# Patient Record
Sex: FEMALE | Race: WHITE | NOT HISPANIC OR LATINO | Employment: OTHER | URBAN - NONMETROPOLITAN AREA
[De-identification: names, ages, dates, MRNs, and addresses within clinical notes are randomized per-mention and may not be internally consistent; named-entity substitution may affect disease eponyms.]

---

## 2017-01-04 ENCOUNTER — OFFICE VISIT (OUTPATIENT)
Dept: CARDIAC SURGERY | Facility: CLINIC | Age: 72
End: 2017-01-04

## 2017-01-04 VITALS
BODY MASS INDEX: 39.4 KG/M2 | SYSTOLIC BLOOD PRESSURE: 130 MMHG | HEIGHT: 68 IN | HEART RATE: 40 BPM | DIASTOLIC BLOOD PRESSURE: 80 MMHG | WEIGHT: 260 LBS | OXYGEN SATURATION: 98 %

## 2017-01-04 DIAGNOSIS — I35.0 AORTIC VALVE STENOSIS, UNSPECIFIED ETIOLOGY: Primary | ICD-10-CM

## 2017-01-04 PROCEDURE — 99213 OFFICE O/P EST LOW 20 MIN: CPT | Performed by: THORACIC SURGERY (CARDIOTHORACIC VASCULAR SURGERY)

## 2017-01-04 NOTE — MR AVS SNAPSHOT
Jarvis Morgan   1/4/2017 1:45 PM   Office Visit    Dept Phone:  [phone]   Encounter #:  [id number]    Provider:  Tristan Mendez MD   Department:  [facility] CARDIOTHORACIC SURGERY                Your Full Care Plan              Your Updated Medication List          This list is accurate as of: 1/4/17  3:39 PM.  Always use your most recent med list.                allopurinol 300 MG tablet   Commonly known as:  ZYLOPRIM       calcitriol 0.25 MCG capsule   Commonly known as:  ROCALTROL       cholecalciferol 1000 UNITS tablet   Commonly known as:  VITAMIN D3       donepezil 5 MG tablet   Commonly known as:  ARICEPT       ergocalciferol 75358 UNITS capsule   Commonly known as:  ERGOCALCIFEROL       fish oil 1000 MG capsule capsule       furosemide 40 MG tablet   Commonly known as:  LASIX       HYDROcodone-acetaminophen 5-325 MG per tablet   Commonly known as:  NORCO       methIMAzole 10 MG tablet   Commonly known as:  TAPAZOLE       metoprolol tartrate 100 MG tablet   Commonly known as:  LOPRESSOR       O2   Commonly known as:  OXYGEN       PROTONIX 40 MG EC tablet   Generic drug:  pantoprazole       * CAROLANN-FIT NATURA UROSTOMY POUCH POUCH misc       * CAROLANN-FIT NATURA WAFER/FLANGE wafer       warfarin 7.5 MG tablet   Commonly known as:  COUMADIN       * Notice:  This list has 2 medication(s) that are the same as other medications prescribed for you. Read the directions carefully, and ask your doctor or other care provider to review them with you.            We Performed the Following     Ambulatory Referral to Cardiology       You Were Diagnosed With        Codes Comments    Aortic valve stenosis, unspecified etiology    -  Primary ICD-10-CM: I35.0  ICD-9-CM: 424.1       Instructions     None    Patient Instructions History      Upcoming Appointments     Visit Type Date Time Department    FOLLOW UP 1/4/2017  1:45 PM [facility] SR PAD    FOLLOW UP 2/7/2017  1:00 PM [facility]  "[facility] HRT[facility] SR PAD    FOLLOW UP 4/21/2017  9:15 AM [facility] HEART [facility] PAD      MyChart Signup     Our records indicate that you have declined [facility] sign[facility]. If you would like to sign up for MyChart, please email [email or web address] or call [phone] to obtain an activation code.             Other Info from Your Visit           Your Appointments     Feb 07, 2017  1:00 PM CST   Follow Up with Tristan Mendez MD   [facility] CARDIOTHORACIC SURGERY (--)    [address] [place] [address] [address]  [place] [address] [phone]           Arrive 15 minutes prior to appointment.            Apr 21, 2017  9:15 AM CDT   Follow Up with Elia Flores MD   [facility] HEART GROUP (--)    [address] [place] [address] [address]  [place] [address] [phone]           Arrive 15 minutes prior to appointment.              Allergies     Ciprofloxacin      Codeine      Other Allergy Itching    Cloth bandaids    Tetanus Toxoids      Tizanidine Hcl        Reason for Visit     Follow-up pt states here for f/u after lung doctor    Shortness of Breath pt has had several spells on exertion and they are getting worse       Vital Signs     Blood Pressure Pulse Height Weight Oxygen Saturation Body Mass Index    130/80 (BP Location: Right arm, Patient Position: Sitting, Cuff Size: Adult) 40 68\" (172.7 cm) 260 lb (118 kg) 98% 39.53 kg/m2    Smoking Status                   Never Smoker           Problems and Diagnoses Noted     Aortic heart valve narrowing    -  Primary        "

## 2017-01-06 ENCOUNTER — TELEPHONE (OUTPATIENT)
Dept: CARDIAC SURGERY | Facility: CLINIC | Age: 72
End: 2017-01-06

## 2017-01-06 NOTE — TELEPHONE ENCOUNTER
Have already attempted to call pts daughter her v/m is full i dont have order for any test but i do have an appt for cardiology that i have been trying to get ahold of her for if she calls and im not around apt is with mitali 1/12 @ 1015 i will att  empt to call her back again

## 2017-01-12 ENCOUNTER — OFFICE VISIT (OUTPATIENT)
Dept: CARDIOLOGY | Facility: CLINIC | Age: 72
End: 2017-01-12

## 2017-01-12 VITALS
SYSTOLIC BLOOD PRESSURE: 118 MMHG | HEIGHT: 68 IN | DIASTOLIC BLOOD PRESSURE: 80 MMHG | HEART RATE: 81 BPM | BODY MASS INDEX: 38.49 KG/M2 | WEIGHT: 254 LBS

## 2017-01-12 DIAGNOSIS — I10 ESSENTIAL HYPERTENSION: ICD-10-CM

## 2017-01-12 DIAGNOSIS — I35.0 AORTIC STENOSIS, SEVERE: Primary | ICD-10-CM

## 2017-01-12 DIAGNOSIS — R53.81 PHYSICAL DECONDITIONING: ICD-10-CM

## 2017-01-12 DIAGNOSIS — I48.20 CHRONIC ATRIAL FIBRILLATION (HCC): ICD-10-CM

## 2017-01-12 PROBLEM — R06.02 SHORTNESS OF BREATH: Status: ACTIVE | Noted: 2017-01-12

## 2017-01-12 PROCEDURE — 99213 OFFICE O/P EST LOW 20 MIN: CPT | Performed by: INTERNAL MEDICINE

## 2017-01-12 PROCEDURE — 93000 ELECTROCARDIOGRAM COMPLETE: CPT | Performed by: INTERNAL MEDICINE

## 2017-01-12 NOTE — MR AVS SNAPSHOT
Jarvis Morgan   1/12/2017 10:15 AM   Office Visit    Dept Phone:  [phone]   Encounter #:  [id number]    Provider:  Lb Murry MD   Department:  [facility] HEART GROUP                Your Full Care Plan              Your Updated Medication List          This list is accurate as of: 1/12/17 11:23 AM.  Always use your most recent med list.                allopurinol 300 MG tablet   Commonly known as:  ZYLOPRIM       calcitriol 0.25 MCG capsule   Commonly known as:  ROCALTROL       cholecalciferol 1000 UNITS tablet   Commonly known as:  VITAMIN D3       donepezil 5 MG tablet   Commonly known as:  ARICEPT       ergocalciferol 50769 UNITS capsule   Commonly known as:  ERGOCALCIFEROL       fish oil 1000 MG capsule capsule       furosemide 40 MG tablet   Commonly known as:  LASIX       HYDROcodone-acetaminophen 5-325 MG per tablet   Commonly known as:  NORCO       methIMAzole 10 MG tablet   Commonly known as:  TAPAZOLE       metoprolol tartrate 100 MG tablet   Commonly known as:  LOPRESSOR       O2   Commonly known as:  OXYGEN       PROTONIX 40 MG EC tablet   Generic drug:  pantoprazole       * CAROLANN-FIT NATURA UROSTOMY POUCH POUCH misc       * CAROLANN-FIT NATURA WAFER/FLANGE wafer       warfarin 7.5 MG tablet   Commonly known as:  COUMADIN       * Notice:  This list has 2 medication(s) that are the same as other medications prescribed for you. Read the directions carefully, and ask your doctor or other care provider to review them with you.            Instructions     None    Patient Instructions History      Upcoming Appointments     Visit Type Date Time Department    FOLLOW UP 1/12/2017 10:15 AM [facility] HEART GROUP PAD    FOLLOW UP 2/7/2017  1:00 PM [facility] HRTCHST SR PAD    FOLLOW UP 4/21/2017  9:15 AM [facility] HEART [facility] PAD      MyChart Signup     Our records indicate that you have declined [facility] signup. If you would like to sign up for MyChart, please  "email [email or web address] or call [phone] to obtain an activation code.             Other Info from Your Visit           Your Appointments     Feb 07, 2017  1:00 PM CST   Follow Up with Tristan Mendez MD   [facility] CARDIOTHORACIC SURGERY (--)    [address] [place] [address] [address]  [facility] [id number] [phone]           Arrive 15 minutes prior to appointment.            Apr 21, 2017  9:15 AM CDT   Follow Up with Elia Flores MD   [facility] HEART GROUP (--)    [address] [place] [address] [address]  [facility] [address]   [phone]           Arrive 15 minutes prior to appointment.              Allergies     Ciprofloxacin      Codeine      Other Allergy Itching    Cloth bandaids    Tetanus Toxoids      Tizanidine Hcl        Reason for Visit     Aortic Stenosis Dr Mendez referred to us for baloon procedure.    Edema feet and ankles swelling.    Shortness of Breath When she wakes up in the morning and on exertion.      Vital Signs     Blood Pressure Pulse Height Weight Body Mass Index Smoking Status    118/80 (BP Location: Right arm, Patient Position: Sitting) 81 68\" (172.7 cm) 254 lb (115 kg) 38.62 kg/m2 Never Smoker        "

## 2017-01-12 NOTE — PROGRESS NOTES
Subjective:     Encounter Date:01/12/2017      Patient ID: Viaserjio Morgan is a 71 y.o. female.  With a history of chronic atrial fibrillation, followed by Dr. Flores, severe native aortic valve stenosis, previously seen by Dr. Mendez for consideration of surgical aortic valve replacement who presents today for routine follow-up, and a second opinion regarding the patient's care surrounding her aortic valve disease.    Chief Complaint: Aortic valve stenosis    Shortness of Breath   This is a chronic problem. The problem occurs daily. The problem has been gradually improving. Associated symptoms include leg swelling. Pertinent negatives include no abdominal pain, chest pain, claudication, fever, headaches, hemoptysis, orthopnea, PND, sore throat, syncope, vomiting or wheezing. The symptoms are aggravated by exercise. She has tried rest for the symptoms. The treatment provided moderate relief. There is no history of CAD or a heart failure.   Atrial Fibrillation   Presents for follow-up visit. Symptoms include shortness of breath. Symptoms are negative for bradycardia, chest pain, dizziness, hemodynamic instability, hypertension, hypotension, palpitations, syncope, tachycardia and weakness. The symptoms have been stable. Past medical history includes atrial fibrillation. There is no history of CAD. There are no medication compliance problems.   Hypertension   This is a chronic problem. The problem is unchanged. The problem is controlled. Associated symptoms include peripheral edema and shortness of breath. Pertinent negatives include no anxiety, blurred vision, chest pain, headaches, orthopnea, palpitations or PND. There are no associated agents to hypertension. Risk factors for coronary artery disease include obesity. Past treatments include beta blockers. The current treatment provides significant improvement. Compliance problems include exercise and diet.  There is no history of CAD/MI or heart failure. Identifiable  causes of hypertension include chronic renal disease.     The patient is a history of chronic atrial fibrillation and is followed by Dr. Flores in our office and is anticoagulated and tolerating this well with controlled heart rates.  She also has a history of severe aortic valve stenosis.  She is followed with Dr. Mendez for this.  She has been considered for surgical aortic valve replacement, however, in the interim of scheduling this procedure and testing, the patient has had some decrease in her overall health status to the poor she was thought, at last office visit by Dr. Mendez, did not be an ideal candidate, at the current time, given her physical deconditioning, for a surgical aortic valve replacement.  I was then asked to see the patient to consider her for a possible balloon aortic valvuloplasty procedure.  Since last seen Dr. Mendez, the patient has lost proximally 5-10 pounds.  She notes that she has increased her physical activity level and she has changed her diet and improve her portion control on attempt to get in better health prior to possible surgical aortic valve replacement.  The patient is feeling markedly better than she did when she saw Dr. Mendez.  She denies any chest pain.  She continues to have shortness of breath and dyspnea on exertion as well as chronic lower extremity edema.  She denies any orthopnea, PND, lightheadedness, dizziness, syncope.  She has had no trouble with her medications.    Current Outpatient Prescriptions:   •  allopurinol (ZYLOPRIM) 300 MG tablet, Take 300 mg by mouth Daily., Disp: , Rfl:   •  calcitriol (ROCALTROL) 0.25 MCG capsule, Take 0.25 mcg by mouth Daily., Disp: , Rfl:   •  cholecalciferol (VITAMIN D3) 1000 UNITS tablet, Take 1,000 Units by mouth Daily., Disp: , Rfl:   •  donepezil (ARICEPT) 5 MG tablet, Take 5 mg by mouth Every Night., Disp: , Rfl:   •  ergocalciferol (ERGOCALCIFEROL) 30839 UNITS capsule, Take 50,000 Units by mouth 1 (One) Time Per Week., Disp: ,  Rfl:   •  furosemide (LASIX) 40 MG tablet, Take 40 mg by mouth Daily., Disp: , Rfl:   •  HYDROcodone-acetaminophen (NORCO) 5-325 MG per tablet, Take 1 tablet by mouth Every 6 (Six) Hours As Needed., Disp: , Rfl:   •  methimazole (TAPAZOLE) 10 MG tablet, Take 5 mg by mouth Daily., Disp: , Rfl:   •  metoprolol tartrate (LOPRESSOR) 100 MG tablet, Take 100 mg by mouth 2 (Two) Times a Day., Disp: , Rfl:   •  O2 (OXYGEN), Inhale 1 (One) Time., Disp: , Rfl:   •  Omega-3 Fatty Acids (FISH OIL) 1000 MG capsule capsule, Take  by mouth Daily With Breakfast., Disp: , Rfl:   •  Ostomy Supplies (CAROLANN-FIT NATURA UROSTOMY POUCH) POUCH misc, , Disp: , Rfl: 11  •  Ostomy Supplies (CAROLANN-FIT NATURA WAFER/FLANGE) wafer, , Disp: , Rfl:   •  pantoprazole (PROTONIX) 40 MG EC tablet, Take 40 mg by mouth Daily., Disp: , Rfl:   •  warfarin (COUMADIN) 7.5 MG tablet, Take 7.5 mg by mouth Daily. As directed by Dr. Alvarez, Disp: , Rfl:     Review of Systems   Constitution: Positive for weight loss. Negative for chills, fever, weakness and night sweats.   HENT: Negative for congestion, headaches and sore throat.    Eyes: Negative for blurred vision.   Cardiovascular: Positive for dyspnea on exertion and leg swelling. Negative for chest pain, claudication, irregular heartbeat, orthopnea, palpitations, paroxysmal nocturnal dyspnea and syncope.   Respiratory: Positive for shortness of breath. Negative for cough, hemoptysis and wheezing.    Endocrine: Negative for cold intolerance, heat intolerance, polydipsia and polyuria.   Hematologic/Lymphatic: Negative for bleeding problem. Does not bruise/bleed easily.   Gastrointestinal: Negative for abdominal pain, hematemesis, hematochezia, melena, nausea and vomiting.   Genitourinary: Negative for bladder incontinence, dysuria and hematuria.   Neurological: Negative for dizziness, loss of balance, numbness, paresthesias and seizures.       ECG 12 Lead  Date/Time: 1/12/2017 4:09 PM  Performed by: KRISTEN  "GOLDY WALKER  Authorized by: GOLDY PETERSON DENISE   Comparison: compared with previous ECG   Similar to previous ECG  Rhythm: atrial fibrillation  Rate: normal  Clinical impression: abnormal ECG  Comments: Nonspecific ST and T-wave changes             Objective:     Physical Exam   Constitutional: She is oriented to person, place, and time. She appears well-developed and well-nourished. No distress.   HENT:   Head: Normocephalic and atraumatic.   Mouth/Throat: Oropharynx is clear and moist.   Eyes: EOM are normal. Pupils are equal, round, and reactive to light.   Neck: Normal range of motion. Neck supple. No JVD present. No thyromegaly present.   Cardiovascular: Normal rate, regular rhythm, S1 normal, S2 normal and intact distal pulses.  Exam reveals no gallop and no friction rub.    Murmur heard.   Harsh midsystolic murmur is present with a grade of 2/6  at the upper right sternal border  Pulses:       Carotid pulses are 2+ on the right side, and 2+ on the left side.       Dorsalis pedis pulses are 1+ on the right side, and 1+ on the left side.        Posterior tibial pulses are 1+ on the right side, and 1+ on the left side.   Pulmonary/Chest: Effort normal and breath sounds normal.   Abdominal: Soft. Bowel sounds are normal. She exhibits no distension and no mass. There is no tenderness. There is no rebound and no guarding.   Obese   Musculoskeletal: Normal range of motion. She exhibits edema.   Neurological: She is alert and oriented to person, place, and time. No cranial nerve deficit.   Skin: Skin is warm and dry. No rash noted. No cyanosis or erythema. Nails show no clubbing.   Psychiatric: She has a normal mood and affect.   Nursing note and vitals reviewed.      Visit Vitals   • /80 (BP Location: Right arm, Patient Position: Sitting)   • Pulse 81   • Ht 68\" (172.7 cm)   • Wt 254 lb (115 kg)   • BMI 38.62 kg/m2           Assessment:          Diagnosis Plan   1. Aortic stenosis, severe     2. Chronic " atrial fibrillation     3. Essential hypertension     4. Physical deconditioning            Plan:       1.  Severe native aortic valve stenosis: The patient likely needs an aortic valve replacement as I feel like she is symptomatic from her aortic valve disease.  She was seen by Dr. Mendez and thought to be somewhat deconditioned and likely not the best candidate for an immediate surgical aortic valve replacement.  She was referred here to me, for potential evaluation of a balloon aortic valvuloplasty.  Since last seeing Dr. Mendez, she reports a 10 pound weight loss, more strict adherence to diet and exercise regimens.  She notes that she actually has improved.  Her physical strength and stamina have increased somewhat.  Her daughter supports her in this and and is keeping a close watch over her mother and helping her along in this process.  Therefore, I think, as the patient has follow-up with Dr. Mendez at February 1, I should allow for a few more weeks to see if she continues to improve.  If, by the time she visits Dr. Mendez again, she is still thought to be deconditioned to the point of not being a good candidate for surgical aortic valve at the present time, we could offer her a bridge to a surgical aortic valve procedure by means of a balloon aortic valvuloplasty.  She, although otherwise might not be high risk necessarily for surgical aortic valve procedure, might also be considered for a transcatheter aortic valve replacement if she does not improve physically.    2.  Chronic atrial fibrillation: Her heart rates are well controlled and she is chronically anticoagulated with warfarin.  Continue current medication.    3.  Essential hypertension: The patient's blood pressure is under good control.  Continue current medications.    4.  Physical deconditioning.  I have encouraged continued efforts at diet, weight loss, exercise, and for her to perform as much physical activity as possible in order to better herself  for a potential surgical aortic valve replacement.    Follow-up with me as needed.  The patient has scheduled follow-up with Dr. Mendez and Dr. Flores.  Keep these appointments.    EMR Dragon/Transcription disclaimer: Much of this encounter note is an electronic transcription/translation of spoken language to printed text. The electronic translation of spoken language may permit erroneous, or at times, nonsensical words or phrases to be inadvertently transcribed; although I have reviewed the note for such errors, some may still exist.

## 2017-01-24 NOTE — PROGRESS NOTES
"Chief Complaint   Patient presents with   • Follow-up     pt states here for f/u after lung doctor   • Shortness of Breath     pt has had several spells on exertion and they are getting worse        Since her last visit, she was seen by pulmonary.  She has been cleared with the understanding for her to have increased operative risk from a pulmonary point of view for pulmonary complications.    She reports increasing shortness of breath with exertion.  The intensity is increased and the time taken to recover is increased.  As a result, she has reduced her ambulation markedly.  She reports no significant lower extremity swelling.      Visit Vitals   • /80 (BP Location: Right arm, Patient Position: Sitting, Cuff Size: Adult)   • Pulse (!) 40   • Ht 68\" (172.7 cm)   • Wt 260 lb (118 kg)   • SpO2 98%   • BMI 39.53 kg/m2       Physical Exam:    General: NAD, depressed facies  Cardiovascular: Irregular, irregular HR on exam is 64, No rubs, or gallops.  Murmur    Pulmonary: CTAB, No wheezing, rubs, or rales.  Abdomen: soft, Non-distended, and non-tender  Extremities: warm, KABA, edematous, chronic left greater than right  Neurologic:  No focal deficits, CN II-XII intact grossly.      Impression:  Aortic valve stenosis, severe, symptomatic   Pulmonary dysfunction, restrictive- contributing but improved as her hypoxemia is better today compared to last exam  Hypercoagulable state, previous DVT, left  Atrial fibrillation   Possible physical debilitation  Apparent poort operative candidate    Medical decision making/recommendations/plan:  We discussed that she is a difficult case.  Given her host of medical problems, she will have increased risk of complications whether TAVR or SAVR is performed.  Given her debility which appears to have been progressive over the last 3 week at least, she probably is better served by not proceeding forward with SAVR.  Probably the best course of action is to consider BAV.  I discussed the " procedure and its asociated risks in broad description.  If her dyspnea improves and she can increase her strength, then SAVR will probably be her best answer.  If her dyspnea does not, then given her baseline comorbidities and physical debilitation, the best course is TAVR.    We discussed the above.  A referral will be made to Dr. Murry for BAV.  I have spoken to him direction, and try to see her in an asap effort.    We discussed the importance of durable lifestyle changes to accomplish an acceptable weight since her weight is greater than acceptable for her age and height.   I have recommended she speak with her PCP.    She is a non-smoker.    Total time: 22 minutes.  With greater than 50% time counseling the patient.

## 2017-02-07 ENCOUNTER — OFFICE VISIT (OUTPATIENT)
Dept: CARDIAC SURGERY | Facility: CLINIC | Age: 72
End: 2017-02-07

## 2017-02-07 VITALS
BODY MASS INDEX: 37.44 KG/M2 | HEIGHT: 68 IN | WEIGHT: 247 LBS | OXYGEN SATURATION: 98 % | DIASTOLIC BLOOD PRESSURE: 80 MMHG | SYSTOLIC BLOOD PRESSURE: 124 MMHG | HEART RATE: 83 BPM

## 2017-02-07 DIAGNOSIS — I87.2 VENOUS INSUFFICIENCY (CHRONIC) (PERIPHERAL): Primary | ICD-10-CM

## 2017-02-07 PROCEDURE — 99214 OFFICE O/P EST MOD 30 MIN: CPT | Performed by: THORACIC SURGERY (CARDIOTHORACIC VASCULAR SURGERY)

## 2017-02-13 ENCOUNTER — TELEPHONE (OUTPATIENT)
Dept: CARDIAC SURGERY | Facility: CLINIC | Age: 72
End: 2017-02-13

## 2017-03-07 ENCOUNTER — OFFICE VISIT (OUTPATIENT)
Dept: VASCULAR SURGERY | Facility: CLINIC | Age: 72
End: 2017-03-07

## 2017-03-07 VITALS
DIASTOLIC BLOOD PRESSURE: 72 MMHG | WEIGHT: 239 LBS | SYSTOLIC BLOOD PRESSURE: 134 MMHG | HEIGHT: 68 IN | HEART RATE: 59 BPM | BODY MASS INDEX: 36.22 KG/M2

## 2017-03-07 DIAGNOSIS — I87.8 VENOUS STASIS: ICD-10-CM

## 2017-03-07 DIAGNOSIS — Z86.718 HISTORY OF DVT OF LOWER EXTREMITY: ICD-10-CM

## 2017-03-07 DIAGNOSIS — I87.2 VENOUS (PERIPHERAL) INSUFFICIENCY: Primary | ICD-10-CM

## 2017-03-07 PROCEDURE — 99204 OFFICE O/P NEW MOD 45 MIN: CPT | Performed by: SURGERY

## 2017-03-07 NOTE — PROGRESS NOTES
03/07/2017      Tristan Mendez MD  [address]  [facility] [address]  [place] [address]    Jarvis Morgan  1945    Chief Complaint   Patient presents with   • Lower Extremity Issue     wound LLL       Dear Tristan Mendez MD:      HPI  I had the pleasure of seeing your patient Jarvis Morgan in the office today.  Thank you kindly for this consultation.  As you recall, Jarvis Morgan is a 71 y.o.  female who you are currently following for aortic valve disease.  She also has a history of a hypercoagulable state with multiple DVTs in the left lower extremity.  She has evidence of significant swelling and stasis dermatitis of that leg.  She has had a wound to her shin on her left lower extremity, which has now healed.  She has been wearing compression stockings on a daily basis.    Past Medical History   Diagnosis Date   • A-fib    • Aortic stenosis    • Arthritis    • Bradycardia    • Cancer      bladder and skin   • Cardiomegaly    • GERD (gastroesophageal reflux disease)    • Hypercalcemia    • Hyperlipidemia    • Hypertension    • Hypothyroidism    • Long term current use of anticoagulant therapy    • Palpitation    • Shortness of breath    • Sick sinus syndrome    • Sleep apnea        Past Surgical History   Procedure Laterality Date   • Hip bipolar replacement Left    • Bladder surgery       removed   • Nephrectomy radical Right      from cancer   • Hemicolectomy w/ ostomy     • Hysterectomy     • Cardiac catheterization     • Kidney surgery       right kidney removed   • Cardiac catheterization N/A 12/9/2016     Procedure: Left Heart Cath;  Surgeon: Elia Flores MD;  Location: [facility] CATH INVASIVE LOCATION;  Service:        Family History   Problem Relation Age of Onset   • Heart failure Mother    • Heart disease Father    • Stroke Father    • Hypertension Sister    • Heart failure Sister    • No Known Problems Brother    • No Known Problems Maternal Grandmother    • No Known Problems Maternal Grandfather    • No  Known Problems Paternal Grandmother    • No Known Problems Paternal Grandfather    • Hypertension Sister    • Heart failure Sister    • Hypertension Sister    • Heart failure Sister    • Hypertension Sister    • Heart failure Sister    • Hypertension Sister    • Heart failure Sister    • Hypertension Sister    • Heart failure Sister    • Gallbladder disease Brother        Social History     Social History   • Marital status:      Spouse name: N/A   • Number of children: N/A   • Years of education: N/A     Occupational History   • Not on file.     Social History Main Topics   • Smoking status: Never Smoker   • Smokeless tobacco: Never Used   • Alcohol use No   • Drug use: No   • Sexual activity: Defer     Other Topics Concern   • Not on file     Social History Narrative       Allergies   Allergen Reactions   • Ciprofloxacin    • Codeine    • Other Itching     Cloth bandaids   • Tetanus Toxoids    • Tizanidine Hcl        Prior to Admission medications    Medication Sig Start Date End Date Taking? Authorizing Provider   allopurinol (ZYLOPRIM) 300 MG tablet Take 300 mg by mouth Daily.   Yes Historical Provider, MD   calcitriol (ROCALTROL) 0.25 MCG capsule Take 0.25 mcg by mouth Daily.   Yes Historical Provider, MD   donepezil (ARICEPT) 5 MG tablet Take 5 mg by mouth Every Night.   Yes Historical Provider, MD   methimazole (TAPAZOLE) 10 MG tablet Take 5 mg by mouth Daily.   Yes Historical Provider, MD   metoprolol tartrate (LOPRESSOR) 100 MG tablet Take 100 mg by mouth 2 (Two) Times a Day.   Yes Historical Provider, MD   O2 (OXYGEN) Inhale 1 (One) Time.   Yes Historical Provider, MD   Ostomy Supplies (CAROLANN-FIT NATURA UROSTOMY POUCH) [facility]  10/25/16  Yes Historical Provider, MD   Ostomy Supplies (CAROLANN-FIT NATURA WAFER/FLANGE) wafer    Yes Historical Provider, MD   pantoprazole (PROTONIX) 40 MG EC tablet Take 40 mg by mouth Daily.   Yes Historical Provider, MD   warfarin (COUMADIN) 7.5 MG tablet Take 7.5 mg by  "mouth Daily. As directed by Dr. Alvarez   Yes Historical Provider, MD   cholecalciferol (VITAMIN D3) 1000 UNITS tablet Take 1,000 Units by mouth Daily.  3/7/17  Historical Provider, MD   ergocalciferol (ERGOCALCIFEROL) 73852 UNITS capsule Take 50,000 Units by mouth 1 (One) Time Per Week.  3/7/17  Historical Provider, MD   furosemide (LASIX) 40 MG tablet Take 40 mg by mouth Daily.  3/7/17  Historical Provider, MD   HYDROcodone-acetaminophen (NORCO) 5-325 MG per tablet Take 1 tablet by mouth Every 6 (Six) Hours As Needed.  3/7/17  Historical Provider, MD   Omega-3 Fatty Acids (FISH OIL) 1000 MG capsule capsule Take  by mouth Daily With Breakfast.  3/7/17  Historical Provider, MD       Review of Systems   Constitutional: Negative.    HENT: Negative.    Eyes: Negative.    Respiratory: Negative.    Cardiovascular: Positive for leg swelling.        Leg cramping   Gastrointestinal: Negative.    Endocrine: Negative.    Genitourinary: Negative.    Musculoskeletal: Negative.    Skin: Negative.    Allergic/Immunologic: Negative.    Neurological: Negative.    Hematological: Negative.    Psychiatric/Behavioral: Negative.        Visit Vitals   • /72   • Pulse 59   • Ht 68\" (172.7 cm)   • Wt 239 lb (108 kg)   • BMI 36.34 kg/m2     Physical Exam   Constitutional: She is oriented to person, place, and time. She appears well-developed and well-nourished.   HENT:   Head: Normocephalic and atraumatic.   Neck: Neck supple. No JVD present. Carotid bruit is not present. No thyromegaly present.   Cardiovascular: Normal rate, regular rhythm and normal heart sounds.    Pulses:       Carotid pulses are 2+ on the right side, and 2+ on the left side.       Femoral pulses are 2+ on the right side, and 2+ on the left side.       Popliteal pulses are 2+ on the right side, and 2+ on the left side.        Dorsalis pedis pulses are 2+ on the right side, and 2+ on the left side.        Posterior tibial pulses are 2+ on the right side, and 2+ on " the left side.   Chronic venous stasis changes to left lower extremity   Pulmonary/Chest: Effort normal and breath sounds normal.   Abdominal: Soft. Bowel sounds are normal. She exhibits no distension, no abdominal bruit and no mass. There is no hepatosplenomegaly. There is no tenderness.   Musculoskeletal: Normal range of motion. She exhibits no edema.   Neurological: She is alert and oriented to person, place, and time. She has normal strength. No cranial nerve deficit or sensory deficit.   Skin: Skin is warm and intact.   Nursing note and vitals reviewed.          Patient Active Problem List   Diagnosis   • Aortic stenosis, severe   • Chronic atrial fibrillation   • Mixed hyperlipidemia   • Essential hypertension   • Deep vein thrombosis (DVT) of left lower extremity   • Shortness of breath   • Physical deconditioning         ICD-10-CM ICD-9-CM   1. Venous (peripheral) insufficiency I87.2 459.81   2. Venous stasis I87.8 459.81   3. History of DVT of lower extremity Z86.718 V12.51       Lab Frequency Next Occurrence       Plan: After thoroughly evaluating Jarvis Morgan, I believe the best course of action is to proceed with some further testing which will include a 2 leg VVI.  She does have evidence of significant swelling and venous stasis.  She recently had an ulcer which was slow to heal but did finally heal.  If she has significant insufficiency on the testing then I would recommend radiofrequency ablation of the greater saphenous vein.  I will see her back in 1 week's time with her testing to see if any further treatment is necessary.  I urged her to continue to wear her stockings on a daily basis and to keep her legs elevated when she is not on them.    Thank you for allowing me to participate in the care of your patient.  Please do not hesitate with any questions or concerns.  I will keep you aware of any further encounters with Jarvis Morgan.        Sincerely yours,         Blake Martinez, DO [place]  Catrachita Izquierdo, APRN

## 2017-03-17 ENCOUNTER — OFFICE VISIT (OUTPATIENT)
Dept: VASCULAR SURGERY | Facility: CLINIC | Age: 72
End: 2017-03-17

## 2017-03-17 ENCOUNTER — HOSPITAL ENCOUNTER (OUTPATIENT)
Dept: WOMENS IMAGING | Age: 72
Discharge: HOME | End: 2017-03-17
Payer: MEDICARE

## 2017-03-17 ENCOUNTER — HOSPITAL ENCOUNTER (OUTPATIENT)
Dept: ULTRASOUND IMAGING | Facility: HOSPITAL | Age: 72
Discharge: HOME | End: 2017-03-17
Admitting: NURSE PRACTITIONER

## 2017-03-17 VITALS
SYSTOLIC BLOOD PRESSURE: 142 MMHG | HEIGHT: 68 IN | BODY MASS INDEX: 36.22 KG/M2 | WEIGHT: 239 LBS | DIASTOLIC BLOOD PRESSURE: 82 MMHG

## 2017-03-17 DIAGNOSIS — Z79.01 LONG TERM CURRENT USE OF ANTICOAGULANT THERAPY: ICD-10-CM

## 2017-03-17 DIAGNOSIS — I87.2 VENOUS (PERIPHERAL) INSUFFICIENCY: Primary | ICD-10-CM

## 2017-03-17 DIAGNOSIS — I87.2 VENOUS (PERIPHERAL) INSUFFICIENCY: ICD-10-CM

## 2017-03-17 DIAGNOSIS — Z01.818 PREOP TESTING: ICD-10-CM

## 2017-03-17 DIAGNOSIS — Z12.31 VISIT FOR SCREENING MAMMOGRAM: ICD-10-CM

## 2017-03-17 PROCEDURE — 93970 EXTREMITY STUDY: CPT | Performed by: SURGERY

## 2017-03-17 PROCEDURE — 77063 BREAST TOMOSYNTHESIS BI: CPT

## 2017-03-17 PROCEDURE — 99213 OFFICE O/P EST LOW 20 MIN: CPT | Performed by: NURSE PRACTITIONER

## 2017-03-17 PROCEDURE — 93970 EXTREMITY STUDY: CPT

## 2017-03-17 RX ORDER — ERGOCALCIFEROL 1.25 MG/1
50000 CAPSULE ORAL WEEKLY
COMMUNITY
End: 2017-11-29 | Stop reason: HOSPADM

## 2017-03-17 NOTE — H&P
03/07/2017      IRINEO Baltazar  [address] [place] [address] [address]  [place] [address]    Jarvis Morgan  1945    Chief Complaint   Patient presents with   • Follow-up     1 wk f/u with 2 leg VVI/zander knee inj since last visit-feeling better       Dear IRINEO Baltazar:      HPI  I had the pleasure of seeing your patient Jarvis Morgan in the office today.   As you recall, Jarvis Morgan is a 71 y.o.  female who you are currently following for aortic valve disease.  She also has a history of a hypercoagulable state with multiple DVTs in the left lower extremity.  She has evidence of significant swelling and stasis dermatitis of that leg.  She has had a wound to her shin on her left that healed, but recently reopened.  She has been wearing compression stockings on a daily basis, however I not sure these are enough compression.    Past Medical History   Diagnosis Date   • A-fib    • Aortic stenosis    • Arthritis    • Bradycardia    • Cancer      bladder and skin   • Cardiomegaly    • GERD (gastroesophageal reflux disease)    • Hypercalcemia    • Hyperlipidemia    • Hypertension    • Hypothyroidism    • Long term current use of anticoagulant therapy    • Palpitation    • Shortness of breath    • Sick sinus syndrome    • Sleep apnea        Past Surgical History   Procedure Laterality Date   • Hip bipolar replacement Left    • Bladder surgery       removed   • Nephrectomy radical Right      from cancer   • Hemicolectomy w/ ostomy     • Hysterectomy     • Cardiac catheterization     • Kidney surgery       right kidney removed   • Cardiac catheterization N/A 12/9/2016     Procedure: Left Heart Cath;  Surgeon: Elia Flores MD;  Location:  [facility] CATH INVASIVE LOCATION;  Service:        Family History   Problem Relation Age of Onset   • Heart failure Mother    • Heart disease Father    • Stroke Father    • Hypertension Sister    • Heart failure Sister    • No Known Problems Brother    • No Known Problems Maternal  Grandmother    • No Known Problems Maternal Grandfather    • No Known Problems Paternal Grandmother    • No Known Problems Paternal Grandfather    • Hypertension Sister    • Heart failure Sister    • Hypertension Sister    • Heart failure Sister    • Hypertension Sister    • Heart failure Sister    • Hypertension Sister    • Heart failure Sister    • Hypertension Sister    • Heart failure Sister    • Gallbladder disease Brother        Social History     Social History   • Marital status:      Spouse name: N/A   • Number of children: N/A   • Years of education: N/A     Occupational History   • Not on file.     Social History Main Topics   • Smoking status: Never Smoker   • Smokeless tobacco: Never Used   • Alcohol use No   • Drug use: No   • Sexual activity: Defer     Other Topics Concern   • Not on file     Social History Narrative       Allergies   Allergen Reactions   • Ciprofloxacin    • Codeine    • Other Itching     Cloth bandaids   • Tetanus Toxoids    • Tizanidine Hcl        Prior to Admission medications    Medication Sig Start Date End Date Taking? Authorizing Provider   allopurinol (ZYLOPRIM) 300 MG tablet Take 300 mg by mouth Daily.   Yes Historical Provider, MD   calcitriol (ROCALTROL) 0.25 MCG capsule Take 0.25 mcg by mouth Daily.   Yes Historical Provider, MD   donepezil (ARICEPT) 5 MG tablet Take 5 mg by mouth Every Night.   Yes Historical Provider, MD   methimazole (TAPAZOLE) 10 MG tablet Take 5 mg by mouth Daily.   Yes Historical Provider, MD   metoprolol tartrate (LOPRESSOR) 100 MG tablet Take 100 mg by mouth 2 (Two) Times a Day.   Yes Historical Provider, MD   O2 (OXYGEN) Inhale 1 (One) Time.   Yes Historical Provider, MD   Ostomy Supplies (CAROLANN-FIT NATURA UROSTOMY POUCH) POUCH misc  10/25/16  Yes Historical Provider, MD   Ostomy Supplies (CAROLANN-FIT NATURA WAFER/FLANGE) wafer    Yes Historical Provider, MD   pantoprazole (PROTONIX) 40 MG EC tablet Take 40 mg by mouth Daily.   Yes Historical  "Provider, MD   warfarin (COUMADIN) 7.5 MG tablet Take 7.5 mg by mouth Daily. As directed by Dr. Alvarez   Yes Historical Provider, MD   cholecalciferol (VITAMIN D3) 1000 UNITS tablet Take 1,000 Units by mouth Daily.  3/7/17  Historical Provider, MD   ergocalciferol (ERGOCALCIFEROL) 87311 UNITS capsule Take 50,000 Units by mouth 1 (One) Time Per Week.  3/7/17  Historical Provider, MD   furosemide (LASIX) 40 MG tablet Take 40 mg by mouth Daily.  3/7/17  Historical Provider, MD   HYDROcodone-acetaminophen (NORCO) 5-325 MG per tablet Take 1 tablet by mouth Every 6 (Six) Hours As Needed.  3/7/17  Historical Provider, MD   Omega-3 Fatty Acids (FISH OIL) 1000 MG capsule capsule Take  by mouth Daily With Breakfast.  3/7/17  Historical Provider, MD       Review of Systems   Constitutional: Negative.    HENT: Negative.    Eyes: Negative.    Respiratory: Negative.    Cardiovascular: Positive for leg swelling.        Leg cramping   Gastrointestinal: Negative.    Endocrine: Negative.    Genitourinary: Negative.    Musculoskeletal: Negative.    Skin: Negative.    Allergic/Immunologic: Negative.    Neurological: Negative.    Hematological: Negative.    Psychiatric/Behavioral: Negative.        Visit Vitals   • /82   • Ht 68\" (172.7 cm)   • Wt 239 lb (108 kg)   • BMI 36.34 kg/m2     Physical Exam   Constitutional: She is oriented to person, place, and time. She appears well-developed and well-nourished.   HENT:   Head: Normocephalic and atraumatic.   Neck: Neck supple. No JVD present. Carotid bruit is not present. No thyromegaly present.   Cardiovascular: Normal rate, regular rhythm and normal heart sounds.    Pulses:       Carotid pulses are 2+ on the right side, and 2+ on the left side.       Femoral pulses are 2+ on the right side, and 2+ on the left side.       Popliteal pulses are 2+ on the right side, and 2+ on the left side.        Dorsalis pedis pulses are 2+ on the right side, and 2+ on the left side.        Posterior " tibial pulses are 2+ on the right side, and 2+ on the left side.   Chronic venous stasis changes to left lower extremity   Pulmonary/Chest: Effort normal and breath sounds normal.   Abdominal: Soft. Bowel sounds are normal. She exhibits no distension, no abdominal bruit and no mass. There is no hepatosplenomegaly. There is no tenderness.   Musculoskeletal: Normal range of motion. She exhibits no edema.        Legs:  Neurological: She is alert and oriented to person, place, and time. She has normal strength. No cranial nerve deficit or sensory deficit.   Skin: Skin is warm and intact.   Nursing note and vitals reviewed.    Diagnostic Data:  Noninvasive testing including a 2 leg venous valvular insufficiency study that shows greater than 0.5 seconds of reflux to bilateral lower extremities with bilateral lesser saphenous vein reflux is well.      Patient Active Problem List   Diagnosis   • Aortic stenosis, severe   • Chronic atrial fibrillation   • Mixed hyperlipidemia   • Essential hypertension   • Deep vein thrombosis (DVT) of left lower extremity   • Shortness of breath   • Physical deconditioning         ICD-10-CM ICD-9-CM   1. Venous (peripheral) insufficiency I87.2 459.81   2. Preop testing Z01.818 V72.84   3. Long term current use of anticoagulant therapy Z79.01 V58.61       Lab Frequency Next Occurrence       Plan: After thoroughly evaluating Jarvis Morgan, I believe the best course of action is to proceed with a radiofrequency ablation of the left lower extremity She does have evidence of significant swelling and venous stasis.  She recently had an ulcer which was slow to heal but did finally heal, but has reopened.  I will try to get her juxta lite compression.  Risks of radiofrequency ablation include, but are not limited to, bleeding, infection, vessel damage, nerve damage, DVT, phlebitis, and pulmonary embolus.  The patient understands these risks and wishes to proceed with procedure.  I urged her to  continue to wear her stockings on a daily basis and to keep her legs elevated when she is not on them. We will schedule this over the next 1-2 weeks.     Thank you for allowing me to participate in the care of your patient.  Please do not hesitate with any questions or concerns.  I will keep you aware of any further encounters with Jarvis Morgan.        Sincerely yours,         IRINEO Simmons APRN

## 2017-03-17 NOTE — PROGRESS NOTES
"03/07/2017      IRINEO Baltazar  [address] [place] [address] [address]  [place] [address]    Jarvis Morgan  1945    Chief Complaint   Patient presents with   • Follow-up     1 wk f/u with 2 leg VVI/zander knee inj since last visit-feeling better       Dear IRINEO Baltazar:      HPI  I had the pleasure of seeing your patient Jarvis Morgan in the office today.   As you recall, Jarvis Morgan is a 71 y.o.  female who you are currently following for aortic valve disease.  She also has a history of a hypercoagulable state with multiple DVTs in the left lower extremity.  She has evidence of significant swelling and stasis dermatitis of that leg.  She has had a wound to her shin on her left that healed, but recently reopened.  She has been wearing compression stockings on a daily basis, however I not sure these are enough compression.        Review of Systems   Constitutional: Negative.    HENT: Negative.    Eyes: Negative.    Respiratory: Negative.    Cardiovascular: Positive for leg swelling.        Leg cramping   Gastrointestinal: Negative.    Endocrine: Negative.    Genitourinary: Negative.    Musculoskeletal: Negative.    Skin: Positive for wound.   Allergic/Immunologic: Negative.    Neurological: Negative.    Hematological: Negative.    Psychiatric/Behavioral: Negative.        Visit Vitals   • /82   • Ht 68\" (172.7 cm)   • Wt 239 lb (108 kg)   • BMI 36.34 kg/m2     Physical Exam   Constitutional: She is oriented to person, place, and time. She appears well-developed and well-nourished.   HENT:   Head: Normocephalic and atraumatic.   Neck: Neck supple. No JVD present. Carotid bruit is not present. No thyromegaly present.   Cardiovascular: Normal rate, regular rhythm and normal heart sounds.    Pulses:       Carotid pulses are 2+ on the right side, and 2+ on the left side.       Femoral pulses are 2+ on the right side, and 2+ on the left side.       Popliteal pulses are 2+ on the right side, and 2+ on " the left side.        Dorsalis pedis pulses are 2+ on the right side, and 2+ on the left side.        Posterior tibial pulses are 2+ on the right side, and 2+ on the left side.   Chronic venous stasis changes to left lower extremity   Pulmonary/Chest: Effort normal and breath sounds normal.   Abdominal: Soft. Bowel sounds are normal. She exhibits no distension, no abdominal bruit and no mass. There is no hepatosplenomegaly. There is no tenderness.   Musculoskeletal: Normal range of motion. She exhibits no edema.        Legs:  Neurological: She is alert and oriented to person, place, and time. She has normal strength. No cranial nerve deficit or sensory deficit.   Skin: Skin is warm and intact.   Nursing note and vitals reviewed.    Diagnostic Data:  Noninvasive testing including a 2 leg venous valvular insufficiency study that shows greater than 0.5 seconds of reflux to bilateral lower extremities with bilateral lesser saphenous vein reflux is well.      Patient Active Problem List   Diagnosis   • Aortic stenosis, severe   • Chronic atrial fibrillation   • Mixed hyperlipidemia   • Essential hypertension   • Deep vein thrombosis (DVT) of left lower extremity   • Shortness of breath   • Physical deconditioning         ICD-10-CM ICD-9-CM   1. Venous (peripheral) insufficiency I87.2 459.81   2. Preop testing Z01.818 V72.84   3. Long term current use of anticoagulant therapy Z79.01 V58.61       Lab Frequency Next Occurrence       Plan: After thoroughly evaluating Jarvis Morgan, I believe the best course of action is to proceed with a radiofrequency ablation of the left lower extremity She does have evidence of significant swelling and venous stasis.  She recently had an ulcer which was slow to heal but did finally heal, but has reopened.  I will try to get her juxta lite compression.  Risks of radiofrequency ablation include, but are not limited to, bleeding, infection, vessel damage, nerve damage, DVT, phlebitis, and  pulmonary embolus.  The patient understands these risks and wishes to proceed with procedure.  She  I urged her to continue to wear her stockings on a daily basis and to keep her legs elevated when she is not on them. We will schedule this over the next 1-2 weeks.     Thank you for allowing me to participate in the care of your patient.  Please do not hesitate with any questions or concerns.  I will keep you aware of any further encounters with Jarvis Morgan.        Sincerely yours,         IRINEO Simmons APRN

## 2017-04-03 ENCOUNTER — APPOINTMENT (OUTPATIENT)
Dept: PREADMISSION TESTING | Facility: HOSPITAL | Age: 72
End: 2017-04-03

## 2017-04-03 ENCOUNTER — HOSPITAL ENCOUNTER (OUTPATIENT)
Dept: GENERAL RADIOLOGY | Facility: HOSPITAL | Age: 72
Discharge: HOME | End: 2017-04-03
Admitting: NURSE PRACTITIONER

## 2017-04-03 VITALS
HEART RATE: 54 BPM | BODY MASS INDEX: 33.47 KG/M2 | RESPIRATION RATE: 18 BRPM | HEIGHT: 69 IN | SYSTOLIC BLOOD PRESSURE: 120 MMHG | WEIGHT: 226 LBS | DIASTOLIC BLOOD PRESSURE: 83 MMHG | OXYGEN SATURATION: 100 %

## 2017-04-03 DIAGNOSIS — I87.2 VENOUS (PERIPHERAL) INSUFFICIENCY: ICD-10-CM

## 2017-04-03 DIAGNOSIS — Z79.01 LONG TERM CURRENT USE OF ANTICOAGULANT THERAPY: ICD-10-CM

## 2017-04-03 DIAGNOSIS — Z01.818 PREOP TESTING: ICD-10-CM

## 2017-04-03 LAB
ANION GAP SERPL CALCULATED.3IONS-SCNC: 10 MMOL/L (ref 4–13)
APTT PPP: 45.8 SECONDS (ref 24.1–34.8)
BASOPHILS # BLD AUTO: 0.01 10*3/MM3 (ref 0–0.2)
BASOPHILS NFR BLD AUTO: 0.2 % (ref 0–2)
BUN BLD-MCNC: 38 MG/DL (ref 5–21)
BUN/CREAT SERPL: 23.5 (ref 7–25)
CALCIUM SPEC-SCNC: 9.7 MG/DL (ref 8.4–10.4)
CHLORIDE SERPL-SCNC: 101 MMOL/L (ref 98–110)
CO2 SERPL-SCNC: 31 MMOL/L (ref 24–31)
CREAT BLD-MCNC: 1.62 MG/DL (ref 0.5–1.4)
DEPRECATED RDW RBC AUTO: 46.6 FL (ref 40–54)
EOSINOPHIL # BLD AUTO: 0.2 10*3/MM3 (ref 0–0.7)
EOSINOPHIL NFR BLD AUTO: 3 % (ref 0–4)
ERYTHROCYTE [DISTWIDTH] IN BLOOD BY AUTOMATED COUNT: 13.3 % (ref 12–15)
GFR SERPL CREATININE-BSD FRML MDRD: 31 ML/MIN/1.73
GLUCOSE BLD-MCNC: 102 MG/DL (ref 70–100)
HCT VFR BLD AUTO: 39.6 % (ref 37–47)
HGB BLD-MCNC: 13.2 G/DL (ref 12–16)
IMM GRANULOCYTES # BLD: 0.01 10*3/MM3 (ref 0–0.03)
IMM GRANULOCYTES NFR BLD: 0.2 % (ref 0–5)
LYMPHOCYTES # BLD AUTO: 1.44 10*3/MM3 (ref 0.72–4.86)
LYMPHOCYTES NFR BLD AUTO: 22 % (ref 15–45)
MCH RBC QN AUTO: 32.3 PG (ref 28–32)
MCHC RBC AUTO-ENTMCNC: 33.3 G/DL (ref 33–36)
MCV RBC AUTO: 96.8 FL (ref 82–98)
MONOCYTES # BLD AUTO: 0.6 10*3/MM3 (ref 0.19–1.3)
MONOCYTES NFR BLD AUTO: 9.1 % (ref 4–12)
NEUTROPHILS # BLD AUTO: 4.3 10*3/MM3 (ref 1.87–8.4)
NEUTROPHILS NFR BLD AUTO: 65.5 % (ref 39–78)
PLATELET # BLD AUTO: 190 10*3/MM3 (ref 130–400)
PMV BLD AUTO: 9.7 FL (ref 6–12)
POTASSIUM BLD-SCNC: 4.9 MMOL/L (ref 3.5–5.3)
RBC # BLD AUTO: 4.09 10*6/MM3 (ref 4.2–5.4)
SODIUM BLD-SCNC: 142 MMOL/L (ref 135–145)
WBC NRBC COR # BLD: 6.56 10*3/MM3 (ref 4.8–10.8)

## 2017-04-03 PROCEDURE — 36415 COLL VENOUS BLD VENIPUNCTURE: CPT

## 2017-04-03 PROCEDURE — 80048 BASIC METABOLIC PNL TOTAL CA: CPT | Performed by: NURSE PRACTITIONER

## 2017-04-03 PROCEDURE — 85730 THROMBOPLASTIN TIME PARTIAL: CPT | Performed by: NURSE PRACTITIONER

## 2017-04-03 PROCEDURE — 93005 ELECTROCARDIOGRAM TRACING: CPT

## 2017-04-03 PROCEDURE — 85025 COMPLETE CBC W/AUTO DIFF WBC: CPT | Performed by: NURSE PRACTITIONER

## 2017-04-03 PROCEDURE — 71020 HC CHEST PA AND LATERAL: CPT

## 2017-04-03 PROCEDURE — 93010 ELECTROCARDIOGRAM REPORT: CPT | Performed by: INTERNAL MEDICINE

## 2017-04-03 RX ORDER — TRIAMTERENE AND HYDROCHLOROTHIAZIDE 37.5; 25 MG/1; MG/1
1 CAPSULE ORAL EVERY MORNING
COMMUNITY
End: 2017-08-03

## 2017-04-03 RX ORDER — CHLORAL HYDRATE 500 MG
1000 CAPSULE ORAL
Status: ON HOLD | COMMUNITY
End: 2017-04-10

## 2017-04-03 RX ORDER — FUROSEMIDE 40 MG/1
40 TABLET ORAL DAILY
COMMUNITY
End: 2018-01-04 | Stop reason: HOSPADM

## 2017-04-03 NOTE — DISCHARGE INSTRUCTIONS
DAY OF SURGERY INSTRUCTIONS        YOUR SURGEON: dr noble      PROCEDURE: left saphenous vein radio frequency ablation    DATE OF SURGERY: 4/10/2017    ARRIVAL TIME: AS DIRECTED BY OFFICE    DAY OF SURGERY TAKE ONLY THESE MEDICATIONS: metoprolol        BEFORE YOU COME TO THE HOSPITAL  (Pre-op instructions)  • Do not eat, drink, smoke or chew gum after midnight the night before surgery.  This also includes no mints.  • Morning of surgery take only the medicines you have been instructed with a sip of water unless otherwise instructed  by your physician.  • Do not shave, wear makeup or dark nail polish.  • Remove all jewelry including rings.  • Leave anything you consider valuable at home.  • Leave your suitcase in the car until after your surgery.  • Bring the following with you if applicable:  o Picture ID and insurance, Medicare or Medicaid cards  o Co-pay/deductible required by insurance (cash, check, credit card)  o Medications (no narcotics) in original bottles (not a list) including all over-the-counter medications.  o Copy of advance directive, living will or power-of- documents if not brought to PAT  o CPAP or BIPAP mask and tubing  o Skin prep instruction sheet  o Relaxation aids (MP3 player, book, magazine)  • Confirm your arrival time with you surgeon the day before your surgery (surgery times are subject to change)  • On the day of surgery check in at registration located at the main entrance of the hospital.       Outpatient Surgery Guidelines, Adult  Outpatient procedures are those for which the person having the procedure is allowed to go home the same day as the procedure. Various procedures are done on an outpatient basis. You should follow some general guidelines if you will be having an outpatient procedure.  LET YOUR HEALTH CARE PROVIDER KNOW ABOUT:  · Any allergies you have.  · All medicines you are taking, including vitamins, herbs, eye drops, creams, and over-the-counter  medicines.  · Previous problems you or members of your family have had with the use of anesthetics.  · Any blood disorders you have.  · Previous surgeries you have had.  · Medical conditions you have.  RISKS AND COMPLICATIONS  Your health care provider will discuss possible risks and complications with you before surgery. Common risks and complications include:    · Problems due to the use of anesthetics.  · Blood loss and replacement (does not apply to minor surgical procedures).  · Temporary increase in pain due to surgery.  · Uncorrected pain or problems that the surgery was meant to correct.  · Infection.  · New damage.  BEFORE THE PROCEDURE  · Ask your health care provider about changing or stopping your regular medicines. You may need to stop taking certain medicines in the days or weeks before the procedure.  · Stop smoking at least 2 weeks before surgery. This lowers your risk for complications during and after surgery. Ask your health care provider for help with this if needed.  · Eat your usual meals and a light supper the day before surgery. Continue fluid intake. Do not drink alcohol.  · Do not eat or drink after midnight the night before your surgery.   · Arrange for someone to take you home and to stay with you for 24 hours after the procedure. Medicine given for your procedure may affect your ability to drive or to care for yourself.  · Call your health care provider's office if you develop an illness or problem that may prevent you from safely having your procedure.  AFTER THE PROCEDURE  After surgery, you will be taken to a recovery area, where your progress will be monitored. If there are no complications, you will be allowed to go home when you are awake, stable, and taking fluids well. You may have numbness around the surgical site. Healing will take some time. You will have tenderness at the surgical site and may have some swelling and bruising. You may also have some nausea.  HOME CARE  INSTRUCTIONS  · Do not drive for 24 hours, or as directed by your health care provider. Do not drive while taking prescription pain medicines.  · Do not drink alcohol for 24 hours.  · Do not make important decisions or sign legal documents for 24 hours.  · You may resume a normal diet and activities as directed.  · Do not lift anything heavier than 10 pounds (4.5 kg) or play contact sports until your health care provider says it is okay.  · Change your bandages (dressings) as directed.  · Only take over-the-counter or prescription medicines as directed by your health care provider.  · Follow up with your health care provider as directed.  SEEK MEDICAL CARE IF:  · You have increased bleeding (more than a small spot) from the surgical site.  · You have redness, swelling, or increasing pain in the wound.  · You see pus coming from the wound.  · You have a fever.  · You notice a bad smell coming from the wound or dressing.  · You feel lightheaded or faint.  · You develop a rash.  · You have trouble breathing.  · You develop allergies.  MAKE SURE YOU:  · Understand these instructions.  · Will watch your condition.  · Will get help right away if you are not doing well or get worse.     This information is not intended to replace advice given to you by your health care provider. Make sure you discuss any questions you have with your health care provider.     Document Released: 09/12/2002 Document Revised: 05/03/2016 Document Reviewed: 05/22/2014  [employer] Interactive Patient Education ©2016 [employer]       Fall Prevention in Hospitals, Adult  As a hospital patient, your condition and the treatments you receive can increase your risk for falls. Some additional risk factors for falls in a hospital include:  · Being in an unfamiliar environment.  · Being on bed rest.  · Your surgery.  · Taking certain medicines.  · Your tubing requirements, such as intravenous (IV) therapy or catheters.  It is important that you learn how  to decrease fall risks while at the hospital. Below are important tips that can help prevent falls.  SAFETY TIPS FOR PREVENTING FALLS  Talk about your risk of falling.  · Ask your health care provider why you are at risk for falling. Is it your medicine, illness, tubing placement, or something else?  · Make a plan with your health care provider to keep you safe from falls.  · Ask your health care provider or pharmacist about side effects of your medicines. Some medicines can make you dizzy or affect your coordination.  Ask for help.  · Ask for help before getting out of bed. You may need to press your call button.  · Ask for assistance in getting safely to the toilet.  · Ask for a walker or cane to be put at your bedside. Ask that most of the side rails on your bed be placed up before your health care provider leaves the room.  · Ask family or friends to sit with you.  · Ask for things that are out of your reach, such as your glasses, hearing aids, telephone, bedside table, or call button.  Follow these tips to avoid falling:  · Stay lying or seated, rather than standing, while waiting for help.  · Wear rubber-soled slippers or shoes whenever you walk in the hospital.  · Avoid quick, sudden movements.  ¨ Change positions slowly.  ¨ Sit on the side of your bed before standing.  ¨ Stand up slowly and wait before you start to walk.  · Let your health care provider know if there is a spill on the floor.  · Pay careful attention to the medical equipment, electrical cords, and tubes around you.  · When you need help, use your call button by your bed or in the bathroom. Wait for one of your health care providers to help you.  · If you feel dizzy or unsure of your footing, return to bed and wait for assistance.  · Avoid being distracted by the TV, telephone, or another person in your room.  · Do not lean or support yourself on rolling objects, such as IV poles or bedside tables.     This information is not intended to  replace advice given to you by your health care provider. Make sure you discuss any questions you have with your health care provider.     Document Released: 12/15/2001 Document Revised: 01/08/2016 Document Reviewed: 08/25/2013  [employer] Interactive Patient Education ©2016 [employer]       Surgical Site Infections FAQs  What is a Surgical Site Infection (SSI)?  A surgical site infection is an infection that occurs after surgery in the part of the body where the surgery took place. Most patients who have surgery do not develop an infection. However, infections develop in about 1 to 3 out of every 100 patients who have surgery.  Some of the common symptoms of a surgical site infection are:  · Redness and pain around the area where you had surgery  · Drainage of cloudy fluid from your surgical wound  · Fever  Can SSIs be treated?  Yes. Most surgical site infections can be treated with antibiotics. The antibiotic given to you depends on the bacteria (germs) causing the infection. Sometimes patients with SSIs also need another surgery to treat the infection.  What are some of the things that hospitals are doing to prevent SSIs?  To prevent SSIs, doctors, nurses, and other healthcare providers:  · Clean their hands and arms up to their elbows with an antiseptic agent just before the surgery.  · Clean their hands with soap and water or an alcohol-based hand rub before and after caring for each patient.  · May remove some of your hair immediately before your surgery using electric clippers if the hair is in the same area where the procedure will occur. They should not shave you with a razor.  · Wear special hair covers, masks, gowns, and gloves during surgery to keep the surgery area clean.  · Give you antibiotics before your surgery starts. In most cases, you should get antibiotics within 60 minutes before the surgery starts and the antibiotics should be stopped within 24 hours after surgery.  · Clean the skin at the  site of your surgery with a special soap that kills germs.  What can I do to help prevent SSIs?  Before your surgery:  · Tell your doctor about other medical problems you may have. Health problems such as allergies, diabetes, and obesity could affect your surgery and your treatment.  · Quit smoking. Patients who smoke get more infections. Talk to your doctor about how you can quit before your surgery.  · Do not shave near where you will have surgery. Shaving with a razor can irritate your skin and make it easier to develop an infection.  At the time of your surgery:  · Speak up if someone tries to shave you with a razor before surgery. Ask why you need to be shaved and talk with your surgeon if you have any concerns.  · Ask if you will get antibiotics before surgery.  After your surgery:  · Make sure that your healthcare providers clean their hands before examining you, either with soap and water or an alcohol-based hand rub.  · If you do not see your providers clean their hands, please ask them to do so.  · Family and friends who visit you should not touch the surgical wound or dressings.  · Family and friends should clean their hands with soap and water or an alcohol-based hand rub before and after visiting you. If you do not see them clean their hands, ask them to clean their hands.  What do I need to do when I go home from the hospital?  · Before you go home, your doctor or nurse should explain everything you need to know about taking care of your wound. Make sure you understand how to care for your wound before you leave the hospital.  · Always clean your hands before and after caring for your wound.  · Before you go home, make sure you know who to contact if you have questions or problems after you get home.  · If you have any symptoms of an infection, such as redness and pain at the surgery site, drainage, or fever, call your doctor immediately.  If you have additional questions, please ask your doctor or  nurse.  Developed and co-sponsored by The Society for Healthcare Epidemiology of [place] (SHEA); Infectious Diseases Society of [place] (IDSA); American Hospital Association; Association for Professionals in Infection Control and Epidemiology (APIC); Centers for Disease Control and Prevention (CDC); and The Joint Commission.     This information is not intended to replace advice given to you by your health care provider. Make sure you discuss any questions you have with your health care provider.     Document Released: 12/23/2014 Document Revised: 01/08/2016 Document Reviewed: 03/02/2016  [employer] Interactive Patient Education ©2016 [employer]       [facility]  CHG 4% Patient Instruction Sheet    Preparing the Skin Before Surgery  Preparing or “prepping” skin before surgery can reduce the risk of infection at the surgical site. To make the process easier,[facility] has chosen 4% Chlorhexidine Gluconate (CHG) antiseptic solution.   The steps below outline the prepping process and should be carefully followed.                                                                                                                                                      Prep the skin at the following time(s):                                                      We recommend you shower the night before surgery, and again the morning of surgery with the 4% CHG antiseptic solution using half of the bottle and a cloth each time.  Dress in clean clothes/sleepwear after showering.  See instructions below for application.          Do not apply any lotions or moisturizers.       Do not shave the area to be prepped for at least 2 days prior to surgery.    Clipping the hair may be done immediately prior to your surgery at the hospital    if needed.    Directions:  Thoroughly rinse your body with water.  Apply 4% CHG to a cloth and wash skin gently, paying special attention to the operative site.  Rinse again thoroughly.  Once you  have begun using this product do not apply anything else to your skin. If itching or redness persists, rinse affected areas and discontinue use.    When using this product:  • Keep out of eyes, ears, and mouth.  • If solution should contact these areas, rinse out promptly and thoroughly with water.  • For external use only.  • Do not use in genital area, on your face or head.      PATIENT/FAMILY/RESPONSIBLE PARTY VERBALIZES UNDERSTANDING OF ABOVE EDUCATION

## 2017-04-10 ENCOUNTER — ANESTHESIA EVENT (OUTPATIENT)
Dept: PERIOP | Facility: HOSPITAL | Age: 72
End: 2017-04-10

## 2017-04-10 ENCOUNTER — HOSPITAL ENCOUNTER (OUTPATIENT)
Facility: HOSPITAL | Age: 72
Discharge: HOME | End: 2017-04-10
Attending: SURGERY | Admitting: SURGERY

## 2017-04-10 ENCOUNTER — APPOINTMENT (OUTPATIENT)
Dept: ULTRASOUND IMAGING | Facility: HOSPITAL | Age: 72
End: 2017-04-10

## 2017-04-10 ENCOUNTER — ANESTHESIA (OUTPATIENT)
Dept: PERIOP | Facility: HOSPITAL | Age: 72
End: 2017-04-10

## 2017-04-10 ENCOUNTER — APPOINTMENT (OUTPATIENT)
Dept: INTERVENTIONAL RADIOLOGY/VASCULAR | Facility: HOSPITAL | Age: 72
End: 2017-04-10

## 2017-04-10 VITALS
SYSTOLIC BLOOD PRESSURE: 129 MMHG | DIASTOLIC BLOOD PRESSURE: 82 MMHG | HEART RATE: 76 BPM | TEMPERATURE: 97.7 F | OXYGEN SATURATION: 93 % | RESPIRATION RATE: 20 BRPM

## 2017-04-10 DIAGNOSIS — I87.2 VENOUS INSUFFICIENCY: ICD-10-CM

## 2017-04-10 DIAGNOSIS — Z01.818 PREOP TESTING: ICD-10-CM

## 2017-04-10 DIAGNOSIS — I87.2 VENOUS (PERIPHERAL) INSUFFICIENCY: ICD-10-CM

## 2017-04-10 LAB
ABO GROUP BLD: NORMAL
BLD GP AB SCN SERPL QL: NEGATIVE
INR PPP: 1.56 (ref 0.91–1.09)
PROTHROMBIN TIME: 19.2 SECONDS (ref 11.9–14.6)
RH BLD: POSITIVE

## 2017-04-10 PROCEDURE — C1769 GUIDE WIRE: HCPCS | Performed by: SURGERY

## 2017-04-10 PROCEDURE — C1894 INTRO/SHEATH, NON-LASER: HCPCS | Performed by: SURGERY

## 2017-04-10 PROCEDURE — 25010000002 PHENYLEPHRINE PER 1 ML: Performed by: NURSE ANESTHETIST, CERTIFIED REGISTERED

## 2017-04-10 PROCEDURE — 25010000002 PROPOFOL 10 MG/ML EMULSION: Performed by: NURSE ANESTHETIST, CERTIFIED REGISTERED

## 2017-04-10 PROCEDURE — 85610 PROTHROMBIN TIME: CPT | Performed by: NURSE PRACTITIONER

## 2017-04-10 PROCEDURE — 25010000002 PROPOFOL 1000 MG/ML EMULSION: Performed by: NURSE ANESTHETIST, CERTIFIED REGISTERED

## 2017-04-10 PROCEDURE — 76937 US GUIDE VASCULAR ACCESS: CPT

## 2017-04-10 PROCEDURE — C1888 ENDOVAS NON-CARDIAC ABL CATH: HCPCS | Performed by: SURGERY

## 2017-04-10 PROCEDURE — 36476 ENDOVENOUS RF VEIN ADD-ON: CPT | Performed by: SURGERY

## 2017-04-10 PROCEDURE — 0 IOVERSOL 68 % SOLUTION: Performed by: SURGERY

## 2017-04-10 PROCEDURE — 36475 ENDOVENOUS RF 1ST VEIN: CPT | Performed by: SURGERY

## 2017-04-10 PROCEDURE — 86900 BLOOD TYPING SEROLOGIC ABO: CPT | Performed by: NURSE PRACTITIONER

## 2017-04-10 PROCEDURE — 86901 BLOOD TYPING SEROLOGIC RH(D): CPT | Performed by: NURSE PRACTITIONER

## 2017-04-10 PROCEDURE — 25010000003 CEFAZOLIN PER 500 MG: Performed by: NURSE PRACTITIONER

## 2017-04-10 PROCEDURE — 75820 VEIN X-RAY ARM/LEG: CPT

## 2017-04-10 PROCEDURE — 25010000002 MIDAZOLAM PER 1 MG: Performed by: NURSE ANESTHETIST, CERTIFIED REGISTERED

## 2017-04-10 PROCEDURE — 25010000002 FENTANYL CITRATE (PF) 100 MCG/2ML SOLUTION: Performed by: NURSE ANESTHETIST, CERTIFIED REGISTERED

## 2017-04-10 PROCEDURE — 86850 RBC ANTIBODY SCREEN: CPT | Performed by: NURSE PRACTITIONER

## 2017-04-10 RX ORDER — HYDRALAZINE HYDROCHLORIDE 20 MG/ML
5 INJECTION INTRAMUSCULAR; INTRAVENOUS
Status: CANCELLED | OUTPATIENT
Start: 2017-04-10

## 2017-04-10 RX ORDER — MIDAZOLAM HYDROCHLORIDE 1 MG/ML
1 INJECTION INTRAMUSCULAR; INTRAVENOUS
Status: DISCONTINUED | OUTPATIENT
Start: 2017-04-10 | End: 2017-04-10 | Stop reason: HOSPADM

## 2017-04-10 RX ORDER — ONDANSETRON 2 MG/ML
4 INJECTION INTRAMUSCULAR; INTRAVENOUS ONCE AS NEEDED
Status: CANCELLED | OUTPATIENT
Start: 2017-04-10

## 2017-04-10 RX ORDER — PROPOFOL 10 MG/ML
VIAL (ML) INTRAVENOUS AS NEEDED
Status: DISCONTINUED | OUTPATIENT
Start: 2017-04-10 | End: 2017-04-10 | Stop reason: SURG

## 2017-04-10 RX ORDER — FENTANYL CITRATE 50 UG/ML
25 INJECTION, SOLUTION INTRAMUSCULAR; INTRAVENOUS
Status: DISCONTINUED | OUTPATIENT
Start: 2017-04-10 | End: 2017-04-10 | Stop reason: HOSPADM

## 2017-04-10 RX ORDER — MEPERIDINE HYDROCHLORIDE 25 MG/ML
12.5 INJECTION INTRAMUSCULAR; INTRAVENOUS; SUBCUTANEOUS
Status: CANCELLED | OUTPATIENT
Start: 2017-04-10 | End: 2017-04-11

## 2017-04-10 RX ORDER — LABETALOL HYDROCHLORIDE 5 MG/ML
5 INJECTION, SOLUTION INTRAVENOUS
Status: CANCELLED | OUTPATIENT
Start: 2017-04-10

## 2017-04-10 RX ORDER — SODIUM CHLORIDE, SODIUM LACTATE, POTASSIUM CHLORIDE, CALCIUM CHLORIDE 600; 310; 30; 20 MG/100ML; MG/100ML; MG/100ML; MG/100ML
9 INJECTION, SOLUTION INTRAVENOUS CONTINUOUS
Status: DISCONTINUED | OUTPATIENT
Start: 2017-04-10 | End: 2017-04-10 | Stop reason: HOSPADM

## 2017-04-10 RX ORDER — MIDAZOLAM HYDROCHLORIDE 1 MG/ML
2 INJECTION INTRAMUSCULAR; INTRAVENOUS
Status: DISCONTINUED | OUTPATIENT
Start: 2017-04-10 | End: 2017-04-10 | Stop reason: HOSPADM

## 2017-04-10 RX ORDER — DEXTROSE MONOHYDRATE 25 G/50ML
12.5 INJECTION, SOLUTION INTRAVENOUS AS NEEDED
Status: DISCONTINUED | OUTPATIENT
Start: 2017-04-10 | End: 2017-04-10 | Stop reason: HOSPADM

## 2017-04-10 RX ORDER — SODIUM CHLORIDE 9 MG/ML
INJECTION, SOLUTION INTRAVENOUS AS NEEDED
Status: DISCONTINUED | OUTPATIENT
Start: 2017-04-10 | End: 2017-04-10 | Stop reason: HOSPADM

## 2017-04-10 RX ORDER — DIPHENHYDRAMINE HYDROCHLORIDE 50 MG/ML
12.5 INJECTION INTRAMUSCULAR; INTRAVENOUS
Status: CANCELLED | OUTPATIENT
Start: 2017-04-10

## 2017-04-10 RX ORDER — MIDAZOLAM HYDROCHLORIDE 1 MG/ML
INJECTION INTRAMUSCULAR; INTRAVENOUS AS NEEDED
Status: DISCONTINUED | OUTPATIENT
Start: 2017-04-10 | End: 2017-04-10 | Stop reason: SURG

## 2017-04-10 RX ORDER — MORPHINE SULFATE 2 MG/ML
2 INJECTION, SOLUTION INTRAMUSCULAR; INTRAVENOUS
Status: CANCELLED | OUTPATIENT
Start: 2017-04-10 | End: 2017-04-10

## 2017-04-10 RX ORDER — IPRATROPIUM BROMIDE AND ALBUTEROL SULFATE 2.5; .5 MG/3ML; MG/3ML
3 SOLUTION RESPIRATORY (INHALATION) ONCE AS NEEDED
Status: CANCELLED | OUTPATIENT
Start: 2017-04-10

## 2017-04-10 RX ORDER — KETAMINE HYDROCHLORIDE 50 MG/ML
INJECTION, SOLUTION, CONCENTRATE INTRAMUSCULAR; INTRAVENOUS AS NEEDED
Status: DISCONTINUED | OUTPATIENT
Start: 2017-04-10 | End: 2017-04-10 | Stop reason: SURG

## 2017-04-10 RX ORDER — NALOXONE HCL 0.4 MG/ML
0.4 VIAL (ML) INJECTION AS NEEDED
Status: CANCELLED | OUTPATIENT
Start: 2017-04-10

## 2017-04-10 RX ORDER — HYDROCODONE BITARTRATE AND ACETAMINOPHEN 5; 325 MG/1; MG/1
1-2 TABLET ORAL EVERY 6 HOURS PRN
Qty: 20 TABLET | Refills: 0 | Status: SHIPPED | OUTPATIENT
Start: 2017-04-10 | End: 2017-04-17

## 2017-04-10 RX ORDER — LIDOCAINE HYDROCHLORIDE 20 MG/ML
INJECTION, SOLUTION INFILTRATION; PERINEURAL AS NEEDED
Status: DISCONTINUED | OUTPATIENT
Start: 2017-04-10 | End: 2017-04-10 | Stop reason: SURG

## 2017-04-10 RX ORDER — ONDANSETRON 2 MG/ML
4 INJECTION INTRAMUSCULAR; INTRAVENOUS ONCE AS NEEDED
Status: DISCONTINUED | OUTPATIENT
Start: 2017-04-10 | End: 2017-04-10 | Stop reason: HOSPADM

## 2017-04-10 RX ORDER — FENTANYL CITRATE 50 UG/ML
INJECTION, SOLUTION INTRAMUSCULAR; INTRAVENOUS AS NEEDED
Status: DISCONTINUED | OUTPATIENT
Start: 2017-04-10 | End: 2017-04-10 | Stop reason: SURG

## 2017-04-10 RX ORDER — PROMETHAZINE HYDROCHLORIDE 25 MG/ML
12.5 INJECTION, SOLUTION INTRAMUSCULAR; INTRAVENOUS ONCE AS NEEDED
Status: DISCONTINUED | OUTPATIENT
Start: 2017-04-10 | End: 2017-04-10 | Stop reason: HOSPADM

## 2017-04-10 RX ORDER — SODIUM CHLORIDE 0.9 % (FLUSH) 0.9 %
1-10 SYRINGE (ML) INJECTION AS NEEDED
Status: DISCONTINUED | OUTPATIENT
Start: 2017-04-10 | End: 2017-04-10 | Stop reason: HOSPADM

## 2017-04-10 RX ORDER — HYDROCODONE BITARTRATE AND ACETAMINOPHEN 5; 325 MG/1; MG/1
1 TABLET ORAL ONCE AS NEEDED
Status: DISCONTINUED | OUTPATIENT
Start: 2017-04-10 | End: 2017-04-10 | Stop reason: HOSPADM

## 2017-04-10 RX ADMIN — CEFAZOLIN SODIUM 2 G: 2 SOLUTION INTRAVENOUS at 08:46

## 2017-04-10 RX ADMIN — KETAMINE HYDROCHLORIDE 20 MG: 50 INJECTION, SOLUTION INTRAMUSCULAR; INTRAVENOUS at 08:42

## 2017-04-10 RX ADMIN — SODIUM CHLORIDE, POTASSIUM CHLORIDE, SODIUM LACTATE AND CALCIUM CHLORIDE: 600; 310; 30; 20 INJECTION, SOLUTION INTRAVENOUS at 09:59

## 2017-04-10 RX ADMIN — PROPOFOL 20 MG: 10 INJECTION, EMULSION INTRAVENOUS at 08:45

## 2017-04-10 RX ADMIN — PHENYLEPHRINE HYDROCHLORIDE 80 MCG: 10 INJECTION INTRAVENOUS at 09:33

## 2017-04-10 RX ADMIN — SODIUM CHLORIDE, POTASSIUM CHLORIDE, SODIUM LACTATE AND CALCIUM CHLORIDE 9 ML/HR: 600; 310; 30; 20 INJECTION, SOLUTION INTRAVENOUS at 07:37

## 2017-04-10 RX ADMIN — PHENYLEPHRINE HYDROCHLORIDE 80 MCG: 10 INJECTION INTRAVENOUS at 09:00

## 2017-04-10 RX ADMIN — KETAMINE HYDROCHLORIDE 10 MG: 50 INJECTION, SOLUTION INTRAMUSCULAR; INTRAVENOUS at 08:58

## 2017-04-10 RX ADMIN — PROPOFOL 30 MG: 10 INJECTION, EMULSION INTRAVENOUS at 09:00

## 2017-04-10 RX ADMIN — FENTANYL CITRATE 50 MCG: 50 INJECTION, SOLUTION INTRAMUSCULAR; INTRAVENOUS at 08:42

## 2017-04-10 RX ADMIN — PHENYLEPHRINE HYDROCHLORIDE 80 MCG: 10 INJECTION INTRAVENOUS at 09:08

## 2017-04-10 RX ADMIN — LIDOCAINE HYDROCHLORIDE 80 MG: 20 INJECTION, SOLUTION INFILTRATION; PERINEURAL at 08:46

## 2017-04-10 RX ADMIN — MIDAZOLAM HYDROCHLORIDE 1 MG: 1 INJECTION, SOLUTION INTRAMUSCULAR; INTRAVENOUS at 09:09

## 2017-04-10 RX ADMIN — PHENYLEPHRINE HYDROCHLORIDE 80 MCG: 10 INJECTION INTRAVENOUS at 09:14

## 2017-04-10 RX ADMIN — LIDOCAINE HYDROCHLORIDE 0.5 ML: 10 INJECTION, SOLUTION EPIDURAL; INFILTRATION; INTRACAUDAL; PERINEURAL at 07:36

## 2017-04-10 RX ADMIN — PROPOFOL 25 MCG/KG/MIN: 10 INJECTION, EMULSION INTRAVENOUS at 08:42

## 2017-04-10 NOTE — PLAN OF CARE
Problem: Patient Care Overview (Adult)  Goal: Plan of Care Review  Outcome: Outcome(s) achieved Date Met:  04/10/17    04/10/17 1100   Coping/Psychosocial Response Interventions   Plan Of Care Reviewed With patient;family   Patient Care Overview   Progress improving   Outcome Evaluation   Outcome Summary/Follow up Plan ready for dc          Problem: Perioperative Period (Adult)  Goal: Signs and Symptoms of Listed Potential Problems Will be Absent or Manageable (Perioperative Period)  Outcome: Outcome(s) achieved Date Met:  04/10/17    04/10/17 1100   Perioperative Period   Problems Assessed (Perioperative Period) all   Problems Present (Perioperative Period) none

## 2017-04-10 NOTE — H&P (VIEW-ONLY)
03/07/2017      IRINEO Baltazar  [address] [place] [address] [address]  [place] [address]    Jarvis Morgan  1945    Chief Complaint   Patient presents with   • Follow-up     1 wk f/u with 2 leg VVI/zander knee inj since last visit-feeling better       Dear IRINEO Baltazar:      HPI  I had the pleasure of seeing your patient Jarvis Morgan in the office today.   As you recall, Jarvis Morgan is a 71 y.o.  female who you are currently following for aortic valve disease.  She also has a history of a hypercoagulable state with multiple DVTs in the left lower extremity.  She has evidence of significant swelling and stasis dermatitis of that leg.  She has had a wound to her shin on her left that healed, but recently reopened.  She has been wearing compression stockings on a daily basis, however I not sure these are enough compression.    Past Medical History   Diagnosis Date   • A-fib    • Aortic stenosis    • Arthritis    • Bradycardia    • Cancer      bladder and skin   • Cardiomegaly    • GERD (gastroesophageal reflux disease)    • Hypercalcemia    • Hyperlipidemia    • Hypertension    • Hypothyroidism    • Long term current use of anticoagulant therapy    • Palpitation    • Shortness of breath    • Sick sinus syndrome    • Sleep apnea        Past Surgical History   Procedure Laterality Date   • Hip bipolar replacement Left    • Bladder surgery       removed   • Nephrectomy radical Right      from cancer   • Hemicolectomy w/ ostomy     • Hysterectomy     • Cardiac catheterization     • Kidney surgery       right kidney removed   • Cardiac catheterization N/A 12/9/2016     Procedure: Left Heart Cath;  Surgeon: Elia Flores MD;  Location:  [facility];  Service:        Family History   Problem Relation Age of Onset   • Heart failure Mother    • Heart disease Father    • Stroke Father    • Hypertension Sister    • Heart failure Sister    • No Known Problems Brother    • No Known Problems Maternal  Grandmother    • No Known Problems Maternal Grandfather    • No Known Problems Paternal Grandmother    • No Known Problems Paternal Grandfather    • Hypertension Sister    • Heart failure Sister    • Hypertension Sister    • Heart failure Sister    • Hypertension Sister    • Heart failure Sister    • Hypertension Sister    • Heart failure Sister    • Hypertension Sister    • Heart failure Sister    • Gallbladder disease Brother        Social History     Social History   • Marital status:      Spouse name: N/A   • Number of children: N/A   • Years of education: N/A     Occupational History   • Not on file.     Social History Main Topics   • Smoking status: Never Smoker   • Smokeless tobacco: Never Used   • Alcohol use No   • Drug use: No   • Sexual activity: Defer     Other Topics Concern   • Not on file     Social History Narrative       Allergies   Allergen Reactions   • Ciprofloxacin    • Codeine    • Other Itching     Cloth bandaids   • Tetanus Toxoids    • Tizanidine Hcl        Prior to Admission medications    Medication Sig Start Date End Date Taking? Authorizing Provider   allopurinol (ZYLOPRIM) 300 MG tablet Take 300 mg by mouth Daily.   Yes Historical Provider, MD   calcitriol (ROCALTROL) 0.25 MCG capsule Take 0.25 mcg by mouth Daily.   Yes Historical Provider, MD   donepezil (ARICEPT) 5 MG tablet Take 5 mg by mouth Every Night.   Yes Historical Provider, MD   methimazole (TAPAZOLE) 10 MG tablet Take 5 mg by mouth Daily.   Yes Historical Provider, MD   metoprolol tartrate (LOPRESSOR) 100 MG tablet Take 100 mg by mouth 2 (Two) Times a Day.   Yes Historical Provider, MD   O2 (OXYGEN) Inhale 1 (One) Time.   Yes Historical Provider, MD   Ostomy Supplies (CAROLANN-FIT NATURA UROSTOMY POUCH) POUCH misc  10/25/16  Yes Historical Provider, MD   Ostomy Supplies (CAROLANN-FIT NATURA WAFER/FLANGE) wafer    Yes Historical Provider, MD   pantoprazole (PROTONIX) 40 MG EC tablet Take 40 mg by mouth Daily.   Yes Historical  "Provider, MD   warfarin (COUMADIN) 7.5 MG tablet Take 7.5 mg by mouth Daily. As directed by Dr. Alvarez   Yes Historical Provider, MD   cholecalciferol (VITAMIN D3) 1000 UNITS tablet Take 1,000 Units by mouth Daily.  3/7/17  Historical Provider, MD   ergocalciferol (ERGOCALCIFEROL) 21678 UNITS capsule Take 50,000 Units by mouth 1 (One) Time Per Week.  3/7/17  Historical Provider, MD   furosemide (LASIX) 40 MG tablet Take 40 mg by mouth Daily.  3/7/17  Historical Provider, MD   HYDROcodone-acetaminophen (NORCO) 5-325 MG per tablet Take 1 tablet by mouth Every 6 (Six) Hours As Needed.  3/7/17  Historical Provider, MD   Omega-3 Fatty Acids (FISH OIL) 1000 MG capsule capsule Take  by mouth Daily With Breakfast.  3/7/17  Historical Provider, MD       Review of Systems   Constitutional: Negative.    HENT: Negative.    Eyes: Negative.    Respiratory: Negative.    Cardiovascular: Positive for leg swelling.        Leg cramping   Gastrointestinal: Negative.    Endocrine: Negative.    Genitourinary: Negative.    Musculoskeletal: Negative.    Skin: Negative.    Allergic/Immunologic: Negative.    Neurological: Negative.    Hematological: Negative.    Psychiatric/Behavioral: Negative.        Visit Vitals   • /82   • Ht 68\" (172.7 cm)   • Wt 239 lb (108 kg)   • BMI 36.34 kg/m2     Physical Exam   Constitutional: She is oriented to person, place, and time. She appears well-developed and well-nourished.   HENT:   Head: Normocephalic and atraumatic.   Neck: Neck supple. No JVD present. Carotid bruit is not present. No thyromegaly present.   Cardiovascular: Normal rate, regular rhythm and normal heart sounds.    Pulses:       Carotid pulses are 2+ on the right side, and 2+ on the left side.       Femoral pulses are 2+ on the right side, and 2+ on the left side.       Popliteal pulses are 2+ on the right side, and 2+ on the left side.        Dorsalis pedis pulses are 2+ on the right side, and 2+ on the left side.        Posterior " tibial pulses are 2+ on the right side, and 2+ on the left side.   Chronic venous stasis changes to left lower extremity   Pulmonary/Chest: Effort normal and breath sounds normal.   Abdominal: Soft. Bowel sounds are normal. She exhibits no distension, no abdominal bruit and no mass. There is no hepatosplenomegaly. There is no tenderness.   Musculoskeletal: Normal range of motion. She exhibits no edema.        Legs:  Neurological: She is alert and oriented to person, place, and time. She has normal strength. No cranial nerve deficit or sensory deficit.   Skin: Skin is warm and intact.   Nursing note and vitals reviewed.    Diagnostic Data:  Noninvasive testing including a 2 leg venous valvular insufficiency study that shows greater than 0.5 seconds of reflux to bilateral lower extremities with bilateral lesser saphenous vein reflux is well.      Patient Active Problem List   Diagnosis   • Aortic stenosis, severe   • Chronic atrial fibrillation   • Mixed hyperlipidemia   • Essential hypertension   • Deep vein thrombosis (DVT) of left lower extremity   • Shortness of breath   • Physical deconditioning         ICD-10-CM ICD-9-CM   1. Venous (peripheral) insufficiency I87.2 459.81   2. Preop testing Z01.818 V72.84   3. Long term current use of anticoagulant therapy Z79.01 V58.61       Lab Frequency Next Occurrence       Plan: After thoroughly evaluating Jarvis Morgan, I believe the best course of action is to proceed with a radiofrequency ablation of the left lower extremity She does have evidence of significant swelling and venous stasis.  She recently had an ulcer which was slow to heal but did finally heal, but has reopened.  I will try to get her juxta lite compression.  Risks of radiofrequency ablation include, but are not limited to, bleeding, infection, vessel damage, nerve damage, DVT, phlebitis, and pulmonary embolus.  The patient understands these risks and wishes to proceed with procedure.  I urged her to  continue to wear her stockings on a daily basis and to keep her legs elevated when she is not on them. We will schedule this over the next 1-2 weeks.     Thank you for allowing me to participate in the care of your patient.  Please do not hesitate with any questions or concerns.  I will keep you aware of any further encounters with Jarvis Morgan.        Sincerely yours,         IRINEO Simmons APRN

## 2017-04-10 NOTE — ANESTHESIA PREPROCEDURE EVALUATION
Anesthesia Evaluation     Patient summary reviewed   no history of anesthetic complications:  NPO Status: > 8 hours   Airway   Mallampati: II  TM distance: >3 FB  Neck ROM: full  Dental      Pulmonary    (+) sleep apnea (with O2) on CPAP,   (-) not a smoker  Cardiovascular   Exercise tolerance: good (4-7 METS)    ECG reviewed  Patient on routine beta blocker and Beta blocker given within 24 hours of surgery    (+) hypertension, valvular problems/murmurs (severe AS) AS, dysrhythmias Atrial Fib, PVD,   (-) pacemaker, CAD, CHF, cardiac stents    ROS comment: Currently in the works for SAVR/TAVR but non healing stasis ulcers are prohibitive.    Neuro/Psych  (-) seizures, CVA  GI/Hepatic/Renal/Endo    (+) obesity,  GERD, hypothyroidism,   (-) liver disease, renal disease, diabetes    Musculoskeletal     Abdominal    Substance History      OB/GYN          Other                                    Anesthesia Plan    ASA 4     MAC     intravenous induction   Anesthetic plan and risks discussed with patient.

## 2017-04-10 NOTE — OP NOTE
DATE OF PROCEDURE: 04/10/2017    PREOPERATIVE DIAGNOSIS:  Venous insufficiency with varicose veins.    POSTOPERATIVE DIAGNOSIS: Venous insufficience with varicose veins.     PROCEDURES PERFORMED:  1.  Ultrasound-guided cannulation of the left lower extremity greater saphenous vein.   2.  Ultrasound-guided cannulation of the left lower extremity anterior accessory saphenous vein.   3.  Radiofrequency ablation of the left lower extremity greater saphenous vein.   4.  Radiofrequency ablation of the left lower extremity anterior accessory saphenous vein.   5.  Left lower extremity superficial venogram.   6.  Radiographic supervision and interpretation.     SURGEON: Blake Martinez DO    ASSISTANT: None.     ANESTHESIA: MAC with local.     ESTIMATED BLOOD LOSS: 20 mL     COMPLICATIONS:  None.     COUNTS: Correct.     SPECIMENS: None.    INDICATIONS FOR PROCEDURE:  The patient is a 71-year-old  female who has a history of hypercoagulable state with multiple DVTs in the left lower extremity. She also has evidence of significant swelling and stasis dermatitis in that leg. She also has a wound to her shin  that is healed but it has recently reopened.  She has also been wearing compression stockings on a daily basis but still has evidence of swelling and ulceration. Therefore, the decision was made to proceed with a radiofrequency ablation of the left lower extremity greater saphenous vein and anterior accessory saphenous vein. Risks and benefits were explained at great length to the patient which include but are not limited to infection, vessel damage, nerve damage, DVT, and PE. The patient understands the risks and wished for me to proceed.     DESCRIPTION OF PROCEDURE: After the patient was correctly identified and the site was marked by attending surgeon, the patient was brought into the operating room and placed in supine position. Satisfactory anesthesia was then induced. The left lower extremity was  prepped and draped in standard sterile fashion. Under ultrasound guidance and using a micropuncture technique, the left lower extremity greater saphenous vein was cannulated just below the knee. The microwire was placed.  This was confirmed on ultrasound. A separate stab incision was made with a #11 blade and a 7-Bangladeshi sheath was placed. The catheter was attempted to be placed but was unsuccessful. At this point, a left lower extremity venogram was performed. There was a large greater saphenous vein but severe stenosis just above the knee and a very large anterior accessory branch coming all the way down the medial thigh traveling down below the knee, which appeared to be running tandem or parallel to the greater saphenous vein. At this point, under roadmap guidance, the 0.014 command wire was used to traverse the stenosis and get up to the saphenofemoral junction. The catheter was then placed over the wire successfully. The patient was then placed in Trendelenburg position. The septal saphenofemoral junction was identified and the catheter was pulled back 3 cm and marked. Next, tumescent fluid was instilled along the entire length of the vein under ultrasound guidance. Once sufficient tumescent fluid was placed, the radiofrequency ablation had commenced. There was a total of 8 RF cycles for a total treatment length of 46 cm for a total treatment of time of 2 minutes and 40 seconds at an average of 13 colón at 128°C. Once the radiofrequency ablation was complete, the sheath and catheter were removed. Direct pressure was held for 5-10 minutes to help ensure hemostasis. Under ultrasound guidance and using micropuncture technique the anterior accessory saphenous vein was also cannulated just below the knee. This was confirmed under ultrasound with the microwire. A small stab incision was made with the #11 blade and a 7-Bangladeshi sheath was placed. The junction was identified.  The catheter was placed over the 0.014  command wire up to the saphenofemoral junction and pulled back 3 cm and marked. Next, sufficient tumescent fluid was placed along the entire length of the vein successfully. This is a very large anterior accessory saphenous vein. Once sufficient tumescent fluid was placed, the radiofrequency ablation had commenced. There was still 8 RF cycles for a total treatment length of 46 cm at 13 colón and 120? Celsius at 2 minutes and 40 seconds. Upon completion, the sheath and catheter were removed. Direct pressure was held for about10 minutes. Sterile dressings were applied. An Ace wrap was placed from the toes to the groin. The patient tolerated the procedure well and was transferred back to the outpatient center in stable condition. All counts were correct at the end of the case. I was present for the entire procedure.     cc:  IRINEO De Leon D.O.  [id number]  D:  04/10/2017 11:10:00(Eastern Time)  T:  04/10/2017 12:32:53(Eastern Time)  Voice ID:  [id number]/Document ID:  [id number]

## 2017-04-10 NOTE — DISCHARGE INSTRUCTIONS
Moderate Conscious Sedation, Adult, Care After  Refer to this sheet in the next few weeks. These instructions provide you with information on caring for yourself after your procedure. Your health care provider may also give you more specific instructions. Your treatment has been planned according to current medical practices, but problems sometimes occur. Call your health care provider if you have any problems or questions after your procedure.  WHAT TO EXPECT AFTER THE PROCEDURE    After your procedure:  · You may feel sleepy, clumsy, and have poor balance for several hours.  · Vomiting may occur if you eat too soon after the procedure.  HOME CARE INSTRUCTIONS  · Do not participate in any activities where you could become injured for at least 24 hours. Do not:  ¨ Drive.  ¨ Swim.  ¨ Ride a bicycle.  ¨ Operate heavy machinery.  ¨ Cook.  ¨ Use power tools.  ¨ Climb ladders.  ¨ Work from a high place.  · Do not make important decisions or sign legal documents until you are improved.  · If you vomit, drink water, juice, or soup when you can drink without vomiting. Make sure you have little or no nausea before eating solid foods.  · Only take over-the-counter or prescription medicines for pain, discomfort, or fever as directed by your health care provider.  · Make sure you and your family fully understand everything about the medicines given to you, including what side effects may occur.  · You should not drink alcohol, take sleeping pills, or take medicines that cause drowsiness for at least 24 hours.  · If you smoke, do not smoke without supervision.  · If you are feeling better, you may resume normal activities 24 hours after you were sedated.  · Keep all appointments with your health care provider.  SEEK MEDICAL CARE IF:  · Your skin is pale or bluish in color.  · You continue to feel nauseous or vomit.  · Your pain is getting worse and is not helped by medicine.  · You have bleeding or swelling.  · You are still  sleepy or feeling clumsy after 24 hours.  SEEK IMMEDIATE MEDICAL CARE IF:  · You develop a rash.  · You have difficulty breathing.  · You develop any type of allergic problem.  · You have a fever.  MAKE SURE YOU:  · Understand these instructions.  · Will watch your condition.  · Will get help right away if you are not doing well or get worse.     This information is not intended to replace advice given to you by your health care provider. Make sure you discuss any questions you have with your health care provider.     Document Released: 10/08/2014 Document Revised: 01/08/2016 Document Reviewed: 10/08/2014  [employer] Interactive Patient Education ©2016 [employer]         CALL YOUR PHYSICIAN IF YOU EXPERIENCE  INCREASED PAIN NOT HELPED BY YOUR PAIN MEDICATION.    IF YOU HAVE QUESTIONS OR CONCERNS AFTER YOU ARE DISCHARGED CALL THE NURSE AT THE [facility] LINE [phone].              Fall Prevention in the Home      Falls can cause injuries. They can happen to people of all ages. There are many things you can do to make your home safe and to help prevent falls.    WHAT CAN I DO ON THE OUTSIDE OF MY HOME?  · Regularly fix the edges of walkways and driveways and fix any cracks.  · Remove anything that might make you trip as you walk through a door, such as a raised step or threshold.  · Trim any bushes or trees on the path to your home.  · Use bright outdoor lighting.  · Clear any walking paths of anything that might make someone trip, such as rocks or tools.  · Regularly check to see if handrails are loose or broken. Make sure that both sides of any steps have handrails.  · Any raised decks and porches should have guardrails on the edges.  · Have any leaves, snow, or ice cleared regularly.  · Use sand or salt on walking paths during winter.  · Clean up any spills in your garage right away. This includes oil or grease spills.  WHAT CAN I DO IN THE BATHROOM?    · Use night lights.  · Install grab bars by the  toilet and in the tub and shower. Do not use towel bars as grab bars.  · Use non-skid mats or decals in the tub or shower.  · If you need to sit down in the shower, use a plastic, non-slip stool.  · Keep the floor dry. Clean up any water that spills on the floor as soon as it happens.  · Remove soap buildup in the tub or shower regularly.  · Attach bath mats securely with double-sided non-slip rug tape.  · Do not have throw rugs and other things on the floor that can make you trip.  WHAT CAN I DO IN THE BEDROOM?  · Use night lights.  · Make sure that you have a light by your bed that is easy to reach.  · Do not use any sheets or blankets that are too big for your bed. They should not hang down onto the floor.  · Have a firm chair that has side arms. You can use this for support while you get dressed.  · Do not have throw rugs and other things on the floor that can make you trip.  WHAT CAN I DO IN THE KITCHEN?  · Clean up any spills right away.  · Avoid walking on wet floors.  · Keep items that you use a lot in easy-to-reach places.  · If you need to reach something above you, use a strong step stool that has a grab bar.  · Keep electrical cords out of the way.  · Do not use floor polish or wax that makes floors slippery. If you must use wax, use non-skid floor wax.  · Do not have throw rugs and other things on the floor that can make you trip.  WHAT CAN I DO WITH MY STAIRS?  · Do not leave any items on the stairs.  · Make sure that there are handrails on both sides of the stairs and use them. Fix handrails that are broken or loose. Make sure that handrails are as long as the stairways.  · Check any carpeting to make sure that it is firmly attached to the stairs. Fix any carpet that is loose or worn.  · Avoid having throw rugs at the top or bottom of the stairs. If you do have throw rugs, attach them to the floor with carpet tape.  · Make sure that you have a light switch at the top of the stairs and the bottom of  the stairs. If you do not have them, ask someone to add them for you.  WHAT ELSE CAN I DO TO HELP PREVENT FALLS?  · Wear shoes that:  ¨ Do not have high heels.  ¨ Have rubber bottoms.  ¨ Are comfortable and fit you well.  ¨ Are closed at the toe. Do not wear sandals.  · If you use a stepladder:  ¨ Make sure that it is fully opened. Do not climb a closed stepladder.  ¨ Make sure that both sides of the stepladder are locked into place.  ¨ Ask someone to hold it for you, if possible.  · Clearly yoselyn and make sure that you can see:  ¨ Any grab bars or handrails.  ¨ First and last steps.  ¨ Where the edge of each step is.  · Use tools that help you move around (mobility aids) if they are needed. These include:  ¨ Canes.  ¨ Walkers.  ¨ Scooters.  ¨ Crutches.  · Turn on the lights when you go into a dark area. Replace any light bulbs as soon as they burn out.  · Set up your furniture so you have a clear path. Avoid moving your furniture around.  · If any of your floors are uneven, fix them.  · If there are any pets around you, be aware of where they are.  · Review your medicines with your doctor. Some medicines can make you feel dizzy. This can increase your chance of falling.  Ask your doctor what other things that you can do to help prevent falls.     This information is not intended to replace advice given to you by your health care provider. Make sure you discuss any questions you have with your health care provider.     Document Released: 10/14/2010 Document Revised: 05/03/2016 Document Reviewed: 01/22/2016  [employer] Interactive Patient Education ©2016 [employer]     PATIENT/FAMILY/RESPONSIBLE PARTY VERBALIZES UNDERSTANDING OF ABOVE EDUCATION

## 2017-04-10 NOTE — BRIEF OP NOTE
SAPHENOUS VEIN RADIO FREQUENCY ABLATION  Procedure Note    Jarvis Morgan  4/10/2017    Pre-op Diagnosis:   Venous (peripheral) insufficiency [I87.2]  Preop testing [Z01.818]    Post-op Diagnosis:     Post-Op Diagnosis Codes:     * Venous (peripheral) insufficiency [I87.2]     * Preop testing [Z01.818]    Procedure/CPT® Codes:      Procedure(s):  LEFT LOWER EXTREMITY VENOGRAM. LEFT SAPHENOUS VEIN RADIO FREQUENCY ABLATION with anterior accessory    Surgeon(s):  Blake Martinez DO    Anesthesia: General    Staff:   Circulator: Karolyn Danielson RN  Scrub Person: Deacon Maria  Assistant: Hoa Grubbs  Vascular Ultrasound Technician: Elise Shabazz    Estimated Blood Loss: 20ml  Urine Voided: * No values recorded between 4/10/2017  8:29 AM and 4/10/2017 10:01 AM *    Specimens:                * No specimens in log *      Drains:   Urostomy (Active)             Complications: none      Blake Martinez DO     Date: 4/10/2017  Time: 10:01 AM

## 2017-04-17 ENCOUNTER — OFFICE VISIT (OUTPATIENT)
Dept: VASCULAR SURGERY | Facility: CLINIC | Age: 72
End: 2017-04-17

## 2017-04-17 ENCOUNTER — HOSPITAL ENCOUNTER (OUTPATIENT)
Dept: ULTRASOUND IMAGING | Facility: HOSPITAL | Age: 72
Discharge: HOME | End: 2017-04-17
Attending: SURGERY | Admitting: SURGERY

## 2017-04-17 VITALS
HEART RATE: 82 BPM | DIASTOLIC BLOOD PRESSURE: 84 MMHG | BODY MASS INDEX: 36.98 KG/M2 | SYSTOLIC BLOOD PRESSURE: 128 MMHG | WEIGHT: 244 LBS | HEIGHT: 68 IN

## 2017-04-17 DIAGNOSIS — I87.2 VENOUS INSUFFICIENCY: ICD-10-CM

## 2017-04-17 DIAGNOSIS — E78.2 MIXED HYPERLIPIDEMIA: ICD-10-CM

## 2017-04-17 DIAGNOSIS — I10 ESSENTIAL HYPERTENSION: ICD-10-CM

## 2017-04-17 DIAGNOSIS — I87.2 VENOUS INSUFFICIENCY: Primary | ICD-10-CM

## 2017-04-17 PROCEDURE — 93971 EXTREMITY STUDY: CPT

## 2017-04-17 PROCEDURE — 99213 OFFICE O/P EST LOW 20 MIN: CPT | Performed by: NURSE PRACTITIONER

## 2017-04-17 PROCEDURE — 93971 EXTREMITY STUDY: CPT | Performed by: SURGERY

## 2017-04-17 RX ORDER — WARFARIN SODIUM 10 MG/1
10 TABLET ORAL 2 TIMES WEEKLY
COMMUNITY
End: 2017-11-29 | Stop reason: HOSPADM

## 2017-04-21 NOTE — PROGRESS NOTES
"04/17/2017      IRINEO Baltazar  [address] [place] [address] [address]  [place] [address]        Jarvis Morgan  1945    Chief Complaint   Patient presents with   • Follow-up     Post left leg RFA.States it is getting better and color is improving.       Dear IRINEO Baltazar:    HPI     I had the pleasure of seeing you patient in the office today for follow up.  As you recall, the patient is a 72 y.o. female who we are currently following for venous insufficiency.  She also has a history of a hypercoagulable state with multiple DVTs in the left lower extremity. She has evidence of significant swelling and stasis dermatitis of that leg. She has had a wound to her shin on her left that healed, but recently reopened.   He did recently have radio frequency ablation of the left greater saphenous vein.  She is doing well and the area that had previously opened has essentially healed.  She did have noninvasive testing performed today, which I did review in office.      /84  Pulse 82  Ht 68\" (172.7 cm)  Wt 244 lb (111 kg)  BMI 37.1 kg/m2  Physical Exam  Constitutional: She is oriented to person, place, and time. She appears well-developed and well-nourished.   HENT:   Head: Normocephalic and atraumatic.   Neck: Neck supple. No JVD present. Carotid bruit is not present. No thyromegaly present.   Cardiovascular: Normal rate, regular rhythm and normal heart sounds.   Pulses:  Carotid pulses are 2+ on the right side, and 2+ on the left side.  Femoral pulses are 2+ on the right side, and 2+ on the left side.  Popliteal pulses are 2+ on the right side, and 2+ on the left side.   Dorsalis pedis pulses are 2+ on the right side, and 2+ on the left side.   Posterior tibial pulses are 2+ on the right side, and 2+ on the left side.   Chronic venous stasis changes to left lower extremity   Pulmonary/Chest: Effort normal and breath sounds normal.   Abdominal: Soft. Bowel sounds are normal. She exhibits no " distension, no abdominal bruit and no mass. There is no hepatosplenomegaly. There is no tenderness.   Musculoskeletal: Normal range of motion. She exhibits no edema.   Legs:  Neurological: She is alert and oriented to person, place, and time. She has normal strength. No cranial nerve deficit or sensory deficit.   Skin: Skin is warm and intact.   Nursing note and vitals reviewed.    DIAGNOSTIC DATA:History: Status post ablation      IMPRESSION:  Impression:   1. There is no evidence of deep venous thrombosis of the left lower  extremity.  2. The greater saphenous vein and the accessory saphenous vein are  closed from zones 2 through 6.      Comments: Left lower extremity venous duplex exam was performed using  color Doppler flow, Doppler wave form analysis, and grayscale imaging,  with and without compression. There is no evidence of deep venous  thrombosis of the common femoral, superficial femoral, popliteal,  posterior tibial, and peroneal veins. There is no thrombus identified in  the saphenofemoral junction. There is no evidence of clot in the right  common femoral vein.      This report was finalized on 04/17/2017 15:26 by Dr. Blake Martinez MD.    Patient Active Problem List   Diagnosis   • Aortic stenosis, severe   • Chronic atrial fibrillation   • Mixed hyperlipidemia   • Essential hypertension   • Deep vein thrombosis (DVT) of left lower extremity   • Shortness of breath   • Physical deconditioning   • Venous insufficiency         ICD-10-CM ICD-9-CM   1. Venous insufficiency I87.2 459.81   2. Mixed hyperlipidemia E78.2 272.2   3. Essential hypertension I10 401.9       Lab Frequency Next Occurrence       PLAN: After thoroughly evaluating Jarvis Morgan, I believe the best course of action is to remain conservative from a vascular standpoint.  We will see Jarvis Morgan back in 6 months.  He is to continue wearing compression stockings as previously discussed.  The patient is to continue taking their  medications as previously discussed.   This was all discussed in full with complete understanding.  Thank you for allowing me to participate in the care of your patient.  Please do not hesitate to call with any questions or concerns.  We will keep you aware of any further encounters with Jarvis Morgan.      Sincerely Yours,        IRINEO Simmons

## 2017-04-26 RX ORDER — METOPROLOL TARTRATE 100 MG/1
100 TABLET ORAL 2 TIMES DAILY
Qty: 60 TABLET | Refills: 11 | Status: SHIPPED | OUTPATIENT
Start: 2017-04-26 | End: 2017-11-20

## 2017-05-11 ENCOUNTER — HOSPITAL ENCOUNTER (EMERGENCY)
Facility: HOSPITAL | Age: 72
Discharge: HOME | End: 2017-05-12
Attending: EMERGENCY MEDICINE | Admitting: EMERGENCY MEDICINE

## 2017-05-11 DIAGNOSIS — R79.9 ELEVATED BUN: ICD-10-CM

## 2017-05-11 DIAGNOSIS — M79.605 LEFT LEG PAIN: Primary | ICD-10-CM

## 2017-05-11 PROCEDURE — 99283 EMERGENCY DEPT VISIT LOW MDM: CPT

## 2017-05-11 PROCEDURE — 96375 TX/PRO/DX INJ NEW DRUG ADDON: CPT

## 2017-05-11 PROCEDURE — 96374 THER/PROPH/DIAG INJ IV PUSH: CPT

## 2017-05-12 ENCOUNTER — APPOINTMENT (OUTPATIENT)
Dept: ULTRASOUND IMAGING | Facility: HOSPITAL | Age: 72
End: 2017-05-12

## 2017-05-12 ENCOUNTER — OFFICE VISIT (OUTPATIENT)
Dept: VASCULAR SURGERY | Facility: CLINIC | Age: 72
End: 2017-05-12

## 2017-05-12 VITALS
OXYGEN SATURATION: 96 % | HEIGHT: 67 IN | SYSTOLIC BLOOD PRESSURE: 131 MMHG | BODY MASS INDEX: 36.1 KG/M2 | TEMPERATURE: 97.8 F | HEART RATE: 88 BPM | DIASTOLIC BLOOD PRESSURE: 97 MMHG | RESPIRATION RATE: 17 BRPM | WEIGHT: 230 LBS

## 2017-05-12 VITALS
DIASTOLIC BLOOD PRESSURE: 90 MMHG | WEIGHT: 236 LBS | HEART RATE: 66 BPM | BODY MASS INDEX: 35.77 KG/M2 | HEIGHT: 68 IN | SYSTOLIC BLOOD PRESSURE: 122 MMHG

## 2017-05-12 DIAGNOSIS — I10 ESSENTIAL HYPERTENSION: ICD-10-CM

## 2017-05-12 DIAGNOSIS — I80.02 THROMBOPHLEBITIS OF SUPERFICIAL VEINS OF LEFT LOWER EXTREMITY: Primary | ICD-10-CM

## 2017-05-12 DIAGNOSIS — E78.2 MIXED HYPERLIPIDEMIA: ICD-10-CM

## 2017-05-12 DIAGNOSIS — I87.2 VENOUS INSUFFICIENCY: ICD-10-CM

## 2017-05-12 LAB
ALBUMIN SERPL-MCNC: 3.6 G/DL (ref 3.5–5)
ALBUMIN/GLOB SERPL: 1 G/DL (ref 1.1–2.5)
ALP SERPL-CCNC: 95 U/L (ref 24–120)
ALT SERPL W P-5'-P-CCNC: 17 U/L (ref 0–54)
ANION GAP SERPL CALCULATED.3IONS-SCNC: 12 MMOL/L (ref 4–13)
AST SERPL-CCNC: 22 U/L (ref 7–45)
BASOPHILS # BLD AUTO: 0.02 10*3/MM3 (ref 0–0.2)
BASOPHILS NFR BLD AUTO: 0.3 % (ref 0–2)
BILIRUB SERPL-MCNC: 0.5 MG/DL (ref 0.1–1)
BUN BLD-MCNC: 50 MG/DL (ref 5–21)
BUN/CREAT SERPL: 32.9 (ref 7–25)
CALCIUM SPEC-SCNC: 9.8 MG/DL (ref 8.4–10.4)
CHLORIDE SERPL-SCNC: 107 MMOL/L (ref 98–110)
CO2 SERPL-SCNC: 27 MMOL/L (ref 24–31)
CREAT BLD-MCNC: 1.52 MG/DL (ref 0.5–1.4)
DEPRECATED RDW RBC AUTO: 50.9 FL (ref 40–54)
EOSINOPHIL # BLD AUTO: 0.22 10*3/MM3 (ref 0–0.7)
EOSINOPHIL NFR BLD AUTO: 3.6 % (ref 0–4)
ERYTHROCYTE [DISTWIDTH] IN BLOOD BY AUTOMATED COUNT: 14.3 % (ref 12–15)
GFR SERPL CREATININE-BSD FRML MDRD: 34 ML/MIN/1.73
GLOBULIN UR ELPH-MCNC: 3.7 GM/DL
GLUCOSE BLD-MCNC: 92 MG/DL (ref 70–100)
HCT VFR BLD AUTO: 38.3 % (ref 37–47)
HGB BLD-MCNC: 12.8 G/DL (ref 12–16)
HOLD SPECIMEN: NORMAL
HOLD SPECIMEN: NORMAL
IMM GRANULOCYTES # BLD: 0.01 10*3/MM3 (ref 0–0.03)
IMM GRANULOCYTES NFR BLD: 0.2 % (ref 0–5)
INR PPP: 3.57 (ref 0.91–1.09)
LYMPHOCYTES # BLD AUTO: 1.39 10*3/MM3 (ref 0.72–4.86)
LYMPHOCYTES NFR BLD AUTO: 22.6 % (ref 15–45)
MCH RBC QN AUTO: 32.8 PG (ref 28–32)
MCHC RBC AUTO-ENTMCNC: 33.4 G/DL (ref 33–36)
MCV RBC AUTO: 98.2 FL (ref 82–98)
MONOCYTES # BLD AUTO: 0.61 10*3/MM3 (ref 0.19–1.3)
MONOCYTES NFR BLD AUTO: 9.9 % (ref 4–12)
NEUTROPHILS # BLD AUTO: 3.89 10*3/MM3 (ref 1.87–8.4)
NEUTROPHILS NFR BLD AUTO: 63.4 % (ref 39–78)
PLATELET # BLD AUTO: 215 10*3/MM3 (ref 130–400)
PMV BLD AUTO: 9.5 FL (ref 6–12)
POTASSIUM BLD-SCNC: 3.8 MMOL/L (ref 3.5–5.3)
PROT SERPL-MCNC: 7.3 G/DL (ref 6.3–8.7)
PROTHROMBIN TIME: 37.1 SECONDS (ref 11.9–14.6)
RBC # BLD AUTO: 3.9 10*6/MM3 (ref 4.2–5.4)
SODIUM BLD-SCNC: 146 MMOL/L (ref 135–145)
WBC NRBC COR # BLD: 6.14 10*3/MM3 (ref 4.8–10.8)
WHOLE BLOOD HOLD SPECIMEN: NORMAL
WHOLE BLOOD HOLD SPECIMEN: NORMAL

## 2017-05-12 PROCEDURE — 85025 COMPLETE CBC W/AUTO DIFF WBC: CPT | Performed by: EMERGENCY MEDICINE

## 2017-05-12 PROCEDURE — 99213 OFFICE O/P EST LOW 20 MIN: CPT | Performed by: NURSE PRACTITIONER

## 2017-05-12 PROCEDURE — 80053 COMPREHEN METABOLIC PANEL: CPT | Performed by: EMERGENCY MEDICINE

## 2017-05-12 PROCEDURE — 85610 PROTHROMBIN TIME: CPT | Performed by: EMERGENCY MEDICINE

## 2017-05-12 PROCEDURE — 25010000002 ONDANSETRON PER 1 MG: Performed by: EMERGENCY MEDICINE

## 2017-05-12 PROCEDURE — 93971 EXTREMITY STUDY: CPT | Performed by: SURGERY

## 2017-05-12 PROCEDURE — 93926 LOWER EXTREMITY STUDY: CPT | Performed by: SURGERY

## 2017-05-12 PROCEDURE — 93926 LOWER EXTREMITY STUDY: CPT

## 2017-05-12 PROCEDURE — 93971 EXTREMITY STUDY: CPT

## 2017-05-12 RX ORDER — ONDANSETRON 2 MG/ML
4 INJECTION INTRAMUSCULAR; INTRAVENOUS ONCE
Status: COMPLETED | OUTPATIENT
Start: 2017-05-12 | End: 2017-05-12

## 2017-05-12 RX ORDER — SODIUM CHLORIDE 0.9 % (FLUSH) 0.9 %
10 SYRINGE (ML) INJECTION AS NEEDED
Status: DISCONTINUED | OUTPATIENT
Start: 2017-05-12 | End: 2017-05-12 | Stop reason: HOSPADM

## 2017-05-12 RX ADMIN — ONDANSETRON 4 MG: 2 INJECTION INTRAMUSCULAR; INTRAVENOUS at 04:44

## 2017-05-12 RX ADMIN — ONDANSETRON 4 MG: 2 INJECTION INTRAMUSCULAR; INTRAVENOUS at 01:49

## 2017-07-11 ENCOUNTER — OFFICE VISIT (OUTPATIENT)
Dept: CARDIAC SURGERY | Facility: CLINIC | Age: 72
End: 2017-07-11

## 2017-07-11 VITALS
WEIGHT: 239 LBS | HEART RATE: 82 BPM | OXYGEN SATURATION: 97 % | SYSTOLIC BLOOD PRESSURE: 120 MMHG | HEIGHT: 68 IN | BODY MASS INDEX: 36.22 KG/M2 | DIASTOLIC BLOOD PRESSURE: 80 MMHG

## 2017-07-11 DIAGNOSIS — I87.2 VENOUS INSUFFICIENCY: ICD-10-CM

## 2017-07-11 DIAGNOSIS — R53.81 PHYSICAL DECONDITIONING: ICD-10-CM

## 2017-07-11 DIAGNOSIS — I35.0 AORTIC STENOSIS, SEVERE: Primary | ICD-10-CM

## 2017-07-11 DIAGNOSIS — I48.20 CHRONIC ATRIAL FIBRILLATION (HCC): ICD-10-CM

## 2017-07-11 PROCEDURE — 99214 OFFICE O/P EST MOD 30 MIN: CPT | Performed by: THORACIC SURGERY (CARDIOTHORACIC VASCULAR SURGERY)

## 2017-07-11 NOTE — PROGRESS NOTES
"Subjective   Patient ID: Jarvis Morgan is a 72 y.o. female who is here for follow-up to consider her candidacy for TAVR verses SAVR         History of Present Illness       She presents today with her feelings slightly better.  Her shortness of breath is less.  She denies paroxysmal nocturnal dyspnea, worsening lower extremity swelling, admissions for heart failure, or changes in diuretics.  She does report increasing knee troubles.        The following portions of the patient's history were reviewed and updated as appropriate: allergies, current medications, past family history, past medical history, past social history, past surgical history and problem list.        Objective   Visit Vitals   • /80 (BP Location: Left arm, Patient Position: Sitting, Cuff Size: Adult)   • Pulse 82   • Ht 68\" (172.7 cm)   • Wt 239 lb (108 kg)   • SpO2 97%   • BMI 36.34 kg/m2       Physical Exam   Constitutional: She is oriented to person, place, and time. She appears well-developed.   HENT:   Head: Normocephalic and atraumatic.   Mouth/Throat: Oropharynx is clear and moist.   Eyes: EOM are normal. Pupils are equal, round, and reactive to light.   Neck: Normal range of motion. Neck supple. No JVD present. No tracheal deviation present. No thyromegaly present.   Cardiovascular: Normal rate, regular rhythm and intact distal pulses.  Exam reveals no gallop and no friction rub.    Murmur heard.  Pulmonary/Chest: Effort normal and breath sounds normal. No respiratory distress. She has no wheezes. She has no rales. She exhibits no tenderness.   Abdominal: Soft. She exhibits no distension. There is no tenderness.   Musculoskeletal: Normal range of motion. She exhibits edema (chronic left).   Her previously described wound is closed.     Lymphadenopathy:     She has no cervical adenopathy.   Neurological: She is alert and oriented to person, place, and time. No cranial nerve deficit.   Skin: Skin is warm and dry.   Psychiatric: She has a " normal mood and affect.           Assessment/Plan       Viann was seen today for follow-up.    Diagnoses and all orders for this visit:    Aortic stenosis, severe  -     Adult Transthoracic Echo Complete With Contrast; Future    Chronic atrial fibrillation    Venous insufficiency    Physical deconditioning      Based on current STS data she can expect a risk of morbidity and mortality of       Risk of Mortality: 5.294%   Morbidity or Mortality: 24.188%   Long Length of Stay: 12.744%   Short Length of Stay: 17.5853%   Permanent Stroke: 1.502%   Prolonged Ventilation: 17.83%   DSW Infection: 0.814%   Renal Failure: 8.682%   Reoperation: 8.185%   She does contimue with fraility requiring assistance with standing and CGA with walking.  She perhaps may be more limited by her knee troubles at this time.  Given the aforementioned risk stratification, I do believe she would be greater risk than the STS risk calculator would suggest.  I recommend TAVR as she contineus to need although is doing better with rehabilitation.  We discussed the prox/cons of each approach.  Factors that were weighed in my decision making were discussed.  I've answered all questions from both her and the daughter.  She would like to stay local if TAVR is recommended.  She does not want to go out of town.  Will see back in 6 months with a ECHO.  If symptoms worsen she is to contact me earlier to re-evaluate.     We discussed the importance of durable lifestyle changes to accomplish an acceptable weight since her weight is greater than acceptable for her age and height.   I have recommended she speak with her PCP.  She is a Non smoker.    RTC 6 months.

## 2017-07-14 RX ORDER — ALLOPURINOL 300 MG/1
300 TABLET ORAL DAILY
Qty: 30 TABLET | Refills: 1 | Status: SHIPPED | OUTPATIENT
Start: 2017-07-14 | End: 2017-09-08 | Stop reason: SDUPTHER

## 2017-07-14 RX ORDER — ALLOPURINOL 300 MG/1
300 TABLET ORAL
COMMUNITY
End: 2017-07-14 | Stop reason: SDUPTHER

## 2017-08-03 ENCOUNTER — APPOINTMENT (OUTPATIENT)
Dept: CT IMAGING | Facility: HOSPITAL | Age: 72
End: 2017-08-03

## 2017-08-03 ENCOUNTER — HOSPITAL ENCOUNTER (EMERGENCY)
Facility: HOSPITAL | Age: 72
Discharge: HOME | End: 2017-08-03
Attending: EMERGENCY MEDICINE | Admitting: EMERGENCY MEDICINE

## 2017-08-03 VITALS
HEIGHT: 68 IN | OXYGEN SATURATION: 100 % | DIASTOLIC BLOOD PRESSURE: 67 MMHG | RESPIRATION RATE: 16 BRPM | SYSTOLIC BLOOD PRESSURE: 135 MMHG | BODY MASS INDEX: 37.13 KG/M2 | TEMPERATURE: 97.8 F | HEART RATE: 78 BPM | WEIGHT: 245 LBS

## 2017-08-03 DIAGNOSIS — I35.0 AORTIC VALVE STENOSIS, UNSPECIFIED ETIOLOGY: ICD-10-CM

## 2017-08-03 DIAGNOSIS — R07.9 CHEST PAIN, UNSPECIFIED TYPE: Primary | ICD-10-CM

## 2017-08-03 DIAGNOSIS — I48.20 CHRONIC A-FIB (HCC): ICD-10-CM

## 2017-08-03 LAB
ALBUMIN SERPL-MCNC: 3.8 G/DL (ref 3.5–5)
ALBUMIN/GLOB SERPL: 1.2 G/DL (ref 1.1–2.5)
ALP SERPL-CCNC: 85 U/L (ref 24–120)
ALT SERPL W P-5'-P-CCNC: 36 U/L (ref 0–54)
ANION GAP SERPL CALCULATED.3IONS-SCNC: 9 MMOL/L (ref 4–13)
APTT PPP: 44.8 SECONDS (ref 24.1–34.8)
ARTERIAL PATENCY WRIST A: ABNORMAL
AST SERPL-CCNC: 22 U/L (ref 7–45)
ATMOSPHERIC PRESS: ABNORMAL MMHG
BASE EXCESS BLDA CALC-SCNC: 1 MMOL/L (ref -2–2)
BASOPHILS # BLD AUTO: 0.01 10*3/MM3 (ref 0–0.2)
BASOPHILS NFR BLD AUTO: 0.2 % (ref 0–2)
BDY SITE: ABNORMAL
BILIRUB SERPL-MCNC: 0.7 MG/DL (ref 0.1–1)
BUN BLD-MCNC: 50 MG/DL (ref 5–21)
BUN/CREAT SERPL: 38.8 (ref 7–25)
CALCIUM SPEC-SCNC: 10 MG/DL (ref 8.4–10.4)
CHLORIDE SERPL-SCNC: 108 MMOL/L (ref 98–110)
CO2 SERPL-SCNC: 26 MMOL/L (ref 24–31)
CREAT BLD-MCNC: 1.29 MG/DL (ref 0.5–1.4)
D DIMER PPP FEU-MCNC: 0.31 MG/L (FEU) (ref 0–0.5)
DEPRECATED RDW RBC AUTO: 51.6 FL (ref 40–54)
EOSINOPHIL # BLD AUTO: 0.38 10*3/MM3 (ref 0–0.7)
EOSINOPHIL NFR BLD AUTO: 6.1 % (ref 0–4)
ERYTHROCYTE [DISTWIDTH] IN BLOOD BY AUTOMATED COUNT: 13.8 % (ref 12–15)
GFR SERPL CREATININE-BSD FRML MDRD: 41 ML/MIN/1.73
GLOBULIN UR ELPH-MCNC: 3.3 GM/DL
GLUCOSE BLD-MCNC: 94 MG/DL (ref 70–100)
HCO3 BLDA-SCNC: 27.1 MMOL/L (ref 22–26)
HCT VFR BLD AUTO: 37.6 % (ref 37–47)
HGB BLD-MCNC: 12.2 G/DL (ref 12–16)
HOLD SPECIMEN: NORMAL
HOLD SPECIMEN: NORMAL
IMM GRANULOCYTES # BLD: 0 10*3/MM3 (ref 0–0.03)
IMM GRANULOCYTES NFR BLD: 0 % (ref 0–5)
INR PPP: 3.64 (ref 0.91–1.09)
LYMPHOCYTES # BLD AUTO: 1.34 10*3/MM3 (ref 0.72–4.86)
LYMPHOCYTES NFR BLD AUTO: 21.4 % (ref 15–45)
MCH RBC QN AUTO: 33.2 PG (ref 28–32)
MCHC RBC AUTO-ENTMCNC: 32.4 G/DL (ref 33–36)
MCV RBC AUTO: 102.5 FL (ref 82–98)
MODALITY: ABNORMAL
MONOCYTES # BLD AUTO: 0.56 10*3/MM3 (ref 0.19–1.3)
MONOCYTES NFR BLD AUTO: 8.9 % (ref 4–12)
MYOGLOBIN SERPL-MCNC: 86.4 NG/ML (ref 0–110)
NEUTROPHILS # BLD AUTO: 3.97 10*3/MM3 (ref 1.87–8.4)
NEUTROPHILS NFR BLD AUTO: 63.4 % (ref 39–78)
NT-PROBNP SERPL-MCNC: 2260 PG/ML (ref 0–900)
PCO2 BLDA: 48.6 MM HG (ref 35–45)
PH BLDA: 7.36 PH UNITS (ref 7.35–7.45)
PLATELET # BLD AUTO: 177 10*3/MM3 (ref 130–400)
PMV BLD AUTO: 9.8 FL (ref 6–12)
PO2 BLDA: 64.6 MM HG (ref 80–100)
POTASSIUM BLD-SCNC: 4.2 MMOL/L (ref 3.5–5.3)
PROT SERPL-MCNC: 7.1 G/DL (ref 6.3–8.7)
PROTHROMBIN TIME: 37.6 SECONDS (ref 11.9–14.6)
RBC # BLD AUTO: 3.67 10*6/MM3 (ref 4.2–5.4)
SAO2 % BLDCOA: 91.8 % (ref 94–100)
SAO2 % BLDCOA: 91.8 % (ref 94–100)
SODIUM BLD-SCNC: 143 MMOL/L (ref 135–145)
TROPONIN I SERPL-MCNC: <0.012 NG/ML (ref 0–0.03)
TROPONIN I SERPL-MCNC: <0.012 NG/ML (ref 0–0.03)
WBC NRBC COR # BLD: 6.26 10*3/MM3 (ref 4.8–10.8)
WHOLE BLOOD HOLD SPECIMEN: NORMAL
WHOLE BLOOD HOLD SPECIMEN: NORMAL

## 2017-08-03 PROCEDURE — 36600 WITHDRAWAL OF ARTERIAL BLOOD: CPT

## 2017-08-03 PROCEDURE — 83874 ASSAY OF MYOGLOBIN: CPT | Performed by: EMERGENCY MEDICINE

## 2017-08-03 PROCEDURE — 85610 PROTHROMBIN TIME: CPT | Performed by: EMERGENCY MEDICINE

## 2017-08-03 PROCEDURE — 71275 CT ANGIOGRAPHY CHEST: CPT

## 2017-08-03 PROCEDURE — 93005 ELECTROCARDIOGRAM TRACING: CPT

## 2017-08-03 PROCEDURE — 0 IOPAMIDOL PER 1 ML: Performed by: EMERGENCY MEDICINE

## 2017-08-03 PROCEDURE — 93010 ELECTROCARDIOGRAM REPORT: CPT | Performed by: INTERNAL MEDICINE

## 2017-08-03 PROCEDURE — 84484 ASSAY OF TROPONIN QUANT: CPT | Performed by: EMERGENCY MEDICINE

## 2017-08-03 PROCEDURE — 85025 COMPLETE CBC W/AUTO DIFF WBC: CPT | Performed by: EMERGENCY MEDICINE

## 2017-08-03 PROCEDURE — 80053 COMPREHEN METABOLIC PANEL: CPT | Performed by: EMERGENCY MEDICINE

## 2017-08-03 PROCEDURE — 85379 FIBRIN DEGRADATION QUANT: CPT | Performed by: EMERGENCY MEDICINE

## 2017-08-03 PROCEDURE — 85730 THROMBOPLASTIN TIME PARTIAL: CPT | Performed by: EMERGENCY MEDICINE

## 2017-08-03 PROCEDURE — 93005 ELECTROCARDIOGRAM TRACING: CPT | Performed by: EMERGENCY MEDICINE

## 2017-08-03 PROCEDURE — 83880 ASSAY OF NATRIURETIC PEPTIDE: CPT | Performed by: EMERGENCY MEDICINE

## 2017-08-03 PROCEDURE — 82803 BLOOD GASES ANY COMBINATION: CPT

## 2017-08-03 PROCEDURE — 99283 EMERGENCY DEPT VISIT LOW MDM: CPT

## 2017-08-03 RX ADMIN — IOPAMIDOL 73 ML: 755 INJECTION, SOLUTION INTRAVENOUS at 14:19

## 2017-08-03 NOTE — ED PROVIDER NOTES
Subjective   Patient is a 72 y.o. female presenting with chest pain.   Chest Pain   Pain location:  Substernal area and L chest  Pain quality: aching    Pain quality: not hot, no pressure, not sharp and not shooting    Pain radiates to:  Does not radiate  Pain severity:  Mild  Onset quality:  Gradual  Timing:  Constant  Chronicity:  New  Context: not breathing, not drug use, not eating, not lifting, not movement, not at rest, not stress and not trauma    Relieved by:  Nothing  Worsened by:  Nothing  Ineffective treatments:  None tried  Associated symptoms: lower extremity edema    Associated symptoms: no abdominal pain, no AICD problem, no altered mental status, no anorexia, no back pain, no claudication, no diaphoresis, no dizziness, no dysphagia, no fatigue, no fever, no headache, no heartburn, no nausea, no near-syncope, no numbness, no orthopnea, no palpitations, no PND, no vomiting and no weakness    Risk factors: coronary artery disease, high cholesterol, hypertension, obesity and prior DVT/PE    Risk factors: no aortic disease and no diabetes mellitus        Review of Systems   Constitutional: Negative.  Negative for activity change, appetite change, chills, diaphoresis, fatigue and fever.   HENT: Negative for congestion, drooling, ear pain, facial swelling, hearing loss, sinus pressure, sore throat and trouble swallowing.    Eyes: Negative.  Negative for discharge.   Cardiovascular: Positive for chest pain. Negative for palpitations, orthopnea, claudication, PND and near-syncope.   Gastrointestinal: Negative for abdominal distention, abdominal pain, anorexia, blood in stool, diarrhea, heartburn, nausea and vomiting.   Endocrine: Negative.  Negative for cold intolerance, heat intolerance, polydipsia, polyphagia and polyuria.   Genitourinary: Negative.  Negative for dysuria, flank pain and urgency.   Musculoskeletal: Negative.  Negative for arthralgias, back pain, myalgias and neck stiffness.   Skin: Negative.   Negative for color change, pallor and rash.   Allergic/Immunologic: Negative.    Neurological: Negative.  Negative for dizziness, seizures, speech difficulty, weakness, numbness and headaches.   Hematological: Negative.  Negative for adenopathy.   All other systems reviewed and are negative.      Past Medical History:   Diagnosis Date   • A-fib    • Aortic stenosis    • Arthritis    • Bradycardia    • Cancer     bladder and skin   • Cardiomegaly    • GERD (gastroesophageal reflux disease)    • Hard of hearing    • History of short term memory loss    • Hypercalcemia    • Hyperlipidemia    • Hypertension    • Hypothyroidism    • Kidney stone    • Long term current use of anticoagulant therapy    • Open wound     left lower extremity,   • Palpitation    • PONV (postoperative nausea and vomiting)    • Shortness of breath    • Sick sinus syndrome    • Sleep apnea        Allergies   Allergen Reactions   • Other Anaphylaxis and Itching     Cloth bandaids , causes redness of skin   • Ciprofloxacin Itching   • Codeine Itching   • Tetanus Toxoids Itching     Itching around site   • Tizanidine Hcl Itching       Past Surgical History:   Procedure Laterality Date   • BLADDER SURGERY      removed   • CARDIAC CATHETERIZATION     • CARDIAC CATHETERIZATION N/A 12/9/2016    Procedure: Left Heart Cath;  Surgeon: Elia Flores MD;  Location:  [facility];  Service:    • HIP BIPOLAR REPLACEMENT Left    • HYSTERECTOMY     • ILEOSTOMY     • JOINT REPLACEMENT     • KIDNEY SURGERY      right kidney removed   • NEPHRECTOMY RADICAL Right     from cancer   • VARICOSE VEIN SURGERY Left 4/10/2017    Procedure: LEFT LOWER EXTREMITY VENOGRAM. LEFT SAPHENOUS VEIN RADIO FREQUENCY ABLATION;  Surgeon: Blake Martinez DO;  Location: [facility] OR;  Service:        Family History   Problem Relation Age of Onset   • Heart failure Mother    • Heart disease Father    • Stroke Father    • Hypertension Sister    • Heart failure Sister    •  No Known Problems Brother    • No Known Problems Maternal Grandmother    • No Known Problems Maternal Grandfather    • No Known Problems Paternal Grandmother    • No Known Problems Paternal Grandfather    • Hypertension Sister    • Heart failure Sister    • Hypertension Sister    • Heart failure Sister    • Hypertension Sister    • Heart failure Sister    • Hypertension Sister    • Heart failure Sister    • Hypertension Sister    • Heart failure Sister    • Gallbladder disease Brother        Social History     Social History   • Marital status:      Spouse name: N/A   • Number of children: N/A   • Years of education: N/A     Social History Main Topics   • Smoking status: Never Smoker   • Smokeless tobacco: Never Used   • Alcohol use No   • Drug use: No   • Sexual activity: Defer     Other Topics Concern   • None     Social History Narrative           Objective   Physical Exam   Constitutional: She is oriented to person, place, and time. She appears well-developed and well-nourished.   HENT:   Head: Normocephalic.   Right Ear: External ear normal.   Eyes: Conjunctivae are normal. Pupils are equal, round, and reactive to light.   Neck: Normal range of motion. Neck supple.   Cardiovascular: Normal rate, regular rhythm, normal heart sounds and intact distal pulses.  PMI is not displaced.  Exam reveals no decreased pulses.    No murmur heard.  Pulmonary/Chest: Effort normal and breath sounds normal. No accessory muscle usage. No tachypnea. No respiratory distress. She has no decreased breath sounds. She has no wheezes. She has no rales. She exhibits no tenderness.   Abdominal: Soft. Bowel sounds are normal. There is no tenderness.   Musculoskeletal: Normal range of motion. She exhibits no edema or tenderness.   Le edema left greater than right       Vascular Status -  Her exam exhibits right foot vasculature normal. Her exam exhibits no right foot edema. Her exam exhibits left foot vasculature normal. Her exam  exhibits no left foot edema.  Neurological: She is alert and oriented to person, place, and time. She has normal reflexes. No cranial nerve deficit. Coordination normal.   Skin: Skin is warm. No rash noted. No erythema.   Nursing note and vitals reviewed.      Procedures         ED Course  ED Course   Comment By Time   vj Castellanos MD 08/03 1240   Selective coronary angiography:                         #1.  The left main coronary artery is a long vessel with no significant disease.                         #2.   The left anterior descending coronary artery is a type 3 vessel with one diagonal branch. There is no significant disease.                         #3.  Left circumflex coronary artery is an average size vessel with one large obtuse marginal branch.  There is no significant disease                         #4  Right coronary artery is a large dominant vessel with one posterior descending one posterolateral branch.  There is no significant disease     Conclusion:  Normal coronary arteries 12/2016 London Castellanos MD 08/03 5383   Patient given a chest pain no evidence of CHF BNP slightly elevated Coumadin is therapeutic he is got a history of aortic stenosis very cath 6 months ago was negative case discussed with the hospitalist service and cardiology service this is a with some ocular negative the patient will be discharged home to follow up with the primary M.D. and to return here for any worsening symptoms she has follow-up with thoracic surgery also London Castellanos MD 08/03 7705   The patient's symptoms are now better.  Patient is not having pain.  No chest pain, difficulty breathing, nausea, vomiting or palpitations.  No anxiety or dizziness.  Vital signs have been reviewed and appear to be correct.  Physical exam findings are improved.  Alert.  Oriented X3 .  No acute distress.  Breath sounds normal.  No respiratory distress, decreased breath sounds or wheezes.  Normal heart rate and rhythm.  Heart sounds  normal.  .  Abdomen soft and nontender.  No abdominal tenderness or guarding or rebound tenderness.  Skin warm and dry.  No cyanosis or diaphoresis.  Oriented X 3.  Not anxious.  No alteration in mental status or weakness. London Castellanos MD 08/03 2039                  [facility]    Final diagnoses:   Chest pain, unspecified type   Aortic valve stenosis, unspecified etiology   Chronic a-fib            London Castellanos MD  08/04/17 2239

## 2017-08-08 PROBLEM — Z98.890: Status: ACTIVE | Noted: 2017-08-08

## 2017-08-08 PROBLEM — Z85.51 HISTORY OF BLADDER CANCER: Status: ACTIVE | Noted: 2017-08-08

## 2017-08-08 RX ORDER — FUROSEMIDE 40 MG/1
TABLET ORAL
Qty: 60 TABLET | Refills: 3 | Status: SHIPPED | OUTPATIENT
Start: 2017-08-08 | End: 2018-07-18 | Stop reason: SDUPTHER

## 2017-08-08 RX ORDER — OSTOMY SUPPLY 2 1/4"
EACH MISCELLANEOUS
Qty: 20 EACH | Refills: 11 | Status: SHIPPED | OUTPATIENT
Start: 2017-08-08 | End: 2017-08-08 | Stop reason: SDUPTHER

## 2017-08-08 RX ORDER — FECAL COLL W-CHARCOAL/CATH/SYR
MISCELLANEOUS RECTAL
Qty: 20 WAFER | Refills: 11 | Status: SHIPPED | OUTPATIENT
Start: 2017-08-08 | End: 2017-08-22 | Stop reason: SDUPTHER

## 2017-08-08 RX ORDER — OSTOMY SUPPLY 2 1/4"
EACH MISCELLANEOUS
Qty: 20 EACH | Refills: 11 | Status: SHIPPED | OUTPATIENT
Start: 2017-08-08 | End: 2017-08-22 | Stop reason: SDUPTHER

## 2017-08-22 DIAGNOSIS — Z85.51 HISTORY OF BLADDER CANCER: Primary | ICD-10-CM

## 2017-08-22 DIAGNOSIS — Z98.890 H/O URETEROSTOMY: ICD-10-CM

## 2017-08-22 RX ORDER — OSTOMY SUPPLY 2 1/4"
EACH MISCELLANEOUS
Qty: 20 EACH | Refills: 11 | Status: SHIPPED | OUTPATIENT
Start: 2017-08-22 | End: 2018-10-05 | Stop reason: SDUPTHER

## 2017-08-22 RX ORDER — FECAL COLL W-CHARCOAL/CATH/SYR
MISCELLANEOUS RECTAL
Qty: 20 WAFER | Refills: 11 | Status: SHIPPED | OUTPATIENT
Start: 2017-08-22

## 2017-08-28 RX ORDER — WARFARIN SODIUM 10 MG/1
TABLET ORAL
Qty: 30 TABLET | Refills: 2 | Status: SHIPPED | OUTPATIENT
Start: 2017-08-28 | End: 2017-10-24 | Stop reason: ALTCHOICE

## 2017-09-08 ENCOUNTER — TELEPHONE (OUTPATIENT)
Dept: INTERNAL MEDICINE | Age: 72
End: 2017-09-08

## 2017-09-08 DIAGNOSIS — E05.90 HYPERTHYROIDISM: ICD-10-CM

## 2017-09-08 DIAGNOSIS — D64.9 ANEMIA, UNSPECIFIED: ICD-10-CM

## 2017-09-08 DIAGNOSIS — E55.9 VITAMIN D DEFICIENCY: ICD-10-CM

## 2017-09-08 DIAGNOSIS — I48.20 CHRONIC ATRIAL FIBRILLATION (HCC): ICD-10-CM

## 2017-09-08 DIAGNOSIS — E78.5 HYPERLIPIDEMIA, UNSPECIFIED HYPERLIPIDEMIA TYPE: ICD-10-CM

## 2017-09-08 DIAGNOSIS — E05.90 HYPERTHYROIDISM: Primary | ICD-10-CM

## 2017-09-08 DIAGNOSIS — I10 ESSENTIAL HYPERTENSION, BENIGN: ICD-10-CM

## 2017-09-08 LAB
ALBUMIN SERPL-MCNC: 3.7 G/DL (ref 3.5–5.2)
ALP BLD-CCNC: 97 U/L (ref 35–104)
ALT SERPL-CCNC: 14 U/L (ref 5–33)
ANION GAP SERPL CALCULATED.3IONS-SCNC: 16 MMOL/L (ref 7–19)
AST SERPL-CCNC: 18 U/L (ref 5–32)
BACTERIA: ABNORMAL /HPF
BILIRUB SERPL-MCNC: 0.6 MG/DL (ref 0.2–1.2)
BILIRUBIN URINE: NEGATIVE
BLOOD, URINE: ABNORMAL
BUN BLDV-MCNC: 48 MG/DL (ref 8–23)
CALCIUM SERPL-MCNC: 9.9 MG/DL (ref 8.8–10.2)
CHLORIDE BLD-SCNC: 103 MMOL/L (ref 98–111)
CHOLESTEROL, TOTAL: 114 MG/DL (ref 160–199)
CLARITY: CLEAR
CO2: 26 MMOL/L (ref 22–29)
COLOR: YELLOW
CREAT SERPL-MCNC: 1.4 MG/DL (ref 0.5–0.9)
EPITHELIAL CELLS, UA: ABNORMAL /HPF
GFR NON-AFRICAN AMERICAN: 37
GLUCOSE BLD-MCNC: 101 MG/DL (ref 74–109)
GLUCOSE URINE: NEGATIVE MG/DL
HCT VFR BLD CALC: 35.7 % (ref 37–47)
HDLC SERPL-MCNC: 29 MG/DL (ref 65–121)
HEMOGLOBIN: 11.9 G/DL (ref 12–16)
INR BLD: 3.64 (ref 0.88–1.18)
KETONES, URINE: NEGATIVE MG/DL
LDL CHOLESTEROL CALCULATED: 51 MG/DL
LEUKOCYTE ESTERASE, URINE: ABNORMAL
MCH RBC QN AUTO: 34.9 PG (ref 27–31)
MCHC RBC AUTO-ENTMCNC: 33.3 G/DL (ref 33–37)
MCV RBC AUTO: 104.7 FL (ref 81–99)
NITRITE, URINE: NEGATIVE
PDW BLD-RTO: 13.9 % (ref 11.5–14.5)
PH UA: 6.5
PLATELET # BLD: 197 K/UL (ref 130–400)
PMV BLD AUTO: 9.3 FL (ref 9.4–12.3)
POTASSIUM SERPL-SCNC: 4.2 MMOL/L (ref 3.5–5)
PROTEIN UA: NEGATIVE MG/DL
PROTHROMBIN TIME: 36.8 SEC (ref 12–14.6)
RBC # BLD: 3.41 M/UL (ref 4.2–5.4)
SODIUM BLD-SCNC: 145 MMOL/L (ref 136–145)
SPECIFIC GRAVITY UA: 1.01
T4 FREE: 1.3 NG/ML (ref 0.9–1.7)
TOTAL PROTEIN: 7.7 G/DL (ref 6.6–8.7)
TRIGL SERPL-MCNC: 169 MG/DL (ref 150–199)
TSH SERPL DL<=0.05 MIU/L-ACNC: 0.13 UIU/ML (ref 0.27–4.2)
UROBILINOGEN, URINE: 0.2 E.U./DL
VITAMIN D 25-HYDROXY: 16.4 NG/ML
WBC # BLD: 6.3 K/UL (ref 4.8–10.8)
WBC UA: ABNORMAL /HPF (ref 0–5)

## 2017-09-10 LAB
ORGANISM: ABNORMAL
URINE CULTURE, ROUTINE: ABNORMAL
URINE CULTURE, ROUTINE: ABNORMAL

## 2017-09-11 RX ORDER — DONEPEZIL HYDROCHLORIDE 5 MG/1
TABLET, FILM COATED ORAL
Qty: 90 TABLET | Refills: 3 | Status: SHIPPED | OUTPATIENT
Start: 2017-09-11 | End: 2018-09-14 | Stop reason: SDUPTHER

## 2017-09-11 RX ORDER — PANTOPRAZOLE SODIUM 40 MG/1
TABLET, DELAYED RELEASE ORAL
Qty: 90 TABLET | Refills: 3 | Status: SHIPPED | OUTPATIENT
Start: 2017-09-11 | End: 2018-09-18 | Stop reason: SDUPTHER

## 2017-09-11 RX ORDER — ALLOPURINOL 300 MG/1
TABLET ORAL
Qty: 90 TABLET | Refills: 1 | Status: SHIPPED | OUTPATIENT
Start: 2017-09-11 | End: 2018-01-08 | Stop reason: ALTCHOICE

## 2017-09-11 RX ORDER — NITROFURANTOIN MACROCRYSTALS 100 MG/1
100 CAPSULE ORAL 2 TIMES DAILY
Qty: 14 CAPSULE | Refills: 0 | Status: SHIPPED | OUTPATIENT
Start: 2017-09-11 | End: 2017-09-18

## 2017-09-15 ENCOUNTER — OFFICE VISIT (OUTPATIENT)
Dept: INTERNAL MEDICINE | Age: 72
End: 2017-09-15
Payer: MEDICARE

## 2017-09-15 ENCOUNTER — HOSPITAL ENCOUNTER (OUTPATIENT)
Dept: CT IMAGING | Age: 72
Discharge: HOME | End: 2017-09-15
Payer: MEDICARE

## 2017-09-15 ENCOUNTER — HOSPITAL ENCOUNTER (OUTPATIENT)
Dept: GENERAL RADIOLOGY | Age: 72
Discharge: HOME | End: 2017-09-15
Payer: MEDICARE

## 2017-09-15 VITALS
HEIGHT: 69 IN | OXYGEN SATURATION: 96 % | DIASTOLIC BLOOD PRESSURE: 80 MMHG | SYSTOLIC BLOOD PRESSURE: 132 MMHG | WEIGHT: 241 LBS | BODY MASS INDEX: 35.7 KG/M2 | TEMPERATURE: 97.7 F | RESPIRATION RATE: 18 BRPM | HEART RATE: 84 BPM

## 2017-09-15 DIAGNOSIS — Z92.29 HISTORY OF COUMADIN THERAPY: ICD-10-CM
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
## 2017-09-15 DIAGNOSIS — I48.20 CHRONIC ATRIAL FIBRILLATION (HCC): ICD-10-CM

## 2017-09-15 DIAGNOSIS — Z00.00 HEALTH CARE MAINTENANCE: ICD-10-CM

## 2017-09-15 DIAGNOSIS — S09.90XA HEAD INJURY, INITIAL ENCOUNTER: ICD-10-CM

## 2017-09-15 DIAGNOSIS — N30.00 ACUTE CYSTITIS WITHOUT HEMATURIA: ICD-10-CM

## 2017-09-15 DIAGNOSIS — B86 SCABIES INFESTATION: ICD-10-CM

## 2017-09-15 DIAGNOSIS — E78.5 HYPERLIPIDEMIA, UNSPECIFIED HYPERLIPIDEMIA TYPE: ICD-10-CM

## 2017-09-15 DIAGNOSIS — M54.50 ACUTE LEFT-SIDED LOW BACK PAIN WITHOUT SCIATICA: ICD-10-CM

## 2017-09-15 DIAGNOSIS — M25.552 LEFT HIP PAIN: ICD-10-CM
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
## 2017-09-15 PROBLEM — I87.2 CHRONIC VENOUS INSUFFICIENCY: Status: ACTIVE | Noted: 2017-04-10

## 2017-09-15 LAB
INR BLD: 1.93 (ref 0.88–1.18)
PROTHROMBIN TIME: 22.2 SEC (ref 12–14.6)

## 2017-09-15 PROCEDURE — G8419 CALC BMI OUT NRM PARAM NOF/U: HCPCS | Performed by: NURSE PRACTITIONER

## 2017-09-15 PROCEDURE — 1036F TOBACCO NON-USER: CPT | Performed by: NURSE PRACTITIONER

## 2017-09-15 PROCEDURE — 99214 OFFICE O/P EST MOD 30 MIN: CPT | Performed by: NURSE PRACTITIONER

## 2017-09-15 PROCEDURE — 73521 X-RAY EXAM HIPS BI 2 VIEWS: CPT

## 2017-09-15 PROCEDURE — 1090F PRES/ABSN URINE INCON ASSESS: CPT | Performed by: NURSE PRACTITIONER

## 2017-09-15 PROCEDURE — 4040F PNEUMOC VAC/ADMIN/RCVD: CPT | Performed by: NURSE PRACTITIONER

## 2017-09-15 PROCEDURE — 72100 X-RAY EXAM L-S SPINE 2/3 VWS: CPT

## 2017-09-15 PROCEDURE — 3017F COLORECTAL CA SCREEN DOC REV: CPT | Performed by: NURSE PRACTITIONER

## 2017-09-15 PROCEDURE — G8400 PT W/DXA NO RESULTS DOC: HCPCS | Performed by: NURSE PRACTITIONER

## 2017-09-15 PROCEDURE — 70450 CT HEAD/BRAIN W/O DYE: CPT

## 2017-09-15 PROCEDURE — G8427 DOCREV CUR MEDS BY ELIG CLIN: HCPCS | Performed by: NURSE PRACTITIONER

## 2017-09-15 PROCEDURE — 3014F SCREEN MAMMO DOC REV: CPT | Performed by: NURSE PRACTITIONER

## 2017-09-15 PROCEDURE — 1123F ACP DISCUSS/DSCN MKR DOCD: CPT | Performed by: NURSE PRACTITIONER

## 2017-09-15 RX ORDER — SULFAMETHOXAZOLE AND TRIMETHOPRIM 800; 160 MG/1; MG/1
1 TABLET ORAL 2 TIMES DAILY
Qty: 14 TABLET | Refills: 0 | Status: SHIPPED | OUTPATIENT
Start: 2017-09-15 | End: 2017-09-22

## 2017-09-15 RX ORDER — METOPROLOL TARTRATE 100 MG/1
TABLET ORAL
Refills: 11 | COMMUNITY
Start: 2017-07-28 | End: 2019-03-01

## 2017-09-15 RX ORDER — CALCITRIOL 0.25 UG/1
CAPSULE, LIQUID FILLED ORAL
Refills: 4 | COMMUNITY
Start: 2017-07-10 | End: 2018-08-20 | Stop reason: SDUPTHER

## 2017-09-15 ASSESSMENT — ENCOUNTER SYMPTOMS
EYE DISCHARGE: 0
WHEEZING: 0
TROUBLE SWALLOWING: 0
ABDOMINAL DISTENTION: 0
SORE THROAT: 0
NAUSEA: 0
STRIDOR: 0
CONSTIPATION: 0
CHOKING: 0
VOMITING: 0
SHORTNESS OF BREATH: 0
COUGH: 0
EYE ITCHING: 0
DIARRHEA: 0
BLOOD IN STOOL: 0
ABDOMINAL PAIN: 0
COLOR CHANGE: 0

## 2017-09-15 ASSESSMENT — PATIENT HEALTH QUESTIONNAIRE - PHQ9
2. FEELING DOWN, DEPRESSED OR HOPELESS: 0
SUM OF ALL RESPONSES TO PHQ9 QUESTIONS 1 & 2: 0
1. LITTLE INTEREST OR PLEASURE IN DOING THINGS: 0
SUM OF ALL RESPONSES TO PHQ QUESTIONS 1-9: 0

## 2017-09-19 ENCOUNTER — TELEPHONE (OUTPATIENT)
Dept: INTERNAL MEDICINE | Age: 72
End: 2017-09-19

## 2017-09-19 DIAGNOSIS — I48.20 CHRONIC ATRIAL FIBRILLATION (HCC): Primary | ICD-10-CM

## 2017-09-19 DIAGNOSIS — I48.20 CHRONIC ATRIAL FIBRILLATION (HCC): ICD-10-CM

## 2017-09-19 LAB
INR BLD: 2.58 (ref 0.88–1.18)
PROTHROMBIN TIME: 28 SEC (ref 12–14.6)

## 2017-10-08 ENCOUNTER — APPOINTMENT (OUTPATIENT)
Dept: GENERAL RADIOLOGY | Facility: HOSPITAL | Age: 72
End: 2017-10-08

## 2017-10-08 ENCOUNTER — HOSPITAL ENCOUNTER (INPATIENT)
Facility: HOSPITAL | Age: 72
LOS: 2 days | Discharge: ANOTHER HOSPITAL | End: 2017-10-11
Attending: EMERGENCY MEDICINE | Admitting: INTERNAL MEDICINE

## 2017-10-08 DIAGNOSIS — I50.9 ACUTE ON CHRONIC CONGESTIVE HEART FAILURE, UNSPECIFIED CONGESTIVE HEART FAILURE TYPE: Primary | ICD-10-CM

## 2017-10-08 DIAGNOSIS — R06.09 DYSPNEA ON EXERTION: ICD-10-CM

## 2017-10-08 LAB
ALBUMIN SERPL-MCNC: 4 G/DL (ref 3.5–5)
ALBUMIN/GLOB SERPL: 1.1 G/DL (ref 1.1–2.5)
ALP SERPL-CCNC: 100 U/L (ref 24–120)
ALT SERPL W P-5'-P-CCNC: 45 U/L (ref 0–54)
ANION GAP SERPL CALCULATED.3IONS-SCNC: 11 MMOL/L (ref 4–13)
ANISOCYTOSIS BLD QL: NORMAL
APTT PPP: 45.6 SECONDS (ref 24.1–34.8)
ARTERIAL PATENCY WRIST A: POSITIVE
AST SERPL-CCNC: 30 U/L (ref 7–45)
ATMOSPHERIC PRESS: 744 MMHG
BASE EXCESS BLDA CALC-SCNC: 0.1 MMOL/L (ref 0–2)
BASOPHILS # BLD AUTO: 0.02 10*3/MM3 (ref 0–0.2)
BASOPHILS NFR BLD AUTO: 0.3 % (ref 0–2)
BDY SITE: ABNORMAL
BILIRUB SERPL-MCNC: 0.6 MG/DL (ref 0.1–1)
BODY TEMPERATURE: 37 C
BUN BLD-MCNC: 35 MG/DL (ref 5–21)
BUN/CREAT SERPL: 28.5 (ref 7–25)
CALCIUM SPEC-SCNC: 9.8 MG/DL (ref 8.4–10.4)
CHLORIDE SERPL-SCNC: 110 MMOL/L (ref 98–110)
CO2 SERPL-SCNC: 26 MMOL/L (ref 24–31)
CREAT BLD-MCNC: 1.23 MG/DL (ref 0.5–1.4)
D DIMER PPP FEU-MCNC: 0.53 MG/L (FEU) (ref 0–0.5)
DEPRECATED RDW RBC AUTO: 55.3 FL (ref 40–54)
EOSINOPHIL # BLD AUTO: 0.64 10*3/MM3 (ref 0–0.7)
EOSINOPHIL NFR BLD AUTO: 9.5 % (ref 0–4)
ERYTHROCYTE [DISTWIDTH] IN BLOOD BY AUTOMATED COUNT: 14.4 % (ref 12–15)
GAS FLOW AIRWAY: 2 LPM
GFR SERPL CREATININE-BSD FRML MDRD: 43 ML/MIN/1.73
GLOBULIN UR ELPH-MCNC: 3.6 GM/DL
GLUCOSE BLD-MCNC: 136 MG/DL (ref 70–100)
HCO3 BLDA-SCNC: 25.9 MMOL/L (ref 20–26)
HCT VFR BLD AUTO: 37 % (ref 37–47)
HGB BLD-MCNC: 11.8 G/DL (ref 12–16)
HOLD SPECIMEN: NORMAL
HOLD SPECIMEN: NORMAL
IMM GRANULOCYTES # BLD: 0.01 10*3/MM3 (ref 0–0.03)
IMM GRANULOCYTES NFR BLD: 0.1 % (ref 0–5)
INR PPP: 2.92 (ref 0.91–1.09)
LYMPHOCYTES # BLD AUTO: 1.17 10*3/MM3 (ref 0.72–4.86)
LYMPHOCYTES NFR BLD AUTO: 17.4 % (ref 15–45)
Lab: ABNORMAL
MACROCYTES BLD QL SMEAR: NORMAL
MCH RBC QN AUTO: 33.7 PG (ref 28–32)
MCHC RBC AUTO-ENTMCNC: 31.9 G/DL (ref 33–36)
MCV RBC AUTO: 105.7 FL (ref 82–98)
MODALITY: ABNORMAL
MONOCYTES # BLD AUTO: 0.88 10*3/MM3 (ref 0.19–1.3)
MONOCYTES NFR BLD AUTO: 13.1 % (ref 4–12)
NEUTROPHILS # BLD AUTO: 4.01 10*3/MM3 (ref 1.87–8.4)
NEUTROPHILS NFR BLD AUTO: 59.6 % (ref 39–78)
PCO2 BLDA: 46.2 MM HG (ref 35–45)
PCO2 TEMP ADJ BLD: 46.2 MM HG (ref 35–45)
PH BLDA: 7.36 PH UNITS (ref 7.35–7.45)
PH, TEMP CORRECTED: 7.36 PH UNITS (ref 7.35–7.45)
PLAT MORPH BLD: NORMAL
PLATELET # BLD AUTO: 216 10*3/MM3 (ref 130–400)
PMV BLD AUTO: 9.9 FL (ref 6–12)
PO2 BLDA: 79 MM HG (ref 83–108)
PO2 TEMP ADJ BLD: 79 MM HG (ref 83–108)
POTASSIUM BLD-SCNC: 4.4 MMOL/L (ref 3.5–5.3)
PROT SERPL-MCNC: 7.6 G/DL (ref 6.3–8.7)
PROTHROMBIN TIME: 31.6 SECONDS (ref 11.9–14.6)
RBC # BLD AUTO: 3.5 10*6/MM3 (ref 4.2–5.4)
SAO2 % BLDCOA: 95.7 % (ref 94–99)
SODIUM BLD-SCNC: 147 MMOL/L (ref 135–145)
TROPONIN I SERPL-MCNC: <0.012 NG/ML (ref 0–0.03)
VENTILATOR MODE: ABNORMAL
WBC MORPH BLD: NORMAL
WBC NRBC COR # BLD: 6.73 10*3/MM3 (ref 4.8–10.8)
WHOLE BLOOD HOLD SPECIMEN: NORMAL
WHOLE BLOOD HOLD SPECIMEN: NORMAL

## 2017-10-08 PROCEDURE — 93005 ELECTROCARDIOGRAM TRACING: CPT

## 2017-10-08 PROCEDURE — 82803 BLOOD GASES ANY COMBINATION: CPT

## 2017-10-08 PROCEDURE — 99285 EMERGENCY DEPT VISIT HI MDM: CPT

## 2017-10-08 PROCEDURE — 94640 AIRWAY INHALATION TREATMENT: CPT

## 2017-10-08 PROCEDURE — 94799 UNLISTED PULMONARY SVC/PX: CPT

## 2017-10-08 PROCEDURE — 71010 HC CHEST PA OR AP: CPT

## 2017-10-08 PROCEDURE — 85025 COMPLETE CBC W/AUTO DIFF WBC: CPT | Performed by: EMERGENCY MEDICINE

## 2017-10-08 PROCEDURE — 93010 ELECTROCARDIOGRAM REPORT: CPT | Performed by: INTERNAL MEDICINE

## 2017-10-08 PROCEDURE — 25010000002 METHYLPREDNISOLONE PER 125 MG: Performed by: EMERGENCY MEDICINE

## 2017-10-08 PROCEDURE — 84484 ASSAY OF TROPONIN QUANT: CPT | Performed by: EMERGENCY MEDICINE

## 2017-10-08 PROCEDURE — 85379 FIBRIN DEGRADATION QUANT: CPT | Performed by: EMERGENCY MEDICINE

## 2017-10-08 PROCEDURE — 85007 BL SMEAR W/DIFF WBC COUNT: CPT | Performed by: EMERGENCY MEDICINE

## 2017-10-08 PROCEDURE — 80053 COMPREHEN METABOLIC PANEL: CPT | Performed by: EMERGENCY MEDICINE

## 2017-10-08 PROCEDURE — 83880 ASSAY OF NATRIURETIC PEPTIDE: CPT | Performed by: EMERGENCY MEDICINE

## 2017-10-08 PROCEDURE — 85730 THROMBOPLASTIN TIME PARTIAL: CPT | Performed by: EMERGENCY MEDICINE

## 2017-10-08 PROCEDURE — 36600 WITHDRAWAL OF ARTERIAL BLOOD: CPT

## 2017-10-08 PROCEDURE — 85610 PROTHROMBIN TIME: CPT | Performed by: EMERGENCY MEDICINE

## 2017-10-08 RX ORDER — METHYLPREDNISOLONE SODIUM SUCCINATE 125 MG/2ML
125 INJECTION, POWDER, LYOPHILIZED, FOR SOLUTION INTRAMUSCULAR; INTRAVENOUS ONCE
Status: COMPLETED | OUTPATIENT
Start: 2017-10-08 | End: 2017-10-08

## 2017-10-08 RX ORDER — IPRATROPIUM BROMIDE AND ALBUTEROL SULFATE 2.5; .5 MG/3ML; MG/3ML
6 SOLUTION RESPIRATORY (INHALATION) ONCE
Status: COMPLETED | OUTPATIENT
Start: 2017-10-08 | End: 2017-10-08

## 2017-10-08 RX ADMIN — METHYLPREDNISOLONE SODIUM SUCCINATE 125 MG: 125 INJECTION, POWDER, FOR SOLUTION INTRAMUSCULAR; INTRAVENOUS at 23:00

## 2017-10-08 RX ADMIN — IPRATROPIUM BROMIDE AND ALBUTEROL SULFATE 3 ML: .5; 3 SOLUTION RESPIRATORY (INHALATION) at 23:33

## 2017-10-09 ENCOUNTER — APPOINTMENT (OUTPATIENT)
Dept: CARDIOLOGY | Facility: HOSPITAL | Age: 72
End: 2017-10-09
Attending: INTERNAL MEDICINE

## 2017-10-09 PROBLEM — I50.9 ACUTE ON CHRONIC CONGESTIVE HEART FAILURE (HCC): Status: ACTIVE | Noted: 2017-10-09

## 2017-10-09 LAB
25(OH)D3 SERPL-MCNC: 27.5 NG/ML (ref 30–100)
BACTERIA UR QL AUTO: ABNORMAL /HPF
BH CV ECHO MEAS - AO MAX PG (FULL): 81.8 MMHG
BH CV ECHO MEAS - AO MAX PG: 86.9 MMHG
BH CV ECHO MEAS - AO MEAN PG (FULL): 52.3 MMHG
BH CV ECHO MEAS - AO MEAN PG: 56.3 MMHG
BH CV ECHO MEAS - AO ROOT AREA (BSA CORRECTED): 1.6
BH CV ECHO MEAS - AO ROOT AREA: 9.1 CM^2
BH CV ECHO MEAS - AO ROOT DIAM: 3.4 CM
BH CV ECHO MEAS - AO V2 MAX: 466 CM/SEC
BH CV ECHO MEAS - AO V2 MEAN: 358.3 CM/SEC
BH CV ECHO MEAS - AO V2 VTI: 110.1 CM
BH CV ECHO MEAS - AVA(I,A): 1.3 CM^2
BH CV ECHO MEAS - AVA(I,D): 1.3 CM^2
BH CV ECHO MEAS - AVA(V,A): 1.1 CM^2
BH CV ECHO MEAS - AVA(V,D): 1.1 CM^2
BH CV ECHO MEAS - BSA(HAYCOCK): 2.3 M^2
BH CV ECHO MEAS - BSA: 2.2 M^2
BH CV ECHO MEAS - BZI_BMI: 37.7 KILOGRAMS/M^2
BH CV ECHO MEAS - BZI_METRIC_HEIGHT: 170.2 CM
BH CV ECHO MEAS - BZI_METRIC_WEIGHT: 109.3 KG
BH CV ECHO MEAS - CONTRAST EF 4CH: 62.7 ML/M^2
BH CV ECHO MEAS - EDV(CUBED): 35.6 ML
BH CV ECHO MEAS - EDV(MOD-SP4): 110 ML
BH CV ECHO MEAS - EDV(TEICH): 43.8 ML
BH CV ECHO MEAS - EF(CUBED): 69.3 %
BH CV ECHO MEAS - EF(MOD-SP4): 62.7 %
BH CV ECHO MEAS - EF(TEICH): 62.2 %
BH CV ECHO MEAS - ESV(CUBED): 10.9 ML
BH CV ECHO MEAS - ESV(MOD-SP4): 41 ML
BH CV ECHO MEAS - ESV(TEICH): 16.6 ML
BH CV ECHO MEAS - FS: 32.5 %
BH CV ECHO MEAS - IVS/LVPW: 0.93
BH CV ECHO MEAS - IVSD: 1.8 CM
BH CV ECHO MEAS - LA DIMENSION: 4.2 CM
BH CV ECHO MEAS - LA/AO: 1.2
BH CV ECHO MEAS - LV DIASTOLIC VOL/BSA (35-75): 50.3 ML/M^2
BH CV ECHO MEAS - LV MASS(C)D: 256.3 GRAMS
BH CV ECHO MEAS - LV MASS(C)DI: 117.1 GRAMS/M^2
BH CV ECHO MEAS - LV MAX PG: 5.1 MMHG
BH CV ECHO MEAS - LV MEAN PG: 4 MMHG
BH CV ECHO MEAS - LV SYSTOLIC VOL/BSA (12-30): 18.7 ML/M^2
BH CV ECHO MEAS - LV V1 MAX: 113 CM/SEC
BH CV ECHO MEAS - LV V1 MEAN: 91.1 CM/SEC
BH CV ECHO MEAS - LV V1 VTI: 31.9 CM
BH CV ECHO MEAS - LVIDD: 3.3 CM
BH CV ECHO MEAS - LVIDS: 2.2 CM
BH CV ECHO MEAS - LVLD AP4: 7.4 CM
BH CV ECHO MEAS - LVLS AP4: 6.5 CM
BH CV ECHO MEAS - LVOT AREA (M): 4.5 CM^2
BH CV ECHO MEAS - LVOT AREA: 4.5 CM^2
BH CV ECHO MEAS - LVOT DIAM: 2.4 CM
BH CV ECHO MEAS - LVPWD: 1.9 CM
BH CV ECHO MEAS - MR ALIAS VEL: 30.8 CM/SEC
BH CV ECHO MEAS - MR ERO: 0.06 CM^2
BH CV ECHO MEAS - MR FLOW RATE: 31 CM^3/SEC
BH CV ECHO MEAS - MR MAX PG: 91.4 MMHG
BH CV ECHO MEAS - MR MAX VEL: 478 CM/SEC
BH CV ECHO MEAS - MR MEAN PG: 72 MMHG
BH CV ECHO MEAS - MR MEAN VEL: 413 CM/SEC
BH CV ECHO MEAS - MR PISA RADIUS: 0.4 CM
BH CV ECHO MEAS - MR PISA: 1 CM^2
BH CV ECHO MEAS - MR VOLUME: 11.6 ML
BH CV ECHO MEAS - MR VTI: 179 CM
BH CV ECHO MEAS - MV DEC TIME: 0.3 SEC
BH CV ECHO MEAS - MV E MAX VEL: 128 CM/SEC
BH CV ECHO MEAS - RAP SYSTOLE: 10 MMHG
BH CV ECHO MEAS - RVSP: 57.6 MMHG
BH CV ECHO MEAS - SI(AO): 456.8 ML/M^2
BH CV ECHO MEAS - SI(CUBED): 11.3 ML/M^2
BH CV ECHO MEAS - SI(LVOT): 65.9 ML/M^2
BH CV ECHO MEAS - SI(MOD-SP4): 31.5 ML/M^2
BH CV ECHO MEAS - SI(TEICH): 12.4 ML/M^2
BH CV ECHO MEAS - SV(AO): 999.9 ML
BH CV ECHO MEAS - SV(CUBED): 24.7 ML
BH CV ECHO MEAS - SV(LVOT): 144.3 ML
BH CV ECHO MEAS - SV(MOD-SP4): 69 ML
BH CV ECHO MEAS - SV(TEICH): 27.2 ML
BH CV ECHO MEAS - TR MAX VEL: 345 CM/SEC
BILIRUB UR QL STRIP: NEGATIVE
CLARITY UR: CLEAR
COLOR UR: YELLOW
E/E' RATIO: 16.1
FOLATE SERPL-MCNC: 12.3 NG/ML
GLUCOSE UR STRIP-MCNC: NEGATIVE MG/DL
HGB UR QL STRIP.AUTO: NEGATIVE
HYALINE CASTS UR QL AUTO: ABNORMAL /LPF
INR PPP: 2.72 (ref 0.91–1.09)
KETONES UR QL STRIP: NEGATIVE
LEFT ATRIUM VOLUME INDEX: 55.7 ML/M2
LEFT ATRIUM VOLUME: 122 CM3
LEUKOCYTE ESTERASE UR QL STRIP.AUTO: NEGATIVE
LV EF 2D ECHO EST: 65 %
MAGNESIUM SERPL-MCNC: 1.8 MG/DL (ref 1.4–2.2)
NITRITE UR QL STRIP: POSITIVE
NT-PROBNP SERPL-MCNC: 3510 PG/ML (ref 0–900)
PH UR STRIP.AUTO: 6.5 [PH] (ref 5–8)
PROT UR QL STRIP: NEGATIVE
PROTHROMBIN TIME: 29.9 SECONDS (ref 11.9–14.6)
RBC # UR: ABNORMAL /HPF
REF LAB TEST METHOD: ABNORMAL
SP GR UR STRIP: 1.01 (ref 1–1.03)
SQUAMOUS #/AREA URNS HPF: ABNORMAL /HPF
T4 FREE SERPL-MCNC: 1.44 NG/DL (ref 0.78–2.19)
TROPONIN I SERPL-MCNC: 0.01 NG/ML (ref 0–0.03)
TROPONIN I SERPL-MCNC: 0.01 NG/ML (ref 0–0.03)
TROPONIN I SERPL-MCNC: <0.012 NG/ML (ref 0–0.03)
TSH SERPL DL<=0.05 MIU/L-ACNC: <0.02 MIU/ML (ref 0.47–4.68)
UROBILINOGEN UR QL STRIP: ABNORMAL
VIT B12 BLD-MCNC: 259 PG/ML (ref 239–931)
WBC UR QL AUTO: ABNORMAL /HPF

## 2017-10-09 PROCEDURE — 84443 ASSAY THYROID STIM HORMONE: CPT | Performed by: INTERNAL MEDICINE

## 2017-10-09 PROCEDURE — 82746 ASSAY OF FOLIC ACID SERUM: CPT | Performed by: INTERNAL MEDICINE

## 2017-10-09 PROCEDURE — 84484 ASSAY OF TROPONIN QUANT: CPT | Performed by: INTERNAL MEDICINE

## 2017-10-09 PROCEDURE — 25010000002 FUROSEMIDE PER 20 MG: Performed by: EMERGENCY MEDICINE

## 2017-10-09 PROCEDURE — 84439 ASSAY OF FREE THYROXINE: CPT | Performed by: INTERNAL MEDICINE

## 2017-10-09 PROCEDURE — 85610 PROTHROMBIN TIME: CPT | Performed by: INTERNAL MEDICINE

## 2017-10-09 PROCEDURE — 83735 ASSAY OF MAGNESIUM: CPT | Performed by: INTERNAL MEDICINE

## 2017-10-09 PROCEDURE — 87186 SC STD MICRODIL/AGAR DIL: CPT | Performed by: INTERNAL MEDICINE

## 2017-10-09 PROCEDURE — 99223 1ST HOSP IP/OBS HIGH 75: CPT | Performed by: NURSE PRACTITIONER

## 2017-10-09 PROCEDURE — 25010000002 PERFLUTREN 6.52 MG/ML SUSPENSION: Performed by: INTERNAL MEDICINE

## 2017-10-09 PROCEDURE — 87086 URINE CULTURE/COLONY COUNT: CPT | Performed by: INTERNAL MEDICINE

## 2017-10-09 PROCEDURE — 82607 VITAMIN B-12: CPT | Performed by: INTERNAL MEDICINE

## 2017-10-09 PROCEDURE — 93306 TTE W/DOPPLER COMPLETE: CPT | Performed by: INTERNAL MEDICINE

## 2017-10-09 PROCEDURE — 99223 1ST HOSP IP/OBS HIGH 75: CPT | Performed by: INTERNAL MEDICINE

## 2017-10-09 PROCEDURE — 36415 COLL VENOUS BLD VENIPUNCTURE: CPT | Performed by: INTERNAL MEDICINE

## 2017-10-09 PROCEDURE — 25010000002 FUROSEMIDE PER 20 MG: Performed by: INTERNAL MEDICINE

## 2017-10-09 PROCEDURE — 93306 TTE W/DOPPLER COMPLETE: CPT

## 2017-10-09 PROCEDURE — 87088 URINE BACTERIA CULTURE: CPT | Performed by: INTERNAL MEDICINE

## 2017-10-09 PROCEDURE — 82306 VITAMIN D 25 HYDROXY: CPT | Performed by: INTERNAL MEDICINE

## 2017-10-09 PROCEDURE — 94799 UNLISTED PULMONARY SVC/PX: CPT

## 2017-10-09 PROCEDURE — 81001 URINALYSIS AUTO W/SCOPE: CPT | Performed by: INTERNAL MEDICINE

## 2017-10-09 RX ORDER — ONDANSETRON 2 MG/ML
4 INJECTION INTRAMUSCULAR; INTRAVENOUS EVERY 6 HOURS PRN
Status: DISCONTINUED | OUTPATIENT
Start: 2017-10-09 | End: 2017-10-11 | Stop reason: HOSPADM

## 2017-10-09 RX ORDER — ACETAMINOPHEN 325 MG/1
650 TABLET ORAL EVERY 6 HOURS PRN
Status: DISCONTINUED | OUTPATIENT
Start: 2017-10-09 | End: 2017-10-11 | Stop reason: HOSPADM

## 2017-10-09 RX ORDER — FUROSEMIDE 10 MG/ML
40 INJECTION INTRAMUSCULAR; INTRAVENOUS EVERY 12 HOURS
Status: DISCONTINUED | OUTPATIENT
Start: 2017-10-09 | End: 2017-10-11 | Stop reason: HOSPADM

## 2017-10-09 RX ORDER — SODIUM CHLORIDE 0.9 % (FLUSH) 0.9 %
1-10 SYRINGE (ML) INJECTION AS NEEDED
Status: DISCONTINUED | OUTPATIENT
Start: 2017-10-09 | End: 2017-10-11 | Stop reason: HOSPADM

## 2017-10-09 RX ORDER — METOPROLOL TARTRATE 100 MG/1
100 TABLET ORAL 2 TIMES DAILY
Status: DISCONTINUED | OUTPATIENT
Start: 2017-10-09 | End: 2017-10-11 | Stop reason: HOSPADM

## 2017-10-09 RX ORDER — METHIMAZOLE 10 MG/1
5 TABLET ORAL DAILY
Status: DISCONTINUED | OUTPATIENT
Start: 2017-10-09 | End: 2017-10-11 | Stop reason: HOSPADM

## 2017-10-09 RX ORDER — PANTOPRAZOLE SODIUM 40 MG/1
40 TABLET, DELAYED RELEASE ORAL DAILY
Status: DISCONTINUED | OUTPATIENT
Start: 2017-10-09 | End: 2017-10-09 | Stop reason: SDUPTHER

## 2017-10-09 RX ORDER — POLYETHYLENE GLYCOL 3350 17 G/17G
17 POWDER, FOR SOLUTION ORAL DAILY PRN
Status: DISCONTINUED | OUTPATIENT
Start: 2017-10-09 | End: 2017-10-11 | Stop reason: HOSPADM

## 2017-10-09 RX ORDER — CALCITRIOL 0.25 UG/1
0.25 CAPSULE, LIQUID FILLED ORAL DAILY
Status: DISCONTINUED | OUTPATIENT
Start: 2017-10-09 | End: 2017-10-11 | Stop reason: HOSPADM

## 2017-10-09 RX ORDER — ALUMINA, MAGNESIA, AND SIMETHICONE 2400; 2400; 240 MG/30ML; MG/30ML; MG/30ML
30 SUSPENSION ORAL EVERY 6 HOURS PRN
Status: DISCONTINUED | OUTPATIENT
Start: 2017-10-09 | End: 2017-10-11 | Stop reason: HOSPADM

## 2017-10-09 RX ORDER — NITROGLYCERIN 0.4 MG/1
0.4 TABLET SUBLINGUAL
Status: DISCONTINUED | OUTPATIENT
Start: 2017-10-09 | End: 2017-10-11 | Stop reason: HOSPADM

## 2017-10-09 RX ORDER — NALOXONE HYDROCHLORIDE 1 MG/ML
0.4 INJECTION INTRAMUSCULAR; INTRAVENOUS; SUBCUTANEOUS
Status: DISCONTINUED | OUTPATIENT
Start: 2017-10-09 | End: 2017-10-11 | Stop reason: HOSPADM

## 2017-10-09 RX ORDER — WARFARIN SODIUM 10 MG/1
10 TABLET ORAL
Status: DISCONTINUED | OUTPATIENT
Start: 2017-10-09 | End: 2017-10-11 | Stop reason: HOSPADM

## 2017-10-09 RX ORDER — PANTOPRAZOLE SODIUM 40 MG/1
40 TABLET, DELAYED RELEASE ORAL
Status: DISCONTINUED | OUTPATIENT
Start: 2017-10-09 | End: 2017-10-11 | Stop reason: HOSPADM

## 2017-10-09 RX ORDER — DILTIAZEM HYDROCHLORIDE 180 MG/1
180 CAPSULE, COATED, EXTENDED RELEASE ORAL
Status: DISCONTINUED | OUTPATIENT
Start: 2017-10-09 | End: 2017-10-11 | Stop reason: HOSPADM

## 2017-10-09 RX ORDER — FUROSEMIDE 10 MG/ML
40 INJECTION INTRAMUSCULAR; INTRAVENOUS ONCE
Status: COMPLETED | OUTPATIENT
Start: 2017-10-09 | End: 2017-10-09

## 2017-10-09 RX ORDER — DONEPEZIL HYDROCHLORIDE 5 MG/1
5 TABLET, FILM COATED ORAL NIGHTLY
Status: DISCONTINUED | OUTPATIENT
Start: 2017-10-09 | End: 2017-10-11 | Stop reason: HOSPADM

## 2017-10-09 RX ORDER — IPRATROPIUM BROMIDE AND ALBUTEROL SULFATE 2.5; .5 MG/3ML; MG/3ML
3 SOLUTION RESPIRATORY (INHALATION) EVERY 4 HOURS PRN
Status: DISCONTINUED | OUTPATIENT
Start: 2017-10-09 | End: 2017-10-11 | Stop reason: HOSPADM

## 2017-10-09 RX ADMIN — FUROSEMIDE 40 MG: 10 INJECTION, SOLUTION INTRAMUSCULAR; INTRAVENOUS at 04:09

## 2017-10-09 RX ADMIN — PANTOPRAZOLE SODIUM 40 MG: 40 TABLET, DELAYED RELEASE ORAL at 05:40

## 2017-10-09 RX ADMIN — DILTIAZEM HYDROCHLORIDE 180 MG: 180 CAPSULE, COATED, EXTENDED RELEASE ORAL at 11:07

## 2017-10-09 RX ADMIN — WARFARIN 10 MG: 10 TABLET ORAL at 18:08

## 2017-10-09 RX ADMIN — CALCITRIOL 0.25 MCG: 0.25 CAPSULE ORAL at 11:06

## 2017-10-09 RX ADMIN — METOPROLOL TARTRATE 100 MG: 100 TABLET ORAL at 18:09

## 2017-10-09 RX ADMIN — METOPROLOL TARTRATE 100 MG: 100 TABLET ORAL at 11:05

## 2017-10-09 RX ADMIN — FUROSEMIDE 40 MG: 10 INJECTION, SOLUTION INTRAMUSCULAR; INTRAVENOUS at 19:55

## 2017-10-09 RX ADMIN — PERFLUTREN 8.48 MG: 6.52 INJECTION, SUSPENSION INTRAVENOUS at 12:29

## 2017-10-09 RX ADMIN — METHIMAZOLE 5 MG: 10 TABLET ORAL at 11:06

## 2017-10-09 RX ADMIN — DONEPEZIL HYDROCHLORIDE 5 MG: 5 TABLET, FILM COATED ORAL at 21:09

## 2017-10-09 NOTE — PROGRESS NOTES
Discharge Planning Assessment   [place]     Patient Name: Jarvis Morgan  MRN: [id number]  Today's Date: 10/9/2017    Admit Date: 10/8/2017          Discharge Needs Assessment       10/09/17 5271    Living Environment    Lives With child(jamin), adult    Living Arrangements house    Type of Financial/Environmental Concern none    Transportation Available family or friend will provide;car    Living Environment    Provides Primary Care For no one    Quality Of Family Relationships supportive    Able to Return to Prior Living Arrangements yes    Discharge Needs Assessment    Concerns To Be Addressed denies needs/concerns at this time    Readmission Within The Last 30 Days no previous admission in last 30 days    Equipment Currently Used at Home none;bipap/ cpap;oxygen   Has Cane/walker and says she does not use    Equipment Needed After Discharge none            Discharge Plan       10/09/17 2004    Case Management/Social Work Plan    Plan Home    Additional Comments Spoke with pt to assess for home needs. Pt lives with dtr and plans same. She says she has needed DME, does not wish for HH care. Will follow.         Discharge Placement     No information found                Demographic Summary     None            Functional Status     None            Psychosocial     None            Abuse/Neglect     None            Legal     None            Substance Abuse     None            Patient Forms     None          LUIS Rodríguez

## 2017-10-09 NOTE — CONSULTS
Referring Provider: Dr. Francisco  Reason for Consultation: Sever aortic valve stenosis    Patient Care Team:  IRINEO Baltazar as PCP - General  IRINEO Baltazar as PCP - Family Medicine  Elia Flores MD as Cardiologist (Cardiology)    Chief complaint Shortness of breath    Subjective .     History of present illness:  Ms. Morgan is a 72 year old female well known to our service and followed by Dr. Mendez for her history of severe aortic valve stenosis. She was last seen in the office in July 2017 with discussions of SAVR vs. TAVR. At that point, she was having less shortness of breath but continued with physical deconditioning and TAVR was recommended. However, the patient wanted to stay local for TAVR so a 6 month follow up with repeat 2d echo was planned.    Ms. Morgan states she has had frequent falls at home. She states she is falling due to loss of balance and denies syncopal episodes. She reports worsening shortness of breath that prompted a visit to the ER last night. Workup showed an elevated BNP and negative Troponins. IV diuresis was initiated and patient was admitted to the Hospitalist service with acute on chronic CHF. CT surgery has been consulted for known sever aortic valve stenosis. 2d Echo is currently pending. She denies chest pain or orthopnea. Her daughter is not present.      History  Past Medical History:   Diagnosis Date   • A-fib    • Aortic stenosis    • Arthritis    • Bradycardia    • Cancer     bladder and skin   • Cardiomegaly    • CHF (congestive heart failure)    • GERD (gastroesophageal reflux disease)    • Hard of hearing    • History of short term memory loss    • Hypercalcemia    • Hyperlipidemia    • Hypertension    • Hypothyroidism    • Kidney stone    • Long term current use of anticoagulant therapy    • Open wound     left lower extremity,   • Palpitation    • PONV (postoperative nausea and vomiting)    • Shortness of breath    • Sick sinus syndrome    • Sleep  apnea    , Past Surgical History:   Procedure Laterality Date   • BLADDER SURGERY      removed   • CARDIAC CATHETERIZATION     • CARDIAC CATHETERIZATION N/A 12/9/2016    Procedure: Left Heart Cath;  Surgeon: Elia Flores MD;  Location:  [facility];  Service:    • HIP BIPOLAR REPLACEMENT Left    • HYSTERECTOMY     • ILEOSTOMY     • JOINT REPLACEMENT     • KIDNEY SURGERY      right kidney removed   • NEPHRECTOMY RADICAL Right     from cancer   • VARICOSE VEIN SURGERY Left 4/10/2017    Procedure: LEFT LOWER EXTREMITY VENOGRAM. LEFT SAPHENOUS VEIN RADIO FREQUENCY ABLATION;  Surgeon: Blake Martinez DO;  Location:  [facility] OR;  Service:    , Family History   Problem Relation Age of Onset   • Heart failure Mother    • Heart disease Father    • Stroke Father    • Hypertension Sister    • Heart failure Sister    • No Known Problems Brother    • No Known Problems Maternal Grandmother    • No Known Problems Maternal Grandfather    • No Known Problems Paternal Grandmother    • No Known Problems Paternal Grandfather    • Hypertension Sister    • Heart failure Sister    • Hypertension Sister    • Heart failure Sister    • Hypertension Sister    • Heart failure Sister    • Hypertension Sister    • Heart failure Sister    • Hypertension Sister    • Heart failure Sister    • Gallbladder disease Brother    • COPD Daughter    • Asthma Daughter    • Diabetes Son    • No Known Problems Son    • Autism Son    , Social History   Substance Use Topics   • Smoking status: Never Smoker   • Smokeless tobacco: Never Used   • Alcohol use No   , Prescriptions Prior to Admission   Medication Sig Dispense Refill Last Dose   • allopurinol (ZYLOPRIM) 300 MG tablet Take 300 mg by mouth Daily.   Past Week at Unknown time   • calcitriol (ROCALTROL) 0.25 MCG capsule Take 0.25 mcg by mouth Daily.   Past Week at Unknown time   • donepezil (ARICEPT) 5 MG tablet Take 5 mg by mouth Every Night.   Past Week at Unknown time   • furosemide  "(LASIX) 40 MG tablet Take 40 mg by mouth Daily.   Past Week at Unknown time   • methimazole (TAPAZOLE) 10 MG tablet Take 5 mg by mouth Daily.   Taking   • metoprolol tartrate (LOPRESSOR) 100 MG tablet Take 1 tablet by mouth 2 (Two) Times a Day. 60 tablet 11 Past Week at Unknown time   • pantoprazole (PROTONIX) 40 MG EC tablet Take 40 mg by mouth Daily.   Past Week at Unknown time   • vitamin D (ERGOCALCIFEROL) 91719 UNITS capsule capsule Take 50,000 Units by mouth 1 (One) Time Per Week.   Past Week at Unknown time   • warfarin (COUMADIN) 10 MG tablet Take 10 mg by mouth Daily.   Past Week at Unknown time   , Allergies: Other; Ciprofloxacin; Codeine; Tetanus toxoids; and Tizanidine hcl    Review of Systems  Review of Systems   Constitutional: Positive for fatigue. Negative for activity change, chills and fever.   Respiratory: Positive for shortness of breath. Negative for wheezing.    Cardiovascular: Positive for palpitations and leg swelling. Negative for chest pain.   Gastrointestinal: Negative for abdominal pain, constipation, nausea and vomiting.   Genitourinary: Negative for decreased urine volume and flank pain.   Musculoskeletal: Positive for gait problem.   Neurological: Negative for dizziness and syncope.   Hematological: Bruises/bleeds easily.        Objective     Vital Signs   Visit Vitals   • /65 (BP Location: Right arm, Patient Position: Sitting)   • Pulse 88   • Temp 98.6 °F (37 °C) (Oral)   • Resp 20   • Ht 67\" (170.2 cm)   • Wt 241 lb 2 oz (109 kg)   • LMP  (LMP Unknown)   • SpO2 95%   • Breastfeeding No   • BMI 37.77 kg/m2       Physical Exam   Constitutional: She appears well-developed. No distress.   HENT:   Head: Normocephalic.   Eyes: Pupils are equal, round, and reactive to light.   Neck: Normal range of motion.   Cardiovascular: An irregularly irregular rhythm present.   Murmur heard.   Systolic murmur is present with a grade of 3/6   Pulmonary/Chest: Breath sounds normal. No stridor. No " respiratory distress. She has no wheezes. She has no rales.   Slightly labored, on 2 liters nasal cannula   Abdominal: Soft.   Obese. Ostomy bag.    Musculoskeletal: She exhibits edema.   LE edema. Chronic venous stasis changes to left lower extremity.    Neurological: She is alert.   Skin: Skin is warm and dry. She is not diaphoretic.   Vitals reviewed.        LAB:   CBC:  Results from last 7 days  Lab Units 10/08/17  2244   WBC 10*3/mm3 6.73   HEMATOCRIT % 37.0   PLATELETS 10*3/mm3 216          BMP:)  Results from last 7 days  Lab Units 10/08/17  2244   SODIUM mmol/L 147*   POTASSIUM mmol/L 4.4   CHLORIDE mmol/L 110   CO2 mmol/L 26.0   GLUCOSE mg/dL 136*   BUN mg/dL 35*   CREATININE mg/dL 1.23           COAG:  Results from last 7 days  Lab Units 10/09/17  0507 10/08/17  2244   INR  2.72* 2.92*   APTT seconds  --  45.6*           IMAGES:       Imaging Results (last 24 hours)     Procedure Component Value Units Date/Time    XR Chest 1 View [[id number]] Collected:  10/09/17 0652     Updated:  10/09/17 0655    Narrative:       EXAMINATION:   XR CHEST 1 VW-  10/9/2017 6:52 AM CDT     HISTORY: Single view the chest obtained. Chronic changes noted in the  pulmonary parenchyma. Cardiac silhouettes mildly enlarged. Pulmonary  vascular margins are slightly indistinct. Small bilateral pleural  effusions are present.     IMPRESSION mild changes of pulmonary vascular congestion superimposed on  COPD  This report was finalized on 10/09/2017 06:52 by Dr. Phong Mckeon MD.                   Assessment/Plan      Active Problems:    Acute on chronic congestive heart failure  Aortic valve stenosis, severe  Chronic atrial fibrillation on Coumadin  History of deep vein thrombosis of left lower extremity  Physical deconditioning  Peripheral vascular disease  Essential hypertension  Obesity    Patient continues with poor performance measures, a now with admission for heart failure. Based on previous discussion with patient and daughter,  probably best to proceed forward with TAVR. Will discuss with Cardiology and patient's daughter tomorrow.       Noemi Root, IRINEO  10/09/17  3:51 PM

## 2017-10-09 NOTE — PLAN OF CARE
Problem: Patient Care Overview (Adult)  Goal: Plan of Care Review  Outcome: Ongoing (interventions implemented as appropriate)    10/09/17 0544   Coping/Psychosocial Response Interventions   Plan Of Care Reviewed With patient   Patient Care Overview   Progress no change   Outcome Evaluation   Outcome Summary/Follow up Plan Pt admitted from ER with CHF. STach per Tele, O2 at 2L, VSS. Pt stable; continue to monitor.         Problem: Cardiac: Heart Failure (Adult)  Goal: Signs and Symptoms of Listed Potential Problems Will be Absent or Manageable (Cardiac: Heart Failure)  Outcome: Ongoing (interventions implemented as appropriate)    10/09/17 0544   Cardiac: Heart Failure   Problems Assessed (Heart Failure) all   Problems Present (Heart Failure) dysrhythmia/arrhythmia;functional decline/self-care deficit;respiratory compromise         Problem: Fall Risk (Adult)  Goal: Identify Related Risk Factors and Signs and Symptoms  Outcome: Ongoing (interventions implemented as appropriate)    10/09/17 0544   Fall Risk   Fall Risk: Related Risk Factors age-related changes;gait/mobility problems;history of falls;environment unfamiliar   Fall Risk: Signs and Symptoms presence of risk factors       Goal: Absence of Falls  Outcome: Ongoing (interventions implemented as appropriate)    10/09/17 0544   Fall Risk (Adult)   Absence of Falls making progress toward outcome

## 2017-10-09 NOTE — PLAN OF CARE
Problem: Patient Care Overview (Adult)  Goal: Plan of Care Review  Outcome: Ongoing (interventions implemented as appropriate)    10/09/17 1121   Patient Care Overview   Progress no change   Outcome Evaluation   Outcome Summary/Follow up Plan Initial RDN assessment. Pt is on Cardiac diet with PO intake 100%/1meal so far. She was sleeping at time of RD visit and did not wake to door knock or verbal stimuli--will continue to FU and offer Low Na/Cardiac diet edu when Pt more alert.

## 2017-10-09 NOTE — ED NOTES
RESPIRATORY THERAPIST AT PATIENT BEDSIDE FOR ABG BLOOD DRAW AND ADMINISTRATION OF BREATHING TREATMENT     Amaya Rizzo, GERARDO  10/08/17 7157

## 2017-10-09 NOTE — CONSULTS
Patient Care Team:  IRINEO Baltazar as PCP - General  IRINEO Baltazar as PCP - Family Medicine  Elia Flores MD as Cardiologist (Cardiology)  Torres Dunbar MD  REASON FOR REFERRAL: severe aortic stenosis   Chief complaint: dyspnea     Subjective     Patient is a 72 y.o. female presents with worsening dyspnea. The patient is a poor historian. She states her dyspnea became severe yesterday and she presented to ER for further evaluation and care. There are reports of chest pain in the chart, however the patient is denying chest pain. She admits chronic lower extremity edema and occasional palpitations. EKG reveals atrial fibrillation, no acute STT changes. Troponins are negative. Heart rates are low 100s to 120s. BNP is elevated. She has been started on IV lasix per the primary team. She is anticoagulated with warfarin for her chronic afib. INR is therapeutic. Dr. Murry and Dr. Mendez follow her for severe aortic stenosis. Echo 10/2016 revealed severe AS, normal LVEF, LV DD, moderate LAE, and mild TR. Per his 7/2017 office note, TAVR was recommended by Dr. Mendez. The patient wanted TAVR to be done locally. Six month follow up with repeat echo was the plan at that time. Dr. Murry saw the patient prior to that on 1/2017 for consideration of balloon aortic valvuloplasty. She was felt to be significantly deconditioned at that time. Workup was ongoing. Please refer to his note. She had normal coronaries per cath 12/2016.    Review of Systems   Review of Systems   Constitutional: Positive for fatigue. Negative for diaphoresis, fever and unexpected weight change.   HENT: Negative for nosebleeds.    Respiratory: Positive for shortness of breath. Negative for apnea, cough, chest tightness and wheezing.    Cardiovascular: Positive for palpitations (occasional ) and leg swelling. Negative for chest pain.   Gastrointestinal: Negative for abdominal distention, nausea and vomiting.   Genitourinary: Negative  for hematuria.   Musculoskeletal: Negative for gait problem.   Skin: Negative for color change.   Neurological: Negative for dizziness, syncope, weakness and light-headedness.       History  Past Medical History:   Diagnosis Date   • A-fib    • Aortic stenosis    • Arthritis    • Bradycardia    • Cancer     bladder and skin   • Cardiomegaly    • CHF (congestive heart failure)    • GERD (gastroesophageal reflux disease)    • Hard of hearing    • History of short term memory loss    • Hypercalcemia    • Hyperlipidemia    • Hypertension    • Hypothyroidism    • Kidney stone    • Long term current use of anticoagulant therapy    • Open wound     left lower extremity,   • Palpitation    • PONV (postoperative nausea and vomiting)    • Shortness of breath    • Sick sinus syndrome    • Sleep apnea      Past Surgical History:   Procedure Laterality Date   • BLADDER SURGERY      removed   • CARDIAC CATHETERIZATION     • CARDIAC CATHETERIZATION N/A 12/9/2016    Procedure: Left Heart Cath;  Surgeon: Elia Flores MD;  Location:  [facility];  Service:    • HIP BIPOLAR REPLACEMENT Left    • HYSTERECTOMY     • ILEOSTOMY     • JOINT REPLACEMENT     • KIDNEY SURGERY      right kidney removed   • NEPHRECTOMY RADICAL Right     from cancer   • VARICOSE VEIN SURGERY Left 4/10/2017    Procedure: LEFT LOWER EXTREMITY VENOGRAM. LEFT SAPHENOUS VEIN RADIO FREQUENCY ABLATION;  Surgeon: Blake Martinez DO;  Location:  [facility] OR;  Service:      Family History   Problem Relation Age of Onset   • Heart failure Mother    • Heart disease Father    • Stroke Father    • Hypertension Sister    • Heart failure Sister    • No Known Problems Brother    • No Known Problems Maternal Grandmother    • No Known Problems Maternal Grandfather    • No Known Problems Paternal Grandmother    • No Known Problems Paternal Grandfather    • Hypertension Sister    • Heart failure Sister    • Hypertension Sister    • Heart failure Sister    •  Hypertension Sister    • Heart failure Sister    • Hypertension Sister    • Heart failure Sister    • Hypertension Sister    • Heart failure Sister    • Gallbladder disease Brother    • COPD Daughter    • Asthma Daughter    • Diabetes Son    • No Known Problems Son    • Autism Son      Social History   Substance Use Topics   • Smoking status: Never Smoker   • Smokeless tobacco: Never Used   • Alcohol use No     Prescriptions Prior to Admission   Medication Sig Dispense Refill Last Dose   • allopurinol (ZYLOPRIM) 300 MG tablet Take 300 mg by mouth Daily.   Past Week at Unknown time   • calcitriol (ROCALTROL) 0.25 MCG capsule Take 0.25 mcg by mouth Daily.   Past Week at Unknown time   • donepezil (ARICEPT) 5 MG tablet Take 5 mg by mouth Every Night.   Past Week at Unknown time   • furosemide (LASIX) 40 MG tablet Take 40 mg by mouth Daily.   Past Week at Unknown time   • methimazole (TAPAZOLE) 10 MG tablet Take 5 mg by mouth Daily.   Taking   • metoprolol tartrate (LOPRESSOR) 100 MG tablet Take 1 tablet by mouth 2 (Two) Times a Day. 60 tablet 11 Past Week at Unknown time   • pantoprazole (PROTONIX) 40 MG EC tablet Take 40 mg by mouth Daily.   Past Week at Unknown time   • vitamin D (ERGOCALCIFEROL) 38209 UNITS capsule capsule Take 50,000 Units by mouth 1 (One) Time Per Week.   Past Week at Unknown time   • warfarin (COUMADIN) 10 MG tablet Take 10 mg by mouth Daily.   Past Week at Unknown time       Current Facility-Administered Medications:   •  acetaminophen (TYLENOL) tablet 650 mg, 650 mg, Oral, Q6H PRN, Torres Dunbar MD  •  aluminum-magnesium hydroxide-simethicone (MAALOX MAX) 400-400-40 MG/5ML suspension 30 mL, 30 mL, Oral, Q6H PRN, Torres Dunbar MD  •  calcitriol (ROCALTROL) capsule 0.25 mcg, 0.25 mcg, Oral, Daily, Torres Dunbar MD, 0.25 mcg at 10/09/17 1106  •  diltiaZEM CD (CARDIZEM CD) 24 hr capsule 180 mg, 180 mg, Oral, Q24H, Mode Francisco MD, 180 mg at 10/09/17 1107  •  donepezil (ARICEPT) tablet 5 mg, 5  mg, Oral, Nightly, Torres Dunbar MD  •  furosemide (LASIX) injection 40 mg, 40 mg, Intravenous, Q12H, Torres Dunbar MD  •  Influenza Vac Subunit Quad (FLUCELVAX) injection 0.5 mL, 0.5 mL, Intramuscular, During Hospitalization, Torres Dunbar MD  •  ipratropium-albuterol (DUO-NEB) nebulizer solution 3 mL, 3 mL, Nebulization, Q4H PRN, Torres Dunbar MD  •  methIMAzole (TAPAZOLE) tablet 5 mg, 5 mg, Oral, Daily, Torres Dunbar MD, 5 mg at 10/09/17 1106  •  metoprolol tartrate (LOPRESSOR) tablet 100 mg, 100 mg, Oral, BID, Torres Dunbar MD, 100 mg at 10/09/17 1105  •  Naloxone HCl (NARCAN) injection 0.4 mg, 0.4 mg, Intravenous, Q1H PRN, Torres Dunbar MD  •  nitroglycerin (NITROSTAT) SL tablet 0.4 mg, 0.4 mg, Sublingual, Q5 Min PRN, Torres Dunbar MD  •  ondansetron (ZOFRAN) injection 4 mg, 4 mg, Intravenous, Q6H PRN, Torres Dunbar MD  •  pantoprazole (PROTONIX) EC tablet 40 mg, 40 mg, Oral, Q AM, Torres Dunbar MD, 40 mg at 10/09/17 0540  •  pneumococcal polysaccharide 23-valent (PNEUMOVAX-23) vaccine 0.5 mL, 0.5 mL, Intramuscular, During Hospitalization, Torres Dunbar MD  •  polyethylene glycol (MIRALAX) packet 17 g, 17 g, Oral, Daily PRN, Torres Dunbar MD  •  sodium chloride 0.9 % flush 1-10 mL, 1-10 mL, Intravenous, PRN, Torres Dunbar MD  •  warfarin (COUMADIN) tablet 10 mg, 10 mg, Oral, Daily, Torres Dunbar MD  Allergies:  Other; Ciprofloxacin; Codeine; Tetanus toxoids; and Tizanidine hcl    Objective     Vital Signs  Temp:  [97.4 °F (36.3 °C)-98.4 °F (36.9 °C)] 97.4 °F (36.3 °C)  Heart Rate:  [] 101  Resp:  [18-20] 18  BP: (108-160)/() 143/87    Physical Exam:   Physical Exam   Constitutional: She is oriented to person, place, and time. She appears well-developed and well-nourished. No distress.   HENT:   Head: Normocephalic and atraumatic.   Eyes: Pupils are equal, round, and reactive to light.   Neck: Normal range of motion. Neck supple. No JVD present. No thyromegaly present.   Cardiovascular: Normal rate,  regular rhythm and intact distal pulses.  Exam reveals no gallop and no friction rub.    Murmur (3/6 systolic murmur ) heard.  Pulses:       Dorsalis pedis pulses are 2+ on the right side, and 2+ on the left side.   Pulmonary/Chest: Effort normal. No respiratory distress. She has decreased breath sounds. She has no wheezes. She has no rales. She exhibits no tenderness.   Abdominal: Soft. Bowel sounds are normal. She exhibits no distension. There is no tenderness.   Musculoskeletal: Normal range of motion. She exhibits edema (3-4+ BLE edema ).   Neurological: She is alert and oriented to person, place, and time. No cranial nerve deficit.   Skin: Skin is warm and dry. She is not diaphoretic.   Psychiatric: She has a normal mood and affect. Her behavior is normal.     Results Review:     Lab Results (last 72 hours)     Procedure Component Value Units Date/Time    Comprehensive Metabolic Panel [[id number]]  (Abnormal) Collected:  10/08/17 2244    Specimen:  Blood from Arm, Right Updated:  10/08/17 2312     Glucose 136 (H) mg/dL      BUN 35 (H) mg/dL      Creatinine 1.23 mg/dL      Sodium 147 (H) mmol/L      Potassium 4.4 mmol/L      Chloride 110 mmol/L      CO2 26.0 mmol/L      Calcium 9.8 mg/dL      Total Protein 7.6 g/dL      Albumin 4.00 g/dL      ALT (SGPT) 45 U/L      AST (SGOT) 30 U/L      Alkaline Phosphatase 100 U/L      Total Bilirubin 0.6 mg/dL      eGFR Non African Amer 43 (L) mL/min/1.73      Globulin 3.6 gm/dL      A/G Ratio 1.1 g/dL      BUN/Creatinine Ratio 28.5 (H)     Anion Gap 11.0 mmol/L     Narrative:       The MDRD GFR formula is only valid for adults with stable renal function between ages 18 and 70.    aPTT [[id number]]  (Abnormal) Collected:  10/08/17 2244    Specimen:  Blood from Arm, Right Updated:  10/08/17 2317     PTT 45.6 (H) seconds     Protime-INR [[id number]]  (Abnormal) Collected:  10/08/17 2244    Specimen:  Blood from Arm, Right Updated:  10/08/17 2317     Protime 31.6 (H) Seconds       INR 2.92 (H)    D-dimer, Quantitative [[id number]]  (Abnormal) Collected:  10/08/17 2244    Specimen:  Blood from Arm, Right Updated:  10/08/17 2317     D-Dimer, Quantitative 0.53 (H) mg/L (FEU)     Narrative:       Reference Range is 0-0.50 mg/L FEU. However, results <0.50 mg/L FEU tends to rule out DVT or PE. Results >0.50 mg/L FEU are not useful in predicting absence or presence of DVT or PE.    Troponin [[id number]]  (Normal) Collected:  10/08/17 2244    Specimen:  Blood from Arm, Right Updated:  10/08/17 2323     Troponin I <0.012 ng/mL     Blood Gas, Arterial [[id number]]  (Abnormal) Collected:  10/08/17 2322    Specimen:  Arterial Blood Updated:  10/08/17 2330     Site Right Radial     Rubén's Test Positive     pH, Arterial 7.358 pH units      pCO2, Arterial 46.2 (H) mm Hg      pO2, Arterial 79.0 (L) mm Hg      HCO3, Arterial 25.9 mmol/L      Base Excess, Arterial 0.1 mmol/L      O2 Saturation, Arterial 95.7 %      Temperature 37.0 C      pH, Temp Corrected 7.358 pH Units      pCO2, Temperature Corrected 46.2 (H) mm Hg      pO2, Temperature Corrected 79.0 (L) mm Hg      Barometric Pressure for Blood Gas 744 mmHg      Modality Nasal Cannula     Flow Rate 2.0 lpm      Ventilator Mode NA     Collected by [id number]    [place] Draw [[id number]] Collected:  10/08/17 2244    Specimen:  Blood Updated:  10/08/17 2346    Narrative:       The following orders were created for panel order [place] Draw.  Procedure                               Abnormality         Status                     ---------                               -----------         ------                     Light Blue Top[[id number]]                                   Final result               Green Top (Gel)[[id number]]                                  Final result               Lavender Top[[id number]]                                     Final result               Red Top[[id number]]                                          Final result                 Please  view results for these tests on the individual orders.    Light Blue Top [[id number]] Collected:  10/08/17 2244    Specimen:  Blood from Arm, Right Updated:  10/08/17 2346     Extra Tube hold for add-on      Auto resulted       Green Top (Gel) [[id number]] Collected:  10/08/17 2244    Specimen:  Blood from Arm, Right Updated:  10/08/17 2346     Extra Tube Hold for add-ons.      Auto resulted.       Lavender Top [[id number]] Collected:  10/08/17 2244    Specimen:  Blood from Arm, Right Updated:  10/08/17 2346     Extra Tube hold for add-on      Auto resulted       Red Top [[id number]] Collected:  10/08/17 2244    Specimen:  Blood from Arm, Right Updated:  10/08/17 2346     Extra Tube Hold for add-ons.      Auto resulted.       CBC & Differential [[id number]] Collected:  10/08/17 2244    Specimen:  Blood Updated:  10/08/17 2357    Narrative:       The following orders were created for panel order CBC & Differential.  Procedure                               Abnormality         Status                     ---------                               -----------         ------                     Scan Slide[[id number]]                                       Final result               CBC Auto Differential[[id number]]        Abnormal            Final result                 Please view results for these tests on the individual orders.    CBC Auto Differential [[id number]]  (Abnormal) Collected:  10/08/17 2244    Specimen:  Blood from Arm, Right Updated:  10/08/17 2357     WBC 6.73 10*3/mm3      RBC 3.50 (L) 10*6/mm3      Hemoglobin 11.8 (L) g/dL      Hematocrit 37.0 %      .7 (H) fL      MCH 33.7 (H) pg      MCHC 31.9 (L) g/dL      RDW 14.4 %      RDW-SD 55.3 (H) fl      MPV 9.9 fL      Platelets 216 10*3/mm3      Neutrophil % 59.6 %      Lymphocyte % 17.4 %      Monocyte % 13.1 (H) %      Eosinophil % 9.5 (H) %      Basophil % 0.3 %      Immature Grans % 0.1 %      Neutrophils, Absolute 4.01 10*3/mm3      Lymphocytes, Absolute  1.17 10*3/mm3      Monocytes, Absolute 0.88 10*3/mm3      Eosinophils, Absolute 0.64 10*3/mm3      Basophils, Absolute 0.02 10*3/mm3      Immature Grans, Absolute 0.01 10*3/mm3     Narrative:       Appended report.  These results have been appended to a previously verified report.    Scan Slide [[id number]] Collected:  10/08/17 2244    Specimen:  Blood from Arm, Right Updated:  10/08/17 2357     Anisocytosis Slight/1+     Macrocytes Slight/1+     WBC Morphology Normal     Platelet Morphology Normal    BNP [[id number]]  (Abnormal) Collected:  10/08/17 2244    Specimen:  Blood from Arm, Right Updated:  10/09/17 0224     proBNP 3510.0 (H) pg/mL     Protime-INR [[id number]]  (Abnormal) Collected:  10/09/17 0507    Specimen:  Blood Updated:  10/09/17 0545     Protime 29.9 (H) Seconds      INR 2.72 (H)    Urine Culture - Urine, Urine, Clean Catch [[id number]] Collected:  10/09/17 0526    Specimen:  Urine from Urine, Clean Catch Updated:  10/09/17 0547    Troponin [[id number]]  (Normal) Collected:  10/09/17 0507    Specimen:  Blood Updated:  10/09/17 0557     Troponin I 0.013 ng/mL     T4, Free [[id number]]  (Normal) Collected:  10/09/17 0507    Specimen:  Blood Updated:  10/09/17 0605     Free T4 1.44 ng/dL     Urinalysis With / Culture If Indicated - Urine, Clean Catch [[id number]]  (Abnormal) Collected:  10/09/17 0526    Specimen:  Urine from Urine, Clean Catch Updated:  10/09/17 0624     Color, UA Yellow     Appearance, UA Clear     pH, UA 6.5     Specific Gravity, UA 1.007     Glucose, UA Negative     Ketones, UA Negative     Bilirubin, UA Negative     Blood, UA Negative     Protein, UA Negative     Leuk Esterase, UA Negative     Nitrite, UA Positive (A)     Urobilinogen, UA 0.2 E.U./dL    Urinalysis, Microscopic Only - Urine, Clean Catch [[id number]]  (Abnormal) Collected:  10/09/17 0526    Specimen:  Urine from Urine, Clean Catch Updated:  10/09/17 0624     RBC, UA None Seen /HPF      WBC, UA 0-2 (A) /HPF      Bacteria, UA  2+ (A) /HPF      Squamous Epithelial Cells, UA 0-2 /HPF      Hyaline Casts, UA None Seen /LPF      Methodology Manual Light Microscopy    Troponin [[id number]]  (Normal) Collected:  10/09/17 0939    Specimen:  Blood Updated:  10/09/17 1006     Troponin I <0.012 ng/mL     Magnesium [[id number]]  (Normal) Collected:  10/09/17 0507    Specimen:  Blood Updated:  10/09/17 1019     Magnesium 1.8 mg/dL     Vitamin D 25 Hydroxy [[id number]]  (Abnormal) Collected:  10/09/17 0507    Specimen:  Blood Updated:  10/09/17 1038     25 Hydroxy, Vitamin D 27.5 (L) ng/ml     TSH [[id number]]  (Abnormal) Collected:  10/09/17 0507    Specimen:  Blood Updated:  10/09/17 1051     TSH <0.020 (L) mIU/mL     Vitamin B12 [[id number]]  (Normal) Collected:  10/09/17 0507    Specimen:  Blood Updated:  10/09/17 1125     Vitamin B-12 259 pg/mL     Folate [[id number]] Collected:  10/09/17 0507    Specimen:  Blood Updated:  10/09/17 1125     Folate 12.30 ng/mL           Assessment/Plan     Severe aortic stenosis  Chronic atrial fibrillation - rates 100s-120s  Acute congestive heart failure- ?diastolic, repeat echo pending  Essential hypertension  Deconditioning   Peripheral vascular disease   Anemia  Hyperthyroidism  Obstructive sleep apnea     Plan-  Defer the need for SAVR vs TAVR vs balloon aortic valvuloplasty to Dr. Flores (seeing for Dr. Murry today) and Dr. Mendez  Repeat echo pending   Cautious diuresis. On IV lasix per primary team- monitor intake, output, renal function, electrolytes, daily weights   Continue anticoagulation with warfarin - INR therapeutic  Continue beta blocker; cardizem added per primary team   Minimize beta agonists   Further recommendations per Dr. Flores     I discussed the patients findings and my recommendations with patient.     Hina Verma, APRN  10/09/17  11:26 AM

## 2017-10-09 NOTE — ED NOTES
PT REMOVED FROM O2 PER DR VASQUES TO ASSESS O2 SATURATION ON ROOM AIR     Amaya Rizzo RN  10/09/17 0125

## 2017-10-09 NOTE — NURSING NOTE
"Patient has agreed that we can give information to her daughter when she calls.  The Password is \"[email or web address]\".  "

## 2017-10-09 NOTE — NURSING NOTE
Spoke to Latanya in admitting at [facility] and Patient's facesheet faxed to [phone] per request. Samantha Pearl RN.

## 2017-10-09 NOTE — ED PROVIDER NOTES
"Subjective   HPI Comments: Patient complaining of dyspnea.  She states that it began approximately 13 hours ago.  She does not typically wear oxygen at home but when she began to have difficulty with the dyspnea she began wearing her oxygen.  Patient is also complaining of a pressure-like sensation to her chest.  It is 5 out of 10 without radiation.  She is not having any nausea or vomiting or diaphoresis.  Patient's daughter checked her pulse and noted that it was \"high\" which is why she came to the ED tonight.      History provided by:  Patient      Review of Systems   Constitutional: Negative for activity change, fatigue and fever.   HENT: Negative for congestion, ear pain, facial swelling, postnasal drip, rhinorrhea, sinus pressure, sore throat and trouble swallowing.    Eyes: Negative for photophobia and redness.   Respiratory: Positive for shortness of breath. Negative for cough, chest tightness and wheezing.    Cardiovascular: Positive for chest pain. Negative for palpitations.   Gastrointestinal: Negative for abdominal distention, abdominal pain, diarrhea, nausea and vomiting.   Genitourinary: Negative for difficulty urinating, dysuria and flank pain.   Musculoskeletal: Negative for back pain and neck pain.   Skin: Negative for color change and wound.   Neurological: Negative for dizziness, speech difficulty, weakness, light-headedness and headaches.   Psychiatric/Behavioral: Negative for agitation, confusion, self-injury and suicidal ideas.       Past Medical History:   Diagnosis Date   • A-fib    • Aortic stenosis    • Arthritis    • Bradycardia    • Cancer     bladder and skin   • Cardiomegaly    • GERD (gastroesophageal reflux disease)    • Hard of hearing    • History of short term memory loss    • Hypercalcemia    • Hyperlipidemia    • Hypertension    • Hypothyroidism    • Kidney stone    • Long term current use of anticoagulant therapy    • Open wound     left lower extremity,   • Palpitation    • " PONV (postoperative nausea and vomiting)    • Shortness of breath    • Sick sinus syndrome    • Sleep apnea        Allergies   Allergen Reactions   • Other Anaphylaxis and Itching     Cloth bandaids , causes redness of skin   • Ciprofloxacin Itching   • Codeine Itching   • Tetanus Toxoids Itching     Itching around site   • Tizanidine Hcl Itching       Past Surgical History:   Procedure Laterality Date   • BLADDER SURGERY      removed   • CARDIAC CATHETERIZATION     • CARDIAC CATHETERIZATION N/A 12/9/2016    Procedure: Left Heart Cath;  Surgeon: Elia Flores MD;  Location:  [facility];  Service:    • HIP BIPOLAR REPLACEMENT Left    • HYSTERECTOMY     • ILEOSTOMY     • JOINT REPLACEMENT     • KIDNEY SURGERY      right kidney removed   • NEPHRECTOMY RADICAL Right     from cancer   • VARICOSE VEIN SURGERY Left 4/10/2017    Procedure: LEFT LOWER EXTREMITY VENOGRAM. LEFT SAPHENOUS VEIN RADIO FREQUENCY ABLATION;  Surgeon: Blake Martinez DO;  Location:  [facility] OR;  Service:        Family History   Problem Relation Age of Onset   • Heart failure Mother    • Heart disease Father    • Stroke Father    • Hypertension Sister    • Heart failure Sister    • No Known Problems Brother    • No Known Problems Maternal Grandmother    • No Known Problems Maternal Grandfather    • No Known Problems Paternal Grandmother    • No Known Problems Paternal Grandfather    • Hypertension Sister    • Heart failure Sister    • Hypertension Sister    • Heart failure Sister    • Hypertension Sister    • Heart failure Sister    • Hypertension Sister    • Heart failure Sister    • Hypertension Sister    • Heart failure Sister    • Gallbladder disease Brother        Social History     Social History   • Marital status:      Spouse name: N/A   • Number of children: N/A   • Years of education: N/A     Social History Main Topics   • Smoking status: Never Smoker   • Smokeless tobacco: Never Used   • Alcohol use No   • Drug  use: No   • Sexual activity: Defer     Other Topics Concern   • None     Social History Narrative           Objective   Physical Exam   Constitutional: She is oriented to person, place, and time. She appears well-developed and well-nourished.   HENT:   Head: Normocephalic and atraumatic.   Nose: Nose normal.   Mouth/Throat: Oropharynx is clear and moist.   Eyes: Conjunctivae and EOM are normal. Pupils are equal, round, and reactive to light.   Neck: Normal range of motion. Neck supple.   Cardiovascular: S1 normal, S2 normal and intact distal pulses.  An irregularly irregular rhythm present. Tachycardia present.  Exam reveals no gallop.    Murmur heard.   Systolic murmur is present   Pulmonary/Chest: Effort normal. Tachypnea noted. No respiratory distress. She has decreased breath sounds in the right upper field, the right middle field, the right lower field, the left upper field, the left middle field and the left lower field. She has no wheezes. She has no rhonchi. She has no rales.   Abdominal: Soft. Bowel sounds are normal.   Musculoskeletal: Normal range of motion. She exhibits edema.        Left lower leg: She exhibits edema. She exhibits no tenderness, no bony tenderness and no swelling.   Neurological: She is alert and oriented to person, place, and time.   Skin: Skin is warm and dry.   Psychiatric: She has a normal mood and affect. Her behavior is normal.   Nursing note and vitals reviewed.      Procedures         ED Course  ED Course   Value Comment By Time   ECG 12 Lead Atrial fib with a rate of 104, normal axis, no acute ST elevations or depressions. Mode Hernandez MD 10/08 2254   INR: (!) 2.92 INR is therapeutic on Coumadin. Mode Hernandez MD 10/09 0117      Lab Results (last 24 hours)     Procedure Component Value Units Date/Time    aPTT [[id number]]  (Abnormal) Collected:  10/08/17 2244    Specimen:  Blood from Arm, Right Updated:  10/08/17 2317     PTT 45.6 (H) seconds     Protime-INR [[id number]]   (Abnormal) Collected:  10/08/17 2244    Specimen:  Blood from Arm, Right Updated:  10/08/17 2317     Protime 31.6 (H) Seconds      INR 2.92 (H)    Troponin [[id number]]  (Normal) Collected:  10/08/17 2244    Specimen:  Blood from Arm, Right Updated:  10/08/17 2323     Troponin I <0.012 ng/mL     D-dimer, Quantitative [[id number]]  (Abnormal) Collected:  10/08/17 2244    Specimen:  Blood from Arm, Right Updated:  10/08/17 2317     D-Dimer, Quantitative 0.53 (H) mg/L (FEU)     Narrative:       Reference Range is 0-0.50 mg/L FEU. However, results <0.50 mg/L FEU tends to rule out DVT or PE. Results >0.50 mg/L FEU are not useful in predicting absence or presence of DVT or PE.    Comprehensive Metabolic Panel [[id number]]  (Abnormal) Collected:  10/08/17 2244    Specimen:  Blood from Arm, Right Updated:  10/08/17 2312     Glucose 136 (H) mg/dL      BUN 35 (H) mg/dL      Creatinine 1.23 mg/dL      Sodium 147 (H) mmol/L      Potassium 4.4 mmol/L      Chloride 110 mmol/L      CO2 26.0 mmol/L      Calcium 9.8 mg/dL      Total Protein 7.6 g/dL      Albumin 4.00 g/dL      ALT (SGPT) 45 U/L      AST (SGOT) 30 U/L      Alkaline Phosphatase 100 U/L      Total Bilirubin 0.6 mg/dL      eGFR Non African Amer 43 (L) mL/min/1.73      Globulin 3.6 gm/dL      A/G Ratio 1.1 g/dL      BUN/Creatinine Ratio 28.5 (H)     Anion Gap 11.0 mmol/L     Narrative:       The MDRD GFR formula is only valid for adults with stable renal function between ages 18 and 70.    CBC & Differential [[id number]] Collected:  10/08/17 2244    Specimen:  Blood Updated:  10/08/17 2357    Narrative:       The following orders were created for panel order CBC & Differential.  Procedure                               Abnormality         Status                     ---------                               -----------         ------                     Scan Slide[[id number]]                                       Final result               CBC Auto Differential[[id number]]         Abnormal            Final result                 Please view results for these tests on the individual orders.    CBC Auto Differential [[id number]]  (Abnormal) Collected:  10/08/17 2244    Specimen:  Blood from Arm, Right Updated:  10/08/17 2357     WBC 6.73 10*3/mm3      RBC 3.50 (L) 10*6/mm3      Hemoglobin 11.8 (L) g/dL      Hematocrit 37.0 %      .7 (H) fL      MCH 33.7 (H) pg      MCHC 31.9 (L) g/dL      RDW 14.4 %      RDW-SD 55.3 (H) fl      MPV 9.9 fL      Platelets 216 10*3/mm3      Neutrophil % 59.6 %      Lymphocyte % 17.4 %      Monocyte % 13.1 (H) %      Eosinophil % 9.5 (H) %      Basophil % 0.3 %      Immature Grans % 0.1 %      Neutrophils, Absolute 4.01 10*3/mm3      Lymphocytes, Absolute 1.17 10*3/mm3      Monocytes, Absolute 0.88 10*3/mm3      Eosinophils, Absolute 0.64 10*3/mm3      Basophils, Absolute 0.02 10*3/mm3      Immature Grans, Absolute 0.01 10*3/mm3     Narrative:       Appended report.  These results have been appended to a previously verified report.    Scan Slide [[id number]] Collected:  10/08/17 2244    Specimen:  Blood from Arm, Right Updated:  10/08/17 2357     Anisocytosis Slight/1+     Macrocytes Slight/1+     WBC Morphology Normal     Platelet Morphology Normal    BNP [[id number]]  (Abnormal) Collected:  10/08/17 2244    Specimen:  Blood from Arm, Right Updated:  10/09/17 0224     proBNP 3510.0 (H) pg/mL     Blood Gas, Arterial [[id number]]  (Abnormal) Collected:  10/08/17 2322    Specimen:  Arterial Blood Updated:  10/08/17 2330     Site Right Radial     Rubén's Test Positive     pH, Arterial 7.358 pH units      pCO2, Arterial 46.2 (H) mm Hg      pO2, Arterial 79.0 (L) mm Hg      HCO3, Arterial 25.9 mmol/L      Base Excess, Arterial 0.1 mmol/L      O2 Saturation, Arterial 95.7 %      Temperature 37.0 C      pH, Temp Corrected 7.358 pH Units      pCO2, Temperature Corrected 46.2 (H) mm Hg      pO2, Temperature Corrected 79.0 (L) mm Hg      Barometric Pressure for Blood Gas  744 mmHg      Modality Nasal Cannula     Flow Rate 2.0 lpm      Ventilator Mode NA     Collected by [id number]                    [facility]  Number of Diagnoses or Management Options  Acute on chronic congestive heart failure, unspecified congestive heart failure type: new and requires workup  Dyspnea on exertion: new and requires workup  Diagnosis management comments: Patient found to have CHF.  Will admit to the hospitalist for further evaluation.       Amount and/or Complexity of Data Reviewed  Clinical lab tests: ordered and reviewed  Tests in the radiology section of CPT®: ordered and reviewed  Tests in the medicine section of CPT®: ordered and reviewed  Decide to obtain previous medical records or to obtain history from someone other than the patient: yes  Discuss the patient with other providers: yes  Independent visualization of images, tracings, or specimens: yes    Risk of Complications, Morbidity, and/or Mortality  Presenting problems: moderate  Diagnostic procedures: moderate  Management options: moderate    Patient Progress  Patient progress: stable      Final diagnoses:   Acute on chronic congestive heart failure, unspecified congestive heart failure type   Dyspnea on exertion            Mode Hernandez MD  10/09/17 0652

## 2017-10-09 NOTE — H&P
"    [facility] Medicine Services  HISTORY AND PHYSICAL    Date of Admission: 10/8/2017  Primary Care Physician: IRINEO Baltazar    Subjective     Chief Complaint: dyspnea    Atrial Fibrillation   Symptoms include chest pain and shortness of breath. Past medical history includes atrial fibrillation.   Chest Pain    Associated symptoms include shortness of breath. Pertinent negatives include no fever, headaches, nausea or vomiting.   Shortness of Breath   Associated symptoms include chest pain. Pertinent negatives include no fever, headaches, vomiting or wheezing.     Patient is a 71 yo  female complaining of severe dyspnea starting at 9 A.M. on 10/8/17. She was sitting on the side of the bed during interview in good spirits. Daughter was not present. Patient states shortness of air on walking to her bathroom. She typically does not wear O2, but does wear it during her dyspneic \"flairs\" which she states helps. She wears a CPAP at night, but states it does not help. Patient denied any chest pain or pressure today, but complained of it last night in the emergency room. Patient denies any coughing, wheezing or fevers. Also has history of aortic stenosis and has seen CT surgery with plans for future valve replacement.        Review of Systems   Constitutional: Negative for activity change, chills, fatigue and fever.   Respiratory: Positive for shortness of breath. Negative for chest tightness and wheezing.    Cardiovascular: Positive for chest pain.   Gastrointestinal: Positive for constipation. Negative for diarrhea, nausea and vomiting.   Genitourinary: Negative for difficulty urinating, dysuria and frequency.   Neurological: Negative for speech difficulty, light-headedness and headaches.        Otherwise complete ROS reviewed and negative except as mentioned in the HPI.      Past Medical History:   Past Medical History:   Diagnosis Date   • A-fib    • Aortic stenosis  "   • Arthritis    • Bradycardia    • Cancer     bladder and skin   • Cardiomegaly    • CHF (congestive heart failure)    • GERD (gastroesophageal reflux disease)    • Hard of hearing    • History of short term memory loss    • Hypercalcemia    • Hyperlipidemia    • Hypertension    • Hypothyroidism    • Kidney stone    • Long term current use of anticoagulant therapy    • Open wound     left lower extremity,   • Palpitation    • PONV (postoperative nausea and vomiting)    • Shortness of breath    • Sick sinus syndrome    • Sleep apnea        Past Surgical History:  Past Surgical History:   Procedure Laterality Date   • BLADDER SURGERY      removed   • CARDIAC CATHETERIZATION     • CARDIAC CATHETERIZATION N/A 12/9/2016    Procedure: Left Heart Cath;  Surgeon: Elia Flores MD;  Location:  [facility] CATH INVASIVE LOCATION;  Service:    • HIP BIPOLAR REPLACEMENT Left    • HYSTERECTOMY     • ILEOSTOMY     • JOINT REPLACEMENT     • KIDNEY SURGERY      right kidney removed   • NEPHRECTOMY RADICAL Right     from cancer   • VARICOSE VEIN SURGERY Left 4/10/2017    Procedure: LEFT LOWER EXTREMITY VENOGRAM. LEFT SAPHENOUS VEIN RADIO FREQUENCY ABLATION;  Surgeon: Blake Martinez DO;  Location:  [facility] OR;  Service:        Social History:  reports that she has never smoked. She has never used smokeless tobacco. She reports that she does not drink alcohol or use illicit drugs.    Family History: family history includes Asthma in her daughter; Autism in her son; COPD in her daughter; Diabetes in her son; Gallbladder disease in her brother; Heart disease in her father; Heart failure in her mother, sister, sister, sister, sister, sister, and sister; Hypertension in her sister, sister, sister, sister, sister, and sister; No Known Problems in her brother, maternal grandfather, maternal grandmother, paternal grandfather, paternal grandmother, and son; Stroke in her father.       Allergies:  Allergies   Allergen Reactions   • Other  "Anaphylaxis and Itching     Cloth bandaids , causes redness of skin   • Ciprofloxacin Itching   • Codeine Itching   • Tetanus Toxoids Itching     Itching around site   • Tizanidine Hcl Itching       Medications:  Prior to Admission medications    Medication Sig Start Date End Date Taking? Authorizing Provider   allopurinol (ZYLOPRIM) 300 MG tablet Take 300 mg by mouth Daily.    Historical Provider, MD   calcitriol (ROCALTROL) 0.25 MCG capsule Take 0.25 mcg by mouth Daily.    Historical Provider, MD   donepezil (ARICEPT) 5 MG tablet Take 5 mg by mouth Every Night.    Historical Provider, MD   furosemide (LASIX) 40 MG tablet Take 40 mg by mouth Daily.    Historical Provider, MD   methimazole (TAPAZOLE) 10 MG tablet Take 5 mg by mouth Daily.    Historical Provider, MD   metoprolol tartrate (LOPRESSOR) 100 MG tablet Take 1 tablet by mouth 2 (Two) Times a Day. 4/26/17   IRINEO Pichardo   pantoprazole (PROTONIX) 40 MG EC tablet Take 40 mg by mouth Daily.    Historical Provider, MD   vitamin D (ERGOCALCIFEROL) 83913 UNITS capsule capsule Take 50,000 Units by mouth 1 (One) Time Per Week.    Historical Provider, MD   warfarin (COUMADIN) 10 MG tablet Take 10 mg by mouth Daily.    Historical Provider, MD       Objective     Vital Signs: /87 (BP Location: Right arm, Patient Position: Sitting)  Pulse 101  Temp 97.4 °F (36.3 °C) (Tympanic)   Resp 18  Ht 67\" (170.2 cm)  Wt 241 lb 2 oz (109 kg)  LMP  (LMP Unknown)  SpO2 96%  Breastfeeding? No  BMI 37.77 kg/m2  Physical Exam   Constitutional: She appears well-nourished. No distress.   HENT:   Head: Normocephalic and atraumatic.   Eyes: Conjunctivae are normal. Pupils are equal, round, and reactive to light.   Neck: JVD present.   Right JVD   Cardiovascular: Exam reveals no gallop and no friction rub.    Murmur heard.  Increased rate. 4/6 holosystolic murmur at left lower sternal border.   Pulmonary/Chest: Breath sounds normal. She has no wheezes. She has no " rales. She exhibits no tenderness.   Abdominal: Soft. Bowel sounds are normal. There is no tenderness.   Musculoskeletal: She exhibits edema. She exhibits no tenderness.   Lower extremity edema, L>R   Neurological: She is alert.   Elderly female oriented to person and place and month, but not exact date.  Gait normal   Skin: Skin is warm and dry. No rash noted. No erythema. No pallor.   Psychiatric: She has a normal mood and affect.           Results Reviewed:  Lab Results (last 24 hours)     Procedure Component Value Units Date/Time    Comprehensive Metabolic Panel [[id number]]  (Abnormal) Collected:  10/08/17 2244    Specimen:  Blood from Arm, Right Updated:  10/08/17 2312     Glucose 136 (H) mg/dL      BUN 35 (H) mg/dL      Creatinine 1.23 mg/dL      Sodium 147 (H) mmol/L      Potassium 4.4 mmol/L      Chloride 110 mmol/L      CO2 26.0 mmol/L      Calcium 9.8 mg/dL      Total Protein 7.6 g/dL      Albumin 4.00 g/dL      ALT (SGPT) 45 U/L      AST (SGOT) 30 U/L      Alkaline Phosphatase 100 U/L      Total Bilirubin 0.6 mg/dL      eGFR Non African Amer 43 (L) mL/min/1.73      Globulin 3.6 gm/dL      A/G Ratio 1.1 g/dL      BUN/Creatinine Ratio 28.5 (H)     Anion Gap 11.0 mmol/L     Narrative:       The MDRD GFR formula is only valid for adults with stable renal function between ages 18 and 70.    aPTT [[id number]]  (Abnormal) Collected:  10/08/17 2244    Specimen:  Blood from Arm, Right Updated:  10/08/17 2317     PTT 45.6 (H) seconds     Protime-INR [[id number]]  (Abnormal) Collected:  10/08/17 2244    Specimen:  Blood from Arm, Right Updated:  10/08/17 2317     Protime 31.6 (H) Seconds      INR 2.92 (H)    D-dimer, Quantitative [[id number]]  (Abnormal) Collected:  10/08/17 2244    Specimen:  Blood from Arm, Right Updated:  10/08/17 2317     D-Dimer, Quantitative 0.53 (H) mg/L (FEU)     Narrative:       Reference Range is 0-0.50 mg/L FEU. However, results <0.50 mg/L FEU tends to rule out DVT or PE. Results >0.50 mg/L  FEU are not useful in predicting absence or presence of DVT or PE.    Troponin [[id number]]  (Normal) Collected:  10/08/17 2244    Specimen:  Blood from Arm, Right Updated:  10/08/17 2323     Troponin I <0.012 ng/mL     Blood Gas, Arterial [[id number]]  (Abnormal) Collected:  10/08/17 2322    Specimen:  Arterial Blood Updated:  10/08/17 2330     Site Right Radial     Rubén's Test Positive     pH, Arterial 7.358 pH units      pCO2, Arterial 46.2 (H) mm Hg      pO2, Arterial 79.0 (L) mm Hg      HCO3, Arterial 25.9 mmol/L      Base Excess, Arterial 0.1 mmol/L      O2 Saturation, Arterial 95.7 %      Temperature 37.0 C      pH, Temp Corrected 7.358 pH Units      pCO2, Temperature Corrected 46.2 (H) mm Hg      pO2, Temperature Corrected 79.0 (L) mm Hg      Barometric Pressure for Blood Gas 744 mmHg      Modality Nasal Cannula     Flow Rate 2.0 lpm      Ventilator Mode NA     Collected by [id number]    Reading Draw [[id number]] Collected:  10/08/17 2244    Specimen:  Blood Updated:  10/08/17 2346    Narrative:       The following orders were created for panel order Reading Draw.  Procedure                               Abnormality         Status                     ---------                               -----------         ------                     Light Blue Top[[id number]]                                   Final result               Green Top (Gel)[[id number]]                                  Final result               Lavender Top[[id number]]                                     Final result               Red Top[[id number]]                                          Final result                 Please view results for these tests on the individual orders.    Light Blue Top [[id number]] Collected:  10/08/17 2244    Specimen:  Blood from Arm, Right Updated:  10/08/17 2346     Extra Tube hold for add-on      Auto resulted       Green Top (Gel) [[id number]] Collected:  10/08/17 2244    Specimen:  Blood from Arm, Right Updated:   10/08/17 2346     Extra Tube Hold for add-ons.      Auto resulted.       Lavender Top [[id number]] Collected:  10/08/17 2244    Specimen:  Blood from Arm, Right Updated:  10/08/17 2346     Extra Tube hold for add-on      Auto resulted       Red Top [[id number]] Collected:  10/08/17 2244    Specimen:  Blood from Arm, Right Updated:  10/08/17 2346     Extra Tube Hold for add-ons.      Auto resulted.       CBC & Differential [[id number]] Collected:  10/08/17 2244    Specimen:  Blood Updated:  10/08/17 2357    Narrative:       The following orders were created for panel order CBC & Differential.  Procedure                               Abnormality         Status                     ---------                               -----------         ------                     Scan Slide[[id number]]                                       Final result               CBC Auto Differential[[id number]]        Abnormal            Final result                 Please view results for these tests on the individual orders.    CBC Auto Differential [[id number]]  (Abnormal) Collected:  10/08/17 2244    Specimen:  Blood from Arm, Right Updated:  10/08/17 2357     WBC 6.73 10*3/mm3      RBC 3.50 (L) 10*6/mm3      Hemoglobin 11.8 (L) g/dL      Hematocrit 37.0 %      .7 (H) fL      MCH 33.7 (H) pg      MCHC 31.9 (L) g/dL      RDW 14.4 %      RDW-SD 55.3 (H) fl      MPV 9.9 fL      Platelets 216 10*3/mm3      Neutrophil % 59.6 %      Lymphocyte % 17.4 %      Monocyte % 13.1 (H) %      Eosinophil % 9.5 (H) %      Basophil % 0.3 %      Immature Grans % 0.1 %      Neutrophils, Absolute 4.01 10*3/mm3      Lymphocytes, Absolute 1.17 10*3/mm3      Monocytes, Absolute 0.88 10*3/mm3      Eosinophils, Absolute 0.64 10*3/mm3      Basophils, Absolute 0.02 10*3/mm3      Immature Grans, Absolute 0.01 10*3/mm3     Narrative:       Appended report.  These results have been appended to a previously verified report.    Scan Slide [[id number]] Collected:  10/08/17  2244    Specimen:  Blood from Arm, Right Updated:  10/08/17 2357     Anisocytosis Slight/1+     Macrocytes Slight/1+     WBC Morphology Normal     Platelet Morphology Normal    BNP [[id number]]  (Abnormal) Collected:  10/08/17 2244    Specimen:  Blood from Arm, Right Updated:  10/09/17 0224     proBNP 3510.0 (H) pg/mL     Protime-INR [[id number]]  (Abnormal) Collected:  10/09/17 0507    Specimen:  Blood Updated:  10/09/17 0545     Protime 29.9 (H) Seconds      INR 2.72 (H)    Urine Culture - Urine, Urine, Clean Catch [[id number]] Collected:  10/09/17 0526    Specimen:  Urine from Urine, Clean Catch Updated:  10/09/17 0547    Troponin [[id number]]  (Normal) Collected:  10/09/17 0507    Specimen:  Blood Updated:  10/09/17 0557     Troponin I 0.013 ng/mL     T4, Free [[id number]]  (Normal) Collected:  10/09/17 0507    Specimen:  Blood Updated:  10/09/17 0605     Free T4 1.44 ng/dL     Urinalysis With / Culture If Indicated - Urine, Clean Catch [[id number]]  (Abnormal) Collected:  10/09/17 0526    Specimen:  Urine from Urine, Clean Catch Updated:  10/09/17 0624     Color, UA Yellow     Appearance, UA Clear     pH, UA 6.5     Specific Gravity, UA 1.007     Glucose, UA Negative     Ketones, UA Negative     Bilirubin, UA Negative     Blood, UA Negative     Protein, UA Negative     Leuk Esterase, UA Negative     Nitrite, UA Positive (A)     Urobilinogen, UA 0.2 E.U./dL    Urinalysis, Microscopic Only - Urine, Clean Catch [[id number]]  (Abnormal) Collected:  10/09/17 0526    Specimen:  Urine from Urine, Clean Catch Updated:  10/09/17 0624     RBC, UA None Seen /HPF      WBC, UA 0-2 (A) /HPF      Bacteria, UA 2+ (A) /HPF      Squamous Epithelial Cells, UA 0-2 /HPF      Hyaline Casts, UA None Seen /LPF      Methodology Manual Light Microscopy        Imaging Results (last 24 hours)     Procedure Component Value Units Date/Time    XR Chest 1 View [[id number]] Collected:  10/09/17 0652     Updated:  10/09/17 0655    Narrative:        EXAMINATION:   XR CHEST 1 VW-  10/9/2017 6:52 AM CDT     HISTORY: Single view the chest obtained. Chronic changes noted in the  pulmonary parenchyma. Cardiac silhouettes mildly enlarged. Pulmonary  vascular margins are slightly indistinct. Small bilateral pleural  effusions are present.     IMPRESSION mild changes of pulmonary vascular congestion superimposed on  COPD  This report was finalized on 10/09/2017 06:52 by Dr. Phong Mckeon MD.          I have personally reviewed and interpreted the radiology studies and ECG obtained at time of admission.     Assessment / Plan     Assessment & Plan  Hospital Problem List     Acute on chronic congestive heart failure        1. Valvular disease  2. Tachycardia  2. CHF acute on chronic   3. Hx of Afib, venous stasis, elevated d-dimer  4. Hyperglycemia, hypernatremia  5. Macrocytic anemia  6. Hx of hyperthyroidism  7. Hypoxia    1. Consult cardiology, consult CT surgery.    2. Start Cardizem po for rate control.   3. Continue Lasix for diuresis  4. Patient is therapeutic on coumadin. Continue home dose.   4. Follow labs  5. Order B12 and folate levels.  6. TSH and T4   7. Continue on oxygen    Code Status: Full      I discussed the patients findings and my recommendations with the patient she designates her daughter as her healthcare power surrogate.    Estimated length of stay 4-5 days    Mode Francisco MD   10/09/17   8:37 AM

## 2017-10-09 NOTE — ED NOTES
O2 REAPPLIED PER NASAL CANNULA AT 2L DUE TO O2 SAT OF 86% ON ROOM AIR     Amaya Rizzo RN  10/09/17 0132

## 2017-10-10 LAB
ALBUMIN SERPL-MCNC: 3.8 G/DL (ref 3.5–5)
ALBUMIN/GLOB SERPL: 1 G/DL (ref 1.1–2.5)
ALP SERPL-CCNC: 100 U/L (ref 24–120)
ALT SERPL W P-5'-P-CCNC: 36 U/L (ref 0–54)
ANION GAP SERPL CALCULATED.3IONS-SCNC: 10 MMOL/L (ref 4–13)
AST SERPL-CCNC: 38 U/L (ref 7–45)
BASOPHILS # BLD AUTO: 0.01 10*3/MM3 (ref 0–0.2)
BASOPHILS NFR BLD AUTO: 0.1 % (ref 0–2)
BILIRUB SERPL-MCNC: 1 MG/DL (ref 0.1–1)
BUN BLD-MCNC: 36 MG/DL (ref 5–21)
BUN/CREAT SERPL: 28.6 (ref 7–25)
CALCIUM SPEC-SCNC: 10 MG/DL (ref 8.4–10.4)
CHLORIDE SERPL-SCNC: 107 MMOL/L (ref 98–110)
CO2 SERPL-SCNC: 31 MMOL/L (ref 24–31)
CREAT BLD-MCNC: 1.26 MG/DL (ref 0.5–1.4)
DEPRECATED RDW RBC AUTO: 55.6 FL (ref 40–54)
EOSINOPHIL # BLD AUTO: 0.38 10*3/MM3 (ref 0–0.7)
EOSINOPHIL NFR BLD AUTO: 4.7 % (ref 0–4)
ERYTHROCYTE [DISTWIDTH] IN BLOOD BY AUTOMATED COUNT: 14.7 % (ref 12–15)
GFR SERPL CREATININE-BSD FRML MDRD: 42 ML/MIN/1.73
GLOBULIN UR ELPH-MCNC: 3.7 GM/DL
GLUCOSE BLD-MCNC: 114 MG/DL (ref 70–100)
HCT VFR BLD AUTO: 37.3 % (ref 37–47)
HGB BLD-MCNC: 11.8 G/DL (ref 12–16)
IMM GRANULOCYTES # BLD: 0.02 10*3/MM3 (ref 0–0.03)
IMM GRANULOCYTES NFR BLD: 0.2 % (ref 0–5)
INR PPP: 2.29 (ref 0.91–1.09)
LYMPHOCYTES # BLD AUTO: 1.34 10*3/MM3 (ref 0.72–4.86)
LYMPHOCYTES NFR BLD AUTO: 16.6 % (ref 15–45)
MAGNESIUM SERPL-MCNC: 1.6 MG/DL (ref 1.4–2.2)
MCH RBC QN AUTO: 33.8 PG (ref 28–32)
MCHC RBC AUTO-ENTMCNC: 31.6 G/DL (ref 33–36)
MCV RBC AUTO: 106.9 FL (ref 82–98)
MONOCYTES # BLD AUTO: 1.3 10*3/MM3 (ref 0.19–1.3)
MONOCYTES NFR BLD AUTO: 16.1 % (ref 4–12)
NEUTROPHILS # BLD AUTO: 5.02 10*3/MM3 (ref 1.87–8.4)
NEUTROPHILS NFR BLD AUTO: 62.3 % (ref 39–78)
NT-PROBNP SERPL-MCNC: 4700 PG/ML (ref 0–900)
PLATELET # BLD AUTO: 228 10*3/MM3 (ref 130–400)
PMV BLD AUTO: 10.1 FL (ref 6–12)
POTASSIUM BLD-SCNC: 4.1 MMOL/L (ref 3.5–5.3)
PROT SERPL-MCNC: 7.5 G/DL (ref 6.3–8.7)
PROTHROMBIN TIME: 26.1 SECONDS (ref 11.9–14.6)
RBC # BLD AUTO: 3.49 10*6/MM3 (ref 4.2–5.4)
SODIUM BLD-SCNC: 148 MMOL/L (ref 135–145)
WBC NRBC COR # BLD: 8.07 10*3/MM3 (ref 4.8–10.8)

## 2017-10-10 PROCEDURE — 83735 ASSAY OF MAGNESIUM: CPT | Performed by: INTERNAL MEDICINE

## 2017-10-10 PROCEDURE — 83880 ASSAY OF NATRIURETIC PEPTIDE: CPT | Performed by: INTERNAL MEDICINE

## 2017-10-10 PROCEDURE — 25010000002 PNEUMOCOCCAL VAC POLYVALENT PER 0.5 ML: Performed by: INTERNAL MEDICINE

## 2017-10-10 PROCEDURE — 90732 PPSV23 VACC 2 YRS+ SUBQ/IM: CPT | Performed by: INTERNAL MEDICINE

## 2017-10-10 PROCEDURE — 85610 PROTHROMBIN TIME: CPT | Performed by: INTERNAL MEDICINE

## 2017-10-10 PROCEDURE — 25010000002 CEFTRIAXONE: Performed by: INTERNAL MEDICINE

## 2017-10-10 PROCEDURE — 99232 SBSQ HOSP IP/OBS MODERATE 35: CPT | Performed by: INTERNAL MEDICINE

## 2017-10-10 PROCEDURE — 85025 COMPLETE CBC W/AUTO DIFF WBC: CPT | Performed by: INTERNAL MEDICINE

## 2017-10-10 PROCEDURE — 80053 COMPREHEN METABOLIC PANEL: CPT | Performed by: INTERNAL MEDICINE

## 2017-10-10 PROCEDURE — 25010000002 FUROSEMIDE PER 20 MG: Performed by: INTERNAL MEDICINE

## 2017-10-10 PROCEDURE — G0008 ADMIN INFLUENZA VIRUS VAC: HCPCS | Performed by: INTERNAL MEDICINE

## 2017-10-10 PROCEDURE — 90661 CCIIV3 VAC ABX FR 0.5 ML IM: CPT | Performed by: INTERNAL MEDICINE

## 2017-10-10 PROCEDURE — G0009 ADMIN PNEUMOCOCCAL VACCINE: HCPCS | Performed by: INTERNAL MEDICINE

## 2017-10-10 PROCEDURE — 25010000002 INFLUENZA VAC SUBUNIT QUAD 0.5 ML SUSPENSION PREFILLED SYRINGE: Performed by: INTERNAL MEDICINE

## 2017-10-10 RX ORDER — DIAPER,BRIEF,INFANT-TODD,DISP
EACH MISCELLANEOUS EVERY 12 HOURS SCHEDULED
Status: DISCONTINUED | OUTPATIENT
Start: 2017-10-10 | End: 2017-10-11 | Stop reason: HOSPADM

## 2017-10-10 RX ADMIN — METOPROLOL TARTRATE 100 MG: 100 TABLET ORAL at 09:55

## 2017-10-10 RX ADMIN — FUROSEMIDE 40 MG: 10 INJECTION, SOLUTION INTRAMUSCULAR; INTRAVENOUS at 18:45

## 2017-10-10 RX ADMIN — METOPROLOL TARTRATE 100 MG: 100 TABLET ORAL at 18:46

## 2017-10-10 RX ADMIN — PANTOPRAZOLE SODIUM 40 MG: 40 TABLET, DELAYED RELEASE ORAL at 05:38

## 2017-10-10 RX ADMIN — PNEUMOCOCCAL VACCINE POLYVALENT 0.5 ML
25; 25; 25; 25; 25; 25; 25; 25; 25; 25; 25; 25; 25; 25; 25; 25; 25; 25; 25; 25; 25; 25; 25 INJECTION, SOLUTION INTRAMUSCULAR; SUBCUTANEOUS at 16:48

## 2017-10-10 RX ADMIN — CALCITRIOL 0.25 MCG: 0.25 CAPSULE ORAL at 09:55

## 2017-10-10 RX ADMIN — DONEPEZIL HYDROCHLORIDE 5 MG: 5 TABLET, FILM COATED ORAL at 20:34

## 2017-10-10 RX ADMIN — FUROSEMIDE 40 MG: 10 INJECTION, SOLUTION INTRAMUSCULAR; INTRAVENOUS at 05:38

## 2017-10-10 RX ADMIN — METHIMAZOLE 5 MG: 10 TABLET ORAL at 09:54

## 2017-10-10 RX ADMIN — WARFARIN 10 MG: 10 TABLET ORAL at 18:46

## 2017-10-10 RX ADMIN — DILTIAZEM HYDROCHLORIDE 180 MG: 180 CAPSULE, COATED, EXTENDED RELEASE ORAL at 09:55

## 2017-10-10 RX ADMIN — A/SINGAPORE/GP1908/2015 IVR-180 (H1N1) (AN A/MICHIGAN/45/2015-LIKE VIRUS), A/SINGAPORE/GP2050/2015 (H3N2) (AN A/HONG KONG/4801/2014 - LIKE VIRUS), B/UTAH/9/2014 (A B/PHUKET/3073/2013-LIKE VIRUS), B/HONG KONG/259/2010 (A B/BRISBANE/60/08-LIKE VIRUS) 0.5 ML: 15; 15; 15; 15 INJECTION, SUSPENSION INTRAMUSCULAR at 16:49

## 2017-10-10 RX ADMIN — HYDROCORTISONE: 1 CREAM TOPICAL at 18:46

## 2017-10-10 RX ADMIN — CEFTRIAXONE 1 G: 1 INJECTION, POWDER, FOR SOLUTION INTRAMUSCULAR; INTRAVENOUS at 16:46

## 2017-10-10 NOTE — PROGRESS NOTES
[facility] Medicine Services  INPATIENT PROGRESS NOTE    Length of Stay: 1  Date of Admission: 10/8/2017  Primary Care Physician: IRINEO Baltazar    Subjective   Chief Complaint:  SOA     HPI   71 YO CF admitted on 10/8/17. Patient sitting up in the side of the bed sleeping. No acute complaints. Spoke with Dr. Flores who would like to transfer the patient to [facility] for TAVR secondary to patient's physical status and endurance. Patient is agreeable and we are awaiting a bed in [place].         Review of Systems   SOA  All pertinent negatives and positives are as above. All other systems have been reviewed and are negative unless otherwise stated.     Objective    Temp:  [97.8 °F (36.6 °C)-98.9 °F (37.2 °C)] 98 °F (36.7 °C)  Heart Rate:  [73-95] 85  Resp:  [18-20] 18  BP: (115-139)/(65-84) 122/72  Physical Exam   HENT:   Head: Normocephalic and atraumatic.   Nose: Nose normal.   Mouth/Throat: Oropharynx is clear and moist.   Eyes: Conjunctivae and EOM are normal.   Neck: Normal range of motion. Neck supple.   Cardiovascular: Normal rate, regular rhythm and normal heart sounds.    Pulmonary/Chest: Effort normal and breath sounds normal.   Abdominal: Soft. Bowel sounds are normal.   Obese     Musculoskeletal: She exhibits no edema or tenderness.   Hyperkyphotic.    Neurological: She is alert. No cranial nerve deficit.   Skin: Skin is warm and dry.   Compression hose left leg.    Psychiatric: She has a normal mood and affect.   Vitals reviewed.          Results Review:  I have reviewed the labs, radiology results, and diagnostic studies.    Laboratory Data:     Results from last 7 days  Lab Units 10/10/17  0453 10/08/17  2244   WBC 10*3/mm3 8.07 6.73   HEMOGLOBIN g/dL 11.8* 11.8*   HEMATOCRIT % 37.3 37.0   PLATELETS 10*3/mm3 228 216          Results from last 7 days  Lab Units 10/10/17  0453 10/08/17  2244   SODIUM mmol/L 148* 147*   POTASSIUM mmol/L 4.1 4.4   CHLORIDE mmol/L  107 110   CO2 mmol/L 31.0 26.0   BUN mg/dL 36* 35*   CREATININE mg/dL 1.26 1.23   CALCIUM mg/dL 10.0 9.8   BILIRUBIN mg/dL 1.0 0.6   ALK PHOS U/L 100 100   ALT (SGPT) U/L 36 45   AST (SGOT) U/L 38 30   GLUCOSE mg/dL 114* 136*       Culture Data:   Urine Culture   Date Value Ref Range Status   10/09/2017 >100,000 CFU/mL Escherichia coli (A)  Preliminary       Radiology Data:   Imaging Results (last 24 hours)     ** No results found for the last 24 hours. **          I have reviewed the patient current medications.     Assessment/Plan     Hospital Problem List     Acute on chronic congestive heart failure        Impression/PLan:  1. A/C CHF  2. Severe aortic valve stenosis  3. E Coli UTI  4. Generalized debility  5. Hyperkalemia  6. Afib    1. Continue Lasix 40mg IV daily.  2. Pending transfer to [facility] for less invasive valve replacement procedure.  3. Start Rocephin  4. Potassium normal range  5. Chronic coumadin therapy, she is theraputic            Discharge Planning: I expect the patient to be discharged to [facility] in 1 days.    Amaris Miner,    10/10/17   1:03 PM

## 2017-10-10 NOTE — PROGRESS NOTES
Continued Stay Note  KRYSTAL Mccracken     Patient Name: Jarvis Morgan  MRN: [id number]  Today's Date: 10/10/2017    Admit Date: 10/8/2017          Discharge Plan       10/10/17 1103    Case Management/Social Work Plan    Plan Waiting on bed at [place]    Additional Comments Called [facility] and spoke with Claudia [phone], informed of need for transfer to [facility] when bed becomes available, provided needed information. Pt placed on Will Call.               Discharge Codes     None            LUIS Rodríguez

## 2017-10-10 NOTE — NURSING NOTE
"April  V., unit assistant called [facility] for an update on patient bed.  The Transfer Center stated'\" We are at capacity and do not have a bed available a this time.\"  "

## 2017-10-10 NOTE — DISCHARGE PLACEMENT REQUEST
"  Jarvis Morgan (72 y.o. Female)     Date of Birth Social Security Number Address Home Phone MRN    1945  [address]   [facility] [address] [phone] [id number]    Sabianism Marital Status          Mandaen        Admission Date Admission Type Admitting Provider Attending Provider Department, Room/Bed    10/8/17 Emergency Amaris Miner DO [place], Ali Janell, DO [address], [address]    Discharge Date Discharge Disposition Discharge Destination                      Attending Provider: Amaris Miner DO     Allergies:  Other, Ciprofloxacin, Codeine, Tetanus Toxoids, Tizanidine Hcl    Isolation:  None   Infection:  None   Code Status:  FULL    Ht:  67\" (170.2 cm)   Wt:  241 lb 2 oz (109 kg)    Admission Cmt:  None   Principal Problem:  None                Active Insurance as of 10/8/2017     Primary Coverage     Payor Plan Insurance Group Employer/Plan Group    MEDICARE MEDICARE A & B      Payor Plan Address Payor Plan Phone Number Effective From Effective To    [address] [phone] 12/1/1999     [place] [address]       Subscriber Name Subscriber Birth Date Member ID       JARVIS MORGAN 1945 [id number]                 Emergency Contacts      (Rel.) Home Phone Work Phone Mobile Phone    Swetha Morgan (Daughter) -- -- [phone]              "

## 2017-10-10 NOTE — NURSING NOTE
Face Sheet sent to [facility] () spoke with Nayana who said Dr Segal was to be attending awaiting bed assignment

## 2017-10-10 NOTE — PLAN OF CARE
Problem: Patient Care Overview (Adult)  Goal: Plan of Care Review  Outcome: Ongoing (interventions implemented as appropriate)    10/09/17 0544 10/09/17 1121 10/09/17 1756   Coping/Psychosocial Response Interventions   Plan Of Care Reviewed With patient --  --    Patient Care Overview   Progress --  no change --    Outcome Evaluation   Outcome Summary/Follow up Plan --  --  spent time talking with family in the room about patients medication change and her dianosis. she might go to [place] tomorrow. dr deleon has spoken with a Dr. jasmine. face sheet was faxed.         Problem: Cardiac: Heart Failure (Adult)  Goal: Signs and Symptoms of Listed Potential Problems Will be Absent or Manageable (Cardiac: Heart Failure)  Outcome: Ongoing (interventions implemented as appropriate)    Problem: Fall Risk (Adult)  Goal: Identify Related Risk Factors and Signs and Symptoms  Outcome: Ongoing (interventions implemented as appropriate)  Goal: Absence of Falls  Outcome: Ongoing (interventions implemented as appropriate)

## 2017-10-10 NOTE — PROGRESS NOTES
Referring Provider: Torres Dunbar MD    Length of Stay: 1    Chief Complaint:   Chief Complaint   Patient presents with   • Atrial Fibrillation   • Chest Pain   • Shortness of Breath       Subjective: My arms are itching    Medications  Current Facility-Administered Medications   Medication Dose Route Frequency Provider Last Rate Last Dose   • acetaminophen (TYLENOL) tablet 650 mg  650 mg Oral Q6H PRN Torres Dunbar MD       • aluminum-magnesium hydroxide-simethicone (MAALOX MAX) 400-400-40 MG/5ML suspension 30 mL  30 mL Oral Q6H PRN Torres Dunbar MD       • calcitriol (ROCALTROL) capsule 0.25 mcg  0.25 mcg Oral Daily Torres Dunbar MD   0.25 mcg at 10/10/17 0955   • diltiaZEM CD (CARDIZEM CD) 24 hr capsule 180 mg  180 mg Oral Q24H Mode Francisco MD   180 mg at 10/10/17 0955   • donepezil (ARICEPT) tablet 5 mg  5 mg Oral Nightly Torres Dunbar MD   5 mg at 10/09/17 2109   • furosemide (LASIX) injection 40 mg  40 mg Intravenous Q12H Torres Dunbar MD   40 mg at 10/10/17 0538   • Influenza Vac Subunit Quad (FLUCELVAX) injection 0.5 mL  0.5 mL Intramuscular During Hospitalization Torres Dunbar MD       • ipratropium-albuterol (DUO-NEB) nebulizer solution 3 mL  3 mL Nebulization Q4H PRN Torres Dunbar MD       • methIMAzole (TAPAZOLE) tablet 5 mg  5 mg Oral Daily Torres Dunbar MD   5 mg at 10/10/17 0954   • metoprolol tartrate (LOPRESSOR) tablet 100 mg  100 mg Oral BID Torres Dunbar MD   100 mg at 10/10/17 0955   • Naloxone HCl (NARCAN) injection 0.4 mg  0.4 mg Intravenous Q1H PRN Torres Dunbar MD       • nitroglycerin (NITROSTAT) SL tablet 0.4 mg  0.4 mg Sublingual Q5 Min PRN Torres Dunbar MD       • ondansetron (ZOFRAN) injection 4 mg  4 mg Intravenous Q6H PRN Torres Dunbar MD       • pantoprazole (PROTONIX) EC tablet 40 mg  40 mg Oral Q AM Torres Dunbar MD   40 mg at 10/10/17 0538   • pneumococcal polysaccharide 23-valent (PNEUMOVAX-23) vaccine 0.5 mL  0.5 mL Intramuscular During Hospitalization Torres Dunbar MD        • polyethylene glycol (MIRALAX) packet 17 g  17 g Oral Daily PRN Torres Dunbar MD       • sodium chloride 0.9 % flush 1-10 mL  1-10 mL Intravenous PRN Torres Dunbar MD       • warfarin (COUMADIN) tablet 10 mg  10 mg Oral Daily Torres Dunbar MD   10 mg at 10/09/17 1808       Past Medical History:   Diagnosis Date   • A-fib    • Aortic stenosis    • Arthritis    • Bradycardia    • Cancer     bladder and skin   • Cardiomegaly    • CHF (congestive heart failure)    • GERD (gastroesophageal reflux disease)    • Hard of hearing    • History of short term memory loss    • Hypercalcemia    • Hyperlipidemia    • Hypertension    • Hypothyroidism    • Kidney stone    • Long term current use of anticoagulant therapy    • Open wound     left lower extremity,   • Palpitation    • PONV (postoperative nausea and vomiting)    • Shortness of breath    • Sick sinus syndrome    • Sleep apnea    ,   Past Surgical History:   Procedure Laterality Date   • BLADDER SURGERY      removed   • CARDIAC CATHETERIZATION     • CARDIAC CATHETERIZATION N/A 12/9/2016    Procedure: Left Heart Cath;  Surgeon: Elia Flores MD;  Location:  [facility] CATH [facility]ASIVE [facility];  Service:    • HIP BIPOLAR REPLACEMENT Left    • HYSTERECTOMY     • ILEOSTOMY     • JOINT REPLACEMENT     • KIDNEY SURGERY      right kidney removed   • NEPHRECTOMY RADICAL Right     from cancer   • VARICOSE VEIN SURGERY Left 4/10/2017    Procedure: LEFT LOWER EXTREMITY VENOGRAM. LEFT SAPHENOUS VEIN RADIO FREQUENCY ABLATION;  Surgeon: Blake Martinez DO;  Location:  [facility] OR;  Service:    ,   Family History   Problem Relation Age of Onset   • Heart failure Mother    • Heart disease Father    • Stroke Father    • Hypertension Sister    • Heart failure Sister    • No Known Problems Brother    • No Known Problems Maternal Grandmother    • No Known Problems Maternal Grandfather    • No Known Problems Paternal Grandmother    • No Known Problems Paternal Grandfather    • Hypertension Sister     • Heart failure Sister    • Hypertension Sister    • Heart failure Sister    • Hypertension Sister    • Heart failure Sister    • Hypertension Sister    • Heart failure Sister    • Hypertension Sister    • Heart failure Sister    • Gallbladder disease Brother    • COPD Daughter    • Asthma Daughter    • Diabetes Son    • No Known Problems Son    • Autism Son    ,   Social History   Substance Use Topics   • Smoking status: Never Smoker   • Smokeless tobacco: Never Used   • Alcohol use No   ,    Review of Systems  Review of Systems   HENT: Negative for nosebleeds.    Cardiovascular: Positive for dyspnea on exertion. Negative for chest pain, claudication, irregular heartbeat, leg swelling, near-syncope, orthopnea, palpitations, paroxysmal nocturnal dyspnea and syncope.   Respiratory: Positive for shortness of breath. Negative for cough and hemoptysis.    Skin: Positive for itching and rash.   Gastrointestinal: Negative for dysphagia, hematemesis and melena.   Genitourinary: Negative for hematuria.   All other systems reviewed and are negative.      Objective     Physical Exam:  Patient Vitals for the past 24 hrs:   BP Temp Temp src Pulse Resp SpO2   10/10/17 0805 122/72 98 °F (36.7 °C) Temporal Art 85 18 94 %   10/10/17 0510 121/84 98.9 °F (37.2 °C) Temporal Art 91 18 96 %   10/09/17 2131 125/69 98.2 °F (36.8 °C) Oral 95 18 97 %   10/09/17 1623 139/75 97.8 °F (36.6 °C) Tympanic 73 20 95 %   10/09/17 1350 115/65 98.6 °F (37 °C) Oral 88 20 95 %     Physical Exam   Constitutional: She is oriented to person, place, and time.   HENT:   Head: Normocephalic and atraumatic.   Cardiovascular: Normal rate, regular rhythm, S1 normal and S2 normal.  Exam reveals no gallop and no friction rub.    Murmur heard.   Harsh midsystolic murmur is present with a grade of 3/6  at the upper right sternal border  Pulmonary/Chest: Effort normal and breath sounds normal. No respiratory distress. She has no wheezes. She has no rales. She  exhibits no tenderness.   Abdominal: Soft. Bowel sounds are normal. She exhibits no distension. There is no tenderness. There is no rebound and no guarding.   Musculoskeletal: Normal range of motion. She exhibits no edema.   Neurological: She is alert and oriented to person, place, and time. No cranial nerve deficit.   Skin: Skin is warm and dry. Rash noted. No erythema.        Psychiatric: She has a normal mood and affect. Her behavior is normal.       Results Review:   I reviewed the patient's new clinical results.  Lab Results (last 24 hours)     Procedure Component Value Units Date/Time    Vitamin B12 [[id number]]  (Normal) Collected:  10/09/17 0507    Specimen:  Blood Updated:  10/09/17 1125     Vitamin B-12 259 pg/mL     Folate [[id number]] Collected:  10/09/17 0507    Specimen:  Blood Updated:  10/09/17 1125     Folate 12.30 ng/mL     Troponin [[id number]]  (Normal) Collected:  10/09/17 1532    Specimen:  Blood Updated:  10/09/17 1715     Troponin I 0.015 ng/mL     CBC Auto Differential [[id number]]  (Abnormal) Collected:  10/10/17 0453    Specimen:  Blood Updated:  10/10/17 0606     WBC 8.07 10*3/mm3      RBC 3.49 (L) 10*6/mm3      Hemoglobin 11.8 (L) g/dL      Hematocrit 37.3 %      .9 (H) fL      MCH 33.8 (H) pg      MCHC 31.6 (L) g/dL      RDW 14.7 %      RDW-SD 55.6 (H) fl      MPV 10.1 fL      Platelets 228 10*3/mm3      Neutrophil % 62.3 %      Lymphocyte % 16.6 %      Monocyte % 16.1 (H) %      Eosinophil % 4.7 (H) %      Basophil % 0.1 %      Immature Grans % 0.2 %      Neutrophils, Absolute 5.02 10*3/mm3      Lymphocytes, Absolute 1.34 10*3/mm3      Monocytes, Absolute 1.30 10*3/mm3      Eosinophils, Absolute 0.38 10*3/mm3      Basophils, Absolute 0.01 10*3/mm3      Immature Grans, Absolute 0.02 10*3/mm3     CBC & Differential [[id number]] Collected:  10/10/17 0453    Specimen:  Blood Updated:  10/10/17 0606    Narrative:       The following orders were created for panel order CBC &  Differential.  Procedure                               Abnormality         Status                     ---------                               -----------         ------                     Scan Slide[[id number]]                                                                  CBC Auto Differential[[id number]]        Abnormal            Final result                 Please view results for these tests on the individual orders.    Protime-INR [[id number]]  (Abnormal) Collected:  10/10/17 0453    Specimen:  Blood Updated:  10/10/17 0606     Protime 26.1 (H) Seconds      INR 2.29 (H)    Magnesium [[id number]]  (Normal) Collected:  10/10/17 0453    Specimen:  Blood Updated:  10/10/17 0617     Magnesium 1.6 mg/dL     Comprehensive Metabolic Panel [[id number]]  (Abnormal) Collected:  10/10/17 0453    Specimen:  Blood Updated:  10/10/17 0617     Glucose 114 (H) mg/dL      BUN 36 (H) mg/dL      Creatinine 1.26 mg/dL      Sodium 148 (H) mmol/L      Potassium 4.1 mmol/L      Chloride 107 mmol/L      CO2 31.0 mmol/L      Calcium 10.0 mg/dL      Total Protein 7.5 g/dL      Albumin 3.80 g/dL      ALT (SGPT) 36 U/L      AST (SGOT) 38 U/L      Alkaline Phosphatase 100 U/L      Total Bilirubin 1.0 mg/dL      eGFR Non African Amer 42 (L) mL/min/1.73      Globulin 3.7 gm/dL      A/G Ratio 1.0 (L) g/dL      BUN/Creatinine Ratio 28.6 (H)     Anion Gap 10.0 mmol/L     Narrative:       The MDRD GFR formula is only valid for adults with stable renal function between ages 18 and 70.    BNP [[id number]]  (Abnormal) Collected:  10/10/17 0453    Specimen:  Blood Updated:  10/10/17 0619     proBNP 4700.0 (H) pg/mL     Urine Culture - Urine, Urine, Clean Catch [[id number]]  (Abnormal) Collected:  10/09/17 0526    Specimen:  Urine from Urine, Clean Catch Updated:  10/10/17 0721     Urine Culture --      >100,000 CFU/mL Escherichia coli (A)        Lab Results   Component Value Date    ECHOEFEST 65 10/09/2017     Imaging Results (last 24 hours)     ** No  results found for the last 24 hours. **          I have reviewed telemetry which reveals Afib    Assessment/Plan   Active Problems:    Acute on chronic congestive heart failure      New Problems that need treatment:  1. Severe AS, Dr. Peña at [facility] has agreed to accept the pt in transfer for TAVR but they currently have no beds and she is on the waiting list    Old problems that are stable:   Chronic afib  HTN  Anemia      Medical Decision Making:  Moderate Complexity

## 2017-10-11 VITALS
HEART RATE: 62 BPM | RESPIRATION RATE: 14 BRPM | TEMPERATURE: 97.9 F | WEIGHT: 237 LBS | HEIGHT: 67 IN | SYSTOLIC BLOOD PRESSURE: 116 MMHG | OXYGEN SATURATION: 92 % | DIASTOLIC BLOOD PRESSURE: 54 MMHG | BODY MASS INDEX: 37.2 KG/M2

## 2017-10-11 LAB
ALBUMIN SERPL-MCNC: 3.6 G/DL (ref 3.5–5)
ALBUMIN/GLOB SERPL: 1 G/DL (ref 1.1–2.5)
ALP SERPL-CCNC: 80 U/L (ref 24–120)
ALT SERPL W P-5'-P-CCNC: 38 U/L (ref 0–54)
ANION GAP SERPL CALCULATED.3IONS-SCNC: 12 MMOL/L (ref 4–13)
AST SERPL-CCNC: 24 U/L (ref 7–45)
BACTERIA SPEC AEROBE CULT: ABNORMAL
BASOPHILS # BLD AUTO: 0.01 10*3/MM3 (ref 0–0.2)
BASOPHILS NFR BLD AUTO: 0.1 % (ref 0–2)
BILIRUB SERPL-MCNC: 0.9 MG/DL (ref 0.1–1)
BUN BLD-MCNC: 48 MG/DL (ref 5–21)
BUN/CREAT SERPL: 32 (ref 7–25)
CALCIUM SPEC-SCNC: 9.5 MG/DL (ref 8.4–10.4)
CHLORIDE SERPL-SCNC: 105 MMOL/L (ref 98–110)
CO2 SERPL-SCNC: 29 MMOL/L (ref 24–31)
CREAT BLD-MCNC: 1.5 MG/DL (ref 0.5–1.4)
DEPRECATED RDW RBC AUTO: 55.1 FL (ref 40–54)
EOSINOPHIL # BLD AUTO: 0.68 10*3/MM3 (ref 0–0.7)
EOSINOPHIL NFR BLD AUTO: 9 % (ref 0–4)
ERYTHROCYTE [DISTWIDTH] IN BLOOD BY AUTOMATED COUNT: 14.5 % (ref 12–15)
GFR SERPL CREATININE-BSD FRML MDRD: 34 ML/MIN/1.73
GLOBULIN UR ELPH-MCNC: 3.6 GM/DL
GLUCOSE BLD-MCNC: 119 MG/DL (ref 70–100)
HCT VFR BLD AUTO: 35.9 % (ref 37–47)
HGB BLD-MCNC: 11.4 G/DL (ref 12–16)
IMM GRANULOCYTES # BLD: 0.02 10*3/MM3 (ref 0–0.03)
IMM GRANULOCYTES NFR BLD: 0.3 % (ref 0–5)
INR PPP: 2.51 (ref 0.91–1.09)
LYMPHOCYTES # BLD AUTO: 1.26 10*3/MM3 (ref 0.72–4.86)
LYMPHOCYTES NFR BLD AUTO: 16.6 % (ref 15–45)
MCH RBC QN AUTO: 33.6 PG (ref 28–32)
MCHC RBC AUTO-ENTMCNC: 31.8 G/DL (ref 33–36)
MCV RBC AUTO: 105.9 FL (ref 82–98)
MONOCYTES # BLD AUTO: 1.05 10*3/MM3 (ref 0.19–1.3)
MONOCYTES NFR BLD AUTO: 13.8 % (ref 4–12)
NEUTROPHILS # BLD AUTO: 4.57 10*3/MM3 (ref 1.87–8.4)
NEUTROPHILS NFR BLD AUTO: 60.2 % (ref 39–78)
PLATELET # BLD AUTO: 214 10*3/MM3 (ref 130–400)
PMV BLD AUTO: 10 FL (ref 6–12)
POTASSIUM BLD-SCNC: 4 MMOL/L (ref 3.5–5.3)
PROT SERPL-MCNC: 7.2 G/DL (ref 6.3–8.7)
PROTHROMBIN TIME: 28 SECONDS (ref 11.9–14.6)
RBC # BLD AUTO: 3.39 10*6/MM3 (ref 4.2–5.4)
SODIUM BLD-SCNC: 146 MMOL/L (ref 135–145)
WBC NRBC COR # BLD: 7.59 10*3/MM3 (ref 4.8–10.8)

## 2017-10-11 PROCEDURE — 85025 COMPLETE CBC W/AUTO DIFF WBC: CPT | Performed by: INTERNAL MEDICINE

## 2017-10-11 PROCEDURE — 85610 PROTHROMBIN TIME: CPT | Performed by: INTERNAL MEDICINE

## 2017-10-11 PROCEDURE — 80053 COMPREHEN METABOLIC PANEL: CPT | Performed by: INTERNAL MEDICINE

## 2017-10-11 PROCEDURE — 25010000002 FUROSEMIDE PER 20 MG: Performed by: INTERNAL MEDICINE

## 2017-10-11 RX ORDER — DIAPER,BRIEF,INFANT-TODD,DISP
EACH MISCELLANEOUS EVERY 12 HOURS SCHEDULED
Qty: 1 EACH | Refills: 1
Start: 2017-10-11 | End: 2017-11-20

## 2017-10-11 RX ORDER — DILTIAZEM HYDROCHLORIDE 180 MG/1
180 CAPSULE, COATED, EXTENDED RELEASE ORAL
Qty: 1 CAPSULE | Refills: 0 | Status: SHIPPED | OUTPATIENT
Start: 2017-10-11 | End: 2018-01-04 | Stop reason: HOSPADM

## 2017-10-11 RX ADMIN — FUROSEMIDE 40 MG: 10 INJECTION, SOLUTION INTRAMUSCULAR; INTRAVENOUS at 05:03

## 2017-10-11 RX ADMIN — PANTOPRAZOLE SODIUM 40 MG: 40 TABLET, DELAYED RELEASE ORAL at 05:04

## 2017-10-11 NOTE — DISCHARGE SUMMARY
[facility] Medicine Services  DISCHARGE SUMMARY       Date of Admission: 10/8/2017  Date of Discharge:  10/11/2017  Primary Care Physician: IRINEO Baltazar    Discharge Diagnoses:  Hospital Problem List     Acute on chronic congestive heart failure          Procedures Performed:   None    Pertinent Test Results:   Lab Results (last 24 hours)     Procedure Component Value Units Date/Time    Protime-INR [[id number]]  (Abnormal) Collected:  10/11/17 0513    Specimen:  Blood Updated:  10/11/17 0541     Protime 28.0 (H) Seconds      INR 2.51 (H)    CBC Auto Differential [[id number]]  (Abnormal) Collected:  10/11/17 0513    Specimen:  Blood Updated:  10/11/17 0546     WBC 7.59 10*3/mm3      RBC 3.39 (L) 10*6/mm3      Hemoglobin 11.4 (L) g/dL      Hematocrit 35.9 (L) %      .9 (H) fL      MCH 33.6 (H) pg      MCHC 31.8 (L) g/dL      RDW 14.5 %      RDW-SD 55.1 (H) fl      MPV 10.0 fL      Platelets 214 10*3/mm3      Neutrophil % 60.2 %      Lymphocyte % 16.6 %      Monocyte % 13.8 (H) %      Eosinophil % 9.0 (H) %      Basophil % 0.1 %      Immature Grans % 0.3 %      Neutrophils, Absolute 4.57 10*3/mm3      Lymphocytes, Absolute 1.26 10*3/mm3      Monocytes, Absolute 1.05 10*3/mm3      Eosinophils, Absolute 0.68 10*3/mm3      Basophils, Absolute 0.01 10*3/mm3      Immature Grans, Absolute 0.02 10*3/mm3     CBC & Differential [[id number]] Collected:  10/11/17 0513    Specimen:  Blood Updated:  10/11/17 0546    Narrative:       The following orders were created for panel order CBC & Differential.  Procedure                               Abnormality         Status                     ---------                               -----------         ------                     Scan Slide[[id number]]                                                                  CBC Auto Differential[[id number]]        Abnormal            Final result                 Please view results for these  tests on the individual orders.    Comprehensive Metabolic Panel [[id number]]  (Abnormal) Collected:  10/11/17 0513    Specimen:  Blood Updated:  10/11/17 0551     Glucose 119 (H) mg/dL      BUN 48 (H) mg/dL      Creatinine 1.50 (H) mg/dL      Sodium 146 (H) mmol/L      Potassium 4.0 mmol/L      Chloride 105 mmol/L      CO2 29.0 mmol/L      Calcium 9.5 mg/dL      Total Protein 7.2 g/dL      Albumin 3.60 g/dL      ALT (SGPT) 38 U/L      AST (SGOT) 24 U/L      Alkaline Phosphatase 80 U/L      Total Bilirubin 0.9 mg/dL      eGFR Non African Amer 34 (L) mL/min/1.73      Globulin 3.6 gm/dL      A/G Ratio 1.0 (L) g/dL      BUN/Creatinine Ratio 32.0 (H)     Anion Gap 12.0 mmol/L     Narrative:       The MDRD GFR formula is only valid for adults with stable renal function between ages 18 and 70.    Urine Culture - Urine, Urine, Clean Catch [[id number]]  (Abnormal)  (Susceptibility) Collected:  10/09/17 0526    Specimen:  Urine from Urine, Clean Catch Updated:  10/11/17 0719     Urine Culture --      >100,000 CFU/mL Escherichia coli (A)      40,000-50,000 CFU/mL Mixed Gram Positive Monika (A)      Probable Contaminant         Susceptibility      Escherichia coli     INDIO     Ampicillin 8 ug/ml Susceptible     Ampicillin + Sulbactam 4 ug/ml Susceptible     Cefazolin <=4 ug/ml Susceptible  [1]      Cefepime <=1 ug/ml Susceptible     Ceftriaxone <=1 ug/ml Susceptible     Ertapenem <=0.5 ug/ml Susceptible     ESBL Confirmation Test NEG  Negative     Gentamicin <=1 ug/ml Susceptible     Levofloxacin <=0.12 ug/ml Susceptible     Meropenem <=0.25 ug/ml Susceptible     Nitrofurantoin 32 ug/ml Susceptible     Piperacillin + Tazobactam <=4 ug/ml Susceptible     Trimethoprim + Sulfamethoxazole <=20 ug/ml Susceptible            [1]   Cefazolin results may be used to predict the potential effectiveness of oral cephalosporins for treating uncomplicated urinary tract infections.                         Consults:  Cardiology  CT  "surgery    Chief Complaint on Day of Discharge: SOA    Hospital Course  Patient is a 72 y.o. female presented with SOA. CT surgery and cardiology were consulted and the patient was placed on IV diuretic. Patient has severe valve disease and poor physical conditioning. It was thought to be in the patient's best interest to transfer her to [facility] for less invasive procedure for valve replacement. TAVR.      Condition on Discharge:  Stable    Physical Exam on Discharge:  /54 (BP Location: Right arm, Patient Position: Lying)  Pulse 62  Temp 97.9 °F (36.6 °C) (Oral)   Resp 14  Ht 67\" (170.2 cm)  Wt 237 lb (108 kg)  LMP  (LMP Unknown)  SpO2 92%  Breastfeeding? No  BMI 37.12 kg/m2  Physical Exam   HENT:   Head: Normocephalic and atraumatic.   Nose: Nose normal.   Mouth/Throat: Oropharynx is clear and moist.   Eyes: Conjunctivae and EOM are normal.   Neck: Normal range of motion. Neck supple.   Cardiovascular: Normal rate and regular rhythm.    Murmur heard.  Pulmonary/Chest: Effort normal and breath sounds normal.   Abdominal: Soft. Bowel sounds are normal.   Musculoskeletal: She exhibits no edema or tenderness.   Neurological: She is alert. No cranial nerve deficit.   Skin: Skin is warm and dry.   Psychiatric: She has a normal mood and affect.   Vitals reviewed.      Discharge Disposition:  Short Term Hospital (DC - External)    Discharge Medications:   Jarvis Morgan   Home Medication Instructions KAMERON:[id number]    Printed on:10/11/17 6240   Medication Information                      allopurinol (ZYLOPRIM) 300 MG tablet  Take 300 mg by mouth Daily.             calcitriol (ROCALTROL) 0.25 MCG capsule  Take 0.25 mcg by mouth Daily.             cefTRIAXone (ROCEPHIN) 1 g/100 mL 0.9% NS (MBP)  Infuse 100 mL into a venous catheter Daily. Indications: Urinary Tract Infection             diltiaZEM CD (CARDIZEM CD) 180 MG 24 hr capsule  Take 1 capsule by mouth Daily.             donepezil (ARICEPT) 5 MG " tablet  Take 5 mg by mouth Every Night.             furosemide (LASIX) 40 MG tablet  Take 40 mg by mouth Daily.             hydrocortisone 1 % cream  Apply  topically Every 12 (Twelve) Hours.             methimazole (TAPAZOLE) 10 MG tablet  Take 5 mg by mouth Daily.             metoprolol tartrate (LOPRESSOR) 100 MG tablet  Take 1 tablet by mouth 2 (Two) Times a Day.             pantoprazole (PROTONIX) 40 MG EC tablet  Take 40 mg by mouth Daily.             vitamin D (ERGOCALCIFEROL) 22457 UNITS capsule capsule  Take 50,000 Units by mouth 1 (One) Time Per Week.             warfarin (COUMADIN) 10 MG tablet  Take 10 mg by mouth Daily.                 Discharge Diet:    Cardiac    Discharge Care Plan / Instructions: Transfer to [facility] ambulance.     Activity at Discharge:    As tolerated  02 keep sat >90%      Test Results Pending at Discharge: None      Amaris Miner DO  10/11/17  8:22 AM

## 2017-10-11 NOTE — NURSING NOTE
Spoke with Nayana from [facility] [place] - she says without DC documentation (summary, medical record packet) will have bed available 10/11 but may not be the same one assigned previously, therefore calling report may be to different phone number than given previously. Staff to call transfer center  when pt READY to DC via [employer] [facility] service for correct room # assignment & phone number (fax copies of medical record to  & send hard copies with patient/EMS).

## 2017-10-11 NOTE — PLAN OF CARE
Problem: Patient Care Overview (Adult)  Goal: Plan of Care Review  Outcome: Ongoing (interventions implemented as appropriate)    10/09/17 1121 10/09/17 1756 10/10/17 1928   Coping/Psychosocial Response Interventions   Plan Of Care Reviewed With --  --  patient   Patient Care Overview   Progress no change --  --    Outcome Evaluation   Outcome Summary/Follow up Plan --  Bed assignment at [facility] obtained at 1915.  Dr. Flores not on call to do D/c summary. midnight Hospitalist  Notified of d/c.  [facility] on Standby   --          Problem: Cardiac: Heart Failure (Adult)  Goal: Signs and Symptoms of Listed Potential Problems Will be Absent or Manageable (Cardiac: Heart Failure)  Outcome: Ongoing (interventions implemented as appropriate)    Problem: Fall Risk (Adult)  Goal: Identify Related Risk Factors and Signs and Symptoms  Outcome: Ongoing (interventions implemented as appropriate)  Goal: Absence of Falls  Outcome: Ongoing (interventions implemented as appropriate)

## 2017-10-24 RX ORDER — METHIMAZOLE 10 MG/1
TABLET ORAL
Qty: 90 TABLET | Refills: 3 | Status: SHIPPED | OUTPATIENT
Start: 2017-10-24 | End: 2018-10-29 | Stop reason: SDUPTHER

## 2017-10-24 RX ORDER — METHIMAZOLE 10 MG/1
10 TABLET ORAL DAILY
Qty: 30 TABLET | Refills: 1 | Status: SHIPPED | OUTPATIENT
Start: 2017-10-24 | End: 2017-11-24 | Stop reason: SDUPTHER

## 2017-10-26 ENCOUNTER — TELEPHONE (OUTPATIENT)
Dept: VASCULAR SURGERY | Facility: CLINIC | Age: 72
End: 2017-10-26

## 2017-10-26 NOTE — TELEPHONE ENCOUNTER
Spoke with Ms. Morgan and reminded her of her appointment at 10 am and she stated she would be here.

## 2017-11-09 ENCOUNTER — TELEPHONE (OUTPATIENT)
Dept: INTERNAL MEDICINE | Age: 72
End: 2017-11-09

## 2017-11-09 ENCOUNTER — TELEPHONE (OUTPATIENT)
Dept: VASCULAR SURGERY | Facility: CLINIC | Age: 72
End: 2017-11-09

## 2017-11-09 NOTE — TELEPHONE ENCOUNTER
Rang a few times and then stated the person you are trying to call does not have a voicemail box set up.

## 2017-11-10 RX ORDER — METHYLPREDNISOLONE 4 MG/1
TABLET ORAL
Qty: 1 KIT | Refills: 0 | Status: SHIPPED | OUTPATIENT
Start: 2017-11-10 | End: 2017-11-16

## 2017-11-20 ENCOUNTER — HOSPITAL ENCOUNTER (INPATIENT)
Facility: HOSPITAL | Age: 72
LOS: 9 days | Discharge: NURSING, REHAB OR LONG-TERM CARE | End: 2017-11-29
Attending: EMERGENCY MEDICINE | Admitting: FAMILY MEDICINE

## 2017-11-20 ENCOUNTER — APPOINTMENT (OUTPATIENT)
Dept: CT IMAGING | Facility: HOSPITAL | Age: 72
End: 2017-11-20

## 2017-11-20 ENCOUNTER — APPOINTMENT (OUTPATIENT)
Dept: GENERAL RADIOLOGY | Facility: HOSPITAL | Age: 72
End: 2017-11-20

## 2017-11-20 DIAGNOSIS — Z74.09 IMPAIRED MOBILITY: ICD-10-CM

## 2017-11-20 DIAGNOSIS — N39.0 ACUTE UTI (URINARY TRACT INFECTION): ICD-10-CM

## 2017-11-20 DIAGNOSIS — R41.82 ALTERED MENTAL STATUS, UNSPECIFIED ALTERED MENTAL STATUS TYPE: Primary | ICD-10-CM

## 2017-11-20 DIAGNOSIS — R65.10 SIRS (SYSTEMIC INFLAMMATORY RESPONSE SYNDROME) (HCC): ICD-10-CM

## 2017-11-20 DIAGNOSIS — I82.402 DEEP VEIN THROMBOSIS (DVT) OF LEFT LOWER EXTREMITY, UNSPECIFIED CHRONICITY, UNSPECIFIED VEIN (HCC): ICD-10-CM

## 2017-11-20 PROBLEM — E05.90 HYPERTHYROIDISM: Status: ACTIVE | Noted: 2017-11-20

## 2017-11-20 PROBLEM — E55.9 VITAMIN D DEFICIENCY: Status: ACTIVE | Noted: 2017-11-20

## 2017-11-20 PROBLEM — N12 PYELONEPHRITIS: Status: ACTIVE | Noted: 2017-11-20

## 2017-11-20 PROBLEM — R09.02 HYPOXIA: Status: RESOLVED | Noted: 2017-11-20 | Resolved: 2017-11-20

## 2017-11-20 PROBLEM — G47.33 OSA (OBSTRUCTIVE SLEEP APNEA): Status: ACTIVE | Noted: 2017-11-20

## 2017-11-20 PROBLEM — F03.90 DEMENTIA (HCC): Status: ACTIVE | Noted: 2017-11-20

## 2017-11-20 PROBLEM — Z85.51 HISTORY OF BLADDER CANCER: Status: ACTIVE | Noted: 2017-11-20

## 2017-11-20 PROBLEM — E66.09 OBESITY DUE TO EXCESS CALORIES WITH SERIOUS COMORBIDITY: Status: ACTIVE | Noted: 2017-11-20

## 2017-11-20 PROBLEM — J96.01 ACUTE RESPIRATORY FAILURE WITH HYPOXIA (HCC): Status: ACTIVE | Noted: 2017-11-20

## 2017-11-20 PROBLEM — K21.9 GERD (GASTROESOPHAGEAL REFLUX DISEASE): Status: ACTIVE | Noted: 2017-11-20

## 2017-11-20 PROBLEM — R09.02 HYPOXIA: Status: ACTIVE | Noted: 2017-11-20

## 2017-11-20 PROBLEM — E03.9 HYPOTHYROIDISM: Status: RESOLVED | Noted: 2017-11-20 | Resolved: 2017-11-20

## 2017-11-20 PROBLEM — E03.9 HYPOTHYROIDISM: Status: ACTIVE | Noted: 2017-11-20

## 2017-11-20 PROBLEM — Z79.01 CURRENT USE OF LONG TERM ANTICOAGULATION: Status: ACTIVE | Noted: 2017-11-20

## 2017-11-20 PROBLEM — Z86.718 HISTORY OF DVT (DEEP VEIN THROMBOSIS): Status: ACTIVE | Noted: 2017-11-20

## 2017-11-20 PROBLEM — G93.41 METABOLIC ENCEPHALOPATHY: Status: ACTIVE | Noted: 2017-11-20

## 2017-11-20 PROBLEM — I50.32 CHRONIC DIASTOLIC HEART FAILURE (HCC): Status: ACTIVE | Noted: 2017-11-20

## 2017-11-20 PROBLEM — I49.5 SICK SINUS SYNDROME (HCC): Status: ACTIVE | Noted: 2017-11-20

## 2017-11-20 PROBLEM — A41.9 SEPSIS (HCC): Status: ACTIVE | Noted: 2017-11-20

## 2017-11-20 PROBLEM — R50.9 FEVER: Status: ACTIVE | Noted: 2017-11-20

## 2017-11-20 LAB
ALBUMIN SERPL-MCNC: 4.2 G/DL (ref 3.5–5)
ALBUMIN/GLOB SERPL: 1 G/DL (ref 1.1–2.5)
ALP SERPL-CCNC: 105 U/L (ref 24–120)
ALT SERPL W P-5'-P-CCNC: 42 U/L (ref 0–54)
AMPHET+METHAMPHET UR QL: NEGATIVE
ANION GAP SERPL CALCULATED.3IONS-SCNC: 11 MMOL/L (ref 4–13)
APTT PPP: 42.7 SECONDS (ref 24.1–34.8)
ARTERIAL PATENCY WRIST A: POSITIVE
AST SERPL-CCNC: 40 U/L (ref 7–45)
ATMOSPHERIC PRESS: 757 MMHG
BACTERIA BLD CULT: ABNORMAL
BACTERIA UR QL AUTO: ABNORMAL /HPF
BARBITURATES UR QL SCN: NEGATIVE
BASE EXCESS BLDA CALC-SCNC: 2.5 MMOL/L (ref 0–2)
BASOPHILS # BLD AUTO: 0.01 10*3/MM3 (ref 0–0.2)
BASOPHILS NFR BLD AUTO: 0.1 % (ref 0–2)
BDY SITE: ABNORMAL
BENZODIAZ UR QL SCN: NEGATIVE
BILIRUB SERPL-MCNC: 1 MG/DL (ref 0.1–1)
BILIRUB UR QL STRIP: NEGATIVE
BODY TEMPERATURE: 37 C
BUN BLD-MCNC: 53 MG/DL (ref 5–21)
BUN/CREAT SERPL: 31.7 (ref 7–25)
CA-I BLD-MCNC: 4.87 MG/DL (ref 4.6–5.4)
CALCIUM SPEC-SCNC: 9.7 MG/DL (ref 8.4–10.4)
CANNABINOIDS SERPL QL: NEGATIVE
CHLORIDE SERPL-SCNC: 103 MMOL/L (ref 98–110)
CLARITY UR: CLEAR
CO2 SERPL-SCNC: 30 MMOL/L (ref 24–31)
COCAINE UR QL: NEGATIVE
COLOR UR: YELLOW
CREAT BLD-MCNC: 1.67 MG/DL (ref 0.5–1.4)
CRP SERPL-MCNC: 3.49 MG/DL (ref 0–0.99)
D-LACTATE SERPL-SCNC: 1.6 MMOL/L (ref 0.5–2)
D-LACTATE SERPL-SCNC: 2.4 MMOL/L (ref 0.5–2)
D-LACTATE SERPL-SCNC: 2.8 MMOL/L (ref 0.5–2)
DEPRECATED RDW RBC AUTO: 49.8 FL (ref 40–54)
EOSINOPHIL # BLD AUTO: 0 10*3/MM3 (ref 0–0.7)
EOSINOPHIL NFR BLD AUTO: 0 % (ref 0–4)
ERYTHROCYTE [DISTWIDTH] IN BLOOD BY AUTOMATED COUNT: 13.2 % (ref 12–15)
FLUAV AG NPH QL: NEGATIVE
FLUBV AG NPH QL IA: NEGATIVE
GFR SERPL CREATININE-BSD FRML MDRD: 30 ML/MIN/1.73
GLOBULIN UR ELPH-MCNC: 4.1 GM/DL
GLUCOSE BLD-MCNC: 169 MG/DL (ref 70–100)
GLUCOSE UR STRIP-MCNC: NEGATIVE MG/DL
HBA1C MFR BLD: 5.7 %
HCO3 BLDA-SCNC: 26.7 MMOL/L (ref 20–26)
HCT VFR BLD AUTO: 40.2 % (ref 37–47)
HGB BLD-MCNC: 12.8 G/DL (ref 12–16)
HGB UR QL STRIP.AUTO: ABNORMAL
HOLD SPECIMEN: NORMAL
IMM GRANULOCYTES # BLD: 0.03 10*3/MM3 (ref 0–0.03)
IMM GRANULOCYTES NFR BLD: 0.3 % (ref 0–5)
INR PPP: 1.92 (ref 0.91–1.09)
INR PPP: 1.95 (ref 0.91–1.09)
KETONES UR QL STRIP: NEGATIVE
LEUKOCYTE ESTERASE UR QL STRIP.AUTO: NEGATIVE
LYMPHOCYTES # BLD AUTO: 0.32 10*3/MM3 (ref 0.72–4.86)
LYMPHOCYTES NFR BLD AUTO: 2.9 % (ref 15–45)
Lab: ABNORMAL
Lab: ABNORMAL
Lab: NORMAL
MAGNESIUM SERPL-MCNC: 1.8 MG/DL (ref 1.4–2.2)
MCH RBC QN AUTO: 32.8 PG (ref 28–32)
MCHC RBC AUTO-ENTMCNC: 31.8 G/DL (ref 33–36)
MCV RBC AUTO: 103.1 FL (ref 82–98)
METHADONE UR QL SCN: NEGATIVE
MODALITY: ABNORMAL
MONOCYTES # BLD AUTO: 0.48 10*3/MM3 (ref 0.19–1.3)
MONOCYTES NFR BLD AUTO: 4.4 % (ref 4–12)
NEUTROPHILS # BLD AUTO: 10.11 10*3/MM3 (ref 1.87–8.4)
NEUTROPHILS NFR BLD AUTO: 92.3 % (ref 39–78)
NITRITE UR QL STRIP: NEGATIVE
NOTIFIED BY: ABNORMAL
NOTIFIED WHO: ABNORMAL
OPIATES UR QL: NEGATIVE
PCO2 BLDA: 39.1 MM HG (ref 35–45)
PCP UR QL SCN: NEGATIVE
PH BLDA: 7.44 PH UNITS (ref 7.35–7.45)
PH UR STRIP.AUTO: 6.5 [PH] (ref 5–8)
PHOSPHATE SERPL-MCNC: 3.6 MG/DL (ref 2.5–4.5)
PLATELET # BLD AUTO: 214 10*3/MM3 (ref 130–400)
PMV BLD AUTO: 9.7 FL (ref 6–12)
PO2 BLDA: 52.2 MM HG (ref 83–108)
POTASSIUM BLD-SCNC: 4.9 MMOL/L (ref 3.5–5.3)
PROT SERPL-MCNC: 8.3 G/DL (ref 6.3–8.7)
PROT UR QL STRIP: NEGATIVE
PROTHROMBIN TIME: 22.7 SECONDS (ref 11.9–14.6)
PROTHROMBIN TIME: 22.9 SECONDS (ref 11.9–14.6)
RBC # BLD AUTO: 3.9 10*6/MM3 (ref 4.2–5.4)
RBC # UR: ABNORMAL /HPF
REF LAB TEST METHOD: ABNORMAL
SAO2 % BLDCOA: 89.1 % (ref 94–99)
SODIUM BLD-SCNC: 144 MMOL/L (ref 135–145)
SP GR UR STRIP: 1.01 (ref 1–1.03)
SQUAMOUS #/AREA URNS HPF: ABNORMAL /HPF
TSH SERPL DL<=0.05 MIU/L-ACNC: 0.02 MIU/ML (ref 0.47–4.68)
UROBILINOGEN UR QL STRIP: ABNORMAL
VENTILATOR MODE: ABNORMAL
WBC NRBC COR # BLD: 10.95 10*3/MM3 (ref 4.8–10.8)
WBC UR QL AUTO: ABNORMAL /HPF
WHOLE BLOOD HOLD SPECIMEN: NORMAL
WHOLE BLOOD HOLD SPECIMEN: NORMAL

## 2017-11-20 PROCEDURE — 80307 DRUG TEST PRSMV CHEM ANLYZR: CPT | Performed by: EMERGENCY MEDICINE

## 2017-11-20 PROCEDURE — 81001 URINALYSIS AUTO W/SCOPE: CPT | Performed by: EMERGENCY MEDICINE

## 2017-11-20 PROCEDURE — 86140 C-REACTIVE PROTEIN: CPT | Performed by: EMERGENCY MEDICINE

## 2017-11-20 PROCEDURE — 87147 CULTURE TYPE IMMUNOLOGIC: CPT | Performed by: EMERGENCY MEDICINE

## 2017-11-20 PROCEDURE — 71010 HC CHEST PA OR AP: CPT

## 2017-11-20 PROCEDURE — 25010000002 DEXAMETHASONE PER 1 MG: Performed by: EMERGENCY MEDICINE

## 2017-11-20 PROCEDURE — 85025 COMPLETE CBC W/AUTO DIFF WBC: CPT | Performed by: EMERGENCY MEDICINE

## 2017-11-20 PROCEDURE — 87040 BLOOD CULTURE FOR BACTERIA: CPT | Performed by: EMERGENCY MEDICINE

## 2017-11-20 PROCEDURE — 94799 UNLISTED PULMONARY SVC/PX: CPT

## 2017-11-20 PROCEDURE — 87804 INFLUENZA ASSAY W/OPTIC: CPT | Performed by: FAMILY MEDICINE

## 2017-11-20 PROCEDURE — 83036 HEMOGLOBIN GLYCOSYLATED A1C: CPT | Performed by: FAMILY MEDICINE

## 2017-11-20 PROCEDURE — 36600 WITHDRAWAL OF ARTERIAL BLOOD: CPT

## 2017-11-20 PROCEDURE — 87150 DNA/RNA AMPLIFIED PROBE: CPT | Performed by: EMERGENCY MEDICINE

## 2017-11-20 PROCEDURE — 87186 SC STD MICRODIL/AGAR DIL: CPT | Performed by: EMERGENCY MEDICINE

## 2017-11-20 PROCEDURE — 84100 ASSAY OF PHOSPHORUS: CPT | Performed by: EMERGENCY MEDICINE

## 2017-11-20 PROCEDURE — 82330 ASSAY OF CALCIUM: CPT

## 2017-11-20 PROCEDURE — 87185 SC STD ENZYME DETCJ PER NZM: CPT | Performed by: EMERGENCY MEDICINE

## 2017-11-20 PROCEDURE — 83605 ASSAY OF LACTIC ACID: CPT | Performed by: FAMILY MEDICINE

## 2017-11-20 PROCEDURE — 99285 EMERGENCY DEPT VISIT HI MDM: CPT

## 2017-11-20 PROCEDURE — 94660 CPAP INITIATION&MGMT: CPT

## 2017-11-20 PROCEDURE — 80053 COMPREHEN METABOLIC PANEL: CPT | Performed by: EMERGENCY MEDICINE

## 2017-11-20 PROCEDURE — 25010000002 CEFTRIAXONE PER 250 MG: Performed by: EMERGENCY MEDICINE

## 2017-11-20 PROCEDURE — 85610 PROTHROMBIN TIME: CPT | Performed by: EMERGENCY MEDICINE

## 2017-11-20 PROCEDURE — 83735 ASSAY OF MAGNESIUM: CPT | Performed by: EMERGENCY MEDICINE

## 2017-11-20 PROCEDURE — 82803 BLOOD GASES ANY COMBINATION: CPT

## 2017-11-20 PROCEDURE — 85610 PROTHROMBIN TIME: CPT | Performed by: FAMILY MEDICINE

## 2017-11-20 PROCEDURE — 87086 URINE CULTURE/COLONY COUNT: CPT | Performed by: EMERGENCY MEDICINE

## 2017-11-20 PROCEDURE — 25010000002 VANCOMYCIN PER 500 MG: Performed by: EMERGENCY MEDICINE

## 2017-11-20 PROCEDURE — 87147 CULTURE TYPE IMMUNOLOGIC: CPT | Performed by: FAMILY MEDICINE

## 2017-11-20 PROCEDURE — 87181 SC STD AGAR DILUTION PER AGT: CPT | Performed by: EMERGENCY MEDICINE

## 2017-11-20 PROCEDURE — 83605 ASSAY OF LACTIC ACID: CPT | Performed by: EMERGENCY MEDICINE

## 2017-11-20 PROCEDURE — 70450 CT HEAD/BRAIN W/O DYE: CPT

## 2017-11-20 PROCEDURE — 85730 THROMBOPLASTIN TIME PARTIAL: CPT | Performed by: EMERGENCY MEDICINE

## 2017-11-20 PROCEDURE — 93010 ELECTROCARDIOGRAM REPORT: CPT | Performed by: INTERNAL MEDICINE

## 2017-11-20 PROCEDURE — 36415 COLL VENOUS BLD VENIPUNCTURE: CPT | Performed by: EMERGENCY MEDICINE

## 2017-11-20 PROCEDURE — 74176 CT ABD & PELVIS W/O CONTRAST: CPT

## 2017-11-20 PROCEDURE — 93005 ELECTROCARDIOGRAM TRACING: CPT | Performed by: EMERGENCY MEDICINE

## 2017-11-20 PROCEDURE — 84443 ASSAY THYROID STIM HORMONE: CPT | Performed by: FAMILY MEDICINE

## 2017-11-20 PROCEDURE — 87040 BLOOD CULTURE FOR BACTERIA: CPT | Performed by: FAMILY MEDICINE

## 2017-11-20 RX ORDER — ONDANSETRON 2 MG/ML
4 INJECTION INTRAMUSCULAR; INTRAVENOUS EVERY 6 HOURS PRN
Status: DISCONTINUED | OUTPATIENT
Start: 2017-11-20 | End: 2017-11-29 | Stop reason: HOSPADM

## 2017-11-20 RX ORDER — IBUPROFEN 400 MG/1
400 TABLET ORAL ONCE
Status: COMPLETED | OUTPATIENT
Start: 2017-11-20 | End: 2017-11-20

## 2017-11-20 RX ORDER — SODIUM CHLORIDE 0.9 % (FLUSH) 0.9 %
10 SYRINGE (ML) INJECTION AS NEEDED
Status: DISCONTINUED | OUTPATIENT
Start: 2017-11-20 | End: 2017-11-29 | Stop reason: HOSPADM

## 2017-11-20 RX ORDER — ACETAMINOPHEN 160 MG/5ML
650 SOLUTION ORAL ONCE
Status: DISCONTINUED | OUTPATIENT
Start: 2017-11-20 | End: 2017-11-20

## 2017-11-20 RX ORDER — SODIUM CHLORIDE 0.9 % (FLUSH) 0.9 %
1-10 SYRINGE (ML) INJECTION AS NEEDED
Status: DISCONTINUED | OUTPATIENT
Start: 2017-11-20 | End: 2017-11-29 | Stop reason: HOSPADM

## 2017-11-20 RX ORDER — METOPROLOL TARTRATE 100 MG/1
50 TABLET ORAL EVERY 12 HOURS
COMMUNITY
End: 2018-01-04 | Stop reason: HOSPADM

## 2017-11-20 RX ORDER — SODIUM CHLORIDE 9 MG/ML
75 INJECTION, SOLUTION INTRAVENOUS CONTINUOUS
Status: DISCONTINUED | OUTPATIENT
Start: 2017-11-20 | End: 2017-11-22

## 2017-11-20 RX ORDER — ASPIRIN 81 MG/1
81 TABLET ORAL DAILY PRN
Status: ON HOLD | COMMUNITY
End: 2020-11-12

## 2017-11-20 RX ORDER — MELATONIN
5000 DAILY
Status: DISCONTINUED | OUTPATIENT
Start: 2017-11-20 | End: 2017-11-29 | Stop reason: HOSPADM

## 2017-11-20 RX ORDER — DONEPEZIL HYDROCHLORIDE 5 MG/1
5 TABLET, FILM COATED ORAL NIGHTLY
Status: DISCONTINUED | OUTPATIENT
Start: 2017-11-20 | End: 2017-11-29 | Stop reason: HOSPADM

## 2017-11-20 RX ORDER — CALCITRIOL 0.25 UG/1
0.25 CAPSULE, LIQUID FILLED ORAL DAILY
Status: DISCONTINUED | OUTPATIENT
Start: 2017-11-21 | End: 2017-11-29 | Stop reason: HOSPADM

## 2017-11-20 RX ORDER — ASPIRIN 81 MG/1
81 TABLET ORAL DAILY
Status: DISCONTINUED | OUTPATIENT
Start: 2017-11-21 | End: 2017-11-29 | Stop reason: HOSPADM

## 2017-11-20 RX ORDER — CETIRIZINE HYDROCHLORIDE 10 MG/1
10 TABLET ORAL DAILY
COMMUNITY
End: 2017-11-29 | Stop reason: HOSPADM

## 2017-11-20 RX ORDER — DEXAMETHASONE SODIUM PHOSPHATE 10 MG/ML
10 INJECTION INTRAMUSCULAR; INTRAVENOUS ONCE
Status: COMPLETED | OUTPATIENT
Start: 2017-11-20 | End: 2017-11-20

## 2017-11-20 RX ORDER — WARFARIN SODIUM 5 MG/1
5 TABLET ORAL
Status: DISCONTINUED | OUTPATIENT
Start: 2017-11-20 | End: 2017-11-28

## 2017-11-20 RX ORDER — ACETAMINOPHEN 325 MG/1
650 TABLET ORAL EVERY 4 HOURS PRN
Status: DISCONTINUED | OUTPATIENT
Start: 2017-11-20 | End: 2017-11-29 | Stop reason: HOSPADM

## 2017-11-20 RX ORDER — PANTOPRAZOLE SODIUM 40 MG/1
40 TABLET, DELAYED RELEASE ORAL
Status: DISCONTINUED | OUTPATIENT
Start: 2017-11-21 | End: 2017-11-29 | Stop reason: HOSPADM

## 2017-11-20 RX ORDER — WARFARIN SODIUM 10 MG/1
5 TABLET ORAL TAKE AS DIRECTED
COMMUNITY
End: 2017-11-29 | Stop reason: HOSPADM

## 2017-11-20 RX ORDER — WARFARIN SODIUM 10 MG/1
10 TABLET ORAL 2 TIMES WEEKLY
Status: DISCONTINUED | OUTPATIENT
Start: 2017-11-21 | End: 2017-11-29 | Stop reason: HOSPADM

## 2017-11-20 RX ADMIN — CEFTRIAXONE SODIUM 2 G: 2 INJECTION, POWDER, FOR SOLUTION INTRAMUSCULAR; INTRAVENOUS at 10:26

## 2017-11-20 RX ADMIN — DEXAMETHASONE SODIUM PHOSPHATE 10 MG: 10 INJECTION, SOLUTION INTRAMUSCULAR; INTRAVENOUS at 10:25

## 2017-11-20 RX ADMIN — WARFARIN SODIUM 5 MG: 5 TABLET ORAL at 19:00

## 2017-11-20 RX ADMIN — IBUPROFEN 400 MG: 400 TABLET ORAL at 12:40

## 2017-11-20 RX ADMIN — AMPICILLIN SODIUM 2 G: 2 INJECTION, POWDER, FOR SOLUTION INTRAMUSCULAR; INTRAVENOUS at 11:20

## 2017-11-20 RX ADMIN — VANCOMYCIN HYDROCHLORIDE 1000 MG: 1 INJECTION, POWDER, LYOPHILIZED, FOR SOLUTION INTRAVENOUS at 10:27

## 2017-11-20 RX ADMIN — ACETAMINOPHEN 650 MG: 325 SUPPOSITORY RECTAL at 11:10

## 2017-11-20 RX ADMIN — CHOLECALCIFEROL (VITAMIN D3) 25 MCG (1,000 UNIT) TABLET 5000 UNITS: TABLET at 15:19

## 2017-11-20 RX ADMIN — DONEPEZIL HYDROCHLORIDE 5 MG: 5 TABLET, FILM COATED ORAL at 20:03

## 2017-11-20 RX ADMIN — SODIUM CHLORIDE 75 ML/HR: 9 INJECTION, SOLUTION INTRAVENOUS at 20:03

## 2017-11-21 ENCOUNTER — APPOINTMENT (OUTPATIENT)
Dept: CARDIOLOGY | Facility: HOSPITAL | Age: 72
End: 2017-11-21
Attending: FAMILY MEDICINE

## 2017-11-21 PROBLEM — B86 SCABIES: Status: ACTIVE | Noted: 2017-11-21

## 2017-11-21 PROBLEM — R78.81 MRSA BACTEREMIA: Status: ACTIVE | Noted: 2017-11-21

## 2017-11-21 PROBLEM — B95.62 MRSA BACTEREMIA: Status: ACTIVE | Noted: 2017-11-21

## 2017-11-21 LAB
ALBUMIN SERPL-MCNC: 3.7 G/DL (ref 3.5–5)
ALBUMIN/GLOB SERPL: 1 G/DL (ref 1.1–2.5)
ALP SERPL-CCNC: 83 U/L (ref 24–120)
ALT SERPL W P-5'-P-CCNC: 38 U/L (ref 0–54)
ANION GAP SERPL CALCULATED.3IONS-SCNC: 11 MMOL/L (ref 4–13)
AST SERPL-CCNC: 44 U/L (ref 7–45)
BASOPHILS # BLD AUTO: 0 10*3/MM3 (ref 0–0.2)
BASOPHILS NFR BLD AUTO: 0 % (ref 0–2)
BH CV ECHO MEAS - AO MAX PG: 28.5 MMHG
BH CV ECHO MEAS - AO V2 MAX: 267 CM/SEC
BH CV ECHO MEAS - BSA(HAYCOCK): 2.3 M^2
BH CV ECHO MEAS - BSA: 2.3 M^2
BH CV ECHO MEAS - BZI_BMI: 32.6 KILOGRAMS/M^2
BH CV ECHO MEAS - BZI_METRIC_HEIGHT: 180.3 CM
BH CV ECHO MEAS - BZI_METRIC_WEIGHT: 106.1 KG
BH CV ECHO MEAS - CONTRAST EF 4CH: 69.9 ML/M^2
BH CV ECHO MEAS - EDV(MOD-SP4): 115 ML
BH CV ECHO MEAS - EF(MOD-SP4): 69.9 %
BH CV ECHO MEAS - ESV(MOD-SP4): 34.6 ML
BH CV ECHO MEAS - LV DIASTOLIC VOL/BSA (35-75): 51 ML/M^2
BH CV ECHO MEAS - LV SYSTOLIC VOL/BSA (12-30): 15.3 ML/M^2
BH CV ECHO MEAS - LVLD AP4: 7.6 CM
BH CV ECHO MEAS - LVLS AP4: 5.6 CM
BH CV ECHO MEAS - MR MAX PG: 89.1 MMHG
BH CV ECHO MEAS - MR MAX VEL: 472 CM/SEC
BH CV ECHO MEAS - MR MEAN PG: 71 MMHG
BH CV ECHO MEAS - MR MEAN VEL: 403 CM/SEC
BH CV ECHO MEAS - MR VTI: 184 CM
BH CV ECHO MEAS - RAP SYSTOLE: 10 MMHG
BH CV ECHO MEAS - RVSP: 32.7 MMHG
BH CV ECHO MEAS - SI(MOD-SP4): 35.7 ML/M^2
BH CV ECHO MEAS - SV(MOD-SP4): 80.4 ML
BH CV ECHO MEAS - TR MAX VEL: 238 CM/SEC
BILIRUB SERPL-MCNC: 0.4 MG/DL (ref 0.1–1)
BUN BLD-MCNC: 56 MG/DL (ref 5–21)
BUN/CREAT SERPL: 37.1 (ref 7–25)
CALCIUM SPEC-SCNC: 9.5 MG/DL (ref 8.4–10.4)
CHLORIDE SERPL-SCNC: 106 MMOL/L (ref 98–110)
CO2 SERPL-SCNC: 31 MMOL/L (ref 24–31)
CREAT BLD-MCNC: 1.51 MG/DL (ref 0.5–1.4)
D-LACTATE SERPL-SCNC: 1.4 MMOL/L (ref 0.5–2)
DEPRECATED RDW RBC AUTO: 49.6 FL (ref 40–54)
EOSINOPHIL # BLD AUTO: 0 10*3/MM3 (ref 0–0.7)
EOSINOPHIL NFR BLD AUTO: 0 % (ref 0–4)
ERYTHROCYTE [DISTWIDTH] IN BLOOD BY AUTOMATED COUNT: 13.1 % (ref 12–15)
GFR SERPL CREATININE-BSD FRML MDRD: 34 ML/MIN/1.73
GLOBULIN UR ELPH-MCNC: 3.7 GM/DL
GLUCOSE BLD-MCNC: 136 MG/DL (ref 70–100)
HCT VFR BLD AUTO: 37.7 % (ref 37–47)
HGB BLD-MCNC: 11.7 G/DL (ref 12–16)
IMM GRANULOCYTES # BLD: 0.03 10*3/MM3 (ref 0–0.03)
IMM GRANULOCYTES NFR BLD: 0.3 % (ref 0–5)
INR PPP: 2.02 (ref 0.91–1.09)
LV EF 2D ECHO EST: 70 %
LYMPHOCYTES # BLD AUTO: 0.42 10*3/MM3 (ref 0.72–4.86)
LYMPHOCYTES NFR BLD AUTO: 4.7 % (ref 15–45)
MAXIMAL PREDICTED HEART RATE: 147 BPM
MCH RBC QN AUTO: 32.1 PG (ref 28–32)
MCHC RBC AUTO-ENTMCNC: 31 G/DL (ref 33–36)
MCV RBC AUTO: 103.6 FL (ref 82–98)
MONOCYTES # BLD AUTO: 0.63 10*3/MM3 (ref 0.19–1.3)
MONOCYTES NFR BLD AUTO: 7.1 % (ref 4–12)
NEUTROPHILS # BLD AUTO: 7.83 10*3/MM3 (ref 1.87–8.4)
NEUTROPHILS NFR BLD AUTO: 87.9 % (ref 39–78)
PLATELET # BLD AUTO: 164 10*3/MM3 (ref 130–400)
PMV BLD AUTO: 9.7 FL (ref 6–12)
POTASSIUM BLD-SCNC: 3.9 MMOL/L (ref 3.5–5.3)
PROT SERPL-MCNC: 7.4 G/DL (ref 6.3–8.7)
PROTHROMBIN TIME: 23.6 SECONDS (ref 11.9–14.6)
RBC # BLD AUTO: 3.64 10*6/MM3 (ref 4.2–5.4)
SODIUM BLD-SCNC: 148 MMOL/L (ref 135–145)
STRESS TARGET HR: 125 BPM
WBC NRBC COR # BLD: 8.91 10*3/MM3 (ref 4.8–10.8)

## 2017-11-21 PROCEDURE — 87150 DNA/RNA AMPLIFIED PROBE: CPT | Performed by: INTERNAL MEDICINE

## 2017-11-21 PROCEDURE — 85610 PROTHROMBIN TIME: CPT | Performed by: FAMILY MEDICINE

## 2017-11-21 PROCEDURE — 83605 ASSAY OF LACTIC ACID: CPT | Performed by: FAMILY MEDICINE

## 2017-11-21 PROCEDURE — 25010000002 VANCOMYCIN 10 G RECONSTITUTED SOLUTION: Performed by: FAMILY MEDICINE

## 2017-11-21 PROCEDURE — 87205 SMEAR GRAM STAIN: CPT | Performed by: INTERNAL MEDICINE

## 2017-11-21 PROCEDURE — 87186 SC STD MICRODIL/AGAR DIL: CPT | Performed by: INTERNAL MEDICINE

## 2017-11-21 PROCEDURE — 87147 CULTURE TYPE IMMUNOLOGIC: CPT | Performed by: INTERNAL MEDICINE

## 2017-11-21 PROCEDURE — 93325 DOPPLER ECHO COLOR FLOW MAPG: CPT

## 2017-11-21 PROCEDURE — 99223 1ST HOSP IP/OBS HIGH 75: CPT | Performed by: INTERNAL MEDICINE

## 2017-11-21 PROCEDURE — 94660 CPAP INITIATION&MGMT: CPT

## 2017-11-21 PROCEDURE — 87040 BLOOD CULTURE FOR BACTERIA: CPT | Performed by: INTERNAL MEDICINE

## 2017-11-21 PROCEDURE — 25010000002 CEFTRIAXONE PER 250 MG: Performed by: FAMILY MEDICINE

## 2017-11-21 PROCEDURE — 94799 UNLISTED PULMONARY SVC/PX: CPT

## 2017-11-21 PROCEDURE — 93308 TTE F-UP OR LMTD: CPT | Performed by: INTERNAL MEDICINE

## 2017-11-21 PROCEDURE — 93321 DOPPLER ECHO F-UP/LMTD STD: CPT

## 2017-11-21 PROCEDURE — 80053 COMPREHEN METABOLIC PANEL: CPT | Performed by: FAMILY MEDICINE

## 2017-11-21 PROCEDURE — 85025 COMPLETE CBC W/AUTO DIFF WBC: CPT | Performed by: FAMILY MEDICINE

## 2017-11-21 PROCEDURE — 93321 DOPPLER ECHO F-UP/LMTD STD: CPT | Performed by: INTERNAL MEDICINE

## 2017-11-21 PROCEDURE — 25010000002 PERFLUTREN 6.52 MG/ML SUSPENSION: Performed by: FAMILY MEDICINE

## 2017-11-21 PROCEDURE — 87185 SC STD ENZYME DETCJ PER NZM: CPT | Performed by: INTERNAL MEDICINE

## 2017-11-21 PROCEDURE — 93308 TTE F-UP OR LMTD: CPT

## 2017-11-21 PROCEDURE — 93325 DOPPLER ECHO COLOR FLOW MAPG: CPT | Performed by: INTERNAL MEDICINE

## 2017-11-21 RX ORDER — PERMETHRIN 50 MG/G
CREAM TOPICAL ONCE
Status: COMPLETED | OUTPATIENT
Start: 2017-11-21 | End: 2017-11-21

## 2017-11-21 RX ORDER — DIPHENHYDRAMINE HCL 12.5MG/5ML
12.5 LIQUID (ML) ORAL ONCE
Status: COMPLETED | OUTPATIENT
Start: 2017-11-21 | End: 2017-11-21

## 2017-11-21 RX ORDER — DIPHENHYDRAMINE HCL 12.5MG/5ML
12.5 LIQUID (ML) ORAL EVERY 6 HOURS PRN
Status: DISCONTINUED | OUTPATIENT
Start: 2017-11-21 | End: 2017-11-21

## 2017-11-21 RX ADMIN — ASPIRIN 81 MG: 81 TABLET ORAL at 09:56

## 2017-11-21 RX ADMIN — PANTOPRAZOLE SODIUM 40 MG: 40 TABLET, DELAYED RELEASE ORAL at 05:34

## 2017-11-21 RX ADMIN — DONEPEZIL HYDROCHLORIDE 5 MG: 5 TABLET, FILM COATED ORAL at 22:06

## 2017-11-21 RX ADMIN — CHOLECALCIFEROL (VITAMIN D3) 25 MCG (1,000 UNIT) TABLET 5000 UNITS: TABLET at 09:56

## 2017-11-21 RX ADMIN — DIPHENHYDRAMINE HYDROCHLORIDE 12.5 MG: 12.5 SOLUTION ORAL at 00:54

## 2017-11-21 RX ADMIN — SODIUM CHLORIDE 75 ML/HR: 9 INJECTION, SOLUTION INTRAVENOUS at 09:56

## 2017-11-21 RX ADMIN — PERMETHRIN: 50 CREAM TOPICAL at 12:00

## 2017-11-21 RX ADMIN — WARFARIN 10 MG: 10 TABLET ORAL at 18:00

## 2017-11-21 RX ADMIN — CEFTRIAXONE SODIUM 1 G: 1 INJECTION, POWDER, FOR SOLUTION INTRAMUSCULAR; INTRAVENOUS at 09:56

## 2017-11-21 RX ADMIN — PERFLUTREN 8.48 MG: 6.52 INJECTION, SUSPENSION INTRAVENOUS at 14:59

## 2017-11-21 RX ADMIN — CALCITRIOL 0.25 MCG: 0.25 CAPSULE ORAL at 09:56

## 2017-11-21 RX ADMIN — VANCOMYCIN HYDROCHLORIDE 1250 MG: 100 INJECTION, POWDER, LYOPHILIZED, FOR SOLUTION INTRAVENOUS at 06:16

## 2017-11-22 PROBLEM — N12 PYELONEPHRITIS: Status: RESOLVED | Noted: 2017-11-20 | Resolved: 2017-11-22

## 2017-11-22 PROBLEM — R53.1 WEAKNESS: Status: ACTIVE | Noted: 2017-11-22

## 2017-11-22 LAB
ALBUMIN SERPL-MCNC: 3.1 G/DL (ref 3.5–5)
ALBUMIN/GLOB SERPL: 0.9 G/DL (ref 1.1–2.5)
ALP SERPL-CCNC: 76 U/L (ref 24–120)
ALT SERPL W P-5'-P-CCNC: 39 U/L (ref 0–54)
ANION GAP SERPL CALCULATED.3IONS-SCNC: 9 MMOL/L (ref 4–13)
AST SERPL-CCNC: 30 U/L (ref 7–45)
BACTERIA BLD CULT: ABNORMAL
BACTERIA SPEC AEROBE CULT: ABNORMAL
BACTERIA SPEC AEROBE CULT: NORMAL
BASOPHILS # BLD AUTO: 0.01 10*3/MM3 (ref 0–0.2)
BASOPHILS NFR BLD AUTO: 0.2 % (ref 0–2)
BILIRUB SERPL-MCNC: 0.3 MG/DL (ref 0.1–1)
BUN BLD-MCNC: 43 MG/DL (ref 5–21)
BUN/CREAT SERPL: 31.2 (ref 7–25)
CALCIUM SPEC-SCNC: 9.1 MG/DL (ref 8.4–10.4)
CHLORIDE SERPL-SCNC: 110 MMOL/L (ref 98–110)
CO2 SERPL-SCNC: 27 MMOL/L (ref 24–31)
CREAT BLD-MCNC: 1.38 MG/DL (ref 0.5–1.4)
DEPRECATED RDW RBC AUTO: 49.7 FL (ref 40–54)
EOSINOPHIL # BLD AUTO: 0.17 10*3/MM3 (ref 0–0.7)
EOSINOPHIL NFR BLD AUTO: 2.9 % (ref 0–4)
ERYTHROCYTE [DISTWIDTH] IN BLOOD BY AUTOMATED COUNT: 13.4 % (ref 12–15)
GFR SERPL CREATININE-BSD FRML MDRD: 37 ML/MIN/1.73
GLOBULIN UR ELPH-MCNC: 3.4 GM/DL
GLUCOSE BLD-MCNC: 112 MG/DL (ref 70–100)
GRAM STN SPEC: ABNORMAL
GRAM STN SPEC: ABNORMAL
HCT VFR BLD AUTO: 36.3 % (ref 37–47)
HGB BLD-MCNC: 11.1 G/DL (ref 12–16)
IMM GRANULOCYTES # BLD: 0.02 10*3/MM3 (ref 0–0.03)
IMM GRANULOCYTES NFR BLD: 0.3 % (ref 0–5)
INR PPP: 2.08 (ref 0.91–1.09)
ISOLATED FROM: ABNORMAL
ISOLATED FROM: ABNORMAL
LYMPHOCYTES # BLD AUTO: 0.7 10*3/MM3 (ref 0.72–4.86)
LYMPHOCYTES NFR BLD AUTO: 11.8 % (ref 15–45)
MCH RBC QN AUTO: 31.6 PG (ref 28–32)
MCHC RBC AUTO-ENTMCNC: 30.6 G/DL (ref 33–36)
MCV RBC AUTO: 103.4 FL (ref 82–98)
MONOCYTES # BLD AUTO: 0.76 10*3/MM3 (ref 0.19–1.3)
MONOCYTES NFR BLD AUTO: 12.8 % (ref 4–12)
NEUTROPHILS # BLD AUTO: 4.26 10*3/MM3 (ref 1.87–8.4)
NEUTROPHILS NFR BLD AUTO: 72 % (ref 39–78)
PLATELET # BLD AUTO: 144 10*3/MM3 (ref 130–400)
PMV BLD AUTO: 9.4 FL (ref 6–12)
POTASSIUM BLD-SCNC: 4.2 MMOL/L (ref 3.5–5.3)
PROT SERPL-MCNC: 6.5 G/DL (ref 6.3–8.7)
PROTHROMBIN TIME: 24.1 SECONDS (ref 11.9–14.6)
RBC # BLD AUTO: 3.51 10*6/MM3 (ref 4.2–5.4)
REF LAB TEST RESULTS: NORMAL
SODIUM BLD-SCNC: 146 MMOL/L (ref 135–145)
WBC NRBC COR # BLD: 5.92 10*3/MM3 (ref 4.8–10.8)

## 2017-11-22 PROCEDURE — 80053 COMPREHEN METABOLIC PANEL: CPT | Performed by: FAMILY MEDICINE

## 2017-11-22 PROCEDURE — 99232 SBSQ HOSP IP/OBS MODERATE 35: CPT | Performed by: INTERNAL MEDICINE

## 2017-11-22 PROCEDURE — 36415 COLL VENOUS BLD VENIPUNCTURE: CPT | Performed by: INTERNAL MEDICINE

## 2017-11-22 PROCEDURE — 25010000002 ONDANSETRON PER 1 MG: Performed by: FAMILY MEDICINE

## 2017-11-22 PROCEDURE — 25010000002 VANCOMYCIN 10 G RECONSTITUTED SOLUTION: Performed by: FAMILY MEDICINE

## 2017-11-22 PROCEDURE — 85610 PROTHROMBIN TIME: CPT | Performed by: FAMILY MEDICINE

## 2017-11-22 PROCEDURE — 85025 COMPLETE CBC W/AUTO DIFF WBC: CPT | Performed by: FAMILY MEDICINE

## 2017-11-22 RX ORDER — DILTIAZEM HYDROCHLORIDE 180 MG/1
180 CAPSULE, COATED, EXTENDED RELEASE ORAL
Status: DISCONTINUED | OUTPATIENT
Start: 2017-11-22 | End: 2017-11-29 | Stop reason: HOSPADM

## 2017-11-22 RX ORDER — METOPROLOL TARTRATE 50 MG/1
50 TABLET, FILM COATED ORAL EVERY 12 HOURS SCHEDULED
Status: DISCONTINUED | OUTPATIENT
Start: 2017-11-22 | End: 2017-11-29 | Stop reason: HOSPADM

## 2017-11-22 RX ORDER — GUAIFENESIN 600 MG/1
1200 TABLET, EXTENDED RELEASE ORAL EVERY 12 HOURS SCHEDULED
Status: DISCONTINUED | OUTPATIENT
Start: 2017-11-22 | End: 2017-11-25

## 2017-11-22 RX ADMIN — SODIUM CHLORIDE 75 ML/HR: 9 INJECTION, SOLUTION INTRAVENOUS at 01:31

## 2017-11-22 RX ADMIN — ASPIRIN 81 MG: 81 TABLET ORAL at 08:39

## 2017-11-22 RX ADMIN — METOPROLOL TARTRATE 50 MG: 50 TABLET ORAL at 20:31

## 2017-11-22 RX ADMIN — PANTOPRAZOLE SODIUM 40 MG: 40 TABLET, DELAYED RELEASE ORAL at 06:34

## 2017-11-22 RX ADMIN — METOPROLOL TARTRATE 50 MG: 50 TABLET ORAL at 15:29

## 2017-11-22 RX ADMIN — CHOLECALCIFEROL (VITAMIN D3) 25 MCG (1,000 UNIT) TABLET 5000 UNITS: TABLET at 08:39

## 2017-11-22 RX ADMIN — WARFARIN SODIUM 5 MG: 5 TABLET ORAL at 18:03

## 2017-11-22 RX ADMIN — DILTIAZEM HYDROCHLORIDE 180 MG: 180 CAPSULE, COATED, EXTENDED RELEASE ORAL at 15:29

## 2017-11-22 RX ADMIN — DONEPEZIL HYDROCHLORIDE 5 MG: 5 TABLET, FILM COATED ORAL at 20:31

## 2017-11-22 RX ADMIN — GUAIFENESIN 1200 MG: 600 TABLET, EXTENDED RELEASE ORAL at 20:31

## 2017-11-22 RX ADMIN — ONDANSETRON 4 MG: 2 INJECTION, SOLUTION INTRAMUSCULAR; INTRAVENOUS at 22:39

## 2017-11-22 RX ADMIN — CALCITRIOL 0.25 MCG: 0.25 CAPSULE ORAL at 08:39

## 2017-11-22 RX ADMIN — VANCOMYCIN HYDROCHLORIDE 1250 MG: 100 INJECTION, POWDER, LYOPHILIZED, FOR SOLUTION INTRAVENOUS at 06:34

## 2017-11-23 LAB
ALBUMIN SERPL-MCNC: 2.9 G/DL (ref 3.5–5)
ALBUMIN/GLOB SERPL: 0.9 G/DL (ref 1.1–2.5)
ALP SERPL-CCNC: 73 U/L (ref 24–120)
ALT SERPL W P-5'-P-CCNC: 36 U/L (ref 0–54)
ANION GAP SERPL CALCULATED.3IONS-SCNC: 4 MMOL/L (ref 4–13)
AST SERPL-CCNC: 27 U/L (ref 7–45)
B-LACTAMASE USUAL SUSC ISLT: POSITIVE
BACTERIA SPEC AEROBE CULT: ABNORMAL
BACTERIA SPEC AEROBE CULT: ABNORMAL
BASOPHILS # BLD AUTO: 0.01 10*3/MM3 (ref 0–0.2)
BASOPHILS NFR BLD AUTO: 0.2 % (ref 0–2)
BILIRUB SERPL-MCNC: 0.4 MG/DL (ref 0.1–1)
BUN BLD-MCNC: 32 MG/DL (ref 5–21)
BUN/CREAT SERPL: 25.2 (ref 7–25)
CALCIUM SPEC-SCNC: 9.3 MG/DL (ref 8.4–10.4)
CHLORIDE SERPL-SCNC: 112 MMOL/L (ref 98–110)
CO2 SERPL-SCNC: 30 MMOL/L (ref 24–31)
CREAT BLD-MCNC: 1.27 MG/DL (ref 0.5–1.4)
DEPRECATED RDW RBC AUTO: 50 FL (ref 40–54)
EOSINOPHIL # BLD AUTO: 0.34 10*3/MM3 (ref 0–0.7)
EOSINOPHIL NFR BLD AUTO: 7 % (ref 0–4)
ERYTHROCYTE [DISTWIDTH] IN BLOOD BY AUTOMATED COUNT: 13.2 % (ref 12–15)
GFR SERPL CREATININE-BSD FRML MDRD: 41 ML/MIN/1.73
GLOBULIN UR ELPH-MCNC: 3.3 GM/DL
GLUCOSE BLD-MCNC: 94 MG/DL (ref 70–100)
GRAM STN SPEC: ABNORMAL
HCT VFR BLD AUTO: 36.2 % (ref 37–47)
HGB BLD-MCNC: 11.1 G/DL (ref 12–16)
IMM GRANULOCYTES # BLD: 0.01 10*3/MM3 (ref 0–0.03)
IMM GRANULOCYTES NFR BLD: 0.2 % (ref 0–5)
INR PPP: 2.02 (ref 0.91–1.09)
ISOLATED FROM: ABNORMAL
LYMPHOCYTES # BLD AUTO: 0.95 10*3/MM3 (ref 0.72–4.86)
LYMPHOCYTES NFR BLD AUTO: 19.5 % (ref 15–45)
MCH RBC QN AUTO: 31.8 PG (ref 28–32)
MCHC RBC AUTO-ENTMCNC: 30.7 G/DL (ref 33–36)
MCV RBC AUTO: 103.7 FL (ref 82–98)
MONOCYTES # BLD AUTO: 0.81 10*3/MM3 (ref 0.19–1.3)
MONOCYTES NFR BLD AUTO: 16.6 % (ref 4–12)
NEUTROPHILS # BLD AUTO: 2.75 10*3/MM3 (ref 1.87–8.4)
NEUTROPHILS NFR BLD AUTO: 56.5 % (ref 39–78)
PLATELET # BLD AUTO: 147 10*3/MM3 (ref 130–400)
PMV BLD AUTO: 10 FL (ref 6–12)
POTASSIUM BLD-SCNC: 4.5 MMOL/L (ref 3.5–5.3)
PROT SERPL-MCNC: 6.2 G/DL (ref 6.3–8.7)
PROTHROMBIN TIME: 23.6 SECONDS (ref 11.9–14.6)
RBC # BLD AUTO: 3.49 10*6/MM3 (ref 4.2–5.4)
SODIUM BLD-SCNC: 146 MMOL/L (ref 135–145)
VANCOMYCIN TROUGH SERPL-MCNC: 12.59 MCG/ML (ref 10–20)
WBC NRBC COR # BLD: 4.87 10*3/MM3 (ref 4.8–10.8)

## 2017-11-23 PROCEDURE — 80053 COMPREHEN METABOLIC PANEL: CPT | Performed by: FAMILY MEDICINE

## 2017-11-23 PROCEDURE — 97161 PT EVAL LOW COMPLEX 20 MIN: CPT | Performed by: PHYSICAL THERAPIST

## 2017-11-23 PROCEDURE — G8978 MOBILITY CURRENT STATUS: HCPCS | Performed by: PHYSICAL THERAPIST

## 2017-11-23 PROCEDURE — 99232 SBSQ HOSP IP/OBS MODERATE 35: CPT | Performed by: INTERNAL MEDICINE

## 2017-11-23 PROCEDURE — G8979 MOBILITY GOAL STATUS: HCPCS | Performed by: PHYSICAL THERAPIST

## 2017-11-23 PROCEDURE — 85025 COMPLETE CBC W/AUTO DIFF WBC: CPT | Performed by: FAMILY MEDICINE

## 2017-11-23 PROCEDURE — 85610 PROTHROMBIN TIME: CPT | Performed by: FAMILY MEDICINE

## 2017-11-23 PROCEDURE — 80202 ASSAY OF VANCOMYCIN: CPT

## 2017-11-23 PROCEDURE — 25010000002 VANCOMYCIN 10 G RECONSTITUTED SOLUTION: Performed by: FAMILY MEDICINE

## 2017-11-23 PROCEDURE — 87040 BLOOD CULTURE FOR BACTERIA: CPT | Performed by: INTERNAL MEDICINE

## 2017-11-23 RX ADMIN — GUAIFENESIN 1200 MG: 600 TABLET, EXTENDED RELEASE ORAL at 21:04

## 2017-11-23 RX ADMIN — VANCOMYCIN HYDROCHLORIDE 1500 MG: 10 INJECTION, POWDER, LYOPHILIZED, FOR SOLUTION INTRAVENOUS at 06:30

## 2017-11-23 RX ADMIN — METOPROLOL TARTRATE 50 MG: 50 TABLET ORAL at 21:04

## 2017-11-23 RX ADMIN — CHOLECALCIFEROL (VITAMIN D3) 25 MCG (1,000 UNIT) TABLET 5000 UNITS: TABLET at 09:46

## 2017-11-23 RX ADMIN — DILTIAZEM HYDROCHLORIDE 180 MG: 180 CAPSULE, COATED, EXTENDED RELEASE ORAL at 09:46

## 2017-11-23 RX ADMIN — CALCITRIOL 0.25 MCG: 0.25 CAPSULE ORAL at 09:47

## 2017-11-23 RX ADMIN — GUAIFENESIN 1200 MG: 600 TABLET, EXTENDED RELEASE ORAL at 09:46

## 2017-11-23 RX ADMIN — METOPROLOL TARTRATE 50 MG: 50 TABLET ORAL at 09:46

## 2017-11-23 RX ADMIN — WARFARIN 10 MG: 10 TABLET ORAL at 17:30

## 2017-11-23 RX ADMIN — PANTOPRAZOLE SODIUM 40 MG: 40 TABLET, DELAYED RELEASE ORAL at 06:30

## 2017-11-23 RX ADMIN — ASPIRIN 81 MG: 81 TABLET ORAL at 09:47

## 2017-11-23 RX ADMIN — DONEPEZIL HYDROCHLORIDE 5 MG: 5 TABLET, FILM COATED ORAL at 21:04

## 2017-11-24 PROBLEM — G93.41 METABOLIC ENCEPHALOPATHY: Status: RESOLVED | Noted: 2017-11-20 | Resolved: 2017-11-24

## 2017-11-24 PROBLEM — A41.9 SEPSIS (HCC): Status: RESOLVED | Noted: 2017-11-20 | Resolved: 2017-11-24

## 2017-11-24 PROBLEM — R50.9 FEVER: Status: RESOLVED | Noted: 2017-11-20 | Resolved: 2017-11-24

## 2017-11-24 PROBLEM — J96.01 ACUTE RESPIRATORY FAILURE WITH HYPOXIA (HCC): Status: RESOLVED | Noted: 2017-11-20 | Resolved: 2017-11-24

## 2017-11-24 LAB
ALBUMIN SERPL-MCNC: 3.5 G/DL (ref 3.5–5)
ALBUMIN/GLOB SERPL: 1 G/DL (ref 1.1–2.5)
ALP SERPL-CCNC: 93 U/L (ref 24–120)
ALT SERPL W P-5'-P-CCNC: 43 U/L (ref 0–54)
ANION GAP SERPL CALCULATED.3IONS-SCNC: 9 MMOL/L (ref 4–13)
AST SERPL-CCNC: 27 U/L (ref 7–45)
BASOPHILS # BLD AUTO: 0.02 10*3/MM3 (ref 0–0.2)
BASOPHILS NFR BLD AUTO: 0.3 % (ref 0–2)
BILIRUB SERPL-MCNC: 0.4 MG/DL (ref 0.1–1)
BUN BLD-MCNC: 30 MG/DL (ref 5–21)
BUN/CREAT SERPL: 22.1 (ref 7–25)
CALCIUM SPEC-SCNC: 9.6 MG/DL (ref 8.4–10.4)
CHLORIDE SERPL-SCNC: 109 MMOL/L (ref 98–110)
CO2 SERPL-SCNC: 28 MMOL/L (ref 24–31)
CREAT BLD-MCNC: 1.36 MG/DL (ref 0.5–1.4)
DEPRECATED RDW RBC AUTO: 48.6 FL (ref 40–54)
EOSINOPHIL # BLD AUTO: 0.65 10*3/MM3 (ref 0–0.7)
EOSINOPHIL NFR BLD AUTO: 8.7 % (ref 0–4)
ERYTHROCYTE [DISTWIDTH] IN BLOOD BY AUTOMATED COUNT: 13 % (ref 12–15)
GFR SERPL CREATININE-BSD FRML MDRD: 38 ML/MIN/1.73
GLOBULIN UR ELPH-MCNC: 3.5 GM/DL
GLUCOSE BLD-MCNC: 95 MG/DL (ref 70–100)
HCT VFR BLD AUTO: 38 % (ref 37–47)
HGB BLD-MCNC: 11.6 G/DL (ref 12–16)
IMM GRANULOCYTES # BLD: 0.04 10*3/MM3 (ref 0–0.03)
IMM GRANULOCYTES NFR BLD: 0.5 % (ref 0–5)
INR PPP: 1.95 (ref 0.91–1.09)
LYMPHOCYTES # BLD AUTO: 1.06 10*3/MM3 (ref 0.72–4.86)
LYMPHOCYTES NFR BLD AUTO: 14.2 % (ref 15–45)
MCH RBC QN AUTO: 31.5 PG (ref 28–32)
MCHC RBC AUTO-ENTMCNC: 30.5 G/DL (ref 33–36)
MCV RBC AUTO: 103.3 FL (ref 82–98)
MONOCYTES # BLD AUTO: 1.06 10*3/MM3 (ref 0.19–1.3)
MONOCYTES NFR BLD AUTO: 14.2 % (ref 4–12)
NEUTROPHILS # BLD AUTO: 4.62 10*3/MM3 (ref 1.87–8.4)
NEUTROPHILS NFR BLD AUTO: 62.1 % (ref 39–78)
NRBC BLD MANUAL-RTO: 0 /100 WBC (ref 0–0)
PLATELET # BLD AUTO: 157 10*3/MM3 (ref 130–400)
PMV BLD AUTO: 9.9 FL (ref 6–12)
POTASSIUM BLD-SCNC: 4.3 MMOL/L (ref 3.5–5.3)
PROT SERPL-MCNC: 7 G/DL (ref 6.3–8.7)
PROTHROMBIN TIME: 22.9 SECONDS (ref 11.9–14.6)
RBC # BLD AUTO: 3.68 10*6/MM3 (ref 4.2–5.4)
SODIUM BLD-SCNC: 146 MMOL/L (ref 135–145)
WBC NRBC COR # BLD: 7.45 10*3/MM3 (ref 4.8–10.8)

## 2017-11-24 PROCEDURE — 97110 THERAPEUTIC EXERCISES: CPT

## 2017-11-24 PROCEDURE — 25010000002 VANCOMYCIN 10 G RECONSTITUTED SOLUTION: Performed by: FAMILY MEDICINE

## 2017-11-24 PROCEDURE — 85610 PROTHROMBIN TIME: CPT | Performed by: FAMILY MEDICINE

## 2017-11-24 PROCEDURE — 80053 COMPREHEN METABOLIC PANEL: CPT | Performed by: FAMILY MEDICINE

## 2017-11-24 PROCEDURE — 85025 COMPLETE CBC W/AUTO DIFF WBC: CPT | Performed by: FAMILY MEDICINE

## 2017-11-24 PROCEDURE — 99232 SBSQ HOSP IP/OBS MODERATE 35: CPT | Performed by: INTERNAL MEDICINE

## 2017-11-24 PROCEDURE — 94760 N-INVAS EAR/PLS OXIMETRY 1: CPT

## 2017-11-24 PROCEDURE — 87040 BLOOD CULTURE FOR BACTERIA: CPT | Performed by: INTERNAL MEDICINE

## 2017-11-24 PROCEDURE — 94799 UNLISTED PULMONARY SVC/PX: CPT

## 2017-11-24 PROCEDURE — 97116 GAIT TRAINING THERAPY: CPT

## 2017-11-24 RX ADMIN — METOPROLOL TARTRATE 50 MG: 50 TABLET ORAL at 21:07

## 2017-11-24 RX ADMIN — DILTIAZEM HYDROCHLORIDE 180 MG: 180 CAPSULE, COATED, EXTENDED RELEASE ORAL at 08:28

## 2017-11-24 RX ADMIN — VANCOMYCIN HYDROCHLORIDE 1500 MG: 10 INJECTION, POWDER, LYOPHILIZED, FOR SOLUTION INTRAVENOUS at 05:10

## 2017-11-24 RX ADMIN — CHOLECALCIFEROL (VITAMIN D3) 25 MCG (1,000 UNIT) TABLET 5000 UNITS: TABLET at 08:27

## 2017-11-24 RX ADMIN — DONEPEZIL HYDROCHLORIDE 5 MG: 5 TABLET, FILM COATED ORAL at 21:07

## 2017-11-24 RX ADMIN — CALCITRIOL 0.25 MCG: 0.25 CAPSULE ORAL at 08:28

## 2017-11-24 RX ADMIN — ASPIRIN 81 MG: 81 TABLET ORAL at 08:27

## 2017-11-24 RX ADMIN — METOPROLOL TARTRATE 50 MG: 50 TABLET ORAL at 08:27

## 2017-11-24 RX ADMIN — PANTOPRAZOLE SODIUM 40 MG: 40 TABLET, DELAYED RELEASE ORAL at 05:06

## 2017-11-24 RX ADMIN — GUAIFENESIN 1200 MG: 600 TABLET, EXTENDED RELEASE ORAL at 21:07

## 2017-11-24 RX ADMIN — WARFARIN SODIUM 5 MG: 5 TABLET ORAL at 17:08

## 2017-11-25 ENCOUNTER — APPOINTMENT (OUTPATIENT)
Dept: GENERAL RADIOLOGY | Facility: HOSPITAL | Age: 72
End: 2017-11-25

## 2017-11-25 LAB
ALBUMIN SERPL-MCNC: 3 G/DL (ref 3.5–5)
ALBUMIN/GLOB SERPL: 0.9 G/DL (ref 1.1–2.5)
ALP SERPL-CCNC: 82 U/L (ref 24–120)
ALT SERPL W P-5'-P-CCNC: 36 U/L (ref 0–54)
ANION GAP SERPL CALCULATED.3IONS-SCNC: 6 MMOL/L (ref 4–13)
AST SERPL-CCNC: 22 U/L (ref 7–45)
BASOPHILS # BLD AUTO: 0.02 10*3/MM3 (ref 0–0.2)
BASOPHILS NFR BLD AUTO: 0.3 % (ref 0–2)
BILIRUB SERPL-MCNC: 0.5 MG/DL (ref 0.1–1)
BUN BLD-MCNC: 25 MG/DL (ref 5–21)
BUN/CREAT SERPL: 21.6 (ref 7–25)
CALCIUM SPEC-SCNC: 9.3 MG/DL (ref 8.4–10.4)
CHLORIDE SERPL-SCNC: 108 MMOL/L (ref 98–110)
CO2 SERPL-SCNC: 30 MMOL/L (ref 24–31)
CREAT BLD-MCNC: 1.16 MG/DL (ref 0.5–1.4)
DEPRECATED RDW RBC AUTO: 48.2 FL (ref 40–54)
EOSINOPHIL # BLD AUTO: 0.57 10*3/MM3 (ref 0–0.7)
EOSINOPHIL NFR BLD AUTO: 9.3 % (ref 0–4)
ERYTHROCYTE [DISTWIDTH] IN BLOOD BY AUTOMATED COUNT: 13 % (ref 12–15)
GFR SERPL CREATININE-BSD FRML MDRD: 46 ML/MIN/1.73
GLOBULIN UR ELPH-MCNC: 3.4 GM/DL
GLUCOSE BLD-MCNC: 97 MG/DL (ref 70–100)
HCT VFR BLD AUTO: 34.3 % (ref 37–47)
HGB BLD-MCNC: 10.6 G/DL (ref 12–16)
IMM GRANULOCYTES # BLD: 0.03 10*3/MM3 (ref 0–0.03)
IMM GRANULOCYTES NFR BLD: 0.5 % (ref 0–5)
INR PPP: 2 (ref 0.91–1.09)
LYMPHOCYTES # BLD AUTO: 1.15 10*3/MM3 (ref 0.72–4.86)
LYMPHOCYTES NFR BLD AUTO: 18.9 % (ref 15–45)
MCH RBC QN AUTO: 31.6 PG (ref 28–32)
MCHC RBC AUTO-ENTMCNC: 30.9 G/DL (ref 33–36)
MCV RBC AUTO: 102.4 FL (ref 82–98)
MONOCYTES # BLD AUTO: 0.7 10*3/MM3 (ref 0.19–1.3)
MONOCYTES NFR BLD AUTO: 11.5 % (ref 4–12)
NEUTROPHILS # BLD AUTO: 3.63 10*3/MM3 (ref 1.87–8.4)
NEUTROPHILS NFR BLD AUTO: 59.5 % (ref 39–78)
PLATELET # BLD AUTO: 141 10*3/MM3 (ref 130–400)
PMV BLD AUTO: 9.8 FL (ref 6–12)
POTASSIUM BLD-SCNC: 4.2 MMOL/L (ref 3.5–5.3)
PROT SERPL-MCNC: 6.4 G/DL (ref 6.3–8.7)
PROTHROMBIN TIME: 23.4 SECONDS (ref 11.9–14.6)
RBC # BLD AUTO: 3.35 10*6/MM3 (ref 4.2–5.4)
SODIUM BLD-SCNC: 144 MMOL/L (ref 135–145)
WBC NRBC COR # BLD: 6.1 10*3/MM3 (ref 4.8–10.8)

## 2017-11-25 PROCEDURE — 85610 PROTHROMBIN TIME: CPT | Performed by: FAMILY MEDICINE

## 2017-11-25 PROCEDURE — 02HV33Z INSERTION OF INFUSION DEVICE INTO SUPERIOR VENA CAVA, PERCUTANEOUS APPROACH: ICD-10-PCS | Performed by: INTERNAL MEDICINE

## 2017-11-25 PROCEDURE — 97116 GAIT TRAINING THERAPY: CPT

## 2017-11-25 PROCEDURE — 80053 COMPREHEN METABOLIC PANEL: CPT | Performed by: FAMILY MEDICINE

## 2017-11-25 PROCEDURE — 94799 UNLISTED PULMONARY SVC/PX: CPT

## 2017-11-25 PROCEDURE — 71010 HC CHEST PA OR AP: CPT

## 2017-11-25 PROCEDURE — 25010000002 VANCOMYCIN 10 G RECONSTITUTED SOLUTION: Performed by: FAMILY MEDICINE

## 2017-11-25 PROCEDURE — 85025 COMPLETE CBC W/AUTO DIFF WBC: CPT | Performed by: FAMILY MEDICINE

## 2017-11-25 PROCEDURE — C1751 CATH, INF, PER/CENT/MIDLINE: HCPCS

## 2017-11-25 PROCEDURE — 97110 THERAPEUTIC EXERCISES: CPT

## 2017-11-25 PROCEDURE — 94760 N-INVAS EAR/PLS OXIMETRY 1: CPT

## 2017-11-25 RX ORDER — PERMETHRIN 50 MG/G
CREAM TOPICAL ONCE
Status: COMPLETED | OUTPATIENT
Start: 2017-11-25 | End: 2017-11-25

## 2017-11-25 RX ORDER — LIDOCAINE HYDROCHLORIDE 10 MG/ML
1 INJECTION, SOLUTION EPIDURAL; INFILTRATION; INTRACAUDAL; PERINEURAL ONCE
Status: COMPLETED | OUTPATIENT
Start: 2017-11-25 | End: 2017-11-25

## 2017-11-25 RX ADMIN — CALCITRIOL 0.25 MCG: 0.25 CAPSULE ORAL at 08:10

## 2017-11-25 RX ADMIN — CHOLECALCIFEROL (VITAMIN D3) 25 MCG (1,000 UNIT) TABLET 5000 UNITS: TABLET at 08:10

## 2017-11-25 RX ADMIN — PANTOPRAZOLE SODIUM 40 MG: 40 TABLET, DELAYED RELEASE ORAL at 05:56

## 2017-11-25 RX ADMIN — WARFARIN SODIUM 5 MG: 5 TABLET ORAL at 17:53

## 2017-11-25 RX ADMIN — DILTIAZEM HYDROCHLORIDE 180 MG: 180 CAPSULE, COATED, EXTENDED RELEASE ORAL at 08:11

## 2017-11-25 RX ADMIN — LIDOCAINE HYDROCHLORIDE 1 ML: 10 INJECTION, SOLUTION EPIDURAL; INFILTRATION; INTRACAUDAL; PERINEURAL at 12:12

## 2017-11-25 RX ADMIN — DONEPEZIL HYDROCHLORIDE 5 MG: 5 TABLET, FILM COATED ORAL at 20:50

## 2017-11-25 RX ADMIN — ASPIRIN 81 MG: 81 TABLET ORAL at 08:11

## 2017-11-25 RX ADMIN — METOPROLOL TARTRATE 50 MG: 50 TABLET ORAL at 08:10

## 2017-11-25 RX ADMIN — PERMETHRIN: 50 CREAM TOPICAL at 13:09

## 2017-11-25 RX ADMIN — VANCOMYCIN HYDROCHLORIDE 1500 MG: 10 INJECTION, POWDER, LYOPHILIZED, FOR SOLUTION INTRAVENOUS at 05:56

## 2017-11-25 RX ADMIN — GUAIFENESIN 1200 MG: 600 TABLET, EXTENDED RELEASE ORAL at 08:10

## 2017-11-25 RX ADMIN — METOPROLOL TARTRATE 50 MG: 50 TABLET ORAL at 20:50

## 2017-11-26 PROBLEM — I50.33 ACUTE ON CHRONIC DIASTOLIC HEART FAILURE (HCC): Status: ACTIVE | Noted: 2017-11-26

## 2017-11-26 PROBLEM — I50.32 CHRONIC DIASTOLIC HEART FAILURE (HCC): Status: RESOLVED | Noted: 2017-11-20 | Resolved: 2017-11-26

## 2017-11-26 LAB
ALBUMIN SERPL-MCNC: 3.1 G/DL (ref 3.5–5)
ALBUMIN/GLOB SERPL: 0.9 G/DL (ref 1.1–2.5)
ALP SERPL-CCNC: 91 U/L (ref 24–120)
ALT SERPL W P-5'-P-CCNC: 36 U/L (ref 0–54)
ANION GAP SERPL CALCULATED.3IONS-SCNC: 6 MMOL/L (ref 4–13)
AST SERPL-CCNC: 30 U/L (ref 7–45)
BASOPHILS # BLD AUTO: 0.02 10*3/MM3 (ref 0–0.2)
BASOPHILS NFR BLD AUTO: 0.3 % (ref 0–2)
BILIRUB SERPL-MCNC: 0.5 MG/DL (ref 0.1–1)
BUN BLD-MCNC: 22 MG/DL (ref 5–21)
BUN/CREAT SERPL: 18.2 (ref 7–25)
CALCIUM SPEC-SCNC: 9.6 MG/DL (ref 8.4–10.4)
CHLORIDE SERPL-SCNC: 109 MMOL/L (ref 98–110)
CO2 SERPL-SCNC: 30 MMOL/L (ref 24–31)
CREAT BLD-MCNC: 1.21 MG/DL (ref 0.5–1.4)
DEPRECATED RDW RBC AUTO: 48.4 FL (ref 40–54)
EOSINOPHIL # BLD AUTO: 0.55 10*3/MM3 (ref 0–0.7)
EOSINOPHIL NFR BLD AUTO: 8.3 % (ref 0–4)
ERYTHROCYTE [DISTWIDTH] IN BLOOD BY AUTOMATED COUNT: 13 % (ref 12–15)
GFR SERPL CREATININE-BSD FRML MDRD: 44 ML/MIN/1.73
GLOBULIN UR ELPH-MCNC: 3.5 GM/DL
GLUCOSE BLD-MCNC: 95 MG/DL (ref 70–100)
HCT VFR BLD AUTO: 36 % (ref 37–47)
HGB BLD-MCNC: 11 G/DL (ref 12–16)
IMM GRANULOCYTES # BLD: 0.02 10*3/MM3 (ref 0–0.03)
IMM GRANULOCYTES NFR BLD: 0.3 % (ref 0–5)
INR PPP: 1.86 (ref 0.91–1.09)
LYMPHOCYTES # BLD AUTO: 0.97 10*3/MM3 (ref 0.72–4.86)
LYMPHOCYTES NFR BLD AUTO: 14.7 % (ref 15–45)
MCH RBC QN AUTO: 31.3 PG (ref 28–32)
MCHC RBC AUTO-ENTMCNC: 30.6 G/DL (ref 33–36)
MCV RBC AUTO: 102.3 FL (ref 82–98)
MONOCYTES # BLD AUTO: 0.68 10*3/MM3 (ref 0.19–1.3)
MONOCYTES NFR BLD AUTO: 10.3 % (ref 4–12)
NEUTROPHILS # BLD AUTO: 4.35 10*3/MM3 (ref 1.87–8.4)
NEUTROPHILS NFR BLD AUTO: 66.1 % (ref 39–78)
PLATELET # BLD AUTO: 158 10*3/MM3 (ref 130–400)
PMV BLD AUTO: 10.1 FL (ref 6–12)
POTASSIUM BLD-SCNC: 4.3 MMOL/L (ref 3.5–5.3)
PROT SERPL-MCNC: 6.6 G/DL (ref 6.3–8.7)
PROTHROMBIN TIME: 22.1 SECONDS (ref 11.9–14.6)
RBC # BLD AUTO: 3.52 10*6/MM3 (ref 4.2–5.4)
SODIUM BLD-SCNC: 145 MMOL/L (ref 135–145)
WBC NRBC COR # BLD: 6.59 10*3/MM3 (ref 4.8–10.8)

## 2017-11-26 PROCEDURE — 94799 UNLISTED PULMONARY SVC/PX: CPT

## 2017-11-26 PROCEDURE — 97530 THERAPEUTIC ACTIVITIES: CPT

## 2017-11-26 PROCEDURE — 85610 PROTHROMBIN TIME: CPT | Performed by: FAMILY MEDICINE

## 2017-11-26 PROCEDURE — 25010000002 VANCOMYCIN 10 G RECONSTITUTED SOLUTION: Performed by: FAMILY MEDICINE

## 2017-11-26 PROCEDURE — 97110 THERAPEUTIC EXERCISES: CPT

## 2017-11-26 PROCEDURE — 80053 COMPREHEN METABOLIC PANEL: CPT | Performed by: FAMILY MEDICINE

## 2017-11-26 PROCEDURE — 63710000001 DIPHENHYDRAMINE PER 50 MG: Performed by: FAMILY MEDICINE

## 2017-11-26 PROCEDURE — 85025 COMPLETE CBC W/AUTO DIFF WBC: CPT | Performed by: FAMILY MEDICINE

## 2017-11-26 PROCEDURE — 25010000002 FUROSEMIDE PER 20 MG: Performed by: FAMILY MEDICINE

## 2017-11-26 RX ORDER — FUROSEMIDE 10 MG/ML
40 INJECTION INTRAMUSCULAR; INTRAVENOUS ONCE
Status: COMPLETED | OUTPATIENT
Start: 2017-11-26 | End: 2017-11-26

## 2017-11-26 RX ORDER — FUROSEMIDE 40 MG/1
40 TABLET ORAL DAILY
Status: DISCONTINUED | OUTPATIENT
Start: 2017-11-27 | End: 2017-11-29 | Stop reason: HOSPADM

## 2017-11-26 RX ORDER — DIPHENHYDRAMINE HCL 25 MG
25 CAPSULE ORAL EVERY 6 HOURS PRN
Status: DISCONTINUED | OUTPATIENT
Start: 2017-11-26 | End: 2017-11-29 | Stop reason: HOSPADM

## 2017-11-26 RX ADMIN — CHOLECALCIFEROL (VITAMIN D3) 25 MCG (1,000 UNIT) TABLET 5000 UNITS: TABLET at 09:29

## 2017-11-26 RX ADMIN — DILTIAZEM HYDROCHLORIDE 180 MG: 180 CAPSULE, COATED, EXTENDED RELEASE ORAL at 09:29

## 2017-11-26 RX ADMIN — FUROSEMIDE 40 MG: 10 INJECTION, SOLUTION INTRAMUSCULAR; INTRAVENOUS at 09:36

## 2017-11-26 RX ADMIN — PANTOPRAZOLE SODIUM 40 MG: 40 TABLET, DELAYED RELEASE ORAL at 06:05

## 2017-11-26 RX ADMIN — DIPHENHYDRAMINE HYDROCHLORIDE 25 MG: 25 CAPSULE ORAL at 17:32

## 2017-11-26 RX ADMIN — DONEPEZIL HYDROCHLORIDE 5 MG: 5 TABLET, FILM COATED ORAL at 21:09

## 2017-11-26 RX ADMIN — ASPIRIN 81 MG: 81 TABLET ORAL at 09:29

## 2017-11-26 RX ADMIN — METOPROLOL TARTRATE 50 MG: 50 TABLET ORAL at 21:09

## 2017-11-26 RX ADMIN — METOPROLOL TARTRATE 50 MG: 50 TABLET ORAL at 09:29

## 2017-11-26 RX ADMIN — CALCITRIOL 0.25 MCG: 0.25 CAPSULE ORAL at 09:29

## 2017-11-26 RX ADMIN — WARFARIN SODIUM 5 MG: 5 TABLET ORAL at 17:26

## 2017-11-26 RX ADMIN — VANCOMYCIN HYDROCHLORIDE 1500 MG: 10 INJECTION, POWDER, LYOPHILIZED, FOR SOLUTION INTRAVENOUS at 06:05

## 2017-11-27 LAB
ALBUMIN SERPL-MCNC: 3.3 G/DL (ref 3.5–5)
ALBUMIN/GLOB SERPL: 1 G/DL (ref 1.1–2.5)
ALP SERPL-CCNC: 86 U/L (ref 24–120)
ALT SERPL W P-5'-P-CCNC: 41 U/L (ref 0–54)
ANION GAP SERPL CALCULATED.3IONS-SCNC: 6 MMOL/L (ref 4–13)
AST SERPL-CCNC: 20 U/L (ref 7–45)
B-LACTAMASE USUAL SUSC ISLT: POSITIVE
BACTERIA SPEC AEROBE CULT: ABNORMAL
BACTERIA SPEC AEROBE CULT: ABNORMAL
BACTERIA SPEC AEROBE CULT: NORMAL
BASOPHILS # BLD AUTO: 0.01 10*3/MM3 (ref 0–0.2)
BASOPHILS NFR BLD AUTO: 0.1 % (ref 0–2)
BILIRUB SERPL-MCNC: 0.6 MG/DL (ref 0.1–1)
BUN BLD-MCNC: 23 MG/DL (ref 5–21)
BUN/CREAT SERPL: 19 (ref 7–25)
CALCIUM SPEC-SCNC: 9.6 MG/DL (ref 8.4–10.4)
CHLORIDE SERPL-SCNC: 106 MMOL/L (ref 98–110)
CO2 SERPL-SCNC: 33 MMOL/L (ref 24–31)
CREAT BLD-MCNC: 1.21 MG/DL (ref 0.5–1.4)
DEPRECATED RDW RBC AUTO: 48.2 FL (ref 40–54)
EOSINOPHIL # BLD AUTO: 0.55 10*3/MM3 (ref 0–0.7)
EOSINOPHIL NFR BLD AUTO: 8.1 % (ref 0–4)
ERYTHROCYTE [DISTWIDTH] IN BLOOD BY AUTOMATED COUNT: 13.1 % (ref 12–15)
GFR SERPL CREATININE-BSD FRML MDRD: 44 ML/MIN/1.73
GLOBULIN UR ELPH-MCNC: 3.4 GM/DL
GLUCOSE BLD-MCNC: 99 MG/DL (ref 70–100)
GRAM STN SPEC: ABNORMAL
HCT VFR BLD AUTO: 35.3 % (ref 37–47)
HGB BLD-MCNC: 10.9 G/DL (ref 12–16)
IMM GRANULOCYTES # BLD: 0.03 10*3/MM3 (ref 0–0.03)
IMM GRANULOCYTES NFR BLD: 0.4 % (ref 0–5)
INR PPP: 1.74 (ref 0.91–1.09)
ISOLATED FROM: ABNORMAL
LYMPHOCYTES # BLD AUTO: 0.94 10*3/MM3 (ref 0.72–4.86)
LYMPHOCYTES NFR BLD AUTO: 13.9 % (ref 15–45)
MCH RBC QN AUTO: 31.2 PG (ref 28–32)
MCHC RBC AUTO-ENTMCNC: 30.9 G/DL (ref 33–36)
MCV RBC AUTO: 101.1 FL (ref 82–98)
MONOCYTES # BLD AUTO: 0.67 10*3/MM3 (ref 0.19–1.3)
MONOCYTES NFR BLD AUTO: 9.9 % (ref 4–12)
NEUTROPHILS # BLD AUTO: 4.55 10*3/MM3 (ref 1.87–8.4)
NEUTROPHILS NFR BLD AUTO: 67.6 % (ref 39–78)
PLATELET # BLD AUTO: 166 10*3/MM3 (ref 130–400)
PMV BLD AUTO: 9.4 FL (ref 6–12)
POTASSIUM BLD-SCNC: 4 MMOL/L (ref 3.5–5.3)
PROT SERPL-MCNC: 6.7 G/DL (ref 6.3–8.7)
PROTHROMBIN TIME: 21 SECONDS (ref 11.9–14.6)
RBC # BLD AUTO: 3.49 10*6/MM3 (ref 4.2–5.4)
SODIUM BLD-SCNC: 145 MMOL/L (ref 135–145)
WBC NRBC COR # BLD: 6.75 10*3/MM3 (ref 4.8–10.8)

## 2017-11-27 PROCEDURE — 25010000002 VANCOMYCIN 10 G RECONSTITUTED SOLUTION: Performed by: FAMILY MEDICINE

## 2017-11-27 PROCEDURE — 85610 PROTHROMBIN TIME: CPT | Performed by: FAMILY MEDICINE

## 2017-11-27 PROCEDURE — 80053 COMPREHEN METABOLIC PANEL: CPT | Performed by: FAMILY MEDICINE

## 2017-11-27 PROCEDURE — 63710000001 DIPHENHYDRAMINE PER 50 MG: Performed by: FAMILY MEDICINE

## 2017-11-27 PROCEDURE — 97110 THERAPEUTIC EXERCISES: CPT

## 2017-11-27 PROCEDURE — 97116 GAIT TRAINING THERAPY: CPT

## 2017-11-27 PROCEDURE — 85025 COMPLETE CBC W/AUTO DIFF WBC: CPT | Performed by: FAMILY MEDICINE

## 2017-11-27 RX ORDER — METHIMAZOLE 10 MG/1
10 TABLET ORAL DAILY
Qty: 30 TABLET | Refills: 1 | Status: SHIPPED | OUTPATIENT
Start: 2017-11-27 | End: 2017-12-27

## 2017-11-27 RX ORDER — WARFARIN SODIUM 10 MG/1
TABLET ORAL
Qty: 30 TABLET | Refills: 2 | Status: SHIPPED | OUTPATIENT
Start: 2017-11-27 | End: 2018-06-04 | Stop reason: DRUGHIGH

## 2017-11-27 RX ADMIN — ASPIRIN 81 MG: 81 TABLET ORAL at 09:54

## 2017-11-27 RX ADMIN — DIPHENHYDRAMINE HYDROCHLORIDE 25 MG: 25 CAPSULE ORAL at 15:20

## 2017-11-27 RX ADMIN — METOPROLOL TARTRATE 50 MG: 50 TABLET ORAL at 22:18

## 2017-11-27 RX ADMIN — WARFARIN SODIUM 5 MG: 5 TABLET ORAL at 18:43

## 2017-11-27 RX ADMIN — METOPROLOL TARTRATE 50 MG: 50 TABLET ORAL at 09:54

## 2017-11-27 RX ADMIN — CHOLECALCIFEROL (VITAMIN D3) 25 MCG (1,000 UNIT) TABLET 5000 UNITS: TABLET at 09:54

## 2017-11-27 RX ADMIN — FUROSEMIDE 40 MG: 40 TABLET ORAL at 09:54

## 2017-11-27 RX ADMIN — DILTIAZEM HYDROCHLORIDE 180 MG: 180 CAPSULE, COATED, EXTENDED RELEASE ORAL at 09:54

## 2017-11-27 RX ADMIN — PANTOPRAZOLE SODIUM 40 MG: 40 TABLET, DELAYED RELEASE ORAL at 05:08

## 2017-11-27 RX ADMIN — CALCITRIOL 0.25 MCG: 0.25 CAPSULE ORAL at 09:54

## 2017-11-27 RX ADMIN — DONEPEZIL HYDROCHLORIDE 5 MG: 5 TABLET, FILM COATED ORAL at 22:19

## 2017-11-27 RX ADMIN — DIPHENHYDRAMINE HYDROCHLORIDE 25 MG: 25 CAPSULE ORAL at 22:19

## 2017-11-27 RX ADMIN — VANCOMYCIN HYDROCHLORIDE 1500 MG: 10 INJECTION, POWDER, LYOPHILIZED, FOR SOLUTION INTRAVENOUS at 05:08

## 2017-11-28 LAB
ALBUMIN SERPL-MCNC: 3.1 G/DL (ref 3.5–5)
ALBUMIN/GLOB SERPL: 0.9 G/DL (ref 1.1–2.5)
ALP SERPL-CCNC: 79 U/L (ref 24–120)
ALT SERPL W P-5'-P-CCNC: 32 U/L (ref 0–54)
ANION GAP SERPL CALCULATED.3IONS-SCNC: 8 MMOL/L (ref 4–13)
AST SERPL-CCNC: 19 U/L (ref 7–45)
BACTERIA SPEC AEROBE CULT: NORMAL
BASOPHILS # BLD AUTO: 0.02 10*3/MM3 (ref 0–0.2)
BASOPHILS NFR BLD AUTO: 0.3 % (ref 0–2)
BILIRUB SERPL-MCNC: 0.7 MG/DL (ref 0.1–1)
BUN BLD-MCNC: 24 MG/DL (ref 5–21)
BUN/CREAT SERPL: 19.4 (ref 7–25)
CALCIUM SPEC-SCNC: 9.5 MG/DL (ref 8.4–10.4)
CHLORIDE SERPL-SCNC: 102 MMOL/L (ref 98–110)
CO2 SERPL-SCNC: 33 MMOL/L (ref 24–31)
CREAT BLD-MCNC: 1.24 MG/DL (ref 0.5–1.4)
DEPRECATED RDW RBC AUTO: 48.5 FL (ref 40–54)
EOSINOPHIL # BLD AUTO: 0.45 10*3/MM3 (ref 0–0.7)
EOSINOPHIL NFR BLD AUTO: 7.5 % (ref 0–4)
ERYTHROCYTE [DISTWIDTH] IN BLOOD BY AUTOMATED COUNT: 13.3 % (ref 12–15)
GFR SERPL CREATININE-BSD FRML MDRD: 42 ML/MIN/1.73
GLOBULIN UR ELPH-MCNC: 3.6 GM/DL
GLUCOSE BLD-MCNC: 99 MG/DL (ref 70–100)
HCT VFR BLD AUTO: 33.5 % (ref 37–47)
HGB BLD-MCNC: 10.5 G/DL (ref 12–16)
IMM GRANULOCYTES # BLD: 0.02 10*3/MM3 (ref 0–0.03)
IMM GRANULOCYTES NFR BLD: 0.3 % (ref 0–5)
INR PPP: 1.63 (ref 0.91–1.09)
LYMPHOCYTES # BLD AUTO: 1.03 10*3/MM3 (ref 0.72–4.86)
LYMPHOCYTES NFR BLD AUTO: 17.1 % (ref 15–45)
MCH RBC QN AUTO: 31.4 PG (ref 28–32)
MCHC RBC AUTO-ENTMCNC: 31.3 G/DL (ref 33–36)
MCV RBC AUTO: 100.3 FL (ref 82–98)
MONOCYTES # BLD AUTO: 0.75 10*3/MM3 (ref 0.19–1.3)
MONOCYTES NFR BLD AUTO: 12.5 % (ref 4–12)
NEUTROPHILS # BLD AUTO: 3.75 10*3/MM3 (ref 1.87–8.4)
NEUTROPHILS NFR BLD AUTO: 62.3 % (ref 39–78)
NT-PROBNP SERPL-MCNC: 3790 PG/ML (ref 0–900)
PLATELET # BLD AUTO: 171 10*3/MM3 (ref 130–400)
PMV BLD AUTO: 9.9 FL (ref 6–12)
POTASSIUM BLD-SCNC: 3.8 MMOL/L (ref 3.5–5.3)
PROT SERPL-MCNC: 6.7 G/DL (ref 6.3–8.7)
PROTHROMBIN TIME: 19.9 SECONDS (ref 11.9–14.6)
RBC # BLD AUTO: 3.34 10*6/MM3 (ref 4.2–5.4)
SODIUM BLD-SCNC: 143 MMOL/L (ref 135–145)
WBC NRBC COR # BLD: 6.02 10*3/MM3 (ref 4.8–10.8)

## 2017-11-28 PROCEDURE — 63710000001 DIPHENHYDRAMINE PER 50 MG: Performed by: FAMILY MEDICINE

## 2017-11-28 PROCEDURE — 85025 COMPLETE CBC W/AUTO DIFF WBC: CPT | Performed by: FAMILY MEDICINE

## 2017-11-28 PROCEDURE — 97110 THERAPEUTIC EXERCISES: CPT

## 2017-11-28 PROCEDURE — 94799 UNLISTED PULMONARY SVC/PX: CPT

## 2017-11-28 PROCEDURE — 97116 GAIT TRAINING THERAPY: CPT

## 2017-11-28 PROCEDURE — 25010000002 VANCOMYCIN 10 G RECONSTITUTED SOLUTION: Performed by: FAMILY MEDICINE

## 2017-11-28 PROCEDURE — 83880 ASSAY OF NATRIURETIC PEPTIDE: CPT | Performed by: FAMILY MEDICINE

## 2017-11-28 PROCEDURE — 85610 PROTHROMBIN TIME: CPT | Performed by: FAMILY MEDICINE

## 2017-11-28 PROCEDURE — 80053 COMPREHEN METABOLIC PANEL: CPT | Performed by: FAMILY MEDICINE

## 2017-11-28 PROCEDURE — 94660 CPAP INITIATION&MGMT: CPT

## 2017-11-28 RX ORDER — RIFAMPIN 300 MG/1
300 CAPSULE ORAL EVERY 12 HOURS SCHEDULED
Status: DISCONTINUED | OUTPATIENT
Start: 2017-11-28 | End: 2017-11-29 | Stop reason: HOSPADM

## 2017-11-28 RX ORDER — WARFARIN SODIUM 7.5 MG/1
7.5 TABLET ORAL
Status: DISCONTINUED | OUTPATIENT
Start: 2017-11-29 | End: 2017-11-29 | Stop reason: HOSPADM

## 2017-11-28 RX ADMIN — METOPROLOL TARTRATE 50 MG: 50 TABLET ORAL at 21:58

## 2017-11-28 RX ADMIN — DILTIAZEM HYDROCHLORIDE 180 MG: 180 CAPSULE, COATED, EXTENDED RELEASE ORAL at 09:14

## 2017-11-28 RX ADMIN — DIPHENHYDRAMINE HYDROCHLORIDE 25 MG: 25 CAPSULE ORAL at 21:57

## 2017-11-28 RX ADMIN — WARFARIN 10 MG: 10 TABLET ORAL at 17:10

## 2017-11-28 RX ADMIN — PANTOPRAZOLE SODIUM 40 MG: 40 TABLET, DELAYED RELEASE ORAL at 05:45

## 2017-11-28 RX ADMIN — Medication 10 ML: at 09:15

## 2017-11-28 RX ADMIN — ASPIRIN 81 MG: 81 TABLET ORAL at 09:14

## 2017-11-28 RX ADMIN — Medication 10 ML: at 21:59

## 2017-11-28 RX ADMIN — CHOLECALCIFEROL (VITAMIN D3) 25 MCG (1,000 UNIT) TABLET 5000 UNITS: TABLET at 09:14

## 2017-11-28 RX ADMIN — VANCOMYCIN HYDROCHLORIDE 1500 MG: 100 INJECTION, POWDER, LYOPHILIZED, FOR SOLUTION INTRAVENOUS at 05:45

## 2017-11-28 RX ADMIN — RIFAMPIN 300 MG: 300 CAPSULE ORAL at 21:57

## 2017-11-28 RX ADMIN — CALCITRIOL 0.25 MCG: 0.25 CAPSULE ORAL at 09:14

## 2017-11-28 RX ADMIN — METOPROLOL TARTRATE 50 MG: 50 TABLET ORAL at 09:14

## 2017-11-28 RX ADMIN — DONEPEZIL HYDROCHLORIDE 5 MG: 5 TABLET, FILM COATED ORAL at 21:58

## 2017-11-29 ENCOUNTER — APPOINTMENT (OUTPATIENT)
Dept: GENERAL RADIOLOGY | Facility: HOSPITAL | Age: 72
End: 2017-11-29

## 2017-11-29 ENCOUNTER — HOSPITAL ENCOUNTER (OUTPATIENT)
Facility: HOSPITAL | Age: 72
Discharge: HOME HEALTH | End: 2018-01-04
Attending: INTERNAL MEDICINE | Admitting: INTERNAL MEDICINE

## 2017-11-29 VITALS
HEART RATE: 63 BPM | SYSTOLIC BLOOD PRESSURE: 151 MMHG | DIASTOLIC BLOOD PRESSURE: 76 MMHG | BODY MASS INDEX: 32.84 KG/M2 | RESPIRATION RATE: 18 BRPM | OXYGEN SATURATION: 96 % | TEMPERATURE: 100.6 F | HEIGHT: 71 IN | WEIGHT: 234.56 LBS

## 2017-11-29 LAB
ANION GAP SERPL CALCULATED.3IONS-SCNC: 7 MMOL/L (ref 4–13)
BACTERIA SPEC AEROBE CULT: NORMAL
BASOPHILS # BLD AUTO: 0.02 10*3/MM3 (ref 0–0.2)
BASOPHILS NFR BLD AUTO: 0.3 % (ref 0–2)
BUN BLD-MCNC: 25 MG/DL (ref 5–21)
BUN/CREAT SERPL: 18.8 (ref 7–25)
CALCIUM SPEC-SCNC: 9.2 MG/DL (ref 8.4–10.4)
CHLORIDE SERPL-SCNC: 104 MMOL/L (ref 98–110)
CO2 SERPL-SCNC: 30 MMOL/L (ref 24–31)
CREAT BLD-MCNC: 1.33 MG/DL (ref 0.5–1.4)
DEPRECATED RDW RBC AUTO: 45.6 FL (ref 40–54)
EOSINOPHIL # BLD AUTO: 0.37 10*3/MM3 (ref 0–0.7)
EOSINOPHIL NFR BLD AUTO: 6.1 % (ref 0–4)
ERYTHROCYTE [DISTWIDTH] IN BLOOD BY AUTOMATED COUNT: 12.8 % (ref 12–15)
GFR SERPL CREATININE-BSD FRML MDRD: 39 ML/MIN/1.73
GLUCOSE BLD-MCNC: 97 MG/DL (ref 70–100)
HCT VFR BLD AUTO: 30.2 % (ref 37–47)
HGB BLD-MCNC: 9.6 G/DL (ref 12–16)
IMM GRANULOCYTES # BLD: 0.04 10*3/MM3 (ref 0–0.03)
IMM GRANULOCYTES NFR BLD: 0.7 % (ref 0–5)
INR PPP: 1.57 (ref 0.91–1.09)
LYMPHOCYTES # BLD AUTO: 0.89 10*3/MM3 (ref 0.72–4.86)
LYMPHOCYTES NFR BLD AUTO: 14.7 % (ref 15–45)
MCH RBC QN AUTO: 31.2 PG (ref 28–32)
MCHC RBC AUTO-ENTMCNC: 31.8 G/DL (ref 33–36)
MCV RBC AUTO: 98.1 FL (ref 82–98)
MONOCYTES # BLD AUTO: 0.85 10*3/MM3 (ref 0.19–1.3)
MONOCYTES NFR BLD AUTO: 14 % (ref 4–12)
NEUTROPHILS # BLD AUTO: 3.88 10*3/MM3 (ref 1.87–8.4)
NEUTROPHILS NFR BLD AUTO: 64.2 % (ref 39–78)
NRBC BLD MANUAL-RTO: 0 /100 WBC (ref 0–0)
PLATELET # BLD AUTO: 153 10*3/MM3 (ref 130–400)
PMV BLD AUTO: 10 FL (ref 6–12)
POTASSIUM BLD-SCNC: 3.8 MMOL/L (ref 3.5–5.3)
PROTHROMBIN TIME: 19.3 SECONDS (ref 11.9–14.6)
RBC # BLD AUTO: 3.08 10*6/MM3 (ref 4.2–5.4)
SODIUM BLD-SCNC: 141 MMOL/L (ref 135–145)
VANCOMYCIN TROUGH SERPL-MCNC: 15.75 MCG/ML (ref 10–20)
WBC NRBC COR # BLD: 6.05 10*3/MM3 (ref 4.8–10.8)

## 2017-11-29 PROCEDURE — 85025 COMPLETE CBC W/AUTO DIFF WBC: CPT | Performed by: FAMILY MEDICINE

## 2017-11-29 PROCEDURE — 63710000001 DIPHENHYDRAMINE PER 50 MG: Performed by: FAMILY MEDICINE

## 2017-11-29 PROCEDURE — 85610 PROTHROMBIN TIME: CPT | Performed by: FAMILY MEDICINE

## 2017-11-29 PROCEDURE — 63710000001 DIPHENHYDRAMINE PER 50 MG: Performed by: INTERNAL MEDICINE

## 2017-11-29 PROCEDURE — 25010000002 VANCOMYCIN 10 G RECONSTITUTED SOLUTION: Performed by: FAMILY MEDICINE

## 2017-11-29 PROCEDURE — 80048 BASIC METABOLIC PNL TOTAL CA: CPT | Performed by: FAMILY MEDICINE

## 2017-11-29 PROCEDURE — 80202 ASSAY OF VANCOMYCIN: CPT | Performed by: FAMILY MEDICINE

## 2017-11-29 PROCEDURE — 71010 HC CHEST PA OR AP: CPT

## 2017-11-29 RX ORDER — METOPROLOL TARTRATE 50 MG/1
50 TABLET, FILM COATED ORAL EVERY 12 HOURS SCHEDULED
Status: DISCONTINUED | OUTPATIENT
Start: 2017-11-29 | End: 2018-01-04 | Stop reason: HOSPADM

## 2017-11-29 RX ORDER — ONDANSETRON 2 MG/ML
4 INJECTION INTRAMUSCULAR; INTRAVENOUS EVERY 6 HOURS PRN
Status: DISCONTINUED | OUTPATIENT
Start: 2017-11-29 | End: 2018-01-04 | Stop reason: HOSPADM

## 2017-11-29 RX ORDER — ACETAMINOPHEN 325 MG/1
650 TABLET ORAL EVERY 4 HOURS PRN
Status: DISCONTINUED | OUTPATIENT
Start: 2017-11-29 | End: 2018-01-04 | Stop reason: HOSPADM

## 2017-11-29 RX ORDER — FUROSEMIDE 40 MG/1
40 TABLET ORAL DAILY
Status: DISCONTINUED | OUTPATIENT
Start: 2017-11-30 | End: 2018-01-04 | Stop reason: HOSPADM

## 2017-11-29 RX ORDER — WARFARIN SODIUM 10 MG/1
10 TABLET ORAL
Status: DISCONTINUED | OUTPATIENT
Start: 2017-11-29 | End: 2017-12-08

## 2017-11-29 RX ORDER — ONDANSETRON 2 MG/ML
4 INJECTION INTRAMUSCULAR; INTRAVENOUS EVERY 6 HOURS PRN
Start: 2017-11-29 | End: 2018-01-04 | Stop reason: HOSPADM

## 2017-11-29 RX ORDER — CALCITRIOL 0.25 UG/1
0.25 CAPSULE, LIQUID FILLED ORAL DAILY
Status: DISCONTINUED | OUTPATIENT
Start: 2017-11-30 | End: 2018-01-04 | Stop reason: HOSPADM

## 2017-11-29 RX ORDER — ACETAMINOPHEN 325 MG/1
650 TABLET ORAL EVERY 4 HOURS PRN
Status: ON HOLD
Start: 2017-11-29 | End: 2020-11-12

## 2017-11-29 RX ORDER — RIFAMPIN 300 MG/1
300 CAPSULE ORAL EVERY 12 HOURS SCHEDULED
Status: DISPENSED | OUTPATIENT
Start: 2017-11-29 | End: 2017-12-26

## 2017-11-29 RX ORDER — DIPHENHYDRAMINE HCL 25 MG
25 CAPSULE ORAL EVERY 6 HOURS PRN
Status: DISCONTINUED | OUTPATIENT
Start: 2017-11-29 | End: 2018-01-04 | Stop reason: HOSPADM

## 2017-11-29 RX ORDER — WARFARIN SODIUM 10 MG/1
10 TABLET ORAL
Start: 2017-11-29 | End: 2018-01-04 | Stop reason: HOSPADM

## 2017-11-29 RX ORDER — RIFAMPIN 300 MG/1
300 CAPSULE ORAL EVERY 12 HOURS SCHEDULED
Qty: 54 CAPSULE | Refills: 0
Start: 2017-11-29 | End: 2018-01-04 | Stop reason: HOSPADM

## 2017-11-29 RX ORDER — DILTIAZEM HYDROCHLORIDE 180 MG/1
180 CAPSULE, COATED, EXTENDED RELEASE ORAL
Status: DISCONTINUED | OUTPATIENT
Start: 2017-11-30 | End: 2018-01-04 | Stop reason: HOSPADM

## 2017-11-29 RX ORDER — DONEPEZIL HYDROCHLORIDE 5 MG/1
5 TABLET, FILM COATED ORAL NIGHTLY
Status: DISCONTINUED | OUTPATIENT
Start: 2017-11-29 | End: 2018-01-04 | Stop reason: HOSPADM

## 2017-11-29 RX ORDER — PANTOPRAZOLE SODIUM 40 MG/1
40 TABLET, DELAYED RELEASE ORAL
Status: DISCONTINUED | OUTPATIENT
Start: 2017-11-30 | End: 2018-01-04 | Stop reason: HOSPADM

## 2017-11-29 RX ORDER — ASPIRIN 81 MG/1
81 TABLET ORAL DAILY
Status: DISCONTINUED | OUTPATIENT
Start: 2017-11-30 | End: 2018-01-04 | Stop reason: HOSPADM

## 2017-11-29 RX ADMIN — CHOLECALCIFEROL (VITAMIN D3) 25 MCG (1,000 UNIT) TABLET 5000 UNITS: TABLET at 08:47

## 2017-11-29 RX ADMIN — DIPHENHYDRAMINE HYDROCHLORIDE 25 MG: 25 CAPSULE ORAL at 08:47

## 2017-11-29 RX ADMIN — CALCITRIOL 0.25 MCG: 0.25 CAPSULE ORAL at 08:47

## 2017-11-29 RX ADMIN — PANTOPRAZOLE SODIUM 40 MG: 40 TABLET, DELAYED RELEASE ORAL at 06:16

## 2017-11-29 RX ADMIN — ASPIRIN 81 MG: 81 TABLET ORAL at 08:47

## 2017-11-29 RX ADMIN — RIFAMPIN 300 MG: 300 CAPSULE ORAL at 08:47

## 2017-11-29 RX ADMIN — VANCOMYCIN HYDROCHLORIDE 1500 MG: 100 INJECTION, POWDER, LYOPHILIZED, FOR SOLUTION INTRAVENOUS at 06:17

## 2017-11-29 RX ADMIN — METOPROLOL TARTRATE 50 MG: 50 TABLET ORAL at 08:47

## 2017-11-29 RX ADMIN — DILTIAZEM HYDROCHLORIDE 180 MG: 180 CAPSULE, COATED, EXTENDED RELEASE ORAL at 08:48

## 2017-11-30 LAB
ALBUMIN SERPL-MCNC: 3.1 G/DL (ref 3.5–5)
ALBUMIN/GLOB SERPL: 0.9 G/DL (ref 1.1–2.5)
ALP SERPL-CCNC: 86 U/L (ref 24–120)
ALT SERPL W P-5'-P-CCNC: 31 U/L (ref 0–54)
ANION GAP SERPL CALCULATED.3IONS-SCNC: 6 MMOL/L (ref 4–13)
AST SERPL-CCNC: 18 U/L (ref 7–45)
BASOPHILS # BLD AUTO: 0.03 10*3/MM3 (ref 0–0.2)
BASOPHILS NFR BLD AUTO: 0.4 % (ref 0–2)
BILIRUB SERPL-MCNC: 1.1 MG/DL (ref 0.1–1)
BUN BLD-MCNC: 26 MG/DL (ref 5–21)
BUN/CREAT SERPL: 17.7 (ref 7–25)
CALCIUM SPEC-SCNC: 9.2 MG/DL (ref 8.4–10.4)
CHLORIDE SERPL-SCNC: 105 MMOL/L (ref 98–110)
CO2 SERPL-SCNC: 30 MMOL/L (ref 24–31)
CREAT BLD-MCNC: 1.47 MG/DL (ref 0.5–1.4)
DEPRECATED RDW RBC AUTO: 45.5 FL (ref 40–54)
EOSINOPHIL # BLD AUTO: 0.39 10*3/MM3 (ref 0–0.7)
EOSINOPHIL NFR BLD AUTO: 5.7 % (ref 0–4)
ERYTHROCYTE [DISTWIDTH] IN BLOOD BY AUTOMATED COUNT: 12.7 % (ref 12–15)
GFR SERPL CREATININE-BSD FRML MDRD: 35 ML/MIN/1.73
GLOBULIN UR ELPH-MCNC: 3.6 GM/DL
GLUCOSE BLD-MCNC: 99 MG/DL (ref 70–100)
HCT VFR BLD AUTO: 31.3 % (ref 37–47)
HGB BLD-MCNC: 10 G/DL (ref 12–16)
IMM GRANULOCYTES # BLD: 0.04 10*3/MM3 (ref 0–0.03)
IMM GRANULOCYTES NFR BLD: 0.6 % (ref 0–5)
INR PPP: 1.76 (ref 0.91–1.09)
LYMPHOCYTES # BLD AUTO: 1.04 10*3/MM3 (ref 0.72–4.86)
LYMPHOCYTES NFR BLD AUTO: 15.2 % (ref 15–45)
MCH RBC QN AUTO: 31.5 PG (ref 28–32)
MCHC RBC AUTO-ENTMCNC: 31.9 G/DL (ref 33–36)
MCV RBC AUTO: 98.7 FL (ref 82–98)
MONOCYTES # BLD AUTO: 0.93 10*3/MM3 (ref 0.19–1.3)
MONOCYTES NFR BLD AUTO: 13.6 % (ref 4–12)
NEUTROPHILS # BLD AUTO: 4.4 10*3/MM3 (ref 1.87–8.4)
NEUTROPHILS NFR BLD AUTO: 64.5 % (ref 39–78)
NRBC BLD MANUAL-RTO: 0 /100 WBC (ref 0–0)
PLATELET # BLD AUTO: 161 10*3/MM3 (ref 130–400)
PMV BLD AUTO: 9.8 FL (ref 6–12)
POTASSIUM BLD-SCNC: 4.1 MMOL/L (ref 3.5–5.3)
PREALB SERPL-MCNC: 11.6 MG/DL (ref 18–36)
PROT SERPL-MCNC: 6.7 G/DL (ref 6.3–8.7)
PROTHROMBIN TIME: 21.1 SECONDS (ref 11.9–14.6)
RBC # BLD AUTO: 3.17 10*6/MM3 (ref 4.2–5.4)
SODIUM BLD-SCNC: 141 MMOL/L (ref 135–145)
WBC NRBC COR # BLD: 6.83 10*3/MM3 (ref 4.8–10.8)

## 2017-11-30 PROCEDURE — 25010000002 VANCOMYCIN: Performed by: INTERNAL MEDICINE

## 2017-11-30 PROCEDURE — 85610 PROTHROMBIN TIME: CPT | Performed by: INTERNAL MEDICINE

## 2017-11-30 PROCEDURE — 97162 PT EVAL MOD COMPLEX 30 MIN: CPT

## 2017-11-30 PROCEDURE — 97165 OT EVAL LOW COMPLEX 30 MIN: CPT | Performed by: OCCUPATIONAL THERAPIST

## 2017-11-30 PROCEDURE — 80053 COMPREHEN METABOLIC PANEL: CPT | Performed by: INTERNAL MEDICINE

## 2017-11-30 PROCEDURE — 63710000001 DIPHENHYDRAMINE PER 50 MG: Performed by: INTERNAL MEDICINE

## 2017-11-30 PROCEDURE — 85025 COMPLETE CBC W/AUTO DIFF WBC: CPT | Performed by: INTERNAL MEDICINE

## 2017-11-30 PROCEDURE — 84134 ASSAY OF PREALBUMIN: CPT | Performed by: INTERNAL MEDICINE

## 2017-12-01 LAB
ANION GAP SERPL CALCULATED.3IONS-SCNC: 5 MMOL/L (ref 4–13)
BASOPHILS # BLD AUTO: 0.01 10*3/MM3 (ref 0–0.2)
BASOPHILS NFR BLD AUTO: 0.1 % (ref 0–2)
BUN BLD-MCNC: 27 MG/DL (ref 5–21)
BUN/CREAT SERPL: 18.8 (ref 7–25)
CALCIUM SPEC-SCNC: 9 MG/DL (ref 8.4–10.4)
CHLORIDE SERPL-SCNC: 101 MMOL/L (ref 98–110)
CO2 SERPL-SCNC: 33 MMOL/L (ref 24–31)
CREAT BLD-MCNC: 1.44 MG/DL (ref 0.5–1.4)
DEPRECATED RDW RBC AUTO: 45.5 FL (ref 40–54)
EOSINOPHIL # BLD AUTO: 0.37 10*3/MM3 (ref 0–0.7)
EOSINOPHIL NFR BLD AUTO: 4.3 % (ref 0–4)
ERYTHROCYTE [DISTWIDTH] IN BLOOD BY AUTOMATED COUNT: 12.7 % (ref 12–15)
GFR SERPL CREATININE-BSD FRML MDRD: 36 ML/MIN/1.73
GLUCOSE BLD-MCNC: 101 MG/DL (ref 70–100)
HCT VFR BLD AUTO: 31.7 % (ref 37–47)
HGB BLD-MCNC: 10.1 G/DL (ref 12–16)
IMM GRANULOCYTES # BLD: 0.03 10*3/MM3 (ref 0–0.03)
IMM GRANULOCYTES NFR BLD: 0.3 % (ref 0–5)
INR PPP: 1.96 (ref 0.91–1.09)
LYMPHOCYTES # BLD AUTO: 1.15 10*3/MM3 (ref 0.72–4.86)
LYMPHOCYTES NFR BLD AUTO: 13.3 % (ref 15–45)
MCH RBC QN AUTO: 31.4 PG (ref 28–32)
MCHC RBC AUTO-ENTMCNC: 31.9 G/DL (ref 33–36)
MCV RBC AUTO: 98.4 FL (ref 82–98)
MONOCYTES # BLD AUTO: 1.19 10*3/MM3 (ref 0.19–1.3)
MONOCYTES NFR BLD AUTO: 13.8 % (ref 4–12)
NEUTROPHILS # BLD AUTO: 5.89 10*3/MM3 (ref 1.87–8.4)
NEUTROPHILS NFR BLD AUTO: 68.2 % (ref 39–78)
NRBC BLD MANUAL-RTO: 0 /100 WBC (ref 0–0)
PLATELET # BLD AUTO: 178 10*3/MM3 (ref 130–400)
PMV BLD AUTO: 9.4 FL (ref 6–12)
POTASSIUM BLD-SCNC: 4 MMOL/L (ref 3.5–5.3)
PROTHROMBIN TIME: 23 SECONDS (ref 11.9–14.6)
RBC # BLD AUTO: 3.22 10*6/MM3 (ref 4.2–5.4)
SODIUM BLD-SCNC: 139 MMOL/L (ref 135–145)
WBC NRBC COR # BLD: 8.64 10*3/MM3 (ref 4.8–10.8)

## 2017-12-01 PROCEDURE — 63710000001 DIPHENHYDRAMINE PER 50 MG: Performed by: INTERNAL MEDICINE

## 2017-12-01 PROCEDURE — 25010000002 VANCOMYCIN: Performed by: INTERNAL MEDICINE

## 2017-12-01 PROCEDURE — 80048 BASIC METABOLIC PNL TOTAL CA: CPT | Performed by: INTERNAL MEDICINE

## 2017-12-01 PROCEDURE — 85610 PROTHROMBIN TIME: CPT | Performed by: INTERNAL MEDICINE

## 2017-12-01 PROCEDURE — 85025 COMPLETE CBC W/AUTO DIFF WBC: CPT | Performed by: INTERNAL MEDICINE

## 2017-12-01 RX ORDER — GABAPENTIN 100 MG/1
100 CAPSULE ORAL NIGHTLY
Status: DISCONTINUED | OUTPATIENT
Start: 2017-12-01 | End: 2017-12-05

## 2017-12-02 LAB
ANION GAP SERPL CALCULATED.3IONS-SCNC: 5 MMOL/L (ref 4–13)
BASOPHILS # BLD AUTO: 0.04 10*3/MM3 (ref 0–0.2)
BASOPHILS NFR BLD AUTO: 0.4 % (ref 0–2)
BUN BLD-MCNC: 30 MG/DL (ref 5–21)
BUN/CREAT SERPL: 19.9 (ref 7–25)
CALCIUM SPEC-SCNC: 9.1 MG/DL (ref 8.4–10.4)
CHLORIDE SERPL-SCNC: 100 MMOL/L (ref 98–110)
CO2 SERPL-SCNC: 35 MMOL/L (ref 24–31)
CREAT BLD-MCNC: 1.51 MG/DL (ref 0.5–1.4)
DEPRECATED RDW RBC AUTO: 44.7 FL (ref 40–54)
EOSINOPHIL # BLD AUTO: 0.28 10*3/MM3 (ref 0–0.7)
EOSINOPHIL NFR BLD AUTO: 3 % (ref 0–4)
ERYTHROCYTE [DISTWIDTH] IN BLOOD BY AUTOMATED COUNT: 12.6 % (ref 12–15)
GFR SERPL CREATININE-BSD FRML MDRD: 34 ML/MIN/1.73
GLUCOSE BLD-MCNC: 97 MG/DL (ref 70–100)
HCT VFR BLD AUTO: 32.4 % (ref 37–47)
HGB BLD-MCNC: 10.9 G/DL (ref 12–16)
IMM GRANULOCYTES # BLD: 0.03 10*3/MM3 (ref 0–0.03)
IMM GRANULOCYTES NFR BLD: 0.3 % (ref 0–5)
INR PPP: 2.38 (ref 0.91–1.09)
LYMPHOCYTES # BLD AUTO: 1.38 10*3/MM3 (ref 0.72–4.86)
LYMPHOCYTES NFR BLD AUTO: 14.9 % (ref 15–45)
MCH RBC QN AUTO: 32.8 PG (ref 28–32)
MCHC RBC AUTO-ENTMCNC: 33.6 G/DL (ref 33–36)
MCV RBC AUTO: 97.6 FL (ref 82–98)
MONOCYTES # BLD AUTO: 1.32 10*3/MM3 (ref 0.19–1.3)
MONOCYTES NFR BLD AUTO: 14.3 % (ref 4–12)
NEUTROPHILS # BLD AUTO: 6.19 10*3/MM3 (ref 1.87–8.4)
NEUTROPHILS NFR BLD AUTO: 67.1 % (ref 39–78)
NRBC BLD MANUAL-RTO: 0 /100 WBC (ref 0–0)
PLATELET # BLD AUTO: 218 10*3/MM3 (ref 130–400)
PMV BLD AUTO: 9.9 FL (ref 6–12)
POTASSIUM BLD-SCNC: 3.9 MMOL/L (ref 3.5–5.3)
PROTHROMBIN TIME: 26.9 SECONDS (ref 11.9–14.6)
RBC # BLD AUTO: 3.32 10*6/MM3 (ref 4.2–5.4)
SODIUM BLD-SCNC: 140 MMOL/L (ref 135–145)
WBC NRBC COR # BLD: 9.24 10*3/MM3 (ref 4.8–10.8)

## 2017-12-02 PROCEDURE — 85025 COMPLETE CBC W/AUTO DIFF WBC: CPT | Performed by: INTERNAL MEDICINE

## 2017-12-02 PROCEDURE — 85610 PROTHROMBIN TIME: CPT | Performed by: INTERNAL MEDICINE

## 2017-12-02 PROCEDURE — 80048 BASIC METABOLIC PNL TOTAL CA: CPT | Performed by: INTERNAL MEDICINE

## 2017-12-02 PROCEDURE — 25010000002 VANCOMYCIN: Performed by: INTERNAL MEDICINE

## 2017-12-02 PROCEDURE — 63710000001 DIPHENHYDRAMINE PER 50 MG: Performed by: INTERNAL MEDICINE

## 2017-12-03 LAB
ANION GAP SERPL CALCULATED.3IONS-SCNC: 6 MMOL/L (ref 4–13)
BASOPHILS # BLD AUTO: 0.03 10*3/MM3 (ref 0–0.2)
BASOPHILS NFR BLD AUTO: 0.4 % (ref 0–2)
BUN BLD-MCNC: 37 MG/DL (ref 5–21)
BUN/CREAT SERPL: 21.3 (ref 7–25)
CALCIUM SPEC-SCNC: 9 MG/DL (ref 8.4–10.4)
CHLORIDE SERPL-SCNC: 98 MMOL/L (ref 98–110)
CO2 SERPL-SCNC: 35 MMOL/L (ref 24–31)
CREAT BLD-MCNC: 1.74 MG/DL (ref 0.5–1.4)
DEPRECATED RDW RBC AUTO: 45.5 FL (ref 40–54)
EOSINOPHIL # BLD AUTO: 0.2 10*3/MM3 (ref 0–0.7)
EOSINOPHIL NFR BLD AUTO: 2.3 % (ref 0–4)
ERYTHROCYTE [DISTWIDTH] IN BLOOD BY AUTOMATED COUNT: 12.6 % (ref 12–15)
GFR SERPL CREATININE-BSD FRML MDRD: 29 ML/MIN/1.73
GLUCOSE BLD-MCNC: 104 MG/DL (ref 70–100)
HCT VFR BLD AUTO: 33.5 % (ref 37–47)
HGB BLD-MCNC: 10.5 G/DL (ref 12–16)
IMM GRANULOCYTES # BLD: 0.02 10*3/MM3 (ref 0–0.03)
IMM GRANULOCYTES NFR BLD: 0.2 % (ref 0–5)
INR PPP: 2.33 (ref 0.91–1.09)
LYMPHOCYTES # BLD AUTO: 1.15 10*3/MM3 (ref 0.72–4.86)
LYMPHOCYTES NFR BLD AUTO: 13.5 % (ref 15–45)
MCH RBC QN AUTO: 30.9 PG (ref 28–32)
MCHC RBC AUTO-ENTMCNC: 31.3 G/DL (ref 33–36)
MCV RBC AUTO: 98.5 FL (ref 82–98)
MONOCYTES # BLD AUTO: 1.28 10*3/MM3 (ref 0.19–1.3)
MONOCYTES NFR BLD AUTO: 15 % (ref 4–12)
NEUTROPHILS # BLD AUTO: 5.86 10*3/MM3 (ref 1.87–8.4)
NEUTROPHILS NFR BLD AUTO: 68.6 % (ref 39–78)
NRBC BLD MANUAL-RTO: 0 /100 WBC (ref 0–0)
PLATELET # BLD AUTO: 209 10*3/MM3 (ref 130–400)
PMV BLD AUTO: 9.5 FL (ref 6–12)
POTASSIUM BLD-SCNC: 3.5 MMOL/L (ref 3.5–5.3)
PROTHROMBIN TIME: 26.4 SECONDS (ref 11.9–14.6)
RBC # BLD AUTO: 3.4 10*6/MM3 (ref 4.2–5.4)
SODIUM BLD-SCNC: 139 MMOL/L (ref 135–145)
VANCOMYCIN TROUGH SERPL-MCNC: 19.23 MCG/ML (ref 10–20)
WBC NRBC COR # BLD: 8.54 10*3/MM3 (ref 4.8–10.8)

## 2017-12-03 PROCEDURE — 63710000001 DIPHENHYDRAMINE PER 50 MG: Performed by: INTERNAL MEDICINE

## 2017-12-03 PROCEDURE — 80202 ASSAY OF VANCOMYCIN: CPT | Performed by: INTERNAL MEDICINE

## 2017-12-03 PROCEDURE — 85025 COMPLETE CBC W/AUTO DIFF WBC: CPT | Performed by: INTERNAL MEDICINE

## 2017-12-03 PROCEDURE — 80048 BASIC METABOLIC PNL TOTAL CA: CPT | Performed by: INTERNAL MEDICINE

## 2017-12-03 PROCEDURE — 97535 SELF CARE MNGMENT TRAINING: CPT

## 2017-12-03 PROCEDURE — 97530 THERAPEUTIC ACTIVITIES: CPT

## 2017-12-03 PROCEDURE — 85610 PROTHROMBIN TIME: CPT | Performed by: INTERNAL MEDICINE

## 2017-12-03 PROCEDURE — 25010000002 VANCOMYCIN: Performed by: INTERNAL MEDICINE

## 2017-12-03 RX ORDER — DOCUSATE SODIUM 100 MG/1
100 CAPSULE, LIQUID FILLED ORAL 2 TIMES DAILY
Status: DISCONTINUED | OUTPATIENT
Start: 2017-12-03 | End: 2018-01-04 | Stop reason: HOSPADM

## 2017-12-04 LAB
ANION GAP SERPL CALCULATED.3IONS-SCNC: 8 MMOL/L (ref 4–13)
BASOPHILS # BLD AUTO: 0.04 10*3/MM3 (ref 0–0.2)
BASOPHILS NFR BLD AUTO: 0.5 % (ref 0–2)
BUN BLD-MCNC: 41 MG/DL (ref 5–21)
BUN/CREAT SERPL: 25.2 (ref 7–25)
CALCIUM SPEC-SCNC: 9.2 MG/DL (ref 8.4–10.4)
CHLORIDE SERPL-SCNC: 99 MMOL/L (ref 98–110)
CO2 SERPL-SCNC: 32 MMOL/L (ref 24–31)
CREAT BLD-MCNC: 1.63 MG/DL (ref 0.5–1.4)
CRP SERPL-MCNC: 11.8 MG/DL (ref 0–0.99)
DEPRECATED RDW RBC AUTO: 43.6 FL (ref 40–54)
EOSINOPHIL # BLD AUTO: 0.31 10*3/MM3 (ref 0–0.7)
EOSINOPHIL NFR BLD AUTO: 4 % (ref 0–4)
ERYTHROCYTE [DISTWIDTH] IN BLOOD BY AUTOMATED COUNT: 12.5 % (ref 12–15)
ERYTHROCYTE [SEDIMENTATION RATE] IN BLOOD: 65 MM/HR (ref 0–20)
GFR SERPL CREATININE-BSD FRML MDRD: 31 ML/MIN/1.73
GLUCOSE BLD-MCNC: 108 MG/DL (ref 70–100)
HCT VFR BLD AUTO: 31.9 % (ref 37–47)
HGB BLD-MCNC: 10.5 G/DL (ref 12–16)
IMM GRANULOCYTES # BLD: 0.02 10*3/MM3 (ref 0–0.03)
IMM GRANULOCYTES NFR BLD: 0.3 % (ref 0–5)
INR PPP: 2.38 (ref 0.91–1.09)
LYMPHOCYTES # BLD AUTO: 1.02 10*3/MM3 (ref 0.72–4.86)
LYMPHOCYTES NFR BLD AUTO: 13 % (ref 15–45)
MCH RBC QN AUTO: 31.5 PG (ref 28–32)
MCHC RBC AUTO-ENTMCNC: 32.9 G/DL (ref 33–36)
MCV RBC AUTO: 95.8 FL (ref 82–98)
MONOCYTES # BLD AUTO: 1.15 10*3/MM3 (ref 0.19–1.3)
MONOCYTES NFR BLD AUTO: 14.7 % (ref 4–12)
NEUTROPHILS # BLD AUTO: 5.28 10*3/MM3 (ref 1.87–8.4)
NEUTROPHILS NFR BLD AUTO: 67.5 % (ref 39–78)
NRBC BLD MANUAL-RTO: 0 /100 WBC (ref 0–0)
PLATELET # BLD AUTO: 226 10*3/MM3 (ref 130–400)
PMV BLD AUTO: 9.5 FL (ref 6–12)
POTASSIUM BLD-SCNC: 3.8 MMOL/L (ref 3.5–5.3)
PROTHROMBIN TIME: 26.9 SECONDS (ref 11.9–14.6)
RBC # BLD AUTO: 3.33 10*6/MM3 (ref 4.2–5.4)
SODIUM BLD-SCNC: 139 MMOL/L (ref 135–145)
WBC NRBC COR # BLD: 7.82 10*3/MM3 (ref 4.8–10.8)

## 2017-12-04 PROCEDURE — 85610 PROTHROMBIN TIME: CPT | Performed by: INTERNAL MEDICINE

## 2017-12-04 PROCEDURE — 97110 THERAPEUTIC EXERCISES: CPT

## 2017-12-04 PROCEDURE — 25010000002 VANCOMYCIN: Performed by: INTERNAL MEDICINE

## 2017-12-04 PROCEDURE — 85651 RBC SED RATE NONAUTOMATED: CPT | Performed by: NURSE PRACTITIONER

## 2017-12-04 PROCEDURE — 80048 BASIC METABOLIC PNL TOTAL CA: CPT | Performed by: NURSE PRACTITIONER

## 2017-12-04 PROCEDURE — 85025 COMPLETE CBC W/AUTO DIFF WBC: CPT | Performed by: NURSE PRACTITIONER

## 2017-12-04 PROCEDURE — 86140 C-REACTIVE PROTEIN: CPT | Performed by: NURSE PRACTITIONER

## 2017-12-04 PROCEDURE — 63710000001 DIPHENHYDRAMINE PER 50 MG: Performed by: INTERNAL MEDICINE

## 2017-12-05 LAB
ALBUMIN SERPL-MCNC: 3.1 G/DL (ref 3.5–5)
ALBUMIN/GLOB SERPL: 0.8 G/DL (ref 1.1–2.5)
ALP SERPL-CCNC: 79 U/L (ref 24–120)
ALT SERPL W P-5'-P-CCNC: 29 U/L (ref 0–54)
ANION GAP SERPL CALCULATED.3IONS-SCNC: 8 MMOL/L (ref 4–13)
ANION GAP SERPL CALCULATED.3IONS-SCNC: 8 MMOL/L (ref 4–13)
AST SERPL-CCNC: 21 U/L (ref 7–45)
BASOPHILS # BLD AUTO: 0.04 10*3/MM3 (ref 0–0.2)
BASOPHILS NFR BLD AUTO: 0.7 % (ref 0–2)
BILIRUB SERPL-MCNC: 0.3 MG/DL (ref 0.1–1)
BUN BLD-MCNC: 51 MG/DL (ref 5–21)
BUN BLD-MCNC: 51 MG/DL (ref 5–21)
BUN/CREAT SERPL: 30.7 (ref 7–25)
BUN/CREAT SERPL: 30.7 (ref 7–25)
CALCIUM SPEC-SCNC: 9.2 MG/DL (ref 8.4–10.4)
CALCIUM SPEC-SCNC: 9.2 MG/DL (ref 8.4–10.4)
CHLORIDE SERPL-SCNC: 101 MMOL/L (ref 98–110)
CHLORIDE SERPL-SCNC: 101 MMOL/L (ref 98–110)
CO2 SERPL-SCNC: 29 MMOL/L (ref 24–31)
CO2 SERPL-SCNC: 29 MMOL/L (ref 24–31)
CREAT BLD-MCNC: 1.66 MG/DL (ref 0.5–1.4)
CREAT BLD-MCNC: 1.66 MG/DL (ref 0.5–1.4)
CRP SERPL-MCNC: 7.59 MG/DL (ref 0–0.99)
DEPRECATED RDW RBC AUTO: 44.4 FL (ref 40–54)
EOSINOPHIL # BLD AUTO: 0.34 10*3/MM3 (ref 0–0.7)
EOSINOPHIL NFR BLD AUTO: 6.3 % (ref 0–4)
ERYTHROCYTE [DISTWIDTH] IN BLOOD BY AUTOMATED COUNT: 12.5 % (ref 12–15)
GFR SERPL CREATININE-BSD FRML MDRD: 30 ML/MIN/1.73
GFR SERPL CREATININE-BSD FRML MDRD: 30 ML/MIN/1.73
GLOBULIN UR ELPH-MCNC: 3.9 GM/DL
GLUCOSE BLD-MCNC: 112 MG/DL (ref 70–100)
GLUCOSE BLD-MCNC: 112 MG/DL (ref 70–100)
HCT VFR BLD AUTO: 30.9 % (ref 37–47)
HGB BLD-MCNC: 9.9 G/DL (ref 12–16)
IMM GRANULOCYTES # BLD: 0.01 10*3/MM3 (ref 0–0.03)
IMM GRANULOCYTES NFR BLD: 0.2 % (ref 0–5)
INR PPP: 2.34 (ref 0.91–1.09)
LYMPHOCYTES # BLD AUTO: 0.94 10*3/MM3 (ref 0.72–4.86)
LYMPHOCYTES NFR BLD AUTO: 17.3 % (ref 15–45)
MCH RBC QN AUTO: 31 PG (ref 28–32)
MCHC RBC AUTO-ENTMCNC: 32 G/DL (ref 33–36)
MCV RBC AUTO: 96.9 FL (ref 82–98)
MONOCYTES # BLD AUTO: 1.04 10*3/MM3 (ref 0.19–1.3)
MONOCYTES NFR BLD AUTO: 19.2 % (ref 4–12)
NEUTROPHILS # BLD AUTO: 3.05 10*3/MM3 (ref 1.87–8.4)
NEUTROPHILS NFR BLD AUTO: 56.3 % (ref 39–78)
NRBC BLD MANUAL-RTO: 0 /100 WBC (ref 0–0)
PLATELET # BLD AUTO: 245 10*3/MM3 (ref 130–400)
PMV BLD AUTO: 9.6 FL (ref 6–12)
POTASSIUM BLD-SCNC: 3.9 MMOL/L (ref 3.5–5.3)
POTASSIUM BLD-SCNC: 3.9 MMOL/L (ref 3.5–5.3)
PROT SERPL-MCNC: 7 G/DL (ref 6.3–8.7)
PROTHROMBIN TIME: 26.5 SECONDS (ref 11.9–14.6)
RBC # BLD AUTO: 3.19 10*6/MM3 (ref 4.2–5.4)
SODIUM BLD-SCNC: 138 MMOL/L (ref 135–145)
SODIUM BLD-SCNC: 138 MMOL/L (ref 135–145)
WBC NRBC COR # BLD: 5.42 10*3/MM3 (ref 4.8–10.8)

## 2017-12-05 PROCEDURE — 85610 PROTHROMBIN TIME: CPT | Performed by: INTERNAL MEDICINE

## 2017-12-05 PROCEDURE — 63710000001 DIPHENHYDRAMINE PER 50 MG: Performed by: INTERNAL MEDICINE

## 2017-12-05 PROCEDURE — 86140 C-REACTIVE PROTEIN: CPT | Performed by: INTERNAL MEDICINE

## 2017-12-05 PROCEDURE — 80053 COMPREHEN METABOLIC PANEL: CPT | Performed by: INTERNAL MEDICINE

## 2017-12-05 PROCEDURE — 97116 GAIT TRAINING THERAPY: CPT

## 2017-12-05 PROCEDURE — 25010000002 VANCOMYCIN: Performed by: INTERNAL MEDICINE

## 2017-12-05 PROCEDURE — 97110 THERAPEUTIC EXERCISES: CPT

## 2017-12-05 PROCEDURE — 85025 COMPLETE CBC W/AUTO DIFF WBC: CPT | Performed by: INTERNAL MEDICINE

## 2017-12-05 PROCEDURE — 80048 BASIC METABOLIC PNL TOTAL CA: CPT | Performed by: INTERNAL MEDICINE

## 2017-12-05 RX ORDER — GABAPENTIN 300 MG/1
300 CAPSULE ORAL NIGHTLY
Status: DISCONTINUED | OUTPATIENT
Start: 2017-12-05 | End: 2018-01-04 | Stop reason: HOSPADM

## 2017-12-06 LAB
ANION GAP SERPL CALCULATED.3IONS-SCNC: 6 MMOL/L (ref 4–13)
BASOPHILS # BLD AUTO: 0.03 10*3/MM3 (ref 0–0.2)
BASOPHILS NFR BLD AUTO: 0.8 % (ref 0–2)
BUN BLD-MCNC: 48 MG/DL (ref 5–21)
BUN/CREAT SERPL: 31.4 (ref 7–25)
CALCIUM SPEC-SCNC: 9.1 MG/DL (ref 8.4–10.4)
CHLORIDE SERPL-SCNC: 105 MMOL/L (ref 98–110)
CO2 SERPL-SCNC: 28 MMOL/L (ref 24–31)
CREAT BLD-MCNC: 1.53 MG/DL (ref 0.5–1.4)
DEPRECATED RDW RBC AUTO: 43.8 FL (ref 40–54)
EOSINOPHIL # BLD AUTO: 0.31 10*3/MM3 (ref 0–0.7)
EOSINOPHIL NFR BLD AUTO: 7.9 % (ref 0–4)
ERYTHROCYTE [DISTWIDTH] IN BLOOD BY AUTOMATED COUNT: 12.6 % (ref 12–15)
GFR SERPL CREATININE-BSD FRML MDRD: 33 ML/MIN/1.73
GLUCOSE BLD-MCNC: 120 MG/DL (ref 70–100)
HCT VFR BLD AUTO: 28.4 % (ref 37–47)
HGB BLD-MCNC: 9.3 G/DL (ref 12–16)
IMM GRANULOCYTES # BLD: 0.01 10*3/MM3 (ref 0–0.03)
IMM GRANULOCYTES NFR BLD: 0.3 % (ref 0–5)
INR PPP: 2.1 (ref 0.91–1.09)
LYMPHOCYTES # BLD AUTO: 0.78 10*3/MM3 (ref 0.72–4.86)
LYMPHOCYTES NFR BLD AUTO: 20 % (ref 15–45)
MCH RBC QN AUTO: 31.3 PG (ref 28–32)
MCHC RBC AUTO-ENTMCNC: 32.7 G/DL (ref 33–36)
MCV RBC AUTO: 95.6 FL (ref 82–98)
MONOCYTES # BLD AUTO: 0.78 10*3/MM3 (ref 0.19–1.3)
MONOCYTES NFR BLD AUTO: 20 % (ref 4–12)
NEUTROPHILS # BLD AUTO: 1.99 10*3/MM3 (ref 1.87–8.4)
NEUTROPHILS NFR BLD AUTO: 51 % (ref 39–78)
NRBC BLD MANUAL-RTO: 0 /100 WBC (ref 0–0)
PLATELET # BLD AUTO: 232 10*3/MM3 (ref 130–400)
PMV BLD AUTO: 9.3 FL (ref 6–12)
POTASSIUM BLD-SCNC: 4 MMOL/L (ref 3.5–5.3)
PROTHROMBIN TIME: 24.3 SECONDS (ref 11.9–14.6)
RBC # BLD AUTO: 2.97 10*6/MM3 (ref 4.2–5.4)
SODIUM BLD-SCNC: 139 MMOL/L (ref 135–145)
WBC NRBC COR # BLD: 3.9 10*3/MM3 (ref 4.8–10.8)

## 2017-12-06 PROCEDURE — 97535 SELF CARE MNGMENT TRAINING: CPT

## 2017-12-06 PROCEDURE — 85025 COMPLETE CBC W/AUTO DIFF WBC: CPT | Performed by: INTERNAL MEDICINE

## 2017-12-06 PROCEDURE — 63710000001 DIPHENHYDRAMINE PER 50 MG: Performed by: INTERNAL MEDICINE

## 2017-12-06 PROCEDURE — 80048 BASIC METABOLIC PNL TOTAL CA: CPT | Performed by: INTERNAL MEDICINE

## 2017-12-06 PROCEDURE — 25010000002 VANCOMYCIN: Performed by: INTERNAL MEDICINE

## 2017-12-06 PROCEDURE — 85610 PROTHROMBIN TIME: CPT | Performed by: INTERNAL MEDICINE

## 2017-12-07 LAB
ANION GAP SERPL CALCULATED.3IONS-SCNC: 6 MMOL/L (ref 4–13)
BASOPHILS # BLD AUTO: 0.03 10*3/MM3 (ref 0–0.2)
BASOPHILS NFR BLD AUTO: 0.7 % (ref 0–2)
BUN BLD-MCNC: 43 MG/DL (ref 5–21)
BUN/CREAT SERPL: 26.1 (ref 7–25)
CALCIUM SPEC-SCNC: 9 MG/DL (ref 8.4–10.4)
CHLORIDE SERPL-SCNC: 104 MMOL/L (ref 98–110)
CO2 SERPL-SCNC: 30 MMOL/L (ref 24–31)
CREAT BLD-MCNC: 1.65 MG/DL (ref 0.5–1.4)
CRP SERPL-MCNC: 2.96 MG/DL (ref 0–0.99)
DEPRECATED RDW RBC AUTO: 45.1 FL (ref 40–54)
EOSINOPHIL # BLD AUTO: 0.3 10*3/MM3 (ref 0–0.7)
EOSINOPHIL NFR BLD AUTO: 7.4 % (ref 0–4)
ERYTHROCYTE [DISTWIDTH] IN BLOOD BY AUTOMATED COUNT: 12.7 % (ref 12–15)
ERYTHROCYTE [SEDIMENTATION RATE] IN BLOOD: 55 MM/HR (ref 0–20)
GFR SERPL CREATININE-BSD FRML MDRD: 30 ML/MIN/1.73
GLUCOSE BLD-MCNC: 94 MG/DL (ref 70–100)
HCT VFR BLD AUTO: 29.2 % (ref 37–47)
HGB BLD-MCNC: 9.4 G/DL (ref 12–16)
IMM GRANULOCYTES # BLD: 0.01 10*3/MM3 (ref 0–0.03)
IMM GRANULOCYTES NFR BLD: 0.2 % (ref 0–5)
INR PPP: 1.7 (ref 0.91–1.09)
LYMPHOCYTES # BLD AUTO: 1.05 10*3/MM3 (ref 0.72–4.86)
LYMPHOCYTES NFR BLD AUTO: 26.1 % (ref 15–45)
MCH RBC QN AUTO: 31 PG (ref 28–32)
MCHC RBC AUTO-ENTMCNC: 32.2 G/DL (ref 33–36)
MCV RBC AUTO: 96.4 FL (ref 82–98)
MONOCYTES # BLD AUTO: 0.7 10*3/MM3 (ref 0.19–1.3)
MONOCYTES NFR BLD AUTO: 17.4 % (ref 4–12)
NEUTROPHILS # BLD AUTO: 1.94 10*3/MM3 (ref 1.87–8.4)
NEUTROPHILS NFR BLD AUTO: 48.2 % (ref 39–78)
NRBC BLD MANUAL-RTO: 0 /100 WBC (ref 0–0)
PLATELET # BLD AUTO: 239 10*3/MM3 (ref 130–400)
PMV BLD AUTO: 9.1 FL (ref 6–12)
POTASSIUM BLD-SCNC: 4 MMOL/L (ref 3.5–5.3)
PROTHROMBIN TIME: 20.6 SECONDS (ref 11.9–14.6)
RBC # BLD AUTO: 3.03 10*6/MM3 (ref 4.2–5.4)
SODIUM BLD-SCNC: 140 MMOL/L (ref 135–145)
VANCOMYCIN TROUGH SERPL-MCNC: 16.46 MCG/ML (ref 10–20)
WBC NRBC COR # BLD: 4.03 10*3/MM3 (ref 4.8–10.8)

## 2017-12-07 PROCEDURE — 80048 BASIC METABOLIC PNL TOTAL CA: CPT | Performed by: INTERNAL MEDICINE

## 2017-12-07 PROCEDURE — 97110 THERAPEUTIC EXERCISES: CPT

## 2017-12-07 PROCEDURE — 86140 C-REACTIVE PROTEIN: CPT | Performed by: INTERNAL MEDICINE

## 2017-12-07 PROCEDURE — 85651 RBC SED RATE NONAUTOMATED: CPT | Performed by: INTERNAL MEDICINE

## 2017-12-07 PROCEDURE — 85610 PROTHROMBIN TIME: CPT | Performed by: INTERNAL MEDICINE

## 2017-12-07 PROCEDURE — 97535 SELF CARE MNGMENT TRAINING: CPT

## 2017-12-07 PROCEDURE — 97116 GAIT TRAINING THERAPY: CPT

## 2017-12-07 PROCEDURE — 25010000002 VANCOMYCIN: Performed by: INTERNAL MEDICINE

## 2017-12-07 PROCEDURE — 63710000001 DIPHENHYDRAMINE PER 50 MG: Performed by: INTERNAL MEDICINE

## 2017-12-07 PROCEDURE — 80202 ASSAY OF VANCOMYCIN: CPT | Performed by: INTERNAL MEDICINE

## 2017-12-07 PROCEDURE — 85025 COMPLETE CBC W/AUTO DIFF WBC: CPT | Performed by: INTERNAL MEDICINE

## 2017-12-08 LAB
ALBUMIN SERPL-MCNC: 2.9 G/DL (ref 3.5–5)
ALBUMIN/GLOB SERPL: 0.7 G/DL (ref 1.1–2.5)
ALP SERPL-CCNC: 85 U/L (ref 24–120)
ALT SERPL W P-5'-P-CCNC: 33 U/L (ref 0–54)
ANION GAP SERPL CALCULATED.3IONS-SCNC: 5 MMOL/L (ref 4–13)
AST SERPL-CCNC: 18 U/L (ref 7–45)
BASOPHILS # BLD AUTO: 0.03 10*3/MM3 (ref 0–0.2)
BASOPHILS NFR BLD AUTO: 0.7 % (ref 0–2)
BILIRUB SERPL-MCNC: 0.1 MG/DL (ref 0.1–1)
BUN BLD-MCNC: 42 MG/DL (ref 5–21)
BUN/CREAT SERPL: 28.4 (ref 7–25)
CALCIUM SPEC-SCNC: 9.2 MG/DL (ref 8.4–10.4)
CHLORIDE SERPL-SCNC: 105 MMOL/L (ref 98–110)
CO2 SERPL-SCNC: 31 MMOL/L (ref 24–31)
CREAT BLD-MCNC: 1.48 MG/DL (ref 0.5–1.4)
CRP SERPL-MCNC: 2.39 MG/DL (ref 0–0.99)
DEPRECATED RDW RBC AUTO: 46.1 FL (ref 40–54)
EOSINOPHIL # BLD AUTO: 0.31 10*3/MM3 (ref 0–0.7)
EOSINOPHIL NFR BLD AUTO: 7.5 % (ref 0–4)
ERYTHROCYTE [DISTWIDTH] IN BLOOD BY AUTOMATED COUNT: 12.9 % (ref 12–15)
ERYTHROCYTE [SEDIMENTATION RATE] IN BLOOD: 56 MM/HR (ref 0–20)
GFR SERPL CREATININE-BSD FRML MDRD: 35 ML/MIN/1.73
GLOBULIN UR ELPH-MCNC: 3.9 GM/DL
GLUCOSE BLD-MCNC: 102 MG/DL (ref 70–100)
HCT VFR BLD AUTO: 30.5 % (ref 37–47)
HGB BLD-MCNC: 9.6 G/DL (ref 12–16)
IMM GRANULOCYTES # BLD: 0.01 10*3/MM3 (ref 0–0.03)
IMM GRANULOCYTES NFR BLD: 0.2 % (ref 0–5)
INR PPP: 1.57 (ref 0.91–1.09)
LYMPHOCYTES # BLD AUTO: 1.02 10*3/MM3 (ref 0.72–4.86)
LYMPHOCYTES NFR BLD AUTO: 24.8 % (ref 15–45)
MCH RBC QN AUTO: 30.6 PG (ref 28–32)
MCHC RBC AUTO-ENTMCNC: 31.5 G/DL (ref 33–36)
MCV RBC AUTO: 97.1 FL (ref 82–98)
MONOCYTES # BLD AUTO: 0.66 10*3/MM3 (ref 0.19–1.3)
MONOCYTES NFR BLD AUTO: 16.1 % (ref 4–12)
NEUTROPHILS # BLD AUTO: 2.08 10*3/MM3 (ref 1.87–8.4)
NEUTROPHILS NFR BLD AUTO: 50.7 % (ref 39–78)
NRBC BLD MANUAL-RTO: 0 /100 WBC (ref 0–0)
PLATELET # BLD AUTO: 247 10*3/MM3 (ref 130–400)
PMV BLD AUTO: 9.2 FL (ref 6–12)
POTASSIUM BLD-SCNC: 4.2 MMOL/L (ref 3.5–5.3)
PROT SERPL-MCNC: 6.8 G/DL (ref 6.3–8.7)
PROTHROMBIN TIME: 19.3 SECONDS (ref 11.9–14.6)
RBC # BLD AUTO: 3.14 10*6/MM3 (ref 4.2–5.4)
SODIUM BLD-SCNC: 141 MMOL/L (ref 135–145)
WBC NRBC COR # BLD: 4.11 10*3/MM3 (ref 4.8–10.8)

## 2017-12-08 PROCEDURE — 25010000002 VANCOMYCIN: Performed by: INTERNAL MEDICINE

## 2017-12-08 PROCEDURE — 97110 THERAPEUTIC EXERCISES: CPT

## 2017-12-08 PROCEDURE — 97535 SELF CARE MNGMENT TRAINING: CPT

## 2017-12-08 PROCEDURE — 86140 C-REACTIVE PROTEIN: CPT | Performed by: INTERNAL MEDICINE

## 2017-12-08 PROCEDURE — 63710000001 DIPHENHYDRAMINE PER 50 MG: Performed by: INTERNAL MEDICINE

## 2017-12-08 PROCEDURE — 85610 PROTHROMBIN TIME: CPT | Performed by: INTERNAL MEDICINE

## 2017-12-08 PROCEDURE — 80053 COMPREHEN METABOLIC PANEL: CPT | Performed by: INTERNAL MEDICINE

## 2017-12-08 PROCEDURE — 85651 RBC SED RATE NONAUTOMATED: CPT | Performed by: INTERNAL MEDICINE

## 2017-12-08 PROCEDURE — 97116 GAIT TRAINING THERAPY: CPT

## 2017-12-08 PROCEDURE — 85025 COMPLETE CBC W/AUTO DIFF WBC: CPT | Performed by: INTERNAL MEDICINE

## 2017-12-08 RX ORDER — WARFARIN SODIUM 6 MG/1
12 TABLET ORAL
Status: DISCONTINUED | OUTPATIENT
Start: 2017-12-08 | End: 2017-12-11 | Stop reason: DRUGHIGH

## 2017-12-09 LAB
ANION GAP SERPL CALCULATED.3IONS-SCNC: 8 MMOL/L (ref 4–13)
BASOPHILS # BLD AUTO: 0.04 10*3/MM3 (ref 0–0.2)
BASOPHILS NFR BLD AUTO: 0.9 % (ref 0–2)
BUN BLD-MCNC: 42 MG/DL (ref 5–21)
BUN/CREAT SERPL: 30.4 (ref 7–25)
CALCIUM SPEC-SCNC: 9.7 MG/DL (ref 8.4–10.4)
CHLORIDE SERPL-SCNC: 105 MMOL/L (ref 98–110)
CO2 SERPL-SCNC: 30 MMOL/L (ref 24–31)
CREAT BLD-MCNC: 1.38 MG/DL (ref 0.5–1.4)
DEPRECATED RDW RBC AUTO: 43.9 FL (ref 40–54)
EOSINOPHIL # BLD AUTO: 0.31 10*3/MM3 (ref 0–0.7)
EOSINOPHIL NFR BLD AUTO: 7 % (ref 0–4)
ERYTHROCYTE [DISTWIDTH] IN BLOOD BY AUTOMATED COUNT: 12.6 % (ref 12–15)
GFR SERPL CREATININE-BSD FRML MDRD: 37 ML/MIN/1.73
GLUCOSE BLD-MCNC: 98 MG/DL (ref 70–100)
HCT VFR BLD AUTO: 35 % (ref 37–47)
HGB BLD-MCNC: 11.2 G/DL (ref 12–16)
IMM GRANULOCYTES # BLD: 0.01 10*3/MM3 (ref 0–0.03)
IMM GRANULOCYTES NFR BLD: 0.2 % (ref 0–5)
INR PPP: 1.44 (ref 0.91–1.09)
LYMPHOCYTES # BLD AUTO: 0.83 10*3/MM3 (ref 0.72–4.86)
LYMPHOCYTES NFR BLD AUTO: 18.7 % (ref 15–45)
MCH RBC QN AUTO: 30.6 PG (ref 28–32)
MCHC RBC AUTO-ENTMCNC: 32 G/DL (ref 33–36)
MCV RBC AUTO: 95.6 FL (ref 82–98)
MONOCYTES # BLD AUTO: 0.61 10*3/MM3 (ref 0.19–1.3)
MONOCYTES NFR BLD AUTO: 13.7 % (ref 4–12)
NEUTROPHILS # BLD AUTO: 2.65 10*3/MM3 (ref 1.87–8.4)
NEUTROPHILS NFR BLD AUTO: 59.5 % (ref 39–78)
NRBC BLD MANUAL-RTO: 0 /100 WBC (ref 0–0)
PLATELET # BLD AUTO: 301 10*3/MM3 (ref 130–400)
PMV BLD AUTO: 9 FL (ref 6–12)
POTASSIUM BLD-SCNC: 4.4 MMOL/L (ref 3.5–5.3)
PROTHROMBIN TIME: 18 SECONDS (ref 11.9–14.6)
RBC # BLD AUTO: 3.66 10*6/MM3 (ref 4.2–5.4)
SODIUM BLD-SCNC: 143 MMOL/L (ref 135–145)
WBC NRBC COR # BLD: 4.45 10*3/MM3 (ref 4.8–10.8)

## 2017-12-09 PROCEDURE — 85025 COMPLETE CBC W/AUTO DIFF WBC: CPT | Performed by: INTERNAL MEDICINE

## 2017-12-09 PROCEDURE — 80048 BASIC METABOLIC PNL TOTAL CA: CPT | Performed by: INTERNAL MEDICINE

## 2017-12-09 PROCEDURE — 97110 THERAPEUTIC EXERCISES: CPT

## 2017-12-09 PROCEDURE — 97116 GAIT TRAINING THERAPY: CPT

## 2017-12-09 PROCEDURE — 25010000002 VANCOMYCIN: Performed by: INTERNAL MEDICINE

## 2017-12-09 PROCEDURE — 63710000001 DIPHENHYDRAMINE PER 50 MG: Performed by: INTERNAL MEDICINE

## 2017-12-09 PROCEDURE — 85610 PROTHROMBIN TIME: CPT | Performed by: INTERNAL MEDICINE

## 2017-12-10 LAB
ANION GAP SERPL CALCULATED.3IONS-SCNC: 5 MMOL/L (ref 4–13)
BASOPHILS # BLD AUTO: 0.04 10*3/MM3 (ref 0–0.2)
BASOPHILS NFR BLD AUTO: 0.9 % (ref 0–2)
BUN BLD-MCNC: 37 MG/DL (ref 5–21)
BUN/CREAT SERPL: 26.2 (ref 7–25)
CALCIUM SPEC-SCNC: 9.9 MG/DL (ref 8.4–10.4)
CHLORIDE SERPL-SCNC: 105 MMOL/L (ref 98–110)
CO2 SERPL-SCNC: 32 MMOL/L (ref 24–31)
CREAT BLD-MCNC: 1.41 MG/DL (ref 0.5–1.4)
DEPRECATED RDW RBC AUTO: 45.1 FL (ref 40–54)
EOSINOPHIL # BLD AUTO: 0.36 10*3/MM3 (ref 0–0.7)
EOSINOPHIL NFR BLD AUTO: 7.7 % (ref 0–4)
ERYTHROCYTE [DISTWIDTH] IN BLOOD BY AUTOMATED COUNT: 12.5 % (ref 12–15)
GFR SERPL CREATININE-BSD FRML MDRD: 37 ML/MIN/1.73
GLUCOSE BLD-MCNC: 104 MG/DL (ref 70–100)
HCT VFR BLD AUTO: 35.6 % (ref 37–47)
HGB BLD-MCNC: 11 G/DL (ref 12–16)
IMM GRANULOCYTES # BLD: 0.01 10*3/MM3 (ref 0–0.03)
IMM GRANULOCYTES NFR BLD: 0.2 % (ref 0–5)
INR PPP: 1.43 (ref 0.91–1.09)
LYMPHOCYTES # BLD AUTO: 0.95 10*3/MM3 (ref 0.72–4.86)
LYMPHOCYTES NFR BLD AUTO: 20.2 % (ref 15–45)
MCH RBC QN AUTO: 30.3 PG (ref 28–32)
MCHC RBC AUTO-ENTMCNC: 30.9 G/DL (ref 33–36)
MCV RBC AUTO: 98.1 FL (ref 82–98)
MONOCYTES # BLD AUTO: 0.56 10*3/MM3 (ref 0.19–1.3)
MONOCYTES NFR BLD AUTO: 11.9 % (ref 4–12)
NEUTROPHILS # BLD AUTO: 2.78 10*3/MM3 (ref 1.87–8.4)
NEUTROPHILS NFR BLD AUTO: 59.1 % (ref 39–78)
NRBC BLD MANUAL-RTO: 0 /100 WBC (ref 0–0)
PLATELET # BLD AUTO: 305 10*3/MM3 (ref 130–400)
PMV BLD AUTO: 8.9 FL (ref 6–12)
POTASSIUM BLD-SCNC: 4.4 MMOL/L (ref 3.5–5.3)
PROTHROMBIN TIME: 17.9 SECONDS (ref 11.9–14.6)
RBC # BLD AUTO: 3.63 10*6/MM3 (ref 4.2–5.4)
SODIUM BLD-SCNC: 142 MMOL/L (ref 135–145)
WBC NRBC COR # BLD: 4.7 10*3/MM3 (ref 4.8–10.8)

## 2017-12-10 PROCEDURE — 80048 BASIC METABOLIC PNL TOTAL CA: CPT | Performed by: INTERNAL MEDICINE

## 2017-12-10 PROCEDURE — 63710000001 DIPHENHYDRAMINE PER 50 MG: Performed by: INTERNAL MEDICINE

## 2017-12-10 PROCEDURE — 85610 PROTHROMBIN TIME: CPT | Performed by: INTERNAL MEDICINE

## 2017-12-10 PROCEDURE — 25010000002 VANCOMYCIN: Performed by: INTERNAL MEDICINE

## 2017-12-10 PROCEDURE — 85025 COMPLETE CBC W/AUTO DIFF WBC: CPT | Performed by: INTERNAL MEDICINE

## 2017-12-10 RX ORDER — PERMETHRIN 50 MG/G
CREAM TOPICAL ONCE
Status: DISCONTINUED | OUTPATIENT
Start: 2017-12-10 | End: 2018-01-04 | Stop reason: HOSPADM

## 2017-12-10 RX ORDER — PERMETHRIN 50 MG/G
CREAM TOPICAL ONCE
Status: DISCONTINUED | OUTPATIENT
Start: 2017-12-17 | End: 2018-01-04 | Stop reason: HOSPADM

## 2017-12-11 LAB
ANION GAP SERPL CALCULATED.3IONS-SCNC: 8 MMOL/L (ref 4–13)
BASOPHILS # BLD AUTO: 0.03 10*3/MM3 (ref 0–0.2)
BASOPHILS NFR BLD AUTO: 0.7 % (ref 0–2)
BUN BLD-MCNC: 36 MG/DL (ref 5–21)
BUN/CREAT SERPL: 25.5 (ref 7–25)
CALCIUM SPEC-SCNC: 9.7 MG/DL (ref 8.4–10.4)
CHLORIDE SERPL-SCNC: 106 MMOL/L (ref 98–110)
CO2 SERPL-SCNC: 29 MMOL/L (ref 24–31)
CREAT BLD-MCNC: 1.41 MG/DL (ref 0.5–1.4)
CRP SERPL-MCNC: 1.33 MG/DL (ref 0–0.99)
DEPRECATED RDW RBC AUTO: 44.3 FL (ref 40–54)
EOSINOPHIL # BLD AUTO: 0.32 10*3/MM3 (ref 0–0.7)
EOSINOPHIL NFR BLD AUTO: 7.5 % (ref 0–4)
ERYTHROCYTE [DISTWIDTH] IN BLOOD BY AUTOMATED COUNT: 12.5 % (ref 12–15)
ERYTHROCYTE [SEDIMENTATION RATE] IN BLOOD: 65 MM/HR (ref 0–20)
GFR SERPL CREATININE-BSD FRML MDRD: 37 ML/MIN/1.73
GLUCOSE BLD-MCNC: 96 MG/DL (ref 70–100)
HCT VFR BLD AUTO: 34.1 % (ref 37–47)
HGB BLD-MCNC: 10.8 G/DL (ref 12–16)
IMM GRANULOCYTES # BLD: 0.01 10*3/MM3 (ref 0–0.03)
IMM GRANULOCYTES NFR BLD: 0.2 % (ref 0–5)
INR PPP: 1.49 (ref 0.91–1.09)
LYMPHOCYTES # BLD AUTO: 1 10*3/MM3 (ref 0.72–4.86)
LYMPHOCYTES NFR BLD AUTO: 23.6 % (ref 15–45)
MCH RBC QN AUTO: 30.4 PG (ref 28–32)
MCHC RBC AUTO-ENTMCNC: 31.7 G/DL (ref 33–36)
MCV RBC AUTO: 96.1 FL (ref 82–98)
MONOCYTES # BLD AUTO: 0.55 10*3/MM3 (ref 0.19–1.3)
MONOCYTES NFR BLD AUTO: 13 % (ref 4–12)
NEUTROPHILS # BLD AUTO: 2.33 10*3/MM3 (ref 1.87–8.4)
NEUTROPHILS NFR BLD AUTO: 55 % (ref 39–78)
NRBC BLD MANUAL-RTO: 0 /100 WBC (ref 0–0)
PLATELET # BLD AUTO: 280 10*3/MM3 (ref 130–400)
PMV BLD AUTO: 9.1 FL (ref 6–12)
POTASSIUM BLD-SCNC: 4.2 MMOL/L (ref 3.5–5.3)
PROTHROMBIN TIME: 18.5 SECONDS (ref 11.9–14.6)
RBC # BLD AUTO: 3.55 10*6/MM3 (ref 4.2–5.4)
SODIUM BLD-SCNC: 143 MMOL/L (ref 135–145)
WBC NRBC COR # BLD: 4.24 10*3/MM3 (ref 4.8–10.8)

## 2017-12-11 PROCEDURE — 80048 BASIC METABOLIC PNL TOTAL CA: CPT | Performed by: NURSE PRACTITIONER

## 2017-12-11 PROCEDURE — 86140 C-REACTIVE PROTEIN: CPT | Performed by: NURSE PRACTITIONER

## 2017-12-11 PROCEDURE — 97116 GAIT TRAINING THERAPY: CPT

## 2017-12-11 PROCEDURE — 85651 RBC SED RATE NONAUTOMATED: CPT | Performed by: NURSE PRACTITIONER

## 2017-12-11 PROCEDURE — 97535 SELF CARE MNGMENT TRAINING: CPT

## 2017-12-11 PROCEDURE — 25010000002 VANCOMYCIN: Performed by: INTERNAL MEDICINE

## 2017-12-11 PROCEDURE — 85610 PROTHROMBIN TIME: CPT | Performed by: INTERNAL MEDICINE

## 2017-12-11 PROCEDURE — 85025 COMPLETE CBC W/AUTO DIFF WBC: CPT | Performed by: NURSE PRACTITIONER

## 2017-12-11 PROCEDURE — 63710000001 DIPHENHYDRAMINE PER 50 MG: Performed by: INTERNAL MEDICINE

## 2017-12-11 RX ORDER — WARFARIN SODIUM 7.5 MG/1
15 TABLET ORAL
Status: DISCONTINUED | OUTPATIENT
Start: 2017-12-11 | End: 2017-12-14

## 2017-12-12 LAB
ANION GAP SERPL CALCULATED.3IONS-SCNC: 10 MMOL/L (ref 4–13)
BASOPHILS # BLD AUTO: 0.02 10*3/MM3 (ref 0–0.2)
BASOPHILS NFR BLD AUTO: 0.4 % (ref 0–2)
BUN BLD-MCNC: 40 MG/DL (ref 5–21)
BUN/CREAT SERPL: 30.3 (ref 7–25)
CALCIUM SPEC-SCNC: 9.9 MG/DL (ref 8.4–10.4)
CHLORIDE SERPL-SCNC: 105 MMOL/L (ref 98–110)
CO2 SERPL-SCNC: 28 MMOL/L (ref 24–31)
CREAT BLD-MCNC: 1.32 MG/DL (ref 0.5–1.4)
DEPRECATED RDW RBC AUTO: 44.3 FL (ref 40–54)
EOSINOPHIL # BLD AUTO: 0.32 10*3/MM3 (ref 0–0.7)
EOSINOPHIL NFR BLD AUTO: 6.7 % (ref 0–4)
ERYTHROCYTE [DISTWIDTH] IN BLOOD BY AUTOMATED COUNT: 12.8 % (ref 12–15)
GFR SERPL CREATININE-BSD FRML MDRD: 39 ML/MIN/1.73
GLUCOSE BLD-MCNC: 89 MG/DL (ref 70–100)
HCT VFR BLD AUTO: 35.9 % (ref 37–47)
HGB BLD-MCNC: 11.6 G/DL (ref 12–16)
IMM GRANULOCYTES # BLD: 0.01 10*3/MM3 (ref 0–0.03)
IMM GRANULOCYTES NFR BLD: 0.2 % (ref 0–5)
INR PPP: 1.51 (ref 0.91–1.09)
LYMPHOCYTES # BLD AUTO: 1.11 10*3/MM3 (ref 0.72–4.86)
LYMPHOCYTES NFR BLD AUTO: 23.4 % (ref 15–45)
MCH RBC QN AUTO: 30.8 PG (ref 28–32)
MCHC RBC AUTO-ENTMCNC: 32.3 G/DL (ref 33–36)
MCV RBC AUTO: 95.2 FL (ref 82–98)
MONOCYTES # BLD AUTO: 0.63 10*3/MM3 (ref 0.19–1.3)
MONOCYTES NFR BLD AUTO: 13.3 % (ref 4–12)
NEUTROPHILS # BLD AUTO: 2.66 10*3/MM3 (ref 1.87–8.4)
NEUTROPHILS NFR BLD AUTO: 56 % (ref 39–78)
NRBC BLD MANUAL-RTO: 0 /100 WBC (ref 0–0)
PLATELET # BLD AUTO: 300 10*3/MM3 (ref 130–400)
PMV BLD AUTO: 9.2 FL (ref 6–12)
POTASSIUM BLD-SCNC: 4.3 MMOL/L (ref 3.5–5.3)
PROTHROMBIN TIME: 18.7 SECONDS (ref 11.9–14.6)
RBC # BLD AUTO: 3.77 10*6/MM3 (ref 4.2–5.4)
SODIUM BLD-SCNC: 143 MMOL/L (ref 135–145)
VANCOMYCIN TROUGH SERPL-MCNC: 20.01 MCG/ML (ref 10–20)
WBC NRBC COR # BLD: 4.75 10*3/MM3 (ref 4.8–10.8)

## 2017-12-12 PROCEDURE — 85610 PROTHROMBIN TIME: CPT | Performed by: INTERNAL MEDICINE

## 2017-12-12 PROCEDURE — 97116 GAIT TRAINING THERAPY: CPT

## 2017-12-12 PROCEDURE — 25010000002 ENOXAPARIN PER 10 MG: Performed by: INTERNAL MEDICINE

## 2017-12-12 PROCEDURE — 80048 BASIC METABOLIC PNL TOTAL CA: CPT | Performed by: INTERNAL MEDICINE

## 2017-12-12 PROCEDURE — 97110 THERAPEUTIC EXERCISES: CPT

## 2017-12-12 PROCEDURE — 63710000001 DIPHENHYDRAMINE PER 50 MG: Performed by: INTERNAL MEDICINE

## 2017-12-12 PROCEDURE — 80202 ASSAY OF VANCOMYCIN: CPT | Performed by: INTERNAL MEDICINE

## 2017-12-12 PROCEDURE — 85025 COMPLETE CBC W/AUTO DIFF WBC: CPT | Performed by: INTERNAL MEDICINE

## 2017-12-12 PROCEDURE — 25010000002 VANCOMYCIN: Performed by: INTERNAL MEDICINE

## 2017-12-12 RX ORDER — VANCOMYCIN HYDROCHLORIDE 1 G/200ML
1000 INJECTION, SOLUTION INTRAVENOUS EVERY 24 HOURS
Status: DISCONTINUED | OUTPATIENT
Start: 2017-12-13 | End: 2017-12-17 | Stop reason: DRUGHIGH

## 2017-12-13 LAB
ANION GAP SERPL CALCULATED.3IONS-SCNC: 8 MMOL/L (ref 4–13)
BASOPHILS # BLD AUTO: 0.02 10*3/MM3 (ref 0–0.2)
BASOPHILS NFR BLD AUTO: 0.5 % (ref 0–2)
BUN BLD-MCNC: 41 MG/DL (ref 5–21)
BUN/CREAT SERPL: 28.5 (ref 7–25)
CALCIUM SPEC-SCNC: 9.7 MG/DL (ref 8.4–10.4)
CHLORIDE SERPL-SCNC: 105 MMOL/L (ref 98–110)
CO2 SERPL-SCNC: 28 MMOL/L (ref 24–31)
CREAT BLD-MCNC: 1.44 MG/DL (ref 0.5–1.4)
DEPRECATED RDW RBC AUTO: 44.4 FL (ref 40–54)
EOSINOPHIL # BLD AUTO: 0.31 10*3/MM3 (ref 0–0.7)
EOSINOPHIL NFR BLD AUTO: 7.7 % (ref 0–4)
ERYTHROCYTE [DISTWIDTH] IN BLOOD BY AUTOMATED COUNT: 12.6 % (ref 12–15)
GFR SERPL CREATININE-BSD FRML MDRD: 36 ML/MIN/1.73
GLUCOSE BLD-MCNC: 136 MG/DL (ref 70–100)
HCT VFR BLD AUTO: 32.9 % (ref 37–47)
HGB BLD-MCNC: 10.3 G/DL (ref 12–16)
IMM GRANULOCYTES # BLD: 0.01 10*3/MM3 (ref 0–0.03)
IMM GRANULOCYTES NFR BLD: 0.2 % (ref 0–5)
INR PPP: 1.67 (ref 0.91–1.09)
LYMPHOCYTES # BLD AUTO: 0.88 10*3/MM3 (ref 0.72–4.86)
LYMPHOCYTES NFR BLD AUTO: 21.8 % (ref 15–45)
MCH RBC QN AUTO: 30.1 PG (ref 28–32)
MCHC RBC AUTO-ENTMCNC: 31.3 G/DL (ref 33–36)
MCV RBC AUTO: 96.2 FL (ref 82–98)
MONOCYTES # BLD AUTO: 0.48 10*3/MM3 (ref 0.19–1.3)
MONOCYTES NFR BLD AUTO: 11.9 % (ref 4–12)
NEUTROPHILS # BLD AUTO: 2.33 10*3/MM3 (ref 1.87–8.4)
NEUTROPHILS NFR BLD AUTO: 57.9 % (ref 39–78)
NRBC BLD MANUAL-RTO: 0 /100 WBC (ref 0–0)
PLATELET # BLD AUTO: 247 10*3/MM3 (ref 130–400)
PMV BLD AUTO: 9 FL (ref 6–12)
POTASSIUM BLD-SCNC: 4.1 MMOL/L (ref 3.5–5.3)
PROTHROMBIN TIME: 20.3 SECONDS (ref 11.9–14.6)
RBC # BLD AUTO: 3.42 10*6/MM3 (ref 4.2–5.4)
SODIUM BLD-SCNC: 141 MMOL/L (ref 135–145)
WBC NRBC COR # BLD: 4.03 10*3/MM3 (ref 4.8–10.8)

## 2017-12-13 PROCEDURE — 85610 PROTHROMBIN TIME: CPT | Performed by: INTERNAL MEDICINE

## 2017-12-13 PROCEDURE — 85025 COMPLETE CBC W/AUTO DIFF WBC: CPT | Performed by: INTERNAL MEDICINE

## 2017-12-13 PROCEDURE — 25010000002 ENOXAPARIN PER 10 MG: Performed by: INTERNAL MEDICINE

## 2017-12-13 PROCEDURE — 63710000001 DIPHENHYDRAMINE PER 50 MG: Performed by: INTERNAL MEDICINE

## 2017-12-13 PROCEDURE — 97530 THERAPEUTIC ACTIVITIES: CPT

## 2017-12-13 PROCEDURE — 25010000002 VANCOMYCIN PER 500 MG: Performed by: INTERNAL MEDICINE

## 2017-12-13 PROCEDURE — 97116 GAIT TRAINING THERAPY: CPT

## 2017-12-13 PROCEDURE — 80048 BASIC METABOLIC PNL TOTAL CA: CPT | Performed by: INTERNAL MEDICINE

## 2017-12-14 LAB
ANION GAP SERPL CALCULATED.3IONS-SCNC: 10 MMOL/L (ref 4–13)
BASOPHILS # BLD AUTO: 0.01 10*3/MM3 (ref 0–0.2)
BASOPHILS NFR BLD AUTO: 0.2 % (ref 0–2)
BUN BLD-MCNC: 38 MG/DL (ref 5–21)
BUN/CREAT SERPL: 27.5 (ref 7–25)
CALCIUM SPEC-SCNC: 9.8 MG/DL (ref 8.4–10.4)
CHLORIDE SERPL-SCNC: 105 MMOL/L (ref 98–110)
CO2 SERPL-SCNC: 26 MMOL/L (ref 24–31)
CREAT BLD-MCNC: 1.38 MG/DL (ref 0.5–1.4)
CRP SERPL-MCNC: 0.63 MG/DL (ref 0–0.99)
DEPRECATED RDW RBC AUTO: 45 FL (ref 40–54)
EOSINOPHIL # BLD AUTO: 0.35 10*3/MM3 (ref 0–0.7)
EOSINOPHIL NFR BLD AUTO: 7.7 % (ref 0–4)
ERYTHROCYTE [DISTWIDTH] IN BLOOD BY AUTOMATED COUNT: 12.9 % (ref 12–15)
ERYTHROCYTE [SEDIMENTATION RATE] IN BLOOD: 68 MM/HR (ref 0–20)
GFR SERPL CREATININE-BSD FRML MDRD: 37 ML/MIN/1.73
GLUCOSE BLD-MCNC: 86 MG/DL (ref 70–100)
HCT VFR BLD AUTO: 33.6 % (ref 37–47)
HGB BLD-MCNC: 10.7 G/DL (ref 12–16)
IMM GRANULOCYTES # BLD: 0.01 10*3/MM3 (ref 0–0.03)
IMM GRANULOCYTES NFR BLD: 0.2 % (ref 0–5)
INR PPP: 1.59 (ref 0.91–1.09)
LYMPHOCYTES # BLD AUTO: 1.2 10*3/MM3 (ref 0.72–4.86)
LYMPHOCYTES NFR BLD AUTO: 26.3 % (ref 15–45)
MCH RBC QN AUTO: 30.4 PG (ref 28–32)
MCHC RBC AUTO-ENTMCNC: 31.8 G/DL (ref 33–36)
MCV RBC AUTO: 95.5 FL (ref 82–98)
MONOCYTES # BLD AUTO: 0.57 10*3/MM3 (ref 0.19–1.3)
MONOCYTES NFR BLD AUTO: 12.5 % (ref 4–12)
NEUTROPHILS # BLD AUTO: 2.42 10*3/MM3 (ref 1.87–8.4)
NEUTROPHILS NFR BLD AUTO: 53.1 % (ref 39–78)
NRBC BLD MANUAL-RTO: 0 /100 WBC (ref 0–0)
PLATELET # BLD AUTO: 251 10*3/MM3 (ref 130–400)
PMV BLD AUTO: 9.3 FL (ref 6–12)
POTASSIUM BLD-SCNC: 4.2 MMOL/L (ref 3.5–5.3)
PROTHROMBIN TIME: 19.5 SECONDS (ref 11.9–14.6)
RBC # BLD AUTO: 3.52 10*6/MM3 (ref 4.2–5.4)
SODIUM BLD-SCNC: 141 MMOL/L (ref 135–145)
WBC NRBC COR # BLD: 4.56 10*3/MM3 (ref 4.8–10.8)

## 2017-12-14 PROCEDURE — 86140 C-REACTIVE PROTEIN: CPT | Performed by: NURSE PRACTITIONER

## 2017-12-14 PROCEDURE — 97116 GAIT TRAINING THERAPY: CPT

## 2017-12-14 PROCEDURE — 85610 PROTHROMBIN TIME: CPT | Performed by: INTERNAL MEDICINE

## 2017-12-14 PROCEDURE — 25010000002 VANCOMYCIN PER 500 MG: Performed by: INTERNAL MEDICINE

## 2017-12-14 PROCEDURE — 85025 COMPLETE CBC W/AUTO DIFF WBC: CPT | Performed by: NURSE PRACTITIONER

## 2017-12-14 PROCEDURE — 97110 THERAPEUTIC EXERCISES: CPT

## 2017-12-14 PROCEDURE — 85651 RBC SED RATE NONAUTOMATED: CPT | Performed by: NURSE PRACTITIONER

## 2017-12-14 PROCEDURE — 25010000002 ENOXAPARIN PER 10 MG: Performed by: INTERNAL MEDICINE

## 2017-12-14 PROCEDURE — 97164 PT RE-EVAL EST PLAN CARE: CPT

## 2017-12-14 PROCEDURE — 80048 BASIC METABOLIC PNL TOTAL CA: CPT | Performed by: NURSE PRACTITIONER

## 2017-12-14 PROCEDURE — 63710000001 DIPHENHYDRAMINE PER 50 MG: Performed by: INTERNAL MEDICINE

## 2017-12-14 RX ORDER — WARFARIN SODIUM 7.5 MG/1
15 TABLET ORAL
Status: DISCONTINUED | OUTPATIENT
Start: 2017-12-15 | End: 2017-12-16

## 2017-12-14 RX ORDER — GUAIFENESIN 600 MG/1
600 TABLET, EXTENDED RELEASE ORAL EVERY 12 HOURS SCHEDULED
Status: DISCONTINUED | OUTPATIENT
Start: 2017-12-14 | End: 2018-01-04 | Stop reason: HOSPADM

## 2017-12-14 RX ORDER — CETIRIZINE HYDROCHLORIDE 10 MG/1
10 TABLET ORAL NIGHTLY
Status: DISCONTINUED | OUTPATIENT
Start: 2017-12-14 | End: 2018-01-04 | Stop reason: HOSPADM

## 2017-12-15 LAB
ANION GAP SERPL CALCULATED.3IONS-SCNC: 8 MMOL/L (ref 4–13)
BASOPHILS # BLD AUTO: 0.01 10*3/MM3 (ref 0–0.2)
BASOPHILS NFR BLD AUTO: 0.3 % (ref 0–2)
BUN BLD-MCNC: 40 MG/DL (ref 5–21)
BUN/CREAT SERPL: 26 (ref 7–25)
CALCIUM SPEC-SCNC: 9.5 MG/DL (ref 8.4–10.4)
CHLORIDE SERPL-SCNC: 105 MMOL/L (ref 98–110)
CO2 SERPL-SCNC: 28 MMOL/L (ref 24–31)
CREAT BLD-MCNC: 1.54 MG/DL (ref 0.5–1.4)
DEPRECATED RDW RBC AUTO: 45.3 FL (ref 40–54)
EOSINOPHIL # BLD AUTO: 0.25 10*3/MM3 (ref 0–0.7)
EOSINOPHIL NFR BLD AUTO: 6.9 % (ref 0–4)
ERYTHROCYTE [DISTWIDTH] IN BLOOD BY AUTOMATED COUNT: 12.9 % (ref 12–15)
GFR SERPL CREATININE-BSD FRML MDRD: 33 ML/MIN/1.73
GLUCOSE BLD-MCNC: 103 MG/DL (ref 70–100)
HCT VFR BLD AUTO: 31.7 % (ref 37–47)
HGB BLD-MCNC: 10 G/DL (ref 12–16)
IMM GRANULOCYTES # BLD: 0.01 10*3/MM3 (ref 0–0.03)
IMM GRANULOCYTES NFR BLD: 0.3 % (ref 0–5)
INR PPP: 1.74 (ref 0.91–1.09)
LYMPHOCYTES # BLD AUTO: 0.84 10*3/MM3 (ref 0.72–4.86)
LYMPHOCYTES NFR BLD AUTO: 23.3 % (ref 15–45)
MCH RBC QN AUTO: 30.1 PG (ref 28–32)
MCHC RBC AUTO-ENTMCNC: 31.5 G/DL (ref 33–36)
MCV RBC AUTO: 95.5 FL (ref 82–98)
MONOCYTES # BLD AUTO: 0.55 10*3/MM3 (ref 0.19–1.3)
MONOCYTES NFR BLD AUTO: 15.2 % (ref 4–12)
NEUTROPHILS # BLD AUTO: 1.95 10*3/MM3 (ref 1.87–8.4)
NEUTROPHILS NFR BLD AUTO: 54 % (ref 39–78)
NRBC BLD MANUAL-RTO: 0 /100 WBC (ref 0–0)
PLATELET # BLD AUTO: 224 10*3/MM3 (ref 130–400)
PMV BLD AUTO: 9.4 FL (ref 6–12)
POTASSIUM BLD-SCNC: 4 MMOL/L (ref 3.5–5.3)
PROTHROMBIN TIME: 21 SECONDS (ref 11.9–14.6)
RBC # BLD AUTO: 3.32 10*6/MM3 (ref 4.2–5.4)
SODIUM BLD-SCNC: 141 MMOL/L (ref 135–145)
WBC NRBC COR # BLD: 3.61 10*3/MM3 (ref 4.8–10.8)

## 2017-12-15 PROCEDURE — 63710000001 DIPHENHYDRAMINE PER 50 MG: Performed by: INTERNAL MEDICINE

## 2017-12-15 PROCEDURE — 85025 COMPLETE CBC W/AUTO DIFF WBC: CPT | Performed by: INTERNAL MEDICINE

## 2017-12-15 PROCEDURE — 80048 BASIC METABOLIC PNL TOTAL CA: CPT | Performed by: INTERNAL MEDICINE

## 2017-12-15 PROCEDURE — 97110 THERAPEUTIC EXERCISES: CPT

## 2017-12-15 PROCEDURE — 25010000002 VANCOMYCIN PER 500 MG: Performed by: INTERNAL MEDICINE

## 2017-12-15 PROCEDURE — 85610 PROTHROMBIN TIME: CPT | Performed by: INTERNAL MEDICINE

## 2017-12-15 PROCEDURE — 97116 GAIT TRAINING THERAPY: CPT

## 2017-12-15 PROCEDURE — 25010000002 ENOXAPARIN PER 10 MG: Performed by: INTERNAL MEDICINE

## 2017-12-15 RX ORDER — TRIAMCINOLONE ACETONIDE 1 MG/G
CREAM TOPICAL EVERY 12 HOURS SCHEDULED
Status: DISCONTINUED | OUTPATIENT
Start: 2017-12-16 | End: 2018-01-04 | Stop reason: HOSPADM

## 2017-12-16 LAB
INR PPP: 1.65 (ref 0.91–1.09)
PROTHROMBIN TIME: 20.1 SECONDS (ref 11.9–14.6)

## 2017-12-16 PROCEDURE — 25010000002 VANCOMYCIN PER 500 MG: Performed by: INTERNAL MEDICINE

## 2017-12-16 PROCEDURE — 97116 GAIT TRAINING THERAPY: CPT

## 2017-12-16 PROCEDURE — 85610 PROTHROMBIN TIME: CPT | Performed by: NURSE PRACTITIONER

## 2017-12-17 LAB
INR PPP: 1.53 (ref 0.91–1.09)
PROTHROMBIN TIME: 18.9 SECONDS (ref 11.9–14.6)
VANCOMYCIN TROUGH SERPL-MCNC: 18.22 MCG/ML (ref 10–20)

## 2017-12-17 PROCEDURE — 25010000002 VANCOMYCIN PER 500 MG: Performed by: INTERNAL MEDICINE

## 2017-12-17 PROCEDURE — 80202 ASSAY OF VANCOMYCIN: CPT | Performed by: INTERNAL MEDICINE

## 2017-12-17 PROCEDURE — 63710000001 DIPHENHYDRAMINE PER 50 MG: Performed by: INTERNAL MEDICINE

## 2017-12-17 PROCEDURE — 85610 PROTHROMBIN TIME: CPT | Performed by: NURSE PRACTITIONER

## 2017-12-18 LAB
ALBUMIN SERPL-MCNC: 3.3 G/DL (ref 3.5–5)
ALBUMIN/GLOB SERPL: 0.8 G/DL (ref 1.1–2.5)
ALP SERPL-CCNC: 87 U/L (ref 24–120)
ALT SERPL W P-5'-P-CCNC: 34 U/L (ref 0–54)
ANION GAP SERPL CALCULATED.3IONS-SCNC: 8 MMOL/L (ref 4–13)
AST SERPL-CCNC: 23 U/L (ref 7–45)
BASOPHILS # BLD AUTO: 0.01 10*3/MM3 (ref 0–0.2)
BASOPHILS NFR BLD AUTO: 0.3 % (ref 0–2)
BILIRUB SERPL-MCNC: 0.2 MG/DL (ref 0.1–1)
BUN BLD-MCNC: 41 MG/DL (ref 5–21)
BUN/CREAT SERPL: 28.1 (ref 7–25)
CALCIUM SPEC-SCNC: 9.5 MG/DL (ref 8.4–10.4)
CHLORIDE SERPL-SCNC: 106 MMOL/L (ref 98–110)
CO2 SERPL-SCNC: 28 MMOL/L (ref 24–31)
CREAT BLD-MCNC: 1.46 MG/DL (ref 0.5–1.4)
CRP SERPL-MCNC: 0.64 MG/DL (ref 0–0.99)
DEPRECATED RDW RBC AUTO: 44.4 FL (ref 40–54)
EOSINOPHIL # BLD AUTO: 0.26 10*3/MM3 (ref 0–0.7)
EOSINOPHIL NFR BLD AUTO: 7 % (ref 0–4)
ERYTHROCYTE [DISTWIDTH] IN BLOOD BY AUTOMATED COUNT: 13 % (ref 12–15)
ERYTHROCYTE [SEDIMENTATION RATE] IN BLOOD: 61 MM/HR (ref 0–20)
GFR SERPL CREATININE-BSD FRML MDRD: 35 ML/MIN/1.73
GLOBULIN UR ELPH-MCNC: 4 GM/DL
GLUCOSE BLD-MCNC: 90 MG/DL (ref 70–100)
HCT VFR BLD AUTO: 32.2 % (ref 37–47)
HGB BLD-MCNC: 10.5 G/DL (ref 12–16)
IMM GRANULOCYTES # BLD: 0 10*3/MM3 (ref 0–0.03)
IMM GRANULOCYTES NFR BLD: 0 % (ref 0–5)
INR PPP: 1.61 (ref 0.91–1.09)
LYMPHOCYTES # BLD AUTO: 0.79 10*3/MM3 (ref 0.72–4.86)
LYMPHOCYTES NFR BLD AUTO: 21.4 % (ref 15–45)
MCH RBC QN AUTO: 30.5 PG (ref 28–32)
MCHC RBC AUTO-ENTMCNC: 32.6 G/DL (ref 33–36)
MCV RBC AUTO: 93.6 FL (ref 82–98)
MONOCYTES # BLD AUTO: 0.74 10*3/MM3 (ref 0.19–1.3)
MONOCYTES NFR BLD AUTO: 20 % (ref 4–12)
NEUTROPHILS # BLD AUTO: 1.9 10*3/MM3 (ref 1.87–8.4)
NEUTROPHILS NFR BLD AUTO: 51.3 % (ref 39–78)
NRBC BLD MANUAL-RTO: 0 /100 WBC (ref 0–0)
PLATELET # BLD AUTO: 162 10*3/MM3 (ref 130–400)
PMV BLD AUTO: 9.3 FL (ref 6–12)
POTASSIUM BLD-SCNC: 4.1 MMOL/L (ref 3.5–5.3)
PROT SERPL-MCNC: 7.3 G/DL (ref 6.3–8.7)
PROTHROMBIN TIME: 19.7 SECONDS (ref 11.9–14.6)
RBC # BLD AUTO: 3.44 10*6/MM3 (ref 4.2–5.4)
SODIUM BLD-SCNC: 142 MMOL/L (ref 135–145)
WBC NRBC COR # BLD: 3.7 10*3/MM3 (ref 4.8–10.8)

## 2017-12-18 PROCEDURE — 85025 COMPLETE CBC W/AUTO DIFF WBC: CPT | Performed by: NURSE PRACTITIONER

## 2017-12-18 PROCEDURE — 97110 THERAPEUTIC EXERCISES: CPT

## 2017-12-18 PROCEDURE — 85651 RBC SED RATE NONAUTOMATED: CPT | Performed by: INTERNAL MEDICINE

## 2017-12-18 PROCEDURE — 97116 GAIT TRAINING THERAPY: CPT

## 2017-12-18 PROCEDURE — 63710000001 DIPHENHYDRAMINE PER 50 MG: Performed by: INTERNAL MEDICINE

## 2017-12-18 PROCEDURE — 25010000002 VANCOMYCIN: Performed by: INTERNAL MEDICINE

## 2017-12-18 PROCEDURE — 85610 PROTHROMBIN TIME: CPT | Performed by: NURSE PRACTITIONER

## 2017-12-18 PROCEDURE — 80053 COMPREHEN METABOLIC PANEL: CPT | Performed by: INTERNAL MEDICINE

## 2017-12-18 PROCEDURE — 86140 C-REACTIVE PROTEIN: CPT | Performed by: INTERNAL MEDICINE

## 2017-12-19 LAB
INR PPP: 1.67 (ref 0.91–1.09)
PROTHROMBIN TIME: 20.3 SECONDS (ref 11.9–14.6)

## 2017-12-19 PROCEDURE — 97116 GAIT TRAINING THERAPY: CPT

## 2017-12-19 PROCEDURE — 85610 PROTHROMBIN TIME: CPT | Performed by: NURSE PRACTITIONER

## 2017-12-19 PROCEDURE — 63710000001 DIPHENHYDRAMINE PER 50 MG: Performed by: INTERNAL MEDICINE

## 2017-12-19 PROCEDURE — 25010000002 VANCOMYCIN: Performed by: INTERNAL MEDICINE

## 2017-12-20 LAB
INR PPP: 1.6 (ref 0.91–1.09)
PROTHROMBIN TIME: 19.6 SECONDS (ref 11.9–14.6)

## 2017-12-20 PROCEDURE — 85610 PROTHROMBIN TIME: CPT | Performed by: NURSE PRACTITIONER

## 2017-12-20 PROCEDURE — 97110 THERAPEUTIC EXERCISES: CPT

## 2017-12-20 PROCEDURE — 25010000002 VANCOMYCIN: Performed by: INTERNAL MEDICINE

## 2017-12-20 PROCEDURE — 97116 GAIT TRAINING THERAPY: CPT

## 2017-12-20 PROCEDURE — 63710000001 DIPHENHYDRAMINE PER 50 MG: Performed by: INTERNAL MEDICINE

## 2017-12-20 RX ORDER — WARFARIN SODIUM 6 MG/1
18 TABLET ORAL
Status: DISCONTINUED | OUTPATIENT
Start: 2017-12-20 | End: 2017-12-27

## 2017-12-20 RX ORDER — POLYVINYL ALCOHOL 14 MG/ML
2 SOLUTION/ DROPS OPHTHALMIC 2 TIMES DAILY
Status: DISCONTINUED | OUTPATIENT
Start: 2017-12-20 | End: 2018-01-04 | Stop reason: HOSPADM

## 2017-12-21 LAB
ANION GAP SERPL CALCULATED.3IONS-SCNC: 11 MMOL/L (ref 4–13)
BASOPHILS # BLD AUTO: 0.01 10*3/MM3 (ref 0–0.2)
BASOPHILS NFR BLD AUTO: 0.2 % (ref 0–2)
BUN BLD-MCNC: 44 MG/DL (ref 5–21)
BUN/CREAT SERPL: 30.1 (ref 7–25)
CALCIUM SPEC-SCNC: 9.7 MG/DL (ref 8.4–10.4)
CHLORIDE SERPL-SCNC: 107 MMOL/L (ref 98–110)
CO2 SERPL-SCNC: 26 MMOL/L (ref 24–31)
CREAT BLD-MCNC: 1.46 MG/DL (ref 0.5–1.4)
CRP SERPL-MCNC: 0.78 MG/DL (ref 0–0.99)
DEPRECATED RDW RBC AUTO: 44.6 FL (ref 40–54)
EOSINOPHIL # BLD AUTO: 0.29 10*3/MM3 (ref 0–0.7)
EOSINOPHIL NFR BLD AUTO: 6.1 % (ref 0–4)
ERYTHROCYTE [DISTWIDTH] IN BLOOD BY AUTOMATED COUNT: 12.9 % (ref 12–15)
ERYTHROCYTE [SEDIMENTATION RATE] IN BLOOD: 55 MM/HR (ref 0–20)
GFR SERPL CREATININE-BSD FRML MDRD: 35 ML/MIN/1.73
GLUCOSE BLD-MCNC: 87 MG/DL (ref 70–100)
HCT VFR BLD AUTO: 32.2 % (ref 37–47)
HGB BLD-MCNC: 10.1 G/DL (ref 12–16)
IMM GRANULOCYTES # BLD: 0.01 10*3/MM3 (ref 0–0.03)
IMM GRANULOCYTES NFR BLD: 0.2 % (ref 0–5)
INR PPP: 1.65 (ref 0.91–1.09)
LYMPHOCYTES # BLD AUTO: 0.88 10*3/MM3 (ref 0.72–4.86)
LYMPHOCYTES NFR BLD AUTO: 18.5 % (ref 15–45)
MCH RBC QN AUTO: 29.4 PG (ref 28–32)
MCHC RBC AUTO-ENTMCNC: 31.4 G/DL (ref 33–36)
MCV RBC AUTO: 93.9 FL (ref 82–98)
MONOCYTES # BLD AUTO: 0.81 10*3/MM3 (ref 0.19–1.3)
MONOCYTES NFR BLD AUTO: 17 % (ref 4–12)
NEUTROPHILS # BLD AUTO: 2.76 10*3/MM3 (ref 1.87–8.4)
NEUTROPHILS NFR BLD AUTO: 58 % (ref 39–78)
NRBC BLD MANUAL-RTO: 0 /100 WBC (ref 0–0)
PLATELET # BLD AUTO: 150 10*3/MM3 (ref 130–400)
PMV BLD AUTO: 9.6 FL (ref 6–12)
POTASSIUM BLD-SCNC: 4 MMOL/L (ref 3.5–5.3)
PROTHROMBIN TIME: 20.1 SECONDS (ref 11.9–14.6)
RBC # BLD AUTO: 3.43 10*6/MM3 (ref 4.2–5.4)
SODIUM BLD-SCNC: 144 MMOL/L (ref 135–145)
VANCOMYCIN TROUGH SERPL-MCNC: 13.6 MCG/ML (ref 10–20)
WBC NRBC COR # BLD: 4.76 10*3/MM3 (ref 4.8–10.8)

## 2017-12-21 PROCEDURE — 85651 RBC SED RATE NONAUTOMATED: CPT | Performed by: NURSE PRACTITIONER

## 2017-12-21 PROCEDURE — 97116 GAIT TRAINING THERAPY: CPT

## 2017-12-21 PROCEDURE — 85610 PROTHROMBIN TIME: CPT | Performed by: NURSE PRACTITIONER

## 2017-12-21 PROCEDURE — 86140 C-REACTIVE PROTEIN: CPT | Performed by: NURSE PRACTITIONER

## 2017-12-21 PROCEDURE — 80202 ASSAY OF VANCOMYCIN: CPT | Performed by: INTERNAL MEDICINE

## 2017-12-21 PROCEDURE — 85025 COMPLETE CBC W/AUTO DIFF WBC: CPT | Performed by: NURSE PRACTITIONER

## 2017-12-21 PROCEDURE — 25010000002 VANCOMYCIN: Performed by: INTERNAL MEDICINE

## 2017-12-21 PROCEDURE — 97110 THERAPEUTIC EXERCISES: CPT

## 2017-12-21 PROCEDURE — 80048 BASIC METABOLIC PNL TOTAL CA: CPT | Performed by: NURSE PRACTITIONER

## 2017-12-21 RX ORDER — VANCOMYCIN HYDROCHLORIDE 1 G/200ML
1000 INJECTION, SOLUTION INTRAVENOUS EVERY 24 HOURS
Status: DISCONTINUED | OUTPATIENT
Start: 2017-12-22 | End: 2017-12-27 | Stop reason: DRUGHIGH

## 2017-12-22 LAB
INR PPP: 1.67 (ref 0.91–1.09)
PROTHROMBIN TIME: 20.3 SECONDS (ref 11.9–14.6)

## 2017-12-22 PROCEDURE — 63710000001 DIPHENHYDRAMINE PER 50 MG: Performed by: INTERNAL MEDICINE

## 2017-12-22 PROCEDURE — 85610 PROTHROMBIN TIME: CPT | Performed by: NURSE PRACTITIONER

## 2017-12-22 PROCEDURE — 25010000002 VANCOMYCIN PER 500 MG: Performed by: INTERNAL MEDICINE

## 2017-12-22 PROCEDURE — 97116 GAIT TRAINING THERAPY: CPT

## 2017-12-23 LAB
CREAT BLD-MCNC: 1.45 MG/DL (ref 0.5–1.4)
CREAT BLD-MCNC: 1.69 MG/DL (ref 0.5–1.4)
GFR SERPL CREATININE-BSD FRML MDRD: 30 ML/MIN/1.73
GFR SERPL CREATININE-BSD FRML MDRD: 35 ML/MIN/1.73
INR PPP: 1.81 (ref 0.91–1.09)
PROTHROMBIN TIME: 21.6 SECONDS (ref 11.9–14.6)
VANCOMYCIN TROUGH SERPL-MCNC: 14.78 MCG/ML (ref 10–20)

## 2017-12-23 PROCEDURE — 82565 ASSAY OF CREATININE: CPT | Performed by: INTERNAL MEDICINE

## 2017-12-23 PROCEDURE — 80202 ASSAY OF VANCOMYCIN: CPT | Performed by: INTERNAL MEDICINE

## 2017-12-23 PROCEDURE — 85610 PROTHROMBIN TIME: CPT | Performed by: NURSE PRACTITIONER

## 2017-12-23 PROCEDURE — 25010000002 VANCOMYCIN PER 500 MG: Performed by: INTERNAL MEDICINE

## 2017-12-23 PROCEDURE — 97116 GAIT TRAINING THERAPY: CPT

## 2017-12-23 PROCEDURE — 63710000001 DIPHENHYDRAMINE PER 50 MG: Performed by: INTERNAL MEDICINE

## 2017-12-24 LAB
GLUCOSE BLDC GLUCOMTR-MCNC: 102 MG/DL (ref 70–130)
GLUCOSE BLDC GLUCOMTR-MCNC: 113 MG/DL (ref 70–130)
GLUCOSE BLDC GLUCOMTR-MCNC: 86 MG/DL (ref 70–130)
INR PPP: 1.79 (ref 0.91–1.09)
PROTHROMBIN TIME: 21.4 SECONDS (ref 11.9–14.6)

## 2017-12-24 PROCEDURE — 25010000002 VANCOMYCIN PER 500 MG: Performed by: INTERNAL MEDICINE

## 2017-12-24 PROCEDURE — 82962 GLUCOSE BLOOD TEST: CPT

## 2017-12-24 PROCEDURE — 85610 PROTHROMBIN TIME: CPT | Performed by: NURSE PRACTITIONER

## 2017-12-25 LAB
ANION GAP SERPL CALCULATED.3IONS-SCNC: 7 MMOL/L (ref 4–13)
BASOPHILS # BLD AUTO: 0.02 10*3/MM3 (ref 0–0.2)
BASOPHILS NFR BLD AUTO: 0.4 % (ref 0–2)
BUN BLD-MCNC: 50 MG/DL (ref 5–21)
BUN/CREAT SERPL: 35.7 (ref 7–25)
CALCIUM SPEC-SCNC: 9.7 MG/DL (ref 8.4–10.4)
CHLORIDE SERPL-SCNC: 107 MMOL/L (ref 98–110)
CO2 SERPL-SCNC: 27 MMOL/L (ref 24–31)
CREAT BLD-MCNC: 1.4 MG/DL (ref 0.5–1.4)
DEPRECATED RDW RBC AUTO: 44.6 FL (ref 40–54)
EOSINOPHIL # BLD AUTO: 0.34 10*3/MM3 (ref 0–0.7)
EOSINOPHIL NFR BLD AUTO: 6.7 % (ref 0–4)
ERYTHROCYTE [DISTWIDTH] IN BLOOD BY AUTOMATED COUNT: 13.1 % (ref 12–15)
GFR SERPL CREATININE-BSD FRML MDRD: 37 ML/MIN/1.73
GLUCOSE BLD-MCNC: 91 MG/DL (ref 70–100)
HCT VFR BLD AUTO: 34.9 % (ref 37–47)
HGB BLD-MCNC: 11.2 G/DL (ref 12–16)
IMM GRANULOCYTES # BLD: 0.01 10*3/MM3 (ref 0–0.03)
IMM GRANULOCYTES NFR BLD: 0.2 % (ref 0–5)
INR PPP: 1.71 (ref 0.91–1.09)
LYMPHOCYTES # BLD AUTO: 0.82 10*3/MM3 (ref 0.72–4.86)
LYMPHOCYTES NFR BLD AUTO: 16.1 % (ref 15–45)
MCH RBC QN AUTO: 29.9 PG (ref 28–32)
MCHC RBC AUTO-ENTMCNC: 32.1 G/DL (ref 33–36)
MCV RBC AUTO: 93.3 FL (ref 82–98)
MONOCYTES # BLD AUTO: 0.67 10*3/MM3 (ref 0.19–1.3)
MONOCYTES NFR BLD AUTO: 13.2 % (ref 4–12)
NEUTROPHILS # BLD AUTO: 3.23 10*3/MM3 (ref 1.87–8.4)
NEUTROPHILS NFR BLD AUTO: 63.4 % (ref 39–78)
NRBC BLD MANUAL-RTO: 0 /100 WBC (ref 0–0)
PLATELET # BLD AUTO: 136 10*3/MM3 (ref 130–400)
PMV BLD AUTO: 9.3 FL (ref 6–12)
POTASSIUM BLD-SCNC: 4.2 MMOL/L (ref 3.5–5.3)
PROTHROMBIN TIME: 20.7 SECONDS (ref 11.9–14.6)
RBC # BLD AUTO: 3.74 10*6/MM3 (ref 4.2–5.4)
SODIUM BLD-SCNC: 141 MMOL/L (ref 135–145)
WBC NRBC COR # BLD: 5.09 10*3/MM3 (ref 4.8–10.8)

## 2017-12-25 PROCEDURE — 85610 PROTHROMBIN TIME: CPT | Performed by: NURSE PRACTITIONER

## 2017-12-25 PROCEDURE — 63710000001 DIPHENHYDRAMINE PER 50 MG: Performed by: INTERNAL MEDICINE

## 2017-12-25 PROCEDURE — 85025 COMPLETE CBC W/AUTO DIFF WBC: CPT | Performed by: NURSE PRACTITIONER

## 2017-12-25 PROCEDURE — 80048 BASIC METABOLIC PNL TOTAL CA: CPT | Performed by: NURSE PRACTITIONER

## 2017-12-25 PROCEDURE — 25010000002 VANCOMYCIN PER 500 MG: Performed by: INTERNAL MEDICINE

## 2017-12-26 LAB
ALBUMIN SERPL-MCNC: 3.1 G/DL (ref 3.5–5)
ALP SERPL-CCNC: 79 U/L (ref 24–120)
ALT SERPL W P-5'-P-CCNC: 24 U/L (ref 0–54)
ANION GAP SERPL CALCULATED.3IONS-SCNC: 11 MMOL/L (ref 4–13)
AST SERPL-CCNC: 20 U/L (ref 7–45)
BASOPHILS # BLD AUTO: 0.02 10*3/MM3 (ref 0–0.2)
BASOPHILS NFR BLD AUTO: 0.4 % (ref 0–2)
BILIRUB CONJ SERPL-MCNC: 0 MG/DL (ref 0–0.3)
BILIRUB INDIRECT SERPL-MCNC: 0 MG/DL (ref 0–1.1)
BILIRUB SERPL-MCNC: 0.2 MG/DL (ref 0.1–1)
BUN BLD-MCNC: 49 MG/DL (ref 5–21)
BUN/CREAT SERPL: 31 (ref 7–25)
CALCIUM SPEC-SCNC: 9.4 MG/DL (ref 8.4–10.4)
CHLORIDE SERPL-SCNC: 107 MMOL/L (ref 98–110)
CO2 SERPL-SCNC: 26 MMOL/L (ref 24–31)
CREAT BLD-MCNC: 1.58 MG/DL (ref 0.5–1.4)
CRP SERPL-MCNC: 0.6 MG/DL (ref 0–0.99)
DEPRECATED RDW RBC AUTO: 44.6 FL (ref 40–54)
EOSINOPHIL # BLD AUTO: 0.36 10*3/MM3 (ref 0–0.7)
EOSINOPHIL NFR BLD AUTO: 7.3 % (ref 0–4)
ERYTHROCYTE [DISTWIDTH] IN BLOOD BY AUTOMATED COUNT: 13 % (ref 12–15)
ERYTHROCYTE [SEDIMENTATION RATE] IN BLOOD: 46 MM/HR (ref 0–20)
GFR SERPL CREATININE-BSD FRML MDRD: 32 ML/MIN/1.73
GLUCOSE BLD-MCNC: 131 MG/DL (ref 70–100)
HCT VFR BLD AUTO: 30.8 % (ref 37–47)
HGB BLD-MCNC: 10 G/DL (ref 12–16)
IMM GRANULOCYTES # BLD: 0.01 10*3/MM3 (ref 0–0.03)
IMM GRANULOCYTES NFR BLD: 0.2 % (ref 0–5)
INR PPP: 1.46 (ref 0.91–1.09)
LYMPHOCYTES # BLD AUTO: 0.87 10*3/MM3 (ref 0.72–4.86)
LYMPHOCYTES NFR BLD AUTO: 17.5 % (ref 15–45)
MCH RBC QN AUTO: 30.3 PG (ref 28–32)
MCHC RBC AUTO-ENTMCNC: 32.5 G/DL (ref 33–36)
MCV RBC AUTO: 93.3 FL (ref 82–98)
MONOCYTES # BLD AUTO: 0.8 10*3/MM3 (ref 0.19–1.3)
MONOCYTES NFR BLD AUTO: 16.1 % (ref 4–12)
NEUTROPHILS # BLD AUTO: 2.9 10*3/MM3 (ref 1.87–8.4)
NEUTROPHILS NFR BLD AUTO: 58.5 % (ref 39–78)
NRBC BLD MANUAL-RTO: 0 /100 WBC (ref 0–0)
NT-PROBNP SERPL-MCNC: 1550 PG/ML (ref 0–900)
PLATELET # BLD AUTO: 147 10*3/MM3 (ref 130–400)
PMV BLD AUTO: 9.7 FL (ref 6–12)
POTASSIUM BLD-SCNC: 3.9 MMOL/L (ref 3.5–5.3)
PROT SERPL-MCNC: 7.1 G/DL (ref 6.3–8.7)
PROTHROMBIN TIME: 18.2 SECONDS (ref 11.9–14.6)
RBC # BLD AUTO: 3.3 10*6/MM3 (ref 4.2–5.4)
SODIUM BLD-SCNC: 144 MMOL/L (ref 135–145)
WBC NRBC COR # BLD: 4.96 10*3/MM3 (ref 4.8–10.8)

## 2017-12-26 PROCEDURE — 63710000001 DIPHENHYDRAMINE PER 50 MG: Performed by: INTERNAL MEDICINE

## 2017-12-26 PROCEDURE — 80076 HEPATIC FUNCTION PANEL: CPT | Performed by: INTERNAL MEDICINE

## 2017-12-26 PROCEDURE — 86140 C-REACTIVE PROTEIN: CPT | Performed by: NURSE PRACTITIONER

## 2017-12-26 PROCEDURE — 85651 RBC SED RATE NONAUTOMATED: CPT | Performed by: NURSE PRACTITIONER

## 2017-12-26 PROCEDURE — 97116 GAIT TRAINING THERAPY: CPT

## 2017-12-26 PROCEDURE — 97110 THERAPEUTIC EXERCISES: CPT

## 2017-12-26 PROCEDURE — 85025 COMPLETE CBC W/AUTO DIFF WBC: CPT | Performed by: NURSE PRACTITIONER

## 2017-12-26 PROCEDURE — 83880 ASSAY OF NATRIURETIC PEPTIDE: CPT | Performed by: NURSE PRACTITIONER

## 2017-12-26 PROCEDURE — 80048 BASIC METABOLIC PNL TOTAL CA: CPT | Performed by: NURSE PRACTITIONER

## 2017-12-26 PROCEDURE — 85610 PROTHROMBIN TIME: CPT | Performed by: NURSE PRACTITIONER

## 2017-12-26 PROCEDURE — 25010000002 VANCOMYCIN PER 500 MG: Performed by: INTERNAL MEDICINE

## 2017-12-26 RX ORDER — RIFAMPIN 300 MG/1
300 CAPSULE ORAL EVERY 12 HOURS SCHEDULED
Status: DISCONTINUED | OUTPATIENT
Start: 2017-12-26 | End: 2018-01-03

## 2017-12-27 LAB
CREAT BLD-MCNC: 1.52 MG/DL (ref 0.5–1.4)
GFR SERPL CREATININE-BSD FRML MDRD: 34 ML/MIN/1.73
INR PPP: 1.53 (ref 0.91–1.09)
PROTHROMBIN TIME: 18.9 SECONDS (ref 11.9–14.6)
VANCOMYCIN TROUGH SERPL-MCNC: 19.98 MCG/ML (ref 10–20)

## 2017-12-27 PROCEDURE — 80202 ASSAY OF VANCOMYCIN: CPT | Performed by: INTERNAL MEDICINE

## 2017-12-27 PROCEDURE — 25010000002 VANCOMYCIN PER 500 MG: Performed by: INTERNAL MEDICINE

## 2017-12-27 PROCEDURE — 97116 GAIT TRAINING THERAPY: CPT

## 2017-12-27 PROCEDURE — 97110 THERAPEUTIC EXERCISES: CPT

## 2017-12-27 PROCEDURE — 85610 PROTHROMBIN TIME: CPT | Performed by: NURSE PRACTITIONER

## 2017-12-27 PROCEDURE — 82565 ASSAY OF CREATININE: CPT | Performed by: INTERNAL MEDICINE

## 2017-12-27 RX ORDER — WARFARIN SODIUM 10 MG/1
20 TABLET ORAL
Status: DISCONTINUED | OUTPATIENT
Start: 2017-12-27 | End: 2018-01-04 | Stop reason: HOSPADM

## 2017-12-28 LAB
ANION GAP SERPL CALCULATED.3IONS-SCNC: 9 MMOL/L (ref 4–13)
BASOPHILS # BLD AUTO: 0.02 10*3/MM3 (ref 0–0.2)
BASOPHILS NFR BLD AUTO: 0.4 % (ref 0–2)
BUN BLD-MCNC: 43 MG/DL (ref 5–21)
BUN/CREAT SERPL: 28.9 (ref 7–25)
CALCIUM SPEC-SCNC: 9.5 MG/DL (ref 8.4–10.4)
CHLORIDE SERPL-SCNC: 109 MMOL/L (ref 98–110)
CO2 SERPL-SCNC: 26 MMOL/L (ref 24–31)
CREAT BLD-MCNC: 1.49 MG/DL (ref 0.5–1.4)
CRP SERPL-MCNC: 0.54 MG/DL (ref 0–0.99)
DEPRECATED RDW RBC AUTO: 46 FL (ref 40–54)
EOSINOPHIL # BLD AUTO: 0.41 10*3/MM3 (ref 0–0.7)
EOSINOPHIL NFR BLD AUTO: 8.1 % (ref 0–4)
ERYTHROCYTE [DISTWIDTH] IN BLOOD BY AUTOMATED COUNT: 13.2 % (ref 12–15)
ERYTHROCYTE [SEDIMENTATION RATE] IN BLOOD: 52 MM/HR (ref 0–20)
GFR SERPL CREATININE-BSD FRML MDRD: 34 ML/MIN/1.73
GLUCOSE BLD-MCNC: 127 MG/DL (ref 70–100)
HCT VFR BLD AUTO: 34 % (ref 37–47)
HGB BLD-MCNC: 10.7 G/DL (ref 12–16)
IMM GRANULOCYTES # BLD: 0.02 10*3/MM3 (ref 0–0.03)
IMM GRANULOCYTES NFR BLD: 0.4 % (ref 0–5)
INR PPP: 1.98 (ref 0.91–1.09)
LYMPHOCYTES # BLD AUTO: 0.87 10*3/MM3 (ref 0.72–4.86)
LYMPHOCYTES NFR BLD AUTO: 17.2 % (ref 15–45)
MCH RBC QN AUTO: 29.6 PG (ref 28–32)
MCHC RBC AUTO-ENTMCNC: 31.5 G/DL (ref 33–36)
MCV RBC AUTO: 94.2 FL (ref 82–98)
MONOCYTES # BLD AUTO: 0.71 10*3/MM3 (ref 0.19–1.3)
MONOCYTES NFR BLD AUTO: 14 % (ref 4–12)
NEUTROPHILS # BLD AUTO: 3.04 10*3/MM3 (ref 1.87–8.4)
NEUTROPHILS NFR BLD AUTO: 59.9 % (ref 39–78)
NRBC BLD MANUAL-RTO: 0 /100 WBC (ref 0–0)
NT-PROBNP SERPL-MCNC: 1420 PG/ML (ref 0–900)
PLATELET # BLD AUTO: 152 10*3/MM3 (ref 130–400)
PMV BLD AUTO: 9.6 FL (ref 6–12)
POTASSIUM BLD-SCNC: 4.2 MMOL/L (ref 3.5–5.3)
PROTHROMBIN TIME: 23.2 SECONDS (ref 11.9–14.6)
RBC # BLD AUTO: 3.61 10*6/MM3 (ref 4.2–5.4)
SODIUM BLD-SCNC: 144 MMOL/L (ref 135–145)
WBC NRBC COR # BLD: 5.07 10*3/MM3 (ref 4.8–10.8)

## 2017-12-28 PROCEDURE — 63710000001 DIPHENHYDRAMINE PER 50 MG: Performed by: INTERNAL MEDICINE

## 2017-12-28 PROCEDURE — 83880 ASSAY OF NATRIURETIC PEPTIDE: CPT | Performed by: INTERNAL MEDICINE

## 2017-12-28 PROCEDURE — 97164 PT RE-EVAL EST PLAN CARE: CPT

## 2017-12-28 PROCEDURE — 85610 PROTHROMBIN TIME: CPT | Performed by: NURSE PRACTITIONER

## 2017-12-28 PROCEDURE — 85025 COMPLETE CBC W/AUTO DIFF WBC: CPT | Performed by: NURSE PRACTITIONER

## 2017-12-28 PROCEDURE — 97116 GAIT TRAINING THERAPY: CPT

## 2017-12-28 PROCEDURE — 85651 RBC SED RATE NONAUTOMATED: CPT | Performed by: INTERNAL MEDICINE

## 2017-12-28 PROCEDURE — 86140 C-REACTIVE PROTEIN: CPT | Performed by: INTERNAL MEDICINE

## 2017-12-28 PROCEDURE — 97110 THERAPEUTIC EXERCISES: CPT

## 2017-12-28 PROCEDURE — 25010000002 VANCOMYCIN: Performed by: INTERNAL MEDICINE

## 2017-12-28 PROCEDURE — 80048 BASIC METABOLIC PNL TOTAL CA: CPT | Performed by: NURSE PRACTITIONER

## 2017-12-29 LAB
INR PPP: 1.95 (ref 0.91–1.09)
PROTHROMBIN TIME: 22.9 SECONDS (ref 11.9–14.6)

## 2017-12-29 PROCEDURE — 85610 PROTHROMBIN TIME: CPT | Performed by: NURSE PRACTITIONER

## 2017-12-29 PROCEDURE — 63710000001 DIPHENHYDRAMINE PER 50 MG: Performed by: INTERNAL MEDICINE

## 2017-12-29 PROCEDURE — 25010000002 VANCOMYCIN: Performed by: INTERNAL MEDICINE

## 2017-12-30 LAB
INR PPP: 1.98 (ref 0.91–1.09)
PROTHROMBIN TIME: 23.2 SECONDS (ref 11.9–14.6)

## 2017-12-30 PROCEDURE — 25010000002 VANCOMYCIN: Performed by: INTERNAL MEDICINE

## 2017-12-30 PROCEDURE — 85610 PROTHROMBIN TIME: CPT | Performed by: NURSE PRACTITIONER

## 2017-12-30 PROCEDURE — 63710000001 DIPHENHYDRAMINE PER 50 MG: Performed by: INTERNAL MEDICINE

## 2017-12-31 LAB
INR PPP: 1.64 (ref 0.91–1.09)
PROTHROMBIN TIME: 20 SECONDS (ref 11.9–14.6)

## 2017-12-31 PROCEDURE — 25010000002 VANCOMYCIN: Performed by: INTERNAL MEDICINE

## 2017-12-31 PROCEDURE — 85610 PROTHROMBIN TIME: CPT | Performed by: NURSE PRACTITIONER

## 2017-12-31 PROCEDURE — 63710000001 DIPHENHYDRAMINE PER 50 MG: Performed by: INTERNAL MEDICINE

## 2018-01-01 VITALS — BODY MASS INDEX: 36.74 KG/M2 | WEIGHT: 234.1 LBS | HEIGHT: 67 IN

## 2018-01-01 LAB
ANION GAP SERPL CALCULATED.3IONS-SCNC: 10 MMOL/L (ref 4–13)
BASOPHILS # BLD AUTO: 0.03 10*3/MM3 (ref 0–0.2)
BASOPHILS NFR BLD AUTO: 0.6 % (ref 0–2)
BUN BLD-MCNC: 43 MG/DL (ref 5–21)
BUN/CREAT SERPL: 29.1 (ref 7–25)
CALCIUM SPEC-SCNC: 9.5 MG/DL (ref 8.4–10.4)
CHLORIDE SERPL-SCNC: 108 MMOL/L (ref 98–110)
CO2 SERPL-SCNC: 25 MMOL/L (ref 24–31)
CREAT BLD-MCNC: 1.48 MG/DL (ref 0.5–1.4)
CRP SERPL-MCNC: 0.53 MG/DL (ref 0–0.99)
DEPRECATED RDW RBC AUTO: 45 FL (ref 40–54)
EOSINOPHIL # BLD AUTO: 0.44 10*3/MM3 (ref 0–0.7)
EOSINOPHIL NFR BLD AUTO: 9.1 % (ref 0–4)
ERYTHROCYTE [DISTWIDTH] IN BLOOD BY AUTOMATED COUNT: 13.4 % (ref 12–15)
ERYTHROCYTE [SEDIMENTATION RATE] IN BLOOD: 56 MM/HR (ref 0–20)
GFR SERPL CREATININE-BSD FRML MDRD: 35 ML/MIN/1.73
GLUCOSE BLD-MCNC: 97 MG/DL (ref 70–100)
HCT VFR BLD AUTO: 32.7 % (ref 37–47)
HGB BLD-MCNC: 10.5 G/DL (ref 12–16)
IMM GRANULOCYTES # BLD: 0.01 10*3/MM3 (ref 0–0.03)
IMM GRANULOCYTES NFR BLD: 0.2 % (ref 0–5)
INR PPP: 1.82 (ref 0.91–1.09)
LYMPHOCYTES # BLD AUTO: 0.81 10*3/MM3 (ref 0.72–4.86)
LYMPHOCYTES NFR BLD AUTO: 16.7 % (ref 15–45)
MCH RBC QN AUTO: 29.7 PG (ref 28–32)
MCHC RBC AUTO-ENTMCNC: 32.1 G/DL (ref 33–36)
MCV RBC AUTO: 92.6 FL (ref 82–98)
MONOCYTES # BLD AUTO: 0.7 10*3/MM3 (ref 0.19–1.3)
MONOCYTES NFR BLD AUTO: 14.4 % (ref 4–12)
NEUTROPHILS # BLD AUTO: 2.86 10*3/MM3 (ref 1.87–8.4)
NEUTROPHILS NFR BLD AUTO: 59 % (ref 39–78)
NRBC BLD MANUAL-RTO: 0 /100 WBC (ref 0–0)
PLATELET # BLD AUTO: 144 10*3/MM3 (ref 130–400)
PMV BLD AUTO: 9.8 FL (ref 6–12)
POTASSIUM BLD-SCNC: 4.2 MMOL/L (ref 3.5–5.3)
PROTHROMBIN TIME: 21.7 SECONDS (ref 11.9–14.6)
RBC # BLD AUTO: 3.53 10*6/MM3 (ref 4.2–5.4)
SODIUM BLD-SCNC: 143 MMOL/L (ref 135–145)
WBC NRBC COR # BLD: 4.85 10*3/MM3 (ref 4.8–10.8)

## 2018-01-01 PROCEDURE — 25010000002 VANCOMYCIN: Performed by: INTERNAL MEDICINE

## 2018-01-01 PROCEDURE — 80048 BASIC METABOLIC PNL TOTAL CA: CPT | Performed by: NURSE PRACTITIONER

## 2018-01-01 PROCEDURE — 63710000001 DIPHENHYDRAMINE PER 50 MG: Performed by: INTERNAL MEDICINE

## 2018-01-01 PROCEDURE — 85025 COMPLETE CBC W/AUTO DIFF WBC: CPT | Performed by: NURSE PRACTITIONER

## 2018-01-01 PROCEDURE — 86140 C-REACTIVE PROTEIN: CPT | Performed by: NURSE PRACTITIONER

## 2018-01-01 PROCEDURE — 85651 RBC SED RATE NONAUTOMATED: CPT | Performed by: NURSE PRACTITIONER

## 2018-01-01 PROCEDURE — 85610 PROTHROMBIN TIME: CPT | Performed by: NURSE PRACTITIONER

## 2018-01-01 NOTE — PROGRESS NOTES
[facility]  CANDACE Graham M.D.  IRINEO Vyas      Internal Medicine Progress Note    1/1/2018   9:51 AM    Name:  Jarvis Morgan  MRN:    [id number]     Acct:     [id number]   Room:  [address] [place] [address] Day: 0     Admit Date: 11/29/2017  4:10 PM  PCP: IRINEO Baltazar    Subjective:     C/C: bilateral knee pain    Interval History: Status:  Improved.  Resting in bed. No family at bedside. C/o itching to right leg. Tolerating treatment. Anxious for discharge.     Review of Systems   Constitution: Positive for weakness and malaise/fatigue. Negative for chills, decreased appetite, weight gain and weight loss.   HENT: Negative for congestion, ear discharge, hoarse voice and tinnitus.    Eyes: Negative for blurred vision, discharge, visual disturbance and visual halos.   Cardiovascular: Negative for chest pain, claudication, dyspnea on exertion, irregular heartbeat, leg swelling, orthopnea and paroxysmal nocturnal dyspnea.   Respiratory: Negative for cough, shortness of breath, sputum production and wheezing.    Endocrine: Negative for cold intolerance, heat intolerance and polyuria.   Hematologic/Lymphatic: Negative for adenopathy. Does not bruise/bleed easily.   Skin: Positive for itching. Negative for dry skin and suspicious lesions.   Musculoskeletal: Positive for myalgias. Negative for arthritis, back pain, falls, joint pain and muscle weakness.   Gastrointestinal: Negative for abdominal pain, constipation, diarrhea, dysphagia and hematemesis.   Genitourinary: Negative for bladder incontinence, dysuria and frequency.   Neurological: Negative for aphonia, disturbances in coordination and dizziness.   Psychiatric/Behavioral: Negative for altered mental status, depression, memory loss and substance abuse. The patient does not have insomnia and is not nervous/anxious.      Medications:     Allergies:   Allergies   Allergen Reactions   • Ciprofloxacin Itching   • Codeine Itching  and GI Intolerance   • Definity [Perflutren Lipid Microsphere] Other (See Comments)     Back pain   • Tetanus Toxoids Itching     Itching around site   • Tizanidine Hcl Itching   • Other Itching     Cloth bandaids , causes redness of skin       Current Meds:   Current Facility-Administered Medications:   •  acetaminophen (TYLENOL) tablet 650 mg, 650 mg, Oral, Q4H PRN, Enzo Ayala MD  •  aspirin EC tablet 81 mg, 81 mg, Oral, Daily, Enzo Ayala MD  •  calcitriol (ROCALTROL) capsule 0.25 mcg, 0.25 mcg, Oral, Daily, Enzo Ayala MD  •  cetirizine (zyrTEC) tablet 10 mg, 10 mg, Oral, Nightly, Enzo Ayala MD  •  diclofenac (VOLTAREN) 1 % gel 4 g, 4 g, Topical, BID, Enzo Ayala MD  •  diltiaZEM CD (CARDIZEM CD) 24 hr capsule 180 mg, 180 mg, Oral, Q24H, Enzo Ayala MD  •  diphenhydrAMINE (BENADRYL) capsule 25 mg, 25 mg, Oral, Q6H PRN, Enzo Ayala MD  •  docusate sodium (COLACE) capsule 100 mg, 100 mg, Oral, BID, Enzo Ayala MD  •  donepezil (ARICEPT) tablet 5 mg, 5 mg, Oral, Nightly, Enzo Ayala MD  •  furosemide (LASIX) tablet 40 mg, 40 mg, Oral, Daily, Enzo Ayala MD  •  gabapentin (NEURONTIN) capsule 300 mg, 300 mg, Oral, Nightly, Enzo Ayala MD  •  guaiFENesin (MUCINEX) 12 hr tablet 600 mg, 600 mg, Oral, Q12H, Enzo Ayala MD  •  ipratropium (ATROVENT) nebulizer solution 0.5 mg, 0.5 mg, Nebulization, Q6H PRN, Enzo Ayala MD  •  metoprolol tartrate (LOPRESSOR) tablet 50 mg, 50 mg, Oral, Q12H, Enzo Ayala MD  •  ondansetron (ZOFRAN) injection 4 mg, 4 mg, Intravenous, Q6H PRN, Enzo Ayala MD  •  pantoprazole (PROTONIX) EC tablet 40 mg, 40 mg, Oral, Q AM, Enzo Ayala MD  •  permethrin (ELIMITE) 5 % cream, , Topical, Once **FOLLOWED BY** permethrin (ELIMITE) 5 % cream, , Topical, Once, Enzo Ayala MD  •  polyvinyl alcohol (LIQUIFILM) 1.4 % ophthalmic solution 2  "drop, 2 drop, Both Eyes, BID, Enzo Ayala MD  •  rifAMPin (RIFADIN) capsule 300 mg, 300 mg, Oral, Q12H, Rashaad Miller MD  •  triamcinolone (KENALOG) 0.1 % cream, , Topical, Q12H, Enzo Ayala MD  •  vancomycin 750 mg/250 mL 0.9% NS IVPB ([facility]), 750 mg, Intravenous, Q24H, Rashaad Miller MD  •  warfarin (COUMADIN) tablet 20 mg, 20 mg, Oral, Daily, Alaina Prado, APRN    Data:     Code Status:  Full Code    Family History   Problem Relation Age of Onset   • Heart failure Mother    • Heart disease Father    • Stroke Father    • Hypertension Sister    • Heart failure Sister    • No Known Problems Brother    • No Known Problems Maternal Grandmother    • No Known Problems Maternal Grandfather    • No Known Problems Paternal Grandmother    • No Known Problems Paternal Grandfather    • Hypertension Sister    • Heart failure Sister    • Hypertension Sister    • Heart failure Sister    • Hypertension Sister    • Heart failure Sister    • Hypertension Sister    • Heart failure Sister    • Hypertension Sister    • Heart failure Sister    • Gallbladder disease Brother    • COPD Daughter    • Asthma Daughter    • Diabetes Son    • No Known Problems Son    • Autism Son        Social History     Social History   • Marital status:      Spouse name: N/A   • Number of children: N/A   • Years of education: N/A     Occupational History   • Not on file.     Social History Main Topics   • Smoking status: Never Smoker   • Smokeless tobacco: Never Used   • Alcohol use No   • Drug use: No   • Sexual activity: Defer     Other Topics Concern   • Not on file     Social History Narrative     Vitals:  Ht 170.2 cm (67\")  Wt 106 kg (234 lb 1.6 oz)  LMP  (LMP Unknown)  BMI 36.67 kg/m2     T 97.7 P 82 R 18 /65 Sp02 100% (room air)    Labs and imaging:      Recent Results (from the past 12 hour(s))   Sedimentation Rate    Collection Time: 01/01/18  6:12 AM   Result Value Ref Range    Sed Rate 56 (H) 0 - 20 " mm/hr   CBC Auto Differential    Collection Time: 01/01/18  6:12 AM   Result Value Ref Range    WBC 4.85 4.80 - 10.80 10*3/mm3    RBC 3.53 (L) 4.20 - 5.40 10*6/mm3    Hemoglobin 10.5 (L) 12.0 - 16.0 g/dL    Hematocrit 32.7 (L) 37.0 - 47.0 %    MCV 92.6 82.0 - 98.0 fL    MCH 29.7 28.0 - 32.0 pg    MCHC 32.1 (L) 33.0 - 36.0 g/dL    RDW 13.4 12.0 - 15.0 %    RDW-SD 45.0 40.0 - 54.0 fl    MPV 9.8 6.0 - 12.0 fL    Platelets 144 130 - 400 10*3/mm3    Neutrophil % 59.0 39.0 - 78.0 %    Lymphocyte % 16.7 15.0 - 45.0 %    Monocyte % 14.4 (H) 4.0 - 12.0 %    Eosinophil % 9.1 (H) 0.0 - 4.0 %    Basophil % 0.6 0.0 - 2.0 %    Immature Grans % 0.2 0.0 - 5.0 %    Neutrophils, Absolute 2.86 1.87 - 8.40 10*3/mm3    Lymphocytes, Absolute 0.81 0.72 - 4.86 10*3/mm3    Monocytes, Absolute 0.70 0.19 - 1.30 10*3/mm3    Eosinophils, Absolute 0.44 0.00 - 0.70 10*3/mm3    Basophils, Absolute 0.03 0.00 - 0.20 10*3/mm3    Immature Grans, Absolute 0.01 0.00 - 0.03 10*3/mm3    nRBC 0.0 0.0 - 0.0 /100 WBC   Protime-INR    Collection Time: 01/01/18  6:12 AM   Result Value Ref Range    Protime 21.7 (H) 11.9 - 14.6 Seconds    INR 1.82 (H) 0.91 - 1.09   Basic Metabolic Panel    Collection Time: 01/01/18  6:13 AM   Result Value Ref Range    Glucose 97 70 - 100 mg/dL    BUN 43 (H) 5 - 21 mg/dL    Creatinine 1.48 (H) 0.50 - 1.40 mg/dL    Sodium 143 135 - 145 mmol/L    Potassium 4.2 3.5 - 5.3 mmol/L    Chloride 108 98 - 110 mmol/L    CO2 25.0 24.0 - 31.0 mmol/L    Calcium 9.5 8.4 - 10.4 mg/dL    eGFR Non African Amer 35 (L) >60 mL/min/1.73    BUN/Creatinine Ratio 29.1 (H) 7.0 - 25.0    Anion Gap 10.0 4.0 - 13.0 mmol/L   C-reactive Protein    Collection Time: 01/01/18  6:13 AM   Result Value Ref Range    C-Reactive Protein 0.53 0.00 - 0.99 mg/dL     Physical Examination:        Physical Exam   Constitutional: She is oriented to person, place, and time. She appears well-developed and well-nourished.   HENT:   Head: Normocephalic and atraumatic.   Nose:  Nose normal.   Mouth/Throat: Oropharynx is clear and moist.   Eyes: Conjunctivae and EOM are normal. Pupils are equal, round, and reactive to light.   Neck: Normal range of motion. Neck supple.   Cardiovascular: Normal rate and intact distal pulses.  An irregularly irregular rhythm present.   Murmur heard.  Pulmonary/Chest: Effort normal and breath sounds normal.   Abdominal: Soft. Bowel sounds are normal.   Musculoskeletal:   Generalized weakness   Neurological: She is alert and oriented to person, place, and time. She has normal reflexes.   Skin: Skin is warm and dry.   Discoloration to left lower extremity. Small lesions to left lower extremity - dressing c/d/i   Psychiatric: She has a normal mood and affect. Her behavior is normal.       Assessment:           Active Problems:    * No active hospital problems. *    Past Medical History:   Diagnosis Date   • A-fib    • Aortic stenosis    • Arthritis    • Bradycardia    • Cancer     bladder and skin   • Cardiomegaly    • CHF (congestive heart failure)    • GERD (gastroesophageal reflux disease)    • Hard of hearing    • History of short term memory loss    • Hypercalcemia    • Hyperlipidemia    • Hypertension    • Hyperthyroidism 11/20/2017   • Hypothyroidism    • Kidney stone    • Long term current use of anticoagulant therapy    • Open wound     left lower extremity,   • Palpitation    • PONV (postoperative nausea and vomiting)    • Shortness of breath    • Sick sinus syndrome    • Sleep apnea         Plan:        1. MRSA bacteremia  2. Multifactorial anemia  3. Chronic anticoagulation  4. ASA  5. Chronic atrial fibrillation  6. S/p TAVR  7. S/p cystectomy/right nephrectomy  8. Bilateral foot pain  9. History of scabies, with continuous pruritis    Continue current treatment. Monitor counts. Increase activity. Continue IV antibiotics per ID - day 41/42 antibiotics.  Labs Thursday. Discharge planning.     Electronically signed by IRINEO Barriga on  1/1/2018 at 9:51 AM

## 2018-01-02 LAB
INR PPP: 1.91 (ref 0.91–1.09)
PROTHROMBIN TIME: 22.6 SECONDS (ref 11.9–14.6)

## 2018-01-02 PROCEDURE — 25010000002 VANCOMYCIN: Performed by: INTERNAL MEDICINE

## 2018-01-02 PROCEDURE — 63710000001 DIPHENHYDRAMINE PER 50 MG: Performed by: INTERNAL MEDICINE

## 2018-01-02 PROCEDURE — 85610 PROTHROMBIN TIME: CPT | Performed by: NURSE PRACTITIONER

## 2018-01-02 NOTE — PROGRESS NOTES
"Pharmacy Dosing Service  Pharmacokinetics  Vancomycin Follow-up Evaluation    Assessment/Action/Plan:  Renal function stable, last dose of Vancomycin due today, day 42 of 42 therapy for bacteremia.  Pharmacy will continue to monitor renal function and adjust dose accordingly.     Subjective:  Jarvis Morgan is a 72 y.o. female currently on Vancomycin 750 mg IV every 24 hours for the treatment of bacteremia, day 42 of 42 of therapy.    Objective:  Ht: 170.2 cm (67\"); Wt: 106 kg (234 lb 1.6 oz)  Estimated Creatinine Clearance: 42.4 mL/min (by C-G formula based on Cr of 1.48).   Lab Results   Component Value Date    CREATININE 1.48 (H) 01/01/2018    CREATININE 1.49 (H) 12/28/2017    CREATININE 1.52 (H) 12/27/2017      Lab Results   Component Value Date    WBC 4.85 01/01/2018    WBC 5.07 12/28/2017    WBC 4.96 12/26/2017         Lab Results   Component Value Date    VANCOTROUGH 19.98 12/27/2017       Culture Results:  Microbiology Results (last 10 days)     ** No results found for the last 240 hours. **          ELLEN Maza RPH   01/02/18 8:18 AM    "

## 2018-01-02 NOTE — PROGRESS NOTES
[facility]  CANDACE Graham M.D.  IRINEO Vyas      Internal Medicine Progress Note    1/2/2018   10:39 AM    Name:  Jarvis Morgan  MRN:    [id number]     Acct:     [id number]   Room:  [address] [place] [address] Day: 0     Admit Date: 11/29/2017  4:10 PM  PCP: IRINEO Baltazar    Subjective:     C/C: bilateral knee pain    Interval History: Status:  Improved.   Up to side of bed. No family at bedside. Progressing with therapy. Tolerating treatment. To complete IV antibiotics today.   Review of Systems   Constitution: Positive for weakness and malaise/fatigue. Negative for chills, decreased appetite, weight gain and weight loss.   HENT: Negative for congestion, ear discharge, hoarse voice and tinnitus.    Eyes: Negative for blurred vision, discharge, visual disturbance and visual halos.   Cardiovascular: Negative for chest pain, claudication, dyspnea on exertion, irregular heartbeat, leg swelling, orthopnea and paroxysmal nocturnal dyspnea.   Respiratory: Negative for cough, shortness of breath, sputum production and wheezing.    Endocrine: Negative for cold intolerance, heat intolerance and polyuria.   Hematologic/Lymphatic: Negative for adenopathy. Does not bruise/bleed easily.   Skin: Positive for itching. Negative for dry skin and suspicious lesions.   Musculoskeletal: Positive for myalgias. Negative for arthritis, back pain, falls, joint pain and muscle weakness.   Gastrointestinal: Negative for abdominal pain, constipation, diarrhea, dysphagia and hematemesis.   Genitourinary: Negative for bladder incontinence, dysuria and frequency.   Neurological: Negative for aphonia, disturbances in coordination and dizziness.   Psychiatric/Behavioral: Negative for altered mental status, depression, memory loss and substance abuse. The patient does not have insomnia and is not nervous/anxious.      Medications:     Allergies:   Allergies   Allergen Reactions   • Ciprofloxacin Itching   •  Codeine Itching and GI Intolerance   • Definity [Perflutren Lipid Microsphere] Other (See Comments)     Back pain   • Tetanus Toxoids Itching     Itching around site   • Tizanidine Hcl Itching   • Other Itching     Cloth bandaids , causes redness of skin       Current Meds:   Current Facility-Administered Medications:   •  acetaminophen (TYLENOL) tablet 650 mg, 650 mg, Oral, Q4H PRN, Enzo Ayala MD  •  aspirin EC tablet 81 mg, 81 mg, Oral, Daily, Enzo Ayala MD  •  calcitriol (ROCALTROL) capsule 0.25 mcg, 0.25 mcg, Oral, Daily, Enzo Ayala MD  •  cetirizine (zyrTEC) tablet 10 mg, 10 mg, Oral, Nightly, Enzo Ayala MD  •  diclofenac (VOLTAREN) 1 % gel 4 g, 4 g, Topical, BID, Enzo Ayala MD  •  diltiaZEM CD (CARDIZEM CD) 24 hr capsule 180 mg, 180 mg, Oral, Q24H, Enzo Ayala MD  •  diphenhydrAMINE (BENADRYL) capsule 25 mg, 25 mg, Oral, Q6H PRN, Enzo Ayala MD  •  docusate sodium (COLACE) capsule 100 mg, 100 mg, Oral, BID, Enzo Ayala MD  •  donepezil (ARICEPT) tablet 5 mg, 5 mg, Oral, Nightly, Enzo Ayala MD  •  furosemide (LASIX) tablet 40 mg, 40 mg, Oral, Daily, Enzo Ayala MD  •  gabapentin (NEURONTIN) capsule 300 mg, 300 mg, Oral, Nightly, Enzo Ayala MD  •  guaiFENesin (MUCINEX) 12 hr tablet 600 mg, 600 mg, Oral, Q12H, Enzo Ayala MD  •  ipratropium (ATROVENT) nebulizer solution 0.5 mg, 0.5 mg, Nebulization, Q6H PRN, Enzo Ayala MD  •  metoprolol tartrate (LOPRESSOR) tablet 50 mg, 50 mg, Oral, Q12H, Enzo Ayala MD  •  ondansetron (ZOFRAN) injection 4 mg, 4 mg, Intravenous, Q6H PRN, Enzo Ayala MD  •  pantoprazole (PROTONIX) EC tablet 40 mg, 40 mg, Oral, Q AM, Enzo Ayala MD  •  permethrin (ELIMITE) 5 % cream, , Topical, Once **FOLLOWED BY** permethrin (ELIMITE) 5 % cream, , Topical, Once, Enzo Ayala MD  •  polyvinyl alcohol (LIQUIFILM) 1.4 %  "ophthalmic solution 2 drop, 2 drop, Both Eyes, BID, Enzo Ayala MD  •  rifAMPin (RIFADIN) capsule 300 mg, 300 mg, Oral, Q12H, Rashaad Miller MD  •  triamcinolone (KENALOG) 0.1 % cream, , Topical, Q12H, Enzo Ayala MD  •  vancomycin 750 mg/250 mL 0.9% NS IVPB ([facility]), 750 mg, Intravenous, Q24H, Rashaad Miller MD  •  warfarin (COUMADIN) tablet 20 mg, 20 mg, Oral, Daily, Alaina Prado, APRN    Data:     Code Status:  Full Code    Family History   Problem Relation Age of Onset   • Heart failure Mother    • Heart disease Father    • Stroke Father    • Hypertension Sister    • Heart failure Sister    • No Known Problems Brother    • No Known Problems Maternal Grandmother    • No Known Problems Maternal Grandfather    • No Known Problems Paternal Grandmother    • No Known Problems Paternal Grandfather    • Hypertension Sister    • Heart failure Sister    • Hypertension Sister    • Heart failure Sister    • Hypertension Sister    • Heart failure Sister    • Hypertension Sister    • Heart failure Sister    • Hypertension Sister    • Heart failure Sister    • Gallbladder disease Brother    • COPD Daughter    • Asthma Daughter    • Diabetes Son    • No Known Problems Son    • Autism Son        Social History     Social History   • Marital status:      Spouse name: N/A   • Number of children: N/A   • Years of education: N/A     Occupational History   • Not on file.     Social History Main Topics   • Smoking status: Never Smoker   • Smokeless tobacco: Never Used   • Alcohol use No   • Drug use: No   • Sexual activity: Defer     Other Topics Concern   • Not on file     Social History Narrative     Vitals:  Ht 170.2 cm (67\")  Wt 106 kg (234 lb 1.6 oz)  LMP  (LMP Unknown)  BMI 36.67 kg/m2     T 97.8 P 89 R 18 /77 Sp02 98% (room air)    Labs and imaging:      Recent Results (from the past 12 hour(s))   Protime-INR    Collection Time: 01/02/18  4:48 AM   Result Value Ref Range    Protime 22.6 " (H) 11.9 - 14.6 Seconds    INR 1.91 (H) 0.91 - 1.09     Physical Examination:        Physical Exam   Constitutional: She is oriented to person, place, and time. She appears well-developed and well-nourished.   HENT:   Head: Normocephalic and atraumatic.   Nose: Nose normal.   Mouth/Throat: Oropharynx is clear and moist.   Eyes: Conjunctivae and EOM are normal. Pupils are equal, round, and reactive to light.   Neck: Normal range of motion. Neck supple.   Cardiovascular: Normal rate and intact distal pulses.  An irregularly irregular rhythm present.   Murmur heard.  Pulmonary/Chest: Effort normal and breath sounds normal.   Abdominal: Soft. Bowel sounds are normal.   Musculoskeletal:   Generalized weakness   Neurological: She is alert and oriented to person, place, and time. She has normal reflexes.   Skin: Skin is warm and dry.   Discoloration to left lower extremity. Small lesions to left lower extremity - dressing c/d/i   Psychiatric: She has a normal mood and affect. Her behavior is normal.   Nursing note and vitals reviewed.      Assessment:           Active Problems:    * No active hospital problems. *    Past Medical History:   Diagnosis Date   • A-fib    • Aortic stenosis    • Arthritis    • Bradycardia    • Cancer     bladder and skin   • Cardiomegaly    • CHF (congestive heart failure)    • GERD (gastroesophageal reflux disease)    • Hard of hearing    • History of short term memory loss    • Hypercalcemia    • Hyperlipidemia    • Hypertension    • Hyperthyroidism 11/20/2017   • Hypothyroidism    • Kidney stone    • Long term current use of anticoagulant therapy    • Open wound     left lower extremity,   • Palpitation    • PONV (postoperative nausea and vomiting)    • Shortness of breath    • Sick sinus syndrome    • Sleep apnea         Plan:        1. MRSA bacteremia  2. Multifactorial anemia  3. Chronic anticoagulation  4. SAA  5. Chronic atrial fibrillation  6. S/p TAVR  7. S/p cystectomy/right  nephrectomy  8. Bilateral foot pain  9. History of scabies, with continuous pruritis    Continue current treatment. Monitor counts. Increase activity. Continue IV antibiotics per ID - day 42/42 antibiotics.  Labs Thursday. Discharge planning.     Electronically signed by IRINEO Barriga on 1/2/2018 at 10:39 AM   I have discussed the care of Jarvis Morgan, including pertinent history and exam findings, with the nurse practitioner.    I have seen and examined the patient and the key elements of all parts of the encounter have been performed by me.  I agree with the assessment, plan and orders as documented by Alaina CABELLO, after I modified the exam findings and the plan of treatments and the final version is my approved version of the assessment.        Electronically signed by Enzo Ayala MD on 1/2/2018 at 4:49 PM

## 2018-01-03 LAB
INR PPP: 1.87 (ref 0.91–1.09)
PROTHROMBIN TIME: 22.2 SECONDS (ref 11.9–14.6)

## 2018-01-03 PROCEDURE — 85610 PROTHROMBIN TIME: CPT | Performed by: NURSE PRACTITIONER

## 2018-01-03 PROCEDURE — 63710000001 DIPHENHYDRAMINE PER 50 MG: Performed by: INTERNAL MEDICINE

## 2018-01-03 NOTE — PROGRESS NOTES
Infectious Diseases Progress Note    Patient:  Jarvis Morgan  YOB: 1945  MRN: [id number]   Admit date: 11/29/2017   Admitting Physician: Enzo Ayala MD  Primary Care Physician: IRINEO Baltazar    Chief Complaint/Interval History: She seems to be doing very well.  She is not having any fevers or chills.  She is not having any rash or skin itching.  She is not having any nausea or diarrhea.  She has had no symptoms to suggest TIA.  No symptoms to suggest congestive heart failure.  She is hoping for discharge Thursday or Friday.  She indicates her daughter has made some preparations at home.  She has no pain or discomfort at her PICC line site.  She has completed antibiotic treatment (6 weeks) today.      No intake or output data in the 24 hours ending 01/03/18 1608  Allergies:   Allergies   Allergen Reactions   • Ciprofloxacin Itching   • Codeine Itching and GI Intolerance   • Definity [Perflutren Lipid Microsphere] Other (See Comments)     Back pain   • Tetanus Toxoids Itching     Itching around site   • Tizanidine Hcl Itching   • Other Itching     Cloth bandaids , causes redness of skin     Current Scheduled Medications:     aspirin 81 mg Oral Daily   calcitriol 0.25 mcg Oral Daily   cetirizine 10 mg Oral Nightly   diclofenac 4 g Topical BID   diltiaZEM  mg Oral Q24H   docusate sodium 100 mg Oral BID   donepezil 5 mg Oral Nightly   furosemide 40 mg Oral Daily   gabapentin 300 mg Oral Nightly   guaiFENesin 600 mg Oral Q12H   metoprolol tartrate 50 mg Oral Q12H   pantoprazole 40 mg Oral Q AM   permethrin  Topical Once   Followed by      permethrin  Topical Once   polyvinyl alcohol 2 drop Both Eyes BID   rifAMPin 300 mg Oral Q12H   triamcinolone  Topical Q12H   vancomycin 750 mg Intravenous Q24H   warfarin 20 mg Oral Daily     Current PRN Medications:  •  acetaminophen  •  diphenhydrAMINE  •  ipratropium  •  ondansetron    Review of Systems see HPI    Vital Signs:  Ht 170.2 cm  "(67\")  Wt 106 kg (234 lb 1.6 oz)  LMP  (LMP Unknown)  BMI 36.67 kg/m2    Physical Exam  Vital signs reviewed.  Sclera nonicteric.  No conjunctival hemorrhages.  Extremities without splint or hemorrhages or Janeway lesions.  Abdomen soft and nontender  Skin without drug rash  Line/IV (PIC) site: No erythema, warmth, induration, or tenderness.    Lab Results:  CBC:   Results from last 7 days  Lab Units 01/01/18  0612 12/28/17  0501   WBC 10*3/mm3 4.85 5.07   HEMOGLOBIN g/dL 10.5* 10.7*   HEMATOCRIT % 32.7* 34.0*   PLATELETS 10*3/mm3 144 152     BMP:  Results from last 7 days  Lab Units 01/01/18  0613 12/28/17  0501   SODIUM mmol/L 143 144   POTASSIUM mmol/L 4.2 4.2   CHLORIDE mmol/L 108 109   CO2 mmol/L 25.0 26.0   BUN mg/dL 43* 43*   CREATININE mg/dL 1.48* 1.49*   GLUCOSE mg/dL 97 127*   CALCIUM mg/dL 9.5 9.5     Blood cultures 11/20/2017 and 11/21/2017-MRSA  Blood cultures 11/23/2017 and 11/24/2017-no growth    Impression:   Presumptive prosthetic valve endocarditis with MRSA-she has completed 6 weeks of intravenous antibiotic treatment with vancomycin and oral antimicrobial therapy with rifampin.  No signs of PICC line related problem or antibiotic-related toxicity.  She has not had any symptoms to suggest TIA or congestive heart failure.  Her chronic renal insufficiency remained stable during her course of therapy.    Recommendations:   Would suggest discontinuing vancomycin and rifampin  Would remove PICC line prior to discharge  Okay with me for discharge home on 01/04/2018 or 01/05/2018  Would suggest follow-up blood cultures ×2, CBC and CRP in 2 weeks  I will see her in follow-up in 3 weeks  Would recommend she have close follow-up with her cardiologist as well as her primary care physician.  Whoever is following her anticoagulation needs to be aware that she has been on antibiotics (vanc and rifampin) and that she will likely follow a different diet at home from what she was receiving at [facility]. As a result " coumadin requirements over next few weeks likely to be variable.   Will sign off for now - please call if any questions    Rashaad Bain MD

## 2018-01-04 ENCOUNTER — TELEPHONE (OUTPATIENT)
Dept: INTERNAL MEDICINE CLINIC | Age: 73
End: 2018-01-04

## 2018-01-04 LAB
ANION GAP SERPL CALCULATED.3IONS-SCNC: 9 MMOL/L (ref 4–13)
BASOPHILS # BLD AUTO: 0.02 10*3/MM3 (ref 0–0.2)
BASOPHILS NFR BLD AUTO: 0.4 % (ref 0–2)
BUN BLD-MCNC: 43 MG/DL (ref 5–21)
BUN/CREAT SERPL: 30.7 (ref 7–25)
CALCIUM SPEC-SCNC: 9.8 MG/DL (ref 8.4–10.4)
CHLORIDE SERPL-SCNC: 111 MMOL/L (ref 98–110)
CO2 SERPL-SCNC: 25 MMOL/L (ref 24–31)
CREAT BLD-MCNC: 1.4 MG/DL (ref 0.5–1.4)
CRP SERPL-MCNC: <0.5 MG/DL (ref 0–0.99)
DEPRECATED RDW RBC AUTO: 43.8 FL (ref 40–54)
EOSINOPHIL # BLD AUTO: 0.43 10*3/MM3 (ref 0–0.7)
EOSINOPHIL NFR BLD AUTO: 8.7 % (ref 0–4)
ERYTHROCYTE [DISTWIDTH] IN BLOOD BY AUTOMATED COUNT: 13.2 % (ref 12–15)
ERYTHROCYTE [SEDIMENTATION RATE] IN BLOOD: 44 MM/HR (ref 0–20)
GFR SERPL CREATININE-BSD FRML MDRD: 37 ML/MIN/1.73
GLUCOSE BLD-MCNC: 94 MG/DL (ref 70–100)
HCT VFR BLD AUTO: 30.7 % (ref 37–47)
HGB BLD-MCNC: 10.1 G/DL (ref 12–16)
IMM GRANULOCYTES # BLD: 0.02 10*3/MM3 (ref 0–0.03)
IMM GRANULOCYTES NFR BLD: 0.4 % (ref 0–5)
INR PPP: 1.97 (ref 0.91–1.09)
LYMPHOCYTES # BLD AUTO: 0.97 10*3/MM3 (ref 0.72–4.86)
LYMPHOCYTES NFR BLD AUTO: 19.6 % (ref 15–45)
MCH RBC QN AUTO: 29.8 PG (ref 28–32)
MCHC RBC AUTO-ENTMCNC: 32.9 G/DL (ref 33–36)
MCV RBC AUTO: 90.6 FL (ref 82–98)
MONOCYTES # BLD AUTO: 0.82 10*3/MM3 (ref 0.19–1.3)
MONOCYTES NFR BLD AUTO: 16.6 % (ref 4–12)
NEUTROPHILS # BLD AUTO: 2.69 10*3/MM3 (ref 1.87–8.4)
NEUTROPHILS NFR BLD AUTO: 54.3 % (ref 39–78)
NRBC BLD MANUAL-RTO: 0 /100 WBC (ref 0–0)
PLATELET # BLD AUTO: 151 10*3/MM3 (ref 130–400)
PMV BLD AUTO: 9.8 FL (ref 6–12)
POTASSIUM BLD-SCNC: 4.2 MMOL/L (ref 3.5–5.3)
PROTHROMBIN TIME: 23.1 SECONDS (ref 11.9–14.6)
RBC # BLD AUTO: 3.39 10*6/MM3 (ref 4.2–5.4)
SODIUM BLD-SCNC: 145 MMOL/L (ref 135–145)
WBC NRBC COR # BLD: 4.95 10*3/MM3 (ref 4.8–10.8)

## 2018-01-04 PROCEDURE — 85651 RBC SED RATE NONAUTOMATED: CPT | Performed by: INTERNAL MEDICINE

## 2018-01-04 PROCEDURE — 85025 COMPLETE CBC W/AUTO DIFF WBC: CPT | Performed by: INTERNAL MEDICINE

## 2018-01-04 PROCEDURE — 85610 PROTHROMBIN TIME: CPT | Performed by: NURSE PRACTITIONER

## 2018-01-04 PROCEDURE — 80048 BASIC METABOLIC PNL TOTAL CA: CPT | Performed by: INTERNAL MEDICINE

## 2018-01-04 PROCEDURE — 86140 C-REACTIVE PROTEIN: CPT | Performed by: INTERNAL MEDICINE

## 2018-01-04 RX ORDER — PSEUDOEPHEDRINE HCL 30 MG
100 TABLET ORAL 2 TIMES DAILY PRN
Qty: 20 CAPSULE | Refills: 0 | Status: SHIPPED | OUTPATIENT
Start: 2018-01-04 | End: 2018-04-13

## 2018-01-04 RX ORDER — GUAIFENESIN 600 MG/1
600 TABLET, EXTENDED RELEASE ORAL EVERY 12 HOURS SCHEDULED
Qty: 60 TABLET | Refills: 0 | Status: SHIPPED | OUTPATIENT
Start: 2018-01-04 | End: 2018-04-13

## 2018-01-04 RX ORDER — CALCITRIOL 0.25 UG/1
0.25 CAPSULE, LIQUID FILLED ORAL DAILY
Qty: 30 CAPSULE | Refills: 0 | Status: SHIPPED | OUTPATIENT
Start: 2018-01-04

## 2018-01-04 RX ORDER — FUROSEMIDE 40 MG/1
40 TABLET ORAL DAILY
Qty: 30 TABLET | Refills: 0 | Status: SHIPPED | OUTPATIENT
Start: 2018-01-04 | End: 2019-01-14 | Stop reason: SDUPTHER

## 2018-01-04 RX ORDER — DILTIAZEM HYDROCHLORIDE 180 MG/1
180 CAPSULE, COATED, EXTENDED RELEASE ORAL
Qty: 30 CAPSULE | Refills: 0 | Status: SHIPPED | OUTPATIENT
Start: 2018-01-04

## 2018-01-04 RX ORDER — METOPROLOL TARTRATE 50 MG/1
50 TABLET, FILM COATED ORAL EVERY 12 HOURS SCHEDULED
Qty: 30 TABLET | Refills: 0 | Status: SHIPPED | OUTPATIENT
Start: 2018-01-04 | End: 2019-05-22 | Stop reason: SDUPTHER

## 2018-01-04 RX ORDER — PANTOPRAZOLE SODIUM 40 MG/1
40 TABLET, DELAYED RELEASE ORAL DAILY
Qty: 30 TABLET | Refills: 0 | Status: SHIPPED | OUTPATIENT
Start: 2018-01-04 | End: 2020-09-21

## 2018-01-04 RX ORDER — DONEPEZIL HYDROCHLORIDE 5 MG/1
5 TABLET, FILM COATED ORAL NIGHTLY
Qty: 30 TABLET | Refills: 0 | Status: SHIPPED | OUTPATIENT
Start: 2018-01-04

## 2018-01-04 RX ORDER — WARFARIN SODIUM 10 MG/1
TABLET ORAL
Qty: 7 TABLET | Refills: 0 | Status: SHIPPED | OUTPATIENT
Start: 2018-01-04 | End: 2018-05-22 | Stop reason: HOSPADM

## 2018-01-04 RX ORDER — GABAPENTIN 300 MG/1
300 CAPSULE ORAL NIGHTLY
Qty: 10 CAPSULE | Refills: 0 | OUTPATIENT
Start: 2018-01-04 | End: 2020-09-21

## 2018-01-04 NOTE — DISCHARGE SUMMARY
[facility]  Marty Ayala M.D.  FRAN Ayala M.D.  IRINEO Vyas    Internal Medicine Discharge Summary    Patient ID: Jarvis Morgan  MRN: [id number]     Acct:  [id number]       Patient's PCP: IRINEO Baltazar    Admit Date: 11/29/2017     Discharge Date:   1/4/18    Admitting Physician: Enzo Ayala MD    Discharge Physician: IRINEO Barriga     Active Discharge Diagnoses:  1. MRSA bacteremia  2. Multifactorial anemia  3. Chronic anticoagulation  4. ASA  5. Chronic atrial fibrillation  6. S/p TAVR  7. S/p cystectomy/right nephrectomy  8. Bilateral foot pain  9. History of scabies, with continuous pruritis        Hospital Problems  Active Problems:    * No active hospital problems. *     Past Medical History:   Diagnosis Date   • A-fib    • Aortic stenosis    • Arthritis    • Bradycardia    • Cancer     bladder and skin   • Cardiomegaly    • CHF (congestive heart failure)    • GERD (gastroesophageal reflux disease)    • Hard of hearing    • History of short term memory loss    • Hypercalcemia    • Hyperlipidemia    • Hypertension    • Hyperthyroidism 11/20/2017   • Hypothyroidism    • Kidney stone    • Long term current use of anticoagulant therapy    • Open wound     left lower extremity,   • Palpitation    • PONV (postoperative nausea and vomiting)    • Shortness of breath    • Sick sinus syndrome    • Sleep apnea        The patient was seen and examined on the day of discharge and this discharge summary is in conjunction with any daily progress note from day of discharge.    Code Status:  Full Code    Hospital Course: Jarvis Morgan is a  72 y.o.  female who originally presented to [facility] with c/o increased fever and confusion. She presented to ER with her complaints where she was found to be febrile and hypoxic. She was admitted to the service of the hospitalists. The patient is s/p cystectomy and right nephrectomy with urostomy tube and  is reportedly typically meticulous about its care. Workup revealed MRSA bacteremia for which she was seen in consultation by infectious disease and started on antibiotic therapy. She underwent DANYA which was read as negative for any vegetations. The patient was placed on rifampin and vancomycin which she has tolerated well thus far. She was treated for scabies while in acute care. The patient developed fluid volume overload for which she has been diuresed. The patient responded well to treatment. Due to her need for continued IV antibiotics and strengthening, she has transferred to [facility]. While here, the patient progressed slowly with therapy. The patient has chronic knee pain for which she receives outpatient injections that she missed while admitted to the hospital. She was treated a second time for scabies while at [facility] after initially being treated at [facility]. She continued to have pruritis and responded well to the second treatment. The patient's anticoagulants have had to be increased due to continued subtherapeutic INR. She is currently on 20 mg po daily with daily protimes being monitored and remains slightly subtherapeutic. Home health will be checking protimes and we would advise the patient's primary care provider to follow closely as an outpatient now that the patient is no longer on antibiotics and will likely have a change in diet upon her return to home. The patient has completed a 6 week course of rifampin and vancomycin and has plans for discharge to home with home health with close follow up with her PCP, cardiology and infectious disease.     Consults:  Dr. Miller (infectious disease)    Disposition: home health    Discharged Condition: Stable    Follow Up: Mary Beth Izquierdo, APRN  [address] [place] [address] [address]  [facility] [address]  [phone]          Tristan Mendez MD  [address]  [address]  [facility] [address]  [phone]            Diet: Diet  Regular    Discharge Medications:    Jarvis Morgan   Home Medication Instructions KAMERON:[id number]    Printed on:01/04/18 0939   Medication Information                      acetaminophen (TYLENOL) 325 MG tablet  Take 2 tablets by mouth Every 4 (Four) Hours As Needed for Mild Pain  or Fever.             aspirin 81 MG EC tablet  Take 81 mg by mouth Daily.             calcitriol (ROCALTROL) 0.25 MCG capsule  Take 1 capsule by mouth Daily.             cholecalciferol 5000 units tablet  Take 5,000 Units by mouth Daily.             diclofenac (VOLTAREN) 1 % gel gel  Apply 4 g topically 4 (Four) Times a Day.             diltiaZEM CD (CARDIZEM CD) 180 MG 24 hr capsule  Take 1 capsule by mouth Daily.             docusate sodium 100 MG capsule  Take 100 mg by mouth 2 (Two) Times a Day As Needed for Constipation.             donepezil (ARICEPT) 5 MG tablet  Take 1 tablet by mouth Every Night.             furosemide (LASIX) 40 MG tablet  Take 1 tablet by mouth Daily.             gabapentin (NEURONTIN) 300 MG capsule  Take 1 capsule by mouth Every Night.             guaiFENesin (MUCINEX) 600 MG 12 hr tablet  Take 1 tablet by mouth Every 12 (Twelve) Hours.             metoprolol tartrate (LOPRESSOR) 50 MG tablet  Take 1 tablet by mouth Every 12 (Twelve) Hours.             pantoprazole (PROTONIX) 40 MG EC tablet  Take 1 tablet by mouth Daily.             warfarin (COUMADIN) 10 MG tablet  20 mg po daily                 Time Spent on discharge is  32 minutes in patient examination, evaluation, patient/family counseling as well as medication reconciliation, prescriptions for required medications, discharge plan and follow up.     Electronically signed by IRINEO Barriga on 1/4/2018 at 9:39 AM

## 2018-01-04 NOTE — TELEPHONE ENCOUNTER
Pt is to be dc from [facility] today and  Has [facility] orders - [address]  while she was there as she was admitted on  to [facility] [place] [facility] - would you follow for [facility] orders nursing for ostomy care and education with INR due next Monday , and probably PT ? Also, would Dr. Deloise Merlin still be signing the orders ?

## 2018-01-05 ENCOUNTER — TELEPHONE (OUTPATIENT)
Dept: INTERNAL MEDICINE | Age: 73
End: 2018-01-05

## 2018-01-05 ENCOUNTER — APPOINTMENT (OUTPATIENT)
Dept: CARDIOLOGY | Facility: HOSPITAL | Age: 73
End: 2018-01-05
Attending: THORACIC SURGERY (CARDIOTHORACIC VASCULAR SURGERY)

## 2018-01-05 DIAGNOSIS — E78.5 HYPERLIPIDEMIA, UNSPECIFIED HYPERLIPIDEMIA TYPE: ICD-10-CM

## 2018-01-05 DIAGNOSIS — I48.20 CHRONIC ATRIAL FIBRILLATION (HCC): Primary | ICD-10-CM

## 2018-01-05 DIAGNOSIS — I48.20 CHRONIC ATRIAL FIBRILLATION (HCC): ICD-10-CM

## 2018-01-05 DIAGNOSIS — R78.81 MRSA BACTEREMIA: Primary | ICD-10-CM

## 2018-01-05 DIAGNOSIS — Z00.00 HEALTH CARE MAINTENANCE: ICD-10-CM

## 2018-01-05 DIAGNOSIS — B95.62 MRSA BACTEREMIA: Primary | ICD-10-CM

## 2018-01-05 LAB
ALBUMIN SERPL-MCNC: 3.6 G/DL (ref 3.5–5.2)
ALP BLD-CCNC: 80 U/L (ref 35–104)
ALT SERPL-CCNC: 12 U/L (ref 5–33)
ANION GAP SERPL CALCULATED.3IONS-SCNC: 17 MMOL/L (ref 7–19)
AST SERPL-CCNC: 16 U/L (ref 5–32)
BASOPHILS ABSOLUTE: 0 K/UL (ref 0–0.2)
BASOPHILS RELATIVE PERCENT: 0 % (ref 0–1)
BILIRUB SERPL-MCNC: 0.3 MG/DL (ref 0.2–1.2)
BUN BLDV-MCNC: 45 MG/DL (ref 8–23)
CALCIUM SERPL-MCNC: 10 MG/DL (ref 8.8–10.2)
CHLORIDE BLD-SCNC: 102 MMOL/L (ref 98–111)
CHOLESTEROL, TOTAL: 141 MG/DL (ref 160–199)
CO2: 22 MMOL/L (ref 22–29)
CREAT SERPL-MCNC: 1.2 MG/DL (ref 0.5–0.9)
EOSINOPHILS ABSOLUTE: 0 K/UL (ref 0–0.6)
EOSINOPHILS RELATIVE PERCENT: 0 % (ref 0–5)
GFR NON-AFRICAN AMERICAN: 44
GLUCOSE BLD-MCNC: 150 MG/DL (ref 74–109)
HCT VFR BLD CALC: 36.8 % (ref 37–47)
HDLC SERPL-MCNC: 51 MG/DL (ref 65–121)
HEMOGLOBIN: 11.3 G/DL (ref 12–16)
INR BLD: 1.74 (ref 0.88–1.18)
LDL CHOLESTEROL CALCULATED: 66 MG/DL
LYMPHOCYTES ABSOLUTE: 0.7 K/UL (ref 1.1–4.5)
LYMPHOCYTES RELATIVE PERCENT: 8 % (ref 20–40)
MCH RBC QN AUTO: 29.7 PG (ref 27–31)
MCHC RBC AUTO-ENTMCNC: 30.7 G/DL (ref 33–37)
MCV RBC AUTO: 96.8 FL (ref 81–99)
MONOCYTES ABSOLUTE: 0.4 K/UL (ref 0–0.9)
MONOCYTES RELATIVE PERCENT: 4.7 % (ref 0–10)
NEUTROPHILS ABSOLUTE: 7.1 K/UL (ref 1.5–7.5)
NEUTROPHILS RELATIVE PERCENT: 86.8 % (ref 50–65)
PDW BLD-RTO: 13.2 % (ref 11.5–14.5)
PLATELET # BLD: 179 K/UL (ref 130–400)
PMV BLD AUTO: 10.1 FL (ref 9.4–12.3)
POTASSIUM SERPL-SCNC: 4.5 MMOL/L (ref 3.5–5)
PROTHROMBIN TIME: 20.4 SEC (ref 12–14.6)
RBC # BLD: 3.8 M/UL (ref 4.2–5.4)
SODIUM BLD-SCNC: 141 MMOL/L (ref 136–145)
TOTAL PROTEIN: 8.3 G/DL (ref 6.6–8.7)
TRIGL SERPL-MCNC: 122 MG/DL (ref 0–149)
WBC # BLD: 8.1 K/UL (ref 4.8–10.8)

## 2018-01-05 RX ORDER — METOPROLOL TARTRATE 50 MG/1
50 TABLET, FILM COATED ORAL
COMMUNITY
End: 2018-01-08 | Stop reason: SDUPTHER

## 2018-01-05 RX ORDER — ASPIRIN 81 MG/1
81 TABLET ORAL DAILY
COMMUNITY
End: 2019-09-10

## 2018-01-05 RX ORDER — ACETAMINOPHEN 325 MG/1
650 TABLET ORAL EVERY 4 HOURS PRN
COMMUNITY

## 2018-01-05 RX ORDER — DILTIAZEM HYDROCHLORIDE 180 MG/1
180 CAPSULE, EXTENDED RELEASE ORAL DAILY
COMMUNITY
End: 2018-01-08 | Stop reason: SDUPTHER

## 2018-01-05 RX ORDER — GUAIFENESIN 600 MG/1
1200 TABLET, EXTENDED RELEASE ORAL 2 TIMES DAILY
COMMUNITY
End: 2019-05-22

## 2018-01-05 RX ORDER — GABAPENTIN 300 MG/1
300 CAPSULE ORAL NIGHTLY
COMMUNITY
End: 2018-01-12 | Stop reason: SDUPTHER

## 2018-01-05 RX ORDER — DOCUSATE SODIUM 100 MG/1
100 CAPSULE, LIQUID FILLED ORAL 2 TIMES DAILY PRN
COMMUNITY

## 2018-01-05 RX ORDER — WARFARIN SODIUM 10 MG/1
20 TABLET ORAL DAILY
COMMUNITY
End: 2018-01-08 | Stop reason: SDUPTHER

## 2018-01-08 ENCOUNTER — OFFICE VISIT (OUTPATIENT)
Dept: INTERNAL MEDICINE | Age: 73
End: 2018-01-08
Payer: MEDICARE

## 2018-01-08 VITALS
RESPIRATION RATE: 16 BRPM | WEIGHT: 237 LBS | SYSTOLIC BLOOD PRESSURE: 136 MMHG | DIASTOLIC BLOOD PRESSURE: 82 MMHG | HEART RATE: 74 BPM | HEIGHT: 68 IN | OXYGEN SATURATION: 98 % | BODY MASS INDEX: 35.92 KG/M2

## 2018-01-08 DIAGNOSIS — M89.9 DISORDER OF BONE: ICD-10-CM

## 2018-01-08 DIAGNOSIS — E55.9 VITAMIN D DEFICIENCY: ICD-10-CM

## 2018-01-08 DIAGNOSIS — G60.9 IDIOPATHIC PERIPHERAL NEUROPATHY: ICD-10-CM

## 2018-01-08 DIAGNOSIS — Z95.2 S/P TAVR (TRANSCATHETER AORTIC VALVE REPLACEMENT): Primary | ICD-10-CM

## 2018-01-08 DIAGNOSIS — I48.20 CHRONIC ATRIAL FIBRILLATION (HCC): ICD-10-CM

## 2018-01-08 DIAGNOSIS — M17.0 OSTEOARTHRITIS OF BOTH KNEES, UNSPECIFIED OSTEOARTHRITIS TYPE: ICD-10-CM

## 2018-01-08 DIAGNOSIS — I10 ESSENTIAL (PRIMARY) HYPERTENSION: ICD-10-CM

## 2018-01-08 DIAGNOSIS — E03.9 HYPOTHYROIDISM, UNSPECIFIED TYPE: ICD-10-CM

## 2018-01-08 DIAGNOSIS — R21 RASH: ICD-10-CM

## 2018-01-08 PROCEDURE — 1090F PRES/ABSN URINE INCON ASSESS: CPT | Performed by: NURSE PRACTITIONER

## 2018-01-08 PROCEDURE — G8417 CALC BMI ABV UP PARAM F/U: HCPCS | Performed by: NURSE PRACTITIONER

## 2018-01-08 PROCEDURE — 3014F SCREEN MAMMO DOC REV: CPT | Performed by: NURSE PRACTITIONER

## 2018-01-08 PROCEDURE — 99214 OFFICE O/P EST MOD 30 MIN: CPT | Performed by: NURSE PRACTITIONER

## 2018-01-08 PROCEDURE — G8484 FLU IMMUNIZE NO ADMIN: HCPCS | Performed by: NURSE PRACTITIONER

## 2018-01-08 PROCEDURE — G8427 DOCREV CUR MEDS BY ELIG CLIN: HCPCS | Performed by: NURSE PRACTITIONER

## 2018-01-08 PROCEDURE — 3017F COLORECTAL CA SCREEN DOC REV: CPT | Performed by: NURSE PRACTITIONER

## 2018-01-08 PROCEDURE — 4040F PNEUMOC VAC/ADMIN/RCVD: CPT | Performed by: NURSE PRACTITIONER

## 2018-01-08 PROCEDURE — G8400 PT W/DXA NO RESULTS DOC: HCPCS | Performed by: NURSE PRACTITIONER

## 2018-01-08 PROCEDURE — 1036F TOBACCO NON-USER: CPT | Performed by: NURSE PRACTITIONER

## 2018-01-08 PROCEDURE — 1123F ACP DISCUSS/DSCN MKR DOCD: CPT | Performed by: NURSE PRACTITIONER

## 2018-01-08 RX ORDER — DILTIAZEM HYDROCHLORIDE 180 MG/1
180 CAPSULE, COATED, EXTENDED RELEASE ORAL
COMMUNITY
Start: 2018-01-04 | End: 2018-02-05 | Stop reason: SDUPTHER

## 2018-01-08 ASSESSMENT — ENCOUNTER SYMPTOMS
EYE DISCHARGE: 0
CONSTIPATION: 0
TROUBLE SWALLOWING: 0
COUGH: 0
NAUSEA: 0
VOMITING: 0
COLOR CHANGE: 0
SORE THROAT: 0
DIARRHEA: 0
ABDOMINAL DISTENTION: 0
SHORTNESS OF BREATH: 0
EYE ITCHING: 0
STRIDOR: 0
BLOOD IN STOOL: 0
ABDOMINAL PAIN: 0
CHOKING: 0
WHEEZING: 0

## 2018-01-08 NOTE — PROGRESS NOTES
tablet 3    Ostomy Supplies (CHAO-FIT NATURA STOMAHESIVE) WAFR Use as directed 20 Wafer 11    Ostomy Supplies (CHAO-FIT NATURA UROSTOMY POUCH) Pouch MISC Use as directed 20 each 11    furosemide (LASIX) 40 MG tablet TAKE 1 TABLET BY MOUTH EVERY DAY 60 tablet 3    diclofenac sodium 1 % GEL Apply 2 g topically 4 times daily as needed for Pain      guaiFENesin (MUCINEX) 600 MG extended release tablet Take 1,200 mg by mouth 2 times daily      methimazole (TAPAZOLE) 10 MG tablet TAKE 1 TABLET BY MOUTH EVERY DAY 90 tablet 3     No current facility-administered medications for this visit. Allergies   Allergen Reactions    Other Anaphylaxis and Itching     Cloth bandaids , causes redness of skin    Ciprofloxacin Itching    Codeine Itching    Perflutren Lipid Microsphere Other (See Comments)     Back pain    Tetanus Toxoids Itching     Itching around site    Tizanidine Hcl Itching    Tetanus Toxoid      Reaction: [facility] Maintenance   Topic Date Due    Colon cancer screen colonoscopy  04/13/1994    Zostavax vaccine  04/13/2004    DEXA (modify frequency per FRAX score)  04/13/2009    Pneumococcal low/med risk (2 of 2 - PCV13) 10/10/2018    Potassium monitoring  01/05/2019    Creatinine monitoring  01/05/2019    Breast cancer screen  03/17/2019    Lipid screen  01/05/2023    Flu vaccine  Completed       Subjective:      Review of Systems   Constitutional: Positive for fatigue. Negative for fever and unexpected weight change. HENT: Negative for ear discharge, ear pain, mouth sores, sore throat and trouble swallowing. Eyes: Negative for discharge, itching and visual disturbance. Respiratory: Negative for cough, choking, shortness of breath, wheezing and stridor. Cardiovascular: Negative for chest pain, palpitations and leg swelling. Gastrointestinal: Negative for abdominal distention, abdominal pain, blood in stool, constipation, diarrhea, nausea and vomiting. Endocrine: Negative for cold intolerance, polydipsia and polyuria. Genitourinary: Negative for difficulty urinating, dysuria, frequency and urgency. Musculoskeletal: Negative for arthralgias and gait problem. Skin: Negative for color change and rash. Allergic/Immunologic: Negative for food allergies and immunocompromised state. Neurological: Negative for dizziness, tremors, syncope, speech difficulty, weakness and headaches. Neuropathy   Hematological: Negative for adenopathy. Does not bruise/bleed easily. Psychiatric/Behavioral: Negative for confusion and hallucinations. Objective:     Physical Exam   Constitutional: She is oriented to person, place, and time. She appears well-developed and well-nourished. No distress. HENT:   Head: Normocephalic and atraumatic. Eyes: Pupils are equal, round, and reactive to light. Right eye exhibits no discharge. Left eye exhibits no discharge. No scleral icterus. Neck: Normal range of motion. Neck supple. No JVD present. No thyromegaly present. Cardiovascular: Normal rate, regular rhythm and normal heart sounds. No murmur heard. Pulmonary/Chest: Effort normal and breath sounds normal. No respiratory distress. She has no wheezes. She has no rales. Abdominal: Soft. Bowel sounds are normal. She exhibits no distension and no mass. There is no tenderness. There is no rebound and no guarding. Musculoskeletal: Normal range of motion. She exhibits edema (trace BLE). She exhibits no tenderness. Neurological: She is alert and oriented to person, place, and time. She has normal reflexes. No cranial nerve deficit. Coordination normal.   Skin: Skin is warm and dry. No rash noted. No erythema. Psychiatric: Her behavior is normal. Judgment and thought content normal. She does not exhibit a depressed mood. /82   Pulse 74   Resp 16   Ht 5' 8\" (1.727 m)   Wt 237 lb (107.5 kg)   SpO2 98%   BMI 36.04 kg/m²     Assessment:      1.  S/P TAVR (transcatheter aortic valve replacement)     2. Chronic atrial fibrillation (HCC)     3. Osteoarthritis of both knees, unspecified osteoarthritis type     4. Rash     5. Essential (primary) hypertension  CBC    Comprehensive Metabolic Panel    TSH without Reflex    T4, Free    Lipid Panel    Vitamin D 25 Hydroxy   6. Idiopathic peripheral neuropathy     7. Hypothyroidism, unspecified type  TSH without Reflex    T4, Free   8. Vitamin D deficiency     9. Disorder of bone   Vitamin D 25 Hydroxy       Plan:        Patient given educational materials - see patient instructions. Discussed use, benefit, and side effects of prescribed medications. All patient questions answered. Pt voiced understanding. Reviewed health maintenance. Instructed to continue current medications, diet and exercise. Patient agreed with treatment plan. Follow up as directed. MEDICATIONS:  No orders of the defined types were placed in this encounter. ORDERS:  Orders Placed This Encounter   Procedures    Protime-INR    CBC    Comprehensive Metabolic Panel    TSH without Reflex    T4, Free    Lipid Panel    Vitamin D 25 Hydroxy       Follow-up:  No Follow-up on file. PATIENT INSTRUCTIONS:  Patient Instructions   #1/2 aortic valve replacement with a history of atrial fibrillation on chronic Coumadin therapy. Postop, patient of MRSA requiring 60 hospital stay for IV vancomycin. She has home health now driving her INR is her last was 1.6 on Friday she has been taking 20 mg of Coumadin at night I do realize this is extremely high dose prior to this she was only taking 10 mg 2 days a week and 5 mg in the other days I'm unsure exactly what's going on with this. We will recheck today and then make further plans for after results are in.    #3 osteoarthritis better after getting her knee injections  #4 rash they will make an appointment with her dermatologist as this is been a problem for almost a year  #5 hypertension stable no

## 2018-01-09 ENCOUNTER — TELEPHONE (OUTPATIENT)
Dept: INTERNAL MEDICINE CLINIC | Age: 73
End: 2018-01-09

## 2018-01-09 NOTE — TELEPHONE ENCOUNTER
Was PT/INR drawn in your office yesterday ? If so was dose changed and when do you need it re drawn ?    HH did not go out yesterday due to that appt

## 2018-01-09 NOTE — TELEPHONE ENCOUNTER
Mahnaz  This is an extremely low dose of Coumadin as you know. Prior to her admission to the hospital she was only taking 10 mg 3 days a week and 5 mg all the other days. During the hospital stay the had a lot of problems getting her Coumadin regulated. She previously been on Coumadin for atrial fibrillation with an INR of 2-2.7. Now she has had the replacement she needs to be between 2.5 and 3.5. During her hospital stay she actually had Lovenox the majority of the time as her INR remains low. I'm not sure was ever therapeutic in review of her labs there. We just need to watch her very closely with this dangerously high level and if there is any way you can check it on Wednesday that would be good, As I am off on Thursday. It may be that this Coumadin is not going to work for her we may have to do brand name. Therefore I'm going to need a lot of help from UTIs over the next little bit to make sure we get her to a safe level. Please note also we had done home monitoring at 1.8 she was so far out it is difficult for her to get in to be monitored. But the insurance stopped paying for her strips believed was the problem.

## 2018-01-10 ENCOUNTER — TELEPHONE (OUTPATIENT)
Dept: INTERNAL MEDICINE CLINIC | Age: 73
End: 2018-01-10

## 2018-01-10 NOTE — TELEPHONE ENCOUNTER
[address] [facility] nurse was notified and order entered in [facility] chart for the PT/INR recheck on Monday 1/15 , thank you

## 2018-01-12 RX ORDER — GABAPENTIN 300 MG/1
300 CAPSULE ORAL NIGHTLY
Qty: 90 CAPSULE | Refills: 0 | Status: SHIPPED | OUTPATIENT
Start: 2018-01-12 | End: 2018-04-24 | Stop reason: SDUPTHER

## 2018-01-12 NOTE — TELEPHONE ENCOUNTER
Pt states she was prescribed medication during hospital visit and is now out, requesting refill on the medication.

## 2018-01-15 LAB
INR BLD: 3.79 (ref 0.88–1.18)
PROTHROMBIN TIME: 38 SEC (ref 12–14.6)

## 2018-01-22 ENCOUNTER — TELEPHONE (OUTPATIENT)
Dept: INTERNAL MEDICINE CLINIC | Age: 73
End: 2018-01-22

## 2018-01-24 ENCOUNTER — TELEPHONE (OUTPATIENT)
Dept: INTERNAL MEDICINE CLINIC | Age: 73
End: 2018-01-24

## 2018-01-24 NOTE — TELEPHONE ENCOUNTER
Todays PT/INR is  38.5 / 3.2   Current Coumadin dose is Patient has been on [address] Tuesday, was on 20 mg daily prior   Please advise     Nel Escalante RN

## 2018-01-29 ENCOUNTER — TELEPHONE (OUTPATIENT)
Dept: INTERNAL MEDICINE | Age: 73
End: 2018-01-29

## 2018-01-31 ENCOUNTER — HOSPITAL ENCOUNTER (EMERGENCY)
Facility: HOSPITAL | Age: 73
Discharge: HOME | End: 2018-01-31
Attending: EMERGENCY MEDICINE | Admitting: EMERGENCY MEDICINE

## 2018-01-31 ENCOUNTER — APPOINTMENT (OUTPATIENT)
Dept: CT IMAGING | Facility: HOSPITAL | Age: 73
End: 2018-01-31

## 2018-01-31 ENCOUNTER — APPOINTMENT (OUTPATIENT)
Dept: GENERAL RADIOLOGY | Facility: HOSPITAL | Age: 73
End: 2018-01-31

## 2018-01-31 VITALS
OXYGEN SATURATION: 98 % | RESPIRATION RATE: 16 BRPM | SYSTOLIC BLOOD PRESSURE: 121 MMHG | WEIGHT: 240 LBS | DIASTOLIC BLOOD PRESSURE: 75 MMHG | HEIGHT: 68 IN | BODY MASS INDEX: 36.37 KG/M2 | TEMPERATURE: 98.1 F | HEART RATE: 74 BPM

## 2018-01-31 DIAGNOSIS — I48.20 CHRONIC ATRIAL FIBRILLATION (HCC): ICD-10-CM

## 2018-01-31 DIAGNOSIS — R07.89 ATYPICAL CHEST PAIN: Primary | ICD-10-CM

## 2018-01-31 LAB
ALBUMIN SERPL-MCNC: 3.9 G/DL (ref 3.5–5)
ALBUMIN/GLOB SERPL: 1 G/DL (ref 1.1–2.5)
ALP SERPL-CCNC: 103 U/L (ref 24–120)
ALT SERPL W P-5'-P-CCNC: 33 U/L (ref 0–54)
ANION GAP SERPL CALCULATED.3IONS-SCNC: 10 MMOL/L (ref 4–13)
APTT PPP: 39.8 SECONDS (ref 24.1–34.8)
AST SERPL-CCNC: 29 U/L (ref 7–45)
BASOPHILS # BLD AUTO: 0.01 10*3/MM3 (ref 0–0.2)
BASOPHILS NFR BLD AUTO: 0.2 % (ref 0–2)
BILIRUB SERPL-MCNC: 0.3 MG/DL (ref 0.1–1)
BUN BLD-MCNC: 31 MG/DL (ref 5–21)
BUN/CREAT SERPL: 25 (ref 7–25)
CALCIUM SPEC-SCNC: 9.7 MG/DL (ref 8.4–10.4)
CHLORIDE SERPL-SCNC: 104 MMOL/L (ref 98–110)
CO2 SERPL-SCNC: 32 MMOL/L (ref 24–31)
CREAT BLD-MCNC: 1.24 MG/DL (ref 0.5–1.4)
DEPRECATED RDW RBC AUTO: 51.3 FL (ref 40–54)
EOSINOPHIL # BLD AUTO: 0.28 10*3/MM3 (ref 0–0.7)
EOSINOPHIL NFR BLD AUTO: 5.4 % (ref 0–4)
ERYTHROCYTE [DISTWIDTH] IN BLOOD BY AUTOMATED COUNT: 15.1 % (ref 12–15)
GFR SERPL CREATININE-BSD FRML MDRD: 42 ML/MIN/1.73
GLOBULIN UR ELPH-MCNC: 4 GM/DL
GLUCOSE BLD-MCNC: 140 MG/DL (ref 70–100)
HCT VFR BLD AUTO: 36.6 % (ref 37–47)
HGB BLD-MCNC: 11.3 G/DL (ref 12–16)
HOLD SPECIMEN: NORMAL
HOLD SPECIMEN: NORMAL
IMM GRANULOCYTES # BLD: 0 10*3/MM3 (ref 0–0.03)
IMM GRANULOCYTES NFR BLD: 0 % (ref 0–5)
INR PPP: 3.15 (ref 0.91–1.09)
LYMPHOCYTES # BLD AUTO: 1.02 10*3/MM3 (ref 0.72–4.86)
LYMPHOCYTES NFR BLD AUTO: 19.8 % (ref 15–45)
MCH RBC QN AUTO: 28.8 PG (ref 28–32)
MCHC RBC AUTO-ENTMCNC: 30.9 G/DL (ref 33–36)
MCV RBC AUTO: 93.4 FL (ref 82–98)
MONOCYTES # BLD AUTO: 0.77 10*3/MM3 (ref 0.19–1.3)
MONOCYTES NFR BLD AUTO: 14.9 % (ref 4–12)
NEUTROPHILS # BLD AUTO: 3.08 10*3/MM3 (ref 1.87–8.4)
NEUTROPHILS NFR BLD AUTO: 59.7 % (ref 39–78)
NRBC BLD MANUAL-RTO: 0 /100 WBC (ref 0–0)
PLATELET # BLD AUTO: 214 10*3/MM3 (ref 130–400)
PMV BLD AUTO: 9.2 FL (ref 6–12)
POTASSIUM BLD-SCNC: 4 MMOL/L (ref 3.5–5.3)
PROT SERPL-MCNC: 7.9 G/DL (ref 6.3–8.7)
PROTHROMBIN TIME: 33.6 SECONDS (ref 11.9–14.6)
RBC # BLD AUTO: 3.92 10*6/MM3 (ref 4.2–5.4)
SODIUM BLD-SCNC: 146 MMOL/L (ref 135–145)
TROPONIN I SERPL-MCNC: <0.012 NG/ML (ref 0–0.03)
TROPONIN I SERPL-MCNC: <0.012 NG/ML (ref 0–0.03)
WBC NRBC COR # BLD: 5.16 10*3/MM3 (ref 4.8–10.8)
WHOLE BLOOD HOLD SPECIMEN: NORMAL
WHOLE BLOOD HOLD SPECIMEN: NORMAL

## 2018-01-31 PROCEDURE — 63710000001 DIPHENHYDRAMINE PER 50 MG: Performed by: EMERGENCY MEDICINE

## 2018-01-31 PROCEDURE — 80053 COMPREHEN METABOLIC PANEL: CPT | Performed by: EMERGENCY MEDICINE

## 2018-01-31 PROCEDURE — 0 IOPAMIDOL PER 1 ML: Performed by: EMERGENCY MEDICINE

## 2018-01-31 PROCEDURE — 85610 PROTHROMBIN TIME: CPT | Performed by: EMERGENCY MEDICINE

## 2018-01-31 PROCEDURE — 71275 CT ANGIOGRAPHY CHEST: CPT

## 2018-01-31 PROCEDURE — 85025 COMPLETE CBC W/AUTO DIFF WBC: CPT | Performed by: EMERGENCY MEDICINE

## 2018-01-31 PROCEDURE — 84484 ASSAY OF TROPONIN QUANT: CPT | Performed by: EMERGENCY MEDICINE

## 2018-01-31 PROCEDURE — 85730 THROMBOPLASTIN TIME PARTIAL: CPT | Performed by: EMERGENCY MEDICINE

## 2018-01-31 PROCEDURE — 96374 THER/PROPH/DIAG INJ IV PUSH: CPT

## 2018-01-31 PROCEDURE — 93005 ELECTROCARDIOGRAM TRACING: CPT

## 2018-01-31 PROCEDURE — 99284 EMERGENCY DEPT VISIT MOD MDM: CPT

## 2018-01-31 PROCEDURE — 71045 X-RAY EXAM CHEST 1 VIEW: CPT

## 2018-01-31 PROCEDURE — 25010000002 MORPHINE PER 10 MG: Performed by: EMERGENCY MEDICINE

## 2018-01-31 PROCEDURE — 93010 ELECTROCARDIOGRAM REPORT: CPT | Performed by: INTERNAL MEDICINE

## 2018-01-31 PROCEDURE — 93005 ELECTROCARDIOGRAM TRACING: CPT | Performed by: EMERGENCY MEDICINE

## 2018-01-31 RX ORDER — DIPHENHYDRAMINE HCL 25 MG
25 CAPSULE ORAL ONCE
Status: COMPLETED | OUTPATIENT
Start: 2018-01-31 | End: 2018-01-31

## 2018-01-31 RX ORDER — SODIUM CHLORIDE 0.9 % (FLUSH) 0.9 %
10 SYRINGE (ML) INJECTION AS NEEDED
Status: DISCONTINUED | OUTPATIENT
Start: 2018-01-31 | End: 2018-02-01 | Stop reason: HOSPADM

## 2018-01-31 RX ORDER — MORPHINE SULFATE 4 MG/ML
4 INJECTION, SOLUTION INTRAMUSCULAR; INTRAVENOUS ONCE
Status: COMPLETED | OUTPATIENT
Start: 2018-01-31 | End: 2018-01-31

## 2018-01-31 RX ORDER — ASPIRIN 81 MG/1
324 TABLET, CHEWABLE ORAL ONCE
Status: COMPLETED | OUTPATIENT
Start: 2018-01-31 | End: 2018-01-31

## 2018-01-31 RX ADMIN — DIPHENHYDRAMINE HYDROCHLORIDE 25 MG: 25 CAPSULE ORAL at 22:38

## 2018-01-31 RX ADMIN — MORPHINE SULFATE 4 MG: 4 INJECTION INTRAVENOUS at 21:14

## 2018-01-31 RX ADMIN — IOPAMIDOL 86.5 ML: 755 INJECTION, SOLUTION INTRAVENOUS at 21:53

## 2018-01-31 RX ADMIN — ASPIRIN 81 MG 243 MG: 81 TABLET ORAL at 18:03

## 2018-02-01 NOTE — DISCHARGE INSTRUCTIONS
Atrial Fibrillation  Introduction  Atrial fibrillation is a type of heartbeat that is irregular or fast (rapid). If you have this condition, your heart keeps quivering in a weird (chaotic) way. This condition can make it so your heart cannot pump blood normally. Having this condition gives a person more risk for stroke, heart failure, and other heart problems. There are different types of atrial fibrillation. Talk with your doctor to learn about the type that you have.  Follow these instructions at home:  · Take over-the-counter and prescription medicines only as told by your doctor.  · If your doctor prescribed a blood-thinning medicine, take it exactly as told. Taking too much of it can cause bleeding. If you do not take enough of it, you will not have the protection that you need against stroke and other problems.  · Do not use any tobacco products. These include cigarettes, chewing tobacco, and e-cigarettes. If you need help quitting, ask your doctor.  · If you have apnea (obstructive sleep apnea), manage it as told by your doctor.  · Do not drink alcohol.  · Do not drink beverages that have caffeine. These include coffee, soda, and tea.  · Maintain a healthy weight. Do not use diet pills unless your doctor says they are safe for you. Diet pills may make heart problems worse.  · Follow diet instructions as told by your doctor.  · Exercise regularly as told by your doctor.  · Keep all follow-up visits as told by your doctor. This is important.  Contact a doctor if:  · You notice a change in the speed, rhythm, or strength of your heartbeat.  · You are taking a blood-thinning medicine and you notice more bruising.  · You get tired more easily when you move or exercise.  Get help right away if:  · You have pain in your chest or your belly (abdomen).  · You have sweating or weakness.  · You feel sick to your stomach (nauseous).  · You notice blood in your throw up (vomit), poop (stool), or pee (urine).  · You are  short of breath.  · You suddenly have swollen feet and ankles.  · You feel dizzy.  · Your suddenly get weak or numb in your face, arms, or legs, especially if it happens on one side of your body.  · You have trouble talking, trouble understanding, or both.  · Your face or your eyelid droops on one side.  These symptoms may be an emergency. Do not wait to see if the symptoms will go away. Get medical help right away. Call your local emergency services (911 in the [place] Do not drive yourself to the hospital.   This information is not intended to replace advice given to you by your health care provider. Make sure you discuss any questions you have with your health care provider.  Document Released: 09/26/2009 Document Revised: 05/25/2017 Document Reviewed: 04/13/2016  © 2017 [employer]

## 2018-02-01 NOTE — ED PROVIDER NOTES
Subjective patient is a 72-year-old female who presents to the ER with chest pain.  Patient states she has had intermittent chest pain since around 6 AM this morning.  Patient states she was sitting down when the pain started.  Patient states she has had 3 episodes today each lasting a few minutes each time.  Patient states the pain is located over the left anterior chest with no radiation.  Patient describes the pain as sharp in nature.  Patient states the last episode started approximately one hour ago and lasted for a few minutes and then resolved on its own.  Patient is status post a TAVR in October 2017.  She denies any cough, fever, shortness of breath, abdominal pain, nausea vomiting diarrhea, urinary changes, neurological changes.  She currently denies any chest pain.  Nothing makes her pain better or worse when she has it.    History provided by:  Patient   used: No        Review of Systems   Constitutional: Negative.    HENT: Negative.    Eyes: Negative.    Respiratory: Negative.    Cardiovascular: Positive for chest pain.   Gastrointestinal: Negative.    Endocrine: Negative.    Genitourinary: Negative.    Musculoskeletal: Negative.    Skin: Negative.    Allergic/Immunologic: Negative.    Neurological: Negative.    Hematological: Negative.    Psychiatric/Behavioral: Negative.    All other systems reviewed and are negative.      Past Medical History:   Diagnosis Date   • A-fib    • Aortic stenosis    • Arthritis    • Bradycardia    • Cancer     bladder and skin   • Cardiomegaly    • CHF (congestive heart failure)    • GERD (gastroesophageal reflux disease)    • Hard of hearing    • History of short term memory loss    • Hypercalcemia    • Hyperlipidemia    • Hypertension    • Hyperthyroidism 11/20/2017   • Hypothyroidism    • Kidney stone    • Long term current use of anticoagulant therapy    • Open wound     left lower extremity,   • Palpitation    • PONV (postoperative nausea and  vomiting)    • Shortness of breath    • Sick sinus syndrome    • Sleep apnea        Allergies   Allergen Reactions   • Ciprofloxacin Itching   • Codeine Itching and GI Intolerance   • Definity [Perflutren Lipid Microsphere] Other (See Comments)     Back pain   • Tetanus Toxoids Itching     Itching around site   • Tizanidine Hcl Itching   • Other Itching     Cloth bandaids , causes redness of skin       Past Surgical History:   Procedure Laterality Date   • BLADDER SURGERY      removed   • CARDIAC CATHETERIZATION     • CARDIAC CATHETERIZATION N/A 12/9/2016    Procedure: Left Heart Cath;  Surgeon: Elia Flores MD;  Location:  [facility];  Service:    • HIP BIPOLAR REPLACEMENT Left    • HYSTERECTOMY     • ILEOSTOMY     • JOINT REPLACEMENT     • KIDNEY SURGERY      right kidney removed   • NEPHRECTOMY RADICAL Right     from cancer   • VARICOSE VEIN SURGERY Left 4/10/2017    Procedure: LEFT LOWER EXTREMITY VENOGRAM. LEFT SAPHENOUS VEIN RADIO FREQUENCY ABLATION;  Surgeon: Blake Martinez DO;  Location:  [facility] OR;  Service:        Family History   Problem Relation Age of Onset   • Heart failure Mother    • Heart disease Father    • Stroke Father    • Hypertension Sister    • Heart failure Sister    • No Known Problems Brother    • No Known Problems Maternal Grandmother    • No Known Problems Maternal Grandfather    • No Known Problems Paternal Grandmother    • No Known Problems Paternal Grandfather    • Hypertension Sister    • Heart failure Sister    • Hypertension Sister    • Heart failure Sister    • Hypertension Sister    • Heart failure Sister    • Hypertension Sister    • Heart failure Sister    • Hypertension Sister    • Heart failure Sister    • Gallbladder disease Brother    • COPD Daughter    • Asthma Daughter    • Diabetes Son    • No Known Problems Son    • Autism Son        Social History     Social History   • Marital status:      Spouse name: N/A   • Number of children: N/A   •  Years of education: N/A     Social History Main Topics   • Smoking status: Never Smoker   • Smokeless tobacco: Never Used   • Alcohol use No   • Drug use: No   • Sexual activity: Defer     Other Topics Concern   • None     Social History Narrative           Objective   Physical Exam   Constitutional: She is oriented to person, place, and time. She appears well-developed and well-nourished.   HENT:   Head: Normocephalic and atraumatic.   Eyes: Conjunctivae are normal. Pupils are equal, round, and reactive to light.   Neck: Normal range of motion.   Cardiovascular: Normal rate.  An irregularly irregular rhythm present.   Murmur heard.  Pulmonary/Chest: Effort normal and breath sounds normal.   Abdominal: Soft. There is no tenderness.   Urostomy in place in RLQ functioning appropriately   Musculoskeletal: Normal range of motion. She exhibits no edema or deformity.   Neurological: She is alert and oriented to person, place, and time. She has normal strength.   Skin: Skin is warm.   Psychiatric: She has a normal mood and affect. Her behavior is normal.   Nursing note and vitals reviewed.      Procedures         ED Course  ED Course      EKG: Atrial fibrillation with a rate of 87 with PVCs, LVH, no ST elevation, no changes from prior EKG    Patient remained chest pain-free while here in the ER.    Lab Results (last 24 hours)     Procedure Component Value Units Date/Time    CBC & Differential [[id number]] Collected:  01/31/18 1752    Specimen:  Blood Updated:  01/31/18 1801    Narrative:       The following orders were created for panel order CBC & Differential.  Procedure                               Abnormality         Status                     ---------                               -----------         ------                     CBC Auto Differential[[id number]]        Abnormal            Final result                 Please view results for these tests on the individual orders.    Comprehensive Metabolic Panel  [[id number]]  (Abnormal) Collected:  01/31/18 1752    Specimen:  Blood Updated:  01/31/18 1810     Glucose 140 (H) mg/dL      BUN 31 (H) mg/dL      Creatinine 1.24 mg/dL      Sodium 146 (H) mmol/L      Potassium 4.0 mmol/L      Chloride 104 mmol/L      CO2 32.0 (H) mmol/L      Calcium 9.7 mg/dL      Total Protein 7.9 g/dL      Albumin 3.90 g/dL      ALT (SGPT) 33 U/L      AST (SGOT) 29 U/L      Alkaline Phosphatase 103 U/L      Total Bilirubin 0.3 mg/dL      eGFR Non African Amer 42 (L) mL/min/1.73      Globulin 4.0 gm/dL      A/G Ratio 1.0 (L) g/dL      BUN/Creatinine Ratio 25.0     Anion Gap 10.0 mmol/L     Narrative:       The MDRD GFR formula is only valid for adults with stable renal function between ages 18 and 70.    Troponin [[id number]]  (Normal) Collected:  01/31/18 1752    Specimen:  Blood Updated:  01/31/18 1821     Troponin I <0.012 ng/mL     Protime-INR [[id number]]  (Abnormal) Collected:  01/31/18 1752    Specimen:  Blood Updated:  01/31/18 1806     Protime 33.6 (H) Seconds      INR 3.15 (H)    CBC Auto Differential [[id number]]  (Abnormal) Collected:  01/31/18 1752    Specimen:  Blood Updated:  01/31/18 1801     WBC 5.16 10*3/mm3      RBC 3.92 (L) 10*6/mm3      Hemoglobin 11.3 (L) g/dL      Hematocrit 36.6 (L) %      MCV 93.4 fL      MCH 28.8 pg      MCHC 30.9 (L) g/dL      RDW 15.1 (H) %      RDW-SD 51.3 fl      MPV 9.2 fL      Platelets 214 10*3/mm3      Neutrophil % 59.7 %      Lymphocyte % 19.8 %      Monocyte % 14.9 (H) %      Eosinophil % 5.4 (H) %      Basophil % 0.2 %      Immature Grans % 0.0 %      Neutrophils, Absolute 3.08 10*3/mm3      Lymphocytes, Absolute 1.02 10*3/mm3      Monocytes, Absolute 0.77 10*3/mm3      Eosinophils, Absolute 0.28 10*3/mm3      Basophils, Absolute 0.01 10*3/mm3      Immature Grans, Absolute 0.00 10*3/mm3      nRBC 0.0 /100 WBC     aPTT [[id number]]  (Abnormal) Collected:  01/31/18 1752    Specimen:  Blood Updated:  01/31/18 1815     PTT 39.8 (H) seconds     Troponin  [[id number]]  (Normal) Collected:  01/31/18 2039    Specimen:  Blood Updated:  01/31/18 2112     Troponin I <0.012 ng/mL         CT Angiogram Chest With Contrast   Final Result   1. No evidence of pulmonary embolus or other acute cardiopulmonary   process.   2. Multinodular thyroid gland, with substernal extension.   3. Cardiomegaly. No convincing evidence of fluid overload.   4. Chronic interstitial lung changes.           This report was finalized on 01/31/2018 22:03 by Dr. Zee Carballo MD.      XR Chest 1 View   Final Result   Impression:       1.  Probable trace left pleural effusion.   2.  Mild cardiomegaly and central pulmonary venous congestion   This report was finalized on 01/31/2018 18:42 by Dr. Zee Carballo MD.        Labs are unremarkable including troponin ×2.  Chest x-ray showed cardiomegaly and a trace left pleural effusion.  CTA of the chest showed no PE.  Did show cardiomegaly and chronic interstitial lung changes.  Patient also had a multinodular thyroid gland.  Patient had no chest pain while here in the ER.  Patient will be discharged home to follow up with PCP and cardiologist.  Return for any worsening or new pain, shortness of breath or other concerns.            MDM  Number of Diagnoses or Management Options      Final diagnoses:   Atypical chest pain   Chronic atrial fibrillation            Cris Phillip MD  01/31/18 6636

## 2018-02-01 NOTE — ED NOTES
Patient reports that she can not have IV contrast. When asked if this was an allergy patient state that she can not have it because the last time that she had it: it caused pain to her back. Dr. Phillip notified.      Harjinder Belcher RN  01/31/18 4358

## 2018-02-01 NOTE — ED NOTES
Dr. Phillip spoke with patient. Patient agreed for CT with contrast if pain medication could be received prior to scan.      Harjinder Belcher RN  01/31/18 9265

## 2018-02-03 NOTE — ED NOTES
"ED Call Back Questions    1. How are you doing since leaving the Emergency Department?    Much better, no isses.  2. Do you have any questions about your discharge instructions? No     3. Have you filled your new prescriptions yet? N/A  a. Do you have any questions about those medications? N/A    4. Were you able to make a follow-up appointment with the physician? No     5. Do you have a primary care physician? Yes   a. If No, would you like for me to set you up with one? N/A  i. If Yes, “I will have our ED  give you a call right back at this number to work with you on the best time for an appointment.”    6. We are always looking to get better at what we do. Do you have any suggestions for what we can do to be even better? No   a. If Yes, \"Thank you for sharing your concerns. I apologize. I will follow up with our manager and patient . Would you like someone to call you back?\" N/A    7. Is there anything else I can do for you? No     "

## 2018-02-05 RX ORDER — DILTIAZEM HYDROCHLORIDE 180 MG/1
180 CAPSULE, COATED, EXTENDED RELEASE ORAL DAILY
Qty: 30 CAPSULE | Refills: 2 | Status: SHIPPED | OUTPATIENT
Start: 2018-02-05 | End: 2018-05-08 | Stop reason: SDUPTHER

## 2018-02-06 ENCOUNTER — TELEPHONE (OUTPATIENT)
Dept: INTERNAL MEDICINE | Age: 73
End: 2018-02-06

## 2018-02-06 NOTE — TELEPHONE ENCOUNTER
Hold today and tomorrow;  Recheck on thur ;   If back to range 2-3 she can start 5 mg daily with 7.5 mwf and recheck next Monday

## 2018-02-08 ENCOUNTER — TELEPHONE (OUTPATIENT)
Dept: INTERNAL MEDICINE CLINIC | Age: 73
End: 2018-02-08

## 2018-02-08 NOTE — TELEPHONE ENCOUNTER
Todays PT/INR is 36.8 / 3.1   Current Coumadin dose - has been holding for 2 days, due to elevation  - was on 10 mg prior     Please advise     Juan Manuel Queen RN

## 2018-02-09 ENCOUNTER — TELEPHONE (OUTPATIENT)
Dept: INTERNAL MEDICINE | Age: 73
End: 2018-02-09

## 2018-02-09 NOTE — TELEPHONE ENCOUNTER
I had already given orders I think for her coumadin  She needs 10 mg every day but mon wed frid take 5 mg; recheck next Wednesday

## 2018-02-11 ENCOUNTER — HOSPITAL ENCOUNTER (EMERGENCY)
Facility: HOSPITAL | Age: 73
Discharge: HOME | End: 2018-02-11
Attending: EMERGENCY MEDICINE | Admitting: EMERGENCY MEDICINE

## 2018-02-11 ENCOUNTER — APPOINTMENT (OUTPATIENT)
Dept: GENERAL RADIOLOGY | Facility: HOSPITAL | Age: 73
End: 2018-02-11

## 2018-02-11 ENCOUNTER — APPOINTMENT (OUTPATIENT)
Dept: CT IMAGING | Facility: HOSPITAL | Age: 73
End: 2018-02-11

## 2018-02-11 VITALS
RESPIRATION RATE: 16 BRPM | BODY MASS INDEX: 37.98 KG/M2 | HEIGHT: 67 IN | OXYGEN SATURATION: 99 % | WEIGHT: 242 LBS | HEART RATE: 81 BPM | DIASTOLIC BLOOD PRESSURE: 56 MMHG | SYSTOLIC BLOOD PRESSURE: 114 MMHG | TEMPERATURE: 98.1 F

## 2018-02-11 DIAGNOSIS — S00.03XA HEMATOMA OF SCALP, INITIAL ENCOUNTER: ICD-10-CM

## 2018-02-11 DIAGNOSIS — N18.9 ACUTE ON CHRONIC RENAL INSUFFICIENCY: ICD-10-CM
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
## 2018-02-11 DIAGNOSIS — N28.9 ACUTE ON CHRONIC RENAL INSUFFICIENCY: ICD-10-CM

## 2018-02-11 LAB
ALBUMIN SERPL-MCNC: 3.6 G/DL (ref 3.5–5)
ALBUMIN/GLOB SERPL: 0.9 G/DL (ref 1.1–2.5)
ALP SERPL-CCNC: 103 U/L (ref 24–120)
ALT SERPL W P-5'-P-CCNC: 31 U/L (ref 0–54)
ANION GAP SERPL CALCULATED.3IONS-SCNC: 13 MMOL/L (ref 4–13)
AST SERPL-CCNC: 24 U/L (ref 7–45)
BASOPHILS # BLD AUTO: 0.03 10*3/MM3 (ref 0–0.2)
BASOPHILS NFR BLD AUTO: 0.4 % (ref 0–2)
BILIRUB SERPL-MCNC: 0.4 MG/DL (ref 0.1–1)
BUN BLD-MCNC: 47 MG/DL (ref 5–21)
BUN/CREAT SERPL: 31.8 (ref 7–25)
CALCIUM SPEC-SCNC: 9.5 MG/DL (ref 8.4–10.4)
CHLORIDE SERPL-SCNC: 104 MMOL/L (ref 98–110)
CO2 SERPL-SCNC: 30 MMOL/L (ref 24–31)
CREAT BLD-MCNC: 1.48 MG/DL (ref 0.5–1.4)
DEPRECATED RDW RBC AUTO: 49.6 FL (ref 40–54)
EOSINOPHIL # BLD AUTO: 0.21 10*3/MM3 (ref 0–0.7)
EOSINOPHIL NFR BLD AUTO: 3.1 % (ref 0–4)
ERYTHROCYTE [DISTWIDTH] IN BLOOD BY AUTOMATED COUNT: 14.6 % (ref 12–15)
GFR SERPL CREATININE-BSD FRML MDRD: 35 ML/MIN/1.73
GLOBULIN UR ELPH-MCNC: 3.9 GM/DL
GLUCOSE BLD-MCNC: 126 MG/DL (ref 70–100)
HCT VFR BLD AUTO: 34.5 % (ref 37–47)
HGB BLD-MCNC: 10.8 G/DL (ref 12–16)
IMM GRANULOCYTES # BLD: 0.04 10*3/MM3 (ref 0–0.03)
IMM GRANULOCYTES NFR BLD: 0.6 % (ref 0–5)
INR PPP: 1.36 (ref 0.91–1.09)
LYMPHOCYTES # BLD AUTO: 0.96 10*3/MM3 (ref 0.72–4.86)
LYMPHOCYTES NFR BLD AUTO: 14 % (ref 15–45)
MCH RBC QN AUTO: 28.8 PG (ref 28–32)
MCHC RBC AUTO-ENTMCNC: 31.3 G/DL (ref 33–36)
MCV RBC AUTO: 92 FL (ref 82–98)
MONOCYTES # BLD AUTO: 1.07 10*3/MM3 (ref 0.19–1.3)
MONOCYTES NFR BLD AUTO: 15.6 % (ref 4–12)
NEUTROPHILS # BLD AUTO: 4.55 10*3/MM3 (ref 1.87–8.4)
NEUTROPHILS NFR BLD AUTO: 66.3 % (ref 39–78)
NRBC BLD MANUAL-RTO: 0 /100 WBC (ref 0–0)
PLATELET # BLD AUTO: 245 10*3/MM3 (ref 130–400)
PMV BLD AUTO: 9.4 FL (ref 6–12)
POTASSIUM BLD-SCNC: 3.8 MMOL/L (ref 3.5–5.3)
PROT SERPL-MCNC: 7.5 G/DL (ref 6.3–8.7)
PROTHROMBIN TIME: 17.2 SECONDS (ref 11.9–14.6)
RBC # BLD AUTO: 3.75 10*6/MM3 (ref 4.2–5.4)
SODIUM BLD-SCNC: 147 MMOL/L (ref 135–145)
WBC NRBC COR # BLD: 6.86 10*3/MM3 (ref 4.8–10.8)

## 2018-02-11 PROCEDURE — 93005 ELECTROCARDIOGRAM TRACING: CPT | Performed by: EMERGENCY MEDICINE

## 2018-02-11 PROCEDURE — 70450 CT HEAD/BRAIN W/O DYE: CPT

## 2018-02-11 PROCEDURE — 73562 X-RAY EXAM OF KNEE 3: CPT

## 2018-02-11 PROCEDURE — 93010 ELECTROCARDIOGRAM REPORT: CPT | Performed by: INTERNAL MEDICINE

## 2018-02-11 PROCEDURE — 85025 COMPLETE CBC W/AUTO DIFF WBC: CPT | Performed by: EMERGENCY MEDICINE

## 2018-02-11 PROCEDURE — 36415 COLL VENOUS BLD VENIPUNCTURE: CPT

## 2018-02-11 PROCEDURE — 80053 COMPREHEN METABOLIC PANEL: CPT | Performed by: EMERGENCY MEDICINE

## 2018-02-11 PROCEDURE — 85610 PROTHROMBIN TIME: CPT | Performed by: EMERGENCY MEDICINE

## 2018-02-11 PROCEDURE — 71045 X-RAY EXAM CHEST 1 VIEW: CPT

## 2018-02-11 PROCEDURE — 99283 EMERGENCY DEPT VISIT LOW MDM: CPT

## 2018-02-11 PROCEDURE — 73502 X-RAY EXAM HIP UNI 2-3 VIEWS: CPT

## 2018-02-11 PROCEDURE — 72125 CT NECK SPINE W/O DYE: CPT

## 2018-02-11 NOTE — ED PROVIDER NOTES
Subjective   HPI Comments: Patient is a 72-year-old female who reports that she was at home this morning and at 9:30 AM she was carrying laundry and walked into a pet gate and this caused her to fall and struck the front of her head on the corner of the wall.  Patient's male friend was with her and he reports that the patient was unconscious for 5 minutes after she fell and hit her head.  The patient reports that currently she is having right-sided neck pain, generalized headache pain and pain in the middle of her forehead where she hit her head.      History provided by:  Patient and friend      Review of Systems   Constitutional: Negative.         Fall   Eyes: Negative.    Respiratory: Negative.    Cardiovascular: Negative.    Gastrointestinal: Negative.    Endocrine: Negative.    Genitourinary: Negative.    Musculoskeletal: Positive for arthralgias and neck pain.   Skin: Negative.    Allergic/Immunologic: Negative.    Neurological: Positive for headaches.        Loss of consciousness   Hematological: Negative.    Psychiatric/Behavioral: Negative.        Past Medical History:   Diagnosis Date   • A-fib    • Aortic stenosis    • Arthritis    • Bradycardia    • Cancer     bladder and skin   • Cardiomegaly    • CHF (congestive heart failure)    • GERD (gastroesophageal reflux disease)    • Hard of hearing    • History of short term memory loss    • Hypercalcemia    • Hyperlipidemia    • Hypertension    • Hyperthyroidism 11/20/2017   • Hypothyroidism    • Kidney stone    • Long term current use of anticoagulant therapy    • Open wound     left lower extremity,   • Palpitation    • PONV (postoperative nausea and vomiting)    • Shortness of breath    • Sick sinus syndrome    • Sleep apnea        Allergies   Allergen Reactions   • Ciprofloxacin Itching   • Codeine Itching and GI Intolerance   • Definity [Perflutren Lipid Microsphere] Other (See Comments)     Back pain   • Tetanus Toxoids Itching     Itching around site    • Tizanidine Hcl Itching   • Other Itching     Cloth bandaids , causes redness of skin       Past Surgical History:   Procedure Laterality Date   • BLADDER SURGERY      removed   • CARDIAC CATHETERIZATION     • CARDIAC CATHETERIZATION N/A 12/9/2016    Procedure: Left Heart Cath;  Surgeon: Elia Flores MD;  Location:  [facility];  Service:    • HIP BIPOLAR REPLACEMENT Left    • HYSTERECTOMY     • ILEOSTOMY     • JOINT REPLACEMENT     • KIDNEY SURGERY      right kidney removed   • NEPHRECTOMY RADICAL Right     from cancer   • VARICOSE VEIN SURGERY Left 4/10/2017    Procedure: LEFT LOWER EXTREMITY VENOGRAM. LEFT SAPHENOUS VEIN RADIO FREQUENCY ABLATION;  Surgeon: Blake Martinez DO;  Location:  [facility] OR;  Service:        Family History   Problem Relation Age of Onset   • Heart failure Mother    • Heart disease Father    • Stroke Father    • Hypertension Sister    • Heart failure Sister    • No Known Problems Brother    • No Known Problems Maternal Grandmother    • No Known Problems Maternal Grandfather    • No Known Problems Paternal Grandmother    • No Known Problems Paternal Grandfather    • Hypertension Sister    • Heart failure Sister    • Hypertension Sister    • Heart failure Sister    • Hypertension Sister    • Heart failure Sister    • Hypertension Sister    • Heart failure Sister    • Hypertension Sister    • Heart failure Sister    • Gallbladder disease Brother    • COPD Daughter    • Asthma Daughter    • Diabetes Son    • No Known Problems Son    • Autism Son        Social History     Social History   • Marital status:      Spouse name: N/A   • Number of children: N/A   • Years of education: N/A     Social History Main Topics   • Smoking status: Never Smoker   • Smokeless tobacco: Never Used   • Alcohol use No   • Drug use: No   • Sexual activity: Defer     Other Topics Concern   • None     Social History Narrative           Objective   Physical Exam   Constitutional: She is  oriented to person, place, and time. She appears well-developed and well-nourished.   Elderly, injured appearing female   HENT:   Head: Normocephalic.   Right Ear: External ear normal.   Left Ear: External ear normal.   Nose: Nose normal.   Mouth/Throat: Oropharynx is clear and moist.   Bruised appearance and moderate edema of the mid forehead area with pain to palpation of  that same area.   Eyes: Conjunctivae and EOM are normal. Pupils are equal, round, and reactive to light. Right eye exhibits no discharge. Left eye exhibits no discharge.   Neck: No tracheal deviation present. No thyromegaly present.   In cervical neck collar; Pain to palpation of the midline of the middle cervical neck area   Cardiovascular: Normal rate, regular rhythm, normal heart sounds and intact distal pulses.    No murmur heard.  Pulmonary/Chest: Effort normal and breath sounds normal. No respiratory distress. She exhibits no tenderness.   Abdominal: Soft. She exhibits no distension. There is no tenderness.   Moderate obesity   Musculoskeletal: She exhibits no edema (3+ edema of both lower extremities), tenderness or deformity.   Mild weakness of the left lower extremity   Neurological: She is alert and oriented to person, place, and time. No cranial nerve deficit.   Skin: Skin is warm and dry. No erythema. No pallor.   Psychiatric: She has a normal mood and affect. Judgment normal.   Nursing note and vitals reviewed.      Procedures         ED Course  ED Course   Comment By Time   Indications, risks and benefits of a CT scan of the head and CT scan of the cervical spine was discussed with the patient in details.  The patient voiced understanding.  The patient agrees to have a CT scan of the head and CT scan of the cervical spine performed at this emergency department visit. Mario Falcon MD 02/11 6293   Nursing got the patient up from the bed to see if she can ambulate and she could walk but she did complain of new left knee pain.  Mario Falcon MD 02/11 1111   Patient's case was discussed with Dr. Hernandez and he is okay with the patient being discharged home. Mario Falcon MD 02/11 4859   Patient reports she is stable at time of discharge. Mario Falcon MD 02/11 6772                  MDM  Number of Diagnoses or Management Options  Acute on chronic renal insufficiency: established and worsening  Fall, initial encounter:   Hematoma of scalp, initial encounter:      Amount and/or Complexity of Data Reviewed  Clinical lab tests: ordered and reviewed  Tests in the radiology section of CPT®: ordered and reviewed  Discuss the patient with other providers: yes    Risk of Complications, Morbidity, and/or Mortality  Presenting problems: moderate  Diagnostic procedures: moderate  Management options: low    Patient Progress  Patient progress: stable      Final diagnoses:   Fall, initial encounter   Hematoma of scalp, initial encounter   Acute on chronic renal insufficiency            Mario Falcon MD  02/11/18 9280

## 2018-02-14 ENCOUNTER — TELEPHONE (OUTPATIENT)
Dept: INTERNAL MEDICINE CLINIC | Age: 73
End: 2018-02-14

## 2018-02-14 NOTE — TELEPHONE ENCOUNTER
Todays PT/INR is 19.0 / 1.6   Current Coumadin dose is 5 mg Mon/Weds/Fri  and 10 mg  all other days     Please advise     June Suarez RN

## 2018-02-15 ENCOUNTER — APPOINTMENT (OUTPATIENT)
Dept: LAB | Facility: HOSPITAL | Age: 73
End: 2018-02-15
Attending: INTERNAL MEDICINE

## 2018-02-15 ENCOUNTER — TRANSCRIBE ORDERS (OUTPATIENT)
Dept: ADMINISTRATIVE | Facility: HOSPITAL | Age: 73
End: 2018-02-15

## 2018-02-15 DIAGNOSIS — R78.81 BACTEREMIA: ICD-10-CM

## 2018-02-15 DIAGNOSIS — Z85.51 PERSONAL HISTORY OF MALIGNANT NEOPLASM OF BLADDER: ICD-10-CM

## 2018-02-15 DIAGNOSIS — B95.62 CELLULITIS DUE TO MRSA: Primary | ICD-10-CM

## 2018-02-15 DIAGNOSIS — I33.0 ACUTE AND SUBACUTE BACTERIAL ENDOCARDITIS: ICD-10-CM

## 2018-02-15 DIAGNOSIS — L03.90 CELLULITIS DUE TO MRSA: Primary | ICD-10-CM

## 2018-02-15 PROCEDURE — 87040 BLOOD CULTURE FOR BACTERIA: CPT | Performed by: INTERNAL MEDICINE

## 2018-02-20 LAB
BACTERIA SPEC AEROBE CULT: NORMAL
BACTERIA SPEC AEROBE CULT: NORMAL

## 2018-02-21 ENCOUNTER — TELEPHONE (OUTPATIENT)
Dept: INTERNAL MEDICINE CLINIC | Age: 73
End: 2018-02-21

## 2018-02-28 ENCOUNTER — TELEPHONE (OUTPATIENT)
Dept: INTERNAL MEDICINE CLINIC | Age: 73
End: 2018-02-28

## 2018-03-12 ENCOUNTER — NURSE ONLY (OUTPATIENT)
Dept: INTERNAL MEDICINE | Age: 73
End: 2018-03-12
Payer: MEDICARE

## 2018-03-12 ENCOUNTER — TELEPHONE (OUTPATIENT)
Dept: INTERNAL MEDICINE | Age: 73
End: 2018-03-12

## 2018-03-12 ENCOUNTER — ANTI-COAG VISIT (OUTPATIENT)
Dept: INTERNAL MEDICINE | Age: 73
End: 2018-03-12

## 2018-03-12 DIAGNOSIS — Z79.01 LONG TERM CURRENT USE OF ANTICOAGULANT: ICD-10-CM

## 2018-03-12 DIAGNOSIS — I48.20 CHRONIC ATRIAL FIBRILLATION (HCC): Primary | ICD-10-CM

## 2018-03-12 LAB
INTERNATIONAL NORMALIZATION RATIO, POC: 4.9
PROTHROMBIN TIME, POC: NORMAL

## 2018-03-12 PROCEDURE — 85610 PROTHROMBIN TIME: CPT | Performed by: NURSE PRACTITIONER

## 2018-03-12 NOTE — PROGRESS NOTES
servings of alcohol occasionally. In addition, drinking excessive amounts of alcohol can increase the risk of injury, and therefore bleeding. Warfarin and medications  A number of medications, herbs, and vitamins can interact with warfarin (table 2 and table 3). This interaction may affect the action of warfarin or the other medication. If warfarin is affected, the dose may need to be adjusted (up or down) to maintain an optimal coagulation effect. Patients who take warfarin should consult with their clinician before taking any new medication, including over-the-counter (non-prescription) drugs, herbal medicines, vitamins, or any other products. Some of the most common over-the-counter pain relievers, including acetaminophen (Tylenol®), aspirin, and nonsteroidal antiinflammatory drugs (such as ibuprofen [Advil®]) and naproxen (Aleve®), enhance the anticoagulant effects of warfarin. Vitamin E may increase the anticoagulant effects of warfarin. Consult a healthcare provider before adding or changing a dose of vitamin E or any other vitamin. Wear medical identification  People who require long-term warfarin should wear a bracelet, necklace, or similar alert tag at all times. If an accident occurs and the person is too ill to explain their condition, this will help responders provide appropriate care. The alert tag should include a list of major medical conditions and the reason warfarin is needed (eg, atrial fibrillation), as well as the name and phone number of an emergency contact. One device, Medic Alert®, provides a toll-free number that emergency medical workers can call to find out a person's medical history, list of medications, family emergency contact numbers, and healthcare provider names and numbers.     GRAPHICS: Table 1  Foods with moderate to high levels of vitamin K  Food name Serving size Vitamin K (micrograms)   High level vitamin K foods   Kale, frozen (cooked or boiled, drained) 1/2 cup

## 2018-03-19 ENCOUNTER — ANTI-COAG VISIT (OUTPATIENT)
Dept: INTERNAL MEDICINE | Age: 73
End: 2018-03-19

## 2018-03-19 ENCOUNTER — NURSE ONLY (OUTPATIENT)
Dept: INTERNAL MEDICINE | Age: 73
End: 2018-03-19
Payer: MEDICARE

## 2018-03-19 DIAGNOSIS — Z79.01 LONG TERM CURRENT USE OF ANTICOAGULANT: Primary | ICD-10-CM

## 2018-03-19 LAB
INTERNATIONAL NORMALIZATION RATIO, POC: 2.6
PROTHROMBIN TIME, POC: NORMAL

## 2018-03-19 PROCEDURE — 85610 PROTHROMBIN TIME: CPT | Performed by: NURSE PRACTITIONER

## 2018-03-19 NOTE — PROGRESS NOTES
Ms. Jmaes Cole was here today. INR today: 2.6       INR Goal: 2.0-3.0    Dosing Plan  As of 3/19/2018    TTR:      Full instructions:   10 mg on Tue, Thu, Sat; 5 mg all other days                 PLAN: Continue same dose      NEXT COUMADIN CLINIC APT IS: 03/26/18 @ 1:00pm         [facility]  [phone]    Maxene Notice EFFECTS  The major complication associated with warfarin is bleeding due to excessive anticoagulation. Excessive bleeding, or hemorrhage, can occur from any area of the body, and patients on warfarin should report any falls or accidents, as well as signs or symptoms of bleeding or unusual bruising. Signs of unusual bleeding include bleeding from the gums, blood in the urine, bloody or dark stool, a nosebleed, or vomiting blood. Because the risk of bleeding increases as the INR rises, the INR is closely monitored and adjustments are made as needed to maintain the INR within the target range. Jennye Batters also cause skin necrosis or gangrene, which can cause dark red or black areas on the skin. This is a rare complication that may occur during the first several days of warfarin therapy. When to seek help  If there are obvious or subtle signs of bleeding, including the following, patients should call their healthcare provider immediately.   · Persistent nausea, stomach upset, or vomiting blood or other material that looks like coffee grounds   · Headaches, dizziness, or weakness   · Nosebleeds   · Dark red or brown urine   · Blood in the bowel movement or dark-colored stool   · Pain, discomfort, or swelling, especially after an injury   · After a serious fall or head injury, even if there are no other symptoms  The patient should also call if any of the following occurs:  · Bleeding from the gums after brushing the teeth   · Swelling or pain at an injection site   · Excessive menstrual bleeding or bleeding between menstrual periods   · Diarrhea, vomiting, or

## 2018-03-26 ENCOUNTER — ANTI-COAG VISIT (OUTPATIENT)
Dept: INTERNAL MEDICINE | Age: 73
End: 2018-03-26

## 2018-04-05 RX ORDER — ERGOCALCIFEROL 1.25 MG/1
CAPSULE ORAL
Qty: 16 CAPSULE | Refills: 3 | Status: SHIPPED | OUTPATIENT
Start: 2018-04-05 | End: 2019-05-22 | Stop reason: SDUPTHER

## 2018-04-13 ENCOUNTER — OFFICE VISIT (OUTPATIENT)
Dept: CARDIOLOGY | Facility: CLINIC | Age: 73
End: 2018-04-13

## 2018-04-13 VITALS
RESPIRATION RATE: 18 BRPM | HEIGHT: 68 IN | DIASTOLIC BLOOD PRESSURE: 72 MMHG | HEART RATE: 65 BPM | SYSTOLIC BLOOD PRESSURE: 120 MMHG | BODY MASS INDEX: 37.28 KG/M2 | WEIGHT: 246 LBS

## 2018-04-13 DIAGNOSIS — G47.33 OSA (OBSTRUCTIVE SLEEP APNEA): ICD-10-CM

## 2018-04-13 DIAGNOSIS — I49.5 SICK SINUS SYNDROME (HCC): ICD-10-CM

## 2018-04-13 DIAGNOSIS — Z79.01 CURRENT USE OF LONG TERM ANTICOAGULATION: ICD-10-CM

## 2018-04-13 DIAGNOSIS — I48.20 CHRONIC ATRIAL FIBRILLATION (HCC): ICD-10-CM

## 2018-04-13 DIAGNOSIS — Z95.2 S/P TAVR (TRANSCATHETER AORTIC VALVE REPLACEMENT): ICD-10-CM

## 2018-04-13 DIAGNOSIS — IMO0001 CLASS 2 OBESITY DUE TO EXCESS CALORIES WITH SERIOUS COMORBIDITY AND BODY MASS INDEX (BMI) OF 37.0 TO 37.9 IN ADULT: ICD-10-CM

## 2018-04-13 DIAGNOSIS — Z86.79 HISTORY OF AORTIC VALVULAR STENOSIS: Primary | ICD-10-CM

## 2018-04-13 DIAGNOSIS — I50.32 CHRONIC DIASTOLIC HEART FAILURE (HCC): ICD-10-CM

## 2018-04-13 DIAGNOSIS — Z86.718 HISTORY OF DVT (DEEP VEIN THROMBOSIS): ICD-10-CM

## 2018-04-13 DIAGNOSIS — I10 ESSENTIAL HYPERTENSION: ICD-10-CM

## 2018-04-13 PROCEDURE — 93000 ELECTROCARDIOGRAM COMPLETE: CPT | Performed by: NURSE PRACTITIONER

## 2018-04-13 PROCEDURE — 99214 OFFICE O/P EST MOD 30 MIN: CPT | Performed by: NURSE PRACTITIONER

## 2018-04-13 RX ORDER — METHIMAZOLE 10 MG/1
10 TABLET ORAL TAKE AS DIRECTED
COMMUNITY

## 2018-04-13 NOTE — PATIENT INSTRUCTIONS
Obesity, Adult  Obesity is the condition of having too much total body fat. Being overweight or obese means that your weight is greater than what is considered healthy for your body size. Obesity is determined by a measurement called BMI. BMI is an estimate of body fat and is calculated from height and weight. For adults, a BMI of 30 or higher is considered obese.  Obesity can eventually lead to other health concerns and major illnesses, including:  · Stroke.  · Coronary artery disease (CAD).  · Type 2 diabetes.  · Some types of cancer, including cancers of the colon, breast, uterus, and gallbladder.  · Osteoarthritis.  · High blood pressure (hypertension).  · High cholesterol.  · Sleep apnea.  · Gallbladder stones.  · Infertility problems.  What are the causes?  The main cause of obesity is taking in (consuming) more calories than your body uses for energy. Other factors that contribute to this condition may include:  · Being born with genes that make you more likely to become obese.  · Having a medical condition that causes obesity. These conditions include:  ¨ Hypothyroidism.  ¨ Polycystic ovarian syndrome (PCOS).  ¨ Binge-eating disorder.  ¨ Cushing syndrome.  · Taking certain medicines, such as steroids, antidepressants, and seizure medicines.  · Not being physically active (sedentary lifestyle).  · Living where there are limited places to exercise safely or buy healthy foods.  · Not getting enough sleep.  What increases the risk?  The following factors may increase your risk of this condition:  · Having a family history of obesity.  · Being a woman of -American descent.  · Being a man of  descent.  What are the signs or symptoms?  Having excessive body fat is the main symptom of this condition.  How is this diagnosed?  This condition may be diagnosed based on:  · Your symptoms.  · Your medical history.  · A physical exam. Your health care provider may measure:  ¨ Your BMI. If you are an adult  with a BMI between 25 and less than 30, you are considered overweight. If you are an adult with a BMI of 30 or higher, you are considered obese.  ¨ The distances around your hips and your waist (circumferences). These may be compared to each other to help diagnose your condition.  ¨ Your skinfold thickness. Your health care provider may gently pinch a fold of your skin and measure it.  How is this treated?  Treatment for this condition often includes changing your lifestyle. Treatment may include some or all of the following:  · Dietary changes. Work with your health care provider and a dietitian to set a weight-loss goal that is healthy and reasonable for you. Dietary changes may include eating:  ¨ Smaller portions. A portion size is the amount of a particular food that is healthy for you to eat at one time. This varies from person to person.  ¨ Low-calorie or low-fat options.  ¨ More whole grains, fruits, and vegetables.  · Regular physical activity. This may include aerobic activity (cardio) and strength training.  · Medicine to help you lose weight. Your health care provider may prescribe medicine if you are unable to lose 1 pound a week after 6 weeks of eating more healthily and doing more physical activity.  · Surgery. Surgical options may include gastric banding and gastric bypass. Surgery may be done if:  ¨ Other treatments have not helped to improve your condition.  ¨ You have a BMI of 40 or higher.  ¨ You have life-threatening health problems related to obesity.  Follow these instructions at home:     Eating and drinking     · Follow recommendations from your health care provider about what you eat and drink. Your health care provider may advise you to:  ¨ Limit fast foods, sweets, and processed snack foods.  ¨ Choose low-fat options, such as low-fat milk instead of whole milk.  ¨ Eat 5 or more servings of fruits or vegetables every day.  ¨ Eat at home more often. This gives you more control over what you  eat.  ¨ Choose healthy foods when you eat out.  ¨ Learn what a healthy portion size is.  ¨ Keep low-fat snacks on hand.  ¨ Avoid sugary drinks, such as soda, fruit juice, iced tea sweetened with sugar, and flavored milk.  ¨ Eat a healthy breakfast.  · Drink enough water to keep your urine clear or pale yellow.  · Do not go without eating for long periods of time (do not fast) or follow a fad diet. Fasting and fad diets can be unhealthy and even dangerous.  Physical Activity   · Exercise regularly, as told by your health care provider. Ask your health care provider what types of exercise are safe for you and how often you should exercise.  · Warm up and stretch before being active.  · Cool down and stretch after being active.  · Rest between periods of activity.  Lifestyle   · Limit the time that you spend in front of your TV, computer, or video game system.  · Find ways to reward yourself that do not involve food.  · Limit alcohol intake to no more than 1 drink a day for nonpregnant women and 2 drinks a day for men. One drink equals 12 oz of beer, 5 oz of wine, or 1½ oz of hard liquor.  General instructions   · Keep a weight loss journal to keep track of the food you eat and how much you exercise you get.  · Take over-the-counter and prescription medicines only as told by your health care provider.  · Take vitamins and supplements only as told by your health care provider.  · Consider joining a support group. Your health care provider may be able to recommend a support group.  · Keep all follow-up visits as told by your health care provider. This is important.  Contact a health care provider if:  · You are unable to meet your weight loss goal after 6 weeks of dietary and lifestyle changes.  This information is not intended to replace advice given to you by your health care provider. Make sure you discuss any questions you have with your health care provider.  Document Released: 01/25/2006 Document Revised:  "05/22/2017 Document Reviewed: 10/05/2016  [employer] Patient Education © 2017 [employer].    Heart-Healthy Eating Plan  Heart-healthy meal planning includes:  · Limiting unhealthy fats.  · Increasing healthy fats.  · Making other small dietary changes.  You may need to talk with your doctor or a diet specialist (dietitian) to create an eating plan that is right for you.  What types of fat should I choose?  · Choose healthy fats. These include olive oil and canola oil, flaxseeds, walnuts, almonds, and seeds.  · Eat more omega-3 fats. These include salmon, mackerel, sardines, tuna, flaxseed oil, and ground flaxseeds. Try to eat fish at least twice each week.  · Limit saturated fats.  ¨ Saturated fats are often found in animal products, such as meats, butter, and cream.  ¨ Plant sources of saturated fats include palm oil, palm kernel oil, and coconut oil.  · Avoid foods with partially hydrogenated oils in them. These include stick margarine, some tub margarines, cookies, crackers, and other baked goods. These contain trans fats.  What general guidelines do I need to follow?  · Check food labels carefully. Identify foods with trans fats or high amounts of saturated fat.  · Fill one half of your plate with vegetables and green salads. Eat 4-5 servings of vegetables per day. A serving of vegetables is:  ¨ 1 cup of raw leafy vegetables.  ¨ ½ cup of raw or cooked cut-up vegetables.  ¨ ½ cup of vegetable juice.  · Fill one fourth of your plate with whole grains. Look for the word \"whole\" as the first word in the ingredient list.  · Fill one fourth of your plate with lean protein foods.  · Eat 4-5 servings of fruit per day. A serving of fruit is:  ¨ One medium whole fruit.  ¨ ¼ cup of dried fruit.  ¨ ½ cup of fresh, frozen, or canned fruit.  ¨ ½ cup of 100% fruit juice.  · Eat more foods that contain soluble fiber. These include apples, broccoli, carrots, beans, peas, and barley. Try to get 20-30 g of fiber per " day.  · Eat more home-cooked food. Eat less restaurant, buffet, and fast food.  · Limit or avoid alcohol.  · Limit foods high in starch and sugar.  · Avoid fried foods.  · Avoid frying your food. Try baking, boiling, grilling, or broiling it instead. You can also reduce fat by:  ¨ Removing the skin from poultry.  ¨ Removing all visible fats from meats.  ¨ Skimming the fat off of stews, soups, and gravies before serving them.  ¨ Steaming vegetables in water or broth.  · Lose weight if you are overweight.  · Eat 4-5 servings of nuts, legumes, and seeds per week:  ¨ One serving of dried beans or legumes equals ½ cup after being cooked.  ¨ One serving of nuts equals 1½ ounces.  ¨ One serving of seeds equals ½ ounce or one tablespoon.  · You may need to keep track of how much salt or sodium you eat. This is especially true if you have high blood pressure. Talk with your doctor or dietitian to get more information.  What foods can I eat?  Grains   Breads, including [place]ese, white, duncan, wheat, raisin, rye, oatmeal, and Italian. Tortillas that are neither fried nor made with lard or trans fat. Low-fat rolls, including hotdog and hamburger buns and English muffins. Biscuits. Muffins. Waffles. Pancakes. Light popcorn. Whole-grain cereals. Flatbread. Tania toast. Pretzels. Breadsticks. Rusks. Low-fat snacks. Low-fat crackers, including oyster, saltine, matzo, jose, animal, and rye. Rice and pasta, including brown rice and pastas that are made with whole wheat.  Vegetables   All vegetables.  Fruits   All fruits, but limit coconut.  Meats and Other Protein Sources   Lean, well-trimmed beef, veal, pork, and lamb. Chicken and turkey without skin. All fish and shellfish. Wild duck, rabbit, pheasant, and venison. Egg whites or low-cholesterol egg substitutes. Dried beans, peas, lentils, and tofu. Seeds and most nuts.  Dairy   Low-fat or nonfat cheeses, including ricotta, string, and mozzarella. Skim or 1% milk that is liquid,  powdered, or evaporated. Buttermilk that is made with low-fat milk. Nonfat or low-fat yogurt.  Beverages   Mineral water. Diet carbonated beverages.  Sweets and Desserts   Sherbets and fruit ices. Honey, jam, marmalade, jelly, and syrups. Meringues and gelatins. Pure sugar candy, such as hard candy, jelly beans, gumdrops, mints, marshmallows, and small amounts of dark chocolate. Leonel food cake.  Eat all sweets and desserts in moderation.  Fats and Oils   Nonhydrogenated (trans-free) margarines. Vegetable oils, including soybean, sesame, sunflower, olive, peanut, safflower, corn, canola, and cottonseed. Salad dressings or mayonnaise made with a vegetable oil. Limit added fats and oils that you use for cooking, baking, salads, and as spreads.  Other   Cocoa powder. Coffee and tea. All seasonings and condiments.  The items listed above may not be a complete list of recommended foods or beverages. Contact your dietitian for more options.   What foods are not recommended?  Grains   Breads that are made with saturated or trans fats, oils, or whole milk. Croissants. Butter rolls. Cheese breads. Sweet rolls. Donuts. Buttered popcorn. Chow mein noodles. High-fat crackers, such as cheese or butter crackers.  Meats and Other Protein Sources   Fatty meats, such as hotdogs, short ribs, sausage, spareribs, anderson, rib eye roast or steak, and mutton. High-fat deli meats, such as salami and bologna. Caviar. Domestic duck and goose. Organ meats, such as kidney, liver, sweetbreads, and heart.  Dairy   Cream, sour cream, cream cheese, and creamed cottage cheese. Whole-milk cheeses, including blue (geoff), [place], Brie, Tevin, American, Havarti, Swiss, cheddar, Camembert, and [place]. Whole or 2% milk that is liquid, evaporated, or condensed. Whole buttermilk. Cream sauce or high-fat cheese sauce. Yogurt that is made from whole milk.  Beverages   Regular sodas and juice drinks with added sugar.  Sweets and Desserts   Frosting.  Pudding. Cookies. Cakes other than betty food cake. Candy that has milk chocolate or white chocolate, hydrogenated fat, butter, coconut, or unknown ingredients. Buttered syrups. Full-fat ice cream or ice cream drinks.  Fats and Oils   Gravy that has suet, meat fat, or shortening. Cocoa butter, hydrogenated oils, palm oil, coconut oil, palm kernel oil. These can often be found in baked products, candy, fried foods, nondairy creamers, and whipped toppings. Solid fats and shortenings, including anderson fat, salt pork, lard, and butter. Nondairy cream substitutes, such as coffee creamers and sour cream substitutes. Salad dressings that are made of unknown oils, cheese, or sour cream.  The items listed above may not be a complete list of foods and beverages to avoid. Contact your dietitian for more information.   This information is not intended to replace advice given to you by your health care provider. Make sure you discuss any questions you have with your health care provider.  Document Released: 06/18/2013 Document Revised: 05/25/2017 Document Reviewed: 06/11/2015  [employer] Patient Education © 2017 [employer]    Exercising to Lose Weight  Exercising can help you to lose weight. In order to lose weight through exercise, you need to do vigorous-intensity exercise. You can tell that you are exercising with vigorous intensity if you are breathing very hard and fast and cannot hold a conversation while exercising.  Moderate-intensity exercise helps to maintain your current weight. You can tell that you are exercising at a moderate level if you have a higher heart rate and faster breathing, but you are still able to hold a conversation.  How often should I exercise?  Choose an activity that you enjoy and set realistic goals. Your health care provider can help you to make an activity plan that works for you. Exercise regularly as directed by your health care provider. This may include:  · Doing resistance  training twice each week, such as:  ¨ Push-ups.  ¨ Sit-ups.  ¨ Lifting weights.  ¨ Using resistance bands.  · Doing a given intensity of exercise for a given amount of time. Choose from these options:  ¨ 150 minutes of moderate-intensity exercise every week.  ¨ 75 minutes of vigorous-intensity exercise every week.  ¨ A mix of moderate-intensity and vigorous-intensity exercise every week.  Children, pregnant women, people who are out of shape, people who are overweight, and older adults may need to consult a health care provider for individual recommendations. If you have any sort of medical condition, be sure to consult your health care provider before starting a new exercise program.  What are some activities that can help me to lose weight?  · Walking at a rate of at least 4.5 miles an hour.  · Jogging or running at a rate of 5 miles per hour.  · Biking at a rate of at least 10 miles per hour.  · Lap swimming.  · Roller-skating or in-line skating.  · Cross-country skiing.  · Vigorous competitive sports, such as football, basketball, and soccer.  · Jumping rope.  · Aerobic dancing.  How can I be more active in my day-to-day activities?  · Use the stairs instead of the elevator.  · Take a walk during your lunch break.  · If you drive, park your car farther away from work or school.  · If you take public transportation, get off one stop early and walk the rest of the way.  · Make all of your phone calls while standing up and walking around.  · Get up, stretch, and walk around every 30 minutes throughout the day.  What guidelines should I follow while exercising?  · Do not exercise so much that you hurt yourself, feel dizzy, or get very short of breath.  · Consult your health care provider prior to starting a new exercise program.  · Wear comfortable clothes and shoes with good support.  · Drink plenty of water while you exercise to prevent dehydration or heat stroke. Body water is lost during exercise and must be  replaced.  · Work out until you breathe faster and your heart beats faster.  This information is not intended to replace advice given to you by your health care provider. Make sure you discuss any questions you have with your health care provider.  Document Released: 01/20/2012 Document Revised: 05/25/2017 Document Reviewed: 05/21/2015  [employer] Interactive Patient Education © 2017 [employer]    Heart Failure  Heart failure is a condition in which the heart has trouble pumping blood because it has become weak or stiff. This means that the heart does not pump blood efficiently for the body to work well. For some people with heart failure, fluid may back up into the lungs and there may be swelling (edema) in the lower legs. Heart failure is usually a long-term (chronic) condition. It is important for you to take good care of yourself and follow the treatment plan from your health care provider.  What are the causes?  This condition is caused by some health problems, including:  · High blood pressure (hypertension). Hypertension causes the heart muscle to work harder than normal. High blood pressure eventually causes the heart to become stiff and weak.  · Coronary artery disease (CAD). CAD is the buildup of cholesterol and fat (plaques) in the arteries of the heart.  · Heart attack (myocardial infarction). Injured tissue, which is caused by the heart attack, does not contract as well and the heart's ability to pump blood is weakened.  · Abnormal heart valves. When the heart valves do not open and close properly, the heart muscle must pump harder to keep the blood flowing.  · Heart muscle disease (cardiomyopathy or myocarditis). Heart muscle disease is damage to the heart muscle from a variety of causes, such as drug or alcohol abuse, infections, or unknown causes. These can increase the risk of heart failure.  · Lung disease. When the lungs do not work properly, the heart must work harder.  What increases the  risk?  Risk of heart failure increases as a person ages. This condition is also more likely to develop in people who:  · Are overweight.  · Are male.  · Smoke or chew tobacco.  · Abuse alcohol or illegal drugs.  · Have taken medicines that can damage the heart, such as chemotherapy drugs.  · Have diabetes.  ¨ High blood sugar (glucose) is associated with high fat (lipid) levels in the blood.  ¨ Diabetes can also damage tiny blood vessels that carry nutrients to the heart muscle.  · Have abnormal heart rhythms.  · Have thyroid problems.  · Have low blood counts (anemia).  What are the signs or symptoms?  Symptoms of this condition include:  · Shortness of breath with activity, such as when climbing stairs.  · Persistent cough.  · Swelling of the feet, ankles, legs, or abdomen.  · Unexplained weight gain.  · Difficulty breathing when lying flat (orthopnea).  · Waking from sleep because of the need to sit up and get more air.  · Rapid heartbeat.  · Fatigue and loss of energy.  · Feeling light-headed, dizzy, or close to fainting.  · Loss of appetite.  · Nausea.  · Increased urination during the night (nocturia).  · Confusion.  How is this diagnosed?  This condition is diagnosed based on:  · Medical history, symptoms, and a physical exam.  · Diagnostic tests, which may include:  ¨ Echocardiogram.  ¨ Electrocardiogram (ECG).  ¨ Chest X-ray.  ¨ Blood tests.  ¨ Exercise stress test.  ¨ Radionuclide scans.  ¨ Cardiac catheterization and angiogram.  How is this treated?  Treatment for this condition is aimed at managing the symptoms of heart failure. Medicines, behavioral changes, or other treatments may be necessary to treat heart failure.  Medicines   These may include:  · Angiotensin-converting enzyme (ACE) inhibitors. This type of medicine blocks the effects of a blood protein called angiotensin-converting enzyme. ACE inhibitors relax (dilate) the blood vessels and help to lower blood pressure.  · Angiotensin receptor  blockers (ARBs). This type of medicine blocks the actions of a blood protein called angiotensin. ARBs dilate the blood vessels and help to lower blood pressure.  · Water pills (diuretics). Diuretics cause the kidneys to remove salt and water from the blood. The extra fluid is removed through urination, leaving a lower volume of blood that the heart has to pump.  · Beta blockers. These improve heart muscle strength and they prevent the heart from beating too quickly.  · Digoxin. This increases the force of the heartbeat.  Healthy behavior changes   These may include:  · Reaching and maintaining a healthy weight.  · Stopping smoking or chewing tobacco.  · Eating heart-healthy foods.  · Limiting or avoiding alcohol.  · Stopping use of street drugs (illegal drugs).  · Physical activity.  Other treatments   These may include:  · Surgery to open blocked coronary arteries or repair damaged heart valves.  · Placement of a biventricular pacemaker to improve heart muscle function (cardiac resynchronization therapy). This device paces both the right ventricle and left ventricle.  · Placement of a device to treat serious abnormal heart rhythms (implantable cardioverter defibrillator, or ICD).  · Placement of a device to improve the pumping ability of the heart (left ventricular assist device, or LVAD).  · Heart transplant. This can cure heart failure, and it is considered for certain patients who do not improve with other therapies.  Follow these instructions at home:  Medicines   · Take over-the-counter and prescription medicines only as told by your health care provider. Medicines are important in reducing the workload of your heart, slowing the progression of heart failure, and improving your symptoms.  ¨ Do not stop taking your medicine unless your health care provider told you to do that.  ¨ Do not skip any dose of medicine.  ¨ Refill your prescriptions before you run out of medicine. You need your medicines every  day.  Eating and drinking     · Eat heart-healthy foods. Talk with a dietitian to make an eating plan that is right for you.  ¨ Choose foods that contain no trans fat and are low in saturated fat and cholesterol. Healthy choices include fresh or frozen fruits and vegetables, fish, lean meats, legumes, fat-free or low-fat dairy products, and whole-grain or high-fiber foods.  ¨ Limit salt (sodium) if directed by your health care provider. Sodium restriction may reduce symptoms of heart failure. Ask a dietitian to recommend heart-healthy seasonings.  ¨ Use healthy cooking methods instead of frying. Healthy methods include roasting, grilling, broiling, baking, poaching, steaming, and stir-frying.  · Limit your fluid intake if directed by your health care provider. Fluid restriction may reduce symptoms of heart failure.  Lifestyle   · Stop smoking or using chewing tobacco. Nicotine and tobacco can damage your heart and your blood vessels. Do not use nicotine gum or patches before talking to your health care provider.  · Limit alcohol intake to no more than 1 drink per day for non-pregnant women and 2 drinks per day for men. One drink equals 12 oz of beer, 5 oz of wine, or 1½ oz of hard liquor.  ¨ Drinking more than that is harmful to your heart. Tell your health care provider if you drink alcohol several times a week.  ¨ Talk with your health care provider about whether any level of alcohol use is safe for you.  ¨ If your heart has already been damaged by alcohol or you have severe heart failure, drinking alcohol should be stopped completely.  · Stop use of illegal drugs.  · Lose weight if directed by your health care provider. Weight loss may reduce symptoms of heart failure.  · Do moderate physical activity if directed by your health care provider. People who are elderly and people with severe heart failure should consult with a health care provider for physical activity recommendations.  Monitor important information    · Weigh yourself every day. Keeping track of your weight daily helps you to notice excess fluid sooner.  ¨ Weigh yourself every morning after you urinate and before you eat breakfast.  ¨ Wear the same amount of clothing each time you weigh yourself.  ¨ Record your daily weight. Provide your health care provider with your weight record.  · Monitor and record your blood pressure as told by your health care provider.  · Check your pulse as told by your health care provider.  Dealing with extreme temperatures   · If the weather is extremely hot:  ¨ Avoid vigorous physical activity.  ¨ Use air conditioning or fans or seek a cooler location.  ¨ Avoid caffeine and alcohol.  ¨ Wear loose-fitting, lightweight, and light-colored clothing.  · If the weather is extremely cold:  ¨ Avoid vigorous physical activity.  ¨ Layer your clothes.  ¨ Wear mittens or gloves, a hat, and a scarf when you go outside.  ¨ Avoid alcohol.  General instructions   · Manage other health conditions such as hypertension, diabetes, thyroid disease, or abnormal heart rhythms as told by your health care provider.  · Learn to manage stress. If you need help to do this, ask your health care provider.  · Plan rest periods when fatigued.  · Get ongoing education and support as needed.  · Participate in or seek rehabilitation as needed to maintain or improve independence and quality of life.  · Stay up to date with immunizations. Keeping current on pneumococcal and influenza immunizations is especially important to prevent respiratory infections.  · Keep all follow-up visits as told by your health care provider. This is important.  Contact a health care provider if:  · You have a rapid weight gain.  · You have increasing shortness of breath that is unusual for you.  · You are unable to participate in your usual physical activities.  · You tire easily.  · You cough more than normal, especially with physical activity.  · You have any swelling or more swelling  in areas such as your hands, feet, ankles, or abdomen.  · You are unable to sleep because it is hard to breathe.  · You feel like your heart is beating quickly (palpitations).  · You become dizzy or light-headed when you stand up.  Get help right away if:  · You have difficulty breathing.  · You notice or your family notices a change in your awareness, such as having trouble staying awake or having difficulty with concentration.  · You have pain or discomfort in your chest.  · You have an episode of fainting (syncope).  This information is not intended to replace advice given to you by your health care provider. Make sure you discuss any questions you have with your health care provider.  Document Released: 12/18/2006 Document Revised: 08/22/2017 Document Reviewed: 07/12/2017  [employer] Patient Education © 2017 [employer]

## 2018-04-13 NOTE — PROGRESS NOTES
Subjective:     Encounter Date:04/13/2018      Patient ID: Jarvis Morgan is a 73 y.o. female. She presents today for two month follow up of hospital discharge for chest pain. No EKG changes and negative troponin levels at that time. She has a history of severe aortic valve stenosis s/p TAVR, chronic diastolic congestive heart failure, chronic atrial fibrillation on chronic anticoagulation, hypertension, sick sinus syndrome, deep venous thrombus, obstructive sleep apnea on CPAP and obesity. She denies chest pain, shortness of breath, palpitations, dizziness, syncope, orthopnea, PND or decreased stamina. She reports chronic bilateral lower extremity edema, left worse than right related to chronic DVT. She denies any signs of bleeding. She reports blood pressure has been well controlled.     Chief Complaint:  Atrial Fibrillation   Presents for follow-up visit. Symptoms are negative for an AICD problem, bradycardia, chest pain, dizziness, hemodynamic instability, hypertension, hypotension, pacemaker problem, palpitations, shortness of breath, syncope, tachycardia and weakness. The symptoms have been stable. Past medical history includes atrial fibrillation and CHF.   Congestive Heart Failure   Presents for follow-up visit. Associated symptoms include edema. Pertinent negatives include no abdominal pain, chest pain, chest pressure, claudication, fatigue, muscle weakness, near-syncope, nocturia, orthopnea, palpitations, paroxysmal nocturnal dyspnea, shortness of breath or unexpected weight change. The symptoms have been stable.   Hypertension   This is a chronic problem. The current episode started more than 1 year ago. The problem is controlled. Associated symptoms include peripheral edema. Pertinent negatives include no anxiety, blurred vision, chest pain, headaches, malaise/fatigue, neck pain, orthopnea, palpitations, PND, shortness of breath or sweats. Risk factors for coronary artery disease include dyslipidemia,  obesity and post-menopausal state. Current antihypertension treatment includes beta blockers, diuretics and calcium channel blockers. The current treatment provides significant improvement. Hypertensive end-organ damage includes heart failure.       The following portions of the patient's history were reviewed and updated as appropriate: allergies, current medications, past family history, past medical history, past social history, past surgical history and problem list.     Allergies   Allergen Reactions   • Ciprofloxacin Itching   • Codeine Itching and GI Intolerance   • Definity [Perflutren Lipid Microsphere] Other (See Comments)     Back pain   • Tetanus Toxoids Itching     Itching around site   • Tizanidine Hcl Itching   • Other Itching     Cloth bandaids , causes redness of skin       Current Outpatient Prescriptions:   •  acetaminophen (TYLENOL) 325 MG tablet, Take 2 tablets by mouth Every 4 (Four) Hours As Needed for Mild Pain  or Fever., Disp: , Rfl:   •  aspirin 81 MG EC tablet, Take 81 mg by mouth Daily., Disp: , Rfl:   •  calcitriol (ROCALTROL) 0.25 MCG capsule, Take 1 capsule by mouth Daily., Disp: 30 capsule, Rfl: 0  •  cholecalciferol 5000 units tablet, Take 5,000 Units by mouth Daily., Disp: , Rfl:   •  diltiaZEM CD (CARDIZEM CD) 180 MG 24 hr capsule, Take 1 capsule by mouth Daily., Disp: 30 capsule, Rfl: 0  •  donepezil (ARICEPT) 5 MG tablet, Take 1 tablet by mouth Every Night., Disp: 30 tablet, Rfl: 0  •  furosemide (LASIX) 40 MG tablet, Take 1 tablet by mouth Daily., Disp: 30 tablet, Rfl: 0  •  gabapentin (NEURONTIN) 300 MG capsule, Take 1 capsule by mouth Every Night., Disp: 10 capsule, Rfl: 0  •  methIMAzole (TAPAZOLE) 10 MG tablet, Take 10 mg by mouth Daily., Disp: , Rfl:   •  metoprolol tartrate (LOPRESSOR) 50 MG tablet, Take 1 tablet by mouth Every 12 (Twelve) Hours., Disp: 30 tablet, Rfl: 0  •  pantoprazole (PROTONIX) 40 MG EC tablet, Take 1 tablet by mouth Daily., Disp: 30 tablet, Rfl: 0  •   warfarin (COUMADIN) 10 MG tablet, 20 mg po daily, Disp: 7 tablet, Rfl: 0  Past Medical History:   Diagnosis Date   • A-fib    • Aortic stenosis    • Arthritis    • Bradycardia    • Cancer     bladder and skin   • Cardiomegaly    • CHF (congestive heart failure)    • GERD (gastroesophageal reflux disease)    • Hard of hearing    • History of short term memory loss    • Hypercalcemia    • Hyperlipidemia    • Hypertension    • Hyperthyroidism 11/20/2017   • Hypothyroidism    • Kidney stone    • Long term current use of anticoagulant therapy    • Open wound     left lower extremity,   • Palpitation    • PONV (postoperative nausea and vomiting)    • Shortness of breath    • Sick sinus syndrome    • Sleep apnea      Past Surgical History:   Procedure Laterality Date   • BLADDER SURGERY      removed   • CARDIAC CATHETERIZATION     • CARDIAC CATHETERIZATION N/A 12/9/2016    Procedure: Left Heart Cath;  Surgeon: Elia Flores MD;  Location: [facility] CATH INVASIVE LOCATION;  Service:    • HIP BIPOLAR REPLACEMENT Left    • HYSTERECTOMY     • ILEOSTOMY     • JOINT REPLACEMENT     • KIDNEY SURGERY      right kidney removed   • NEPHRECTOMY RADICAL Right     from cancer   • VARICOSE VEIN SURGERY Left 4/10/2017    Procedure: LEFT LOWER EXTREMITY VENOGRAM. LEFT SAPHENOUS VEIN RADIO FREQUENCY ABLATION;  Surgeon: Blake Martinez DO;  Location: [facility] OR;  Service:      Family History   Problem Relation Age of Onset   • Heart failure Mother    • Heart disease Father    • Stroke Father    • Hypertension Sister    • Heart failure Sister    • No Known Problems Brother    • No Known Problems Maternal Grandmother    • No Known Problems Maternal Grandfather    • No Known Problems Paternal Grandmother    • No Known Problems Paternal Grandfather    • Hypertension Sister    • Heart failure Sister    • Hypertension Sister    • Heart failure Sister    • Hypertension Sister    • Heart failure Sister    • Hypertension Sister    • Heart failure  Sister    • Hypertension Sister    • Heart failure Sister    • Gallbladder disease Brother    • COPD Daughter    • Asthma Daughter    • Diabetes Son    • No Known Problems Son    • Autism Son      Social History     Social History   • Marital status:      Spouse name: N/A   • Number of children: N/A   • Years of education: N/A     Occupational History   • Not on file.     Social History Main Topics   • Smoking status: Never Smoker   • Smokeless tobacco: Never Used   • Alcohol use No   • Drug use: No   • Sexual activity: Defer     Other Topics Concern   • Not on file     Social History Narrative   • No narrative on file         Review of Systems   Constitution: Negative for chills, decreased appetite, fatigue, fever, weakness, malaise/fatigue, night sweats, unexpected weight change, weight gain and weight loss.   HENT: Negative for nosebleeds.    Eyes: Negative for blurred vision and visual disturbance.   Cardiovascular: Positive for leg swelling. Negative for chest pain, claudication, dyspnea on exertion, near-syncope, orthopnea, palpitations, paroxysmal nocturnal dyspnea and syncope.   Respiratory: Negative for cough, hemoptysis, shortness of breath, snoring and wheezing.    Endocrine: Negative for cold intolerance and heat intolerance.   Hematologic/Lymphatic: Does not bruise/bleed easily.   Skin: Negative for rash.   Musculoskeletal: Negative for back pain, falls, muscle weakness and neck pain.   Gastrointestinal: Negative for abdominal pain, change in bowel habit, constipation, diarrhea, dysphagia, heartburn, nausea and vomiting.   Genitourinary: Negative for hematuria and nocturia.   Neurological: Negative for dizziness, headaches and light-headedness.   Psychiatric/Behavioral: Negative for altered mental status.   Allergic/Immunologic: Negative for persistent infections.         ECG 12 Lead  Date/Time: 4/13/2018 2:39 PM  Performed by: MARY MCLEOD  Authorized by: MARY MCLEOD   Comparison:  "compared with previous ECG from 2/11/2018  Similar to previous ECG  Rhythm: atrial fibrillation  Rate: normal  BPM: 65  Clinical impression: abnormal ECG              /72 (BP Location: Right arm, Patient Position: Sitting, Cuff Size: Adult)   Pulse 65   Resp 18   Ht 172.7 cm (68\")   Wt 112 kg (246 lb)   LMP  (LMP Unknown)   Breastfeeding? No   BMI 37.40 kg/m²     Objective:     Physical Exam   Constitutional: She is oriented to person, place, and time. Vital signs are normal. She appears well-developed and well-nourished. No distress.   HENT:   Head: Normocephalic and atraumatic.   Right Ear: External ear normal.   Left Ear: External ear normal.   Nose: Nose normal.   Eyes: Conjunctivae are normal. Pupils are equal, round, and reactive to light. Right eye exhibits no discharge. Left eye exhibits no discharge.   Neck: Normal range of motion. Neck supple. No JVD present. Carotid bruit is not present. No tracheal deviation present. No thyromegaly present.   Cardiovascular: Normal rate, normal heart sounds, intact distal pulses and normal pulses.  An irregularly irregular rhythm present. PMI is not displaced.  Exam reveals no gallop and no friction rub.    No murmur heard.  Pulses:       Radial pulses are 2+ on the right side, and 2+ on the left side.        Dorsalis pedis pulses are 2+ on the right side, and 2+ on the left side.        Posterior tibial pulses are 2+ on the right side, and 2+ on the left side.   Pulmonary/Chest: Effort normal and breath sounds normal. No respiratory distress. She has no decreased breath sounds. She has no wheezes. She has no rhonchi. She has no rales. She exhibits no tenderness.   Abdominal: Soft. She exhibits no distension. There is no tenderness.   Musculoskeletal: Normal range of motion. She exhibits edema (Moderate bilateral lower extremity edema). She exhibits no tenderness or deformity.   Neurological: She is alert and oriented to person, place, and time.   Skin: Skin " is warm and dry. No rash noted. She is not diaphoretic. No erythema. No pallor.   Psychiatric: She has a normal mood and affect. Her behavior is normal. Judgment and thought content normal.   Vitals reviewed.      Lab Review:   Lab Results   Component Value Date    WBC 6.86 02/11/2018    HGB 10.8 (L) 02/11/2018    HCT 34.5 (L) 02/11/2018    MCV 92.0 02/11/2018     02/11/2018     Lab Results   Component Value Date    GLUCOSE 126 (H) 02/11/2018    BUN 47 (H) 02/11/2018    CREATININE 1.48 (H) 02/11/2018    EGFRIFNONA 35 (L) 02/11/2018    BCR 31.8 (H) 02/11/2018    K 3.8 02/11/2018    CO2 30.0 02/11/2018    CALCIUM 9.5 02/11/2018    ALBUMIN 3.60 02/11/2018    LABIL2 0.9 (L) 02/11/2018    AST 24 02/11/2018    ALT 31 02/11/2018         Assessment:          Diagnosis Plan   1. History of aortic valvular stenosis  S/P TAVR.    2. S/P TAVR (transcatheter aortic valve replacement)     3. Chronic diastolic heart failure  Compensated. Educated on importance of daily weights and monitoring sodium intake.    4. Chronic atrial fibrillation  Rate controlled. Anticoagulated.    5. Current use of long term anticoagulation  On coumadin.    6. Essential hypertension  Well controlled.    7. Sick sinus syndrome     8. History of DVT (deep vein thrombosis)     9. ASA (obstructive sleep apnea)  On CPAP.    10. Class 2 obesity due to excess calories with serious comorbidity and body mass index (BMI) of 37.0 to 37.9 in adult  Patient's Body mass index is 37.4 kg/m². BMI is above normal parameters. Follow-up plan includes:  educational material.            Plan:       1. Continue medications as previously prescribed.  2. Reviewed signs and symptoms of CHF and what to report with the patient. Patient instructed to restrict sodium and weigh daily. Report weight gain of greater than 2 lbs overnight or 5 lbs in 1 week. Pt verbalized understanding of instructions and plan of care.   3. Report any worsening symptoms.   4. Report any signs of  bleeding.   5. Follow up with PCP for blood pressure management and routine lab work.  6. Follow up with CHF clinic in 3 months, or sooner if needed.

## 2018-04-24 ENCOUNTER — OFFICE VISIT (OUTPATIENT)
Dept: INTERNAL MEDICINE | Age: 73
End: 2018-04-24
Payer: MEDICARE

## 2018-04-24 VITALS
DIASTOLIC BLOOD PRESSURE: 72 MMHG | HEART RATE: 58 BPM | RESPIRATION RATE: 16 BRPM | OXYGEN SATURATION: 97 % | BODY MASS INDEX: 37.44 KG/M2 | WEIGHT: 247 LBS | HEIGHT: 68 IN | SYSTOLIC BLOOD PRESSURE: 126 MMHG

## 2018-04-24 DIAGNOSIS — L29.9 ITCHING: ICD-10-CM

## 2018-04-24 DIAGNOSIS — H61.23 BILATERAL HEARING LOSS DUE TO CERUMEN IMPACTION: ICD-10-CM

## 2018-04-24 DIAGNOSIS — Z79.01 LONG TERM CURRENT USE OF ANTICOAGULANT: ICD-10-CM

## 2018-04-24 DIAGNOSIS — E55.9 VITAMIN D DEFICIENCY: ICD-10-CM

## 2018-04-24 DIAGNOSIS — E78.2 MIXED HYPERLIPIDEMIA: Primary | ICD-10-CM

## 2018-04-24 DIAGNOSIS — I48.20 CHRONIC ATRIAL FIBRILLATION (HCC): ICD-10-CM

## 2018-04-24 PROCEDURE — 69210 REMOVE IMPACTED EAR WAX UNI: CPT | Performed by: NURSE PRACTITIONER

## 2018-04-24 PROCEDURE — 3017F COLORECTAL CA SCREEN DOC REV: CPT | Performed by: NURSE PRACTITIONER

## 2018-04-24 PROCEDURE — 99214 OFFICE O/P EST MOD 30 MIN: CPT | Performed by: NURSE PRACTITIONER

## 2018-04-24 PROCEDURE — G8400 PT W/DXA NO RESULTS DOC: HCPCS | Performed by: NURSE PRACTITIONER

## 2018-04-24 PROCEDURE — 1036F TOBACCO NON-USER: CPT | Performed by: NURSE PRACTITIONER

## 2018-04-24 PROCEDURE — 1123F ACP DISCUSS/DSCN MKR DOCD: CPT | Performed by: NURSE PRACTITIONER

## 2018-04-24 PROCEDURE — G8427 DOCREV CUR MEDS BY ELIG CLIN: HCPCS | Performed by: NURSE PRACTITIONER

## 2018-04-24 PROCEDURE — G8417 CALC BMI ABV UP PARAM F/U: HCPCS | Performed by: NURSE PRACTITIONER

## 2018-04-24 PROCEDURE — 1090F PRES/ABSN URINE INCON ASSESS: CPT | Performed by: NURSE PRACTITIONER

## 2018-04-24 PROCEDURE — 4040F PNEUMOC VAC/ADMIN/RCVD: CPT | Performed by: NURSE PRACTITIONER

## 2018-04-24 RX ORDER — GABAPENTIN 300 MG/1
300 CAPSULE ORAL NIGHTLY
Qty: 90 CAPSULE | Refills: 0 | Status: SHIPPED | OUTPATIENT
Start: 2018-04-24 | End: 2018-08-10 | Stop reason: SDUPTHER

## 2018-04-24 ASSESSMENT — ENCOUNTER SYMPTOMS
EYE DISCHARGE: 0
SORE THROAT: 0
CONSTIPATION: 0
TROUBLE SWALLOWING: 0
ABDOMINAL DISTENTION: 0
BLOOD IN STOOL: 0
SHORTNESS OF BREATH: 1
COLOR CHANGE: 0
ABDOMINAL PAIN: 0
EYE ITCHING: 0
NAUSEA: 0
WHEEZING: 0
COUGH: 0
VOMITING: 0
STRIDOR: 0
CHOKING: 0
DIARRHEA: 0

## 2018-04-26 DIAGNOSIS — I10 ESSENTIAL (PRIMARY) HYPERTENSION: ICD-10-CM

## 2018-04-26 DIAGNOSIS — E03.9 HYPOTHYROIDISM, UNSPECIFIED TYPE: ICD-10-CM

## 2018-04-26 DIAGNOSIS — Z95.2 S/P TAVR (TRANSCATHETER AORTIC VALVE REPLACEMENT): ICD-10-CM

## 2018-04-26 DIAGNOSIS — I48.20 CHRONIC ATRIAL FIBRILLATION (HCC): ICD-10-CM

## 2018-04-26 DIAGNOSIS — M89.9 DISORDER OF BONE: ICD-10-CM

## 2018-04-26 LAB
ALBUMIN SERPL-MCNC: 4 G/DL (ref 3.5–5.2)
ALP BLD-CCNC: 94 U/L (ref 35–104)
ALT SERPL-CCNC: 13 U/L (ref 5–33)
ANION GAP SERPL CALCULATED.3IONS-SCNC: 14 MMOL/L (ref 7–19)
AST SERPL-CCNC: 17 U/L (ref 5–32)
BILIRUB SERPL-MCNC: 0.6 MG/DL (ref 0.2–1.2)
BUN BLDV-MCNC: 42 MG/DL (ref 8–23)
CALCIUM SERPL-MCNC: 10.2 MG/DL (ref 8.8–10.2)
CHLORIDE BLD-SCNC: 104 MMOL/L (ref 98–111)
CHOLESTEROL, TOTAL: 152 MG/DL (ref 160–199)
CO2: 29 MMOL/L (ref 22–29)
CREAT SERPL-MCNC: 1.3 MG/DL (ref 0.5–0.9)
GFR NON-AFRICAN AMERICAN: 40
GLUCOSE BLD-MCNC: 82 MG/DL (ref 74–109)
HCT VFR BLD CALC: 41.1 % (ref 37–47)
HDLC SERPL-MCNC: 45 MG/DL (ref 65–121)
HEMOGLOBIN: 12.5 G/DL (ref 12–16)
LDL CHOLESTEROL CALCULATED: 76 MG/DL
MCH RBC QN AUTO: 29.7 PG (ref 27–31)
MCHC RBC AUTO-ENTMCNC: 30.4 G/DL (ref 33–37)
MCV RBC AUTO: 97.6 FL (ref 81–99)
PDW BLD-RTO: 16.2 % (ref 11.5–14.5)
PLATELET # BLD: 239 K/UL (ref 130–400)
PMV BLD AUTO: 9.3 FL (ref 9.4–12.3)
POTASSIUM SERPL-SCNC: 4.6 MMOL/L (ref 3.5–5)
RBC # BLD: 4.21 M/UL (ref 4.2–5.4)
SODIUM BLD-SCNC: 147 MMOL/L (ref 136–145)
T4 FREE: 0.8 NG/DL (ref 0.9–1.7)
TOTAL PROTEIN: 7.9 G/DL (ref 6.6–8.7)
TRIGL SERPL-MCNC: 156 MG/DL (ref 0–149)
TSH SERPL DL<=0.05 MIU/L-ACNC: 1.55 UIU/ML (ref 0.27–4.2)
VITAMIN D 25-HYDROXY: 22.9 NG/ML
WBC # BLD: 5.8 K/UL (ref 4.8–10.8)

## 2018-04-30 ENCOUNTER — TELEPHONE (OUTPATIENT)
Dept: INTERNAL MEDICINE | Age: 73
End: 2018-04-30

## 2018-05-08 RX ORDER — DILTIAZEM HYDROCHLORIDE 180 MG/1
CAPSULE, COATED, EXTENDED RELEASE ORAL
Qty: 90 CAPSULE | Refills: 2 | Status: SHIPPED | OUTPATIENT
Start: 2018-05-08 | End: 2019-01-24 | Stop reason: SDUPTHER

## 2018-05-09 RX ORDER — METOPROLOL TARTRATE 100 MG/1
TABLET ORAL
Qty: 60 TABLET | Refills: 11 | OUTPATIENT
Start: 2018-05-09

## 2018-05-09 NOTE — TELEPHONE ENCOUNTER
Refused this RX request. Sent verbal over phone to [facility] for Lopressor 50mg PO BID, Quantity: 60, Refills: 11. HL

## 2018-05-09 NOTE — TELEPHONE ENCOUNTER
I saw this pt in April, and it looks like she was on metoprolol tartrate 50 mg twice daily at that time. This refill request is for 100 mg twice daily?

## 2018-05-12 ENCOUNTER — HOSPITAL ENCOUNTER (INPATIENT)
Facility: HOSPITAL | Age: 73
LOS: 10 days | Discharge: HOME HEALTH | End: 2018-05-22
Attending: EMERGENCY MEDICINE | Admitting: INTERNAL MEDICINE

## 2018-05-12 ENCOUNTER — APPOINTMENT (OUTPATIENT)
Dept: CT IMAGING | Facility: HOSPITAL | Age: 73
End: 2018-05-12

## 2018-05-12 ENCOUNTER — APPOINTMENT (OUTPATIENT)
Dept: GENERAL RADIOLOGY | Facility: HOSPITAL | Age: 73
End: 2018-05-12

## 2018-05-12 ENCOUNTER — APPOINTMENT (OUTPATIENT)
Dept: CARDIOLOGY | Facility: HOSPITAL | Age: 73
End: 2018-05-12
Attending: FAMILY MEDICINE

## 2018-05-12 DIAGNOSIS — A41.9 SEPSIS, DUE TO UNSPECIFIED ORGANISM: Primary | ICD-10-CM

## 2018-05-12 DIAGNOSIS — R13.12 OROPHARYNGEAL DYSPHAGIA: ICD-10-CM

## 2018-05-12 DIAGNOSIS — Z74.09 IMPAIRED MOBILITY: ICD-10-CM

## 2018-05-12 LAB
ALBUMIN SERPL-MCNC: 4.3 G/DL (ref 3.5–5)
ALBUMIN/GLOB SERPL: 1 G/DL (ref 1.1–2.5)
ALP SERPL-CCNC: 183 U/L (ref 24–120)
ALT SERPL W P-5'-P-CCNC: 470 U/L (ref 0–54)
ANION GAP SERPL CALCULATED.3IONS-SCNC: 11 MMOL/L (ref 4–13)
ARTERIAL PATENCY WRIST A: NORMAL
AST SERPL-CCNC: 413 U/L (ref 7–45)
ATMOSPHERIC PRESS: 752 MMHG
BACTERIA UR QL AUTO: ABNORMAL /HPF
BASE EXCESS BLDA CALC-SCNC: 0.2 MMOL/L (ref 0–2)
BASOPHILS # BLD AUTO: 0.05 10*3/MM3 (ref 0–0.2)
BASOPHILS NFR BLD AUTO: 0.3 % (ref 0–2)
BDY SITE: NORMAL
BH CV ECHO MEAS - AO MAX PG (FULL): 31 MMHG
BH CV ECHO MEAS - AO MAX PG: 38.7 MMHG
BH CV ECHO MEAS - AO MEAN PG (FULL): 17 MMHG
BH CV ECHO MEAS - AO MEAN PG: 21 MMHG
BH CV ECHO MEAS - AO ROOT AREA (BSA CORRECTED): 1.2
BH CV ECHO MEAS - AO ROOT AREA: 5.7 CM^2
BH CV ECHO MEAS - AO ROOT DIAM: 2.7 CM
BH CV ECHO MEAS - AO V2 MAX: 311 CM/SEC
BH CV ECHO MEAS - AO V2 MEAN: 209 CM/SEC
BH CV ECHO MEAS - AO V2 VTI: 59.1 CM
BH CV ECHO MEAS - AVA(I,A): 1.2 CM^2
BH CV ECHO MEAS - AVA(I,D): 1.2 CM^2
BH CV ECHO MEAS - AVA(V,A): 1.3 CM^2
BH CV ECHO MEAS - AVA(V,D): 1.3 CM^2
BH CV ECHO MEAS - BSA(HAYCOCK): 2.3 M^2
BH CV ECHO MEAS - BSA: 2.2 M^2
BH CV ECHO MEAS - BZI_BMI: 37 KILOGRAMS/M^2
BH CV ECHO MEAS - BZI_METRIC_HEIGHT: 170.2 CM
BH CV ECHO MEAS - BZI_METRIC_WEIGHT: 107.1 KG
BH CV ECHO MEAS - EDV(CUBED): 148.9 ML
BH CV ECHO MEAS - EDV(TEICH): 135.3 ML
BH CV ECHO MEAS - EF(CUBED): 66 %
BH CV ECHO MEAS - EF(TEICH): 57.1 %
BH CV ECHO MEAS - ESV(CUBED): 50.7 ML
BH CV ECHO MEAS - ESV(TEICH): 58.1 ML
BH CV ECHO MEAS - FS: 30.2 %
BH CV ECHO MEAS - IVS/LVPW: 1.2
BH CV ECHO MEAS - IVSD: 1.1 CM
BH CV ECHO MEAS - LA DIMENSION: 5.1 CM
BH CV ECHO MEAS - LA/AO: 1.9
BH CV ECHO MEAS - LAT PEAK E' VEL: 15 CM/SEC
BH CV ECHO MEAS - LV MASS(C)D: 200.4 GRAMS
BH CV ECHO MEAS - LV MASS(C)DI: 92.4 GRAMS/M^2
BH CV ECHO MEAS - LV MAX PG: 7.7 MMHG
BH CV ECHO MEAS - LV MEAN PG: 4 MMHG
BH CV ECHO MEAS - LV V1 MAX: 139 CM/SEC
BH CV ECHO MEAS - LV V1 MEAN: 90.2 CM/SEC
BH CV ECHO MEAS - LV V1 VTI: 25.1 CM
BH CV ECHO MEAS - LVIDD: 5.3 CM
BH CV ECHO MEAS - LVIDS: 3.7 CM
BH CV ECHO MEAS - LVOT AREA (M): 2.8 CM^2
BH CV ECHO MEAS - LVOT AREA: 2.8 CM^2
BH CV ECHO MEAS - LVOT DIAM: 1.9 CM
BH CV ECHO MEAS - LVPWD: 0.9 CM
BH CV ECHO MEAS - MED PEAK E' VEL: 10 CM/SEC
BH CV ECHO MEAS - MR MAX PG: 90.6 MMHG
BH CV ECHO MEAS - MR MAX VEL: 476 CM/SEC
BH CV ECHO MEAS - MV DEC TIME: 0.22 SEC
BH CV ECHO MEAS - MV E MAX VEL: 143 CM/SEC
BH CV ECHO MEAS - RAP SYSTOLE: 5 MMHG
BH CV ECHO MEAS - RVSP: 35.9 MMHG
BH CV ECHO MEAS - SI(AO): 156 ML/M^2
BH CV ECHO MEAS - SI(CUBED): 45.3 ML/M^2
BH CV ECHO MEAS - SI(LVOT): 32.8 ML/M^2
BH CV ECHO MEAS - SI(TEICH): 35.6 ML/M^2
BH CV ECHO MEAS - SV(AO): 338.4 ML
BH CV ECHO MEAS - SV(CUBED): 98.2 ML
BH CV ECHO MEAS - SV(LVOT): 71.2 ML
BH CV ECHO MEAS - SV(TEICH): 77.2 ML
BH CV ECHO MEAS - TR MAX VEL: 278 CM/SEC
BH CV ECHO MEASUREMENTS AVERAGE E/E' RATIO: 11.44
BILIRUB SERPL-MCNC: 1.3 MG/DL (ref 0.1–1)
BILIRUB UR QL STRIP: NEGATIVE
BODY TEMPERATURE: 37 C
BUN BLD-MCNC: 56 MG/DL (ref 5–21)
BUN/CREAT SERPL: 35.7 (ref 7–25)
CALCIUM SPEC-SCNC: 10.3 MG/DL (ref 8.4–10.4)
CHLORIDE SERPL-SCNC: 106 MMOL/L (ref 98–110)
CLARITY UR: ABNORMAL
CO2 SERPL-SCNC: 30 MMOL/L (ref 24–31)
COLOR UR: YELLOW
CREAT BLD-MCNC: 1.57 MG/DL (ref 0.5–1.4)
D-LACTATE SERPL-SCNC: 1.1 MMOL/L (ref 0.5–2)
D-LACTATE SERPL-SCNC: 2.4 MMOL/L (ref 0.5–2)
DEPRECATED RDW RBC AUTO: 55.8 FL (ref 40–54)
EOSINOPHIL # BLD AUTO: 0.02 10*3/MM3 (ref 0–0.7)
EOSINOPHIL NFR BLD AUTO: 0.1 % (ref 0–4)
ERYTHROCYTE [DISTWIDTH] IN BLOOD BY AUTOMATED COUNT: 16 % (ref 12–15)
GAS FLOW AIRWAY: 3 LPM
GFR SERPL CREATININE-BSD FRML MDRD: 32 ML/MIN/1.73
GLOBULIN UR ELPH-MCNC: 4.2 GM/DL
GLUCOSE BLD-MCNC: 177 MG/DL (ref 70–100)
GLUCOSE UR STRIP-MCNC: NEGATIVE MG/DL
HCO3 BLDA-SCNC: 24.7 MMOL/L (ref 20–26)
HCT VFR BLD AUTO: 41.8 % (ref 37–47)
HGB BLD-MCNC: 13.3 G/DL (ref 12–16)
HGB UR QL STRIP.AUTO: ABNORMAL
HOLD SPECIMEN: NORMAL
IMM GRANULOCYTES # BLD: 0.18 10*3/MM3 (ref 0–0.03)
IMM GRANULOCYTES NFR BLD: 1.2 % (ref 0–5)
INR PPP: 3.36 (ref 0.91–1.09)
KETONES UR QL STRIP: NEGATIVE
LEFT ATRIUM VOLUME INDEX: 45.1 ML/M2
LEFT ATRIUM VOLUME: 97.9 CM3
LEUKOCYTE ESTERASE UR QL STRIP.AUTO: ABNORMAL
LYMPHOCYTES # BLD AUTO: 0.37 10*3/MM3 (ref 0.72–4.86)
LYMPHOCYTES NFR BLD AUTO: 2.4 % (ref 15–45)
Lab: NORMAL
MAXIMAL PREDICTED HEART RATE: 146 BPM
MCH RBC QN AUTO: 30.1 PG (ref 28–32)
MCHC RBC AUTO-ENTMCNC: 31.8 G/DL (ref 33–36)
MCV RBC AUTO: 94.6 FL (ref 82–98)
MODALITY: NORMAL
MONOCYTES # BLD AUTO: 1.1 10*3/MM3 (ref 0.19–1.3)
MONOCYTES NFR BLD AUTO: 7.2 % (ref 4–12)
MUCOUS THREADS URNS QL MICRO: ABNORMAL /HPF
NEUTROPHILS # BLD AUTO: 13.6 10*3/MM3 (ref 1.87–8.4)
NEUTROPHILS NFR BLD AUTO: 88.8 % (ref 39–78)
NITRITE UR QL STRIP: NEGATIVE
NRBC BLD MANUAL-RTO: 0 /100 WBC (ref 0–0)
PCO2 BLDA: 38.7 MM HG (ref 35–45)
PH BLDA: 7.41 PH UNITS (ref 7.35–7.45)
PH UR STRIP.AUTO: 6 [PH] (ref 5–8)
PLATELET # BLD AUTO: 177 10*3/MM3 (ref 130–400)
PMV BLD AUTO: 9.3 FL (ref 6–12)
PO2 BLDA: 94 MM HG (ref 83–108)
POTASSIUM BLD-SCNC: 4.2 MMOL/L (ref 3.5–5.3)
PROT SERPL-MCNC: 8.5 G/DL (ref 6.3–8.7)
PROT UR QL STRIP: ABNORMAL
PROTHROMBIN TIME: 35.3 SECONDS (ref 11.9–14.6)
RBC # BLD AUTO: 4.42 10*6/MM3 (ref 4.2–5.4)
RBC # UR: ABNORMAL /HPF
REF LAB TEST METHOD: ABNORMAL
S PYO AG THROAT QL: NEGATIVE
SAO2 % BLDCOA: 97.7 % (ref 94–99)
SODIUM BLD-SCNC: 147 MMOL/L (ref 135–145)
SP GR UR STRIP: 1.01 (ref 1–1.03)
SQUAMOUS #/AREA URNS HPF: ABNORMAL /HPF
STRESS TARGET HR: 124 BPM
UROBILINOGEN UR QL STRIP: ABNORMAL
VENTILATOR MODE: NORMAL
WBC NRBC COR # BLD: 15.32 10*3/MM3 (ref 4.8–10.8)
WBC UR QL AUTO: ABNORMAL /HPF
WHOLE BLOOD HOLD SPECIMEN: NORMAL
WHOLE BLOOD HOLD SPECIMEN: NORMAL

## 2018-05-12 PROCEDURE — 85025 COMPLETE CBC W/AUTO DIFF WBC: CPT | Performed by: EMERGENCY MEDICINE

## 2018-05-12 PROCEDURE — G8996 SWALLOW CURRENT STATUS: HCPCS

## 2018-05-12 PROCEDURE — 70450 CT HEAD/BRAIN W/O DYE: CPT

## 2018-05-12 PROCEDURE — 25010000002 VANCOMYCIN PER 500 MG: Performed by: EMERGENCY MEDICINE

## 2018-05-12 PROCEDURE — 87086 URINE CULTURE/COLONY COUNT: CPT | Performed by: EMERGENCY MEDICINE

## 2018-05-12 PROCEDURE — 87880 STREP A ASSAY W/OPTIC: CPT | Performed by: EMERGENCY MEDICINE

## 2018-05-12 PROCEDURE — G8997 SWALLOW GOAL STATUS: HCPCS

## 2018-05-12 PROCEDURE — 25010000002 PIPERACILLIN SOD-TAZOBACTAM PER 1 G: Performed by: EMERGENCY MEDICINE

## 2018-05-12 PROCEDURE — 87150 DNA/RNA AMPLIFIED PROBE: CPT | Performed by: EMERGENCY MEDICINE

## 2018-05-12 PROCEDURE — 85610 PROTHROMBIN TIME: CPT | Performed by: EMERGENCY MEDICINE

## 2018-05-12 PROCEDURE — 71045 X-RAY EXAM CHEST 1 VIEW: CPT

## 2018-05-12 PROCEDURE — 81001 URINALYSIS AUTO W/SCOPE: CPT | Performed by: EMERGENCY MEDICINE

## 2018-05-12 PROCEDURE — 93306 TTE W/DOPPLER COMPLETE: CPT

## 2018-05-12 PROCEDURE — 92610 EVALUATE SWALLOWING FUNCTION: CPT

## 2018-05-12 PROCEDURE — 87185 SC STD ENZYME DETCJ PER NZM: CPT | Performed by: EMERGENCY MEDICINE

## 2018-05-12 PROCEDURE — 87077 CULTURE AEROBIC IDENTIFY: CPT | Performed by: EMERGENCY MEDICINE

## 2018-05-12 PROCEDURE — 87040 BLOOD CULTURE FOR BACTERIA: CPT | Performed by: EMERGENCY MEDICINE

## 2018-05-12 PROCEDURE — 83605 ASSAY OF LACTIC ACID: CPT | Performed by: EMERGENCY MEDICINE

## 2018-05-12 PROCEDURE — 87147 CULTURE TYPE IMMUNOLOGIC: CPT | Performed by: EMERGENCY MEDICINE

## 2018-05-12 PROCEDURE — 36600 WITHDRAWAL OF ARTERIAL BLOOD: CPT

## 2018-05-12 PROCEDURE — 99285 EMERGENCY DEPT VISIT HI MDM: CPT

## 2018-05-12 PROCEDURE — 25010000002 PIPERACILLIN SOD-TAZOBACTAM PER 1 G: Performed by: FAMILY MEDICINE

## 2018-05-12 PROCEDURE — 87088 URINE BACTERIA CULTURE: CPT | Performed by: EMERGENCY MEDICINE

## 2018-05-12 PROCEDURE — 80053 COMPREHEN METABOLIC PANEL: CPT | Performed by: EMERGENCY MEDICINE

## 2018-05-12 PROCEDURE — 93306 TTE W/DOPPLER COMPLETE: CPT | Performed by: INTERNAL MEDICINE

## 2018-05-12 PROCEDURE — 82803 BLOOD GASES ANY COMBINATION: CPT

## 2018-05-12 PROCEDURE — 87186 SC STD MICRODIL/AGAR DIL: CPT | Performed by: EMERGENCY MEDICINE

## 2018-05-12 PROCEDURE — 87081 CULTURE SCREEN ONLY: CPT | Performed by: EMERGENCY MEDICINE

## 2018-05-12 RX ORDER — DONEPEZIL HYDROCHLORIDE 5 MG/1
5 TABLET, FILM COATED ORAL NIGHTLY
Status: DISCONTINUED | OUTPATIENT
Start: 2018-05-12 | End: 2018-05-22 | Stop reason: HOSPADM

## 2018-05-12 RX ORDER — ONDANSETRON 2 MG/ML
4 INJECTION INTRAMUSCULAR; INTRAVENOUS EVERY 6 HOURS PRN
Status: DISCONTINUED | OUTPATIENT
Start: 2018-05-12 | End: 2018-05-22 | Stop reason: HOSPADM

## 2018-05-12 RX ORDER — METHIMAZOLE 10 MG/1
10 TABLET ORAL DAILY
Status: DISCONTINUED | OUTPATIENT
Start: 2018-05-12 | End: 2018-05-22 | Stop reason: HOSPADM

## 2018-05-12 RX ORDER — ASPIRIN 81 MG/1
81 TABLET ORAL DAILY
Status: DISCONTINUED | OUTPATIENT
Start: 2018-05-12 | End: 2018-05-22 | Stop reason: HOSPADM

## 2018-05-12 RX ORDER — ACETAMINOPHEN 325 MG/1
650 TABLET ORAL EVERY 4 HOURS PRN
Status: DISCONTINUED | OUTPATIENT
Start: 2018-05-12 | End: 2018-05-22 | Stop reason: HOSPADM

## 2018-05-12 RX ORDER — GABAPENTIN 300 MG/1
300 CAPSULE ORAL NIGHTLY
Status: DISCONTINUED | OUTPATIENT
Start: 2018-05-12 | End: 2018-05-22 | Stop reason: HOSPADM

## 2018-05-12 RX ORDER — ACETAMINOPHEN 650 MG/1
650 SUPPOSITORY RECTAL EVERY 4 HOURS PRN
Status: DISCONTINUED | OUTPATIENT
Start: 2018-05-12 | End: 2018-05-22 | Stop reason: HOSPADM

## 2018-05-12 RX ORDER — SODIUM CHLORIDE 0.9 % (FLUSH) 0.9 %
1-10 SYRINGE (ML) INJECTION AS NEEDED
Status: DISCONTINUED | OUTPATIENT
Start: 2018-05-12 | End: 2018-05-22 | Stop reason: HOSPADM

## 2018-05-12 RX ORDER — SODIUM CHLORIDE 0.9 % (FLUSH) 0.9 %
10 SYRINGE (ML) INJECTION AS NEEDED
Status: DISCONTINUED | OUTPATIENT
Start: 2018-05-12 | End: 2018-05-22 | Stop reason: HOSPADM

## 2018-05-12 RX ORDER — WARFARIN SODIUM 10 MG/1
10 TABLET ORAL
Status: DISCONTINUED | OUTPATIENT
Start: 2018-05-12 | End: 2018-05-13

## 2018-05-12 RX ORDER — VANCOMYCIN HYDROCHLORIDE 1 G/200ML
1000 INJECTION, SOLUTION INTRAVENOUS EVERY 24 HOURS
Status: DISCONTINUED | OUTPATIENT
Start: 2018-05-13 | End: 2018-05-14

## 2018-05-12 RX ORDER — PANTOPRAZOLE SODIUM 40 MG/1
40 TABLET, DELAYED RELEASE ORAL
Status: DISCONTINUED | OUTPATIENT
Start: 2018-05-13 | End: 2018-05-22 | Stop reason: HOSPADM

## 2018-05-12 RX ORDER — ALUMINA, MAGNESIA, AND SIMETHICONE 2400; 2400; 240 MG/30ML; MG/30ML; MG/30ML
15 SUSPENSION ORAL EVERY 6 HOURS PRN
Status: DISCONTINUED | OUTPATIENT
Start: 2018-05-12 | End: 2018-05-22 | Stop reason: HOSPADM

## 2018-05-12 RX ORDER — METOPROLOL TARTRATE 50 MG/1
50 TABLET, FILM COATED ORAL EVERY 12 HOURS SCHEDULED
Status: DISCONTINUED | OUTPATIENT
Start: 2018-05-12 | End: 2018-05-12

## 2018-05-12 RX ORDER — DILTIAZEM HYDROCHLORIDE 180 MG/1
180 CAPSULE, COATED, EXTENDED RELEASE ORAL
Status: DISCONTINUED | OUTPATIENT
Start: 2018-05-12 | End: 2018-05-22 | Stop reason: HOSPADM

## 2018-05-12 RX ORDER — METOPROLOL TARTRATE 5 MG/5ML
5 INJECTION INTRAVENOUS EVERY 6 HOURS SCHEDULED
Status: DISCONTINUED | OUTPATIENT
Start: 2018-05-12 | End: 2018-05-13

## 2018-05-12 RX ORDER — SODIUM CHLORIDE 9 MG/ML
100 INJECTION, SOLUTION INTRAVENOUS CONTINUOUS
Status: DISCONTINUED | OUTPATIENT
Start: 2018-05-12 | End: 2018-05-13

## 2018-05-12 RX ORDER — CALCITRIOL 0.25 UG/1
0.25 CAPSULE, LIQUID FILLED ORAL DAILY
Status: DISCONTINUED | OUTPATIENT
Start: 2018-05-12 | End: 2018-05-22 | Stop reason: HOSPADM

## 2018-05-12 RX ORDER — WARFARIN SODIUM 5 MG/1
5 TABLET ORAL
COMMUNITY
End: 2018-05-22 | Stop reason: HOSPADM

## 2018-05-12 RX ORDER — FUROSEMIDE 40 MG/1
40 TABLET ORAL DAILY
Status: DISCONTINUED | OUTPATIENT
Start: 2018-05-12 | End: 2018-05-15

## 2018-05-12 RX ADMIN — TAZOBACTAM SODIUM AND PIPERACILLIN SODIUM 3.38 G: 375; 3 INJECTION, SOLUTION INTRAVENOUS at 16:58

## 2018-05-12 RX ADMIN — ACETAMINOPHEN 650 MG: 650 SUPPOSITORY RECTAL at 17:22

## 2018-05-12 RX ADMIN — SODIUM CHLORIDE 100 ML/HR: 9 INJECTION, SOLUTION INTRAVENOUS at 23:39

## 2018-05-12 RX ADMIN — METOPROLOL TARTRATE 5 MG: 5 INJECTION INTRAVENOUS at 14:11

## 2018-05-12 RX ADMIN — VANCOMYCIN HYDROCHLORIDE 2000 MG: 1 INJECTION, POWDER, LYOPHILIZED, FOR SOLUTION INTRAVENOUS at 09:51

## 2018-05-12 RX ADMIN — METOPROLOL TARTRATE 5 MG: 5 INJECTION INTRAVENOUS at 17:58

## 2018-05-12 RX ADMIN — TAZOBACTAM SODIUM AND PIPERACILLIN SODIUM 4.5 G: 500; 4 INJECTION, SOLUTION INTRAVENOUS at 09:48

## 2018-05-12 RX ADMIN — SODIUM CHLORIDE 100 ML/HR: 9 INJECTION, SOLUTION INTRAVENOUS at 13:11

## 2018-05-13 LAB
ALBUMIN SERPL-MCNC: 3.4 G/DL (ref 3.5–5)
ALBUMIN/GLOB SERPL: 0.9 G/DL (ref 1.1–2.5)
ALP SERPL-CCNC: 138 U/L (ref 24–120)
ALT SERPL W P-5'-P-CCNC: 280 U/L (ref 0–54)
ANION GAP SERPL CALCULATED.3IONS-SCNC: 10 MMOL/L (ref 4–13)
ANISOCYTOSIS BLD QL: ABNORMAL
AST SERPL-CCNC: 126 U/L (ref 7–45)
BILIRUB SERPL-MCNC: 0.9 MG/DL (ref 0.1–1)
BUN BLD-MCNC: 50 MG/DL (ref 5–21)
BUN/CREAT SERPL: 30.7 (ref 7–25)
CALCIUM SPEC-SCNC: 9.8 MG/DL (ref 8.4–10.4)
CHLORIDE SERPL-SCNC: 114 MMOL/L (ref 98–110)
CO2 SERPL-SCNC: 28 MMOL/L (ref 24–31)
CREAT BLD-MCNC: 1.63 MG/DL (ref 0.5–1.4)
DEPRECATED RDW RBC AUTO: 58.7 FL (ref 40–54)
ERYTHROCYTE [DISTWIDTH] IN BLOOD BY AUTOMATED COUNT: 16.6 % (ref 12–15)
GFR SERPL CREATININE-BSD FRML MDRD: 31 ML/MIN/1.73
GLOBULIN UR ELPH-MCNC: 3.7 GM/DL
GLUCOSE BLD-MCNC: 131 MG/DL (ref 70–100)
HCT VFR BLD AUTO: 41 % (ref 37–47)
HGB BLD-MCNC: 12.8 G/DL (ref 12–16)
INR PPP: 4.15 (ref 0.91–1.09)
LYMPHOCYTES # BLD MANUAL: 0.52 10*3/MM3 (ref 0.72–4.86)
LYMPHOCYTES NFR BLD MANUAL: 5 % (ref 15–45)
LYMPHOCYTES NFR BLD MANUAL: 7 % (ref 4–12)
MCH RBC QN AUTO: 30.1 PG (ref 28–32)
MCHC RBC AUTO-ENTMCNC: 31.2 G/DL (ref 33–36)
MCV RBC AUTO: 96.5 FL (ref 82–98)
MONOCYTES # BLD AUTO: 0.73 10*3/MM3 (ref 0.19–1.3)
NEUTROPHILS # BLD AUTO: 9.06 10*3/MM3 (ref 1.87–8.4)
NEUTROPHILS NFR BLD MANUAL: 86 % (ref 39–78)
NEUTS BAND NFR BLD MANUAL: 1 % (ref 0–10)
PLATELET # BLD AUTO: 125 10*3/MM3 (ref 130–400)
PMV BLD AUTO: 9.2 FL (ref 6–12)
POTASSIUM BLD-SCNC: 4.3 MMOL/L (ref 3.5–5.3)
PROT SERPL-MCNC: 7.1 G/DL (ref 6.3–8.7)
PROTHROMBIN TIME: 41.8 SECONDS (ref 11.9–14.6)
RBC # BLD AUTO: 4.25 10*6/MM3 (ref 4.2–5.4)
SMALL PLATELETS BLD QL SMEAR: ABNORMAL
SODIUM BLD-SCNC: 152 MMOL/L (ref 135–145)
VARIANT LYMPHS NFR BLD MANUAL: 1 % (ref 0–5)
WBC MORPH BLD: NORMAL
WBC NRBC COR # BLD: 10.41 10*3/MM3 (ref 4.8–10.8)

## 2018-05-13 PROCEDURE — 25010000002 VANCOMYCIN PER 500 MG: Performed by: FAMILY MEDICINE

## 2018-05-13 PROCEDURE — G8997 SWALLOW GOAL STATUS: HCPCS

## 2018-05-13 PROCEDURE — 85007 BL SMEAR W/DIFF WBC COUNT: CPT | Performed by: FAMILY MEDICINE

## 2018-05-13 PROCEDURE — 92526 ORAL FUNCTION THERAPY: CPT

## 2018-05-13 PROCEDURE — 85025 COMPLETE CBC W/AUTO DIFF WBC: CPT | Performed by: FAMILY MEDICINE

## 2018-05-13 PROCEDURE — 85610 PROTHROMBIN TIME: CPT | Performed by: FAMILY MEDICINE

## 2018-05-13 PROCEDURE — 25010000002 PIPERACILLIN SOD-TAZOBACTAM PER 1 G: Performed by: FAMILY MEDICINE

## 2018-05-13 PROCEDURE — 80053 COMPREHEN METABOLIC PANEL: CPT | Performed by: FAMILY MEDICINE

## 2018-05-13 PROCEDURE — G8998 SWALLOW D/C STATUS: HCPCS

## 2018-05-13 RX ORDER — DEXTROSE AND SODIUM CHLORIDE 5; .45 G/100ML; G/100ML
75 INJECTION, SOLUTION INTRAVENOUS CONTINUOUS
Status: DISCONTINUED | OUTPATIENT
Start: 2018-05-13 | End: 2018-05-14

## 2018-05-13 RX ORDER — METOPROLOL TARTRATE 50 MG/1
50 TABLET, FILM COATED ORAL EVERY 12 HOURS SCHEDULED
Status: DISCONTINUED | OUTPATIENT
Start: 2018-05-13 | End: 2018-05-22 | Stop reason: HOSPADM

## 2018-05-13 RX ADMIN — CALCITRIOL 0.25 MCG: 0.25 CAPSULE ORAL at 09:24

## 2018-05-13 RX ADMIN — DEXTROSE AND SODIUM CHLORIDE 75 ML/HR: 5; 450 INJECTION, SOLUTION INTRAVENOUS at 11:11

## 2018-05-13 RX ADMIN — TAZOBACTAM SODIUM AND PIPERACILLIN SODIUM 3.38 G: 375; 3 INJECTION, SOLUTION INTRAVENOUS at 00:00

## 2018-05-13 RX ADMIN — ASPIRIN 81 MG: 81 TABLET ORAL at 09:24

## 2018-05-13 RX ADMIN — DILTIAZEM HYDROCHLORIDE 180 MG: 180 CAPSULE, COATED, EXTENDED RELEASE ORAL at 09:25

## 2018-05-13 RX ADMIN — METOPROLOL TARTRATE 5 MG: 5 INJECTION INTRAVENOUS at 00:01

## 2018-05-13 RX ADMIN — FUROSEMIDE 40 MG: 40 TABLET ORAL at 09:24

## 2018-05-13 RX ADMIN — METOPROLOL TARTRATE 50 MG: 50 TABLET ORAL at 20:36

## 2018-05-13 RX ADMIN — CHOLECALCIFEROL CAP 125 MCG (5000 UNIT) 5000 UNITS: 125 CAP at 09:24

## 2018-05-13 RX ADMIN — METOPROLOL TARTRATE 5 MG: 5 INJECTION INTRAVENOUS at 06:09

## 2018-05-13 RX ADMIN — VANCOMYCIN HYDROCHLORIDE 1000 MG: 1 INJECTION, SOLUTION INTRAVENOUS at 11:11

## 2018-05-13 RX ADMIN — GABAPENTIN 300 MG: 300 CAPSULE ORAL at 20:36

## 2018-05-13 RX ADMIN — DONEPEZIL HYDROCHLORIDE 5 MG: 5 TABLET, FILM COATED ORAL at 20:36

## 2018-05-13 RX ADMIN — METHIMAZOLE 10 MG: 10 TABLET ORAL at 09:24

## 2018-05-13 RX ADMIN — METOPROLOL TARTRATE 50 MG: 50 TABLET ORAL at 12:17

## 2018-05-13 RX ADMIN — TAZOBACTAM SODIUM AND PIPERACILLIN SODIUM 3.38 G: 375; 3 INJECTION, SOLUTION INTRAVENOUS at 09:28

## 2018-05-14 LAB
ALBUMIN SERPL-MCNC: 3.1 G/DL (ref 3.5–5)
ALBUMIN/GLOB SERPL: 0.8 G/DL (ref 1.1–2.5)
ALP SERPL-CCNC: 114 U/L (ref 24–120)
ALT SERPL W P-5'-P-CCNC: 161 U/L (ref 0–54)
ANION GAP SERPL CALCULATED.3IONS-SCNC: 10 MMOL/L (ref 4–13)
AST SERPL-CCNC: 42 U/L (ref 7–45)
BACTERIA SPEC AEROBE CULT: NORMAL
BILIRUB SERPL-MCNC: 0.8 MG/DL (ref 0.1–1)
BUN BLD-MCNC: 44 MG/DL (ref 5–21)
BUN/CREAT SERPL: 33.3 (ref 7–25)
CALCIUM SPEC-SCNC: 9.4 MG/DL (ref 8.4–10.4)
CHLORIDE SERPL-SCNC: 109 MMOL/L (ref 98–110)
CO2 SERPL-SCNC: 28 MMOL/L (ref 24–31)
CREAT BLD-MCNC: 1.32 MG/DL (ref 0.5–1.4)
DEPRECATED RDW RBC AUTO: 59.6 FL (ref 40–54)
EOSINOPHIL # BLD MANUAL: 0.09 10*3/MM3 (ref 0–0.7)
EOSINOPHIL NFR BLD MANUAL: 1 % (ref 0–4)
ERYTHROCYTE [DISTWIDTH] IN BLOOD BY AUTOMATED COUNT: 16.4 % (ref 12–15)
GFR SERPL CREATININE-BSD FRML MDRD: 39 ML/MIN/1.73
GLOBULIN UR ELPH-MCNC: 3.7 GM/DL
GLUCOSE BLD-MCNC: 127 MG/DL (ref 70–100)
HCT VFR BLD AUTO: 38.9 % (ref 37–47)
HGB BLD-MCNC: 12.2 G/DL (ref 12–16)
INR PPP: 3.03 (ref 0.91–1.09)
LYMPHOCYTES # BLD MANUAL: 0.51 10*3/MM3 (ref 0.72–4.86)
LYMPHOCYTES NFR BLD MANUAL: 12 % (ref 4–12)
LYMPHOCYTES NFR BLD MANUAL: 6 % (ref 15–45)
MCH RBC QN AUTO: 30.7 PG (ref 28–32)
MCHC RBC AUTO-ENTMCNC: 31.4 G/DL (ref 33–36)
MCV RBC AUTO: 97.7 FL (ref 82–98)
MONOCYTES # BLD AUTO: 1.02 10*3/MM3 (ref 0.19–1.3)
NEUTROPHILS # BLD AUTO: 6.92 10*3/MM3 (ref 1.87–8.4)
NEUTROPHILS NFR BLD MANUAL: 81 % (ref 39–78)
PLATELET # BLD AUTO: 107 10*3/MM3 (ref 130–400)
PMV BLD AUTO: 10.1 FL (ref 6–12)
POIKILOCYTOSIS BLD QL SMEAR: ABNORMAL
POTASSIUM BLD-SCNC: 4.1 MMOL/L (ref 3.5–5.3)
PROT SERPL-MCNC: 6.8 G/DL (ref 6.3–8.7)
PROTHROMBIN TIME: 32.5 SECONDS (ref 11.9–14.6)
RBC # BLD AUTO: 3.98 10*6/MM3 (ref 4.2–5.4)
SMALL PLATELETS BLD QL SMEAR: ABNORMAL
SODIUM BLD-SCNC: 147 MMOL/L (ref 135–145)
VANCOMYCIN TROUGH SERPL-MCNC: 11.08 MCG/ML (ref 10–20)
WBC MORPH BLD: NORMAL
WBC NRBC COR # BLD: 8.54 10*3/MM3 (ref 4.8–10.8)

## 2018-05-14 PROCEDURE — 94660 CPAP INITIATION&MGMT: CPT

## 2018-05-14 PROCEDURE — 87185 SC STD ENZYME DETCJ PER NZM: CPT | Performed by: INTERNAL MEDICINE

## 2018-05-14 PROCEDURE — 87205 SMEAR GRAM STAIN: CPT | Performed by: INTERNAL MEDICINE

## 2018-05-14 PROCEDURE — 25010000002 VANCOMYCIN PER 500 MG: Performed by: FAMILY MEDICINE

## 2018-05-14 PROCEDURE — 85610 PROTHROMBIN TIME: CPT | Performed by: FAMILY MEDICINE

## 2018-05-14 PROCEDURE — 94799 UNLISTED PULMONARY SVC/PX: CPT

## 2018-05-14 PROCEDURE — 87147 CULTURE TYPE IMMUNOLOGIC: CPT | Performed by: INTERNAL MEDICINE

## 2018-05-14 PROCEDURE — 80202 ASSAY OF VANCOMYCIN: CPT | Performed by: INTERNAL MEDICINE

## 2018-05-14 PROCEDURE — 85007 BL SMEAR W/DIFF WBC COUNT: CPT | Performed by: FAMILY MEDICINE

## 2018-05-14 PROCEDURE — 80053 COMPREHEN METABOLIC PANEL: CPT | Performed by: FAMILY MEDICINE

## 2018-05-14 PROCEDURE — 87040 BLOOD CULTURE FOR BACTERIA: CPT | Performed by: INTERNAL MEDICINE

## 2018-05-14 PROCEDURE — 85025 COMPLETE CBC W/AUTO DIFF WBC: CPT | Performed by: FAMILY MEDICINE

## 2018-05-14 PROCEDURE — 87186 SC STD MICRODIL/AGAR DIL: CPT | Performed by: INTERNAL MEDICINE

## 2018-05-14 RX ADMIN — NAFCILLIN SODIUM 2 G: 2 INJECTION, POWDER, LYOPHILIZED, FOR SOLUTION INTRAMUSCULAR; INTRAVENOUS at 17:04

## 2018-05-14 RX ADMIN — METHIMAZOLE 10 MG: 10 TABLET ORAL at 08:16

## 2018-05-14 RX ADMIN — ASPIRIN 81 MG: 81 TABLET ORAL at 08:16

## 2018-05-14 RX ADMIN — DEXTROSE AND SODIUM CHLORIDE 75 ML/HR: 5; 450 INJECTION, SOLUTION INTRAVENOUS at 04:02

## 2018-05-14 RX ADMIN — NAFCILLIN SODIUM 2 G: 2 INJECTION, POWDER, LYOPHILIZED, FOR SOLUTION INTRAMUSCULAR; INTRAVENOUS at 11:36

## 2018-05-14 RX ADMIN — METOPROLOL TARTRATE 50 MG: 50 TABLET ORAL at 20:43

## 2018-05-14 RX ADMIN — CALCITRIOL 0.25 MCG: 0.25 CAPSULE ORAL at 08:16

## 2018-05-14 RX ADMIN — DILTIAZEM HYDROCHLORIDE 180 MG: 180 CAPSULE, COATED, EXTENDED RELEASE ORAL at 08:15

## 2018-05-14 RX ADMIN — VANCOMYCIN HYDROCHLORIDE 1000 MG: 1 INJECTION, SOLUTION INTRAVENOUS at 10:07

## 2018-05-14 RX ADMIN — DONEPEZIL HYDROCHLORIDE 5 MG: 5 TABLET, FILM COATED ORAL at 20:43

## 2018-05-14 RX ADMIN — GABAPENTIN 300 MG: 300 CAPSULE ORAL at 20:49

## 2018-05-14 RX ADMIN — FUROSEMIDE 40 MG: 40 TABLET ORAL at 08:16

## 2018-05-14 RX ADMIN — PANTOPRAZOLE SODIUM 40 MG: 40 TABLET, DELAYED RELEASE ORAL at 05:46

## 2018-05-14 RX ADMIN — CHOLECALCIFEROL CAP 125 MCG (5000 UNIT) 5000 UNITS: 125 CAP at 08:15

## 2018-05-14 RX ADMIN — NAFCILLIN SODIUM 2 G: 2 INJECTION, POWDER, LYOPHILIZED, FOR SOLUTION INTRAMUSCULAR; INTRAVENOUS at 20:44

## 2018-05-14 RX ADMIN — METOPROLOL TARTRATE 50 MG: 50 TABLET ORAL at 08:15

## 2018-05-15 LAB
ALBUMIN SERPL-MCNC: 3.1 G/DL (ref 3.5–5)
ALBUMIN/GLOB SERPL: 0.9 G/DL (ref 1.1–2.5)
ALP SERPL-CCNC: 106 U/L (ref 24–120)
ALT SERPL W P-5'-P-CCNC: 110 U/L (ref 0–54)
ANION GAP SERPL CALCULATED.3IONS-SCNC: 9 MMOL/L (ref 4–13)
AST SERPL-CCNC: 23 U/L (ref 7–45)
BACTERIA BLD CULT: ABNORMAL
BACTERIA SPEC AEROBE CULT: ABNORMAL
BASOPHILS # BLD AUTO: 0.01 10*3/MM3 (ref 0–0.2)
BASOPHILS NFR BLD AUTO: 0.2 % (ref 0–2)
BILIRUB SERPL-MCNC: 1.4 MG/DL (ref 0.1–1)
BUN BLD-MCNC: 48 MG/DL (ref 5–21)
BUN/CREAT SERPL: 32.7 (ref 7–25)
CALCIUM SPEC-SCNC: 9.5 MG/DL (ref 8.4–10.4)
CHLORIDE SERPL-SCNC: 108 MMOL/L (ref 98–110)
CO2 SERPL-SCNC: 28 MMOL/L (ref 24–31)
CREAT BLD-MCNC: 1.47 MG/DL (ref 0.5–1.4)
DEPRECATED RDW RBC AUTO: 58.1 FL (ref 40–54)
EOSINOPHIL # BLD AUTO: 0.18 10*3/MM3 (ref 0–0.7)
EOSINOPHIL NFR BLD AUTO: 3.4 % (ref 0–4)
ERYTHROCYTE [DISTWIDTH] IN BLOOD BY AUTOMATED COUNT: 16.1 % (ref 12–15)
GFR SERPL CREATININE-BSD FRML MDRD: 35 ML/MIN/1.73
GLOBULIN UR ELPH-MCNC: 3.6 GM/DL
GLUCOSE BLD-MCNC: 114 MG/DL (ref 70–100)
GRAM STN SPEC: ABNORMAL
GRAM STN SPEC: ABNORMAL
HCT VFR BLD AUTO: 36.8 % (ref 37–47)
HGB BLD-MCNC: 11.8 G/DL (ref 12–16)
IMM GRANULOCYTES # BLD: 0.02 10*3/MM3 (ref 0–0.03)
IMM GRANULOCYTES NFR BLD: 0.4 % (ref 0–5)
INR PPP: 2.13 (ref 0.91–1.09)
ISOLATED FROM: ABNORMAL
ISOLATED FROM: ABNORMAL
LYMPHOCYTES # BLD AUTO: 0.61 10*3/MM3 (ref 0.72–4.86)
LYMPHOCYTES NFR BLD AUTO: 11.4 % (ref 15–45)
MCH RBC QN AUTO: 30.9 PG (ref 28–32)
MCHC RBC AUTO-ENTMCNC: 32.1 G/DL (ref 33–36)
MCV RBC AUTO: 96.3 FL (ref 82–98)
MONOCYTES # BLD AUTO: 0.67 10*3/MM3 (ref 0.19–1.3)
MONOCYTES NFR BLD AUTO: 12.5 % (ref 4–12)
NEUTROPHILS # BLD AUTO: 3.85 10*3/MM3 (ref 1.87–8.4)
NEUTROPHILS NFR BLD AUTO: 72.1 % (ref 39–78)
NRBC BLD MANUAL-RTO: 0 /100 WBC (ref 0–0)
PLATELET # BLD AUTO: 117 10*3/MM3 (ref 130–400)
PMV BLD AUTO: 9.5 FL (ref 6–12)
POTASSIUM BLD-SCNC: 3.9 MMOL/L (ref 3.5–5.3)
PROT SERPL-MCNC: 6.7 G/DL (ref 6.3–8.7)
PROTHROMBIN TIME: 24.6 SECONDS (ref 11.9–14.6)
RBC # BLD AUTO: 3.82 10*6/MM3 (ref 4.2–5.4)
SODIUM BLD-SCNC: 145 MMOL/L (ref 135–145)
WBC NRBC COR # BLD: 5.34 10*3/MM3 (ref 4.8–10.8)

## 2018-05-15 PROCEDURE — 97116 GAIT TRAINING THERAPY: CPT

## 2018-05-15 PROCEDURE — 85025 COMPLETE CBC W/AUTO DIFF WBC: CPT | Performed by: INTERNAL MEDICINE

## 2018-05-15 PROCEDURE — 80053 COMPREHEN METABOLIC PANEL: CPT | Performed by: INTERNAL MEDICINE

## 2018-05-15 PROCEDURE — 85610 PROTHROMBIN TIME: CPT | Performed by: FAMILY MEDICINE

## 2018-05-15 PROCEDURE — G8978 MOBILITY CURRENT STATUS: HCPCS | Performed by: PHYSICAL THERAPIST

## 2018-05-15 PROCEDURE — 87040 BLOOD CULTURE FOR BACTERIA: CPT | Performed by: INTERNAL MEDICINE

## 2018-05-15 PROCEDURE — 97110 THERAPEUTIC EXERCISES: CPT

## 2018-05-15 PROCEDURE — 97162 PT EVAL MOD COMPLEX 30 MIN: CPT | Performed by: PHYSICAL THERAPIST

## 2018-05-15 PROCEDURE — G8979 MOBILITY GOAL STATUS: HCPCS | Performed by: PHYSICAL THERAPIST

## 2018-05-15 RX ORDER — WARFARIN SODIUM 5 MG/1
5 TABLET ORAL
Status: DISCONTINUED | OUTPATIENT
Start: 2018-05-15 | End: 2018-05-16

## 2018-05-15 RX ADMIN — FUROSEMIDE 40 MG: 40 TABLET ORAL at 08:16

## 2018-05-15 RX ADMIN — METHIMAZOLE 10 MG: 10 TABLET ORAL at 08:15

## 2018-05-15 RX ADMIN — NAFCILLIN SODIUM 2 G: 2 INJECTION, POWDER, LYOPHILIZED, FOR SOLUTION INTRAMUSCULAR; INTRAVENOUS at 15:03

## 2018-05-15 RX ADMIN — GABAPENTIN 300 MG: 300 CAPSULE ORAL at 21:45

## 2018-05-15 RX ADMIN — ASPIRIN 81 MG: 81 TABLET ORAL at 08:16

## 2018-05-15 RX ADMIN — NAFCILLIN SODIUM 2 G: 2 INJECTION, POWDER, LYOPHILIZED, FOR SOLUTION INTRAMUSCULAR; INTRAVENOUS at 00:59

## 2018-05-15 RX ADMIN — DONEPEZIL HYDROCHLORIDE 5 MG: 5 TABLET, FILM COATED ORAL at 21:45

## 2018-05-15 RX ADMIN — CALCITRIOL 0.25 MCG: 0.25 CAPSULE ORAL at 08:15

## 2018-05-15 RX ADMIN — PANTOPRAZOLE SODIUM 40 MG: 40 TABLET, DELAYED RELEASE ORAL at 06:36

## 2018-05-15 RX ADMIN — NAFCILLIN SODIUM 2 G: 2 INJECTION, POWDER, LYOPHILIZED, FOR SOLUTION INTRAMUSCULAR; INTRAVENOUS at 07:54

## 2018-05-15 RX ADMIN — NAFCILLIN SODIUM 2 G: 2 INJECTION, POWDER, LYOPHILIZED, FOR SOLUTION INTRAMUSCULAR; INTRAVENOUS at 18:44

## 2018-05-15 RX ADMIN — NAFCILLIN SODIUM 2 G: 2 INJECTION, POWDER, LYOPHILIZED, FOR SOLUTION INTRAMUSCULAR; INTRAVENOUS at 10:41

## 2018-05-15 RX ADMIN — NAFCILLIN SODIUM 2 G: 2 INJECTION, POWDER, LYOPHILIZED, FOR SOLUTION INTRAMUSCULAR; INTRAVENOUS at 03:59

## 2018-05-15 RX ADMIN — WARFARIN SODIUM 5 MG: 5 TABLET ORAL at 18:44

## 2018-05-15 RX ADMIN — METOPROLOL TARTRATE 50 MG: 50 TABLET ORAL at 08:15

## 2018-05-15 RX ADMIN — CHOLECALCIFEROL CAP 125 MCG (5000 UNIT) 5000 UNITS: 125 CAP at 08:15

## 2018-05-15 RX ADMIN — METOPROLOL TARTRATE 50 MG: 50 TABLET ORAL at 21:45

## 2018-05-15 RX ADMIN — DILTIAZEM HYDROCHLORIDE 180 MG: 180 CAPSULE, COATED, EXTENDED RELEASE ORAL at 08:15

## 2018-05-16 LAB
ANION GAP SERPL CALCULATED.3IONS-SCNC: 12 MMOL/L (ref 4–13)
BUN BLD-MCNC: 48 MG/DL (ref 5–21)
BUN/CREAT SERPL: 38.7 (ref 7–25)
CALCIUM SPEC-SCNC: 9.2 MG/DL (ref 8.4–10.4)
CHLORIDE SERPL-SCNC: 108 MMOL/L (ref 98–110)
CO2 SERPL-SCNC: 25 MMOL/L (ref 24–31)
CREAT BLD-MCNC: 1.24 MG/DL (ref 0.5–1.4)
GFR SERPL CREATININE-BSD FRML MDRD: 42 ML/MIN/1.73
GLUCOSE BLD-MCNC: 109 MG/DL (ref 70–100)
INR PPP: 1.88 (ref 0.91–1.09)
POTASSIUM BLD-SCNC: 3.8 MMOL/L (ref 3.5–5.3)
PROTHROMBIN TIME: 22.3 SECONDS (ref 11.9–14.6)
SODIUM BLD-SCNC: 145 MMOL/L (ref 135–145)

## 2018-05-16 PROCEDURE — 97116 GAIT TRAINING THERAPY: CPT

## 2018-05-16 PROCEDURE — 85610 PROTHROMBIN TIME: CPT | Performed by: FAMILY MEDICINE

## 2018-05-16 PROCEDURE — 87040 BLOOD CULTURE FOR BACTERIA: CPT | Performed by: INTERNAL MEDICINE

## 2018-05-16 PROCEDURE — 97530 THERAPEUTIC ACTIVITIES: CPT

## 2018-05-16 PROCEDURE — 97110 THERAPEUTIC EXERCISES: CPT

## 2018-05-16 PROCEDURE — 80048 BASIC METABOLIC PNL TOTAL CA: CPT | Performed by: INTERNAL MEDICINE

## 2018-05-16 RX ORDER — WARFARIN SODIUM 6 MG/1
6 TABLET ORAL
Status: DISCONTINUED | OUTPATIENT
Start: 2018-05-16 | End: 2018-05-17

## 2018-05-16 RX ADMIN — NAFCILLIN SODIUM 2 G: 2 INJECTION, POWDER, LYOPHILIZED, FOR SOLUTION INTRAMUSCULAR; INTRAVENOUS at 04:48

## 2018-05-16 RX ADMIN — PANTOPRAZOLE SODIUM 40 MG: 40 TABLET, DELAYED RELEASE ORAL at 06:33

## 2018-05-16 RX ADMIN — GABAPENTIN 300 MG: 300 CAPSULE ORAL at 20:19

## 2018-05-16 RX ADMIN — METOPROLOL TARTRATE 50 MG: 50 TABLET ORAL at 20:19

## 2018-05-16 RX ADMIN — NAFCILLIN SODIUM 2 G: 2 INJECTION, POWDER, LYOPHILIZED, FOR SOLUTION INTRAMUSCULAR; INTRAVENOUS at 12:10

## 2018-05-16 RX ADMIN — METHIMAZOLE 10 MG: 10 TABLET ORAL at 08:35

## 2018-05-16 RX ADMIN — WARFARIN SODIUM 6 MG: 6 TABLET ORAL at 17:43

## 2018-05-16 RX ADMIN — CHOLECALCIFEROL CAP 125 MCG (5000 UNIT) 5000 UNITS: 125 CAP at 08:35

## 2018-05-16 RX ADMIN — ASPIRIN 81 MG: 81 TABLET ORAL at 08:35

## 2018-05-16 RX ADMIN — CALCITRIOL 0.25 MCG: 0.25 CAPSULE ORAL at 08:35

## 2018-05-16 RX ADMIN — DILTIAZEM HYDROCHLORIDE 180 MG: 180 CAPSULE, COATED, EXTENDED RELEASE ORAL at 08:35

## 2018-05-16 RX ADMIN — NAFCILLIN SODIUM 2 G: 2 INJECTION, POWDER, LYOPHILIZED, FOR SOLUTION INTRAMUSCULAR; INTRAVENOUS at 20:19

## 2018-05-16 RX ADMIN — NAFCILLIN SODIUM 2 G: 2 INJECTION, POWDER, LYOPHILIZED, FOR SOLUTION INTRAMUSCULAR; INTRAVENOUS at 16:27

## 2018-05-16 RX ADMIN — NAFCILLIN SODIUM 2 G: 2 INJECTION, POWDER, LYOPHILIZED, FOR SOLUTION INTRAMUSCULAR; INTRAVENOUS at 08:35

## 2018-05-16 RX ADMIN — METOPROLOL TARTRATE 50 MG: 50 TABLET ORAL at 08:35

## 2018-05-16 RX ADMIN — DONEPEZIL HYDROCHLORIDE 5 MG: 5 TABLET, FILM COATED ORAL at 20:19

## 2018-05-16 RX ADMIN — NAFCILLIN SODIUM 2 G: 2 INJECTION, POWDER, LYOPHILIZED, FOR SOLUTION INTRAMUSCULAR; INTRAVENOUS at 00:29

## 2018-05-17 LAB
ALBUMIN SERPL-MCNC: 3 G/DL (ref 3.5–5)
ALBUMIN/GLOB SERPL: 0.9 G/DL (ref 1.1–2.5)
ALP SERPL-CCNC: 91 U/L (ref 24–120)
ALT SERPL W P-5'-P-CCNC: 59 U/L (ref 0–54)
ANION GAP SERPL CALCULATED.3IONS-SCNC: 10 MMOL/L (ref 4–13)
AST SERPL-CCNC: 18 U/L (ref 7–45)
BILIRUB SERPL-MCNC: 1 MG/DL (ref 0.1–1)
BUN BLD-MCNC: 42 MG/DL (ref 5–21)
BUN/CREAT SERPL: 33.3 (ref 7–25)
CALCIUM SPEC-SCNC: 9.1 MG/DL (ref 8.4–10.4)
CHLORIDE SERPL-SCNC: 108 MMOL/L (ref 98–110)
CO2 SERPL-SCNC: 27 MMOL/L (ref 24–31)
CREAT BLD-MCNC: 1.26 MG/DL (ref 0.5–1.4)
DEPRECATED RDW RBC AUTO: 54.5 FL (ref 40–54)
ERYTHROCYTE [DISTWIDTH] IN BLOOD BY AUTOMATED COUNT: 15.9 % (ref 12–15)
GFR SERPL CREATININE-BSD FRML MDRD: 42 ML/MIN/1.73
GLOBULIN UR ELPH-MCNC: 3.4 GM/DL
GLUCOSE BLD-MCNC: 99 MG/DL (ref 70–100)
HCT VFR BLD AUTO: 35.3 % (ref 37–47)
HGB BLD-MCNC: 11.2 G/DL (ref 12–16)
INR PPP: 1.76 (ref 0.91–1.09)
MCH RBC QN AUTO: 29.7 PG (ref 28–32)
MCHC RBC AUTO-ENTMCNC: 31.7 G/DL (ref 33–36)
MCV RBC AUTO: 93.6 FL (ref 82–98)
PLATELET # BLD AUTO: 137 10*3/MM3 (ref 130–400)
PMV BLD AUTO: 9.8 FL (ref 6–12)
POTASSIUM BLD-SCNC: 3.6 MMOL/L (ref 3.5–5.3)
PROT SERPL-MCNC: 6.4 G/DL (ref 6.3–8.7)
PROTHROMBIN TIME: 21.1 SECONDS (ref 11.9–14.6)
RBC # BLD AUTO: 3.77 10*6/MM3 (ref 4.2–5.4)
SODIUM BLD-SCNC: 145 MMOL/L (ref 135–145)
WBC NRBC COR # BLD: 5.11 10*3/MM3 (ref 4.8–10.8)

## 2018-05-17 PROCEDURE — 85610 PROTHROMBIN TIME: CPT | Performed by: FAMILY MEDICINE

## 2018-05-17 PROCEDURE — 85027 COMPLETE CBC AUTOMATED: CPT | Performed by: INTERNAL MEDICINE

## 2018-05-17 PROCEDURE — 25010000003 CEFAZOLIN PER 500 MG: Performed by: INTERNAL MEDICINE

## 2018-05-17 PROCEDURE — 97110 THERAPEUTIC EXERCISES: CPT

## 2018-05-17 PROCEDURE — 97116 GAIT TRAINING THERAPY: CPT

## 2018-05-17 PROCEDURE — 80053 COMPREHEN METABOLIC PANEL: CPT | Performed by: INTERNAL MEDICINE

## 2018-05-17 RX ORDER — WARFARIN SODIUM 7.5 MG/1
7.5 TABLET ORAL
Status: DISCONTINUED | OUTPATIENT
Start: 2018-05-17 | End: 2018-05-19

## 2018-05-17 RX ADMIN — CEFAZOLIN 2 G: 1 INJECTION, POWDER, FOR SOLUTION INTRAMUSCULAR; INTRAVENOUS at 17:51

## 2018-05-17 RX ADMIN — METOPROLOL TARTRATE 50 MG: 50 TABLET ORAL at 08:00

## 2018-05-17 RX ADMIN — NAFCILLIN SODIUM 2 G: 2 INJECTION, POWDER, LYOPHILIZED, FOR SOLUTION INTRAMUSCULAR; INTRAVENOUS at 04:52

## 2018-05-17 RX ADMIN — NAFCILLIN SODIUM 2 G: 2 INJECTION, POWDER, LYOPHILIZED, FOR SOLUTION INTRAMUSCULAR; INTRAVENOUS at 00:34

## 2018-05-17 RX ADMIN — CHOLECALCIFEROL CAP 125 MCG (5000 UNIT) 5000 UNITS: 125 CAP at 08:00

## 2018-05-17 RX ADMIN — GABAPENTIN 300 MG: 300 CAPSULE ORAL at 21:30

## 2018-05-17 RX ADMIN — ASPIRIN 81 MG: 81 TABLET ORAL at 08:00

## 2018-05-17 RX ADMIN — PANTOPRAZOLE SODIUM 40 MG: 40 TABLET, DELAYED RELEASE ORAL at 06:01

## 2018-05-17 RX ADMIN — WARFARIN SODIUM 7.5 MG: 7.5 TABLET ORAL at 18:06

## 2018-05-17 RX ADMIN — METHIMAZOLE 10 MG: 10 TABLET ORAL at 08:00

## 2018-05-17 RX ADMIN — DONEPEZIL HYDROCHLORIDE 5 MG: 5 TABLET, FILM COATED ORAL at 21:30

## 2018-05-17 RX ADMIN — DILTIAZEM HYDROCHLORIDE 180 MG: 180 CAPSULE, COATED, EXTENDED RELEASE ORAL at 08:00

## 2018-05-17 RX ADMIN — NAFCILLIN SODIUM 2 G: 2 INJECTION, POWDER, LYOPHILIZED, FOR SOLUTION INTRAMUSCULAR; INTRAVENOUS at 11:48

## 2018-05-17 RX ADMIN — ACETAMINOPHEN 650 MG: 325 TABLET, FILM COATED ORAL at 04:54

## 2018-05-17 RX ADMIN — NAFCILLIN SODIUM 2 G: 2 INJECTION, POWDER, LYOPHILIZED, FOR SOLUTION INTRAMUSCULAR; INTRAVENOUS at 08:00

## 2018-05-17 RX ADMIN — METOPROLOL TARTRATE 50 MG: 50 TABLET ORAL at 21:31

## 2018-05-17 RX ADMIN — CALCITRIOL 0.25 MCG: 0.25 CAPSULE ORAL at 08:00

## 2018-05-18 LAB
INR PPP: 1.64 (ref 0.91–1.09)
PROTHROMBIN TIME: 20 SECONDS (ref 11.9–14.6)

## 2018-05-18 PROCEDURE — 97110 THERAPEUTIC EXERCISES: CPT

## 2018-05-18 PROCEDURE — 97116 GAIT TRAINING THERAPY: CPT

## 2018-05-18 PROCEDURE — 25010000003 CEFAZOLIN PER 500 MG: Performed by: INTERNAL MEDICINE

## 2018-05-18 PROCEDURE — C1751 CATH, INF, PER/CENT/MIDLINE: HCPCS

## 2018-05-18 PROCEDURE — 85610 PROTHROMBIN TIME: CPT | Performed by: FAMILY MEDICINE

## 2018-05-18 RX ORDER — LIDOCAINE HYDROCHLORIDE 10 MG/ML
1 INJECTION, SOLUTION EPIDURAL; INFILTRATION; INTRACAUDAL; PERINEURAL ONCE
Status: COMPLETED | OUTPATIENT
Start: 2018-05-18 | End: 2018-05-18

## 2018-05-18 RX ADMIN — DILTIAZEM HYDROCHLORIDE 180 MG: 180 CAPSULE, COATED, EXTENDED RELEASE ORAL at 08:18

## 2018-05-18 RX ADMIN — CALCITRIOL 0.25 MCG: 0.25 CAPSULE ORAL at 08:18

## 2018-05-18 RX ADMIN — LIDOCAINE HYDROCHLORIDE 1 ML: 10 INJECTION, SOLUTION EPIDURAL; INFILTRATION; INTRACAUDAL; PERINEURAL at 16:06

## 2018-05-18 RX ADMIN — DONEPEZIL HYDROCHLORIDE 5 MG: 5 TABLET, FILM COATED ORAL at 20:49

## 2018-05-18 RX ADMIN — GABAPENTIN 300 MG: 300 CAPSULE ORAL at 20:49

## 2018-05-18 RX ADMIN — CEFAZOLIN 2 G: 1 INJECTION, POWDER, FOR SOLUTION INTRAMUSCULAR; INTRAVENOUS at 16:59

## 2018-05-18 RX ADMIN — CHOLECALCIFEROL CAP 125 MCG (5000 UNIT) 5000 UNITS: 125 CAP at 08:18

## 2018-05-18 RX ADMIN — ACETAMINOPHEN 650 MG: 325 TABLET, FILM COATED ORAL at 07:01

## 2018-05-18 RX ADMIN — PANTOPRAZOLE SODIUM 40 MG: 40 TABLET, DELAYED RELEASE ORAL at 07:00

## 2018-05-18 RX ADMIN — CEFAZOLIN 2 G: 1 INJECTION, POWDER, FOR SOLUTION INTRAMUSCULAR; INTRAVENOUS at 08:19

## 2018-05-18 RX ADMIN — ASPIRIN 81 MG: 81 TABLET ORAL at 08:18

## 2018-05-18 RX ADMIN — METHIMAZOLE 10 MG: 10 TABLET ORAL at 08:18

## 2018-05-18 RX ADMIN — CEFAZOLIN 2 G: 1 INJECTION, POWDER, FOR SOLUTION INTRAMUSCULAR; INTRAVENOUS at 02:26

## 2018-05-18 RX ADMIN — METOPROLOL TARTRATE 50 MG: 50 TABLET ORAL at 20:49

## 2018-05-18 RX ADMIN — WARFARIN SODIUM 7.5 MG: 7.5 TABLET ORAL at 17:00

## 2018-05-18 RX ADMIN — METOPROLOL TARTRATE 50 MG: 50 TABLET ORAL at 08:19

## 2018-05-19 LAB
ALBUMIN SERPL-MCNC: 3.2 G/DL (ref 3.5–5)
ALBUMIN/GLOB SERPL: 0.9 G/DL (ref 1.1–2.5)
ALP SERPL-CCNC: 78 U/L (ref 24–120)
ALT SERPL W P-5'-P-CCNC: 24 U/L (ref 0–54)
ANION GAP SERPL CALCULATED.3IONS-SCNC: 9 MMOL/L (ref 4–13)
AST SERPL-CCNC: 22 U/L (ref 7–45)
B-LACTAMASE USUAL SUSC ISLT: POSITIVE
BACTERIA SPEC AEROBE CULT: ABNORMAL
BACTERIA SPEC AEROBE CULT: ABNORMAL
BASOPHILS # BLD MANUAL: 0.07 10*3/MM3 (ref 0–0.2)
BASOPHILS NFR BLD AUTO: 1 % (ref 0–2)
BILIRUB SERPL-MCNC: 0.6 MG/DL (ref 0.1–1)
BUN BLD-MCNC: 34 MG/DL (ref 5–21)
BUN/CREAT SERPL: 26.8 (ref 7–25)
CALCIUM SPEC-SCNC: 9.4 MG/DL (ref 8.4–10.4)
CHLORIDE SERPL-SCNC: 106 MMOL/L (ref 98–110)
CO2 SERPL-SCNC: 29 MMOL/L (ref 24–31)
CREAT BLD-MCNC: 1.27 MG/DL (ref 0.5–1.4)
DEPRECATED RDW RBC AUTO: 59.1 FL (ref 40–54)
EOSINOPHIL # BLD MANUAL: 0.28 10*3/MM3 (ref 0–0.7)
EOSINOPHIL NFR BLD MANUAL: 4 % (ref 0–4)
ERYTHROCYTE [DISTWIDTH] IN BLOOD BY AUTOMATED COUNT: 16.6 % (ref 12–15)
GFR SERPL CREATININE-BSD FRML MDRD: 41 ML/MIN/1.73
GIANT PLATELETS: ABNORMAL
GLOBULIN UR ELPH-MCNC: 3.7 GM/DL
GLUCOSE BLD-MCNC: 122 MG/DL (ref 70–100)
GRAM STN SPEC: ABNORMAL
HCT VFR BLD AUTO: 34.3 % (ref 37–47)
HGB BLD-MCNC: 11 G/DL (ref 12–16)
INR PPP: 1.36 (ref 0.91–1.09)
ISOLATED FROM: ABNORMAL
LYMPHOCYTES # BLD MANUAL: 0.28 10*3/MM3 (ref 0.72–4.86)
LYMPHOCYTES NFR BLD MANUAL: 4 % (ref 15–45)
LYMPHOCYTES NFR BLD MANUAL: 4 % (ref 4–12)
MCH RBC QN AUTO: 31.2 PG (ref 28–32)
MCHC RBC AUTO-ENTMCNC: 32.1 G/DL (ref 33–36)
MCV RBC AUTO: 97.2 FL (ref 82–98)
MONOCYTES # BLD AUTO: 0.28 10*3/MM3 (ref 0.19–1.3)
NEUTROPHILS # BLD AUTO: 5.91 10*3/MM3 (ref 1.87–8.4)
NEUTROPHILS NFR BLD MANUAL: 84 % (ref 39–78)
PLATELET # BLD AUTO: 196 10*3/MM3 (ref 130–400)
PMV BLD AUTO: 9.7 FL (ref 6–12)
POIKILOCYTOSIS BLD QL SMEAR: ABNORMAL
POTASSIUM BLD-SCNC: 4.1 MMOL/L (ref 3.5–5.3)
PROT SERPL-MCNC: 6.9 G/DL (ref 6.3–8.7)
PROTHROMBIN TIME: 17.2 SECONDS (ref 11.9–14.6)
RBC # BLD AUTO: 3.53 10*6/MM3 (ref 4.2–5.4)
SODIUM BLD-SCNC: 144 MMOL/L (ref 135–145)
VARIANT LYMPHS NFR BLD MANUAL: 3 % (ref 0–5)
WBC MORPH BLD: NORMAL
WBC NRBC COR # BLD: 7.03 10*3/MM3 (ref 4.8–10.8)

## 2018-05-19 PROCEDURE — 25010000003 CEFAZOLIN PER 500 MG: Performed by: INTERNAL MEDICINE

## 2018-05-19 PROCEDURE — 85007 BL SMEAR W/DIFF WBC COUNT: CPT | Performed by: INTERNAL MEDICINE

## 2018-05-19 PROCEDURE — 87040 BLOOD CULTURE FOR BACTERIA: CPT | Performed by: INTERNAL MEDICINE

## 2018-05-19 PROCEDURE — 80053 COMPREHEN METABOLIC PANEL: CPT | Performed by: INTERNAL MEDICINE

## 2018-05-19 PROCEDURE — 25010000002 ENOXAPARIN PER 10 MG: Performed by: INTERNAL MEDICINE

## 2018-05-19 PROCEDURE — 85027 COMPLETE CBC AUTOMATED: CPT | Performed by: INTERNAL MEDICINE

## 2018-05-19 PROCEDURE — 85610 PROTHROMBIN TIME: CPT | Performed by: FAMILY MEDICINE

## 2018-05-19 RX ORDER — WARFARIN SODIUM 10 MG/1
10 TABLET ORAL
Status: DISCONTINUED | OUTPATIENT
Start: 2018-05-19 | End: 2018-05-20

## 2018-05-19 RX ADMIN — GABAPENTIN 300 MG: 300 CAPSULE ORAL at 21:13

## 2018-05-19 RX ADMIN — CALCITRIOL 0.25 MCG: 0.25 CAPSULE ORAL at 08:30

## 2018-05-19 RX ADMIN — DONEPEZIL HYDROCHLORIDE 5 MG: 5 TABLET, FILM COATED ORAL at 21:13

## 2018-05-19 RX ADMIN — CEFAZOLIN 2 G: 1 INJECTION, POWDER, FOR SOLUTION INTRAMUSCULAR; INTRAVENOUS at 01:11

## 2018-05-19 RX ADMIN — WARFARIN SODIUM 10 MG: 10 TABLET ORAL at 17:23

## 2018-05-19 RX ADMIN — METHIMAZOLE 10 MG: 10 TABLET ORAL at 08:30

## 2018-05-19 RX ADMIN — METOPROLOL TARTRATE 50 MG: 50 TABLET ORAL at 21:13

## 2018-05-19 RX ADMIN — ASPIRIN 81 MG: 81 TABLET ORAL at 08:30

## 2018-05-19 RX ADMIN — ACETAMINOPHEN 650 MG: 325 TABLET, FILM COATED ORAL at 01:20

## 2018-05-19 RX ADMIN — ENOXAPARIN SODIUM 110 MG: 120 INJECTION SUBCUTANEOUS at 14:50

## 2018-05-19 RX ADMIN — CEFAZOLIN 2 G: 1 INJECTION, POWDER, FOR SOLUTION INTRAMUSCULAR; INTRAVENOUS at 08:30

## 2018-05-19 RX ADMIN — METOPROLOL TARTRATE 50 MG: 50 TABLET ORAL at 08:30

## 2018-05-19 RX ADMIN — PANTOPRAZOLE SODIUM 40 MG: 40 TABLET, DELAYED RELEASE ORAL at 08:02

## 2018-05-19 RX ADMIN — CEFAZOLIN 2 G: 1 INJECTION, POWDER, FOR SOLUTION INTRAMUSCULAR; INTRAVENOUS at 17:23

## 2018-05-19 RX ADMIN — DILTIAZEM HYDROCHLORIDE 180 MG: 180 CAPSULE, COATED, EXTENDED RELEASE ORAL at 08:30

## 2018-05-19 RX ADMIN — CHOLECALCIFEROL CAP 125 MCG (5000 UNIT) 5000 UNITS: 125 CAP at 08:30

## 2018-05-20 ENCOUNTER — APPOINTMENT (OUTPATIENT)
Dept: GENERAL RADIOLOGY | Facility: HOSPITAL | Age: 73
End: 2018-05-20

## 2018-05-20 LAB
BACTERIA SPEC AEROBE CULT: NORMAL
INR PPP: 1.26 (ref 0.91–1.09)
PROTHROMBIN TIME: 16.2 SECONDS (ref 11.9–14.6)

## 2018-05-20 PROCEDURE — 87040 BLOOD CULTURE FOR BACTERIA: CPT | Performed by: INTERNAL MEDICINE

## 2018-05-20 PROCEDURE — 25010000002 ENOXAPARIN PER 10 MG: Performed by: INTERNAL MEDICINE

## 2018-05-20 PROCEDURE — 85610 PROTHROMBIN TIME: CPT | Performed by: FAMILY MEDICINE

## 2018-05-20 PROCEDURE — 73560 X-RAY EXAM OF KNEE 1 OR 2: CPT

## 2018-05-20 PROCEDURE — 25010000003 CEFAZOLIN PER 500 MG: Performed by: INTERNAL MEDICINE

## 2018-05-20 RX ORDER — WARFARIN SODIUM 7.5 MG/1
15 TABLET ORAL
Status: DISCONTINUED | OUTPATIENT
Start: 2018-05-20 | End: 2018-05-22 | Stop reason: HOSPADM

## 2018-05-20 RX ADMIN — ASPIRIN 81 MG: 81 TABLET ORAL at 09:00

## 2018-05-20 RX ADMIN — WARFARIN SODIUM 15 MG: 7.5 TABLET ORAL at 18:53

## 2018-05-20 RX ADMIN — CEFAZOLIN 2 G: 1 INJECTION, POWDER, FOR SOLUTION INTRAMUSCULAR; INTRAVENOUS at 00:55

## 2018-05-20 RX ADMIN — PANTOPRAZOLE SODIUM 40 MG: 40 TABLET, DELAYED RELEASE ORAL at 05:48

## 2018-05-20 RX ADMIN — ENOXAPARIN SODIUM 110 MG: 120 INJECTION SUBCUTANEOUS at 18:53

## 2018-05-20 RX ADMIN — DICLOFENAC 2 G: 10 GEL TOPICAL at 18:53

## 2018-05-20 RX ADMIN — DICLOFENAC 2 G: 10 GEL TOPICAL at 14:26

## 2018-05-20 RX ADMIN — METOPROLOL TARTRATE 50 MG: 50 TABLET ORAL at 21:03

## 2018-05-20 RX ADMIN — ENOXAPARIN SODIUM 110 MG: 120 INJECTION SUBCUTANEOUS at 05:47

## 2018-05-20 RX ADMIN — DILTIAZEM HYDROCHLORIDE 180 MG: 180 CAPSULE, COATED, EXTENDED RELEASE ORAL at 09:00

## 2018-05-20 RX ADMIN — GABAPENTIN 300 MG: 300 CAPSULE ORAL at 21:03

## 2018-05-20 RX ADMIN — CALCITRIOL 0.25 MCG: 0.25 CAPSULE ORAL at 09:00

## 2018-05-20 RX ADMIN — CEFAZOLIN 2 G: 1 INJECTION, POWDER, FOR SOLUTION INTRAMUSCULAR; INTRAVENOUS at 18:53

## 2018-05-20 RX ADMIN — CHOLECALCIFEROL CAP 125 MCG (5000 UNIT) 5000 UNITS: 125 CAP at 09:00

## 2018-05-20 RX ADMIN — DONEPEZIL HYDROCHLORIDE 5 MG: 5 TABLET, FILM COATED ORAL at 21:03

## 2018-05-20 RX ADMIN — CEFAZOLIN 2 G: 1 INJECTION, POWDER, FOR SOLUTION INTRAMUSCULAR; INTRAVENOUS at 09:00

## 2018-05-20 RX ADMIN — METHIMAZOLE 10 MG: 10 TABLET ORAL at 09:00

## 2018-05-20 RX ADMIN — METOPROLOL TARTRATE 50 MG: 50 TABLET ORAL at 09:00

## 2018-05-21 LAB
BACTERIA SPEC AEROBE CULT: NORMAL
INR PPP: 1.29 (ref 0.91–1.09)
PROTHROMBIN TIME: 16.5 SECONDS (ref 11.9–14.6)

## 2018-05-21 PROCEDURE — 25010000003 CEFAZOLIN PER 500 MG: Performed by: INTERNAL MEDICINE

## 2018-05-21 PROCEDURE — 97530 THERAPEUTIC ACTIVITIES: CPT

## 2018-05-21 PROCEDURE — 85610 PROTHROMBIN TIME: CPT | Performed by: FAMILY MEDICINE

## 2018-05-21 PROCEDURE — 25010000002 ENOXAPARIN PER 10 MG: Performed by: INTERNAL MEDICINE

## 2018-05-21 PROCEDURE — 97110 THERAPEUTIC EXERCISES: CPT

## 2018-05-21 PROCEDURE — 97116 GAIT TRAINING THERAPY: CPT

## 2018-05-21 RX ADMIN — ACETAMINOPHEN 650 MG: 325 TABLET, FILM COATED ORAL at 05:42

## 2018-05-21 RX ADMIN — METOPROLOL TARTRATE 50 MG: 50 TABLET ORAL at 09:21

## 2018-05-21 RX ADMIN — PANTOPRAZOLE SODIUM 40 MG: 40 TABLET, DELAYED RELEASE ORAL at 05:36

## 2018-05-21 RX ADMIN — ENOXAPARIN SODIUM 110 MG: 120 INJECTION SUBCUTANEOUS at 05:36

## 2018-05-21 RX ADMIN — ENOXAPARIN SODIUM 110 MG: 120 INJECTION SUBCUTANEOUS at 18:21

## 2018-05-21 RX ADMIN — ASPIRIN 81 MG: 81 TABLET ORAL at 09:21

## 2018-05-21 RX ADMIN — METOPROLOL TARTRATE 50 MG: 50 TABLET ORAL at 21:21

## 2018-05-21 RX ADMIN — DICLOFENAC 2 G: 10 GEL TOPICAL at 12:20

## 2018-05-21 RX ADMIN — DILTIAZEM HYDROCHLORIDE 180 MG: 180 CAPSULE, COATED, EXTENDED RELEASE ORAL at 09:21

## 2018-05-21 RX ADMIN — GABAPENTIN 300 MG: 300 CAPSULE ORAL at 21:21

## 2018-05-21 RX ADMIN — METHIMAZOLE 10 MG: 10 TABLET ORAL at 09:21

## 2018-05-21 RX ADMIN — CEFAZOLIN 2 G: 1 INJECTION, POWDER, FOR SOLUTION INTRAMUSCULAR; INTRAVENOUS at 18:21

## 2018-05-21 RX ADMIN — CEFAZOLIN 2 G: 1 INJECTION, POWDER, FOR SOLUTION INTRAMUSCULAR; INTRAVENOUS at 01:04

## 2018-05-21 RX ADMIN — CHOLECALCIFEROL CAP 125 MCG (5000 UNIT) 5000 UNITS: 125 CAP at 09:21

## 2018-05-21 RX ADMIN — CEFAZOLIN 2 G: 1 INJECTION, POWDER, FOR SOLUTION INTRAMUSCULAR; INTRAVENOUS at 09:20

## 2018-05-21 RX ADMIN — DICLOFENAC 2 G: 10 GEL TOPICAL at 18:21

## 2018-05-21 RX ADMIN — DICLOFENAC 2 G: 10 GEL TOPICAL at 09:20

## 2018-05-21 RX ADMIN — DONEPEZIL HYDROCHLORIDE 5 MG: 5 TABLET, FILM COATED ORAL at 21:21

## 2018-05-21 RX ADMIN — CALCITRIOL 0.25 MCG: 0.25 CAPSULE ORAL at 09:21

## 2018-05-21 RX ADMIN — WARFARIN SODIUM 15 MG: 7.5 TABLET ORAL at 18:20

## 2018-05-22 ENCOUNTER — TELEPHONE (OUTPATIENT)
Dept: INTERNAL MEDICINE CLINIC | Age: 73
End: 2018-05-22

## 2018-05-22 VITALS
SYSTOLIC BLOOD PRESSURE: 130 MMHG | DIASTOLIC BLOOD PRESSURE: 58 MMHG | BODY MASS INDEX: 41.31 KG/M2 | HEART RATE: 76 BPM | TEMPERATURE: 97.9 F | WEIGHT: 263.2 LBS | HEIGHT: 67 IN | OXYGEN SATURATION: 96 % | RESPIRATION RATE: 18 BRPM

## 2018-05-22 LAB
INR PPP: 1.52 (ref 0.91–1.09)
PROTHROMBIN TIME: 18.8 SECONDS (ref 11.9–14.6)

## 2018-05-22 PROCEDURE — 25010000003 CEFAZOLIN PER 500 MG: Performed by: INTERNAL MEDICINE

## 2018-05-22 PROCEDURE — 85610 PROTHROMBIN TIME: CPT | Performed by: FAMILY MEDICINE

## 2018-05-22 PROCEDURE — 97116 GAIT TRAINING THERAPY: CPT

## 2018-05-22 PROCEDURE — 25010000002 ENOXAPARIN PER 10 MG: Performed by: INTERNAL MEDICINE

## 2018-05-22 RX ORDER — WARFARIN SODIUM 7.5 MG/1
TABLET ORAL
Qty: 60 TABLET | Refills: 0 | Status: ON HOLD | OUTPATIENT
Start: 2018-05-22 | End: 2020-09-26

## 2018-05-22 RX ADMIN — CHOLECALCIFEROL CAP 125 MCG (5000 UNIT) 5000 UNITS: 125 CAP at 08:58

## 2018-05-22 RX ADMIN — MUPIROCIN: 20 OINTMENT TOPICAL at 08:59

## 2018-05-22 RX ADMIN — ASPIRIN 81 MG: 81 TABLET ORAL at 08:58

## 2018-05-22 RX ADMIN — CEFAZOLIN 2 G: 1 INJECTION, POWDER, FOR SOLUTION INTRAMUSCULAR; INTRAVENOUS at 00:09

## 2018-05-22 RX ADMIN — ACETAMINOPHEN 650 MG: 325 TABLET, FILM COATED ORAL at 06:12

## 2018-05-22 RX ADMIN — MICONAZOLE NITRATE: 20.6 POWDER TOPICAL at 08:59

## 2018-05-22 RX ADMIN — ENOXAPARIN SODIUM 110 MG: 120 INJECTION SUBCUTANEOUS at 06:10

## 2018-05-22 RX ADMIN — METHIMAZOLE 10 MG: 10 TABLET ORAL at 08:58

## 2018-05-22 RX ADMIN — CEFAZOLIN 2 G: 1 INJECTION, POWDER, FOR SOLUTION INTRAMUSCULAR; INTRAVENOUS at 08:54

## 2018-05-22 RX ADMIN — METOPROLOL TARTRATE 50 MG: 50 TABLET ORAL at 08:58

## 2018-05-22 RX ADMIN — CALCITRIOL 0.25 MCG: 0.25 CAPSULE ORAL at 08:58

## 2018-05-22 RX ADMIN — CEFAZOLIN 2 G: 1 INJECTION, POWDER, FOR SOLUTION INTRAMUSCULAR; INTRAVENOUS at 16:24

## 2018-05-22 RX ADMIN — PANTOPRAZOLE SODIUM 40 MG: 40 TABLET, DELAYED RELEASE ORAL at 06:10

## 2018-05-22 RX ADMIN — MICONAZOLE NITRATE: 20 CREAM TOPICAL at 08:58

## 2018-05-22 RX ADMIN — DILTIAZEM HYDROCHLORIDE 180 MG: 180 CAPSULE, COATED, EXTENDED RELEASE ORAL at 08:58

## 2018-05-23 ENCOUNTER — EPISODE CHANGES (OUTPATIENT)
Dept: CASE MANAGEMENT | Facility: OTHER | Age: 73
End: 2018-05-23

## 2018-05-23 ENCOUNTER — TELEPHONE (OUTPATIENT)
Dept: INTERNAL MEDICINE | Age: 73
End: 2018-05-23

## 2018-05-24 LAB — BACTERIA SPEC AEROBE CULT: NORMAL

## 2018-05-25 LAB — BACTERIA SPEC AEROBE CULT: NORMAL

## 2018-05-29 ENCOUNTER — TELEPHONE (OUTPATIENT)
Dept: INTERNAL MEDICINE | Age: 73
End: 2018-05-29

## 2018-06-04 ENCOUNTER — OFFICE VISIT (OUTPATIENT)
Dept: INTERNAL MEDICINE | Age: 73
End: 2018-06-04
Payer: MEDICARE

## 2018-06-04 ENCOUNTER — TELEPHONE (OUTPATIENT)
Dept: INTERNAL MEDICINE CLINIC | Age: 73
End: 2018-06-04

## 2018-06-04 VITALS
BODY MASS INDEX: 38.04 KG/M2 | DIASTOLIC BLOOD PRESSURE: 74 MMHG | SYSTOLIC BLOOD PRESSURE: 124 MMHG | OXYGEN SATURATION: 97 % | TEMPERATURE: 98.2 F | WEIGHT: 251 LBS | RESPIRATION RATE: 16 BRPM | HEART RATE: 77 BPM | HEIGHT: 68 IN

## 2018-06-04 DIAGNOSIS — Z79.01 LONG TERM CURRENT USE OF ANTICOAGULANT: ICD-10-CM

## 2018-06-04 DIAGNOSIS — Z85.51 HISTORY OF BLADDER CANCER: ICD-10-CM

## 2018-06-04 DIAGNOSIS — Z98.890 H/O URETEROSTOMY: ICD-10-CM

## 2018-06-04 DIAGNOSIS — A41.01 MSSA (METHICILLIN SUSCEPTIBLE STAPHYLOCOCCUS AUREUS) SEPTICEMIA (HCC): Primary | ICD-10-CM

## 2018-06-04 DIAGNOSIS — I87.2 CHRONIC VENOUS INSUFFICIENCY: ICD-10-CM

## 2018-06-04 DIAGNOSIS — I10 ESSENTIAL (PRIMARY) HYPERTENSION: ICD-10-CM

## 2018-06-04 PROCEDURE — 1111F DSCHRG MED/CURRENT MED MERGE: CPT | Performed by: NURSE PRACTITIONER

## 2018-06-04 PROCEDURE — 99401 PREV MED CNSL INDIV APPRX 15: CPT | Performed by: NURSE PRACTITIONER

## 2018-06-04 PROCEDURE — 99495 TRANSJ CARE MGMT MOD F2F 14D: CPT | Performed by: NURSE PRACTITIONER

## 2018-06-04 RX ORDER — CEPHALEXIN 500 MG/1
CAPSULE ORAL
Refills: 0 | COMMUNITY
Start: 2018-06-01 | End: 2019-03-01 | Stop reason: ALTCHOICE

## 2018-06-04 RX ORDER — MICONAZOLE NITRATE 20.6 MG/G
POWDER TOPICAL
Refills: 0 | COMMUNITY
Start: 2018-05-22 | End: 2021-10-18

## 2018-06-04 RX ORDER — WARFARIN SODIUM 7.5 MG/1
TABLET ORAL
Refills: 0 | COMMUNITY
Start: 2018-05-22 | End: 2018-08-23 | Stop reason: SDUPTHER

## 2018-06-04 RX ORDER — ESCITALOPRAM OXALATE 10 MG/1
10 TABLET ORAL DAILY
Qty: 30 TABLET | Refills: 3 | Status: SHIPPED | OUTPATIENT
Start: 2018-06-04 | End: 2018-10-16 | Stop reason: SDUPTHER

## 2018-06-04 ASSESSMENT — ENCOUNTER SYMPTOMS
SHORTNESS OF BREATH: 0
WHEEZING: 0
CONSTIPATION: 0
COLOR CHANGE: 0
DIARRHEA: 0
TROUBLE SWALLOWING: 0
ABDOMINAL DISTENTION: 0
ABDOMINAL PAIN: 0
STRIDOR: 0
COUGH: 0
EYE ITCHING: 0
VOMITING: 0
SORE THROAT: 0
NAUSEA: 0
CHOKING: 0
BLOOD IN STOOL: 0
EYE DISCHARGE: 0

## 2018-06-05 LAB
ALBUMIN SERPL-MCNC: 3.3 G/DL (ref 3.5–5.2)
ALP BLD-CCNC: 83 U/L (ref 35–104)
ALT SERPL-CCNC: <5 U/L (ref 5–33)
ANION GAP SERPL CALCULATED.3IONS-SCNC: 14 MMOL/L (ref 7–19)
AST SERPL-CCNC: 12 U/L (ref 5–32)
BASOPHILS ABSOLUTE: 0 K/UL (ref 0–0.2)
BASOPHILS RELATIVE PERCENT: 0.5 % (ref 0–1)
BILIRUB SERPL-MCNC: 0.3 MG/DL (ref 0.2–1.2)
BUN BLDV-MCNC: 29 MG/DL (ref 8–23)
CALCIUM SERPL-MCNC: 9.1 MG/DL (ref 8.8–10.2)
CHLORIDE BLD-SCNC: 101 MMOL/L (ref 98–111)
CO2: 26 MMOL/L (ref 22–29)
CREAT SERPL-MCNC: 1.4 MG/DL (ref 0.5–0.9)
EOSINOPHILS ABSOLUTE: 0.1 K/UL (ref 0–0.6)
EOSINOPHILS RELATIVE PERCENT: 1.9 % (ref 0–5)
GFR NON-AFRICAN AMERICAN: 37
GLUCOSE BLD-MCNC: 108 MG/DL (ref 74–109)
HCT VFR BLD CALC: 36 % (ref 37–47)
HEMOGLOBIN: 10.8 G/DL (ref 12–16)
IRON: 51 UG/DL (ref 37–145)
LYMPHOCYTES ABSOLUTE: 0.9 K/UL (ref 1.1–4.5)
LYMPHOCYTES RELATIVE PERCENT: 14.8 % (ref 20–40)
MCH RBC QN AUTO: 30.9 PG (ref 27–31)
MCHC RBC AUTO-ENTMCNC: 30 G/DL (ref 33–37)
MCV RBC AUTO: 102.9 FL (ref 81–99)
MONOCYTES ABSOLUTE: 0.7 K/UL (ref 0–0.9)
MONOCYTES RELATIVE PERCENT: 11.4 % (ref 0–10)
NEUTROPHILS ABSOLUTE: 4.4 K/UL (ref 1.5–7.5)
NEUTROPHILS RELATIVE PERCENT: 71.1 % (ref 50–65)
PDW BLD-RTO: 16.1 % (ref 11.5–14.5)
PLATELET # BLD: 161 K/UL (ref 130–400)
PMV BLD AUTO: 9.4 FL (ref 9.4–12.3)
POTASSIUM SERPL-SCNC: 3.6 MMOL/L (ref 3.5–5)
RBC # BLD: 3.5 M/UL (ref 4.2–5.4)
SODIUM BLD-SCNC: 141 MMOL/L (ref 136–145)
TOTAL PROTEIN: 7.1 G/DL (ref 6.6–8.7)
WBC # BLD: 6.2 K/UL (ref 4.8–10.8)

## 2018-07-03 LAB — INR BLD: 2.7

## 2018-07-04 ENCOUNTER — TRANSCRIBE ORDERS (OUTPATIENT)
Dept: ADMINISTRATIVE | Facility: HOSPITAL | Age: 73
End: 2018-07-04

## 2018-07-06 ENCOUNTER — TRANSCRIBE ORDERS (OUTPATIENT)
Dept: ADMINISTRATIVE | Facility: HOSPITAL | Age: 73
End: 2018-07-06

## 2018-07-06 ENCOUNTER — APPOINTMENT (OUTPATIENT)
Dept: LAB | Facility: HOSPITAL | Age: 73
End: 2018-07-06
Attending: INTERNAL MEDICINE

## 2018-07-06 ENCOUNTER — ANTI-COAG VISIT (OUTPATIENT)
Dept: INTERNAL MEDICINE | Age: 73
End: 2018-07-06

## 2018-07-06 DIAGNOSIS — I33.0 ACUTE AND SUBACUTE BACTERIAL ENDOCARDITIS: ICD-10-CM

## 2018-07-06 DIAGNOSIS — B95.62 CELLULITIS DUE TO MRSA: Primary | ICD-10-CM

## 2018-07-06 DIAGNOSIS — L03.90 CELLULITIS DUE TO MRSA: Primary | ICD-10-CM

## 2018-07-06 DIAGNOSIS — R78.81 BACTEREMIA: ICD-10-CM

## 2018-07-06 PROCEDURE — 87040 BLOOD CULTURE FOR BACTERIA: CPT | Performed by: INTERNAL MEDICINE

## 2018-07-11 LAB
BACTERIA SPEC AEROBE CULT: NORMAL
BACTERIA SPEC AEROBE CULT: NORMAL

## 2018-07-13 ENCOUNTER — OFFICE VISIT (OUTPATIENT)
Dept: CARDIOLOGY | Facility: CLINIC | Age: 73
End: 2018-07-13

## 2018-07-13 ENCOUNTER — TELEPHONE (OUTPATIENT)
Dept: INTERNAL MEDICINE | Age: 73
End: 2018-07-13

## 2018-07-13 VITALS
HEIGHT: 68 IN | SYSTOLIC BLOOD PRESSURE: 132 MMHG | HEART RATE: 68 BPM | OXYGEN SATURATION: 98 % | WEIGHT: 253 LBS | BODY MASS INDEX: 38.34 KG/M2 | DIASTOLIC BLOOD PRESSURE: 68 MMHG

## 2018-07-13 DIAGNOSIS — Z79.01 CHRONIC ANTICOAGULATION: ICD-10-CM

## 2018-07-13 DIAGNOSIS — G47.33 OSA (OBSTRUCTIVE SLEEP APNEA): ICD-10-CM

## 2018-07-13 DIAGNOSIS — IMO0001 CLASS 2 OBESITY DUE TO EXCESS CALORIES WITH SERIOUS COMORBIDITY AND BODY MASS INDEX (BMI) OF 38.0 TO 38.9 IN ADULT: ICD-10-CM

## 2018-07-13 DIAGNOSIS — E78.2 MIXED HYPERLIPIDEMIA: ICD-10-CM

## 2018-07-13 DIAGNOSIS — I50.32 CHRONIC DIASTOLIC HEART FAILURE (HCC): Primary | ICD-10-CM

## 2018-07-13 DIAGNOSIS — I10 ESSENTIAL HYPERTENSION: ICD-10-CM

## 2018-07-13 DIAGNOSIS — I48.20 CHRONIC ATRIAL FIBRILLATION (HCC): ICD-10-CM

## 2018-07-13 DIAGNOSIS — Z86.718 HISTORY OF DVT (DEEP VEIN THROMBOSIS): ICD-10-CM

## 2018-07-13 DIAGNOSIS — I49.5 SICK SINUS SYNDROME (HCC): ICD-10-CM

## 2018-07-13 DIAGNOSIS — Z95.2 S/P TAVR (TRANSCATHETER AORTIC VALVE REPLACEMENT): ICD-10-CM

## 2018-07-13 DIAGNOSIS — Z86.79 HISTORY OF AORTIC VALVULAR STENOSIS: ICD-10-CM

## 2018-07-13 PROBLEM — I50.33 ACUTE ON CHRONIC DIASTOLIC HEART FAILURE: Status: RESOLVED | Noted: 2017-11-26 | Resolved: 2018-07-13

## 2018-07-13 PROBLEM — I50.9 ACUTE ON CHRONIC CONGESTIVE HEART FAILURE (HCC): Status: RESOLVED | Noted: 2017-10-09 | Resolved: 2018-07-13

## 2018-07-13 PROCEDURE — 99214 OFFICE O/P EST MOD 30 MIN: CPT | Performed by: NURSE PRACTITIONER

## 2018-07-13 RX ORDER — CEFDINIR 300 MG/1
300 CAPSULE ORAL 2 TIMES DAILY
Qty: 14 CAPSULE | Refills: 0 | Status: SHIPPED | OUTPATIENT
Start: 2018-07-13 | End: 2018-07-20

## 2018-07-13 RX ORDER — ESCITALOPRAM OXALATE 10 MG/1
10 TABLET ORAL DAILY
COMMUNITY
End: 2020-09-21

## 2018-07-13 NOTE — PATIENT INSTRUCTIONS
Obesity, Adult  Obesity is the condition of having too much total body fat. Being overweight or obese means that your weight is greater than what is considered healthy for your body size. Obesity is determined by a measurement called BMI. BMI is an estimate of body fat and is calculated from height and weight. For adults, a BMI of 30 or higher is considered obese.  Obesity can eventually lead to other health concerns and major illnesses, including:  · Stroke.  · Coronary artery disease (CAD).  · Type 2 diabetes.  · Some types of cancer, including cancers of the colon, breast, uterus, and gallbladder.  · Osteoarthritis.  · High blood pressure (hypertension).  · High cholesterol.  · Sleep apnea.  · Gallbladder stones.  · Infertility problems.    What are the causes?  The main cause of obesity is taking in (consuming) more calories than your body uses for energy. Other factors that contribute to this condition may include:  · Being born with genes that make you more likely to become obese.  · Having a medical condition that causes obesity. These conditions include:  ? Hypothyroidism.  ? Polycystic ovarian syndrome (PCOS).  ? Binge-eating disorder.  ? Cushing syndrome.  · Taking certain medicines, such as steroids, antidepressants, and seizure medicines.  · Not being physically active (sedentary lifestyle).  · Living where there are limited places to exercise safely or buy healthy foods.  · Not getting enough sleep.    What increases the risk?  The following factors may increase your risk of this condition:  · Having a family history of obesity.  · Being a woman of -American descent.  · Being a man of  descent.    What are the signs or symptoms?  Having excessive body fat is the main symptom of this condition.  How is this diagnosed?  This condition may be diagnosed based on:  · Your symptoms.  · Your medical history.  · A physical exam. Your health care provider may measure:  ? Your BMI. If you are an  adult with a BMI between 25 and less than 30, you are considered overweight. If you are an adult with a BMI of 30 or higher, you are considered obese.  ? The distances around your hips and your waist (circumferences). These may be compared to each other to help diagnose your condition.  ? Your skinfold thickness. Your health care provider may gently pinch a fold of your skin and measure it.    How is this treated?  Treatment for this condition often includes changing your lifestyle. Treatment may include some or all of the following:  · Dietary changes. Work with your health care provider and a dietitian to set a weight-loss goal that is healthy and reasonable for you. Dietary changes may include eating:  ? Smaller portions. A portion size is the amount of a particular food that is healthy for you to eat at one time. This varies from person to person.  ? Low-calorie or low-fat options.  ? More whole grains, fruits, and vegetables.  · Regular physical activity. This may include aerobic activity (cardio) and strength training.  · Medicine to help you lose weight. Your health care provider may prescribe medicine if you are unable to lose 1 pound a week after 6 weeks of eating more healthily and doing more physical activity.  · Surgery. Surgical options may include gastric banding and gastric bypass. Surgery may be done if:  ? Other treatments have not helped to improve your condition.  ? You have a BMI of 40 or higher.  ? You have life-threatening health problems related to obesity.    Follow these instructions at home:    Eating and drinking    · Follow recommendations from your health care provider about what you eat and drink. Your health care provider may advise you to:  ? Limit fast foods, sweets, and processed snack foods.  ? Choose low-fat options, such as low-fat milk instead of whole milk.  ? Eat 5 or more servings of fruits or vegetables every day.  ? Eat at home more often. This gives you more control over  what you eat.  ? Choose healthy foods when you eat out.  ? Learn what a healthy portion size is.  ? Keep low-fat snacks on hand.  ? Avoid sugary drinks, such as soda, fruit juice, iced tea sweetened with sugar, and flavored milk.  ? Eat a healthy breakfast.  · Drink enough water to keep your urine clear or pale yellow.  · Do not go without eating for long periods of time (do not fast) or follow a fad diet. Fasting and fad diets can be unhealthy and even dangerous.  Physical Activity  · Exercise regularly, as told by your health care provider. Ask your health care provider what types of exercise are safe for you and how often you should exercise.  · Warm up and stretch before being active.  · Cool down and stretch after being active.  · Rest between periods of activity.  Lifestyle  · Limit the time that you spend in front of your TV, computer, or video game system.  · Find ways to reward yourself that do not involve food.  · Limit alcohol intake to no more than 1 drink a day for nonpregnant women and 2 drinks a day for men. One drink equals 12 oz of beer, 5 oz of wine, or 1½ oz of hard liquor.  General instructions  · Keep a weight loss journal to keep track of the food you eat and how much you exercise you get.  · Take over-the-counter and prescription medicines only as told by your health care provider.  · Take vitamins and supplements only as told by your health care provider.  · Consider joining a support group. Your health care provider may be able to recommend a support group.  · Keep all follow-up visits as told by your health care provider. This is important.  Contact a health care provider if:  · You are unable to meet your weight loss goal after 6 weeks of dietary and lifestyle changes.  This information is not intended to replace advice given to you by your health care provider. Make sure you discuss any questions you have with your health care provider.  Document Released: 01/25/2006 Document Revised:  "05/22/2017 Document Reviewed: 10/05/2016  [employer] Patient Education © 2018 [employer]    Heart-Healthy Eating Plan  Heart-healthy meal planning includes:  · Limiting unhealthy fats.  · Increasing healthy fats.  · Making other small dietary changes.    You may need to talk with your doctor or a diet specialist (dietitian) to create an eating plan that is right for you.  What types of fat should I choose?  · Choose healthy fats. These include olive oil and canola oil, flaxseeds, walnuts, almonds, and seeds.  · Eat more omega-3 fats. These include salmon, mackerel, sardines, tuna, flaxseed oil, and ground flaxseeds. Try to eat fish at least twice each week.  · Limit saturated fats.  ? Saturated fats are often found in animal products, such as meats, butter, and cream.  ? Plant sources of saturated fats include palm oil, palm kernel oil, and coconut oil.  · Avoid foods with partially hydrogenated oils in them. These include stick margarine, some tub margarines, cookies, crackers, and other baked goods. These contain trans fats.  What general guidelines do I need to follow?  · Check food labels carefully. Identify foods with trans fats or high amounts of saturated fat.  · Fill one half of your plate with vegetables and green salads. Eat 4-5 servings of vegetables per day. A serving of vegetables is:  ? 1 cup of raw leafy vegetables.  ? ½ cup of raw or cooked cut-up vegetables.  ? ½ cup of vegetable juice.  · Fill one fourth of your plate with whole grains. Look for the word \"whole\" as the first word in the ingredient list.  · Fill one fourth of your plate with lean protein foods.  · Eat 4-5 servings of fruit per day. A serving of fruit is:  ? One medium whole fruit.  ? ¼ cup of dried fruit.  ? ½ cup of fresh, frozen, or canned fruit.  ? ½ cup of 100% fruit juice.  · Eat more foods that contain soluble fiber. These include apples, broccoli, carrots, beans, peas, and barley. Try to get 20-30 g of fiber per " day.  · Eat more home-cooked food. Eat less restaurant, buffet, and fast food.  · Limit or avoid alcohol.  · Limit foods high in starch and sugar.  · Avoid fried foods.  · Avoid frying your food. Try baking, boiling, grilling, or broiling it instead. You can also reduce fat by:  ? Removing the skin from poultry.  ? Removing all visible fats from meats.  ? Skimming the fat off of stews, soups, and gravies before serving them.  ? Steaming vegetables in water or broth.  · Lose weight if you are overweight.  · Eat 4-5 servings of nuts, legumes, and seeds per week:  ? One serving of dried beans or legumes equals ½ cup after being cooked.  ? One serving of nuts equals 1½ ounces.  ? One serving of seeds equals ½ ounce or one tablespoon.  · You may need to keep track of how much salt or sodium you eat. This is especially true if you have high blood pressure. Talk with your doctor or dietitian to get more information.  What foods can I eat?  Grains  Breads, including Indonesian, white, duncan, wheat, raisin, rye, oatmeal, and Italian. Tortillas that are neither fried nor made with lard or trans fat. Low-fat rolls, including hotdog and hamburger buns and English muffins. Biscuits. Muffins. Waffles. Pancakes. Light popcorn. Whole-grain cereals. Flatbread. Downey toast. Pretzels. Breadsticks. Rusks. Low-fat snacks. Low-fat crackers, including oyster, saltine, matzo, jose, animal, and rye. Rice and pasta, including brown rice and pastas that are made with whole wheat.  Vegetables  All vegetables.  Fruits  All fruits, but limit coconut.  Meats and Other Protein Sources  Lean, well-trimmed beef, veal, pork, and lamb. Chicken and turkey without skin. All fish and shellfish. Wild duck, rabbit, pheasant, and venison. Egg whites or low-cholesterol egg substitutes. Dried beans, peas, lentils, and tofu. Seeds and most nuts.  Dairy  Low-fat or nonfat cheeses, including ricotta, string, and mozzarella. Skim or 1% milk that is liquid, powdered,  or evaporated. Buttermilk that is made with low-fat milk. Nonfat or low-fat yogurt.  Beverages  Mineral water. Diet carbonated beverages.  Sweets and Desserts  Sherbets and fruit ices. Honey, jam, marmalade, jelly, and syrups. Meringues and gelatins. Pure sugar candy, such as hard candy, jelly beans, gumdrops, mints, marshmallows, and small amounts of dark chocolate. Leonel food cake.  Eat all sweets and desserts in moderation.  Fats and Oils  Nonhydrogenated (trans-free) margarines. Vegetable oils, including soybean, sesame, sunflower, olive, peanut, safflower, corn, canola, and cottonseed. Salad dressings or mayonnaise made with a vegetable oil. Limit added fats and oils that you use for cooking, baking, salads, and as spreads.  Other  Cocoa powder. Coffee and tea. All seasonings and condiments.  The items listed above may not be a complete list of recommended foods or beverages. Contact your dietitian for more options.  What foods are not recommended?  Grains  Breads that are made with saturated or trans fats, oils, or whole milk. Croissants. Butter rolls. Cheese breads. Sweet rolls. Donuts. Buttered popcorn. Chow mein noodles. High-fat crackers, such as cheese or butter crackers.  Meats and Other Protein Sources  Fatty meats, such as hotdogs, short ribs, sausage, spareribs, anderson, rib eye roast or steak, and mutton. High-fat deli meats, such as salami and bologna. Caviar. Domestic duck and goose. Organ meats, such as kidney, liver, sweetbreads, and heart.  Dairy  Cream, sour cream, cream cheese, and creamed cottage cheese. Whole-milk cheeses, including blue (geoff), [place], Brie, Tevin, American, Havarti, Swiss, cheddar, Camembert, and Alpine. Whole or 2% milk that is liquid, evaporated, or condensed. Whole buttermilk. Cream sauce or high-fat cheese sauce. Yogurt that is made from whole milk.  Beverages  Regular sodas and juice drinks with added sugar.  Sweets and Desserts  Frosting. Pudding. Cookies.  Cakes other than betty food cake. Candy that has milk chocolate or white chocolate, hydrogenated fat, butter, coconut, or unknown ingredients. Buttered syrups. Full-fat ice cream or ice cream drinks.  Fats and Oils  Gravy that has suet, meat fat, or shortening. Cocoa butter, hydrogenated oils, palm oil, coconut oil, palm kernel oil. These can often be found in baked products, candy, fried foods, nondairy creamers, and whipped toppings. Solid fats and shortenings, including anderson fat, salt pork, lard, and butter. Nondairy cream substitutes, such as coffee creamers and sour cream substitutes. Salad dressings that are made of unknown oils, cheese, or sour cream.  The items listed above may not be a complete list of foods and beverages to avoid. Contact your dietitian for more information.  This information is not intended to replace advice given to you by your health care provider. Make sure you discuss any questions you have with your health care provider.  Document Released: 06/18/2013 Document Revised: 05/25/2017 Document Reviewed: 06/11/2015  [employer] Patient Education © 2018 [employer]    Exercising to Lose Weight  Exercising can help you to lose weight. In order to lose weight through exercise, you need to do vigorous-intensity exercise. You can tell that you are exercising with vigorous intensity if you are breathing very hard and fast and cannot hold a conversation while exercising.  Moderate-intensity exercise helps to maintain your current weight. You can tell that you are exercising at a moderate level if you have a higher heart rate and faster breathing, but you are still able to hold a conversation.  How often should I exercise?  Choose an activity that you enjoy and set realistic goals. Your health care provider can help you to make an activity plan that works for you. Exercise regularly as directed by your health care provider. This may include:  · Doing resistance training twice each  week, such as:  ? Push-ups.  ? Sit-ups.  ? Lifting weights.  ? Using resistance bands.  · Doing a given intensity of exercise for a given amount of time. Choose from these options:  ? 150 minutes of moderate-intensity exercise every week.  ? 75 minutes of vigorous-intensity exercise every week.  ? A mix of moderate-intensity and vigorous-intensity exercise every week.    Children, pregnant women, people who are out of shape, people who are overweight, and older adults may need to consult a health care provider for individual recommendations. If you have any sort of medical condition, be sure to consult your health care provider before starting a new exercise program.  What are some activities that can help me to lose weight?  · Walking at a rate of at least 4.5 miles an hour.  · Jogging or running at a rate of 5 miles per hour.  · Biking at a rate of at least 10 miles per hour.  · Lap swimming.  · Roller-skating or in-line skating.  · Cross-country skiing.  · Vigorous competitive sports, such as football, basketball, and soccer.  · Jumping rope.  · Aerobic dancing.  How can I be more active in my day-to-day activities?  · Use the stairs instead of the elevator.  · Take a walk during your lunch break.  · If you drive, park your car farther away from work or school.  · If you take public transportation, get off one stop early and walk the rest of the way.  · Make all of your phone calls while standing up and walking around.  · Get up, stretch, and walk around every 30 minutes throughout the day.  What guidelines should I follow while exercising?  · Do not exercise so much that you hurt yourself, feel dizzy, or get very short of breath.  · Consult your health care provider prior to starting a new exercise program.  · Wear comfortable clothes and shoes with good support.  · Drink plenty of water while you exercise to prevent dehydration or heat stroke. Body water is lost during exercise and must be replaced.  · Work out  until you breathe faster and your heart beats faster.  This information is not intended to replace advice given to you by your health care provider. Make sure you discuss any questions you have with your health care provider.  Document Released: 01/20/2012 Document Revised: 05/25/2017 Document Reviewed: 05/21/2015  [employer] Patient Education © 2018 [employer]

## 2018-07-13 NOTE — PROGRESS NOTES
Subjective:     Encounter Date:07/13/2018      Patient ID: Jarvis Morgan is a 73 y.o. female. She presents today for three month follow up of chronic diastolic congestive heart failure. She has a history of severe aortic valve stenosis s/p TAVR, chronic atrial fibrillation on chronic anticoagulation, hypertension, sick sinus syndrome, deep venous thrombus, obstructive sleep apnea on CPAP and obesity. She complains of increased fatigue, cough and congestion. She is scheduled to see her PCP this afternoon for these symptoms. She denies chest pain, shortness of breath, palpitations, dizziness, syncope, orthopnea, PND or decreased stamina. She reports chronic bilateral lower extremity edema, left worse than right related to chronic DVT. She denies any signs of bleeding. She reports blood pressure has been well controlled. She was recently discharged from the hospital for sepsis and family member present for visit reports negative blood culture results called yesterday.     Chief Complaint:  Congestive Heart Failure   Presents for follow-up visit. Associated symptoms include edema and fatigue. Pertinent negatives include no abdominal pain, chest pain, chest pressure, claudication, muscle weakness, near-syncope, nocturia, orthopnea, palpitations, paroxysmal nocturnal dyspnea, shortness of breath or unexpected weight change. The symptoms have been stable. Compliance with total regimen is 51-75%. Compliance problems include adherence to diet.  Compliance with diet is 26-50%. Compliance with exercise is 26-50%. Compliance with medications is %.   Atrial Fibrillation   Presents for follow-up visit. Symptoms are negative for an AICD problem, bradycardia, chest pain, dizziness, hemodynamic instability, hypertension, hypotension, pacemaker problem, palpitations, shortness of breath, syncope, tachycardia and weakness. The symptoms have been stable. Past medical history includes atrial fibrillation, CHF and  hyperlipidemia.   Hypertension   This is a chronic problem. The current episode started more than 1 year ago. The problem is controlled. Associated symptoms include malaise/fatigue. Pertinent negatives include no anxiety, blurred vision, chest pain, headaches, neck pain, orthopnea, palpitations, peripheral edema, PND, shortness of breath or sweats. Risk factors for coronary artery disease include obesity, post-menopausal state, sedentary lifestyle and dyslipidemia. Current antihypertension treatment includes calcium channel blockers, beta blockers and diuretics. The current treatment provides significant improvement. Hypertensive end-organ damage includes heart failure.   Hyperlipidemia   This is a chronic problem. The current episode started more than 1 year ago. Pertinent negatives include no chest pain or shortness of breath. She is currently on no antihyperlipidemic treatment. Risk factors for coronary artery disease include hypertension, obesity, dyslipidemia and post-menopausal.       The following portions of the patient's history were reviewed and updated as appropriate: allergies, current medications, past family history, past medical history, past social history, past surgical history and problem list.     Allergies   Allergen Reactions   • Ciprofloxacin Itching   • Codeine Itching and GI Intolerance   • Definity [Perflutren Lipid Microsphere] Other (See Comments)     Back pain   • Tetanus Toxoids Itching     Itching around site   • Tizanidine Hcl Itching   • Other Itching     Cloth bandaids , causes redness of skin       Current Outpatient Prescriptions:   •  acetaminophen (TYLENOL) 325 MG tablet, Take 2 tablets by mouth Every 4 (Four) Hours As Needed for Mild Pain  or Fever., Disp: , Rfl:   •  aspirin 81 MG EC tablet, Take 81 mg by mouth Daily., Disp: , Rfl:   •  calcitriol (ROCALTROL) 0.25 MCG capsule, Take 1 capsule by mouth Daily., Disp: 30 capsule, Rfl: 0  •  cholecalciferol 5000 units tablet, Take  5,000 Units by mouth Daily. (Patient taking differently: Take 50,000 Units by mouth 1 (One) Time Per Week.), Disp: , Rfl:   •  diltiaZEM CD (CARDIZEM CD) 180 MG 24 hr capsule, Take 1 capsule by mouth Daily., Disp: 30 capsule, Rfl: 0  •  donepezil (ARICEPT) 5 MG tablet, Take 1 tablet by mouth Every Night., Disp: 30 tablet, Rfl: 0  •  escitalopram (LEXAPRO) 10 MG tablet, Take 10 mg by mouth Daily., Disp: , Rfl:   •  furosemide (LASIX) 40 MG tablet, Take 1 tablet by mouth Daily., Disp: 30 tablet, Rfl: 0  •  gabapentin (NEURONTIN) 300 MG capsule, Take 1 capsule by mouth Every Night., Disp: 10 capsule, Rfl: 0  •  methIMAzole (TAPAZOLE) 10 MG tablet, Take 10 mg by mouth Daily., Disp: , Rfl:   •  metoprolol tartrate (LOPRESSOR) 50 MG tablet, Take 1 tablet by mouth Every 12 (Twelve) Hours., Disp: 30 tablet, Rfl: 0  •  miconazole (MICOTIN) 2 % cream, Apply  topically Every 12 (Twelve) Hours., Disp: 60 g, Rfl: 0  •  miconazole (MICOTIN) 2 % powder, Apply  topically Every 12 (Twelve) Hours., Disp: 30 g, Rfl: 0  •  mupirocin (BACTROBAN) 2 % ointment, Apply  topically Daily., Disp: 22 g, Rfl: 0  •  O2 (OXYGEN), Inhale 2 L/min 1 (One) Time. CPAP, Disp: , Rfl:   •  pantoprazole (PROTONIX) 40 MG EC tablet, Take 1 tablet by mouth Daily., Disp: 30 tablet, Rfl: 0  •  warfarin (COUMADIN) 7.5 MG tablet, Take two tablets daily (Patient taking differently: Take two tablets daily  Zonia Izquierdo), Disp: 60 tablet, Rfl: 0  Past Medical History:   Diagnosis Date   • A-fib (CMS/HCC)    • Aortic stenosis    • Arthritis    • Bradycardia    • Cancer (CMS/HCC)     bladder and skin   • Cardiomegaly    • CHF (congestive heart failure) (CMS/HCC)    • GERD (gastroesophageal reflux disease)    • Hard of hearing    • History of short term memory loss    • Hypercalcemia    • Hyperlipidemia    • Hypertension    • Hyperthyroidism 11/20/2017   • Hypothyroidism    • Kidney stone    • Long term current use of anticoagulant therapy    • Open wound     left  lower extremity,   • Palpitation    • PONV (postoperative nausea and vomiting)    • Shortness of breath    • Sick sinus syndrome (CMS/HCC)    • Sleep apnea     CPAP     Past Surgical History:   Procedure Laterality Date   • AORTIC VALVE REPAIR/REPLACEMENT     • BLADDER SURGERY      removed   • CARDIAC CATHETERIZATION     • CARDIAC CATHETERIZATION N/A 12/9/2016    Procedure: Left Heart Cath;  Surgeon: Elia Flores MD;  Location:  [facility]ATH [facility];  Service:    • HIP BIPOLAR REPLACEMENT Left    • HYSTERECTOMY     • ILEOSTOMY     • JOINT REPLACEMENT     • KIDNEY SURGERY      right kidney removed   • NEPHRECTOMY RADICAL Right     from cancer   • VARICOSE VEIN SURGERY Left 4/10/2017    Procedure: LEFT LOWER EXTREMITY VENOGRAM. LEFT SAPHENOUS VEIN RADIO FREQUENCY ABLATION;  Surgeon: Blake Martinez DO;  Location:  [facility] OR;  Service:      Family History   Problem Relation Age of Onset   • Heart failure Mother    • Heart disease Father    • Stroke Father    • Hypertension Sister    • Heart failure Sister    • No Known Problems Brother    • No Known Problems Maternal Grandmother    • No Known Problems Maternal Grandfather    • No Known Problems Paternal Grandmother    • No Known Problems Paternal Grandfather    • Hypertension Sister    • Heart failure Sister    • Hypertension Sister    • Heart failure Sister    • Hypertension Sister    • Heart failure Sister    • Hypertension Sister    • Heart failure Sister    • Hypertension Sister    • Heart failure Sister    • Gallbladder disease Brother    • COPD Daughter    • Asthma Daughter    • Diabetes Son    • No Known Problems Son    • Autism Son      Social History     Social History   • Marital status:      Spouse name: N/A   • Number of children: N/A   • Years of education: N/A     Occupational History   • Not on file.     Social History Main Topics   • Smoking status: Never Smoker   • Smokeless tobacco: Never Used   • Alcohol use No   • Drug use: No   •  "Sexual activity: Defer     Other Topics Concern   • Not on file     Social History Narrative   • No narrative on file         Review of Systems   Constitution: Positive for fatigue and malaise/fatigue. Negative for chills, decreased appetite, fever, weakness, night sweats, unexpected weight change, weight gain and weight loss.   HENT: Positive for congestion. Negative for nosebleeds.    Eyes: Negative for blurred vision and visual disturbance.   Cardiovascular: Negative for chest pain, claudication, dyspnea on exertion, leg swelling, near-syncope, orthopnea, palpitations, paroxysmal nocturnal dyspnea and syncope.   Respiratory: Positive for cough. Negative for hemoptysis, shortness of breath, snoring and wheezing.    Endocrine: Negative for cold intolerance and heat intolerance.   Hematologic/Lymphatic: Does not bruise/bleed easily.   Skin: Negative for rash.   Musculoskeletal: Negative for back pain, falls, muscle weakness and neck pain.   Gastrointestinal: Negative for abdominal pain, change in bowel habit, constipation, diarrhea, dysphagia, heartburn, nausea and vomiting.   Genitourinary: Negative for hematuria and nocturia.   Neurological: Negative for dizziness, headaches and light-headedness.   Psychiatric/Behavioral: Negative for altered mental status.   Allergic/Immunologic: Negative for persistent infections.       Procedures  /68   Pulse 68   Ht 172.7 cm (68\")   Wt 115 kg (253 lb)   LMP  (LMP Unknown)   SpO2 98%   BMI 38.47 kg/m²        Objective:     Physical Exam   Constitutional: She is oriented to person, place, and time. Vital signs are normal. She appears well-developed and well-nourished. No distress.   HENT:   Head: Normocephalic and atraumatic.   Right Ear: External ear normal.   Left Ear: External ear normal.   Nose: Nose normal.   Eyes: Conjunctivae are normal. Pupils are equal, round, and reactive to light. Right eye exhibits no discharge. Left eye exhibits no discharge.   Neck: " Normal range of motion. Neck supple. No JVD present. Carotid bruit is not present. No tracheal deviation present. No thyromegaly present.   Cardiovascular: Normal rate, normal heart sounds, intact distal pulses and normal pulses.  An irregularly irregular rhythm present. PMI is not displaced.  Exam reveals no gallop and no friction rub.    No murmur heard.  Pulses:       Radial pulses are 2+ on the right side, and 2+ on the left side.        Dorsalis pedis pulses are 2+ on the right side, and 2+ on the left side.        Posterior tibial pulses are 2+ on the right side, and 2+ on the left side.   Pulmonary/Chest: Effort normal and breath sounds normal. No respiratory distress. She has no decreased breath sounds. She has no wheezes. She has no rhonchi. She has no rales. She exhibits no tenderness.   Abdominal: Soft. She exhibits no distension. There is no tenderness.   Musculoskeletal: Normal range of motion. She exhibits edema (Mild bilateral lower extremity edema). She exhibits no tenderness or deformity.   Neurological: She is alert and oriented to person, place, and time.   Skin: Skin is warm and dry. No rash noted. She is not diaphoretic. No erythema. No pallor.   Psychiatric: She has a normal mood and affect. Her behavior is normal. Judgment and thought content normal.   Vitals reviewed.      Lab Review:   Lab Results   Component Value Date    WBC 7.03 05/19/2018    HGB 11.0 (L) 05/19/2018    HCT 34.3 (L) 05/19/2018    MCV 97.2 05/19/2018     05/19/2018     Lab Results   Component Value Date    GLUCOSE 122 (H) 05/19/2018    BUN 34 (H) 05/19/2018    CREATININE 1.27 05/19/2018    EGFRIFNONA 41 (L) 05/19/2018    BCR 26.8 (H) 05/19/2018    K 4.1 05/19/2018    CO2 29.0 05/19/2018    CALCIUM 9.4 05/19/2018    ALBUMIN 3.20 (L) 05/19/2018    LABIL2 0.9 (L) 05/19/2018    AST 22 05/19/2018    ALT 24 05/19/2018         Assessment:          Diagnosis Plan   1. Chronic diastolic heart failure (CMS/HCC)  Compensated.     2. Chronic atrial fibrillation (CMS/[facility])  Rate controlled. Anticoagulated.    3. Chronic anticoagulation  On coumadin. Denies bleeding.    4. History of aortic valvular stenosis     5. S/P TAVR (transcatheter aortic valve replacement)     6. Essential hypertension  Well controlled.    7. Mixed hyperlipidemia  Managed by PCP.    8. Sick sinus syndrome (CMS/[facility])     9. History of DVT (deep vein thrombosis)     10. ASA (obstructive sleep apnea)  Compliant with Bipap.    11. Class 2 obesity due to excess calories with serious comorbidity and body mass index (BMI) of 38.0 to 38.9 in adult  Patient's Body mass index is 38.47 kg/m². BMI is above normal parameters. Recommendations include: educational material.            Plan:       1. Continue medications as previously prescribed.  2. Reviewed signs and symptoms of CHF and what to report with the patient. Patient instructed to restrict sodium and weigh daily. Report weight gain of greater than 2 lbs overnight or 5 lbs in 1 week. Pt verbalized understanding of instructions and plan of care.   3. Report any worsening symptoms.   4. Report any signs of bleeding.   5. Follow up with PCP for blood pressure management and routine lab work.  6. Follow up with CHF clinic in 3 months, or sooner if needed.

## 2018-07-17 LAB — INR BLD: 2.7

## 2018-07-18 ENCOUNTER — ANTI-COAG VISIT (OUTPATIENT)
Dept: INTERNAL MEDICINE | Age: 73
End: 2018-07-18
Payer: MEDICARE

## 2018-07-18 DIAGNOSIS — I48.20 CHRONIC ATRIAL FIBRILLATION (HCC): ICD-10-CM

## 2018-07-18 PROCEDURE — 93793 ANTICOAG MGMT PT WARFARIN: CPT | Performed by: NURSE PRACTITIONER

## 2018-07-18 RX ORDER — FUROSEMIDE 40 MG/1
TABLET ORAL
Qty: 60 TABLET | Refills: 3 | Status: SHIPPED | OUTPATIENT
Start: 2018-07-18 | End: 2021-06-07

## 2018-07-24 ENCOUNTER — ANTI-COAG VISIT (OUTPATIENT)
Dept: INTERNAL MEDICINE | Age: 73
End: 2018-07-24

## 2018-07-24 LAB — INR BLD: 3.6

## 2018-07-24 NOTE — PATIENT INSTRUCTIONS
Per rivera hold coumadin x 2 days, then take 7.5mg every day except 2 days take 5mg. Recheck in one week.   Daughter aware and voiced understanding

## 2018-08-01 ENCOUNTER — ANTI-COAG VISIT (OUTPATIENT)
Dept: INTERNAL MEDICINE | Age: 73
End: 2018-08-01
Payer: MEDICARE

## 2018-08-01 DIAGNOSIS — I48.20 CHRONIC ATRIAL FIBRILLATION (HCC): ICD-10-CM

## 2018-08-01 LAB — INR BLD: 1.8

## 2018-08-01 PROCEDURE — 93793 ANTICOAG MGMT PT WARFARIN: CPT | Performed by: NURSE PRACTITIONER

## 2018-08-10 DIAGNOSIS — G62.9 PERIPHERAL POLYNEUROPATHY: Primary | ICD-10-CM

## 2018-08-10 RX ORDER — FECAL COLL W-CHARCOAL/CATH/SYR
MISCELLANEOUS RECTAL
Qty: 20 WAFER | Refills: 11 | Status: SHIPPED | OUTPATIENT
Start: 2018-08-10 | End: 2020-01-29

## 2018-08-10 RX ORDER — GABAPENTIN 300 MG/1
300 CAPSULE ORAL NIGHTLY
Qty: 90 CAPSULE | Refills: 0 | Status: SHIPPED | OUTPATIENT
Start: 2018-08-10 | End: 2018-11-14 | Stop reason: SDUPTHER

## 2018-08-17 ENCOUNTER — EPISODE CHANGES (OUTPATIENT)
Dept: CASE MANAGEMENT | Facility: OTHER | Age: 73
End: 2018-08-17

## 2018-08-17 ENCOUNTER — ANTI-COAG VISIT (OUTPATIENT)
Dept: INTERNAL MEDICINE | Age: 73
End: 2018-08-17
Payer: MEDICARE

## 2018-08-17 DIAGNOSIS — I87.2 CHRONIC VENOUS INSUFFICIENCY: ICD-10-CM

## 2018-08-17 DIAGNOSIS — I48.20 CHRONIC ATRIAL FIBRILLATION (HCC): ICD-10-CM

## 2018-08-17 LAB — INR BLD: 2.9

## 2018-08-17 PROCEDURE — 93793 ANTICOAG MGMT PT WARFARIN: CPT | Performed by: NURSE PRACTITIONER

## 2018-08-20 RX ORDER — CALCITRIOL 0.25 UG/1
CAPSULE, LIQUID FILLED ORAL
Qty: 30 CAPSULE | Refills: 4 | Status: SHIPPED | OUTPATIENT
Start: 2018-08-20 | End: 2019-01-24 | Stop reason: SDUPTHER

## 2018-08-21 ENCOUNTER — ANTI-COAG VISIT (OUTPATIENT)
Dept: INTERNAL MEDICINE | Age: 73
End: 2018-08-21
Payer: MEDICARE

## 2018-08-21 DIAGNOSIS — I87.2 CHRONIC VENOUS INSUFFICIENCY: ICD-10-CM

## 2018-08-21 DIAGNOSIS — I48.20 CHRONIC ATRIAL FIBRILLATION (HCC): ICD-10-CM

## 2018-08-21 LAB — INR BLD: 5.1

## 2018-08-21 PROCEDURE — 93793 ANTICOAG MGMT PT WARFARIN: CPT | Performed by: NURSE PRACTITIONER

## 2018-08-23 ENCOUNTER — TELEPHONE (OUTPATIENT)
Dept: INTERNAL MEDICINE | Age: 73
End: 2018-08-23

## 2018-08-23 RX ORDER — WARFARIN SODIUM 5 MG/1
TABLET ORAL
Qty: 30 TABLET | Refills: 5 | Status: SHIPPED | OUTPATIENT
Start: 2018-08-23 | End: 2019-08-28 | Stop reason: SDUPTHER

## 2018-08-23 RX ORDER — WARFARIN SODIUM 7.5 MG/1
TABLET ORAL
Qty: 30 TABLET | Refills: 5 | Status: SHIPPED | OUTPATIENT
Start: 2018-08-23 | End: 2019-09-10 | Stop reason: SDUPTHER

## 2018-08-23 NOTE — TELEPHONE ENCOUNTER
Pt's daughter states we were supposed to send in her blood pressure medication. She was also told to stop her coumadin for two days but doesn't know what to do after that.

## 2018-08-27 ENCOUNTER — ANTI-COAG VISIT (OUTPATIENT)
Dept: INTERNAL MEDICINE | Age: 73
End: 2018-08-27
Payer: MEDICARE

## 2018-08-27 DIAGNOSIS — I48.20 CHRONIC ATRIAL FIBRILLATION (HCC): ICD-10-CM

## 2018-08-27 LAB — INR BLD: 3.3

## 2018-08-27 PROCEDURE — 93793 ANTICOAG MGMT PT WARFARIN: CPT | Performed by: NURSE PRACTITIONER

## 2018-08-30 ENCOUNTER — ANTI-COAG VISIT (OUTPATIENT)
Dept: INTERNAL MEDICINE | Age: 73
End: 2018-08-30
Payer: MEDICARE

## 2018-08-30 DIAGNOSIS — I87.2 CHRONIC VENOUS INSUFFICIENCY: ICD-10-CM

## 2018-08-30 DIAGNOSIS — I48.20 CHRONIC ATRIAL FIBRILLATION (HCC): ICD-10-CM

## 2018-08-30 LAB — INR BLD: 2.8

## 2018-08-30 PROCEDURE — 93793 ANTICOAG MGMT PT WARFARIN: CPT | Performed by: NURSE PRACTITIONER

## 2018-09-04 ENCOUNTER — ANTI-COAG VISIT (OUTPATIENT)
Dept: INTERNAL MEDICINE | Age: 73
End: 2018-09-04
Payer: MEDICARE

## 2018-09-04 DIAGNOSIS — I48.20 CHRONIC ATRIAL FIBRILLATION (HCC): ICD-10-CM

## 2018-09-04 LAB — INR BLD: 3.8

## 2018-09-04 PROCEDURE — 93793 ANTICOAG MGMT PT WARFARIN: CPT | Performed by: NURSE PRACTITIONER

## 2018-09-12 ENCOUNTER — ANTI-COAG VISIT (OUTPATIENT)
Dept: INTERNAL MEDICINE | Age: 73
End: 2018-09-12
Payer: MEDICARE

## 2018-09-12 DIAGNOSIS — I48.20 CHRONIC ATRIAL FIBRILLATION (HCC): ICD-10-CM

## 2018-09-12 LAB — INR BLD: 3.1

## 2018-09-12 PROCEDURE — 93793 ANTICOAG MGMT PT WARFARIN: CPT | Performed by: NURSE PRACTITIONER

## 2018-09-14 RX ORDER — DONEPEZIL HYDROCHLORIDE 5 MG/1
TABLET, FILM COATED ORAL
Qty: 90 TABLET | Refills: 3 | Status: SHIPPED | OUTPATIENT
Start: 2018-09-14 | End: 2019-10-14 | Stop reason: SDUPTHER

## 2018-09-18 LAB — INR BLD: 3.2

## 2018-09-18 RX ORDER — PANTOPRAZOLE SODIUM 40 MG/1
TABLET, DELAYED RELEASE ORAL
Qty: 90 TABLET | Refills: 3 | Status: SHIPPED | OUTPATIENT
Start: 2018-09-18 | End: 2019-08-23

## 2018-09-19 ENCOUNTER — ANTI-COAG VISIT (OUTPATIENT)
Dept: INTERNAL MEDICINE | Age: 73
End: 2018-09-19
Payer: MEDICARE

## 2018-09-19 DIAGNOSIS — I48.20 CHRONIC ATRIAL FIBRILLATION (HCC): ICD-10-CM

## 2018-09-19 PROCEDURE — 93793 ANTICOAG MGMT PT WARFARIN: CPT | Performed by: NURSE PRACTITIONER

## 2018-09-19 NOTE — PATIENT INSTRUCTIONS
INR today was a 3.2  Per verbal from Mary Carmen Pollock,   Patient to hold x 1 night then resume usual dose  Check in one week. Called person and advised of instructions. Patient verbalized understanding.

## 2018-10-04 ENCOUNTER — ANTI-COAG VISIT (OUTPATIENT)
Dept: INTERNAL MEDICINE | Age: 73
End: 2018-10-04

## 2018-10-04 LAB — INR BLD: 3.8

## 2018-10-04 NOTE — PROGRESS NOTES
HOME MONITORING REPORT    INR today:   Results for orders placed or performed in visit on 10/04/18   Protime-INR   Result Value Ref Range    INR 3.80        INR Goal: 2.0-3.0    Dosing Plan  As of 10/4/2018    TTR:   26.3 % (2.7 mo)   Full warfarin instructions:   10/4: Hold; 10/5: 2.5 mg; Otherwise 7.5 mg on Tue, Thu, Sat; 5 mg all other days            Patient has had several inr recently that were too thin, dose adjusted, see below    Spoke with patient's daughter we had in chart that pt takes 5mg tues, thurs, 7.5mg all other days, but she states that patient takes 5mg M, W, F, 7.5mg all other days. Instructed pt's daughter to hold Coumadin tonight, take 2.5mg tomorrow, then change to 5mg daily, except 7.5mg on Tues, Thurs, Sat, and recheck next week.

## 2018-10-05 DIAGNOSIS — Z85.51 HISTORY OF BLADDER CANCER: ICD-10-CM

## 2018-10-05 DIAGNOSIS — Z98.890 H/O URETEROSTOMY: ICD-10-CM

## 2018-10-05 RX ORDER — OSTOMY SUPPLY 2 1/4"
EACH MISCELLANEOUS
Qty: 1 EACH | Refills: 11 | Status: SHIPPED | OUTPATIENT
Start: 2018-10-05 | End: 2020-01-29

## 2018-10-12 ENCOUNTER — ANTI-COAG VISIT (OUTPATIENT)
Dept: INTERNAL MEDICINE | Age: 73
End: 2018-10-12
Payer: MEDICARE

## 2018-10-12 DIAGNOSIS — I48.20 CHRONIC ATRIAL FIBRILLATION (HCC): ICD-10-CM

## 2018-10-12 LAB — INR BLD: 2.8

## 2018-10-12 PROCEDURE — 93793 ANTICOAG MGMT PT WARFARIN: CPT | Performed by: NURSE PRACTITIONER

## 2018-10-15 ENCOUNTER — OFFICE VISIT (OUTPATIENT)
Dept: CARDIOLOGY | Facility: CLINIC | Age: 73
End: 2018-10-15

## 2018-10-15 VITALS
WEIGHT: 253 LBS | OXYGEN SATURATION: 98 % | HEART RATE: 65 BPM | BODY MASS INDEX: 37.47 KG/M2 | HEIGHT: 69 IN | RESPIRATION RATE: 18 BRPM | DIASTOLIC BLOOD PRESSURE: 75 MMHG | SYSTOLIC BLOOD PRESSURE: 125 MMHG

## 2018-10-15 DIAGNOSIS — I48.20 CHRONIC ATRIAL FIBRILLATION (HCC): Primary | ICD-10-CM

## 2018-10-15 DIAGNOSIS — Z95.2 S/P TAVR (TRANSCATHETER AORTIC VALVE REPLACEMENT): ICD-10-CM

## 2018-10-15 DIAGNOSIS — I10 ESSENTIAL HYPERTENSION: ICD-10-CM

## 2018-10-15 DIAGNOSIS — G47.33 OSA (OBSTRUCTIVE SLEEP APNEA): ICD-10-CM

## 2018-10-15 DIAGNOSIS — I82.402 DEEP VEIN THROMBOSIS (DVT) OF LEFT LOWER EXTREMITY, UNSPECIFIED CHRONICITY, UNSPECIFIED VEIN (HCC): ICD-10-CM

## 2018-10-15 DIAGNOSIS — E78.2 MIXED HYPERLIPIDEMIA: ICD-10-CM

## 2018-10-15 DIAGNOSIS — I50.32 CHRONIC DIASTOLIC HEART FAILURE (HCC): ICD-10-CM

## 2018-10-15 PROCEDURE — 99214 OFFICE O/P EST MOD 30 MIN: CPT | Performed by: NURSE PRACTITIONER

## 2018-10-15 PROCEDURE — 93000 ELECTROCARDIOGRAM COMPLETE: CPT | Performed by: NURSE PRACTITIONER

## 2018-10-15 NOTE — PATIENT INSTRUCTIONS
"DASH Eating Plan  DASH stands for \"Dietary Approaches to Stop Hypertension.\" The DASH eating plan is a healthy eating plan that has been shown to reduce high blood pressure (hypertension). It may also reduce your risk for type 2 diabetes, heart disease, and stroke. The DASH eating plan may also help with weight loss.  What are tips for following this plan?  General guidelines  · Avoid eating more than 2,300 mg (milligrams) of salt (sodium) a day. If you have hypertension, you may need to reduce your sodium intake to 1,500 mg a day.  · Limit alcohol intake to no more than 1 drink a day for nonpregnant women and 2 drinks a day for men. One drink equals 12 oz of beer, 5 oz of wine, or 1½ oz of hard liquor.  · Work with your health care provider to maintain a healthy body weight or to lose weight. Ask what an ideal weight is for you.  · Get at least 30 minutes of exercise that causes your heart to beat faster (aerobic exercise) most days of the week. Activities may include walking, swimming, or biking.  · Work with your health care provider or diet and nutrition specialist (dietitian) to adjust your eating plan to your individual calorie needs.  Reading food labels  · Check food labels for the amount of sodium per serving. Choose foods with less than 5 percent of the Daily Value of sodium. Generally, foods with less than 300 mg of sodium per serving fit into this eating plan.  · To find whole grains, look for the word \"whole\" as the first word in the ingredient list.  Shopping  · Buy products labeled as \"low-sodium\" or \"no salt added.\"  · Buy fresh foods. Avoid canned foods and premade or frozen meals.  Cooking  · Avoid adding salt when cooking. Use salt-free seasonings or herbs instead of table salt or sea salt. Check with your health care provider or pharmacist before using salt substitutes.  · Do not grullon foods. Cook foods using healthy methods such as baking, boiling, grilling, and broiling instead.  · Cook with " heart-healthy oils, such as olive, canola, soybean, or sunflower oil.  Meal planning    · Eat a balanced diet that includes:  ? 5 or more servings of fruits and vegetables each day. At each meal, try to fill half of your plate with fruits and vegetables.  ? Up to 6-8 servings of whole grains each day.  ? Less than 6 oz of lean meat, poultry, or fish each day. A 3-oz serving of meat is about the same size as a deck of cards. One egg equals 1 oz.  ? 2 servings of low-fat dairy each day.  ? A serving of nuts, seeds, or beans 5 times each week.  ? Heart-healthy fats. Healthy fats called Omega-3 fatty acids are found in foods such as flaxseeds and coldwater fish, like sardines, salmon, and mackerel.  · Limit how much you eat of the following:  ? Canned or prepackaged foods.  ? Food that is high in trans fat, such as fried foods.  ? Food that is high in saturated fat, such as fatty meat.  ? Sweets, desserts, sugary drinks, and other foods with added sugar.  ? Full-fat dairy products.  · Do not salt foods before eating.  · Try to eat at least 2 vegetarian meals each week.  · Eat more home-cooked food and less restaurant, buffet, and fast food.  · When eating at a restaurant, ask that your food be prepared with less salt or no salt, if possible.  What foods are recommended?  The items listed may not be a complete list. Talk with your dietitian about what dietary choices are best for you.  Grains  Whole-grain or whole-wheat bread. Whole-grain or whole-wheat pasta. Brown rice. Oatmeal. Quinoa. Bulgur. Whole-grain and low-sodium cereals. Shelbie bread. Low-fat, low-sodium crackers. Whole-wheat flour tortillas.  Vegetables  Fresh or frozen vegetables (raw, steamed, roasted, or grilled). Low-sodium or reduced-sodium tomato and vegetable juice. Low-sodium or reduced-sodium tomato sauce and tomato paste. Low-sodium or reduced-sodium canned vegetables.  Fruits  All fresh, dried, or frozen fruit. Canned fruit in natural juice (without  added sugar).  Meat and other protein foods  Skinless chicken or turkey. Ground chicken or turkey. Pork with fat trimmed off. Fish and seafood. Egg whites. Dried beans, peas, or lentils. Unsalted nuts, nut butters, and seeds. Unsalted canned beans. Lean cuts of beef with fat trimmed off. Low-sodium, lean deli meat.  Dairy  Low-fat (1%) or fat-free (skim) milk. Fat-free, low-fat, or reduced-fat cheeses. Nonfat, low-sodium ricotta or cottage cheese. Low-fat or nonfat yogurt. Low-fat, low-sodium cheese.  Fats and oils  Soft margarine without trans fats. Vegetable oil. Low-fat, reduced-fat, or light mayonnaise and salad dressings (reduced-sodium). Canola, safflower, olive, soybean, and sunflower oils. Avocado.  Seasoning and other foods  Herbs. Spices. Seasoning mixes without salt. Unsalted popcorn and pretzels. Fat-free sweets.  What foods are not recommended?  The items listed may not be a complete list. Talk with your dietitian about what dietary choices are best for you.  Grains  Baked goods made with fat, such as croissants, muffins, or some breads. Dry pasta or rice meal packs.  Vegetables  Creamed or fried vegetables. Vegetables in a cheese sauce. Regular canned vegetables (not low-sodium or reduced-sodium). Regular canned tomato sauce and paste (not low-sodium or reduced-sodium). Regular tomato and vegetable juice (not low-sodium or reduced-sodium). Pickles. Olives.  Fruits  Canned fruit in a light or heavy syrup. Fried fruit. Fruit in cream or butter sauce.  Meat and other protein foods  Fatty cuts of meat. Ribs. Fried meat. Bains. Sausage. Bologna and other processed lunch meats. Salami. Fatback. Hotdogs. Bratwurst. Salted nuts and seeds. Canned beans with added salt. Canned or smoked fish. Whole eggs or egg yolks. Chicken or turkey with skin.  Dairy  Whole or 2% milk, cream, and half-and-half. Whole or full-fat cream cheese. Whole-fat or sweetened yogurt. Full-fat cheese. Nondairy creamers. Whipped toppings.  Processed cheese and cheese spreads.  Fats and oils  Butter. Stick margarine. Lard. Shortening. Ghee. Bains fat. Tropical oils, such as coconut, palm kernel, or palm oil.  Seasoning and other foods  Salted popcorn and pretzels. Onion salt, garlic salt, seasoned salt, table salt, and sea salt. Worcestershire sauce. Tartar sauce. Barbecue sauce. Teriyaki sauce. Soy sauce, including reduced-sodium. Steak sauce. Canned and packaged gravies. Fish sauce. Oyster sauce. Cocktail sauce. Horseradish that you find on the shelf. Ketchup. Mustard. Meat flavorings and tenderizers. Bouillon cubes. Hot sauce and Tabasco sauce. Premade or packaged marinades. Premade or packaged taco seasonings. Relishes. Regular salad dressings.  Where to find more information:  · National Heart, Lung, and [employer] [place]: www.nhlbi.nih.gov  · American Heart Association: www.heart.org  Summary  · The DASH eating plan is a healthy eating plan that has been shown to reduce high blood pressure (hypertension). It may also reduce your risk for type 2 diabetes, heart disease, and stroke.  · With the DASH eating plan, you should limit salt (sodium) intake to 2,300 mg a day. If you have hypertension, you may need to reduce your sodium intake to 1,500 mg a day.  · When on the DASH eating plan, aim to eat more fresh fruits and vegetables, whole grains, lean proteins, low-fat dairy, and heart-healthy fats.  · Work with your health care provider or diet and nutrition specialist (dietitian) to adjust your eating plan to your individual calorie needs.  This information is not intended to replace advice given to you by your health care provider. Make sure you discuss any questions you have with your health care provider.  Document Released: 12/06/2012 Document Revised: 12/11/2017 Document Reviewed: 12/11/2017  [employer] Patient Education © 2018 [employer]

## 2018-10-15 NOTE — PROGRESS NOTES
"    Subjective:     Encounter Date:10/15/2018      Patient ID: Jarvis Morgan is a 73 y.o. female.    Chief Complaint:  The patient reports she is feeling well overall. She denies chest pain, shortness of breath, palpitations, orthopnea, PND, weight gain, or syncope. She admits mild lower extremity edema that is improved with the use of compression stockings. She reports good blood pressure control. She is compliant with her medications and BIPAP. She reports an occasional episode of dizziness or lightheadedness. She admits intermittent fatigue.         The following portions of the patient's history were reviewed and updated as appropriate: allergies, current medications, past family history, past medical history, past social history, past surgical history and problem list.  /75 (BP Location: Right arm, Patient Position: Sitting, Cuff Size: Adult)   Pulse 65   Resp 18   Ht 175.3 cm (69\")   Wt 115 kg (253 lb)   LMP  (LMP Unknown)   SpO2 98%   Breastfeeding? No   BMI 37.36 kg/m²   Allergies   Allergen Reactions   • Ciprofloxacin Itching   • Codeine Itching and GI Intolerance   • Definity [Perflutren Lipid Microsphere] Other (See Comments)     Back pain   • Tetanus Toxoids Itching     Itching around site   • Tizanidine Hcl Itching   • Other Itching     Cloth bandaids , causes redness of skin       Current Outpatient Prescriptions:   •  acetaminophen (TYLENOL) 325 MG tablet, Take 2 tablets by mouth Every 4 (Four) Hours As Needed for Mild Pain  or Fever., Disp: , Rfl:   •  aspirin 81 MG EC tablet, Take 81 mg by mouth Daily., Disp: , Rfl:   •  calcitriol (ROCALTROL) 0.25 MCG capsule, Take 1 capsule by mouth Daily., Disp: 30 capsule, Rfl: 0  •  cholecalciferol 5000 units tablet, Take 5,000 Units by mouth Daily. (Patient taking differently: Take 50,000 Units by mouth 1 (One) Time Per Week.), Disp: , Rfl:   •  diltiaZEM CD (CARDIZEM CD) 180 MG 24 hr capsule, Take 1 capsule by mouth Daily., Disp: 30 capsule, Rfl: " 0  •  donepezil (ARICEPT) 5 MG tablet, Take 1 tablet by mouth Every Night., Disp: 30 tablet, Rfl: 0  •  escitalopram (LEXAPRO) 10 MG tablet, Take 10 mg by mouth Daily., Disp: , Rfl:   •  furosemide (LASIX) 40 MG tablet, Take 1 tablet by mouth Daily., Disp: 30 tablet, Rfl: 0  •  gabapentin (NEURONTIN) 300 MG capsule, Take 1 capsule by mouth Every Night., Disp: 10 capsule, Rfl: 0  •  methIMAzole (TAPAZOLE) 10 MG tablet, Take 10 mg by mouth Daily., Disp: , Rfl:   •  metoprolol tartrate (LOPRESSOR) 50 MG tablet, Take 1 tablet by mouth Every 12 (Twelve) Hours., Disp: 30 tablet, Rfl: 0  •  miconazole (MICOTIN) 2 % cream, Apply  topically Every 12 (Twelve) Hours., Disp: 60 g, Rfl: 0  •  miconazole (MICOTIN) 2 % powder, Apply  topically Every 12 (Twelve) Hours., Disp: 30 g, Rfl: 0  •  mupirocin (BACTROBAN) 2 % ointment, Apply  topically Daily., Disp: 22 g, Rfl: 0  •  O2 (OXYGEN), Inhale 2 L/min 1 (One) Time. CPAP, Disp: , Rfl:   •  pantoprazole (PROTONIX) 40 MG EC tablet, Take 1 tablet by mouth Daily., Disp: 30 tablet, Rfl: 0  •  warfarin (COUMADIN) 7.5 MG tablet, Take two tablets daily (Patient taking differently: Take two tablets daily  Zonia Izquierdo), Disp: 60 tablet, Rfl: 0  Past Medical History:   Diagnosis Date   • A-fib (CMS/HCC)    • Aortic stenosis    • Arthritis    • Bradycardia    • Cancer (CMS/HCC)     bladder and skin   • Cardiomegaly    • CHF (congestive heart failure) (CMS/HCC)    • GERD (gastroesophageal reflux disease)    • Hard of hearing    • History of short term memory loss    • Hypercalcemia    • Hyperlipidemia    • Hypertension    • Hyperthyroidism 11/20/2017   • Hypothyroidism    • Kidney stone    • Long term current use of anticoagulant therapy    • Open wound     left lower extremity,   • Palpitation    • PONV (postoperative nausea and vomiting)    • Shortness of breath    • Sick sinus syndrome (CMS/HCC)    • Sleep apnea     CPAP     Past Surgical History:   Procedure Laterality Date   • AORTIC  VALVE REPAIR/REPLACEMENT     • BLADDER SURGERY      removed   • CARDIAC CATHETERIZATION     • CARDIAC CATHETERIZATION N/A 12/9/2016    Procedure: Left Heart Cath;  Surgeon: Elia Flores MD;  Location:  [facility] CATH [facility];  Service:    • HIP BIPOLAR REPLACEMENT Left    • HYSTERECTOMY     • ILEOSTOMY     • JOINT REPLACEMENT     • KIDNEY SURGERY      right kidney removed   • NEPHRECTOMY RADICAL Right     from cancer   • VARICOSE VEIN SURGERY Left 4/10/2017    Procedure: LEFT LOWER EXTREMITY VENOGRAM. LEFT SAPHENOUS VEIN RADIO FREQUENCY ABLATION;  Surgeon: Blake Martinez DO;  Location:  [facility] OR;  Service:      Social History     Social History   • Marital status:      Spouse name: N/A   • Number of children: N/A   • Years of education: N/A     Occupational History   • Not on file.     Social History Main Topics   • Smoking status: Never Smoker   • Smokeless tobacco: Never Used   • Alcohol use No   • Drug use: No   • Sexual activity: Defer     Other Topics Concern   • Not on file     Social History Narrative   • No narrative on file     Family History   Problem Relation Age of Onset   • Heart failure Mother    • Heart disease Father    • Stroke Father    • Hypertension Sister    • Heart failure Sister    • No Known Problems Brother    • No Known Problems Maternal Grandmother    • No Known Problems Maternal Grandfather    • No Known Problems Paternal Grandmother    • No Known Problems Paternal Grandfather    • Hypertension Sister    • Heart failure Sister    • Hypertension Sister    • Heart failure Sister    • Hypertension Sister    • Heart failure Sister    • Hypertension Sister    • Heart failure Sister    • Hypertension Sister    • Heart failure Sister    • Gallbladder disease Brother    • COPD Daughter    • Asthma Daughter    • Diabetes Son    • No Known Problems Son    • Autism Son        Review of Systems   Constitution: Positive for malaise/fatigue. Negative for chills, diaphoresis, fever  and weakness.   HENT: Negative for nosebleeds.    Eyes: Negative for visual disturbance.   Cardiovascular: Positive for leg swelling. Negative for chest pain, claudication, cyanosis, dyspnea on exertion, irregular heartbeat, near-syncope, orthopnea, palpitations, paroxysmal nocturnal dyspnea and syncope.   Respiratory: Negative for cough, hemoptysis, shortness of breath, sputum production and wheezing.    Hematologic/Lymphatic: Negative for bleeding problem.   Skin: Negative for color change and flushing.   Musculoskeletal: Negative for falls.   Gastrointestinal: Negative for bloating, abdominal pain, hematemesis, hematochezia, melena, nausea and vomiting.   Genitourinary: Negative for hematuria.   Neurological: Negative for dizziness and light-headedness.   Psychiatric/Behavioral: Negative for altered mental status.         ECG 12 Lead  Date/Time: 10/15/2018 11:01 AM  Performed by: SHERRY ALO  Authorized by: SHERRY LAO   Comparison: compared with previous ECG from 4/13/2018  Similar to previous ECG  Rhythm: atrial fibrillation               Objective:     Physical Exam   Constitutional: She is oriented to person, place, and time. She appears well-developed and well-nourished. No distress.   HENT:   Head: Normocephalic and atraumatic.   Eyes: Pupils are equal, round, and reactive to light.   Neck: Normal range of motion. Neck supple. No JVD present. No thyromegaly present.   Cardiovascular: Normal rate and intact distal pulses.  An irregularly irregular rhythm present. Exam reveals no gallop and no friction rub.    Murmur (1/6 systolic murmur ) heard.  Pulmonary/Chest: Effort normal and breath sounds normal. No respiratory distress. She has no wheezes. She has no rales. She exhibits no tenderness.   Abdominal: Soft. Bowel sounds are normal. She exhibits no distension. There is no tenderness.   Musculoskeletal: Normal range of motion. She exhibits edema (1+/trace BLE edema, compression stockings in place ).    Neurological: She is alert and oriented to person, place, and time. No cranial nerve deficit.   Skin: Skin is warm and dry. She is not diaphoretic.   Psychiatric: She has a normal mood and affect. Her behavior is normal.       Lab Review:       Assessment:          Diagnosis Plan   1. Chronic atrial fibrillation (CMS/HCC)    Anticoagulated with warfarin (dosing and INRs followed by PCP), rate controlled, asymptomatic      2. Chronic diastolic heart failure (CMS/HCC)    Compensated, euvolemic    3. Deep vein thrombosis (DVT) of left lower extremity, unspecified chronicity, unspecified vein (CMS/HCC)    History of DVT, on warfarin    4. Essential hypertension    Controlled         5. S/P TAVR (transcatheter aortic valve replacement)    5/2018 echo- LVEF 56-60%, acceptable TAVR gradients, no perivalvular leak   6. ASA (obstructive sleep apnea)  Compliant with BIPAP      Patient's Body mass index is 37.36 kg/m². BMI is above normal parameters. Recommendations include: exercise counseling and nutrition counseling   .  12/2016 cath normal coronaries      Plan:       Continue current medical therapy listed above  Follow up 1 month, sooner with new or worsening symptoms or concerns     Reviewed signs and symptoms of CHF and what to report with the patient. Patient instructed to restrict sodium and weigh daily. Report weight gain of greater than 2 lbs overnight or 5 lbs in 1 week. Pt verbalized understanding of instructions and plan of care.

## 2018-10-16 RX ORDER — ESCITALOPRAM OXALATE 10 MG/1
10 TABLET ORAL DAILY
Qty: 30 TABLET | Refills: 3 | Status: SHIPPED | OUTPATIENT
Start: 2018-10-16

## 2018-10-22 ENCOUNTER — ANTI-COAG VISIT (OUTPATIENT)
Dept: INTERNAL MEDICINE | Age: 73
End: 2018-10-22
Payer: MEDICARE

## 2018-10-22 DIAGNOSIS — I48.20 CHRONIC ATRIAL FIBRILLATION (HCC): ICD-10-CM

## 2018-10-22 LAB — INR BLD: 2.9

## 2018-10-22 PROCEDURE — 93793 ANTICOAG MGMT PT WARFARIN: CPT | Performed by: NURSE PRACTITIONER

## 2018-10-29 RX ORDER — METHIMAZOLE 10 MG/1
TABLET ORAL
Qty: 90 TABLET | Refills: 3 | Status: SHIPPED | OUTPATIENT
Start: 2018-10-29 | End: 2019-11-14 | Stop reason: SDUPTHER

## 2018-11-01 ENCOUNTER — ANTI-COAG VISIT (OUTPATIENT)
Dept: INTERNAL MEDICINE | Age: 73
End: 2018-11-01

## 2018-11-01 LAB — INR BLD: 3.4

## 2018-11-02 DIAGNOSIS — I48.20 CHRONIC ATRIAL FIBRILLATION (HCC): ICD-10-CM

## 2018-11-02 DIAGNOSIS — E55.9 VITAMIN D DEFICIENCY: ICD-10-CM

## 2018-11-02 DIAGNOSIS — E78.2 MIXED HYPERLIPIDEMIA: ICD-10-CM

## 2018-11-02 LAB
ALBUMIN SERPL-MCNC: 3.7 G/DL (ref 3.5–5.2)
ALP BLD-CCNC: 67 U/L (ref 35–104)
ALT SERPL-CCNC: 9 U/L (ref 5–33)
ANION GAP SERPL CALCULATED.3IONS-SCNC: 14 MMOL/L (ref 7–19)
AST SERPL-CCNC: 15 U/L (ref 5–32)
BILIRUB SERPL-MCNC: 0.6 MG/DL (ref 0.2–1.2)
BUN BLDV-MCNC: 52 MG/DL (ref 8–23)
CALCIUM SERPL-MCNC: 10.4 MG/DL (ref 8.8–10.2)
CHLORIDE BLD-SCNC: 104 MMOL/L (ref 98–111)
CHOLESTEROL, TOTAL: 141 MG/DL (ref 160–199)
CO2: 26 MMOL/L (ref 22–29)
CREAT SERPL-MCNC: 1.6 MG/DL (ref 0.5–0.9)
GFR NON-AFRICAN AMERICAN: 31
GLUCOSE BLD-MCNC: 93 MG/DL (ref 74–109)
HCT VFR BLD CALC: 41.1 % (ref 37–47)
HDLC SERPL-MCNC: 33 MG/DL (ref 65–121)
HEMOGLOBIN: 12.8 G/DL (ref 12–16)
LDL CHOLESTEROL CALCULATED: 62 MG/DL
MCH RBC QN AUTO: 31.4 PG (ref 27–31)
MCHC RBC AUTO-ENTMCNC: 31.1 G/DL (ref 33–37)
MCV RBC AUTO: 100.7 FL (ref 81–99)
PDW BLD-RTO: 18 % (ref 11.5–14.5)
PLATELET # BLD: 200 K/UL (ref 130–400)
PMV BLD AUTO: 9.2 FL (ref 9.4–12.3)
POTASSIUM SERPL-SCNC: 4.2 MMOL/L (ref 3.5–5)
RBC # BLD: 4.08 M/UL (ref 4.2–5.4)
SODIUM BLD-SCNC: 144 MMOL/L (ref 136–145)
TOTAL PROTEIN: 7.8 G/DL (ref 6.6–8.7)
TRIGL SERPL-MCNC: 231 MG/DL (ref 0–149)
TSH SERPL DL<=0.05 MIU/L-ACNC: 0.83 UIU/ML (ref 0.27–4.2)
VITAMIN D 25-HYDROXY: 42.4 NG/ML
WBC # BLD: 5.2 K/UL (ref 4.8–10.8)

## 2018-11-09 ENCOUNTER — OFFICE VISIT (OUTPATIENT)
Dept: INTERNAL MEDICINE | Age: 73
End: 2018-11-09
Payer: MEDICARE

## 2018-11-09 VITALS
DIASTOLIC BLOOD PRESSURE: 84 MMHG | OXYGEN SATURATION: 91 % | HEART RATE: 66 BPM | SYSTOLIC BLOOD PRESSURE: 138 MMHG | HEIGHT: 68 IN | WEIGHT: 254 LBS | BODY MASS INDEX: 38.49 KG/M2 | RESPIRATION RATE: 16 BRPM

## 2018-11-09 DIAGNOSIS — I48.20 CHRONIC ATRIAL FIBRILLATION (HCC): ICD-10-CM

## 2018-11-09 DIAGNOSIS — Z23 NEED FOR INFLUENZA VACCINATION: Primary | ICD-10-CM

## 2018-11-09 DIAGNOSIS — I87.2 CHRONIC VENOUS INSUFFICIENCY: ICD-10-CM

## 2018-11-09 DIAGNOSIS — I10 ESSENTIAL (PRIMARY) HYPERTENSION: ICD-10-CM

## 2018-11-09 DIAGNOSIS — G60.9 IDIOPATHIC PERIPHERAL NEUROPATHY: ICD-10-CM

## 2018-11-09 DIAGNOSIS — N18.30 STAGE 3 CHRONIC KIDNEY DISEASE (HCC): ICD-10-CM

## 2018-11-09 DIAGNOSIS — Z78.0 POST-MENOPAUSAL: ICD-10-CM

## 2018-11-09 DIAGNOSIS — M17.0 PRIMARY OSTEOARTHRITIS OF BOTH KNEES: ICD-10-CM

## 2018-11-09 DIAGNOSIS — E05.90 HYPERTHYROIDISM: ICD-10-CM

## 2018-11-09 DIAGNOSIS — Z23 NEED FOR PROPHYLACTIC VACCINATION AND INOCULATION AGAINST VARICELLA: ICD-10-CM

## 2018-11-09 DIAGNOSIS — E55.9 VITAMIN D DEFICIENCY: ICD-10-CM

## 2018-11-09 PROCEDURE — G8427 DOCREV CUR MEDS BY ELIG CLIN: HCPCS | Performed by: NURSE PRACTITIONER

## 2018-11-09 PROCEDURE — G0008 ADMIN INFLUENZA VIRUS VAC: HCPCS | Performed by: NURSE PRACTITIONER

## 2018-11-09 PROCEDURE — 3017F COLORECTAL CA SCREEN DOC REV: CPT | Performed by: NURSE PRACTITIONER

## 2018-11-09 PROCEDURE — 99214 OFFICE O/P EST MOD 30 MIN: CPT | Performed by: NURSE PRACTITIONER

## 2018-11-09 PROCEDURE — 1123F ACP DISCUSS/DSCN MKR DOCD: CPT | Performed by: NURSE PRACTITIONER

## 2018-11-09 PROCEDURE — 1101F PT FALLS ASSESS-DOCD LE1/YR: CPT | Performed by: NURSE PRACTITIONER

## 2018-11-09 PROCEDURE — 4040F PNEUMOC VAC/ADMIN/RCVD: CPT | Performed by: NURSE PRACTITIONER

## 2018-11-09 PROCEDURE — 1036F TOBACCO NON-USER: CPT | Performed by: NURSE PRACTITIONER

## 2018-11-09 PROCEDURE — G0439 PPPS, SUBSEQ VISIT: HCPCS | Performed by: NURSE PRACTITIONER

## 2018-11-09 PROCEDURE — 1090F PRES/ABSN URINE INCON ASSESS: CPT | Performed by: NURSE PRACTITIONER

## 2018-11-09 PROCEDURE — G8417 CALC BMI ABV UP PARAM F/U: HCPCS | Performed by: NURSE PRACTITIONER

## 2018-11-09 PROCEDURE — 90662 IIV NO PRSV INCREASED AG IM: CPT | Performed by: NURSE PRACTITIONER

## 2018-11-09 PROCEDURE — G8400 PT W/DXA NO RESULTS DOC: HCPCS | Performed by: NURSE PRACTITIONER

## 2018-11-09 PROCEDURE — G8482 FLU IMMUNIZE ORDER/ADMIN: HCPCS | Performed by: NURSE PRACTITIONER

## 2018-11-09 ASSESSMENT — LIFESTYLE VARIABLES: HOW OFTEN DO YOU HAVE A DRINK CONTAINING ALCOHOL: 0

## 2018-11-09 ASSESSMENT — PATIENT HEALTH QUESTIONNAIRE - PHQ9
SUM OF ALL RESPONSES TO PHQ QUESTIONS 1-9: 0
SUM OF ALL RESPONSES TO PHQ QUESTIONS 1-9: 0

## 2018-11-09 ASSESSMENT — ENCOUNTER SYMPTOMS
BLOOD IN STOOL: 0
ABDOMINAL PAIN: 0
COLOR CHANGE: 0
VOMITING: 0
EYE DISCHARGE: 0
NAUSEA: 0
SHORTNESS OF BREATH: 0
CONSTIPATION: 0
COUGH: 0
STRIDOR: 0
CHOKING: 0
ABDOMINAL DISTENTION: 0
EYE ITCHING: 0
SORE THROAT: 0
DIARRHEA: 0
TROUBLE SWALLOWING: 0
WHEEZING: 0

## 2018-11-09 ASSESSMENT — ANXIETY QUESTIONNAIRES: GAD7 TOTAL SCORE: 0

## 2018-11-09 NOTE — PATIENT INSTRUCTIONS
1.  htn stable no changes  2. Atrial fib; Stable on coumadin and cardizem   3. Vit d def;  Stable on current dose   4. Hyperthyroidism;   5  Bilateral knee pain will get injections next week   6. Bilateral peripheral neuropathy;  Stable on gabapentin   7. CKD; You need to drink at least 4 bottles of water a day;    Aparna moseley with Dr Nora Zapata;    8.  HTN;  Stable no changes  9  Chronic venous insufficiency;   Stable for now; covered with coumadin

## 2018-11-09 NOTE — PROGRESS NOTES
Post-menopausal  DEXA Bone Density Axial Skeleton   3. Need for prophylactic vaccination and inoculation against varicella  zoster recombinant adjuvanted vaccine (SHINGRIX) 50 MCG/0.5ML SUSR injection   4. Primary osteoarthritis of both knees     5. Chronic venous insufficiency     6. Stage 3 chronic kidney disease ([facility] [address]     7. Idiopathic peripheral neuropathy     8. Essential (primary) hypertension     9. Chronic atrial fibrillation (HCC)     10. Vitamin D deficiency     11. Hyperthyroidism       Labs reviewedfrom 10/31/18    Plan:        Patient given educational materials - see patient instructions. Discussed use, benefit, and side effects of prescribed medications. Allpatient questions answered. Pt voiced understanding. Reviewed health maintenance. Instructed to continue current medications, diet and exercise. Patient agreed with treatment plan. Follow up as directed. MEDICATIONS:  Orders Placed This Encounter   Medications    zoster recombinant adjuvanted vaccine (SHINGRIX) 50 MCG/0.5ML SUSR injection     Si MCG IM then repeat 2-6 months. Dispense:  0.5 mL     Refill:  1         ORDERS:  Orders Placed This Encounter   Procedures    DEXA Bone Density Axial Skeleton    INFLUENZA, HIGH DOSE, 65 YRS +, IM, PF, PREFILL SYR, 0.5ML (FLUZONE HD)       Follow-up:  Return in about 6 months (around 2019) for have labs done prior to appt. PATIENT INSTRUCTIONS:  Patient Instructions   1.  htn stable no changes  2. Atrial fib; Stable on coumadin and cardizem   3. Vit d def;  Stable on current dose   4. Hyperthyroidism;   5  Bilateral knee pain will get injections next week   6. Bilateral peripheral neuropathy;  Stable on gabapentin   7. CKD; You need to drink at least 4 bottles of water a day;    Aparna moseley with Dr Candy Dickson;    8.  HTN;  Stable no changes  9  Chronic venous insufficiency;   Stable for now; covered with coumadin         Electronically signed by MARIELOS Mallory on 2018 at 12:37

## 2018-11-14 DIAGNOSIS — G62.9 PERIPHERAL POLYNEUROPATHY: ICD-10-CM

## 2018-11-14 RX ORDER — GABAPENTIN 300 MG/1
300 CAPSULE ORAL NIGHTLY
Qty: 90 CAPSULE | Refills: 0 | Status: SHIPPED | OUTPATIENT
Start: 2018-11-14 | End: 2019-02-25 | Stop reason: SDUPTHER

## 2018-11-14 NOTE — TELEPHONE ENCOUNTER
Adolfo Hwang called requesting a refill of the below medication which has been pended for you:     Requested Prescriptions     Pending Prescriptions Disp Refills    gabapentin (NEURONTIN) 300 MG capsule 90 capsule 0     Sig: Take 1 capsule by mouth nightly for 30 days. .       Last Appointment Date: 11/9/2018  Next Appointment Date: 5/10/2019    Allergies   Allergen Reactions    Other Anaphylaxis and Itching     Cloth bandaids , causes redness of skin    Ciprofloxacin Itching    Codeine Itching    Perflutren Lipid Microsphere Other (See Comments)     Back pain    Tetanus Toxoids Itching     Itching around site    Tizanidine Hcl Itching    Tetanus Toxoid      Reaction: ITCHING AROUND SITE

## 2018-11-16 ENCOUNTER — ANTI-COAG VISIT (OUTPATIENT)
Dept: INTERNAL MEDICINE | Age: 73
End: 2018-11-16
Payer: MEDICARE

## 2018-11-16 DIAGNOSIS — I48.20 CHRONIC ATRIAL FIBRILLATION (HCC): ICD-10-CM

## 2018-11-16 LAB — INR BLD: 3.6

## 2018-11-16 PROCEDURE — 93793 ANTICOAG MGMT PT WARFARIN: CPT | Performed by: NURSE PRACTITIONER

## 2018-11-24 ENCOUNTER — APPOINTMENT (OUTPATIENT)
Dept: GENERAL RADIOLOGY | Facility: HOSPITAL | Age: 73
End: 2018-11-24

## 2018-11-24 ENCOUNTER — HOSPITAL ENCOUNTER (INPATIENT)
Facility: HOSPITAL | Age: 73
LOS: 5 days | Discharge: HOME HEALTH | End: 2018-11-29
Attending: EMERGENCY MEDICINE | Admitting: FAMILY MEDICINE

## 2018-11-24 DIAGNOSIS — Z86.718 HISTORY OF DVT (DEEP VEIN THROMBOSIS): ICD-10-CM

## 2018-11-24 DIAGNOSIS — Z95.2 S/P TAVR (TRANSCATHETER AORTIC VALVE REPLACEMENT): ICD-10-CM

## 2018-11-24 DIAGNOSIS — A41.9 SEPSIS, DUE TO UNSPECIFIED ORGANISM: ICD-10-CM

## 2018-11-24 DIAGNOSIS — N39.0 ACUTE UTI: Primary | ICD-10-CM

## 2018-11-24 DIAGNOSIS — K21.9 GASTROESOPHAGEAL REFLUX DISEASE, ESOPHAGITIS PRESENCE NOT SPECIFIED: ICD-10-CM

## 2018-11-24 DIAGNOSIS — R53.81 PHYSICAL DECONDITIONING: ICD-10-CM

## 2018-11-24 DIAGNOSIS — Z86.79 HISTORY OF AORTIC VALVULAR STENOSIS: ICD-10-CM

## 2018-11-24 DIAGNOSIS — R06.02 SHORTNESS OF BREATH: ICD-10-CM

## 2018-11-24 DIAGNOSIS — I48.20 CHRONIC ATRIAL FIBRILLATION (HCC): ICD-10-CM

## 2018-11-24 DIAGNOSIS — E55.9 VITAMIN D DEFICIENCY: ICD-10-CM

## 2018-11-24 DIAGNOSIS — Z85.51 HISTORY OF BLADDER CANCER: ICD-10-CM

## 2018-11-24 DIAGNOSIS — Z74.09 IMPAIRED FUNCTIONAL MOBILITY AND ACTIVITY TOLERANCE: ICD-10-CM

## 2018-11-24 DIAGNOSIS — R93.89 ABNORMAL CXR: ICD-10-CM

## 2018-11-24 DIAGNOSIS — G30.9 ALZHEIMER'S DEMENTIA WITH BEHAVIORAL DISTURBANCE, UNSPECIFIED TIMING OF DEMENTIA ONSET: ICD-10-CM

## 2018-11-24 DIAGNOSIS — R78.81 MRSA BACTEREMIA: ICD-10-CM

## 2018-11-24 DIAGNOSIS — E05.90 HYPERTHYROIDISM: ICD-10-CM

## 2018-11-24 DIAGNOSIS — I50.32 CHRONIC DIASTOLIC HEART FAILURE (HCC): ICD-10-CM

## 2018-11-24 DIAGNOSIS — I87.2 VENOUS INSUFFICIENCY: ICD-10-CM

## 2018-11-24 DIAGNOSIS — I82.402 DEEP VEIN THROMBOSIS (DVT) OF LEFT LOWER EXTREMITY, UNSPECIFIED CHRONICITY, UNSPECIFIED VEIN (HCC): ICD-10-CM

## 2018-11-24 DIAGNOSIS — R53.1 WEAKNESS: ICD-10-CM

## 2018-11-24 DIAGNOSIS — I10 ESSENTIAL HYPERTENSION: ICD-10-CM

## 2018-11-24 DIAGNOSIS — B95.62 MRSA BACTEREMIA: ICD-10-CM

## 2018-11-24 DIAGNOSIS — I49.5 SICK SINUS SYNDROME (HCC): ICD-10-CM

## 2018-11-24 DIAGNOSIS — E78.2 MIXED HYPERLIPIDEMIA: ICD-10-CM

## 2018-11-24 DIAGNOSIS — F02.81 ALZHEIMER'S DEMENTIA WITH BEHAVIORAL DISTURBANCE, UNSPECIFIED TIMING OF DEMENTIA ONSET: ICD-10-CM

## 2018-11-24 DIAGNOSIS — Z79.01 CHRONIC ANTICOAGULATION: ICD-10-CM

## 2018-11-24 DIAGNOSIS — B86 SCABIES: ICD-10-CM

## 2018-11-24 DIAGNOSIS — G47.33 OSA (OBSTRUCTIVE SLEEP APNEA): ICD-10-CM

## 2018-11-24 LAB
ALBUMIN SERPL-MCNC: 3.2 G/DL (ref 3.5–5)
ALBUMIN SERPL-MCNC: 3.3 G/DL (ref 3.5–5)
ALBUMIN/GLOB SERPL: 0.9 G/DL (ref 1.1–2.5)
ALBUMIN/GLOB SERPL: 1 G/DL (ref 1.1–2.5)
ALP SERPL-CCNC: 60 U/L (ref 24–120)
ALP SERPL-CCNC: 62 U/L (ref 24–120)
ALT SERPL W P-5'-P-CCNC: 26 U/L (ref 0–54)
ALT SERPL W P-5'-P-CCNC: 29 U/L (ref 0–54)
ANION GAP SERPL CALCULATED.3IONS-SCNC: 10 MMOL/L (ref 4–13)
ANION GAP SERPL CALCULATED.3IONS-SCNC: 8 MMOL/L (ref 4–13)
AST SERPL-CCNC: 24 U/L (ref 7–45)
AST SERPL-CCNC: 28 U/L (ref 7–45)
BACTERIA BLD CULT: ABNORMAL
BACTERIA UR QL AUTO: ABNORMAL /HPF
BASOPHILS # BLD AUTO: 0.05 10*3/MM3 (ref 0–0.2)
BASOPHILS NFR BLD AUTO: 0.2 % (ref 0–2)
BILIRUB SERPL-MCNC: 0.7 MG/DL (ref 0.1–1)
BILIRUB SERPL-MCNC: 1 MG/DL (ref 0.1–1)
BILIRUB UR QL STRIP: NEGATIVE
BUN BLD-MCNC: 43 MG/DL (ref 5–21)
BUN BLD-MCNC: 50 MG/DL (ref 5–21)
BUN/CREAT SERPL: 30.3 (ref 7–25)
BUN/CREAT SERPL: 34.5 (ref 7–25)
CALCIUM SPEC-SCNC: 9 MG/DL (ref 8.4–10.4)
CALCIUM SPEC-SCNC: 9 MG/DL (ref 8.4–10.4)
CHLORIDE SERPL-SCNC: 103 MMOL/L (ref 98–110)
CHLORIDE SERPL-SCNC: 104 MMOL/L (ref 98–110)
CLARITY UR: CLEAR
CO2 SERPL-SCNC: 28 MMOL/L (ref 24–31)
CO2 SERPL-SCNC: 29 MMOL/L (ref 24–31)
COLOR UR: YELLOW
CREAT BLD-MCNC: 1.42 MG/DL (ref 0.5–1.4)
CREAT BLD-MCNC: 1.45 MG/DL (ref 0.5–1.4)
D-LACTATE SERPL-SCNC: 2.2 MMOL/L (ref 0.5–2)
D-LACTATE SERPL-SCNC: 2.2 MMOL/L (ref 0.5–2)
DEPRECATED RDW RBC AUTO: 58.3 FL (ref 40–54)
EOSINOPHIL # BLD AUTO: 0.04 10*3/MM3 (ref 0–0.7)
EOSINOPHIL NFR BLD AUTO: 0.2 % (ref 0–4)
ERYTHROCYTE [DISTWIDTH] IN BLOOD BY AUTOMATED COUNT: 15.9 % (ref 12–15)
GFR SERPL CREATININE-BSD FRML MDRD: 35 ML/MIN/1.73
GFR SERPL CREATININE-BSD FRML MDRD: 36 ML/MIN/1.73
GLOBULIN UR ELPH-MCNC: 3.4 GM/DL
GLOBULIN UR ELPH-MCNC: 3.4 GM/DL
GLUCOSE BLD-MCNC: 203 MG/DL (ref 70–100)
GLUCOSE BLD-MCNC: 225 MG/DL (ref 70–100)
GLUCOSE BLDC GLUCOMTR-MCNC: 182 MG/DL (ref 70–130)
GLUCOSE UR STRIP-MCNC: NEGATIVE MG/DL
HCT VFR BLD AUTO: 41.7 % (ref 37–47)
HGB BLD-MCNC: 13.4 G/DL (ref 12–16)
HGB UR QL STRIP.AUTO: ABNORMAL
HOLD SPECIMEN: NORMAL
HYALINE CASTS UR QL AUTO: ABNORMAL /LPF
IMM GRANULOCYTES # BLD: 0.22 10*3/MM3 (ref 0–0.03)
IMM GRANULOCYTES NFR BLD: 0.9 % (ref 0–5)
INR PPP: 2.79 (ref 0.91–1.09)
INR PPP: 2.97 (ref 0.91–1.09)
INR PPP: NORMAL (ref 0.91–1.09)
KETONES UR QL STRIP: NEGATIVE
L PNEUMO1 AG UR QL IA: NEGATIVE
LEUKOCYTE ESTERASE UR QL STRIP.AUTO: ABNORMAL
LYMPHOCYTES # BLD AUTO: 0.45 10*3/MM3 (ref 0.72–4.86)
LYMPHOCYTES NFR BLD AUTO: 1.9 % (ref 15–45)
MCH RBC QN AUTO: 32.1 PG (ref 28–32)
MCHC RBC AUTO-ENTMCNC: 32.1 G/DL (ref 33–36)
MCV RBC AUTO: 99.8 FL (ref 82–98)
MONOCYTES # BLD AUTO: 2.39 10*3/MM3 (ref 0.19–1.3)
MONOCYTES NFR BLD AUTO: 10.2 % (ref 4–12)
NEUTROPHILS # BLD AUTO: 20.26 10*3/MM3 (ref 1.87–8.4)
NEUTROPHILS NFR BLD AUTO: 86.6 % (ref 39–78)
NITRITE UR QL STRIP: NEGATIVE
NRBC BLD MANUAL-RTO: 0 /100 WBC (ref 0–0)
PH UR STRIP.AUTO: 7 [PH] (ref 5–8)
PLATELET # BLD AUTO: 196 10*3/MM3 (ref 130–400)
PMV BLD AUTO: 9.4 FL (ref 6–12)
POTASSIUM BLD-SCNC: 4.2 MMOL/L (ref 3.5–5.3)
POTASSIUM BLD-SCNC: 4.4 MMOL/L (ref 3.5–5.3)
PROT SERPL-MCNC: 6.6 G/DL (ref 6.3–8.7)
PROT SERPL-MCNC: 6.7 G/DL (ref 6.3–8.7)
PROT UR QL STRIP: ABNORMAL
PROTHROMBIN TIME: 30.5 SECONDS (ref 11.9–14.6)
PROTHROMBIN TIME: 32 SECONDS (ref 11.9–14.6)
PROTHROMBIN TIME: NORMAL SECONDS (ref 11.9–14.6)
RBC # BLD AUTO: 4.18 10*6/MM3 (ref 4.2–5.4)
RBC # UR: ABNORMAL /HPF
REF LAB TEST METHOD: ABNORMAL
S PNEUM AG SPEC QL LA: NEGATIVE
SODIUM BLD-SCNC: 141 MMOL/L (ref 135–145)
SODIUM BLD-SCNC: 141 MMOL/L (ref 135–145)
SP GR UR STRIP: 1.01 (ref 1–1.03)
SQUAMOUS #/AREA URNS HPF: ABNORMAL /HPF
UROBILINOGEN UR QL STRIP: ABNORMAL
WBC NRBC COR # BLD: 23.41 10*3/MM3 (ref 4.8–10.8)
WBC UR QL AUTO: ABNORMAL /HPF
WHOLE BLOOD HOLD SPECIMEN: NORMAL
WHOLE BLOOD HOLD SPECIMEN: NORMAL

## 2018-11-24 PROCEDURE — 87086 URINE CULTURE/COLONY COUNT: CPT | Performed by: EMERGENCY MEDICINE

## 2018-11-24 PROCEDURE — 87147 CULTURE TYPE IMMUNOLOGIC: CPT | Performed by: FAMILY MEDICINE

## 2018-11-24 PROCEDURE — 94760 N-INVAS EAR/PLS OXIMETRY 1: CPT

## 2018-11-24 PROCEDURE — 71045 X-RAY EXAM CHEST 1 VIEW: CPT

## 2018-11-24 PROCEDURE — 87899 AGENT NOS ASSAY W/OPTIC: CPT | Performed by: FAMILY MEDICINE

## 2018-11-24 PROCEDURE — 87077 CULTURE AEROBIC IDENTIFY: CPT | Performed by: EMERGENCY MEDICINE

## 2018-11-24 PROCEDURE — 99285 EMERGENCY DEPT VISIT HI MDM: CPT

## 2018-11-24 PROCEDURE — 81001 URINALYSIS AUTO W/SCOPE: CPT | Performed by: EMERGENCY MEDICINE

## 2018-11-24 PROCEDURE — 80053 COMPREHEN METABOLIC PANEL: CPT | Performed by: EMERGENCY MEDICINE

## 2018-11-24 PROCEDURE — 85025 COMPLETE CBC W/AUTO DIFF WBC: CPT | Performed by: EMERGENCY MEDICINE

## 2018-11-24 PROCEDURE — 87088 URINE BACTERIA CULTURE: CPT | Performed by: EMERGENCY MEDICINE

## 2018-11-24 PROCEDURE — 87150 DNA/RNA AMPLIFIED PROBE: CPT | Performed by: EMERGENCY MEDICINE

## 2018-11-24 PROCEDURE — 87040 BLOOD CULTURE FOR BACTERIA: CPT | Performed by: EMERGENCY MEDICINE

## 2018-11-24 PROCEDURE — 87581 M.PNEUMON DNA AMP PROBE: CPT | Performed by: FAMILY MEDICINE

## 2018-11-24 PROCEDURE — 94799 UNLISTED PULMONARY SVC/PX: CPT

## 2018-11-24 PROCEDURE — 82962 GLUCOSE BLOOD TEST: CPT

## 2018-11-24 PROCEDURE — 85610 PROTHROMBIN TIME: CPT | Performed by: EMERGENCY MEDICINE

## 2018-11-24 PROCEDURE — 87185 SC STD ENZYME DETCJ PER NZM: CPT | Performed by: EMERGENCY MEDICINE

## 2018-11-24 PROCEDURE — 87147 CULTURE TYPE IMMUNOLOGIC: CPT | Performed by: EMERGENCY MEDICINE

## 2018-11-24 PROCEDURE — 83605 ASSAY OF LACTIC ACID: CPT | Performed by: EMERGENCY MEDICINE

## 2018-11-24 PROCEDURE — 87186 SC STD MICRODIL/AGAR DIL: CPT | Performed by: EMERGENCY MEDICINE

## 2018-11-24 PROCEDURE — 87040 BLOOD CULTURE FOR BACTERIA: CPT | Performed by: FAMILY MEDICINE

## 2018-11-24 PROCEDURE — 80053 COMPREHEN METABOLIC PANEL: CPT | Performed by: FAMILY MEDICINE

## 2018-11-24 PROCEDURE — 87184 SC STD DISK METHOD PER PLATE: CPT | Performed by: EMERGENCY MEDICINE

## 2018-11-24 PROCEDURE — 85610 PROTHROMBIN TIME: CPT | Performed by: FAMILY MEDICINE

## 2018-11-24 PROCEDURE — 25010000002 CEFTRIAXONE PER 250 MG: Performed by: EMERGENCY MEDICINE

## 2018-11-24 PROCEDURE — 87184 SC STD DISK METHOD PER PLATE: CPT | Performed by: FAMILY MEDICINE

## 2018-11-24 RX ORDER — ACETAMINOPHEN 325 MG/1
650 TABLET ORAL EVERY 4 HOURS PRN
Status: DISCONTINUED | OUTPATIENT
Start: 2018-11-24 | End: 2018-11-29 | Stop reason: HOSPADM

## 2018-11-24 RX ORDER — WARFARIN SODIUM 5 MG/1
5 TABLET ORAL
Status: ON HOLD | COMMUNITY
End: 2020-11-16 | Stop reason: SDUPTHER

## 2018-11-24 RX ORDER — WARFARIN SODIUM 5 MG/1
5 TABLET ORAL
Status: DISCONTINUED | OUTPATIENT
Start: 2018-11-25 | End: 2018-11-29 | Stop reason: HOSPADM

## 2018-11-24 RX ORDER — ASPIRIN 81 MG/1
81 TABLET ORAL DAILY
Status: DISCONTINUED | OUTPATIENT
Start: 2018-11-24 | End: 2018-11-29 | Stop reason: HOSPADM

## 2018-11-24 RX ORDER — ESCITALOPRAM OXALATE 10 MG/1
10 TABLET ORAL DAILY
Status: DISCONTINUED | OUTPATIENT
Start: 2018-11-24 | End: 2018-11-29 | Stop reason: HOSPADM

## 2018-11-24 RX ORDER — ACETAMINOPHEN 500 MG
1000 TABLET ORAL ONCE
Status: COMPLETED | OUTPATIENT
Start: 2018-11-24 | End: 2018-11-24

## 2018-11-24 RX ORDER — SODIUM CHLORIDE 0.9 % (FLUSH) 0.9 %
10 SYRINGE (ML) INJECTION AS NEEDED
Status: DISCONTINUED | OUTPATIENT
Start: 2018-11-24 | End: 2018-11-29 | Stop reason: HOSPADM

## 2018-11-24 RX ORDER — DONEPEZIL HYDROCHLORIDE 5 MG/1
5 TABLET, FILM COATED ORAL NIGHTLY
Status: DISCONTINUED | OUTPATIENT
Start: 2018-11-24 | End: 2018-11-29 | Stop reason: HOSPADM

## 2018-11-24 RX ORDER — PANTOPRAZOLE SODIUM 40 MG/1
40 TABLET, DELAYED RELEASE ORAL DAILY
Status: DISCONTINUED | OUTPATIENT
Start: 2018-11-24 | End: 2018-11-29 | Stop reason: HOSPADM

## 2018-11-24 RX ORDER — CALCITRIOL 0.25 UG/1
0.25 CAPSULE, LIQUID FILLED ORAL DAILY
Status: DISCONTINUED | OUTPATIENT
Start: 2018-11-24 | End: 2018-11-29 | Stop reason: HOSPADM

## 2018-11-24 RX ORDER — FUROSEMIDE 40 MG/1
40 TABLET ORAL DAILY
Status: DISCONTINUED | OUTPATIENT
Start: 2018-11-24 | End: 2018-11-29 | Stop reason: HOSPADM

## 2018-11-24 RX ORDER — GABAPENTIN 300 MG/1
300 CAPSULE ORAL NIGHTLY
Status: DISCONTINUED | OUTPATIENT
Start: 2018-11-24 | End: 2018-11-29 | Stop reason: HOSPADM

## 2018-11-24 RX ORDER — DILTIAZEM HYDROCHLORIDE 180 MG/1
180 CAPSULE, COATED, EXTENDED RELEASE ORAL
Status: DISCONTINUED | OUTPATIENT
Start: 2018-11-24 | End: 2018-11-29 | Stop reason: HOSPADM

## 2018-11-24 RX ORDER — WARFARIN SODIUM 7.5 MG/1
7.5 TABLET ORAL
Status: DISCONTINUED | OUTPATIENT
Start: 2018-11-27 | End: 2018-11-29 | Stop reason: HOSPADM

## 2018-11-24 RX ORDER — METOPROLOL TARTRATE 50 MG/1
50 TABLET, FILM COATED ORAL EVERY 12 HOURS SCHEDULED
Status: DISCONTINUED | OUTPATIENT
Start: 2018-11-24 | End: 2018-11-29 | Stop reason: HOSPADM

## 2018-11-24 RX ORDER — METHIMAZOLE 10 MG/1
10 TABLET ORAL DAILY
Status: DISCONTINUED | OUTPATIENT
Start: 2018-11-24 | End: 2018-11-29 | Stop reason: HOSPADM

## 2018-11-24 RX ADMIN — METOPROLOL TARTRATE 50 MG: 50 TABLET ORAL at 21:42

## 2018-11-24 RX ADMIN — GABAPENTIN 300 MG: 300 CAPSULE ORAL at 20:05

## 2018-11-24 RX ADMIN — DOXYCYCLINE 100 MG: 100 INJECTION, POWDER, LYOPHILIZED, FOR SOLUTION INTRAVENOUS at 22:16

## 2018-11-24 RX ADMIN — DOXYCYCLINE 100 MG: 100 INJECTION, POWDER, LYOPHILIZED, FOR SOLUTION INTRAVENOUS at 11:34

## 2018-11-24 RX ADMIN — ACETAMINOPHEN 650 MG: 325 TABLET, FILM COATED ORAL at 21:51

## 2018-11-24 RX ADMIN — DONEPEZIL HYDROCHLORIDE 5 MG: 5 TABLET, FILM COATED ORAL at 21:42

## 2018-11-24 RX ADMIN — CEFTRIAXONE SODIUM 1 G: 1 INJECTION, POWDER, FOR SOLUTION INTRAMUSCULAR; INTRAVENOUS at 07:55

## 2018-11-24 RX ADMIN — ACETAMINOPHEN 1000 MG: 500 TABLET ORAL at 08:29

## 2018-11-25 LAB
ALBUMIN SERPL-MCNC: 3.1 G/DL (ref 3.5–5)
ALBUMIN/GLOB SERPL: 0.9 G/DL (ref 1.1–2.5)
ALP SERPL-CCNC: 60 U/L (ref 24–120)
ALT SERPL W P-5'-P-CCNC: 30 U/L (ref 0–54)
ANION GAP SERPL CALCULATED.3IONS-SCNC: 9 MMOL/L (ref 4–13)
AST SERPL-CCNC: 21 U/L (ref 7–45)
BASOPHILS # BLD AUTO: 0.01 10*3/MM3 (ref 0–0.2)
BASOPHILS NFR BLD AUTO: 0.1 % (ref 0–2)
BILIRUB SERPL-MCNC: 0.5 MG/DL (ref 0.1–1)
BUN BLD-MCNC: 42 MG/DL (ref 5–21)
BUN/CREAT SERPL: 38.5 (ref 7–25)
CALCIUM SPEC-SCNC: 9.2 MG/DL (ref 8.4–10.4)
CHLORIDE SERPL-SCNC: 104 MMOL/L (ref 98–110)
CO2 SERPL-SCNC: 29 MMOL/L (ref 24–31)
CREAT BLD-MCNC: 1.09 MG/DL (ref 0.5–1.4)
DEPRECATED RDW RBC AUTO: 59.7 FL (ref 40–54)
EOSINOPHIL # BLD AUTO: 0.13 10*3/MM3 (ref 0–0.7)
EOSINOPHIL NFR BLD AUTO: 0.9 % (ref 0–4)
ERYTHROCYTE [DISTWIDTH] IN BLOOD BY AUTOMATED COUNT: 16.1 % (ref 12–15)
GFR SERPL CREATININE-BSD FRML MDRD: 49 ML/MIN/1.73
GLOBULIN UR ELPH-MCNC: 3.3 GM/DL
GLUCOSE BLD-MCNC: 133 MG/DL (ref 70–100)
HCT VFR BLD AUTO: 38.6 % (ref 37–47)
HGB BLD-MCNC: 12.3 G/DL (ref 12–16)
IMM GRANULOCYTES # BLD: 0.09 10*3/MM3 (ref 0–0.03)
IMM GRANULOCYTES NFR BLD: 0.6 % (ref 0–5)
INR PPP: 3.23 (ref 0.91–1.09)
LYMPHOCYTES # BLD AUTO: 0.78 10*3/MM3 (ref 0.72–4.86)
LYMPHOCYTES NFR BLD AUTO: 5.5 % (ref 15–45)
MCH RBC QN AUTO: 32 PG (ref 28–32)
MCHC RBC AUTO-ENTMCNC: 31.9 G/DL (ref 33–36)
MCV RBC AUTO: 100.5 FL (ref 82–98)
MONOCYTES # BLD AUTO: 1.56 10*3/MM3 (ref 0.19–1.3)
MONOCYTES NFR BLD AUTO: 10.9 % (ref 4–12)
NEUTROPHILS # BLD AUTO: 11.74 10*3/MM3 (ref 1.87–8.4)
NEUTROPHILS NFR BLD AUTO: 82 % (ref 39–78)
NRBC BLD MANUAL-RTO: 0 /100 WBC (ref 0–0)
PLATELET # BLD AUTO: 154 10*3/MM3 (ref 130–400)
PMV BLD AUTO: 10.7 FL (ref 6–12)
POTASSIUM BLD-SCNC: 4.5 MMOL/L (ref 3.5–5.3)
PROT SERPL-MCNC: 6.4 G/DL (ref 6.3–8.7)
PROTHROMBIN TIME: 34.2 SECONDS (ref 11.9–14.6)
RBC # BLD AUTO: 3.84 10*6/MM3 (ref 4.2–5.4)
SODIUM BLD-SCNC: 142 MMOL/L (ref 135–145)
WBC NRBC COR # BLD: 14.31 10*3/MM3 (ref 4.8–10.8)

## 2018-11-25 PROCEDURE — 87040 BLOOD CULTURE FOR BACTERIA: CPT | Performed by: INTERNAL MEDICINE

## 2018-11-25 PROCEDURE — 87186 SC STD MICRODIL/AGAR DIL: CPT | Performed by: INTERNAL MEDICINE

## 2018-11-25 PROCEDURE — 85610 PROTHROMBIN TIME: CPT | Performed by: FAMILY MEDICINE

## 2018-11-25 PROCEDURE — 25010000002 CEFTRIAXONE PER 250 MG: Performed by: FAMILY MEDICINE

## 2018-11-25 PROCEDURE — 80053 COMPREHEN METABOLIC PANEL: CPT | Performed by: FAMILY MEDICINE

## 2018-11-25 PROCEDURE — 87147 CULTURE TYPE IMMUNOLOGIC: CPT | Performed by: INTERNAL MEDICINE

## 2018-11-25 PROCEDURE — 87185 SC STD ENZYME DETCJ PER NZM: CPT | Performed by: INTERNAL MEDICINE

## 2018-11-25 PROCEDURE — 25010000002 VANCOMYCIN 10 G RECONSTITUTED SOLUTION: Performed by: FAMILY MEDICINE

## 2018-11-25 PROCEDURE — 85025 COMPLETE CBC W/AUTO DIFF WBC: CPT | Performed by: FAMILY MEDICINE

## 2018-11-25 PROCEDURE — 94760 N-INVAS EAR/PLS OXIMETRY 1: CPT

## 2018-11-25 PROCEDURE — 94799 UNLISTED PULMONARY SVC/PX: CPT

## 2018-11-25 PROCEDURE — 87205 SMEAR GRAM STAIN: CPT | Performed by: INTERNAL MEDICINE

## 2018-11-25 RX ADMIN — CEFTRIAXONE SODIUM 1 G: 1 INJECTION, POWDER, FOR SOLUTION INTRAMUSCULAR; INTRAVENOUS at 09:07

## 2018-11-25 RX ADMIN — PANTOPRAZOLE SODIUM 40 MG: 40 TABLET, DELAYED RELEASE ORAL at 09:13

## 2018-11-25 RX ADMIN — DOXYCYCLINE 100 MG: 100 INJECTION, POWDER, LYOPHILIZED, FOR SOLUTION INTRAVENOUS at 12:01

## 2018-11-25 RX ADMIN — ASPIRIN 81 MG: 81 TABLET ORAL at 09:08

## 2018-11-25 RX ADMIN — FUROSEMIDE 40 MG: 40 TABLET ORAL at 09:08

## 2018-11-25 RX ADMIN — ESCITALOPRAM 10 MG: 10 TABLET, FILM COATED ORAL at 09:08

## 2018-11-25 RX ADMIN — METOPROLOL TARTRATE 50 MG: 50 TABLET ORAL at 09:08

## 2018-11-25 RX ADMIN — METHIMAZOLE 10 MG: 10 TABLET ORAL at 09:08

## 2018-11-25 RX ADMIN — DILTIAZEM HYDROCHLORIDE 180 MG: 180 CAPSULE, COATED, EXTENDED RELEASE ORAL at 09:08

## 2018-11-25 RX ADMIN — GABAPENTIN 300 MG: 300 CAPSULE ORAL at 20:08

## 2018-11-25 RX ADMIN — VANCOMYCIN HYDROCHLORIDE 1500 MG: 10 INJECTION, POWDER, LYOPHILIZED, FOR SOLUTION INTRAVENOUS at 18:42

## 2018-11-25 RX ADMIN — CALCITRIOL 0.25 MCG: 0.25 CAPSULE ORAL at 09:08

## 2018-11-25 RX ADMIN — METOPROLOL TARTRATE 50 MG: 50 TABLET ORAL at 20:08

## 2018-11-25 RX ADMIN — DONEPEZIL HYDROCHLORIDE 5 MG: 5 TABLET, FILM COATED ORAL at 20:07

## 2018-11-26 LAB
ALBUMIN SERPL-MCNC: 3 G/DL (ref 3.5–5)
ALBUMIN/GLOB SERPL: 1 G/DL (ref 1.1–2.5)
ALP SERPL-CCNC: 62 U/L (ref 24–120)
ALT SERPL W P-5'-P-CCNC: 25 U/L (ref 0–54)
ANION GAP SERPL CALCULATED.3IONS-SCNC: 11 MMOL/L (ref 4–13)
AST SERPL-CCNC: 13 U/L (ref 7–45)
B-LACTAMASE USUAL SUSC ISLT: POSITIVE
BACTERIA SPEC AEROBE CULT: ABNORMAL
BASOPHILS # BLD AUTO: 0.02 10*3/MM3 (ref 0–0.2)
BASOPHILS NFR BLD AUTO: 0.2 % (ref 0–2)
BILIRUB SERPL-MCNC: 0.5 MG/DL (ref 0.1–1)
BUN BLD-MCNC: 42 MG/DL (ref 5–21)
BUN/CREAT SERPL: 37.2 (ref 7–25)
CALCIUM SPEC-SCNC: 9.6 MG/DL (ref 8.4–10.4)
CHLORIDE SERPL-SCNC: 105 MMOL/L (ref 98–110)
CO2 SERPL-SCNC: 28 MMOL/L (ref 24–31)
CREAT BLD-MCNC: 1.13 MG/DL (ref 0.5–1.4)
DEPRECATED RDW RBC AUTO: 60.3 FL (ref 40–54)
EOSINOPHIL # BLD AUTO: 0.12 10*3/MM3 (ref 0–0.7)
EOSINOPHIL NFR BLD AUTO: 1 % (ref 0–4)
ERYTHROCYTE [DISTWIDTH] IN BLOOD BY AUTOMATED COUNT: 16 % (ref 12–15)
GFR SERPL CREATININE-BSD FRML MDRD: 47 ML/MIN/1.73
GLOBULIN UR ELPH-MCNC: 3.1 GM/DL
GLUCOSE BLD-MCNC: 138 MG/DL (ref 70–100)
GRAM STN SPEC: ABNORMAL
HCT VFR BLD AUTO: 38.8 % (ref 37–47)
HGB BLD-MCNC: 12.2 G/DL (ref 12–16)
IMM GRANULOCYTES # BLD: 0.08 10*3/MM3 (ref 0–0.03)
IMM GRANULOCYTES NFR BLD: 0.6 % (ref 0–5)
INR PPP: 1.58 (ref 0.91–1.09)
ISOLATED FROM: ABNORMAL
LYMPHOCYTES # BLD AUTO: 0.77 10*3/MM3 (ref 0.72–4.86)
LYMPHOCYTES NFR BLD AUTO: 6.1 % (ref 15–45)
MCH RBC QN AUTO: 32 PG (ref 28–32)
MCHC RBC AUTO-ENTMCNC: 31.4 G/DL (ref 33–36)
MCV RBC AUTO: 101.8 FL (ref 82–98)
MONOCYTES # BLD AUTO: 1.3 10*3/MM3 (ref 0.19–1.3)
MONOCYTES NFR BLD AUTO: 10.4 % (ref 4–12)
NEUTROPHILS # BLD AUTO: 10.25 10*3/MM3 (ref 1.87–8.4)
NEUTROPHILS NFR BLD AUTO: 81.7 % (ref 39–78)
NRBC BLD MANUAL-RTO: 0 /100 WBC (ref 0–0)
PLATELET # BLD AUTO: 169 10*3/MM3 (ref 130–400)
PMV BLD AUTO: 10.3 FL (ref 6–12)
POTASSIUM BLD-SCNC: 4.5 MMOL/L (ref 3.5–5.3)
PROT SERPL-MCNC: 6.1 G/DL (ref 6.3–8.7)
PROTHROMBIN TIME: 19.4 SECONDS (ref 11.9–14.6)
RBC # BLD AUTO: 3.81 10*6/MM3 (ref 4.2–5.4)
SODIUM BLD-SCNC: 144 MMOL/L (ref 135–145)
WBC NRBC COR # BLD: 12.54 10*3/MM3 (ref 4.8–10.8)

## 2018-11-26 PROCEDURE — 87040 BLOOD CULTURE FOR BACTERIA: CPT | Performed by: INTERNAL MEDICINE

## 2018-11-26 PROCEDURE — 80053 COMPREHEN METABOLIC PANEL: CPT | Performed by: FAMILY MEDICINE

## 2018-11-26 PROCEDURE — 25010000002 CEFTRIAXONE PER 250 MG: Performed by: FAMILY MEDICINE

## 2018-11-26 PROCEDURE — 85025 COMPLETE CBC W/AUTO DIFF WBC: CPT | Performed by: FAMILY MEDICINE

## 2018-11-26 PROCEDURE — 25010000002 VANCOMYCIN 10 G RECONSTITUTED SOLUTION: Performed by: FAMILY MEDICINE

## 2018-11-26 PROCEDURE — 85610 PROTHROMBIN TIME: CPT | Performed by: FAMILY MEDICINE

## 2018-11-26 RX ORDER — BUPIVACAINE HCL/0.9 % NACL/PF 0.1 %
2 PLASTIC BAG, INJECTION (ML) EPIDURAL EVERY 8 HOURS SCHEDULED
Status: DISCONTINUED | OUTPATIENT
Start: 2018-11-27 | End: 2018-11-29 | Stop reason: HOSPADM

## 2018-11-26 RX ADMIN — WARFARIN SODIUM 5 MG: 5 TABLET ORAL at 18:09

## 2018-11-26 RX ADMIN — ACETAMINOPHEN 650 MG: 325 TABLET, FILM COATED ORAL at 14:37

## 2018-11-26 RX ADMIN — CALCITRIOL 0.25 MCG: 0.25 CAPSULE ORAL at 08:32

## 2018-11-26 RX ADMIN — CEFTRIAXONE SODIUM 1 G: 1 INJECTION, POWDER, FOR SOLUTION INTRAMUSCULAR; INTRAVENOUS at 08:32

## 2018-11-26 RX ADMIN — ASPIRIN 81 MG: 81 TABLET ORAL at 08:32

## 2018-11-26 RX ADMIN — METHIMAZOLE 10 MG: 10 TABLET ORAL at 08:32

## 2018-11-26 RX ADMIN — METOPROLOL TARTRATE 50 MG: 50 TABLET ORAL at 20:32

## 2018-11-26 RX ADMIN — DILTIAZEM HYDROCHLORIDE 180 MG: 180 CAPSULE, COATED, EXTENDED RELEASE ORAL at 08:32

## 2018-11-26 RX ADMIN — GABAPENTIN 300 MG: 300 CAPSULE ORAL at 20:32

## 2018-11-26 RX ADMIN — METOPROLOL TARTRATE 50 MG: 50 TABLET ORAL at 08:32

## 2018-11-26 RX ADMIN — DONEPEZIL HYDROCHLORIDE 5 MG: 5 TABLET, FILM COATED ORAL at 20:32

## 2018-11-26 RX ADMIN — ESCITALOPRAM 10 MG: 10 TABLET, FILM COATED ORAL at 08:32

## 2018-11-26 RX ADMIN — PANTOPRAZOLE SODIUM 40 MG: 40 TABLET, DELAYED RELEASE ORAL at 08:42

## 2018-11-26 RX ADMIN — VANCOMYCIN HYDROCHLORIDE 750 MG: 10 INJECTION, POWDER, LYOPHILIZED, FOR SOLUTION INTRAVENOUS at 17:55

## 2018-11-26 RX ADMIN — ACETAMINOPHEN 650 MG: 325 TABLET, FILM COATED ORAL at 20:32

## 2018-11-27 ENCOUNTER — APPOINTMENT (OUTPATIENT)
Dept: CARDIOLOGY | Facility: HOSPITAL | Age: 73
End: 2018-11-27
Attending: FAMILY MEDICINE

## 2018-11-27 LAB
ALBUMIN SERPL-MCNC: 2.7 G/DL (ref 3.5–5)
ALBUMIN/GLOB SERPL: 0.9 G/DL (ref 1.1–2.5)
ALP SERPL-CCNC: 56 U/L (ref 24–120)
ALT SERPL W P-5'-P-CCNC: 23 U/L (ref 0–54)
ANION GAP SERPL CALCULATED.3IONS-SCNC: 4 MMOL/L (ref 4–13)
AST SERPL-CCNC: 9 U/L (ref 7–45)
BACTERIA SPEC AEROBE CULT: ABNORMAL
BASOPHILS # BLD AUTO: 0.01 10*3/MM3 (ref 0–0.2)
BASOPHILS NFR BLD AUTO: 0.1 % (ref 0–2)
BH CV ECHO MEAS - AO MAX PG (FULL): 23.3 MMHG
BH CV ECHO MEAS - AO MAX PG: 28.9 MMHG
BH CV ECHO MEAS - AO MEAN PG (FULL): 14 MMHG
BH CV ECHO MEAS - AO MEAN PG: 17 MMHG
BH CV ECHO MEAS - AO ROOT AREA (BSA CORRECTED): 1.2
BH CV ECHO MEAS - AO ROOT AREA: 6.2 CM^2
BH CV ECHO MEAS - AO ROOT DIAM: 2.8 CM
BH CV ECHO MEAS - AO V2 MAX: 269 CM/SEC
BH CV ECHO MEAS - AO V2 MEAN: 191 CM/SEC
BH CV ECHO MEAS - AO V2 VTI: 58.2 CM
BH CV ECHO MEAS - AVA(I,A): 1.5 CM^2
BH CV ECHO MEAS - AVA(I,D): 1.5 CM^2
BH CV ECHO MEAS - AVA(V,A): 1.7 CM^2
BH CV ECHO MEAS - AVA(V,D): 1.7 CM^2
BH CV ECHO MEAS - BSA(HAYCOCK): 2.4 M^2
BH CV ECHO MEAS - BSA: 2.2 M^2
BH CV ECHO MEAS - BZI_BMI: 37.9 KILOGRAMS/M^2
BH CV ECHO MEAS - BZI_METRIC_HEIGHT: 172.7 CM
BH CV ECHO MEAS - BZI_METRIC_WEIGHT: 112.9 KG
BH CV ECHO MEAS - EDV(CUBED): 68.9 ML
BH CV ECHO MEAS - EDV(MOD-SP4): 98.3 ML
BH CV ECHO MEAS - EDV(TEICH): 74.2 ML
BH CV ECHO MEAS - EF(CUBED): 60.8 %
BH CV ECHO MEAS - EF(MOD-SP4): 54 %
BH CV ECHO MEAS - EF(TEICH): 52.8 %
BH CV ECHO MEAS - ESV(CUBED): 27 ML
BH CV ECHO MEAS - ESV(MOD-SP4): 45.2 ML
BH CV ECHO MEAS - ESV(TEICH): 35 ML
BH CV ECHO MEAS - FS: 26.8 %
BH CV ECHO MEAS - IVS/LVPW: 0.94
BH CV ECHO MEAS - IVSD: 1.6 CM
BH CV ECHO MEAS - LA DIMENSION: 4.8 CM
BH CV ECHO MEAS - LA/AO: 1.7
BH CV ECHO MEAS - LAT PEAK E' VEL: 10.9 CM/SEC
BH CV ECHO MEAS - LV DIASTOLIC VOL/BSA (35-75): 43.8 ML/M^2
BH CV ECHO MEAS - LV MASS(C)D: 280.4 GRAMS
BH CV ECHO MEAS - LV MASS(C)DI: 125 GRAMS/M^2
BH CV ECHO MEAS - LV MAX PG: 5.7 MMHG
BH CV ECHO MEAS - LV MEAN PG: 3 MMHG
BH CV ECHO MEAS - LV SYSTOLIC VOL/BSA (12-30): 20.1 ML/M^2
BH CV ECHO MEAS - LV V1 MAX: 119 CM/SEC
BH CV ECHO MEAS - LV V1 MEAN: 78.5 CM/SEC
BH CV ECHO MEAS - LV V1 VTI: 23.1 CM
BH CV ECHO MEAS - LVIDD: 4.1 CM
BH CV ECHO MEAS - LVIDS: 3 CM
BH CV ECHO MEAS - LVLD AP4: 8.3 CM
BH CV ECHO MEAS - LVLS AP4: 6.8 CM
BH CV ECHO MEAS - LVOT AREA (M): 3.8 CM^2
BH CV ECHO MEAS - LVOT AREA: 3.8 CM^2
BH CV ECHO MEAS - LVOT DIAM: 2.2 CM
BH CV ECHO MEAS - LVPWD: 1.7 CM
BH CV ECHO MEAS - MED PEAK E' VEL: 6.3 CM/SEC
BH CV ECHO MEAS - MV DEC TIME: 0.16 SEC
BH CV ECHO MEAS - MV E MAX VEL: 134 CM/SEC
BH CV ECHO MEAS - SI(AO): 159.7 ML/M^2
BH CV ECHO MEAS - SI(CUBED): 18.7 ML/M^2
BH CV ECHO MEAS - SI(LVOT): 39.1 ML/M^2
BH CV ECHO MEAS - SI(MOD-SP4): 23.7 ML/M^2
BH CV ECHO MEAS - SI(TEICH): 17.5 ML/M^2
BH CV ECHO MEAS - SV(AO): 358.4 ML
BH CV ECHO MEAS - SV(CUBED): 41.9 ML
BH CV ECHO MEAS - SV(LVOT): 87.8 ML
BH CV ECHO MEAS - SV(MOD-SP4): 53.1 ML
BH CV ECHO MEAS - SV(TEICH): 39.2 ML
BH CV ECHO MEASUREMENTS AVERAGE E/E' RATIO: 15.58
BILIRUB SERPL-MCNC: 0.4 MG/DL (ref 0.1–1)
BUN BLD-MCNC: 40 MG/DL (ref 5–21)
BUN/CREAT SERPL: 37 (ref 7–25)
CALCIUM SPEC-SCNC: 9.2 MG/DL (ref 8.4–10.4)
CHLORIDE SERPL-SCNC: 110 MMOL/L (ref 98–110)
CO2 SERPL-SCNC: 28 MMOL/L (ref 24–31)
CREAT BLD-MCNC: 1.08 MG/DL (ref 0.5–1.4)
DEPRECATED RDW RBC AUTO: 59.4 FL (ref 40–54)
EOSINOPHIL # BLD AUTO: 0.12 10*3/MM3 (ref 0–0.7)
EOSINOPHIL NFR BLD AUTO: 1.3 % (ref 0–4)
ERYTHROCYTE [DISTWIDTH] IN BLOOD BY AUTOMATED COUNT: 15.8 % (ref 12–15)
GFR SERPL CREATININE-BSD FRML MDRD: 50 ML/MIN/1.73
GLOBULIN UR ELPH-MCNC: 3.1 GM/DL
GLUCOSE BLD-MCNC: 134 MG/DL (ref 70–100)
GRAM STN SPEC: ABNORMAL
GRAM STN SPEC: ABNORMAL
HCT VFR BLD AUTO: 35.2 % (ref 37–47)
HGB BLD-MCNC: 11.3 G/DL (ref 12–16)
IMM GRANULOCYTES # BLD: 0.04 10*3/MM3 (ref 0–0.03)
IMM GRANULOCYTES NFR BLD: 0.4 % (ref 0–5)
INR PPP: 1.31 (ref 0.91–1.09)
ISOLATED FROM: ABNORMAL
ISOLATED FROM: ABNORMAL
LEFT ATRIUM VOLUME INDEX: 67.4 ML/M2
LEFT ATRIUM VOLUME: 151 CM3
LV EF 2D ECHO EST: 55 %
LYMPHOCYTES # BLD AUTO: 0.93 10*3/MM3 (ref 0.72–4.86)
LYMPHOCYTES NFR BLD AUTO: 9.7 % (ref 15–45)
MAXIMAL PREDICTED HEART RATE: 146 BPM
MCH RBC QN AUTO: 32.5 PG (ref 28–32)
MCHC RBC AUTO-ENTMCNC: 32.1 G/DL (ref 33–36)
MCV RBC AUTO: 101.1 FL (ref 82–98)
MONOCYTES # BLD AUTO: 1.05 10*3/MM3 (ref 0.19–1.3)
MONOCYTES NFR BLD AUTO: 11 % (ref 4–12)
NEUTROPHILS # BLD AUTO: 7.43 10*3/MM3 (ref 1.87–8.4)
NEUTROPHILS NFR BLD AUTO: 77.5 % (ref 39–78)
NRBC BLD MANUAL-RTO: 0 /100 WBC (ref 0–0)
PLATELET # BLD AUTO: 163 10*3/MM3 (ref 130–400)
PMV BLD AUTO: 9.8 FL (ref 6–12)
POTASSIUM BLD-SCNC: 4.6 MMOL/L (ref 3.5–5.3)
PROT SERPL-MCNC: 5.8 G/DL (ref 6.3–8.7)
PROTHROMBIN TIME: 16.7 SECONDS (ref 11.9–14.6)
RBC # BLD AUTO: 3.48 10*6/MM3 (ref 4.2–5.4)
SODIUM BLD-SCNC: 142 MMOL/L (ref 135–145)
STRESS TARGET HR: 124 BPM
WBC NRBC COR # BLD: 9.58 10*3/MM3 (ref 4.8–10.8)

## 2018-11-27 PROCEDURE — G8989 SELF CARE D/C STATUS: HCPCS

## 2018-11-27 PROCEDURE — 85025 COMPLETE CBC W/AUTO DIFF WBC: CPT | Performed by: FAMILY MEDICINE

## 2018-11-27 PROCEDURE — 94799 UNLISTED PULMONARY SVC/PX: CPT

## 2018-11-27 PROCEDURE — G8988 SELF CARE GOAL STATUS: HCPCS

## 2018-11-27 PROCEDURE — 85610 PROTHROMBIN TIME: CPT | Performed by: FAMILY MEDICINE

## 2018-11-27 PROCEDURE — 80053 COMPREHEN METABOLIC PANEL: CPT | Performed by: FAMILY MEDICINE

## 2018-11-27 PROCEDURE — 97165 OT EVAL LOW COMPLEX 30 MIN: CPT

## 2018-11-27 PROCEDURE — 93306 TTE W/DOPPLER COMPLETE: CPT | Performed by: INTERNAL MEDICINE

## 2018-11-27 PROCEDURE — G8979 MOBILITY GOAL STATUS: HCPCS

## 2018-11-27 PROCEDURE — G8987 SELF CARE CURRENT STATUS: HCPCS

## 2018-11-27 PROCEDURE — 25010000002 CEFAZOLIN PER 500 MG: Performed by: INTERNAL MEDICINE

## 2018-11-27 PROCEDURE — 93306 TTE W/DOPPLER COMPLETE: CPT

## 2018-11-27 PROCEDURE — G8978 MOBILITY CURRENT STATUS: HCPCS

## 2018-11-27 PROCEDURE — 87040 BLOOD CULTURE FOR BACTERIA: CPT | Performed by: INTERNAL MEDICINE

## 2018-11-27 PROCEDURE — 97161 PT EVAL LOW COMPLEX 20 MIN: CPT

## 2018-11-27 PROCEDURE — 25010000002 PERFLUTREN PROTEIN A MICROSPH SUSPENSION: Performed by: FAMILY MEDICINE

## 2018-11-27 RX ADMIN — DONEPEZIL HYDROCHLORIDE 5 MG: 5 TABLET, FILM COATED ORAL at 21:13

## 2018-11-27 RX ADMIN — SODIUM CHLORIDE 2 G: 9 INJECTION, SOLUTION INTRAVENOUS at 06:05

## 2018-11-27 RX ADMIN — GABAPENTIN 300 MG: 300 CAPSULE ORAL at 21:13

## 2018-11-27 RX ADMIN — HUMAN ALBUMIN MICROSPHERES AND PERFLUTREN 0.66 MG: 10; .22 INJECTION, SOLUTION INTRAVENOUS at 07:39

## 2018-11-27 RX ADMIN — PANTOPRAZOLE SODIUM 40 MG: 40 TABLET, DELAYED RELEASE ORAL at 09:24

## 2018-11-27 RX ADMIN — SODIUM CHLORIDE 2 G: 9 INJECTION, SOLUTION INTRAVENOUS at 17:00

## 2018-11-27 RX ADMIN — ESCITALOPRAM 10 MG: 10 TABLET, FILM COATED ORAL at 09:24

## 2018-11-27 RX ADMIN — CALCITRIOL 0.25 MCG: 0.25 CAPSULE ORAL at 09:24

## 2018-11-27 RX ADMIN — ASPIRIN 81 MG: 81 TABLET ORAL at 09:24

## 2018-11-27 RX ADMIN — METHIMAZOLE 10 MG: 10 TABLET ORAL at 09:24

## 2018-11-27 RX ADMIN — DILTIAZEM HYDROCHLORIDE 180 MG: 180 CAPSULE, COATED, EXTENDED RELEASE ORAL at 09:24

## 2018-11-27 RX ADMIN — METOPROLOL TARTRATE 50 MG: 50 TABLET ORAL at 09:24

## 2018-11-27 RX ADMIN — FUROSEMIDE 40 MG: 40 TABLET ORAL at 09:24

## 2018-11-27 RX ADMIN — WARFARIN SODIUM 7.5 MG: 7.5 TABLET ORAL at 17:01

## 2018-11-27 RX ADMIN — SODIUM CHLORIDE 2 G: 9 INJECTION, SOLUTION INTRAVENOUS at 21:13

## 2018-11-27 RX ADMIN — METOPROLOL TARTRATE 50 MG: 50 TABLET ORAL at 21:13

## 2018-11-28 LAB
ALBUMIN SERPL-MCNC: 2.8 G/DL (ref 3.5–5)
ALBUMIN/GLOB SERPL: 0.9 G/DL (ref 1.1–2.5)
ALP SERPL-CCNC: 59 U/L (ref 24–120)
ALT SERPL W P-5'-P-CCNC: 22 U/L (ref 0–54)
ANION GAP SERPL CALCULATED.3IONS-SCNC: 11 MMOL/L (ref 4–13)
AST SERPL-CCNC: 11 U/L (ref 7–45)
B-LACTAMASE USUAL SUSC ISLT: POSITIVE
BACTERIA SPEC AEROBE CULT: ABNORMAL
BASOPHILS # BLD AUTO: 0.02 10*3/MM3 (ref 0–0.2)
BASOPHILS NFR BLD AUTO: 0.2 % (ref 0–2)
BILIRUB SERPL-MCNC: 0.5 MG/DL (ref 0.1–1)
BUN BLD-MCNC: 32 MG/DL (ref 5–21)
BUN/CREAT SERPL: 32.3 (ref 7–25)
CALCIUM SPEC-SCNC: 9.1 MG/DL (ref 8.4–10.4)
CHLORIDE SERPL-SCNC: 105 MMOL/L (ref 98–110)
CO2 SERPL-SCNC: 27 MMOL/L (ref 24–31)
CREAT BLD-MCNC: 0.99 MG/DL (ref 0.5–1.4)
DEPRECATED RDW RBC AUTO: 55.7 FL (ref 40–54)
EOSINOPHIL # BLD AUTO: 0.08 10*3/MM3 (ref 0–0.7)
EOSINOPHIL NFR BLD AUTO: 1 % (ref 0–4)
ERYTHROCYTE [DISTWIDTH] IN BLOOD BY AUTOMATED COUNT: 15.3 % (ref 12–15)
GFR SERPL CREATININE-BSD FRML MDRD: 55 ML/MIN/1.73
GLOBULIN UR ELPH-MCNC: 3.1 GM/DL
GLUCOSE BLD-MCNC: 117 MG/DL (ref 70–100)
GRAM STN SPEC: ABNORMAL
HCT VFR BLD AUTO: 36.5 % (ref 37–47)
HGB BLD-MCNC: 11.7 G/DL (ref 12–16)
IMM GRANULOCYTES # BLD: 0.03 10*3/MM3 (ref 0–0.03)
IMM GRANULOCYTES NFR BLD: 0.4 % (ref 0–5)
INR PPP: 1.32 (ref 0.91–1.09)
ISOLATED FROM: ABNORMAL
LYMPHOCYTES # BLD AUTO: 0.95 10*3/MM3 (ref 0.72–4.86)
LYMPHOCYTES NFR BLD AUTO: 11.8 % (ref 15–45)
MCH RBC QN AUTO: 32.1 PG (ref 28–32)
MCHC RBC AUTO-ENTMCNC: 32.1 G/DL (ref 33–36)
MCV RBC AUTO: 100 FL (ref 82–98)
MONOCYTES # BLD AUTO: 0.76 10*3/MM3 (ref 0.19–1.3)
MONOCYTES NFR BLD AUTO: 9.5 % (ref 4–12)
NEUTROPHILS # BLD AUTO: 6.18 10*3/MM3 (ref 1.87–8.4)
NEUTROPHILS NFR BLD AUTO: 77.1 % (ref 39–78)
NRBC BLD MANUAL-RTO: 0 /100 WBC (ref 0–0)
PLATELET # BLD AUTO: 166 10*3/MM3 (ref 130–400)
PMV BLD AUTO: 9.9 FL (ref 6–12)
POTASSIUM BLD-SCNC: 4.3 MMOL/L (ref 3.5–5.3)
PROT SERPL-MCNC: 5.9 G/DL (ref 6.3–8.7)
PROTHROMBIN TIME: 16.8 SECONDS (ref 11.9–14.6)
RBC # BLD AUTO: 3.65 10*6/MM3 (ref 4.2–5.4)
SODIUM BLD-SCNC: 143 MMOL/L (ref 135–145)
WBC NRBC COR # BLD: 8.02 10*3/MM3 (ref 4.8–10.8)

## 2018-11-28 PROCEDURE — 85610 PROTHROMBIN TIME: CPT | Performed by: FAMILY MEDICINE

## 2018-11-28 PROCEDURE — 25010000002 CEFAZOLIN PER 500 MG: Performed by: INTERNAL MEDICINE

## 2018-11-28 PROCEDURE — 85025 COMPLETE CBC W/AUTO DIFF WBC: CPT | Performed by: FAMILY MEDICINE

## 2018-11-28 PROCEDURE — 80053 COMPREHEN METABOLIC PANEL: CPT | Performed by: FAMILY MEDICINE

## 2018-11-28 RX ADMIN — SODIUM CHLORIDE, PRESERVATIVE FREE 10 ML: 5 INJECTION INTRAVENOUS at 20:59

## 2018-11-28 RX ADMIN — SODIUM CHLORIDE 2 G: 9 INJECTION, SOLUTION INTRAVENOUS at 06:59

## 2018-11-28 RX ADMIN — SODIUM CHLORIDE 2 G: 9 INJECTION, SOLUTION INTRAVENOUS at 21:00

## 2018-11-28 RX ADMIN — SODIUM CHLORIDE 2 G: 9 INJECTION, SOLUTION INTRAVENOUS at 15:27

## 2018-11-28 RX ADMIN — DONEPEZIL HYDROCHLORIDE 5 MG: 5 TABLET, FILM COATED ORAL at 20:59

## 2018-11-28 RX ADMIN — PANTOPRAZOLE SODIUM 40 MG: 40 TABLET, DELAYED RELEASE ORAL at 10:36

## 2018-11-28 RX ADMIN — ESCITALOPRAM 10 MG: 10 TABLET, FILM COATED ORAL at 10:36

## 2018-11-28 RX ADMIN — DILTIAZEM HYDROCHLORIDE 180 MG: 180 CAPSULE, COATED, EXTENDED RELEASE ORAL at 10:36

## 2018-11-28 RX ADMIN — METHIMAZOLE 10 MG: 10 TABLET ORAL at 10:36

## 2018-11-28 RX ADMIN — CALCITRIOL 0.25 MCG: 0.25 CAPSULE ORAL at 10:37

## 2018-11-28 RX ADMIN — METOPROLOL TARTRATE 50 MG: 50 TABLET ORAL at 20:59

## 2018-11-28 RX ADMIN — WARFARIN SODIUM 5 MG: 5 TABLET ORAL at 18:42

## 2018-11-28 RX ADMIN — ASPIRIN 81 MG: 81 TABLET ORAL at 10:36

## 2018-11-28 RX ADMIN — GABAPENTIN 300 MG: 300 CAPSULE ORAL at 20:59

## 2018-11-28 RX ADMIN — METOPROLOL TARTRATE 50 MG: 50 TABLET ORAL at 10:36

## 2018-11-29 VITALS
TEMPERATURE: 97.9 F | OXYGEN SATURATION: 96 % | WEIGHT: 249.12 LBS | DIASTOLIC BLOOD PRESSURE: 85 MMHG | HEART RATE: 67 BPM | RESPIRATION RATE: 18 BRPM | HEIGHT: 68 IN | SYSTOLIC BLOOD PRESSURE: 152 MMHG | BODY MASS INDEX: 37.76 KG/M2

## 2018-11-29 LAB
ALBUMIN SERPL-MCNC: 3.1 G/DL (ref 3.5–5)
ALBUMIN/GLOB SERPL: 1 G/DL (ref 1.1–2.5)
ALP SERPL-CCNC: 63 U/L (ref 24–120)
ALT SERPL W P-5'-P-CCNC: 18 U/L (ref 0–54)
ANION GAP SERPL CALCULATED.3IONS-SCNC: 11 MMOL/L (ref 4–13)
AST SERPL-CCNC: 13 U/L (ref 7–45)
BACTERIA SPEC AEROBE CULT: NORMAL
BASOPHILS # BLD AUTO: 0.02 10*3/MM3 (ref 0–0.2)
BASOPHILS NFR BLD AUTO: 0.2 % (ref 0–2)
BILIRUB SERPL-MCNC: 0.3 MG/DL (ref 0.1–1)
BUN BLD-MCNC: 36 MG/DL (ref 5–21)
BUN/CREAT SERPL: 37.5 (ref 7–25)
CALCIUM SPEC-SCNC: 9.8 MG/DL (ref 8.4–10.4)
CHLORIDE SERPL-SCNC: 107 MMOL/L (ref 98–110)
CO2 SERPL-SCNC: 26 MMOL/L (ref 24–31)
CREAT BLD-MCNC: 0.96 MG/DL (ref 0.5–1.4)
DEPRECATED RDW RBC AUTO: 57.1 FL (ref 40–54)
EOSINOPHIL # BLD AUTO: 0.12 10*3/MM3 (ref 0–0.7)
EOSINOPHIL NFR BLD AUTO: 1.3 % (ref 0–4)
ERYTHROCYTE [DISTWIDTH] IN BLOOD BY AUTOMATED COUNT: 15.1 % (ref 12–15)
GFR SERPL CREATININE-BSD FRML MDRD: 57 ML/MIN/1.73
GLOBULIN UR ELPH-MCNC: 3.2 GM/DL
GLUCOSE BLD-MCNC: 121 MG/DL (ref 70–100)
HCT VFR BLD AUTO: 39.7 % (ref 37–47)
HGB BLD-MCNC: 12.8 G/DL (ref 12–16)
IMM GRANULOCYTES # BLD: 0.08 10*3/MM3 (ref 0–0.03)
IMM GRANULOCYTES NFR BLD: 0.9 % (ref 0–5)
INR PPP: 1.31 (ref 0.91–1.09)
LYMPHOCYTES # BLD AUTO: 1.04 10*3/MM3 (ref 0.72–4.86)
LYMPHOCYTES NFR BLD AUTO: 11.2 % (ref 15–45)
MCH RBC QN AUTO: 33.2 PG (ref 28–32)
MCHC RBC AUTO-ENTMCNC: 32.2 G/DL (ref 33–36)
MCV RBC AUTO: 102.8 FL (ref 82–98)
MONOCYTES # BLD AUTO: 1.04 10*3/MM3 (ref 0.19–1.3)
MONOCYTES NFR BLD AUTO: 11.2 % (ref 4–12)
NEUTROPHILS # BLD AUTO: 7.02 10*3/MM3 (ref 1.87–8.4)
NEUTROPHILS NFR BLD AUTO: 75.2 % (ref 39–78)
NRBC BLD MANUAL-RTO: 0 /100 WBC (ref 0–0)
PLATELET # BLD AUTO: 176 10*3/MM3 (ref 130–400)
PMV BLD AUTO: 9.2 FL (ref 6–12)
POTASSIUM BLD-SCNC: 4.6 MMOL/L (ref 3.5–5.3)
PROT SERPL-MCNC: 6.3 G/DL (ref 6.3–8.7)
PROTHROMBIN TIME: 16.7 SECONDS (ref 11.9–14.6)
RBC # BLD AUTO: 3.86 10*6/MM3 (ref 4.2–5.4)
SODIUM BLD-SCNC: 144 MMOL/L (ref 135–145)
WBC NRBC COR # BLD: 9.32 10*3/MM3 (ref 4.8–10.8)

## 2018-11-29 PROCEDURE — 25010000002 CEFAZOLIN PER 500 MG: Performed by: INTERNAL MEDICINE

## 2018-11-29 PROCEDURE — C1751 CATH, INF, PER/CENT/MIDLINE: HCPCS

## 2018-11-29 PROCEDURE — 85610 PROTHROMBIN TIME: CPT | Performed by: FAMILY MEDICINE

## 2018-11-29 PROCEDURE — 80053 COMPREHEN METABOLIC PANEL: CPT | Performed by: FAMILY MEDICINE

## 2018-11-29 PROCEDURE — 05H633Z INSERTION OF INFUSION DEVICE INTO LEFT SUBCLAVIAN VEIN, PERCUTANEOUS APPROACH: ICD-10-PCS | Performed by: INTERNAL MEDICINE

## 2018-11-29 PROCEDURE — 85025 COMPLETE CBC W/AUTO DIFF WBC: CPT | Performed by: FAMILY MEDICINE

## 2018-11-29 RX ORDER — LISINOPRIL 10 MG/1
10 TABLET ORAL DAILY
Qty: 30 TABLET | Refills: 2 | Status: SHIPPED | OUTPATIENT
Start: 2018-11-29 | End: 2019-01-14 | Stop reason: ALTCHOICE

## 2018-11-29 RX ORDER — BUPIVACAINE HCL/0.9 % NACL/PF 0.1 %
2 PLASTIC BAG, INJECTION (ML) EPIDURAL EVERY 8 HOURS SCHEDULED
Qty: 3500 ML | Refills: 0 | Status: SHIPPED | OUTPATIENT
Start: 2018-11-29 | End: 2018-12-11

## 2018-11-29 RX ORDER — ATORVASTATIN CALCIUM 10 MG/1
10 TABLET, FILM COATED ORAL DAILY
Qty: 30 TABLET | Refills: 2 | Status: ON HOLD | OUTPATIENT
Start: 2018-11-29 | End: 2020-09-21

## 2018-11-29 RX ADMIN — ASPIRIN 81 MG: 81 TABLET ORAL at 10:25

## 2018-11-29 RX ADMIN — METOPROLOL TARTRATE 50 MG: 50 TABLET ORAL at 10:25

## 2018-11-29 RX ADMIN — METHIMAZOLE 10 MG: 10 TABLET ORAL at 10:25

## 2018-11-29 RX ADMIN — CALCITRIOL 0.25 MCG: 0.25 CAPSULE ORAL at 10:25

## 2018-11-29 RX ADMIN — DILTIAZEM HYDROCHLORIDE 180 MG: 180 CAPSULE, COATED, EXTENDED RELEASE ORAL at 10:25

## 2018-11-29 RX ADMIN — SODIUM CHLORIDE 2 G: 9 INJECTION, SOLUTION INTRAVENOUS at 14:44

## 2018-11-29 RX ADMIN — SODIUM CHLORIDE 2 G: 9 INJECTION, SOLUTION INTRAVENOUS at 05:50

## 2018-11-29 RX ADMIN — PANTOPRAZOLE SODIUM 40 MG: 40 TABLET, DELAYED RELEASE ORAL at 10:25

## 2018-11-29 RX ADMIN — ESCITALOPRAM 10 MG: 10 TABLET, FILM COATED ORAL at 10:25

## 2018-11-30 ENCOUNTER — READMISSION MANAGEMENT (OUTPATIENT)
Dept: CALL CENTER | Facility: HOSPITAL | Age: 73
End: 2018-11-30

## 2018-11-30 LAB — M PNEUMO DNA SPEC QL NAA+PROBE: NEGATIVE

## 2018-11-30 NOTE — OUTREACH NOTE
Prep Survey      Responses   Facility patient discharged from?  [place]   Is patient eligible?  Yes   Discharge diagnosis  Acute UTI, chronic anticoagulation, chronic diastolic heart failure, dementia, hx. of bladder cancer, hyperthyroidism,, physical deconditioning, chronic afib,Hx. of aortic valvular stenosis   Does the patient have one of the following disease processes/diagnoses(primary or secondary)?  Sepsis   Does the patient have Home health ordered?  Yes   What is the Home health agency?   [facility]   Is there a DME ordered?  No   Comments regarding appointments  See AVS   Prep survey completed?  Yes          Magda Granado RN

## 2018-12-01 LAB — BACTERIA SPEC AEROBE CULT: NORMAL

## 2018-12-02 LAB — BACTERIA SPEC AEROBE CULT: NORMAL

## 2018-12-03 ENCOUNTER — READMISSION MANAGEMENT (OUTPATIENT)
Dept: CALL CENTER | Facility: HOSPITAL | Age: 73
End: 2018-12-03

## 2018-12-03 ENCOUNTER — TELEPHONE (OUTPATIENT)
Dept: INTERNAL MEDICINE | Age: 73
End: 2018-12-03

## 2018-12-03 LAB
ALBUMIN SERPL-MCNC: 3.1 G/DL (ref 3.5–5.2)
ALP BLD-CCNC: 62 U/L (ref 35–104)
ALT SERPL-CCNC: <5 U/L (ref 5–33)
ANION GAP SERPL CALCULATED.3IONS-SCNC: 6 MMOL/L (ref 7–19)
AST SERPL-CCNC: 11 U/L (ref 5–32)
BASOPHILS ABSOLUTE: 0 K/UL (ref 0–0.2)
BASOPHILS RELATIVE PERCENT: 0.4 % (ref 0–1)
BILIRUB SERPL-MCNC: <0.2 MG/DL (ref 0.2–1.2)
BUN BLDV-MCNC: 36 MG/DL (ref 8–23)
CALCIUM SERPL-MCNC: 9 MG/DL (ref 8.8–10.2)
CHLORIDE BLD-SCNC: 106 MMOL/L (ref 98–111)
CO2: 28 MMOL/L (ref 22–29)
CREAT SERPL-MCNC: 1 MG/DL (ref 0.5–0.9)
EOSINOPHILS ABSOLUTE: 0.2 K/UL (ref 0–0.6)
EOSINOPHILS RELATIVE PERCENT: 2.1 % (ref 0–5)
GFR NON-AFRICAN AMERICAN: 54
GLUCOSE BLD-MCNC: 120 MG/DL (ref 74–109)
HCT VFR BLD CALC: 36.4 % (ref 37–47)
HEMOGLOBIN: 11.3 G/DL (ref 12–16)
LYMPHOCYTES ABSOLUTE: 0.8 K/UL (ref 1.1–4.5)
LYMPHOCYTES RELATIVE PERCENT: 11.2 % (ref 20–40)
MCH RBC QN AUTO: 32.4 PG (ref 27–31)
MCHC RBC AUTO-ENTMCNC: 31 G/DL (ref 33–37)
MCV RBC AUTO: 104.3 FL (ref 81–99)
MONOCYTES ABSOLUTE: 0.7 K/UL (ref 0–0.9)
MONOCYTES RELATIVE PERCENT: 9.9 % (ref 0–10)
NEUTROPHILS ABSOLUTE: 5.5 K/UL (ref 1.5–7.5)
NEUTROPHILS RELATIVE PERCENT: 76.1 % (ref 50–65)
PDW BLD-RTO: 14.1 % (ref 11.5–14.5)
PLATELET # BLD: 172 K/UL (ref 130–400)
PMV BLD AUTO: 9.4 FL (ref 9.4–12.3)
POTASSIUM SERPL-SCNC: 4.2 MMOL/L (ref 3.5–5)
RBC # BLD: 3.49 M/UL (ref 4.2–5.4)
SODIUM BLD-SCNC: 140 MMOL/L (ref 136–145)
TOTAL PROTEIN: 6.5 G/DL (ref 6.6–8.7)
WBC # BLD: 7.2 K/UL (ref 4.8–10.8)

## 2018-12-03 NOTE — OUTREACH NOTE
Sepsis Week 1 Survey      Responses   Facility patient discharged from?  [place]   Does the patient have one of the following disease processes/diagnoses(primary or secondary)?  Sepsis   Is there a successful TCM telephone encounter documented?  No   Week 1 attempt successful?  Yes   Call start time  1041   Call end time  1048   Discharge diagnosis  Acute UTI, chronic anticoagulation, chronic diastolic heart failure, dementia, hx. of bladder cancer, hyperthyroidism,, physical deconditioning, chronic afib,Hx. of aortic valvular stenosis   Is patient permission given to speak with other caregiver?  Yes   List who call center can speak with  Swetha, Saurabh   Meds reviewed with patient/caregiver?  Yes   Is the patient having any side effects they believe may be caused by any medication additions or changes?  No   Does the patient have all medications related to this admission filled (includes all antibiotics, inhalers, nebulizers,steroids,etc.)  Yes   Is the patient taking all medications as directed (includes completed medication regime)?  Yes   Comments regarding appointments  See AVS   Does the patient have a primary care provider?   Yes   Does the patient have an appointment with their PCP within 7 days of discharge?  Yes   Has the patient kept scheduled appointments due by today?  N/A   Comments  12/4 with PCP, has appt   What is the Home health agency?   [facility]   Has home health visited the patient within 72 hours of discharge?  Yes   Psychosocial issues?  No   Did the patient receive a copy of their discharge instructions?  Yes   Nursing interventions  Reviewed instructions with patient   What is the patient's perception of their health status since discharge?  Improving   Is the patient/caregiver able to teach back Sepsis?  S - Shivering,fever or very cold, E - Extreme pain or generalized discomfort (worst ever,especially abdomen), P - Pale or discolored skin, S - Sleepy,  difficult to arouse,confused, I -   I feel like I might die-a feeling of hopelessness, S - Short of breath   Nursing interventions  Nurse provided patient education   Is patient/caregiver able to teach back steps to recovery at home?  Set small, achievable goals for return to baseline health, Rest and regain strength, Eat a balanced diet   Is the patient/caregiver able to teach back signs and symptoms of worsening condition:  Fever, Hyperthermia, Rapid heart rate (>90), Shortness of breath/rapid respiratory rate, Altered mental status(confusion/coma), Edema, High blood glucose without diabetes   Is the patient/caregiver able to teach back the hierarchy of who to call/visit for symptoms/problems? PCP, Specialist, Home health nurse, Urgent Care, ED, 911  Yes   Week 1 call completed?  Yes   Wrap up additional comments  Difficult call. Family was talking over her, she was having trouble hearing me for them. States she is better. Daughter, Swetha stays with her and helps her. Still weak but as expected.  HH has been to start antibiotic infusion.            Kathe Olsen RN

## 2018-12-04 ENCOUNTER — ANTI-COAG VISIT (OUTPATIENT)
Dept: INTERNAL MEDICINE | Age: 73
End: 2018-12-04

## 2018-12-04 ENCOUNTER — OFFICE VISIT (OUTPATIENT)
Dept: INTERNAL MEDICINE | Age: 73
End: 2018-12-04
Payer: MEDICARE

## 2018-12-04 VITALS
BODY MASS INDEX: 39.1 KG/M2 | SYSTOLIC BLOOD PRESSURE: 108 MMHG | HEART RATE: 74 BPM | HEIGHT: 68 IN | OXYGEN SATURATION: 97 % | WEIGHT: 258 LBS | DIASTOLIC BLOOD PRESSURE: 72 MMHG | TEMPERATURE: 97.6 F | RESPIRATION RATE: 16 BRPM

## 2018-12-04 DIAGNOSIS — N39.0 SEPSIS DUE TO GRAM-NEGATIVE UTI (HCC): Primary | ICD-10-CM

## 2018-12-04 DIAGNOSIS — A41.50 SEPSIS DUE TO GRAM-NEGATIVE UTI (HCC): Primary | ICD-10-CM

## 2018-12-04 DIAGNOSIS — I48.20 CHRONIC ATRIAL FIBRILLATION (HCC): ICD-10-CM

## 2018-12-04 DIAGNOSIS — Z79.01 LONG TERM CURRENT USE OF ANTICOAGULANT: ICD-10-CM

## 2018-12-04 DIAGNOSIS — Z85.51 HISTORY OF BLADDER CANCER: ICD-10-CM

## 2018-12-04 LAB — INTERNATIONAL NORMALIZATION RATIO, POC: 1.5

## 2018-12-04 PROCEDURE — G8417 CALC BMI ABV UP PARAM F/U: HCPCS | Performed by: NURSE PRACTITIONER

## 2018-12-04 PROCEDURE — 99214 OFFICE O/P EST MOD 30 MIN: CPT | Performed by: NURSE PRACTITIONER

## 2018-12-04 PROCEDURE — G8400 PT W/DXA NO RESULTS DOC: HCPCS | Performed by: NURSE PRACTITIONER

## 2018-12-04 PROCEDURE — 1090F PRES/ABSN URINE INCON ASSESS: CPT | Performed by: NURSE PRACTITIONER

## 2018-12-04 PROCEDURE — G8482 FLU IMMUNIZE ORDER/ADMIN: HCPCS | Performed by: NURSE PRACTITIONER

## 2018-12-04 PROCEDURE — 1111F DSCHRG MED/CURRENT MED MERGE: CPT | Performed by: NURSE PRACTITIONER

## 2018-12-04 PROCEDURE — 3017F COLORECTAL CA SCREEN DOC REV: CPT | Performed by: NURSE PRACTITIONER

## 2018-12-04 PROCEDURE — 4040F PNEUMOC VAC/ADMIN/RCVD: CPT | Performed by: NURSE PRACTITIONER

## 2018-12-04 PROCEDURE — 85610 PROTHROMBIN TIME: CPT | Performed by: NURSE PRACTITIONER

## 2018-12-04 PROCEDURE — 1036F TOBACCO NON-USER: CPT | Performed by: NURSE PRACTITIONER

## 2018-12-04 PROCEDURE — 1101F PT FALLS ASSESS-DOCD LE1/YR: CPT | Performed by: NURSE PRACTITIONER

## 2018-12-04 PROCEDURE — 1123F ACP DISCUSS/DSCN MKR DOCD: CPT | Performed by: NURSE PRACTITIONER

## 2018-12-04 PROCEDURE — G8427 DOCREV CUR MEDS BY ELIG CLIN: HCPCS | Performed by: NURSE PRACTITIONER

## 2018-12-04 RX ORDER — CEFAZOLIN SODIUM 100 G/1
2 INJECTION, POWDER, LYOPHILIZED, FOR SOLUTION INTRAVENOUS
COMMUNITY
Start: 2018-11-29 | End: 2018-12-11

## 2018-12-04 RX ORDER — ATORVASTATIN CALCIUM 10 MG/1
10 TABLET, FILM COATED ORAL
COMMUNITY
Start: 2018-11-29 | End: 2020-02-26

## 2018-12-04 RX ORDER — LISINOPRIL 10 MG/1
10 TABLET ORAL
COMMUNITY
Start: 2018-11-29

## 2018-12-04 NOTE — PATIENT INSTRUCTIONS
1.  Ecoli and Efeiella continue IV abx   2. History of bladder cancer;  Stable   3. Pneumonia ;   Stable on abx   4.  afib with chronic coumadin therapy

## 2018-12-10 ENCOUNTER — READMISSION MANAGEMENT (OUTPATIENT)
Dept: CALL CENTER | Facility: HOSPITAL | Age: 73
End: 2018-12-10

## 2018-12-10 LAB
ALBUMIN SERPL-MCNC: 3.3 G/DL (ref 3.5–5.2)
ALP BLD-CCNC: 74 U/L (ref 35–104)
ALT SERPL-CCNC: <5 U/L (ref 5–33)
ANION GAP SERPL CALCULATED.3IONS-SCNC: 11 MMOL/L (ref 7–19)
AST SERPL-CCNC: 11 U/L (ref 5–32)
BASOPHILS ABSOLUTE: 0 K/UL (ref 0–0.2)
BASOPHILS RELATIVE PERCENT: 0.4 % (ref 0–1)
BILIRUB SERPL-MCNC: 0.5 MG/DL (ref 0.2–1.2)
BUN BLDV-MCNC: 26 MG/DL (ref 8–23)
CALCIUM SERPL-MCNC: 10.2 MG/DL (ref 8.8–10.2)
CHLORIDE BLD-SCNC: 103 MMOL/L (ref 98–111)
CO2: 29 MMOL/L (ref 22–29)
CREAT SERPL-MCNC: 1.2 MG/DL (ref 0.5–0.9)
EOSINOPHILS ABSOLUTE: 0.2 K/UL (ref 0–0.6)
EOSINOPHILS RELATIVE PERCENT: 3.7 % (ref 0–5)
GFR NON-AFRICAN AMERICAN: 44
GLUCOSE BLD-MCNC: 96 MG/DL (ref 74–109)
HCT VFR BLD CALC: 36.5 % (ref 37–47)
HEMOGLOBIN: 11.6 G/DL (ref 12–16)
LYMPHOCYTES ABSOLUTE: 0.9 K/UL (ref 1.1–4.5)
LYMPHOCYTES RELATIVE PERCENT: 15 % (ref 20–40)
MCH RBC QN AUTO: 33.3 PG (ref 27–31)
MCHC RBC AUTO-ENTMCNC: 31.8 G/DL (ref 33–37)
MCV RBC AUTO: 104.9 FL (ref 81–99)
MONOCYTES ABSOLUTE: 0.7 K/UL (ref 0–0.9)
MONOCYTES RELATIVE PERCENT: 12.3 % (ref 0–10)
NEUTROPHILS ABSOLUTE: 3.9 K/UL (ref 1.5–7.5)
NEUTROPHILS RELATIVE PERCENT: 68.2 % (ref 50–65)
PDW BLD-RTO: 13.2 % (ref 11.5–14.5)
PLATELET # BLD: 154 K/UL (ref 130–400)
PMV BLD AUTO: 9.7 FL (ref 9.4–12.3)
POTASSIUM SERPL-SCNC: 4.5 MMOL/L (ref 3.5–5)
RBC # BLD: 3.48 M/UL (ref 4.2–5.4)
SODIUM BLD-SCNC: 143 MMOL/L (ref 136–145)
TOTAL PROTEIN: 7 G/DL (ref 6.6–8.7)
WBC # BLD: 5.7 K/UL (ref 4.8–10.8)

## 2018-12-10 NOTE — OUTREACH NOTE
Sepsis Week 2 Survey      Responses   Facility patient discharged from?  [place]   Does the patient have one of the following disease processes/diagnoses(primary or secondary)?  Sepsis   Week 2 attempt successful?  Yes   Call start time  1106   Call end time  1108   General alerts for this patient  Picc out today   List who call center can speak with  Saurabh Posada reviewed with patient/caregiver?  Yes   Is the patient taking all medications as directed (includes completed medication regime)?  Yes   Medication comments  Iv antibiotics finished Picc to be taken out today   Comments regarding appointments  Has paulina Izquierdo NP   Has the patient kept scheduled appointments due by today?  Yes   What is the Home health agency?   IV antibiotics finished today and Picc being removed   What is the patient's perception of their health status since discharge?  Improving   Week 2 call completed?  Yes   Wrap up additional comments  HH is coming and IV antibiotics have finished and PICC to be removed today, she is feeling better, daoughter states.           Cherelle Vaca, RN

## 2018-12-11 ENCOUNTER — ANTI-COAG VISIT (OUTPATIENT)
Dept: INTERNAL MEDICINE | Age: 73
End: 2018-12-11
Payer: MEDICARE

## 2018-12-11 DIAGNOSIS — I48.20 CHRONIC ATRIAL FIBRILLATION (HCC): ICD-10-CM

## 2018-12-11 LAB — INR BLD: 1.9

## 2018-12-11 PROCEDURE — 93793 ANTICOAG MGMT PT WARFARIN: CPT | Performed by: NURSE PRACTITIONER

## 2018-12-18 ENCOUNTER — READMISSION MANAGEMENT (OUTPATIENT)
Dept: CALL CENTER | Facility: HOSPITAL | Age: 73
End: 2018-12-18

## 2018-12-18 NOTE — OUTREACH NOTE
Sepsis Week 3 Survey      Responses   Facility patient discharged from?  [place]   Does the patient have one of the following disease processes/diagnoses(primary or secondary)?  Sepsis   Week 3 attempt successful?  Yes   Call start time  1213   Call end time  1215   Discharge diagnosis  Acute UTI, chronic anticoagulation, chronic diastolic heart failure, dementia, hx. of bladder cancer, hyperthyroidism,, physical deconditioning, chronic afib,Hx. of aortic valvular stenosis   Is patient permission given to speak with other caregiver?  Yes   List who call center can speak with  Swetha, Daughter   Person spoke with today (if not patient) and relationship  Swetha   Narayans reviewed with patient/caregiver?  Yes   Is the patient having any side effects they believe may be caused by any medication additions or changes?  No   Does the patient have all medications related to this admission filled (includes all antibiotics, inhalers, nebulizers,steroids,etc.)  Yes   Is the patient taking all medications as directed (includes completed medication regime)?  Yes   Does the patient have a primary care provider?   Yes   Has the patient kept scheduled appointments due by today?  Yes   Has home health visited the patient within 72 hours of discharge?  N/A   Psychosocial issues?  No   Did the patient receive a copy of their discharge instructions?  Yes   Nursing interventions  Reviewed instructions with patient   What is the patient's perception of their health status since discharge?  Improving   Nursing interventions  Nurse provided patient education   Is the patient/caregiver able to teach back Sepsis?  S - Shivering,fever or very cold, E - Extreme pain or generalized discomfort (worst ever,especially abdomen), P - Pale or discolored skin, S - Sleepy, difficult to arouse,confused, I -   I feel like I might die-a feeling of hopelessness, S - Short of breath   Nursing interventions  Nurse provided patient education   Is patient/caregiver  able to teach back steps to recovery at home?  Set small, achievable goals for return to baseline health, Rest and regain strength, Make a list of questions for PCP appoinment, Exercise as tolerated   Is the patient/caregiver able to teach back signs and symptoms of worsening condition:  Fever, Hyperthermia, Rapid heart rate (>90), Shortness of breath/rapid respiratory rate, Altered mental status(confusion/coma), Edema, High blood glucose without diabetes   Is the patient/caregiver able to teach back the hierarchy of who to call/visit for symptoms/problems? PCP, Specialist, Home health nurse, Urgent Care, ED, 911  Yes   Week 3 call completed?  Yes          Candi Butcher RN

## 2018-12-19 ENCOUNTER — ANTI-COAG VISIT (OUTPATIENT)
Dept: INTERNAL MEDICINE | Age: 73
End: 2018-12-19
Payer: MEDICARE

## 2018-12-19 DIAGNOSIS — I48.20 CHRONIC ATRIAL FIBRILLATION (HCC): ICD-10-CM

## 2018-12-19 LAB — INR BLD: 2.7

## 2018-12-19 PROCEDURE — 93793 ANTICOAG MGMT PT WARFARIN: CPT | Performed by: NURSE PRACTITIONER

## 2018-12-27 ENCOUNTER — READMISSION MANAGEMENT (OUTPATIENT)
Dept: CALL CENTER | Facility: HOSPITAL | Age: 73
End: 2018-12-27

## 2018-12-27 ENCOUNTER — NURSE TRIAGE (OUTPATIENT)
Dept: CALL CENTER | Facility: HOSPITAL | Age: 73
End: 2018-12-27

## 2018-12-27 NOTE — OUTREACH NOTE
Sepsis Week 4 Survey      Responses   Facility patient discharged from?  [place]   Does the patient have one of the following disease processes/diagnoses(primary or secondary)?  Sepsis   Week 4 attempt successful?  Yes   Call start time  1207   Call end time  1209   Meds reviewed with patient/caregiver?  Yes   Is the patient taking all medications as directed (includes completed medication regime)?  Yes   Has the patient kept scheduled appointments due by today?  Yes   Is the patient still receiving Home Health Services?  No   What is the patient's perception of their health status since discharge?  Improving   Week 4 call completed?  Yes   Would the patient like one additional call?  No   Graduated  Yes   Did the patient feel the follow up calls were helpful during their recovery period?  Yes   Was the number of calls appropriate?  Yes          Cherelle Vaca RN

## 2018-12-27 NOTE — TELEPHONE ENCOUNTER
Reviewed guideline with caller, caller agrees to follow care advice. Anxious about temperature because her mother was recently hospitalized with sepsis.     Reason for Disposition  • [1] Fever AND [2] no signs of serious infection or localizing symptoms (all other triage questions negative)    Additional Information  • Negative: Shock suspected (e.g., cold/pale/clammy skin, too weak to stand, low BP, rapid pulse)  • Negative: Difficult to awaken or acting confused (e.g., disoriented, slurred speech)  • Negative: [1] Difficulty breathing AND [2] bluish lips, tongue or face  • Negative: New onset rash with multiple purple (or blood-colored) spots or dots  • Negative: Sounds like a life-threatening emergency to the triager  • Negative: Fever in a cancer patient who is currently (or recently) receiving chemotherapy or radiation therapy, or cancer patient who has metastatic or end-stage cancer and is receiving palliative care  • Negative: Pregnant  • Negative: Fever onset within 24 hours of receiving vaccine  • Negative: [1] Fever AND [2] within 21 days of Ebola EXPOSURE  • Negative: Other symptom is present, see that guideline  (e.g., symptoms of cough, runny nose, sore throat, earache, abdominal pain, diarrhea, vomiting)  • Negative: [1] Headache AND [2] stiff neck (can't touch chin to chest)  • Negative: Difficulty breathing  • Negative: IV drug abuse  • Negative: [1] Drinking very little AND [2] dehydration suspected (e.g., no urine > 12 hours, very dry mouth, very lightheaded)  • Negative: Patient sounds very sick or weak to the triager  (Exception: mild weakness and hasn't taken fever medicine)  • Negative: Fever > 104 F (40 C)  • Negative: [1] Fever > 101 F (38.3 C) AND [2] age > 60  • Negative: [1] Fever > 100.0 F (37.8 C) AND [2] bedridden (e.g., nursing home patient, CVA, chronic illness, recovering from surgery)  • Negative: [1] Fever > 100.0 F (37.8 C) AND [2] indwelling urinary catheter (e.g., Barraza,  "Malissa)  • Negative: [1] Fever > 100.0 F (37.8 C) AND [2] has port (portacath), central line, or PICC line  • Negative: [1] Fever > 100.0 F (37.8 C) AND [2] diabetes mellitus or weak immune system (e.g., HIV positive, cancer chemo, splenectomy, chronic steroids)  • Negative: [1] Fever > 100.0 F (37.8 C) AND [2] surgery in the last month  • Negative: Transplant patient (e.g., kidney, liver, lung, heart)  • Negative: Fever present > 3 days (72 hours)  • Negative: [1] Fever > 100.0 F (37.8 C) AND [2] foreign travel to a developing country in the last month  • Negative: [1] Intermittent fever > 100.0 F (37.8 C) AND [2] lasts > 3 weeks    Answer Assessment - Initial Assessment Questions  1. TEMPERATURE: \"What is the most recent temperature?\"  \"How was it measured?\"       99.1 oral  2. ONSET: \"When did the fever start?\"       tonight  3. SYMPTOMS: \"Do you have any other symptoms besides the fever?\"  (e.g., colds, headache, sore throat, earache, cough, rash, diarrhea, vomiting, abdominal pain)       fatique  4. CAUSE: If there are no symptoms, ask: \"What do you think is causing the fever?\"       unknown  5. CONTACTS: \"Does anyone else in the family have an infection?\"      Toddler has had cold, daughter has had bronchitis  6. TREATMENT: \"What have you done so far to treat this fever?\" (e.g., medications)      Tylenol  7. IMMUNOCOMPROMISE: \"Do you have of the following: diabetes, HIV positive, splenectomy, cancer chemotherapy, chronic steroid treatment, transplant patient, etc.\"      no  8. PREGNANCY: \"Is there any chance you are pregnant?\" \"When was your last menstrual period?\"      no  9. TRAVEL: \"Have you traveled out of the country in the last month?\" (e.g., travel history, exposures)      no    Protocols used: FEVER-ADULT-AH      "

## 2018-12-28 ENCOUNTER — TRANSCRIBE ORDERS (OUTPATIENT)
Dept: ADMINISTRATIVE | Facility: HOSPITAL | Age: 73
End: 2018-12-28

## 2018-12-28 ENCOUNTER — APPOINTMENT (OUTPATIENT)
Dept: LAB | Facility: HOSPITAL | Age: 73
End: 2018-12-28
Attending: INTERNAL MEDICINE

## 2018-12-28 ENCOUNTER — ANTI-COAG VISIT (OUTPATIENT)
Dept: INTERNAL MEDICINE | Age: 73
End: 2018-12-28
Payer: MEDICARE

## 2018-12-28 DIAGNOSIS — B95.62 CELLULITIS DUE TO MRSA: Primary | ICD-10-CM

## 2018-12-28 DIAGNOSIS — L03.90 CELLULITIS DUE TO MRSA: Primary | ICD-10-CM

## 2018-12-28 DIAGNOSIS — R78.81 BACTEREMIA: ICD-10-CM

## 2018-12-28 DIAGNOSIS — I87.2 CHRONIC VENOUS INSUFFICIENCY: ICD-10-CM

## 2018-12-28 DIAGNOSIS — I33.0 ACUTE AND SUBACUTE BACTERIAL ENDOCARDITIS: ICD-10-CM

## 2018-12-28 DIAGNOSIS — I48.20 CHRONIC ATRIAL FIBRILLATION (HCC): ICD-10-CM

## 2018-12-28 LAB — INR BLD: 3

## 2018-12-28 PROCEDURE — 87040 BLOOD CULTURE FOR BACTERIA: CPT | Performed by: INTERNAL MEDICINE

## 2018-12-28 PROCEDURE — 93793 ANTICOAG MGMT PT WARFARIN: CPT | Performed by: NURSE PRACTITIONER

## 2019-01-02 LAB
BACTERIA SPEC AEROBE CULT: NORMAL
BACTERIA SPEC AEROBE CULT: NORMAL

## 2019-01-04 ENCOUNTER — ANTI-COAG VISIT (OUTPATIENT)
Dept: INTERNAL MEDICINE | Age: 74
End: 2019-01-04

## 2019-01-04 LAB — INR BLD: 2.4

## 2019-01-11 ENCOUNTER — ANTI-COAG VISIT (OUTPATIENT)
Dept: INTERNAL MEDICINE | Age: 74
End: 2019-01-11
Payer: MEDICARE

## 2019-01-11 DIAGNOSIS — I48.20 CHRONIC ATRIAL FIBRILLATION (HCC): ICD-10-CM

## 2019-01-11 DIAGNOSIS — I87.2 CHRONIC VENOUS INSUFFICIENCY: ICD-10-CM

## 2019-01-11 LAB — INR BLD: 2.7

## 2019-01-11 PROCEDURE — 93793 ANTICOAG MGMT PT WARFARIN: CPT | Performed by: NURSE PRACTITIONER

## 2019-01-14 ENCOUNTER — OFFICE VISIT (OUTPATIENT)
Dept: CARDIOLOGY | Facility: CLINIC | Age: 74
End: 2019-01-14

## 2019-01-14 VITALS
OXYGEN SATURATION: 100 % | DIASTOLIC BLOOD PRESSURE: 75 MMHG | HEIGHT: 67 IN | RESPIRATION RATE: 18 BRPM | HEART RATE: 75 BPM | BODY MASS INDEX: 41.59 KG/M2 | SYSTOLIC BLOOD PRESSURE: 130 MMHG | WEIGHT: 265 LBS

## 2019-01-14 DIAGNOSIS — G47.33 OSA (OBSTRUCTIVE SLEEP APNEA): ICD-10-CM

## 2019-01-14 DIAGNOSIS — E78.2 MIXED HYPERLIPIDEMIA: ICD-10-CM

## 2019-01-14 DIAGNOSIS — R06.02 SHORTNESS OF BREATH: ICD-10-CM

## 2019-01-14 DIAGNOSIS — Z95.2 S/P TAVR (TRANSCATHETER AORTIC VALVE REPLACEMENT): ICD-10-CM

## 2019-01-14 DIAGNOSIS — I10 ESSENTIAL HYPERTENSION: ICD-10-CM

## 2019-01-14 DIAGNOSIS — I82.402 DEEP VEIN THROMBOSIS (DVT) OF LEFT LOWER EXTREMITY, UNSPECIFIED CHRONICITY, UNSPECIFIED VEIN (HCC): ICD-10-CM

## 2019-01-14 DIAGNOSIS — N18.30 STAGE 3 CHRONIC KIDNEY DISEASE (HCC): Primary | ICD-10-CM

## 2019-01-14 DIAGNOSIS — I50.32 CHRONIC DIASTOLIC CONGESTIVE HEART FAILURE (HCC): ICD-10-CM

## 2019-01-14 DIAGNOSIS — I48.20 CHRONIC ATRIAL FIBRILLATION (HCC): ICD-10-CM

## 2019-01-14 PROCEDURE — 93000 ELECTROCARDIOGRAM COMPLETE: CPT | Performed by: NURSE PRACTITIONER

## 2019-01-14 PROCEDURE — 99214 OFFICE O/P EST MOD 30 MIN: CPT | Performed by: NURSE PRACTITIONER

## 2019-01-14 RX ORDER — FUROSEMIDE 40 MG/1
40 TABLET ORAL DAILY
Qty: 30 TABLET | Refills: 11 | Status: SHIPPED | OUTPATIENT
Start: 2019-01-14 | End: 2020-02-12

## 2019-01-14 NOTE — PROGRESS NOTES
"    Subjective:     Encounter Date:01/14/2019      Patient ID: Jarvis Morgan is a 73 y.o. female.    Chief Complaint:  Congestive Heart Failure   Presents for follow-up visit. Associated symptoms include edema and shortness of breath. Pertinent negatives include no abdominal pain, chest pain, chest pressure, claudication, near-syncope or palpitations. The symptoms have been stable. Compliance with total regimen is 51-75%. Compliance with diet is 26-50%. Compliance with exercise is 51-75%. Compliance with medications is %.   Atrial Fibrillation   Presents for follow-up visit. Symptoms include shortness of breath. Symptoms are negative for chest pain, dizziness, palpitations and syncope. The symptoms have been stable. Past medical history includes atrial fibrillation and CHF. There are no medication compliance problems.   Hypertension   This is a chronic problem. The current episode started more than 1 year ago. The problem is controlled. Associated symptoms include malaise/fatigue and shortness of breath. Pertinent negatives include no chest pain, headaches, orthopnea, palpitations or PND. Past treatments include ACE inhibitors, beta blockers and calcium channel blockers.     Patient presents today for routine follow up with complaints of shortness of breath and swelling. She has a history of severe aortic valve stenosis s/p TAVR, diastolic congestive heart failure, chronic atrial fibrillation on chronic anticoagulation, hypertension, sick sinus syndrome, chronic kidney disease (only has one kidney), deep venous thrombus, obstructive sleep apnea on CPAP and obesity. Patient states she has gradually noticed her shortness of breath get worse. She is up 12 # pounds since last OV. She also noticed some increase in swelling of her legs. Denies palpitations or chest pain. Daughter and patient are bickering back and forth about patient's poor dietary habits. No scale at home for patient to weigh on- daughter states \"I " "know we need to get one.\" Overall patient has been stable. Follows with Catrachita Izquierdo as her PCP.   The following portions of the patient's history were reviewed and updated as appropriate: allergies, current medications, past family history, past medical history, past social history, past surgical history and problem list.   Prior to Admission medications    Medication Sig Start Date End Date Taking? Authorizing Provider   acetaminophen (TYLENOL) 325 MG tablet Take 2 tablets by mouth Every 4 (Four) Hours As Needed for Mild Pain  or Fever. 11/29/17  Yes Rehan Loza MD   aspirin 81 MG EC tablet Take 81 mg by mouth Daily.   Yes ProviderBony MD   atorvastatin (LIPITOR) 10 MG tablet Take 1 tablet by mouth Daily. 11/29/18  Yes Rehan Loza MD   calcitriol (ROCALTROL) 0.25 MCG capsule Take 1 capsule by mouth Daily. 1/4/18  Yes Enzo Ayala MD   cholecalciferol 5000 units tablet Take 5,000 Units by mouth Daily.  Patient taking differently: Take 50,000 Units by mouth 1 (One) Time Per Week. 11/30/17  Yes Rehan Loza MD   diltiaZEM CD (CARDIZEM CD) 180 MG 24 hr capsule Take 1 capsule by mouth Daily. 1/4/18  Yes Enzo Ayala MD   donepezil (ARICEPT) 5 MG tablet Take 1 tablet by mouth Every Night. 1/4/18  Yes Enzo Ayala MD   escitalopram (LEXAPRO) 10 MG tablet Take 10 mg by mouth Daily.   Yes Bony Evans MD   furosemide (LASIX) 40 MG tablet Take 1 tablet by mouth Daily. 1/14/19  Yes Librado Kemp APRN   gabapentin (NEURONTIN) 300 MG capsule Take 1 capsule by mouth Every Night. 1/4/18  Yes Enzo Ayala MD   methIMAzole (TAPAZOLE) 10 MG tablet Take 10 mg by mouth Daily.   Yes Bony Evans MD   metoprolol tartrate (LOPRESSOR) 50 MG tablet Take 1 tablet by mouth Every 12 (Twelve) Hours. 1/4/18  Yes Enzo Ayala MD   miconazole (MICOTIN) 2 % cream Apply  topically Every 12 (Twelve) Hours. 5/22/18  Yes Grisel Grey, DO   O2 (OXYGEN) " Inhale 2 L/min 1 (One) Time. CPAP   Yes ProviderBony MD   pantoprazole (PROTONIX) 40 MG EC tablet Take 1 tablet by mouth Daily. 1/4/18  Yes Enzo Ayala MD   warfarin (COUMADIN) 5 MG tablet Take 5 mg by mouth Daily. Sunday, Monday, Wednesday, Friday   Yes Bony Evans MD   warfarin (COUMADIN) 7.5 MG tablet Take two tablets daily  Patient taking differently: 7.5 mg. Tuesday, Thursday, Saturday 5/22/18  Yes Grisel Grey DO   furosemide (LASIX) 40 MG tablet Take 1 tablet by mouth Daily. 1/4/18 1/14/19 Yes Enzo Ayala MD   lisinopril (PRINIVIL,ZESTRIL) 10 MG tablet Take 1 tablet by mouth Daily. 11/29/18 1/14/19 Yes Rehan Loza MD     Past Medical History:   Diagnosis Date   • A-fib (CMS/HCC)    • Aortic stenosis    • Arthritis    • Bradycardia    • Cancer (CMS/HCC)     bladder and skin   • Cardiomegaly    • CHF (congestive heart failure) (CMS/HCC)    • GERD (gastroesophageal reflux disease)    • Hard of hearing    • History of short term memory loss    • Hypercalcemia    • Hyperlipidemia    • Hypertension    • Hyperthyroidism 11/20/2017   • Hypothyroidism    • Kidney stone    • Long term current use of anticoagulant therapy    • Open wound     left lower extremity,   • Palpitation    • PONV (postoperative nausea and vomiting)    • Shortness of breath    • Sick sinus syndrome (CMS/HCC)    • Sleep apnea     CPAP       Review of Systems   Constitution: Positive for malaise/fatigue and weight gain. Negative for chills, decreased appetite, fever and weight loss.   HENT: Negative for nosebleeds.    Eyes: Negative for visual disturbance.   Cardiovascular: Positive for leg swelling. Negative for chest pain, claudication, dyspnea on exertion, near-syncope, orthopnea, palpitations, paroxysmal nocturnal dyspnea and syncope.   Respiratory: Positive for shortness of breath. Negative for cough, hemoptysis and snoring.    Endocrine: Negative for cold intolerance and heat intolerance.  "  Hematologic/Lymphatic: Negative for bleeding problem. Does not bruise/bleed easily.   Skin: Negative for rash.   Musculoskeletal: Negative for back pain and falls.   Gastrointestinal: Negative for abdominal pain, constipation, diarrhea, heartburn, melena, nausea and vomiting.   Genitourinary: Negative for hematuria.   Neurological: Negative for dizziness, headaches and light-headedness.   Psychiatric/Behavioral: Negative for altered mental status.   Allergic/Immunologic: Negative for persistent infections.         ECG 12 Lead  Date/Time: 1/14/2019 9:25 AM  Performed by: Librado Kemp APRN  Authorized by: Librado Kemp APRN   Comparison: compared with previous ECG from 10/15/2018  Similar to previous ECG  Rhythm: atrial fibrillation               Objective:     Physical Exam   Constitutional: She is oriented to person, place, and time. She appears well-developed and well-nourished.   HENT:   Head: Normocephalic and atraumatic.   Eyes: Pupils are equal, round, and reactive to light.   Neck: Normal range of motion. Neck supple. No JVD present. Carotid bruit is not present.   Cardiovascular: Normal rate and intact distal pulses. An irregular rhythm present.   Murmur heard.  Pulmonary/Chest: Effort normal and breath sounds normal.   Abdominal: Soft. Bowel sounds are normal.   Musculoskeletal: Normal range of motion. She exhibits edema (non-pitting edema. In compression stockings).   Neurological: She is alert and oriented to person, place, and time. She has normal reflexes.   Skin: Skin is warm and dry.   Psychiatric: She has a normal mood and affect. Her behavior is normal. Judgment and thought content normal.     Blood pressure 130/75, pulse 75, resp. rate 18, height 170.2 cm (67\"), weight 120 kg (265 lb), SpO2 100 %, not currently breastfeeding.      Lab Review:       Assessment:          Diagnosis Plan   1. Stage 3 chronic kidney disease (CMS/HCC)  Basic Metabolic Panel   2. Chronic diastolic congestive heart " failure (CMS/[facility])  Basic Metabolic Panel   3. S/P TAVR (transcatheter aortic valve replacement)     4. Chronic atrial fibrillation (CMS/[facility])     5. Essential hypertension     6. Mixed hyperlipidemia     7. Deep vein thrombosis (DVT) of left lower extremity, unspecified chronicity, unspecified vein (CMS/[facility])     8. Shortness of breath     9. ASA (obstructive sleep apnea)            Plan:       1. Chronic Kidney Disease- with one kidney. Repeat blood work Friday after increase Lasix  2. Chronic diastolic congestive heart failure- 12# weight gain since last OV and shortness of breath and swelling. Some lower leg edema, patient has on compression stockings- I suspect some degree of this is chronic. Will double Lasix for 2-3 days. Patient is not compliant with low salit diet or daily weights- strongly encouraged both of these.   3.s/p TAVR last echo May 2018 - EF 56-60%, acceptable TAVR gradiants  4. Chronic A-fib- rate controlled and anticoaulated  5. Blood Pressure well control  6.mixed Hyperlipidemia- on statin, managed by PCP  7. History of DVT- on coumadin  8. Shortness of breath- likely multifactorial but with weight gain I will give a trial of increase Lasix to see if improved.   9. ASA- compliant with BiPAP

## 2019-01-24 LAB — INR BLD: 1.6

## 2019-01-25 ENCOUNTER — HOSPITAL ENCOUNTER (INPATIENT)
Facility: HOSPITAL | Age: 74
LOS: 3 days | Discharge: HOME | End: 2019-01-28
Attending: EMERGENCY MEDICINE | Admitting: FAMILY MEDICINE

## 2019-01-25 ENCOUNTER — APPOINTMENT (OUTPATIENT)
Dept: ULTRASOUND IMAGING | Facility: HOSPITAL | Age: 74
End: 2019-01-25

## 2019-01-25 ENCOUNTER — APPOINTMENT (OUTPATIENT)
Dept: GENERAL RADIOLOGY | Facility: HOSPITAL | Age: 74
End: 2019-01-25

## 2019-01-25 DIAGNOSIS — J18.9 PNEUMONIA OF BOTH LOWER LOBES DUE TO INFECTIOUS ORGANISM: Primary | ICD-10-CM

## 2019-01-25 DIAGNOSIS — I48.20 CHRONIC ATRIAL FIBRILLATION (HCC): ICD-10-CM

## 2019-01-25 DIAGNOSIS — I50.9 CHRONIC CONGESTIVE HEART FAILURE, UNSPECIFIED HEART FAILURE TYPE (HCC): ICD-10-CM

## 2019-01-25 DIAGNOSIS — N17.9 AKI (ACUTE KIDNEY INJURY) (HCC): ICD-10-CM

## 2019-01-25 DIAGNOSIS — J96.01 ACUTE RESPIRATORY FAILURE WITH HYPOXIA (HCC): ICD-10-CM

## 2019-01-25 PROBLEM — R93.89 ABNORMAL CXR: Status: ACTIVE | Noted: 2019-01-25

## 2019-01-25 PROBLEM — J96.22 ACUTE AND CHRONIC RESPIRATORY FAILURE WITH HYPERCAPNIA (HCC): Status: ACTIVE | Noted: 2019-01-25

## 2019-01-25 LAB
ALBUMIN SERPL-MCNC: 3.6 G/DL (ref 3.5–5)
ALBUMIN/GLOB SERPL: 1 G/DL (ref 1.1–2.5)
ALP SERPL-CCNC: 81 U/L (ref 24–120)
ALT SERPL W P-5'-P-CCNC: 25 U/L (ref 0–54)
ANION GAP SERPL CALCULATED.3IONS-SCNC: 8 MMOL/L (ref 4–13)
ARTERIAL PATENCY WRIST A: POSITIVE
AST SERPL-CCNC: 21 U/L (ref 7–45)
ATMOSPHERIC PRESS: 759 MMHG
BASE EXCESS BLDA CALC-SCNC: 5.4 MMOL/L (ref 0–2)
BASOPHILS # BLD AUTO: 0.03 10*3/MM3 (ref 0–0.2)
BASOPHILS NFR BLD AUTO: 0.4 % (ref 0–2)
BDY SITE: ABNORMAL
BILIRUB SERPL-MCNC: 0.7 MG/DL (ref 0.1–1)
BODY TEMPERATURE: 37 C
BUN BLD-MCNC: 39 MG/DL (ref 5–21)
BUN/CREAT SERPL: 25.7 (ref 7–25)
CALCIUM SPEC-SCNC: 9.8 MG/DL (ref 8.4–10.4)
CHLORIDE SERPL-SCNC: 100 MMOL/L (ref 98–110)
CO2 SERPL-SCNC: 33 MMOL/L (ref 24–31)
CREAT BLD-MCNC: 1.52 MG/DL (ref 0.5–1.4)
D DIMER PPP FEU-MCNC: 1.44 MG/L (FEU) (ref 0–0.5)
D-LACTATE SERPL-SCNC: 1.5 MMOL/L (ref 0.5–2)
DEPRECATED RDW RBC AUTO: 43.8 FL (ref 40–54)
EOSINOPHIL # BLD AUTO: 0.37 10*3/MM3 (ref 0–0.7)
EOSINOPHIL NFR BLD AUTO: 5 % (ref 0–4)
ERYTHROCYTE [DISTWIDTH] IN BLOOD BY AUTOMATED COUNT: 12.5 % (ref 12–15)
FLUAV AG NPH QL: NEGATIVE
FLUBV AG NPH QL IA: NEGATIVE
GAS FLOW AIRWAY: 2 LPM
GFR SERPL CREATININE-BSD FRML MDRD: 33 ML/MIN/1.73
GLOBULIN UR ELPH-MCNC: 3.7 GM/DL
GLUCOSE BLD-MCNC: 118 MG/DL (ref 70–100)
HCO3 BLDA-SCNC: 31.8 MMOL/L (ref 20–26)
HCT VFR BLD AUTO: 36.1 % (ref 37–47)
HGB BLD-MCNC: 11.5 G/DL (ref 12–16)
HOLD SPECIMEN: NORMAL
HOLD SPECIMEN: NORMAL
IMM GRANULOCYTES # BLD AUTO: 0.02 10*3/MM3 (ref 0–0.03)
IMM GRANULOCYTES NFR BLD AUTO: 0.3 % (ref 0–5)
INR PPP: 1.88 (ref 0.91–1.09)
LYMPHOCYTES # BLD AUTO: 0.63 10*3/MM3 (ref 0.72–4.86)
LYMPHOCYTES NFR BLD AUTO: 8.5 % (ref 15–45)
Lab: ABNORMAL
MCH RBC QN AUTO: 30.7 PG (ref 28–32)
MCHC RBC AUTO-ENTMCNC: 31.9 G/DL (ref 33–36)
MCV RBC AUTO: 96.5 FL (ref 82–98)
MODALITY: ABNORMAL
MONOCYTES # BLD AUTO: 0.81 10*3/MM3 (ref 0.19–1.3)
MONOCYTES NFR BLD AUTO: 10.9 % (ref 4–12)
NEUTROPHILS # BLD AUTO: 5.58 10*3/MM3 (ref 1.87–8.4)
NEUTROPHILS NFR BLD AUTO: 74.9 % (ref 39–78)
NRBC BLD AUTO-RTO: 0 /100 WBC (ref 0–0)
NT-PROBNP SERPL-MCNC: 1970 PG/ML (ref 0–900)
PCO2 BLDA: 54.4 MM HG (ref 35–45)
PH BLDA: 7.38 PH UNITS (ref 7.35–7.45)
PLATELET # BLD AUTO: 241 10*3/MM3 (ref 130–400)
PMV BLD AUTO: 9.5 FL (ref 6–12)
PO2 BLDA: 58.8 MM HG (ref 83–108)
POTASSIUM BLD-SCNC: 4.2 MMOL/L (ref 3.5–5.3)
PROT SERPL-MCNC: 7.3 G/DL (ref 6.3–8.7)
PROTHROMBIN TIME: 22.3 SECONDS (ref 11.9–14.6)
RBC # BLD AUTO: 3.74 10*6/MM3 (ref 4.2–5.4)
SAO2 % BLDCOA: 90.6 % (ref 94–99)
SODIUM BLD-SCNC: 141 MMOL/L (ref 135–145)
TROPONIN I SERPL-MCNC: 0.03 NG/ML (ref 0–0.03)
TROPONIN I SERPL-MCNC: <0.012 NG/ML (ref 0–0.03)
VENTILATOR MODE: ABNORMAL
WBC NRBC COR # BLD: 7.44 10*3/MM3 (ref 4.8–10.8)
WHOLE BLOOD HOLD SPECIMEN: NORMAL
WHOLE BLOOD HOLD SPECIMEN: NORMAL

## 2019-01-25 PROCEDURE — 71045 X-RAY EXAM CHEST 1 VIEW: CPT

## 2019-01-25 PROCEDURE — 87581 M.PNEUMON DNA AMP PROBE: CPT | Performed by: INTERNAL MEDICINE

## 2019-01-25 PROCEDURE — 93971 EXTREMITY STUDY: CPT | Performed by: SURGERY

## 2019-01-25 PROCEDURE — 84484 ASSAY OF TROPONIN QUANT: CPT | Performed by: INTERNAL MEDICINE

## 2019-01-25 PROCEDURE — 25010000002 AZITHROMYCIN PER 500 MG: Performed by: EMERGENCY MEDICINE

## 2019-01-25 PROCEDURE — 93971 EXTREMITY STUDY: CPT

## 2019-01-25 PROCEDURE — 87804 INFLUENZA ASSAY W/OPTIC: CPT | Performed by: INTERNAL MEDICINE

## 2019-01-25 PROCEDURE — 83605 ASSAY OF LACTIC ACID: CPT | Performed by: EMERGENCY MEDICINE

## 2019-01-25 PROCEDURE — 84484 ASSAY OF TROPONIN QUANT: CPT | Performed by: EMERGENCY MEDICINE

## 2019-01-25 PROCEDURE — 94760 N-INVAS EAR/PLS OXIMETRY 1: CPT

## 2019-01-25 PROCEDURE — 87486 CHLMYD PNEUM DNA AMP PROBE: CPT | Performed by: INTERNAL MEDICINE

## 2019-01-25 PROCEDURE — 94640 AIRWAY INHALATION TREATMENT: CPT

## 2019-01-25 PROCEDURE — 36415 COLL VENOUS BLD VENIPUNCTURE: CPT | Performed by: EMERGENCY MEDICINE

## 2019-01-25 PROCEDURE — 93005 ELECTROCARDIOGRAM TRACING: CPT | Performed by: EMERGENCY MEDICINE

## 2019-01-25 PROCEDURE — 85610 PROTHROMBIN TIME: CPT | Performed by: EMERGENCY MEDICINE

## 2019-01-25 PROCEDURE — 80053 COMPREHEN METABOLIC PANEL: CPT | Performed by: EMERGENCY MEDICINE

## 2019-01-25 PROCEDURE — 25010000002 FUROSEMIDE PER 20 MG: Performed by: INTERNAL MEDICINE

## 2019-01-25 PROCEDURE — 85025 COMPLETE CBC W/AUTO DIFF WBC: CPT | Performed by: EMERGENCY MEDICINE

## 2019-01-25 PROCEDURE — 94799 UNLISTED PULMONARY SVC/PX: CPT

## 2019-01-25 PROCEDURE — 83880 ASSAY OF NATRIURETIC PEPTIDE: CPT | Performed by: EMERGENCY MEDICINE

## 2019-01-25 PROCEDURE — 87633 RESP VIRUS 12-25 TARGETS: CPT | Performed by: INTERNAL MEDICINE

## 2019-01-25 PROCEDURE — 25010000002 CEFTRIAXONE PER 250 MG: Performed by: EMERGENCY MEDICINE

## 2019-01-25 PROCEDURE — 93010 ELECTROCARDIOGRAM REPORT: CPT | Performed by: INTERNAL MEDICINE

## 2019-01-25 PROCEDURE — 85379 FIBRIN DEGRADATION QUANT: CPT | Performed by: EMERGENCY MEDICINE

## 2019-01-25 PROCEDURE — 99284 EMERGENCY DEPT VISIT MOD MDM: CPT

## 2019-01-25 PROCEDURE — 36600 WITHDRAWAL OF ARTERIAL BLOOD: CPT

## 2019-01-25 PROCEDURE — 87040 BLOOD CULTURE FOR BACTERIA: CPT | Performed by: EMERGENCY MEDICINE

## 2019-01-25 PROCEDURE — 82803 BLOOD GASES ANY COMBINATION: CPT

## 2019-01-25 PROCEDURE — 87798 DETECT AGENT NOS DNA AMP: CPT | Performed by: INTERNAL MEDICINE

## 2019-01-25 RX ORDER — WARFARIN SODIUM 7.5 MG/1
7.5 TABLET ORAL
Status: DISCONTINUED | OUTPATIENT
Start: 2019-01-26 | End: 2019-01-28 | Stop reason: HOSPADM

## 2019-01-25 RX ORDER — ESCITALOPRAM OXALATE 10 MG/1
10 TABLET ORAL DAILY
Status: DISCONTINUED | OUTPATIENT
Start: 2019-01-26 | End: 2019-01-28 | Stop reason: HOSPADM

## 2019-01-25 RX ORDER — ACETAMINOPHEN 325 MG/1
650 TABLET ORAL EVERY 4 HOURS PRN
Status: DISCONTINUED | OUTPATIENT
Start: 2019-01-25 | End: 2019-01-25 | Stop reason: SDUPTHER

## 2019-01-25 RX ORDER — PANTOPRAZOLE SODIUM 40 MG/1
40 TABLET, DELAYED RELEASE ORAL
Status: DISCONTINUED | OUTPATIENT
Start: 2019-01-26 | End: 2019-01-28 | Stop reason: HOSPADM

## 2019-01-25 RX ORDER — ONDANSETRON 2 MG/ML
4 INJECTION INTRAMUSCULAR; INTRAVENOUS EVERY 6 HOURS PRN
Status: DISCONTINUED | OUTPATIENT
Start: 2019-01-25 | End: 2019-01-28 | Stop reason: HOSPADM

## 2019-01-25 RX ORDER — SODIUM CHLORIDE 0.9 % (FLUSH) 0.9 %
3 SYRINGE (ML) INJECTION EVERY 12 HOURS SCHEDULED
Status: DISCONTINUED | OUTPATIENT
Start: 2019-01-25 | End: 2019-01-28 | Stop reason: HOSPADM

## 2019-01-25 RX ORDER — ASPIRIN 81 MG/1
81 TABLET ORAL DAILY
Status: DISCONTINUED | OUTPATIENT
Start: 2019-01-26 | End: 2019-01-28 | Stop reason: HOSPADM

## 2019-01-25 RX ORDER — IPRATROPIUM BROMIDE AND ALBUTEROL SULFATE 2.5; .5 MG/3ML; MG/3ML
3 SOLUTION RESPIRATORY (INHALATION)
Status: DISCONTINUED | OUTPATIENT
Start: 2019-01-25 | End: 2019-01-28 | Stop reason: HOSPADM

## 2019-01-25 RX ORDER — DILTIAZEM HYDROCHLORIDE 180 MG/1
CAPSULE, COATED, EXTENDED RELEASE ORAL
Qty: 90 CAPSULE | Refills: 1 | Status: SHIPPED | OUTPATIENT
Start: 2019-01-25 | End: 2019-08-28 | Stop reason: SDUPTHER

## 2019-01-25 RX ORDER — ACETAMINOPHEN 325 MG/1
650 TABLET ORAL EVERY 4 HOURS PRN
Status: DISCONTINUED | OUTPATIENT
Start: 2019-01-25 | End: 2019-01-28 | Stop reason: HOSPADM

## 2019-01-25 RX ORDER — DONEPEZIL HYDROCHLORIDE 5 MG/1
5 TABLET, FILM COATED ORAL NIGHTLY
Status: DISCONTINUED | OUTPATIENT
Start: 2019-01-25 | End: 2019-01-28 | Stop reason: HOSPADM

## 2019-01-25 RX ORDER — SODIUM CHLORIDE 0.9 % (FLUSH) 0.9 %
10 SYRINGE (ML) INJECTION AS NEEDED
Status: DISCONTINUED | OUTPATIENT
Start: 2019-01-25 | End: 2019-01-28 | Stop reason: HOSPADM

## 2019-01-25 RX ORDER — CALCITRIOL 0.25 UG/1
0.25 CAPSULE, LIQUID FILLED ORAL DAILY
Status: DISCONTINUED | OUTPATIENT
Start: 2019-01-26 | End: 2019-01-28 | Stop reason: HOSPADM

## 2019-01-25 RX ORDER — ATORVASTATIN CALCIUM 10 MG/1
10 TABLET, FILM COATED ORAL NIGHTLY
Status: DISCONTINUED | OUTPATIENT
Start: 2019-01-25 | End: 2019-01-28 | Stop reason: HOSPADM

## 2019-01-25 RX ORDER — GUAIFENESIN 600 MG/1
1200 TABLET, EXTENDED RELEASE ORAL EVERY 12 HOURS SCHEDULED
Status: DISCONTINUED | OUTPATIENT
Start: 2019-01-25 | End: 2019-01-28 | Stop reason: HOSPADM

## 2019-01-25 RX ORDER — FUROSEMIDE 10 MG/ML
40 INJECTION INTRAMUSCULAR; INTRAVENOUS EVERY 12 HOURS
Status: DISPENSED | OUTPATIENT
Start: 2019-01-25 | End: 2019-01-27

## 2019-01-25 RX ORDER — CALCITRIOL 0.25 UG/1
CAPSULE, LIQUID FILLED ORAL
Qty: 30 CAPSULE | Refills: 4 | Status: SHIPPED | OUTPATIENT
Start: 2019-01-25 | End: 2019-07-03 | Stop reason: SDUPTHER

## 2019-01-25 RX ORDER — GABAPENTIN 300 MG/1
300 CAPSULE ORAL NIGHTLY
Status: DISCONTINUED | OUTPATIENT
Start: 2019-01-25 | End: 2019-01-28 | Stop reason: HOSPADM

## 2019-01-25 RX ORDER — METOPROLOL TARTRATE 50 MG/1
50 TABLET, FILM COATED ORAL EVERY 12 HOURS SCHEDULED
Status: DISCONTINUED | OUTPATIENT
Start: 2019-01-25 | End: 2019-01-28 | Stop reason: HOSPADM

## 2019-01-25 RX ORDER — METHIMAZOLE 10 MG/1
10 TABLET ORAL DAILY
Status: DISCONTINUED | OUTPATIENT
Start: 2019-01-26 | End: 2019-01-28 | Stop reason: HOSPADM

## 2019-01-25 RX ORDER — DILTIAZEM HYDROCHLORIDE 180 MG/1
180 CAPSULE, COATED, EXTENDED RELEASE ORAL
Status: DISCONTINUED | OUTPATIENT
Start: 2019-01-26 | End: 2019-01-28 | Stop reason: HOSPADM

## 2019-01-25 RX ORDER — WARFARIN SODIUM 5 MG/1
5 TABLET ORAL
Status: DISCONTINUED | OUTPATIENT
Start: 2019-01-25 | End: 2019-01-28 | Stop reason: HOSPADM

## 2019-01-25 RX ORDER — SODIUM CHLORIDE 0.9 % (FLUSH) 0.9 %
3-10 SYRINGE (ML) INJECTION AS NEEDED
Status: DISCONTINUED | OUTPATIENT
Start: 2019-01-25 | End: 2019-01-28 | Stop reason: HOSPADM

## 2019-01-25 RX ADMIN — GABAPENTIN 300 MG: 300 CAPSULE ORAL at 21:21

## 2019-01-25 RX ADMIN — GUAIFENESIN 1200 MG: 600 TABLET, EXTENDED RELEASE ORAL at 21:20

## 2019-01-25 RX ADMIN — DONEPEZIL HYDROCHLORIDE 5 MG: 5 TABLET, FILM COATED ORAL at 21:20

## 2019-01-25 RX ADMIN — FUROSEMIDE 40 MG: 10 INJECTION, SOLUTION INTRAVENOUS at 19:40

## 2019-01-25 RX ADMIN — WARFARIN SODIUM 5 MG: 5 TABLET ORAL at 19:40

## 2019-01-25 RX ADMIN — AZITHROMYCIN MONOHYDRATE 500 MG: 500 INJECTION, POWDER, LYOPHILIZED, FOR SOLUTION INTRAVENOUS at 17:09

## 2019-01-25 RX ADMIN — ATORVASTATIN CALCIUM 10 MG: 10 TABLET, FILM COATED ORAL at 21:20

## 2019-01-25 RX ADMIN — METOPROLOL TARTRATE 50 MG: 50 TABLET ORAL at 21:20

## 2019-01-25 RX ADMIN — CEFTRIAXONE SODIUM 1 G: 1 INJECTION, POWDER, FOR SOLUTION INTRAMUSCULAR; INTRAVENOUS at 17:05

## 2019-01-25 RX ADMIN — SODIUM CHLORIDE, PRESERVATIVE FREE 3 ML: 5 INJECTION INTRAVENOUS at 21:22

## 2019-01-25 RX ADMIN — IPRATROPIUM BROMIDE AND ALBUTEROL SULFATE 3 ML: 2.5; .5 SOLUTION RESPIRATORY (INHALATION) at 19:50

## 2019-01-26 ENCOUNTER — APPOINTMENT (OUTPATIENT)
Dept: NUCLEAR MEDICINE | Facility: HOSPITAL | Age: 74
End: 2019-01-26

## 2019-01-26 LAB
ANION GAP SERPL CALCULATED.3IONS-SCNC: 5 MMOL/L (ref 4–13)
ARTICHOKE IGE QN: 57 MG/DL (ref 0–99)
BASOPHILS # BLD AUTO: 0.03 10*3/MM3 (ref 0–0.2)
BASOPHILS NFR BLD AUTO: 0.6 % (ref 0–2)
BUN BLD-MCNC: 41 MG/DL (ref 5–21)
BUN/CREAT SERPL: 26.1 (ref 7–25)
CALCIUM SPEC-SCNC: 10 MG/DL (ref 8.4–10.4)
CHLORIDE SERPL-SCNC: 105 MMOL/L (ref 98–110)
CHOLEST SERPL-MCNC: 103 MG/DL (ref 130–200)
CO2 SERPL-SCNC: 33 MMOL/L (ref 24–31)
CREAT BLD-MCNC: 1.57 MG/DL (ref 0.5–1.4)
DEPRECATED RDW RBC AUTO: 45.1 FL (ref 40–54)
EOSINOPHIL # BLD AUTO: 0.41 10*3/MM3 (ref 0–0.7)
EOSINOPHIL NFR BLD AUTO: 7.6 % (ref 0–4)
ERYTHROCYTE [DISTWIDTH] IN BLOOD BY AUTOMATED COUNT: 12.6 % (ref 12–15)
GFR SERPL CREATININE-BSD FRML MDRD: 32 ML/MIN/1.73
GLUCOSE BLD-MCNC: 101 MG/DL (ref 70–100)
HCT VFR BLD AUTO: 36.3 % (ref 37–47)
HDLC SERPL-MCNC: 24 MG/DL
HGB BLD-MCNC: 11.4 G/DL (ref 12–16)
IMM GRANULOCYTES # BLD AUTO: 0.01 10*3/MM3 (ref 0–0.03)
IMM GRANULOCYTES NFR BLD AUTO: 0.2 % (ref 0–5)
INR PPP: 2.12 (ref 0.91–1.09)
LDLC/HDLC SERPL: 2.3 {RATIO}
LYMPHOCYTES # BLD AUTO: 0.83 10*3/MM3 (ref 0.72–4.86)
LYMPHOCYTES NFR BLD AUTO: 15.3 % (ref 15–45)
MCH RBC QN AUTO: 30.7 PG (ref 28–32)
MCHC RBC AUTO-ENTMCNC: 31.4 G/DL (ref 33–36)
MCV RBC AUTO: 97.8 FL (ref 82–98)
MONOCYTES # BLD AUTO: 0.94 10*3/MM3 (ref 0.19–1.3)
MONOCYTES NFR BLD AUTO: 17.3 % (ref 4–12)
NEUTROPHILS # BLD AUTO: 3.2 10*3/MM3 (ref 1.87–8.4)
NEUTROPHILS NFR BLD AUTO: 59 % (ref 39–78)
NRBC BLD AUTO-RTO: 0 /100 WBC (ref 0–0)
PLATELET # BLD AUTO: 216 10*3/MM3 (ref 130–400)
PMV BLD AUTO: 9.4 FL (ref 6–12)
POTASSIUM BLD-SCNC: 3.8 MMOL/L (ref 3.5–5.3)
PROTHROMBIN TIME: 24.5 SECONDS (ref 11.9–14.6)
RBC # BLD AUTO: 3.71 10*6/MM3 (ref 4.2–5.4)
SODIUM BLD-SCNC: 143 MMOL/L (ref 135–145)
TRIGL SERPL-MCNC: 119 MG/DL (ref 0–149)
TROPONIN I SERPL-MCNC: 0.03 NG/ML (ref 0–0.03)
TROPONIN I SERPL-MCNC: 0.03 NG/ML (ref 0–0.03)
WBC NRBC COR # BLD: 5.42 10*3/MM3 (ref 4.8–10.8)

## 2019-01-26 PROCEDURE — 94760 N-INVAS EAR/PLS OXIMETRY 1: CPT

## 2019-01-26 PROCEDURE — 94799 UNLISTED PULMONARY SVC/PX: CPT

## 2019-01-26 PROCEDURE — 63710000001 DIPHENHYDRAMINE PER 50 MG: Performed by: INTERNAL MEDICINE

## 2019-01-26 PROCEDURE — A9558 XE133 XENON 10MCI: HCPCS | Performed by: INTERNAL MEDICINE

## 2019-01-26 PROCEDURE — 0 TECHNETIUM ALBUMIN AGGREGATED: Performed by: INTERNAL MEDICINE

## 2019-01-26 PROCEDURE — 25010000002 AZITHROMYCIN PER 500 MG: Performed by: INTERNAL MEDICINE

## 2019-01-26 PROCEDURE — 84484 ASSAY OF TROPONIN QUANT: CPT | Performed by: INTERNAL MEDICINE

## 2019-01-26 PROCEDURE — 85610 PROTHROMBIN TIME: CPT | Performed by: INTERNAL MEDICINE

## 2019-01-26 PROCEDURE — 85025 COMPLETE CBC W/AUTO DIFF WBC: CPT | Performed by: INTERNAL MEDICINE

## 2019-01-26 PROCEDURE — 78582 LUNG VENTILAT&PERFUS IMAGING: CPT

## 2019-01-26 PROCEDURE — 80061 LIPID PANEL: CPT | Performed by: INTERNAL MEDICINE

## 2019-01-26 PROCEDURE — 80048 BASIC METABOLIC PNL TOTAL CA: CPT | Performed by: INTERNAL MEDICINE

## 2019-01-26 PROCEDURE — A9540 TC99M MAA: HCPCS | Performed by: INTERNAL MEDICINE

## 2019-01-26 PROCEDURE — 25010000002 CEFTRIAXONE PER 250 MG: Performed by: INTERNAL MEDICINE

## 2019-01-26 PROCEDURE — 25010000002 FUROSEMIDE PER 20 MG: Performed by: INTERNAL MEDICINE

## 2019-01-26 PROCEDURE — 0 XENON XE 133: Performed by: INTERNAL MEDICINE

## 2019-01-26 RX ORDER — DIPHENHYDRAMINE HCL 25 MG
25 CAPSULE ORAL EVERY 6 HOURS PRN
Status: DISCONTINUED | OUTPATIENT
Start: 2019-01-26 | End: 2019-01-28 | Stop reason: HOSPADM

## 2019-01-26 RX ADMIN — GUAIFENESIN 1200 MG: 600 TABLET, EXTENDED RELEASE ORAL at 20:09

## 2019-01-26 RX ADMIN — ESCITALOPRAM 10 MG: 10 TABLET, FILM COATED ORAL at 07:50

## 2019-01-26 RX ADMIN — CALCITRIOL 0.25 MCG: 0.25 CAPSULE ORAL at 07:51

## 2019-01-26 RX ADMIN — DIPHENHYDRAMINE HYDROCHLORIDE 25 MG: 25 CAPSULE ORAL at 07:55

## 2019-01-26 RX ADMIN — IPRATROPIUM BROMIDE AND ALBUTEROL SULFATE 3 ML: 2.5; .5 SOLUTION RESPIRATORY (INHALATION) at 08:19

## 2019-01-26 RX ADMIN — SODIUM CHLORIDE, PRESERVATIVE FREE 3 ML: 5 INJECTION INTRAVENOUS at 20:09

## 2019-01-26 RX ADMIN — IPRATROPIUM BROMIDE AND ALBUTEROL SULFATE 3 ML: 2.5; .5 SOLUTION RESPIRATORY (INHALATION) at 16:38

## 2019-01-26 RX ADMIN — AZITHROMYCIN MONOHYDRATE 500 MG: 500 INJECTION, POWDER, LYOPHILIZED, FOR SOLUTION INTRAVENOUS at 16:55

## 2019-01-26 RX ADMIN — CEFTRIAXONE SODIUM 1 G: 1 INJECTION, POWDER, FOR SOLUTION INTRAMUSCULAR; INTRAVENOUS at 16:56

## 2019-01-26 RX ADMIN — DONEPEZIL HYDROCHLORIDE 5 MG: 5 TABLET, FILM COATED ORAL at 20:09

## 2019-01-26 RX ADMIN — IPRATROPIUM BROMIDE AND ALBUTEROL SULFATE 3 ML: 2.5; .5 SOLUTION RESPIRATORY (INHALATION) at 20:25

## 2019-01-26 RX ADMIN — ACETAMINOPHEN 650 MG: 325 TABLET, FILM COATED ORAL at 06:13

## 2019-01-26 RX ADMIN — METOPROLOL TARTRATE 50 MG: 50 TABLET ORAL at 07:50

## 2019-01-26 RX ADMIN — GUAIFENESIN 1200 MG: 600 TABLET, EXTENDED RELEASE ORAL at 07:51

## 2019-01-26 RX ADMIN — METHIMAZOLE 10 MG: 10 TABLET ORAL at 07:51

## 2019-01-26 RX ADMIN — SODIUM CHLORIDE, PRESERVATIVE FREE 3 ML: 5 INJECTION INTRAVENOUS at 08:40

## 2019-01-26 RX ADMIN — ATORVASTATIN CALCIUM 10 MG: 10 TABLET, FILM COATED ORAL at 20:09

## 2019-01-26 RX ADMIN — FUROSEMIDE 40 MG: 10 INJECTION, SOLUTION INTRAVENOUS at 06:11

## 2019-01-26 RX ADMIN — WARFARIN SODIUM 7.5 MG: 7.5 TABLET ORAL at 17:16

## 2019-01-26 RX ADMIN — DIPHENHYDRAMINE HYDROCHLORIDE 25 MG: 25 CAPSULE ORAL at 20:17

## 2019-01-26 RX ADMIN — XENON XE-133 15.8 MILLICURIE: 10 GAS RESPIRATORY (INHALATION) at 11:12

## 2019-01-26 RX ADMIN — CHOLECALCIFEROL CAP 125 MCG (5000 UNIT) 5000 UNITS: 125 CAP at 12:06

## 2019-01-26 RX ADMIN — Medication 1 DOSE: at 11:15

## 2019-01-26 RX ADMIN — PANTOPRAZOLE SODIUM 40 MG: 40 TABLET, DELAYED RELEASE ORAL at 06:11

## 2019-01-26 RX ADMIN — DILTIAZEM HYDROCHLORIDE 180 MG: 180 CAPSULE, COATED, EXTENDED RELEASE ORAL at 06:11

## 2019-01-26 RX ADMIN — GABAPENTIN 300 MG: 300 CAPSULE ORAL at 20:09

## 2019-01-26 RX ADMIN — ASPIRIN 81 MG: 81 TABLET, DELAYED RELEASE ORAL at 07:50

## 2019-01-27 LAB
ANION GAP SERPL CALCULATED.3IONS-SCNC: 9 MMOL/L (ref 4–13)
BUN BLD-MCNC: 44 MG/DL (ref 5–21)
BUN/CREAT SERPL: 32.1 (ref 7–25)
CALCIUM SPEC-SCNC: 10 MG/DL (ref 8.4–10.4)
CHLORIDE SERPL-SCNC: 101 MMOL/L (ref 98–110)
CO2 SERPL-SCNC: 31 MMOL/L (ref 24–31)
CREAT BLD-MCNC: 1.37 MG/DL (ref 0.5–1.4)
GFR SERPL CREATININE-BSD FRML MDRD: 38 ML/MIN/1.73
GLUCOSE BLD-MCNC: 97 MG/DL (ref 70–100)
INR PPP: 2.29 (ref 0.91–1.09)
POTASSIUM BLD-SCNC: 4 MMOL/L (ref 3.5–5.3)
PROTHROMBIN TIME: 26.1 SECONDS (ref 11.9–14.6)
SODIUM BLD-SCNC: 141 MMOL/L (ref 135–145)

## 2019-01-27 PROCEDURE — 94760 N-INVAS EAR/PLS OXIMETRY 1: CPT

## 2019-01-27 PROCEDURE — 25010000002 CEFTRIAXONE PER 250 MG: Performed by: INTERNAL MEDICINE

## 2019-01-27 PROCEDURE — 85610 PROTHROMBIN TIME: CPT | Performed by: INTERNAL MEDICINE

## 2019-01-27 PROCEDURE — 63710000001 DIPHENHYDRAMINE PER 50 MG: Performed by: INTERNAL MEDICINE

## 2019-01-27 PROCEDURE — 94799 UNLISTED PULMONARY SVC/PX: CPT

## 2019-01-27 PROCEDURE — 80048 BASIC METABOLIC PNL TOTAL CA: CPT | Performed by: INTERNAL MEDICINE

## 2019-01-27 RX ORDER — AZITHROMYCIN 250 MG/1
500 TABLET, FILM COATED ORAL
Status: DISCONTINUED | OUTPATIENT
Start: 2019-01-27 | End: 2019-01-28 | Stop reason: HOSPADM

## 2019-01-27 RX ORDER — CEFDINIR 300 MG/1
300 CAPSULE ORAL EVERY 12 HOURS SCHEDULED
Status: DISCONTINUED | OUTPATIENT
Start: 2019-01-28 | End: 2019-01-28 | Stop reason: HOSPADM

## 2019-01-27 RX ADMIN — WARFARIN SODIUM 5 MG: 5 TABLET ORAL at 17:06

## 2019-01-27 RX ADMIN — AZITHROMYCIN 500 MG: 250 TABLET, FILM COATED ORAL at 17:06

## 2019-01-27 RX ADMIN — CHOLECALCIFEROL CAP 125 MCG (5000 UNIT) 5000 UNITS: 125 CAP at 12:00

## 2019-01-27 RX ADMIN — PANTOPRAZOLE SODIUM 40 MG: 40 TABLET, DELAYED RELEASE ORAL at 06:25

## 2019-01-27 RX ADMIN — GABAPENTIN 300 MG: 300 CAPSULE ORAL at 19:58

## 2019-01-27 RX ADMIN — METHIMAZOLE 10 MG: 10 TABLET ORAL at 08:09

## 2019-01-27 RX ADMIN — DILTIAZEM HYDROCHLORIDE 180 MG: 180 CAPSULE, COATED, EXTENDED RELEASE ORAL at 06:25

## 2019-01-27 RX ADMIN — METOPROLOL TARTRATE 50 MG: 50 TABLET ORAL at 19:58

## 2019-01-27 RX ADMIN — METOPROLOL TARTRATE 50 MG: 50 TABLET ORAL at 08:09

## 2019-01-27 RX ADMIN — CEFTRIAXONE SODIUM 1 G: 1 INJECTION, POWDER, FOR SOLUTION INTRAMUSCULAR; INTRAVENOUS at 17:06

## 2019-01-27 RX ADMIN — SODIUM CHLORIDE, PRESERVATIVE FREE 3 ML: 5 INJECTION INTRAVENOUS at 20:00

## 2019-01-27 RX ADMIN — DONEPEZIL HYDROCHLORIDE 5 MG: 5 TABLET, FILM COATED ORAL at 19:59

## 2019-01-27 RX ADMIN — IPRATROPIUM BROMIDE AND ALBUTEROL SULFATE 3 ML: 2.5; .5 SOLUTION RESPIRATORY (INHALATION) at 20:26

## 2019-01-27 RX ADMIN — DIPHENHYDRAMINE HYDROCHLORIDE 25 MG: 25 CAPSULE ORAL at 19:57

## 2019-01-27 RX ADMIN — IPRATROPIUM BROMIDE AND ALBUTEROL SULFATE 3 ML: 2.5; .5 SOLUTION RESPIRATORY (INHALATION) at 06:47

## 2019-01-27 RX ADMIN — GUAIFENESIN 1200 MG: 600 TABLET, EXTENDED RELEASE ORAL at 08:09

## 2019-01-27 RX ADMIN — ATORVASTATIN CALCIUM 10 MG: 10 TABLET, FILM COATED ORAL at 19:58

## 2019-01-27 RX ADMIN — ESCITALOPRAM 10 MG: 10 TABLET, FILM COATED ORAL at 08:09

## 2019-01-27 RX ADMIN — GUAIFENESIN 1200 MG: 600 TABLET, EXTENDED RELEASE ORAL at 19:58

## 2019-01-27 RX ADMIN — IPRATROPIUM BROMIDE AND ALBUTEROL SULFATE 3 ML: 2.5; .5 SOLUTION RESPIRATORY (INHALATION) at 14:22

## 2019-01-27 RX ADMIN — CALCITRIOL 0.25 MCG: 0.25 CAPSULE ORAL at 08:09

## 2019-01-27 RX ADMIN — ASPIRIN 81 MG: 81 TABLET, DELAYED RELEASE ORAL at 08:09

## 2019-01-27 RX ADMIN — SODIUM CHLORIDE, PRESERVATIVE FREE 3 ML: 5 INJECTION INTRAVENOUS at 08:39

## 2019-01-28 ENCOUNTER — APPOINTMENT (OUTPATIENT)
Dept: GENERAL RADIOLOGY | Facility: HOSPITAL | Age: 74
End: 2019-01-28

## 2019-01-28 VITALS
TEMPERATURE: 99 F | RESPIRATION RATE: 18 BRPM | HEART RATE: 82 BPM | DIASTOLIC BLOOD PRESSURE: 66 MMHG | BODY MASS INDEX: 38.52 KG/M2 | WEIGHT: 254.2 LBS | SYSTOLIC BLOOD PRESSURE: 119 MMHG | OXYGEN SATURATION: 90 % | HEIGHT: 68 IN

## 2019-01-28 LAB
ANION GAP SERPL CALCULATED.3IONS-SCNC: 6 MMOL/L (ref 4–13)
BUN BLD-MCNC: 40 MG/DL (ref 5–21)
BUN/CREAT SERPL: 30.5 (ref 7–25)
CALCIUM SPEC-SCNC: 10.6 MG/DL (ref 8.4–10.4)
CHLORIDE SERPL-SCNC: 105 MMOL/L (ref 98–110)
CO2 SERPL-SCNC: 32 MMOL/L (ref 24–31)
CREAT BLD-MCNC: 1.31 MG/DL (ref 0.5–1.4)
DEPRECATED RDW RBC AUTO: 44 FL (ref 40–54)
ERYTHROCYTE [DISTWIDTH] IN BLOOD BY AUTOMATED COUNT: 12.5 % (ref 12–15)
GFR SERPL CREATININE-BSD FRML MDRD: 40 ML/MIN/1.73
GLUCOSE BLD-MCNC: 107 MG/DL (ref 70–100)
HCT VFR BLD AUTO: 36.9 % (ref 37–47)
HGB BLD-MCNC: 11.7 G/DL (ref 12–16)
INR PPP: 2.11 (ref 0.91–1.09)
MCH RBC QN AUTO: 30.7 PG (ref 28–32)
MCHC RBC AUTO-ENTMCNC: 31.7 G/DL (ref 33–36)
MCV RBC AUTO: 96.9 FL (ref 82–98)
PLATELET # BLD AUTO: 235 10*3/MM3 (ref 130–400)
PMV BLD AUTO: 9.5 FL (ref 6–12)
POTASSIUM BLD-SCNC: 4.3 MMOL/L (ref 3.5–5.3)
PROTHROMBIN TIME: 24.4 SECONDS (ref 11.9–14.6)
RBC # BLD AUTO: 3.81 10*6/MM3 (ref 4.2–5.4)
SODIUM BLD-SCNC: 143 MMOL/L (ref 135–145)
WBC NRBC COR # BLD: 6.09 10*3/MM3 (ref 4.8–10.8)

## 2019-01-28 PROCEDURE — 85027 COMPLETE CBC AUTOMATED: CPT | Performed by: INTERNAL MEDICINE

## 2019-01-28 PROCEDURE — 85610 PROTHROMBIN TIME: CPT | Performed by: INTERNAL MEDICINE

## 2019-01-28 PROCEDURE — 94760 N-INVAS EAR/PLS OXIMETRY 1: CPT

## 2019-01-28 PROCEDURE — 94799 UNLISTED PULMONARY SVC/PX: CPT

## 2019-01-28 PROCEDURE — 71046 X-RAY EXAM CHEST 2 VIEWS: CPT

## 2019-01-28 PROCEDURE — 80048 BASIC METABOLIC PNL TOTAL CA: CPT | Performed by: INTERNAL MEDICINE

## 2019-01-28 RX ORDER — AZITHROMYCIN 250 MG/1
TABLET, FILM COATED ORAL
Qty: 1 TABLET | Refills: 0 | OUTPATIENT
Start: 2019-01-28 | End: 2019-02-26 | Stop reason: HOSPADM

## 2019-01-28 RX ORDER — CEFDINIR 300 MG/1
300 CAPSULE ORAL EVERY 12 HOURS SCHEDULED
Qty: 10 CAPSULE | Refills: 0 | Status: SHIPPED | OUTPATIENT
Start: 2019-01-28 | End: 2019-02-02

## 2019-01-28 RX ADMIN — ESCITALOPRAM 10 MG: 10 TABLET, FILM COATED ORAL at 09:51

## 2019-01-28 RX ADMIN — CALCITRIOL 0.25 MCG: 0.25 CAPSULE ORAL at 09:51

## 2019-01-28 RX ADMIN — METHIMAZOLE 10 MG: 10 TABLET ORAL at 09:51

## 2019-01-28 RX ADMIN — DILTIAZEM HYDROCHLORIDE 180 MG: 180 CAPSULE, COATED, EXTENDED RELEASE ORAL at 04:22

## 2019-01-28 RX ADMIN — GUAIFENESIN 1200 MG: 600 TABLET, EXTENDED RELEASE ORAL at 09:51

## 2019-01-28 RX ADMIN — ASPIRIN 81 MG: 81 TABLET, DELAYED RELEASE ORAL at 09:51

## 2019-01-28 RX ADMIN — PANTOPRAZOLE SODIUM 40 MG: 40 TABLET, DELAYED RELEASE ORAL at 04:21

## 2019-01-28 RX ADMIN — CEFDINIR 300 MG: 300 CAPSULE ORAL at 09:51

## 2019-01-28 RX ADMIN — IPRATROPIUM BROMIDE AND ALBUTEROL SULFATE 3 ML: 2.5; .5 SOLUTION RESPIRATORY (INHALATION) at 07:24

## 2019-01-28 RX ADMIN — METOPROLOL TARTRATE 50 MG: 50 TABLET ORAL at 09:51

## 2019-01-28 RX ADMIN — SODIUM CHLORIDE, PRESERVATIVE FREE 3 ML: 5 INJECTION INTRAVENOUS at 10:14

## 2019-01-28 RX ADMIN — IPRATROPIUM BROMIDE AND ALBUTEROL SULFATE 3 ML: 2.5; .5 SOLUTION RESPIRATORY (INHALATION) at 11:17

## 2019-01-29 ENCOUNTER — TELEPHONE (OUTPATIENT)
Dept: INTERNAL MEDICINE | Age: 74
End: 2019-01-29

## 2019-01-29 ENCOUNTER — READMISSION MANAGEMENT (OUTPATIENT)
Dept: CALL CENTER | Facility: HOSPITAL | Age: 74
End: 2019-01-29

## 2019-01-29 DIAGNOSIS — I50.9 CONGESTIVE HEART FAILURE, UNSPECIFIED HF CHRONICITY, UNSPECIFIED HEART FAILURE TYPE (HCC): Primary | ICD-10-CM

## 2019-01-29 LAB

## 2019-01-29 NOTE — OUTREACH NOTE
Prep Survey      Responses   Facility patient discharged from?  [facility]   Is patient eligible?  Yes   Discharge diagnosis  Shortness of breath, acute diastolic CHF, eleveated D Dimer, low probability PE, DAVID, ASA uses CPAP with O2 at home   Does the patient have one of the following disease processes/diagnoses(primary or secondary)?  CHF   Does the patient have Home health ordered?  No   Is there a DME ordered?  No   Comments regarding appointments  See AVS   Prep survey completed?  Yes          Magda Granado RN

## 2019-01-30 ENCOUNTER — READMISSION MANAGEMENT (OUTPATIENT)
Dept: CALL CENTER | Facility: HOSPITAL | Age: 74
End: 2019-01-30

## 2019-01-30 LAB
BACTERIA SPEC AEROBE CULT: NORMAL
BACTERIA SPEC AEROBE CULT: NORMAL

## 2019-01-30 NOTE — OUTREACH NOTE
CHF Week 1 Survey      Responses   Facility patient discharged from?  [facility]   Does the patient have one of the following disease processes/diagnoses(primary or secondary)?  CHF   Is there a successful TCM telephone encounter documented?  No   CHF Week 1 attempt successful?  No   Unsuccessful attempts  Attempt 1          Magda Johnson RN

## 2019-01-31 ENCOUNTER — ANTI-COAG VISIT (OUTPATIENT)
Dept: INTERNAL MEDICINE | Age: 74
End: 2019-01-31

## 2019-01-31 ENCOUNTER — READMISSION MANAGEMENT (OUTPATIENT)
Dept: CALL CENTER | Facility: HOSPITAL | Age: 74
End: 2019-01-31

## 2019-01-31 NOTE — OUTREACH NOTE
CHF Week 1 Survey      Responses   Facility patient discharged from?  [place]   Does the patient have one of the following disease processes/diagnoses(primary or secondary)?  CHF   Is there a successful TCM telephone encounter documented?  No   CHF Week 1 attempt successful?  Yes   Call start time  1429   Call end time  1432   Discharge diagnosis  Shortness of breath, acute diastolic CHF, eleveated D Dimer, low probability PE, DAVID, ASA uses CPAP with O2 at home   Is patient permission given to speak with other caregiver?  Yes   List who call center can speak with  Swetha, Daughter   Person spoke with today (if not patient) and relationship  Swetha   Meds reviewed with patient/caregiver?  Yes   Is the patient having any side effects they believe may be caused by any medication additions or changes?  No   Does the patient have all medications ordered at discharge?  Yes   Is the patient taking all medications as directed (includes completed medication regime)?  Yes   Does the patient have a primary care provider?   Yes   Does the patient have an appointment with their PCP within 7 days of discharge?  Yes   Has the patient kept scheduled appointments due by today?  N/A   Psychosocial issues?  No   Did the patient receive a copy of their discharge instructions?  Yes   Nursing interventions  Reviewed instructions with patient   What is the patient's perception of their health status since discharge?  Improving   Nursing interventions  Nurse provided patient education   Is the patient weighing daily?  No   Does the patient have scales?  No   Daily weight interventions  Education provided on importance of daily weight   Is the patient able to teach back Heart Failure diet management?  Yes   Is the patient able to teach back Heart Failure Zones?  Yes   Is the patient able to teach back signs and symptoms of worsening condition? (i.e. weight gain, shortness of air, etc.)  Yes   Is the patient/caregiver able to teach back the  hierarchy of who to call/visit for symptoms/problems? PCP, Specialist, Home health nurse, Urgent Care, ED, 911  Yes    CHF Week 1 call completed?  Yes          Alondra Butcher RN

## 2019-02-01 DIAGNOSIS — I48.20 CHRONIC A-FIB (HCC): Primary | ICD-10-CM

## 2019-02-01 DIAGNOSIS — I48.20 CHRONIC A-FIB (HCC): ICD-10-CM

## 2019-02-01 LAB
INR BLD: 2.41 (ref 0.88–1.18)
PROTHROMBIN TIME: 25.5 SEC (ref 12–14.6)

## 2019-02-07 LAB — INR BLD: 2.6

## 2019-02-08 ENCOUNTER — READMISSION MANAGEMENT (OUTPATIENT)
Dept: CALL CENTER | Facility: HOSPITAL | Age: 74
End: 2019-02-08

## 2019-02-08 NOTE — OUTREACH NOTE
CHF Week 2 Survey      Responses   Facility patient discharged from?  [place]   Does the patient have one of the following disease processes/diagnoses(primary or secondary)?  CHF   Week 2 attempt successful?  No   Unsuccessful attempts  Attempt 1          Caryn Monzon RN

## 2019-02-11 ENCOUNTER — READMISSION MANAGEMENT (OUTPATIENT)
Dept: CALL CENTER | Facility: HOSPITAL | Age: 74
End: 2019-02-11

## 2019-02-11 ENCOUNTER — TELEPHONE (OUTPATIENT)
Dept: ADMINISTRATIVE | Age: 74
End: 2019-02-11

## 2019-02-11 ENCOUNTER — ANTI-COAG VISIT (OUTPATIENT)
Dept: INTERNAL MEDICINE | Age: 74
End: 2019-02-11
Payer: MEDICARE

## 2019-02-11 DIAGNOSIS — I48.20 CHRONIC ATRIAL FIBRILLATION (HCC): ICD-10-CM

## 2019-02-11 PROCEDURE — 93793 ANTICOAG MGMT PT WARFARIN: CPT | Performed by: NURSE PRACTITIONER

## 2019-02-11 NOTE — OUTREACH NOTE
CHF Week 2 Survey      Responses   Facility patient discharged from?  [place]   Does the patient have one of the following disease processes/diagnoses(primary or secondary)?  CHF   Week 2 attempt successful?  Yes   Call start time  1650   Call end time  1651   Discharge diagnosis  Shortness of breath, acute diastolic CHF, eleveated D Dimer, low probability PE, DAVID, ASA uses CPAP with O2 at home   Person spoke with today (if not patient) and relationship  Swetha Purdy reviewed with patient/caregiver?  Yes   Is the patient taking all medications as directed (includes completed medication regime)?  Yes   Medication comments  .   Does the patient have a primary care provider?   Yes   Does the patient have an appointment with their PCP within 7 days of discharge?  Greater than 7 days   What is preventing the patient from scheduling follow up appointments within 7 days of discharge?  Earlier appointment not available   Has the patient kept scheduled appointments due by today?  N/A   What is the Home health agency?   .   Psychosocial issues?  No   Comments  Still having some knee pain.    What is the patient's perception of their health status since discharge?  Returned to baseline/stable   Is the patient weighing daily?  Yes   Does the patient have scales?  Yes   Is the patient able to teach back Heart Failure Zones?  Yes   Is the patient/caregiver able to teach back the hierarchy of who to call/visit for symptoms/problems? PCP, Specialist, Home health nurse, Urgent Care, ED, 911  Yes   CHF Week 2 call completed?  Yes   Revoked  No further contact(revokes)-requires comment   Graduated/Revoked comments  baseline doing well          Veronica Bryant RN

## 2019-02-14 ENCOUNTER — ANTI-COAG VISIT (OUTPATIENT)
Dept: INTERNAL MEDICINE | Age: 74
End: 2019-02-14
Payer: MEDICARE

## 2019-02-14 DIAGNOSIS — I48.20 CHRONIC ATRIAL FIBRILLATION (HCC): ICD-10-CM

## 2019-02-14 LAB — INR BLD: 3.1

## 2019-02-14 PROCEDURE — 93793 ANTICOAG MGMT PT WARFARIN: CPT | Performed by: NURSE PRACTITIONER

## 2019-02-25 ENCOUNTER — ANTI-COAG VISIT (OUTPATIENT)
Dept: INTERNAL MEDICINE | Age: 74
End: 2019-02-25
Payer: MEDICARE

## 2019-02-25 DIAGNOSIS — I48.20 CHRONIC ATRIAL FIBRILLATION (HCC): ICD-10-CM

## 2019-02-25 DIAGNOSIS — G62.9 PERIPHERAL POLYNEUROPATHY: ICD-10-CM

## 2019-02-25 LAB — INR BLD: 2.5

## 2019-02-25 PROCEDURE — 93793 ANTICOAG MGMT PT WARFARIN: CPT | Performed by: NURSE PRACTITIONER

## 2019-02-25 RX ORDER — GABAPENTIN 300 MG/1
CAPSULE ORAL
Qty: 90 CAPSULE | Refills: 0 | Status: SHIPPED | OUTPATIENT
Start: 2019-02-25 | End: 2019-05-22 | Stop reason: SDUPTHER

## 2019-02-26 ENCOUNTER — APPOINTMENT (OUTPATIENT)
Dept: GENERAL RADIOLOGY | Facility: HOSPITAL | Age: 74
End: 2019-02-26

## 2019-02-26 ENCOUNTER — APPOINTMENT (OUTPATIENT)
Dept: CT IMAGING | Facility: HOSPITAL | Age: 74
End: 2019-02-26

## 2019-02-26 ENCOUNTER — HOSPITAL ENCOUNTER (EMERGENCY)
Facility: HOSPITAL | Age: 74
Discharge: HOME | End: 2019-02-26
Attending: EMERGENCY MEDICINE | Admitting: EMERGENCY MEDICINE

## 2019-02-26 VITALS
HEART RATE: 85 BPM | SYSTOLIC BLOOD PRESSURE: 124 MMHG | RESPIRATION RATE: 25 BRPM | HEIGHT: 68 IN | WEIGHT: 275.8 LBS | OXYGEN SATURATION: 93 % | DIASTOLIC BLOOD PRESSURE: 74 MMHG | BODY MASS INDEX: 41.8 KG/M2 | TEMPERATURE: 98 F
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
## 2019-02-26 DIAGNOSIS — G89.29 CHRONIC PAIN OF RIGHT KNEE: ICD-10-CM
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
## 2019-02-26 DIAGNOSIS — M25.561 CHRONIC PAIN OF RIGHT KNEE: ICD-10-CM

## 2019-02-26 LAB
ALBUMIN SERPL-MCNC: 4.1 G/DL (ref 3.5–5)
ALBUMIN/GLOB SERPL: 1 G/DL (ref 1.1–2.5)
ALP SERPL-CCNC: 81 U/L (ref 24–120)
ALT SERPL W P-5'-P-CCNC: 19 U/L (ref 0–54)
ANION GAP SERPL CALCULATED.3IONS-SCNC: 7 MMOL/L (ref 4–13)
AST SERPL-CCNC: 29 U/L (ref 7–45)
BASOPHILS # BLD AUTO: 0.05 10*3/MM3 (ref 0–0.2)
BASOPHILS NFR BLD AUTO: 0.8 % (ref 0–2)
BILIRUB SERPL-MCNC: 0.5 MG/DL (ref 0.1–1)
BUN BLD-MCNC: 65 MG/DL (ref 5–21)
BUN/CREAT SERPL: 42.8 (ref 7–25)
CALCIUM SPEC-SCNC: 10.6 MG/DL (ref 8.4–10.4)
CHLORIDE SERPL-SCNC: 104 MMOL/L (ref 98–110)
CO2 SERPL-SCNC: 28 MMOL/L (ref 24–31)
CREAT BLD-MCNC: 1.52 MG/DL (ref 0.5–1.4)
DEPRECATED RDW RBC AUTO: 51.7 FL (ref 40–54)
EOSINOPHIL # BLD AUTO: 0.54 10*3/MM3 (ref 0–0.7)
EOSINOPHIL NFR BLD AUTO: 8.3 % (ref 0–4)
ERYTHROCYTE [DISTWIDTH] IN BLOOD BY AUTOMATED COUNT: 15 % (ref 12–15)
GFR SERPL CREATININE-BSD FRML MDRD: 33 ML/MIN/1.73
GLOBULIN UR ELPH-MCNC: 4.1 GM/DL
GLUCOSE BLD-MCNC: 117 MG/DL (ref 70–100)
HCT VFR BLD AUTO: 37.4 % (ref 37–47)
HGB BLD-MCNC: 11.8 G/DL (ref 12–16)
IMM GRANULOCYTES # BLD AUTO: 0.01 10*3/MM3 (ref 0–0.05)
IMM GRANULOCYTES NFR BLD AUTO: 0.2 % (ref 0–5)
INR PPP: 3.31 (ref 0.91–1.09)
LYMPHOCYTES # BLD AUTO: 1.19 10*3/MM3 (ref 0.72–4.86)
LYMPHOCYTES NFR BLD AUTO: 18.3 % (ref 15–45)
MCH RBC QN AUTO: 30.1 PG (ref 28–32)
MCHC RBC AUTO-ENTMCNC: 31.6 G/DL (ref 33–36)
MCV RBC AUTO: 95.4 FL (ref 82–98)
MONOCYTES # BLD AUTO: 1.09 10*3/MM3 (ref 0.19–1.3)
MONOCYTES NFR BLD AUTO: 16.7 % (ref 4–12)
NEUTROPHILS # BLD AUTO: 3.63 10*3/MM3 (ref 1.87–8.4)
NEUTROPHILS NFR BLD AUTO: 55.7 % (ref 39–78)
NRBC BLD AUTO-RTO: 0 /100 WBC (ref 0–0)
PLATELET # BLD AUTO: 226 10*3/MM3 (ref 130–400)
PMV BLD AUTO: 9.3 FL (ref 6–12)
POTASSIUM BLD-SCNC: 4.7 MMOL/L (ref 3.5–5.3)
PROT SERPL-MCNC: 8.2 G/DL (ref 6.3–8.7)
PROTHROMBIN TIME: 34.9 SECONDS (ref 11.9–14.6)
RBC # BLD AUTO: 3.92 10*6/MM3 (ref 4.2–5.4)
SODIUM BLD-SCNC: 139 MMOL/L (ref 135–145)
WBC NRBC COR # BLD: 6.51 10*3/MM3 (ref 4.8–10.8)

## 2019-02-26 PROCEDURE — 99284 EMERGENCY DEPT VISIT MOD MDM: CPT

## 2019-02-26 PROCEDURE — 73502 X-RAY EXAM HIP UNI 2-3 VIEWS: CPT

## 2019-02-26 PROCEDURE — 73562 X-RAY EXAM OF KNEE 3: CPT

## 2019-02-26 PROCEDURE — 85610 PROTHROMBIN TIME: CPT | Performed by: EMERGENCY MEDICINE

## 2019-02-26 PROCEDURE — 85025 COMPLETE CBC W/AUTO DIFF WBC: CPT | Performed by: EMERGENCY MEDICINE

## 2019-02-26 PROCEDURE — 80053 COMPREHEN METABOLIC PANEL: CPT | Performed by: EMERGENCY MEDICINE

## 2019-02-26 PROCEDURE — 70450 CT HEAD/BRAIN W/O DYE: CPT

## 2019-03-01 ENCOUNTER — OFFICE VISIT (OUTPATIENT)
Dept: INTERNAL MEDICINE | Age: 74
End: 2019-03-01
Payer: MEDICARE

## 2019-03-01 ENCOUNTER — HOSPITAL ENCOUNTER (OUTPATIENT)
Dept: GENERAL RADIOLOGY | Age: 74
Discharge: HOME | End: 2019-03-01
Payer: MEDICARE

## 2019-03-01 ENCOUNTER — HOSPITAL ENCOUNTER (OUTPATIENT)
Dept: CT IMAGING | Age: 74
Discharge: HOME | End: 2019-03-01
Payer: MEDICARE

## 2019-03-01 VITALS
HEART RATE: 73 BPM | SYSTOLIC BLOOD PRESSURE: 118 MMHG | DIASTOLIC BLOOD PRESSURE: 88 MMHG | HEIGHT: 68 IN | WEIGHT: 275 LBS | BODY MASS INDEX: 41.68 KG/M2 | RESPIRATION RATE: 16 BRPM | OXYGEN SATURATION: 98 %

## 2019-03-01 DIAGNOSIS — M25.551 BILATERAL HIP PAIN: ICD-10-CM

## 2019-03-01 DIAGNOSIS — M25.551 RIGHT HIP PAIN: ICD-10-CM

## 2019-03-01 DIAGNOSIS — Z91.81 AT HIGH RISK FOR FALLS: ICD-10-CM

## 2019-03-01 DIAGNOSIS — M25.552 BILATERAL HIP PAIN: ICD-10-CM

## 2019-03-01 DIAGNOSIS — M25.551 RIGHT HIP PAIN: Primary | ICD-10-CM

## 2019-03-01 PROCEDURE — 99214 OFFICE O/P EST MOD 30 MIN: CPT | Performed by: NURSE PRACTITIONER

## 2019-03-01 PROCEDURE — 1036F TOBACCO NON-USER: CPT | Performed by: NURSE PRACTITIONER

## 2019-03-01 PROCEDURE — G8427 DOCREV CUR MEDS BY ELIG CLIN: HCPCS | Performed by: NURSE PRACTITIONER

## 2019-03-01 PROCEDURE — 1123F ACP DISCUSS/DSCN MKR DOCD: CPT | Performed by: NURSE PRACTITIONER

## 2019-03-01 PROCEDURE — 1090F PRES/ABSN URINE INCON ASSESS: CPT | Performed by: NURSE PRACTITIONER

## 2019-03-01 PROCEDURE — 73521 X-RAY EXAM HIPS BI 2 VIEWS: CPT

## 2019-03-01 PROCEDURE — 1111F DSCHRG MED/CURRENT MED MERGE: CPT | Performed by: NURSE PRACTITIONER

## 2019-03-01 PROCEDURE — G8400 PT W/DXA NO RESULTS DOC: HCPCS | Performed by: NURSE PRACTITIONER

## 2019-03-01 PROCEDURE — G8417 CALC BMI ABV UP PARAM F/U: HCPCS | Performed by: NURSE PRACTITIONER

## 2019-03-01 PROCEDURE — G8482 FLU IMMUNIZE ORDER/ADMIN: HCPCS | Performed by: NURSE PRACTITIONER

## 2019-03-01 PROCEDURE — 3017F COLORECTAL CA SCREEN DOC REV: CPT | Performed by: NURSE PRACTITIONER

## 2019-03-01 PROCEDURE — 73700 CT LOWER EXTREMITY W/O DYE: CPT

## 2019-03-01 PROCEDURE — 1101F PT FALLS ASSESS-DOCD LE1/YR: CPT | Performed by: NURSE PRACTITIONER

## 2019-03-01 PROCEDURE — 4040F PNEUMOC VAC/ADMIN/RCVD: CPT | Performed by: NURSE PRACTITIONER

## 2019-03-01 RX ORDER — METOPROLOL TARTRATE 50 MG/1
TABLET, FILM COATED ORAL
Refills: 11 | COMMUNITY
Start: 2019-02-20 | End: 2019-05-22 | Stop reason: SDUPTHER

## 2019-03-07 LAB — INR BLD: 3.9

## 2019-03-08 ENCOUNTER — ANTI-COAG VISIT (OUTPATIENT)
Dept: INTERNAL MEDICINE | Age: 74
End: 2019-03-08
Payer: MEDICARE

## 2019-03-08 DIAGNOSIS — I48.20 CHRONIC ATRIAL FIBRILLATION (HCC): ICD-10-CM

## 2019-03-08 PROCEDURE — 93793 ANTICOAG MGMT PT WARFARIN: CPT | Performed by: NURSE PRACTITIONER

## 2019-03-11 LAB — INR BLD: 2.2

## 2019-03-13 ENCOUNTER — ANTI-COAG VISIT (OUTPATIENT)
Dept: INTERNAL MEDICINE | Age: 74
End: 2019-03-13
Payer: MEDICARE

## 2019-03-13 DIAGNOSIS — I48.20 CHRONIC ATRIAL FIBRILLATION (HCC): ICD-10-CM

## 2019-03-13 PROCEDURE — 93793 ANTICOAG MGMT PT WARFARIN: CPT | Performed by: NURSE PRACTITIONER

## 2019-03-29 ENCOUNTER — ANTI-COAG VISIT (OUTPATIENT)
Dept: INTERNAL MEDICINE | Age: 74
End: 2019-03-29
Payer: MEDICARE

## 2019-03-29 ENCOUNTER — TELEPHONE (OUTPATIENT)
Dept: INTERNAL MEDICINE | Age: 74
End: 2019-03-29

## 2019-03-29 DIAGNOSIS — I87.2 CHRONIC VENOUS INSUFFICIENCY: ICD-10-CM

## 2019-03-29 DIAGNOSIS — I48.20 CHRONIC ATRIAL FIBRILLATION (HCC): ICD-10-CM

## 2019-03-29 LAB — INR BLD: 2.5

## 2019-03-29 PROCEDURE — 93793 ANTICOAG MGMT PT WARFARIN: CPT | Performed by: NURSE PRACTITIONER

## 2019-04-10 ENCOUNTER — ANTI-COAG VISIT (OUTPATIENT)
Dept: INTERNAL MEDICINE | Age: 74
End: 2019-04-10
Payer: MEDICARE

## 2019-04-10 DIAGNOSIS — I48.20 CHRONIC ATRIAL FIBRILLATION (HCC): ICD-10-CM

## 2019-04-10 LAB — INR BLD: 2.6

## 2019-04-10 PROCEDURE — 93793 ANTICOAG MGMT PT WARFARIN: CPT | Performed by: NURSE PRACTITIONER

## 2019-04-10 NOTE — PATIENT INSTRUCTIONS
INR today was a 2.6  Per verbal from Kvng Silverio,   Patient to continue current coumadin dose  Check in one week. Called person and advised of instructions. Patient verbalized understanding.

## 2019-04-19 ENCOUNTER — ANTI-COAG VISIT (OUTPATIENT)
Dept: INTERNAL MEDICINE | Age: 74
End: 2019-04-19

## 2019-04-19 LAB — INR BLD: 3.1

## 2019-04-19 NOTE — PROGRESS NOTES
HOME MONITORING REPORT    INR today:   Results for orders placed or performed in visit on 04/19/19   Protime-INR   Result Value Ref Range    INR 3.10        INR Goal: 2.0-3.0    Dosing Plan  As of 4/19/2019    TTR:   52.7 % (9.2 mo)   Full warfarin instructions:   7.5 mg every Tue, Thu, Sat; 5 mg all other days              PLAN: CONTINUE CURRENT DOSE    RECHECK IN ONE WEEK      Pt was called 3x 4/18/19. No answer, no VM   Called today, 4/19/19 & gave instructions to her daughter.

## 2019-05-01 ENCOUNTER — ANTI-COAG VISIT (OUTPATIENT)
Dept: INTERNAL MEDICINE | Age: 74
End: 2019-05-01
Payer: MEDICARE

## 2019-05-01 DIAGNOSIS — I48.20 CHRONIC ATRIAL FIBRILLATION (HCC): ICD-10-CM

## 2019-05-01 LAB — INR BLD: 3.4

## 2019-05-01 PROCEDURE — 93793 ANTICOAG MGMT PT WARFARIN: CPT | Performed by: NURSE PRACTITIONER

## 2019-05-14 ENCOUNTER — ANTI-COAG VISIT (OUTPATIENT)
Dept: INTERNAL MEDICINE | Age: 74
End: 2019-05-14
Payer: MEDICARE

## 2019-05-14 DIAGNOSIS — I48.20 CHRONIC ATRIAL FIBRILLATION (HCC): ICD-10-CM

## 2019-05-14 LAB — INR BLD: 3

## 2019-05-14 PROCEDURE — 93793 ANTICOAG MGMT PT WARFARIN: CPT | Performed by: NURSE PRACTITIONER

## 2019-05-14 NOTE — PATIENT INSTRUCTIONS
INR today was a 3.0  Per verbal from Kvng Silverio,   Patient to continue current coumadin dose  Check in one week. Called person and advised of instructions. Patient verbalized understanding.

## 2019-05-22 ENCOUNTER — OFFICE VISIT (OUTPATIENT)
Dept: INTERNAL MEDICINE | Age: 74
End: 2019-05-22
Payer: MEDICARE

## 2019-05-22 VITALS
WEIGHT: 262 LBS | HEART RATE: 58 BPM | OXYGEN SATURATION: 97 % | DIASTOLIC BLOOD PRESSURE: 74 MMHG | BODY MASS INDEX: 38.8 KG/M2 | SYSTOLIC BLOOD PRESSURE: 114 MMHG | HEIGHT: 69 IN

## 2019-05-22 DIAGNOSIS — R06.02 SOB (SHORTNESS OF BREATH): ICD-10-CM

## 2019-05-22 DIAGNOSIS — R53.83 LETHARGY: Primary | ICD-10-CM

## 2019-05-22 DIAGNOSIS — T14.8XXA MULTIPLE SKIN TEARS: ICD-10-CM

## 2019-05-22 DIAGNOSIS — G62.9 PERIPHERAL POLYNEUROPATHY: ICD-10-CM

## 2019-05-22 DIAGNOSIS — R53.83 LETHARGY: ICD-10-CM

## 2019-05-22 DIAGNOSIS — I48.20 CHRONIC A-FIB (HCC): ICD-10-CM

## 2019-05-22 DIAGNOSIS — E55.9 VITAMIN D DEFICIENCY: ICD-10-CM

## 2019-05-22 DIAGNOSIS — I10 ESSENTIAL (PRIMARY) HYPERTENSION: ICD-10-CM

## 2019-05-22 DIAGNOSIS — I48.20 CHRONIC ATRIAL FIBRILLATION (HCC): ICD-10-CM

## 2019-05-22 DIAGNOSIS — L29.8 CHRONIC PRURITIC RASH IN ADULT: ICD-10-CM

## 2019-05-22 DIAGNOSIS — Z00.00 HEALTHCARE MAINTENANCE: ICD-10-CM

## 2019-05-22 LAB
BACTERIA: ABNORMAL /HPF
BILIRUBIN URINE: NEGATIVE
BLOOD, URINE: ABNORMAL
CLARITY: ABNORMAL
COLOR: YELLOW
EPITHELIAL CELLS, UA: ABNORMAL /HPF
GLUCOSE URINE: NEGATIVE MG/DL
INR BLD: 2.69 (ref 0.88–1.18)
KETONES, URINE: NEGATIVE MG/DL
LEUKOCYTE ESTERASE, URINE: ABNORMAL
NITRITE, URINE: NEGATIVE
PH UA: 6.5 (ref 5–8)
PROTEIN UA: 30 MG/DL
PROTHROMBIN TIME: 27.8 SEC (ref 12–14.6)
RBC UA: ABNORMAL /HPF (ref 0–2)
SPECIFIC GRAVITY UA: 1.01 (ref 1–1.03)
URINE REFLEX TO CULTURE: YES
UROBILINOGEN, URINE: 0.2 E.U./DL
WBC UA: ABNORMAL /HPF (ref 0–5)
YEAST: ABNORMAL /HPF

## 2019-05-22 PROCEDURE — G8400 PT W/DXA NO RESULTS DOC: HCPCS | Performed by: NURSE PRACTITIONER

## 2019-05-22 PROCEDURE — G8427 DOCREV CUR MEDS BY ELIG CLIN: HCPCS | Performed by: NURSE PRACTITIONER

## 2019-05-22 PROCEDURE — 1036F TOBACCO NON-USER: CPT | Performed by: NURSE PRACTITIONER

## 2019-05-22 PROCEDURE — 96372 THER/PROPH/DIAG INJ SC/IM: CPT | Performed by: NURSE PRACTITIONER

## 2019-05-22 PROCEDURE — 3017F COLORECTAL CA SCREEN DOC REV: CPT | Performed by: NURSE PRACTITIONER

## 2019-05-22 PROCEDURE — 1123F ACP DISCUSS/DSCN MKR DOCD: CPT | Performed by: NURSE PRACTITIONER

## 2019-05-22 PROCEDURE — 99214 OFFICE O/P EST MOD 30 MIN: CPT | Performed by: NURSE PRACTITIONER

## 2019-05-22 PROCEDURE — G8417 CALC BMI ABV UP PARAM F/U: HCPCS | Performed by: NURSE PRACTITIONER

## 2019-05-22 PROCEDURE — 1090F PRES/ABSN URINE INCON ASSESS: CPT | Performed by: NURSE PRACTITIONER

## 2019-05-22 PROCEDURE — 4040F PNEUMOC VAC/ADMIN/RCVD: CPT | Performed by: NURSE PRACTITIONER

## 2019-05-22 RX ORDER — CEFDINIR 300 MG/1
300 CAPSULE ORAL 2 TIMES DAILY
Qty: 14 CAPSULE | Refills: 0 | Status: SHIPPED | OUTPATIENT
Start: 2019-05-22 | End: 2019-05-29

## 2019-05-22 RX ORDER — METOPROLOL TARTRATE 50 MG/1
TABLET, FILM COATED ORAL
Qty: 60 TABLET | Refills: 8 | Status: SHIPPED | OUTPATIENT
Start: 2019-05-22 | End: 2020-06-23

## 2019-05-22 RX ORDER — ERGOCALCIFEROL 1.25 MG/1
CAPSULE ORAL
Qty: 16 CAPSULE | Refills: 3 | Status: SHIPPED | OUTPATIENT
Start: 2019-05-22 | End: 2020-06-04

## 2019-05-22 RX ORDER — GABAPENTIN 300 MG/1
CAPSULE ORAL
Qty: 90 CAPSULE | Refills: 0 | Status: SHIPPED | OUTPATIENT
Start: 2019-05-22 | End: 2019-08-23

## 2019-05-22 RX ORDER — METHYLPREDNISOLONE ACETATE 80 MG/ML
80 INJECTION, SUSPENSION INTRA-ARTICULAR; INTRALESIONAL; INTRAMUSCULAR; SOFT TISSUE ONCE
Status: COMPLETED | OUTPATIENT
Start: 2019-05-22 | End: 2019-05-22

## 2019-05-22 RX ORDER — METOPROLOL TARTRATE 50 MG/1
TABLET, FILM COATED ORAL
Qty: 60 TABLET | Refills: 11 | Status: SHIPPED | OUTPATIENT
Start: 2019-05-22 | End: 2021-10-18 | Stop reason: SDUPTHER

## 2019-05-22 RX ADMIN — METHYLPREDNISOLONE ACETATE 80 MG: 80 INJECTION, SUSPENSION INTRA-ARTICULAR; INTRALESIONAL; INTRAMUSCULAR; SOFT TISSUE at 15:58

## 2019-05-22 ASSESSMENT — ENCOUNTER SYMPTOMS
BLOOD IN STOOL: 0
COLOR CHANGE: 0
SHORTNESS OF BREATH: 0
COUGH: 0
ABDOMINAL DISTENTION: 0
CONSTIPATION: 0
VOMITING: 0
NAUSEA: 0
DIARRHEA: 0
SORE THROAT: 0
TROUBLE SWALLOWING: 0
EYE ITCHING: 0
WHEEZING: 0
STRIDOR: 0
ABDOMINAL PAIN: 0
CHOKING: 0
EYE DISCHARGE: 0

## 2019-05-22 ASSESSMENT — PATIENT HEALTH QUESTIONNAIRE - PHQ9
1. LITTLE INTEREST OR PLEASURE IN DOING THINGS: 0
SUM OF ALL RESPONSES TO PHQ9 QUESTIONS 1 & 2: 0
SUM OF ALL RESPONSES TO PHQ QUESTIONS 1-9: 0
2. FEELING DOWN, DEPRESSED OR HOPELESS: 0
SUM OF ALL RESPONSES TO PHQ QUESTIONS 1-9: 0

## 2019-05-22 NOTE — PROGRESS NOTES
71   Pulse 58 73 74 66 77 58   Temp - - 97.6 - 98.2 -   Resp - 16 16 16 16 16   SpO2 97 98 97 91 97 97   Weight 262 lb 275 lb 258 lb 254 lb 251 lb 247 lb   Height 5' 9\" 5' 8\" 5' 8\" 5' 8\" 5' 8\" 5' 8\"   BMI (wt*703/ht~2) 38.69 kg/m2 41.81 kg/m2 39.22 kg/m2 38.62 kg/m2 38.16 kg/m2 37.55 kg/m2       Family History   Problem Relation Age of Onset    Heart Disease Mother     Stroke Father     Cancer Sister        Social History     Tobacco Use    Smoking status: Never Smoker    Smokeless tobacco: Never Used   Substance Use Topics    Alcohol use: No      Current Outpatient Medications   Medication Sig Dispense Refill    metoprolol tartrate (LOPRESSOR) 50 MG tablet TAKE 1 TABLET BY MOUTH TWO TIMES A DAY 60 tablet 11    gabapentin (NEURONTIN) 300 MG capsule Take 1 capsule by mouth nightly 90 capsule 0    diltiazem (CARDIZEM CD) 180 MG extended release capsule TAKE ONE CAPSULE BY MOUTH DAILY. 90 capsule 1    calcitRIOL (ROCALTROL) 0.25 MCG capsule TAKE ONE CAPSULE BY MOUTH DAILY. 30 capsule 4    atorvastatin (LIPITOR) 10 MG tablet Take 10 mg by mouth      lisinopril (PRINIVIL;ZESTRIL) 10 MG tablet Take 10 mg by mouth      methimazole (TAPAZOLE) 10 MG tablet TAKE 1 TABLET BY MOUTH EVERY DAY 90 tablet 3    escitalopram (LEXAPRO) 10 MG tablet Take 1 tablet by mouth daily 30 tablet 3    Ostomy Supplies (CHAO-FIT NATURA UROSTOMY POUCH) Pouch MISC USE AS DIRECTED. 1 each 11    pantoprazole (PROTONIX) 40 MG tablet TAKE 1 TABLET BY MOUTH EVERY DAY NEED APPT 90 tablet 3    donepezil (ARICEPT) 5 MG tablet TAKE 1 TABLET BY MOUTH AT BEDTIME FOR MEMORY 90 tablet 3    warfarin (COUMADIN) 7.5 MG tablet Take daily as directed (Patient taking differently: Take daily as directed) 30 tablet 5    warfarin (COUMADIN) 5 MG tablet Take daily as directed.  (Patient taking differently: Take daily as directed.) 30 tablet 5    furosemide (LASIX) 40 MG tablet TAKE 1 TABLET BY MOUTH EVERY DAY 60 tablet 3    acetaminophen (TYLENOL) 325 MG tablet Take 650 mg by mouth every 4 hours as needed for Pain      aspirin 81 MG EC tablet Take 81 mg by mouth daily      docusate sodium (COLACE) 100 MG capsule Take 100 mg by mouth 2 times daily as needed for Constipation      Ostomy Supplies (CHAO-FIT NATURA STOMAHESIVE) WAFR Use as directed 20 Wafer 11    vitamin D (ERGOCALCIFEROL) 48690 units CAPS capsule TAKE 1 CAPSULE BY MOUTH WEEKLY 16 capsule 3    Ostomy Supplies (CHAO-FIT NATURA STOMAHESIVE) WAFR USE AS DIRECTED 20 Wafer 11    miconazole (MICOTIN) 2 % powder Apply topically      ZEASORB-AF 2 % powder APPLY TOPICALLY EVERY 12  TWELVE  HOURS  0    mupirocin (BACTROBAN) 2 % ointment Apply topically      Cetirizine HCl 10 MG TBDP Take 10 mg by mouth      diclofenac sodium 1 % GEL Apply 2 g topically 4 times daily as needed for Pain       No current facility-administered medications for this visit.       Allergies   Allergen Reactions    Other Anaphylaxis and Itching     Cloth bandaids , causes redness of skin    Ciprofloxacin Itching    Codeine Itching    Perflutren Lipid Microsphere Other (See Comments)     Back pain    Tetanus Toxoids Itching     Itching around site    Tizanidine Hcl Itching    Tetanus Toxoid      Reaction: [id number] Maintenance   Topic Date Due    Shingles Vaccine (1 of 2) 04/13/1994    DEXA (modify frequency per FRAX score)  04/13/2009    Colon cancer screen colonoscopy  08/29/2018    TSH testing  11/02/2019    Potassium monitoring  12/10/2019    Creatinine monitoring  12/10/2019    Lipid screen  11/02/2023    Flu vaccine  Completed    Pneumococcal 65+ years Vaccine  Completed       No results found for: LABA1C  No results found for: PSA, PSADIA  TSH   Date Value Ref Range Status   11/02/2018 0.827 0.270 - 4.200 uIU/mL Final   ]  Lab Results   Component Value Date     12/10/2018    K 4.5 12/10/2018     12/10/2018    CO2 29 12/10/2018    BUN 26 (H) 12/10/2018    CREATININE 1.2 (H) 12/10/2018    GLUCOSE 96 12/10/2018    CALCIUM 10.2 12/10/2018    PROT 7.0 12/10/2018    LABALBU 3.3 (L) 12/10/2018    BILITOT 0.5 12/10/2018    ALKPHOS 74 12/10/2018    AST 11 12/10/2018    ALT <5 (A) 12/10/2018    LABGLOM 44 (A) 12/10/2018     Lab Results   Component Value Date    CHOL 141 (L) 11/02/2018    CHOL 152 (L) 04/26/2018    CHOL 141 (L) 01/05/2018     Lab Results   Component Value Date    TRIG 231 (H) 11/02/2018    TRIG 156 (H) 04/26/2018    TRIG 122 01/05/2018     Lab Results   Component Value Date    HDL 33 (L) 11/02/2018    HDL 45 (L) 04/26/2018    HDL 51 (L) 01/05/2018     Lab Results   Component Value Date    LDLCALC 62 11/02/2018    LDLCALC 76 04/26/2018    LDLCALC 66 01/05/2018     Lab Results   Component Value Date     12/10/2018    K 4.5 12/10/2018     12/10/2018    CO2 29 12/10/2018    BUN 26 12/10/2018    CREATININE 1.2 12/10/2018    GLUCOSE 96 12/10/2018    CALCIUM 10.2 12/10/2018      Lab Results   Component Value Date    WBC 5.7 12/10/2018    HGB 11.6 (L) 12/10/2018    HCT 36.5 (L) 12/10/2018    .9 (H) 12/10/2018     12/10/2018    LYMPHOPCT 15.0 (L) 12/10/2018    RBC 3.48 (L) 12/10/2018    MCH 33.3 (H) 12/10/2018    MCHC 31.8 (L) 12/10/2018    RDW 13.2 12/10/2018     Lab Results   Component Value Date    VITD25 42.4 11/02/2018       Subjective:      Review of Systems   Constitutional: Positive for fatigue. Negative for fever and unexpected weight change. HENT: Negative for ear discharge, ear pain, mouth sores, sore throat and trouble swallowing. Eyes: Negative for discharge, itching and visual disturbance. Respiratory: Negative for cough, choking, shortness of breath, wheezing and stridor. Cardiovascular: Negative for chest pain, palpitations and leg swelling. Gastrointestinal: Negative for abdominal distention, abdominal pain, blood in stool, constipation, diarrhea, nausea and vomiting.    Endocrine: Negative for cold intolerance, polydipsia and polyuria. Genitourinary: Negative for difficulty urinating, dysuria, frequency and urgency. Musculoskeletal: Negative for arthralgias and gait problem. Skin: Positive for rash. Negative for color change. Allergic/Immunologic: Negative for food allergies and immunocompromised state. Neurological: Negative for dizziness, tremors, syncope, speech difficulty, weakness and headaches. Neuropathic pain    Hematological: Negative for adenopathy. Does not bruise/bleed easily. Psychiatric/Behavioral: Negative for confusion and hallucinations. Objective:     Physical Exam   Constitutional: She is oriented to person, place, and time. She appears well-developed and well-nourished. No distress. HENT:   Head: Normocephalic and atraumatic. Eyes: Pupils are equal, round, and reactive to light. Right eye exhibits no discharge. Left eye exhibits no discharge. No scleral icterus. Neck: Normal range of motion. Neck supple. No JVD present. No thyromegaly present. Cardiovascular: Normal heart sounds. No murmur heard.  rate   Chronic afib;     Pulmonary/Chest: Effort normal and breath sounds normal. No respiratory distress. She has no wheezes. She has no rales. Abdominal: Soft. Bowel sounds are normal. She exhibits no distension and no mass. There is no tenderness. There is no rebound and no guarding. Musculoskeletal: Normal range of motion. She exhibits no edema or tenderness. Neurological: She is alert and oriented to person, place, and time. She has normal reflexes. She displays normal reflexes. No cranial nerve deficit. Coordination normal.   Skin: Skin is warm and dry. No rash noted. No erythema. Superficial skin tears. Abrasions to both inner thighs; Not red or draining  They have been treating with just band aids    Psychiatric: Her behavior is normal. Judgment and thought content normal. She does not exhibit a depressed mood.      /74   Pulse 58   Ht 5' 9\" (1.753 m)   Wt 262 lb (118.8 kg)   SpO2 97%   BMI 38.69 kg/m²     Assessment:       Diagnosis Orders   1. Lethargy  Urinalysis Reflex to Culture   2. Peripheral polyneuropathy  gabapentin (NEURONTIN) 300 MG capsule   3. Chronic atrial fibrillation ([address]       Labs reviewedfrom today     Plan:        Patient given educational materials - see patient instructions. Discussed use, benefit, and side effects of prescribed medications. Allpatient questions answered. Pt voiced understanding. Reviewed health maintenance. Instructed to continue current medications, diet and exercise. Patient agreed with treatment plan. Follow up as directed. MEDICATIONS:  Orders Placed This Encounter   Medications    metoprolol tartrate (LOPRESSOR) 50 MG tablet     Sig: TAKE 1 TABLET BY MOUTH TWO TIMES A DAY     Dispense:  60 tablet     Refill:  11    gabapentin (NEURONTIN) 300 MG capsule     Sig: Take 1 capsule by mouth nightly     Dispense:  90 capsule     Refill:  0    methylPREDNISolone acetate (DEPO-MEDROL) injection 80 mg     Quality & Risk Score Accuracy    Visit Dx:  C92.4 - Chronic atrial fibrillation (HCC)  Assessment and plan:  Stable based upon symptoms and exam. Continue current treatment plan and follow up at least yearly. Last edited 05/22/19 18:08 CDT by MARIELOS Mejia         ORDERS:  Orders Placed This Encounter   Procedures    Urinalysis Reflex to Culture       Follow-up:  Return in about 3 months (around 8/22/2019). PATIENT INSTRUCTIONS:  Patient Instructions   1. Peripheral neuropathy;   Lets stop the gabapentin and see if that helps   So decrease to 5 times a week   Then 3 times a week   Then you can stop it  2. Lethargy ; urinalysis and culture today   3. SOB  Labs today   4. Rash;    Medrol 80 im ;    Try the rid one more course   You can use diphenhydramine cream for the rash as well   5.   Htn;  Decrease the metoprolol toe 1/2 twice a day     Electronically signed by MARIELOS Mejia on 5/22/2019 at 6:08 PM    EMRDragon/transcription disclaimer:  Much of this encounter note is electronic transcription/translation of spoken language to printed texts. The electronic translation of spoken language may be erroneous, or at times,nonsensical words or phrases may be inadvertently transcribed.   Although I have reviewed the note for such errors, some may still exist.

## 2019-05-22 NOTE — PATIENT INSTRUCTIONS
1. Peripheral neuropathy;   Lets stop the gabapentin and see if that helps   So decrease to 5 times a week   Then 3 times a week   Then you can stop it  2. Lethargy ; urinalysis and culture today   3. SOB  Labs today   4. Rash;    Medrol 80 im ;    Try the rid one more course   You can use diphenhydramine cream for the rash as well   5. Htn;  Decrease the metoprolol toe 1/2 twice a day   6.   Open wounds to thighs we will try alevyn dressings;

## 2019-05-22 NOTE — TELEPHONE ENCOUNTER
Ese Quinonez called requesting a refill of the below medication which has been pended for you:     Requested Prescriptions     Pending Prescriptions Disp Refills    vitamin D (ERGOCALCIFEROL) 16013 units CAPS capsule [Pharmacy Med Name: VIT D2 1.25 MG (50,000 UNIT 59413 CAP] 16 capsule 3     Sig: TAKE 1 CAPSULE BY MOUTH WEEKLY       Last Appointment Date: 3/1/2019  Next Appointment Date: 5/22/2019    Allergies   Allergen Reactions    Other Anaphylaxis and Itching     Cloth bandaids , causes redness of skin    Ciprofloxacin Itching    Codeine Itching    Perflutren Lipid Microsphere Other (See Comments)     Back pain    Tetanus Toxoids Itching     Itching around site    Tizanidine Hcl Itching    Tetanus Toxoid      Reaction: [phone]

## 2019-05-25 LAB
ORGANISM: ABNORMAL
ORGANISM: ABNORMAL
URINE CULTURE, ROUTINE: ABNORMAL

## 2019-06-03 RX ORDER — NITROFURANTOIN 25; 75 MG/1; MG/1
100 CAPSULE ORAL 2 TIMES DAILY
Qty: 14 CAPSULE | Refills: 0 | Status: SHIPPED | OUTPATIENT
Start: 2019-06-03 | End: 2019-06-10

## 2019-06-10 ENCOUNTER — ANTI-COAG VISIT (OUTPATIENT)
Dept: INTERNAL MEDICINE | Age: 74
End: 2019-06-10
Payer: MEDICARE

## 2019-06-10 DIAGNOSIS — I48.20 CHRONIC ATRIAL FIBRILLATION (HCC): ICD-10-CM

## 2019-06-10 LAB — INR BLD: 2.8

## 2019-06-10 PROCEDURE — 93793 ANTICOAG MGMT PT WARFARIN: CPT | Performed by: NURSE PRACTITIONER

## 2019-06-10 NOTE — PATIENT INSTRUCTIONS
INR today was a 2.8  Per verbal from Matt Coyle,   Patient to continue current coumadin dose  Check in one week. Called person and advised of instructions. Patient verbalized understanding.

## 2019-06-24 ENCOUNTER — ANTI-COAG VISIT (OUTPATIENT)
Dept: INTERNAL MEDICINE | Age: 74
End: 2019-06-24
Payer: MEDICARE

## 2019-06-24 DIAGNOSIS — I48.20 CHRONIC ATRIAL FIBRILLATION (HCC): ICD-10-CM

## 2019-06-24 LAB — INR BLD: 1.3

## 2019-06-24 PROCEDURE — 93793 ANTICOAG MGMT PT WARFARIN: CPT | Performed by: NURSE PRACTITIONER

## 2019-06-24 NOTE — PATIENT INSTRUCTIONS
INR today was a 1.3  Per verbal from Maryellen Fofana,   Patient to take 7.5 tonight and tomorrow then repeat INR Thursday or Friday. Check in one week. Daughter states patient forgot to take medication last pm  Called person and advised of instructions. Patient verbalized understanding.

## 2019-06-28 ENCOUNTER — ANTI-COAG VISIT (OUTPATIENT)
Dept: INTERNAL MEDICINE | Age: 74
End: 2019-06-28
Payer: MEDICARE

## 2019-06-28 DIAGNOSIS — I48.20 CHRONIC ATRIAL FIBRILLATION (HCC): ICD-10-CM

## 2019-06-28 LAB — INR BLD: 2.5

## 2019-06-28 PROCEDURE — 93793 ANTICOAG MGMT PT WARFARIN: CPT | Performed by: NURSE PRACTITIONER

## 2019-06-28 NOTE — PATIENT INSTRUCTIONS
INR today was a 2.5  Per verbal from Mendoza Parish,   Patient to continue current coumadin dose  Check in one week. Called person and advised of instructions. Patient verbalized understanding.

## 2019-07-05 RX ORDER — CALCITRIOL 0.25 UG/1
CAPSULE, LIQUID FILLED ORAL
Qty: 30 CAPSULE | Refills: 4 | Status: SHIPPED | OUTPATIENT
Start: 2019-07-05 | End: 2019-12-13 | Stop reason: SDUPTHER

## 2019-07-09 ENCOUNTER — TELEPHONE (OUTPATIENT)
Dept: INTERNAL MEDICINE | Age: 74
End: 2019-07-09

## 2019-07-10 ENCOUNTER — ANTI-COAG VISIT (OUTPATIENT)
Dept: INTERNAL MEDICINE | Age: 74
End: 2019-07-10
Payer: MEDICARE

## 2019-07-10 DIAGNOSIS — I48.20 CHRONIC ATRIAL FIBRILLATION (HCC): ICD-10-CM

## 2019-07-10 LAB — INR BLD: 1.4

## 2019-07-10 PROCEDURE — 93793 ANTICOAG MGMT PT WARFARIN: CPT | Performed by: NURSE PRACTITIONER

## 2019-07-12 ENCOUNTER — ANTI-COAG VISIT (OUTPATIENT)
Dept: INTERNAL MEDICINE | Age: 74
End: 2019-07-12
Payer: MEDICARE

## 2019-07-12 DIAGNOSIS — I48.20 CHRONIC ATRIAL FIBRILLATION (HCC): ICD-10-CM

## 2019-07-12 LAB — INR BLD: 2.3

## 2019-07-12 PROCEDURE — 93793 ANTICOAG MGMT PT WARFARIN: CPT | Performed by: NURSE PRACTITIONER

## 2019-07-12 NOTE — PATIENT INSTRUCTIONS
INR today was a 2.3  Per verbal from Elaine Ramirez,   Patient to continue current coumadin dose  Check in one week. Called person and advised of instructions. Patient verbalized understanding.

## 2019-07-23 LAB — INR BLD: 2.2

## 2019-07-24 ENCOUNTER — ANTI-COAG VISIT (OUTPATIENT)
Dept: INTERNAL MEDICINE | Age: 74
End: 2019-07-24

## 2019-07-31 ENCOUNTER — ANTI-COAG VISIT (OUTPATIENT)
Dept: INTERNAL MEDICINE | Age: 74
End: 2019-07-31
Payer: MEDICARE

## 2019-07-31 DIAGNOSIS — I48.20 CHRONIC ATRIAL FIBRILLATION (HCC): ICD-10-CM

## 2019-07-31 LAB — INR BLD: 3.6

## 2019-07-31 PROCEDURE — 93793 ANTICOAG MGMT PT WARFARIN: CPT | Performed by: NURSE PRACTITIONER

## 2019-08-23 ENCOUNTER — OFFICE VISIT (OUTPATIENT)
Dept: INTERNAL MEDICINE | Age: 74
End: 2019-08-23
Payer: MEDICARE

## 2019-08-23 VITALS
RESPIRATION RATE: 18 BRPM | DIASTOLIC BLOOD PRESSURE: 84 MMHG | HEART RATE: 72 BPM | SYSTOLIC BLOOD PRESSURE: 134 MMHG | OXYGEN SATURATION: 95 %

## 2019-08-23 DIAGNOSIS — Z78.0 POST-MENOPAUSAL: ICD-10-CM

## 2019-08-23 DIAGNOSIS — M89.9 DISORDER OF BONE: ICD-10-CM

## 2019-08-23 DIAGNOSIS — G60.9 IDIOPATHIC PERIPHERAL NEUROPATHY: ICD-10-CM

## 2019-08-23 DIAGNOSIS — F33.41 RECURRENT MAJOR DEPRESSIVE DISORDER, IN PARTIAL REMISSION (HCC): ICD-10-CM

## 2019-08-23 DIAGNOSIS — E55.9 VITAMIN D DEFICIENCY: ICD-10-CM

## 2019-08-23 DIAGNOSIS — Z12.39 SCREENING FOR BREAST CANCER: ICD-10-CM

## 2019-08-23 DIAGNOSIS — I48.20 CHRONIC A-FIB (HCC): ICD-10-CM

## 2019-08-23 DIAGNOSIS — I10 ESSENTIAL (PRIMARY) HYPERTENSION: ICD-10-CM

## 2019-08-23 DIAGNOSIS — I48.20 CHRONIC ATRIAL FIBRILLATION (HCC): ICD-10-CM

## 2019-08-23 DIAGNOSIS — E05.90 HYPERTHYROIDISM: Primary | ICD-10-CM

## 2019-08-23 DIAGNOSIS — Z79.01 LONG TERM CURRENT USE OF ANTICOAGULANT: ICD-10-CM

## 2019-08-23 DIAGNOSIS — H91.93 HEARING DIFFICULTY OF BOTH EARS: ICD-10-CM

## 2019-08-23 DIAGNOSIS — Z00.00 HEALTHCARE MAINTENANCE: ICD-10-CM

## 2019-08-23 DIAGNOSIS — R41.3 MEMORY IMPAIRMENT: ICD-10-CM

## 2019-08-23 DIAGNOSIS — G47.39 OTHER SLEEP APNEA: ICD-10-CM

## 2019-08-23 DIAGNOSIS — N18.30 STAGE 3 CHRONIC KIDNEY DISEASE (HCC): ICD-10-CM

## 2019-08-23 DIAGNOSIS — Z85.51 HISTORY OF BLADDER CANCER: ICD-10-CM

## 2019-08-23 LAB
ALBUMIN SERPL-MCNC: 3.8 G/DL (ref 3.5–5.2)
ALP BLD-CCNC: 75 U/L (ref 35–104)
ALT SERPL-CCNC: 13 U/L (ref 5–33)
ANION GAP SERPL CALCULATED.3IONS-SCNC: 15 MMOL/L (ref 7–19)
AST SERPL-CCNC: 16 U/L (ref 5–32)
BASOPHILS ABSOLUTE: 0 K/UL (ref 0–0.2)
BASOPHILS RELATIVE PERCENT: 0.3 % (ref 0–1)
BILIRUB SERPL-MCNC: 0.3 MG/DL (ref 0.2–1.2)
BUN BLDV-MCNC: 36 MG/DL (ref 8–23)
CALCIUM SERPL-MCNC: 10.6 MG/DL (ref 8.8–10.2)
CHLORIDE BLD-SCNC: 107 MMOL/L (ref 98–111)
CHOLESTEROL, TOTAL: 148 MG/DL (ref 160–199)
CO2: 24 MMOL/L (ref 22–29)
CREAT SERPL-MCNC: 1.4 MG/DL (ref 0.5–0.9)
EOSINOPHILS ABSOLUTE: 0.3 K/UL (ref 0–0.6)
EOSINOPHILS RELATIVE PERCENT: 4 % (ref 0–5)
GFR NON-AFRICAN AMERICAN: 37
GLUCOSE BLD-MCNC: 101 MG/DL (ref 74–109)
HCT VFR BLD CALC: 41.7 % (ref 37–47)
HDLC SERPL-MCNC: 43 MG/DL (ref 65–121)
HEMOGLOBIN: 12.8 G/DL (ref 12–16)
IMMATURE GRANULOCYTES #: 0 K/UL
INR BLD: 2.69 (ref 0.88–1.18)
LDL CHOLESTEROL CALCULATED: 71 MG/DL
LYMPHOCYTES ABSOLUTE: 1.1 K/UL (ref 1.1–4.5)
LYMPHOCYTES RELATIVE PERCENT: 17.4 % (ref 20–40)
MCH RBC QN AUTO: 31.8 PG (ref 27–31)
MCHC RBC AUTO-ENTMCNC: 30.7 G/DL (ref 33–37)
MCV RBC AUTO: 103.5 FL (ref 81–99)
MONOCYTES ABSOLUTE: 0.8 K/UL (ref 0–0.9)
MONOCYTES RELATIVE PERCENT: 13.2 % (ref 0–10)
NEUTROPHILS ABSOLUTE: 4 K/UL (ref 1.5–7.5)
NEUTROPHILS RELATIVE PERCENT: 64.9 % (ref 50–65)
PDW BLD-RTO: 13.7 % (ref 11.5–14.5)
PLATELET # BLD: 220 K/UL (ref 130–400)
PMV BLD AUTO: 9.6 FL (ref 9.4–12.3)
POTASSIUM SERPL-SCNC: 4.5 MMOL/L (ref 3.5–5)
PROTHROMBIN TIME: 27.8 SEC (ref 12–14.6)
RBC # BLD: 4.03 M/UL (ref 4.2–5.4)
SODIUM BLD-SCNC: 146 MMOL/L (ref 136–145)
TOTAL PROTEIN: 8.4 G/DL (ref 6.6–8.7)
TRIGL SERPL-MCNC: 171 MG/DL (ref 0–149)
TSH SERPL DL<=0.05 MIU/L-ACNC: 2.91 UIU/ML (ref 0.27–4.2)
VITAMIN D 25-HYDROXY: 35 NG/ML
WBC # BLD: 6.2 K/UL (ref 4.8–10.8)

## 2019-08-23 PROCEDURE — G8417 CALC BMI ABV UP PARAM F/U: HCPCS | Performed by: NURSE PRACTITIONER

## 2019-08-23 PROCEDURE — 4040F PNEUMOC VAC/ADMIN/RCVD: CPT | Performed by: NURSE PRACTITIONER

## 2019-08-23 PROCEDURE — 3017F COLORECTAL CA SCREEN DOC REV: CPT | Performed by: NURSE PRACTITIONER

## 2019-08-23 PROCEDURE — 1090F PRES/ABSN URINE INCON ASSESS: CPT | Performed by: NURSE PRACTITIONER

## 2019-08-23 PROCEDURE — 1036F TOBACCO NON-USER: CPT | Performed by: NURSE PRACTITIONER

## 2019-08-23 PROCEDURE — 1123F ACP DISCUSS/DSCN MKR DOCD: CPT | Performed by: NURSE PRACTITIONER

## 2019-08-23 PROCEDURE — G8400 PT W/DXA NO RESULTS DOC: HCPCS | Performed by: NURSE PRACTITIONER

## 2019-08-23 PROCEDURE — G8427 DOCREV CUR MEDS BY ELIG CLIN: HCPCS | Performed by: NURSE PRACTITIONER

## 2019-08-23 PROCEDURE — 99214 OFFICE O/P EST MOD 30 MIN: CPT | Performed by: NURSE PRACTITIONER

## 2019-08-23 ASSESSMENT — ENCOUNTER SYMPTOMS
SORE THROAT: 0
ABDOMINAL DISTENTION: 0
STRIDOR: 0
EYE ITCHING: 0
VOMITING: 0
SHORTNESS OF BREATH: 0
ABDOMINAL PAIN: 0
COUGH: 0
CONSTIPATION: 0
WHEEZING: 0
NAUSEA: 0
BLOOD IN STOOL: 0
CHOKING: 0
BACK PAIN: 1
COLOR CHANGE: 0
TROUBLE SWALLOWING: 0
EYE DISCHARGE: 0
DIARRHEA: 0

## 2019-08-23 NOTE — PROGRESS NOTES
[facility]  [address] [address]  [address] [address]  Dept: [phone]  Dept Fax: [phone]  Loc: [phone]    Graham Link is a 75 y.o. female who presents today for her medical conditions/complaints as noted below. Graham Link is c/sam Hypertension (Patient is here for routine follow up visit and review of labs done this am.) and Hearing Problem (Patient states she cant hear anything in right ear. denies pain.)        HPI:     HPI   1.  htn HTN:  Stable on current meds; No side effects of the meds; Takes as directed; takes blood pressure 3-4 times a week   2. Bladder cancer; She has no one as fu; she has no problems with her ostomy has had a recent UTI  3. Hearing issues; she is always scratching her ear with foreign object her daughter relates [place] pins and anything she can find  4. Chronic afib; on chronic coumadin therapy she has home monitoring system and they call in right routinely for her dosing on Coumadin  5. CKD:  Has worsened; this has worsened she is gone from the 40s to now 40. She is on 40 mg of Lasix every day  6. Vit d def she takes medicines as directed she has had no side effects from her medication the level today is acceptable  7.hyperthyroidism she is on Tapazole routinely she takes medicines every day has had no side effects of the medications  8.depression she is on Lexapro she said no side effects from the medication and her daughter thinks that he has helped her mood somewhat  9. Memory loss; They think the aricept has helped retard the progression she is more interactive now willing to do things  10  Sleep apnea ; she wont sleep with her mask  As her nose is stopped up  They tried to add humidity and it went up the tubing   She is sleepy during the day; She refuses to let her daughter put nasal spray in her nose;   She told me no today too ; but explained to her if she would use some saline her nose that Lab Results   Component Value Date    VITD25 35.0 08/23/2019       Subjective:      Review of Systems   Constitutional: Positive for fatigue. Negative for fever and unexpected weight change. HENT: Negative for ear discharge, ear pain, mouth sores, sore throat and trouble swallowing. Eyes: Negative for discharge, itching and visual disturbance. Respiratory: Negative for cough, choking, shortness of breath, wheezing and stridor. Cardiovascular: Negative for chest pain, palpitations and leg swelling. Gastrointestinal: Negative for abdominal distention, abdominal pain, blood in stool, constipation, diarrhea, nausea and vomiting. Endocrine: Negative for cold intolerance, polydipsia and polyuria. Genitourinary: Negative for difficulty urinating, dysuria, frequency and urgency. Musculoskeletal: Positive for back pain. Negative for arthralgias and gait problem. Skin: Negative for color change and rash. Allergic/Immunologic: Negative for food allergies and immunocompromised state. Neurological: Negative for dizziness, tremors, syncope, speech difficulty, weakness and headaches. Ataxia    Daytime sleepiness   Hematological: Negative for adenopathy. Does not bruise/bleed easily. Psychiatric/Behavioral: Negative for confusion and hallucinations. Objective:     Physical Exam   Constitutional: She is oriented to person, place, and time. She appears well-developed and well-nourished. No distress. HENT:   Head: Normocephalic and atraumatic. Eyes: Pupils are equal, round, and reactive to light. Right eye exhibits no discharge. Left eye exhibits no discharge. No scleral icterus. Neck: Normal range of motion. Neck supple. No JVD present. No thyromegaly present. Cardiovascular: Normal rate and normal heart sounds. No murmur heard. Chronic A. fib   Pulmonary/Chest: Effort normal and breath sounds normal. No respiratory distress. She has no wheezes. She has no rales.    Abdominal: Reviewed health maintenance. Instructed to continue current medications, diet and exercise. Patient agreed with treatment plan. Follow up as directed. MEDICATIONS:  No orders of the defined types were placed in this encounter. Quality & Risk Score Accuracy    Last edited 08/23/19 12:20 CDT by MARIELOS Gomez         ORDERS:  Orders Placed This Encounter   Procedures    DEXA BONE DENSITY 2 SITES    EMIL DIGITAL SCREEN W OR WO CAD BILATERAL    CBC Auto Differential    Comprehensive Metabolic Panel    Lipid Panel    TSH without Reflex    Vitamin D 25 Hydroxy       Follow-up:  Return in about 3 months (around 11/23/2019) for have labs done prior to appt. PATIENT INSTRUCTIONS:  Patient Instructions   1.htn . The current medical regimen is effective;  continue present plan and medications. 2.  Bladder cancer  ;with ostomy; We wont treat for UTI unless symptomatic  3. Hearing issues  We will irrigate ears today   4. Chronic afib stable no changes  5. CKD:  Decrease lasix to 1/2 daily;  Limit salt intake   6. Vit d def;  Stable for now   7.  htn  The current medical regimen is effective;  continue present plan and medications. 8.  Depression; The current medical regimen is effective;  continue present plan and medications. 9.  Memory loss stay on the aricept   10. Sleep apnea  Try to use saline spray at least before bedtime so you can use your mask        Electronically signed by MARIELOS Gomez on 8/23/2019 at 2:15 PM    EMRDragon/transcription disclaimer:  Much of this encounter note is electronic transcription/translation of spoken language to printed texts. The electronic translation of spoken language may be erroneous, or at times,nonsensical words or phrases may be inadvertently transcribed.   Although I have reviewed the note for such errors, some may still exist.

## 2019-08-23 NOTE — PATIENT INSTRUCTIONS
1. htn .The current medical regimen is effective;  continue present plan and medications. 2.  Bladder cancer  ;with ostomy; We wont treat for UTI unless symptomatic  3. Hearing issues  We will irrigate ears today   4. Chronic afib stable no changes  5. CKD:  Decrease lasix to 1/2 daily;  Limit salt intake   6. Vit d def;  Stable for now   7.  htn  The current medical regimen is effective;  continue present plan and medications. 8.  Depression; The current medical regimen is effective;  continue present plan and medications. 9.  Memory loss stay on the aricept   10.   Sleep apnea  Try to use saline spray at least before bedtime so you can use your mask

## 2019-08-28 RX ORDER — DILTIAZEM HYDROCHLORIDE 180 MG/1
CAPSULE, COATED, EXTENDED RELEASE ORAL
Qty: 90 CAPSULE | Refills: 1 | Status: SHIPPED | OUTPATIENT
Start: 2019-08-28 | End: 2020-02-24

## 2019-08-28 RX ORDER — WARFARIN SODIUM 5 MG/1
TABLET ORAL
Qty: 30 TABLET | Refills: 5 | Status: SHIPPED | OUTPATIENT
Start: 2019-08-28 | End: 2019-09-10

## 2019-09-10 RX ORDER — WARFARIN SODIUM 7.5 MG/1
TABLET ORAL
Qty: 30 TABLET | Refills: 5 | Status: SHIPPED | OUTPATIENT
Start: 2019-09-10 | End: 2021-01-14 | Stop reason: SDUPTHER

## 2019-09-11 ENCOUNTER — ANTI-COAG VISIT (OUTPATIENT)
Dept: INTERNAL MEDICINE | Age: 74
End: 2019-09-11
Payer: MEDICARE

## 2019-09-11 DIAGNOSIS — I48.20 CHRONIC ATRIAL FIBRILLATION (HCC): ICD-10-CM

## 2019-09-11 LAB — INR BLD: 2.2

## 2019-09-11 PROCEDURE — 93793 ANTICOAG MGMT PT WARFARIN: CPT | Performed by: NURSE PRACTITIONER

## 2019-09-30 ENCOUNTER — ANTI-COAG VISIT (OUTPATIENT)
Dept: INTERNAL MEDICINE | Age: 74
End: 2019-09-30
Payer: MEDICARE

## 2019-09-30 DIAGNOSIS — I48.20 CHRONIC ATRIAL FIBRILLATION (HCC): ICD-10-CM

## 2019-09-30 LAB — INR BLD: 3.8

## 2019-09-30 PROCEDURE — 93793 ANTICOAG MGMT PT WARFARIN: CPT | Performed by: NURSE PRACTITIONER

## 2019-09-30 NOTE — PATIENT INSTRUCTIONS
INR today was a 3.8  Per verbal from Kali Maguire,   Patient to hold x 2 days then resume usual coumadin dose  Check in one week. Called person and advised of instructions. Patient verbalized understanding.

## 2019-10-08 ENCOUNTER — ANTI-COAG VISIT (OUTPATIENT)
Dept: INTERNAL MEDICINE | Age: 74
End: 2019-10-08
Payer: MEDICARE

## 2019-10-08 DIAGNOSIS — I48.20 CHRONIC ATRIAL FIBRILLATION (HCC): ICD-10-CM

## 2019-10-08 LAB — INR BLD: 2.5

## 2019-10-08 PROCEDURE — 93793 ANTICOAG MGMT PT WARFARIN: CPT | Performed by: NURSE PRACTITIONER

## 2019-10-14 RX ORDER — DONEPEZIL HYDROCHLORIDE 5 MG/1
TABLET, FILM COATED ORAL
Qty: 90 TABLET | Refills: 3 | Status: SHIPPED | OUTPATIENT
Start: 2019-10-14 | End: 2020-10-29

## 2019-10-15 RX ORDER — DONEPEZIL HYDROCHLORIDE 5 MG/1
TABLET, FILM COATED ORAL
Qty: 90 TABLET | Refills: 3 | Status: SHIPPED | OUTPATIENT
Start: 2019-10-15 | End: 2020-09-15 | Stop reason: CLARIF

## 2019-10-24 ENCOUNTER — ANTI-COAG VISIT (OUTPATIENT)
Dept: INTERNAL MEDICINE | Age: 74
End: 2019-10-24
Payer: MEDICARE

## 2019-10-24 DIAGNOSIS — I48.20 CHRONIC ATRIAL FIBRILLATION (HCC): ICD-10-CM

## 2019-10-24 LAB — INR BLD: 3.1

## 2019-10-24 PROCEDURE — 93793 ANTICOAG MGMT PT WARFARIN: CPT | Performed by: NURSE PRACTITIONER

## 2019-11-05 ENCOUNTER — ANTI-COAG VISIT (OUTPATIENT)
Dept: INTERNAL MEDICINE | Age: 74
End: 2019-11-05
Payer: MEDICARE

## 2019-11-05 DIAGNOSIS — I48.20 CHRONIC ATRIAL FIBRILLATION (HCC): ICD-10-CM

## 2019-11-05 LAB — INR BLD: 3.3

## 2019-11-05 PROCEDURE — 93793 ANTICOAG MGMT PT WARFARIN: CPT | Performed by: NURSE PRACTITIONER

## 2019-11-18 RX ORDER — METHIMAZOLE 10 MG/1
TABLET ORAL
Qty: 90 TABLET | Refills: 3 | Status: SHIPPED | OUTPATIENT
Start: 2019-11-18 | End: 2020-11-23

## 2019-11-19 ENCOUNTER — TELEPHONE (OUTPATIENT)
Dept: INTERNAL MEDICINE | Age: 74
End: 2019-11-19

## 2019-11-19 RX ORDER — CEFDINIR 300 MG/1
300 CAPSULE ORAL 2 TIMES DAILY
Qty: 14 CAPSULE | Refills: 0 | Status: SHIPPED | OUTPATIENT
Start: 2019-11-19 | End: 2019-11-26

## 2019-12-05 LAB — INR BLD: 3.8

## 2019-12-06 ENCOUNTER — ANTI-COAG VISIT (OUTPATIENT)
Dept: INTERNAL MEDICINE | Age: 74
End: 2019-12-06
Payer: MEDICARE

## 2019-12-06 DIAGNOSIS — I48.20 CHRONIC ATRIAL FIBRILLATION (HCC): ICD-10-CM

## 2019-12-06 PROCEDURE — 93793 ANTICOAG MGMT PT WARFARIN: CPT | Performed by: INTERNAL MEDICINE

## 2019-12-16 RX ORDER — CALCITRIOL 0.25 UG/1
CAPSULE, LIQUID FILLED ORAL
Qty: 30 CAPSULE | Refills: 4 | Status: SHIPPED | OUTPATIENT
Start: 2019-12-16 | End: 2020-05-20

## 2019-12-20 ENCOUNTER — ANTI-COAG VISIT (OUTPATIENT)
Dept: INTERNAL MEDICINE | Age: 74
End: 2019-12-20
Payer: MEDICARE

## 2019-12-20 DIAGNOSIS — I48.20 CHRONIC ATRIAL FIBRILLATION (HCC): ICD-10-CM

## 2019-12-20 LAB — INR BLD: 3

## 2019-12-20 PROCEDURE — 93793 ANTICOAG MGMT PT WARFARIN: CPT | Performed by: NURSE PRACTITIONER

## 2020-01-13 ENCOUNTER — ANTI-COAG VISIT (OUTPATIENT)
Dept: INTERNAL MEDICINE | Age: 75
End: 2020-01-13
Payer: MEDICARE

## 2020-01-13 LAB — INR BLD: 3.1

## 2020-01-13 PROCEDURE — 93793 ANTICOAG MGMT PT WARFARIN: CPT | Performed by: NURSE PRACTITIONER

## 2020-01-13 NOTE — PATIENT INSTRUCTIONS
INR today was a 3.1  Per verbal from Alisha Calabrese,   Patient to continue current coumadin dose  Check in one week. Called person and advised of instructions. Patient verbalized understanding.

## 2020-01-16 ENCOUNTER — TELEPHONE (OUTPATIENT)
Dept: INTERNAL MEDICINE | Age: 75
End: 2020-01-16

## 2020-01-16 RX ORDER — CEFDINIR 300 MG/1
300 CAPSULE ORAL 2 TIMES DAILY
Qty: 14 CAPSULE | Refills: 0 | Status: SHIPPED | OUTPATIENT
Start: 2020-01-16 | End: 2020-01-23

## 2020-01-23 ENCOUNTER — ANTI-COAG VISIT (OUTPATIENT)
Dept: INTERNAL MEDICINE | Age: 75
End: 2020-01-23
Payer: MEDICARE

## 2020-01-23 LAB — INR BLD: 4.2

## 2020-01-23 PROCEDURE — 93793 ANTICOAG MGMT PT WARFARIN: CPT | Performed by: NURSE PRACTITIONER

## 2020-01-27 ENCOUNTER — ANTI-COAG VISIT (OUTPATIENT)
Dept: INTERNAL MEDICINE | Age: 75
End: 2020-01-27

## 2020-01-27 LAB — INR BLD: 2.1

## 2020-01-29 RX ORDER — OSTOMY SUPPLY 2 1/4"
EACH MISCELLANEOUS
Qty: 10 EACH | Refills: 11 | Status: SHIPPED | OUTPATIENT
Start: 2020-01-29 | End: 2021-02-10

## 2020-01-29 RX ORDER — FECAL COLL W-CHARCOAL/CATH/SYR
MISCELLANEOUS RECTAL
Qty: 20 WAFER | Refills: 11 | Status: SHIPPED | OUTPATIENT
Start: 2020-01-29 | End: 2021-02-10

## 2020-02-05 ENCOUNTER — ANTI-COAG VISIT (OUTPATIENT)
Dept: INTERNAL MEDICINE | Age: 75
End: 2020-02-05
Payer: MEDICARE

## 2020-02-05 LAB — INR BLD: 2.9

## 2020-02-05 PROCEDURE — 93793 ANTICOAG MGMT PT WARFARIN: CPT | Performed by: NURSE PRACTITIONER

## 2020-02-05 NOTE — PATIENT INSTRUCTIONS
INR today was a 2.9  Per verbal from Yelitza Rosas,   Patient to continue current coumadin dose  Check in one week. Called person and advised of instructions. Patient verbalized understanding.

## 2020-02-12 RX ORDER — FUROSEMIDE 40 MG/1
40 TABLET ORAL DAILY
Qty: 30 TABLET | Refills: 0 | Status: SHIPPED | OUTPATIENT
Start: 2020-02-12 | End: 2020-04-08

## 2020-02-20 ENCOUNTER — ANTI-COAG VISIT (OUTPATIENT)
Dept: INTERNAL MEDICINE | Age: 75
End: 2020-02-20
Payer: MEDICARE

## 2020-02-20 LAB — INR BLD: 3.4

## 2020-02-20 PROCEDURE — 93793 ANTICOAG MGMT PT WARFARIN: CPT | Performed by: NURSE PRACTITIONER

## 2020-02-24 RX ORDER — DILTIAZEM HYDROCHLORIDE 180 MG/1
CAPSULE, COATED, EXTENDED RELEASE ORAL
Qty: 30 CAPSULE | Refills: 1 | Status: SHIPPED | OUTPATIENT
Start: 2020-02-24 | End: 2020-03-30

## 2020-02-26 ENCOUNTER — OFFICE VISIT (OUTPATIENT)
Dept: INTERNAL MEDICINE | Age: 75
End: 2020-02-26
Payer: MEDICARE

## 2020-02-26 VITALS — SYSTOLIC BLOOD PRESSURE: 132 MMHG | OXYGEN SATURATION: 98 % | HEART RATE: 70 BPM | DIASTOLIC BLOOD PRESSURE: 72 MMHG

## 2020-02-26 DIAGNOSIS — M89.9 DISORDER OF BONE: ICD-10-CM

## 2020-02-26 DIAGNOSIS — E05.90 HYPERTHYROIDISM: ICD-10-CM

## 2020-02-26 DIAGNOSIS — Z00.00 HEALTHCARE MAINTENANCE: ICD-10-CM

## 2020-02-26 DIAGNOSIS — N18.30 STAGE 3 CHRONIC KIDNEY DISEASE (HCC): ICD-10-CM

## 2020-02-26 DIAGNOSIS — I10 ESSENTIAL (PRIMARY) HYPERTENSION: ICD-10-CM

## 2020-02-26 DIAGNOSIS — I48.20 CHRONIC A-FIB (HCC): ICD-10-CM

## 2020-02-26 PROBLEM — Z86.718 HISTORY OF DVT (DEEP VEIN THROMBOSIS): Status: ACTIVE | Noted: 2020-02-26

## 2020-02-26 PROBLEM — E66.09 OBESITY DUE TO EXCESS CALORIES WITH SERIOUS COMORBIDITY: Status: ACTIVE | Noted: 2020-02-26

## 2020-02-26 LAB
ALBUMIN SERPL-MCNC: 3.9 G/DL (ref 3.5–5.2)
ALP BLD-CCNC: 70 U/L (ref 35–104)
ALT SERPL-CCNC: 12 U/L (ref 5–33)
ANION GAP SERPL CALCULATED.3IONS-SCNC: 15 MMOL/L (ref 7–19)
AST SERPL-CCNC: 20 U/L (ref 5–32)
BASOPHILS ABSOLUTE: 0 K/UL (ref 0–0.2)
BASOPHILS RELATIVE PERCENT: 0.2 % (ref 0–1)
BILIRUB SERPL-MCNC: 0.6 MG/DL (ref 0.2–1.2)
BUN BLDV-MCNC: 26 MG/DL (ref 8–23)
CALCIUM SERPL-MCNC: 10.8 MG/DL (ref 8.8–10.2)
CHLORIDE BLD-SCNC: 102 MMOL/L (ref 98–111)
CHOLESTEROL, TOTAL: 142 MG/DL (ref 160–199)
CO2: 25 MMOL/L (ref 22–29)
CREAT SERPL-MCNC: 1.5 MG/DL (ref 0.5–0.9)
EOSINOPHILS ABSOLUTE: 0.3 K/UL (ref 0–0.6)
EOSINOPHILS RELATIVE PERCENT: 5.2 % (ref 0–5)
GFR NON-AFRICAN AMERICAN: 34
GLUCOSE BLD-MCNC: 96 MG/DL (ref 74–109)
HCT VFR BLD CALC: 46 % (ref 37–47)
HDLC SERPL-MCNC: 39 MG/DL (ref 65–121)
HEMOGLOBIN: 14.4 G/DL (ref 12–16)
IMMATURE GRANULOCYTES #: 0 K/UL
INR BLD: 3.33 (ref 0.88–1.18)
LDL CHOLESTEROL CALCULATED: 58 MG/DL
LYMPHOCYTES ABSOLUTE: 0.6 K/UL (ref 1.1–4.5)
LYMPHOCYTES RELATIVE PERCENT: 11.5 % (ref 20–40)
MCH RBC QN AUTO: 33.4 PG (ref 27–31)
MCHC RBC AUTO-ENTMCNC: 31.3 G/DL (ref 33–37)
MCV RBC AUTO: 106.7 FL (ref 81–99)
MONOCYTES ABSOLUTE: 0.6 K/UL (ref 0–0.9)
MONOCYTES RELATIVE PERCENT: 11.2 % (ref 0–10)
NEUTROPHILS ABSOLUTE: 4 K/UL (ref 1.5–7.5)
NEUTROPHILS RELATIVE PERCENT: 71.7 % (ref 50–65)
PDW BLD-RTO: 14.6 % (ref 11.5–14.5)
PLATELET # BLD: 167 K/UL (ref 130–400)
PMV BLD AUTO: 10.2 FL (ref 9.4–12.3)
POTASSIUM SERPL-SCNC: 4.1 MMOL/L (ref 3.5–5)
PROTHROMBIN TIME: 34.8 SEC (ref 12–14.6)
RBC # BLD: 4.31 M/UL (ref 4.2–5.4)
SODIUM BLD-SCNC: 142 MMOL/L (ref 136–145)
TOTAL PROTEIN: 8.1 G/DL (ref 6.6–8.7)
TRIGL SERPL-MCNC: 223 MG/DL (ref 0–149)
TSH SERPL DL<=0.05 MIU/L-ACNC: 3.72 UIU/ML (ref 0.27–4.2)
VITAMIN D 25-HYDROXY: 35 NG/ML
WBC # BLD: 5.6 K/UL (ref 4.8–10.8)

## 2020-02-26 PROCEDURE — 3017F COLORECTAL CA SCREEN DOC REV: CPT | Performed by: NURSE PRACTITIONER

## 2020-02-26 PROCEDURE — 4040F PNEUMOC VAC/ADMIN/RCVD: CPT | Performed by: NURSE PRACTITIONER

## 2020-02-26 PROCEDURE — G8400 PT W/DXA NO RESULTS DOC: HCPCS | Performed by: NURSE PRACTITIONER

## 2020-02-26 PROCEDURE — G8427 DOCREV CUR MEDS BY ELIG CLIN: HCPCS | Performed by: NURSE PRACTITIONER

## 2020-02-26 PROCEDURE — 1036F TOBACCO NON-USER: CPT | Performed by: NURSE PRACTITIONER

## 2020-02-26 PROCEDURE — 99214 OFFICE O/P EST MOD 30 MIN: CPT | Performed by: NURSE PRACTITIONER

## 2020-02-26 PROCEDURE — 1090F PRES/ABSN URINE INCON ASSESS: CPT | Performed by: NURSE PRACTITIONER

## 2020-02-26 PROCEDURE — 1123F ACP DISCUSS/DSCN MKR DOCD: CPT | Performed by: NURSE PRACTITIONER

## 2020-02-26 PROCEDURE — G8484 FLU IMMUNIZE NO ADMIN: HCPCS | Performed by: NURSE PRACTITIONER

## 2020-02-26 PROCEDURE — G8417 CALC BMI ABV UP PARAM F/U: HCPCS | Performed by: NURSE PRACTITIONER

## 2020-02-26 RX ORDER — ATORVASTATIN CALCIUM 40 MG/1
40 TABLET, FILM COATED ORAL NIGHTLY
Qty: 90 TABLET | Refills: 0 | Status: SHIPPED | OUTPATIENT
Start: 2020-02-26 | End: 2020-05-19

## 2020-02-26 ASSESSMENT — ENCOUNTER SYMPTOMS
WHEEZING: 0
CONSTIPATION: 0
DIARRHEA: 0
TROUBLE SWALLOWING: 0
VOMITING: 0
ABDOMINAL DISTENTION: 0
SHORTNESS OF BREATH: 0
SORE THROAT: 0
STRIDOR: 0
EYE DISCHARGE: 0
EYE ITCHING: 0
CHOKING: 0
BLOOD IN STOOL: 0
ABDOMINAL PAIN: 0
COUGH: 0
NAUSEA: 0
COLOR CHANGE: 0

## 2020-02-26 NOTE — PATIENT INSTRUCTIONS
1.  Hyperlipidemia; Increase lipitor to 40 mg at bedtime  2. Afib; Stable on coumadin stable   3. Falls we need HH for nursing physical therapy and OT:    She will need a transport w/c  ;   4.  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 Morbid obesity hopefully with physical therapy this will help some  7.   Recurrent depression stable currently no changes are necessary

## 2020-02-26 NOTE — PROGRESS NOTES
08/23/2019    CREATININE 1.4 (H) 08/23/2019    GLUCOSE 101 08/23/2019    CALCIUM 10.6 (H) 08/23/2019    PROT 8.4 08/23/2019    LABALBU 3.8 08/23/2019    BILITOT 0.3 08/23/2019    ALKPHOS 75 08/23/2019    AST 16 08/23/2019    ALT 13 08/23/2019    LABGLOM 37 (A) 08/23/2019     Lab Results   Component Value Date    CHOL 148 (L) 08/23/2019    CHOL 141 (L) 11/02/2018    CHOL 152 (L) 04/26/2018     Lab Results   Component Value Date    TRIG 171 (H) 08/23/2019    TRIG 231 (H) 11/02/2018    TRIG 156 (H) 04/26/2018     Lab Results   Component Value Date    HDL 43 (L) 08/23/2019    HDL 33 (L) 11/02/2018    HDL 45 (L) 04/26/2018     Lab Results   Component Value Date    LDLCALC 71 08/23/2019    LDLCALC 62 11/02/2018    LDLCALC 76 04/26/2018     Lab Results   Component Value Date     08/23/2019    K 4.5 08/23/2019     08/23/2019    CO2 24 08/23/2019    BUN 36 08/23/2019    CREATININE 1.4 08/23/2019    GLUCOSE 101 08/23/2019    CALCIUM 10.6 08/23/2019      Lab Results   Component Value Date    WBC 6.2 08/23/2019    HGB 12.8 08/23/2019    HCT 41.7 08/23/2019    .5 (H) 08/23/2019     08/23/2019    LYMPHOPCT 17.4 (L) 08/23/2019    RBC 4.03 (L) 08/23/2019    MCH 31.8 (H) 08/23/2019    MCHC 30.7 (L) 08/23/2019    RDW 13.7 08/23/2019     Lab Results   Component Value Date    VITD25 35.0 08/23/2019       Subjective:      Review of Systems   Constitutional: Negative for fatigue, fever and unexpected weight change. HENT: Positive for hearing loss. Negative for ear discharge, ear pain, mouth sores, sore throat and trouble swallowing. Eyes: Negative for discharge, itching and visual disturbance. Respiratory: Negative for cough, choking, shortness of breath, wheezing and stridor. Cardiovascular: Positive for palpitations and leg swelling. Negative for chest pain. Gastrointestinal: Negative for abdominal distention, abdominal pain, blood in stool, constipation, diarrhea, nausea and vomiting.    Endocrine: Negative for cold intolerance, polydipsia and polyuria. Genitourinary: Negative for difficulty urinating, dysuria, frequency and urgency. Musculoskeletal: Negative for arthralgias and gait problem. Skin: Negative for color change and rash. Allergic/Immunologic: Negative for food allergies and immunocompromised state. Neurological: Negative for dizziness, tremors, syncope, speech difficulty, weakness and headaches. Hematological: Negative for adenopathy. Does not bruise/bleed easily. Psychiatric/Behavioral: Negative for confusion and hallucinations. Objective:     Physical Exam  Constitutional:       General: She is not in acute distress. Appearance: She is well-developed. HENT:      Head: Normocephalic and atraumatic. Eyes:      General: No scleral icterus. Right eye: No discharge. Left eye: No discharge. Pupils: Pupils are equal, round, and reactive to light. Neck:      Musculoskeletal: Normal range of motion and neck supple. Thyroid: No thyromegaly. Vascular: No JVD. Cardiovascular:      Rate and Rhythm: Normal rate. Rhythm irregular. Heart sounds: Murmur present. Pulmonary:      Effort: Pulmonary effort is normal. No respiratory distress. Breath sounds: Normal breath sounds. No wheezing or rales. Abdominal:      General: Bowel sounds are normal. There is no distension. Palpations: Abdomen is soft. There is no mass. Tenderness: There is no abdominal tenderness. There is no guarding or rebound. Musculoskeletal: Normal range of motion. General: No tenderness. Skin:     General: Skin is warm and dry. Findings: No erythema or rash. Neurological:      Mental Status: She is alert and oriented to person, place, and time. Cranial Nerves: No cranial nerve deficit. Coordination: Coordination normal.      Deep Tendon Reflexes: Reflexes are normal and symmetric.  Reflexes normal.   Psychiatric:         Mood and

## 2020-02-27 ENCOUNTER — ANTI-COAG VISIT (OUTPATIENT)
Dept: INTERNAL MEDICINE | Age: 75
End: 2020-02-27

## 2020-03-03 ENCOUNTER — TELEPHONE (OUTPATIENT)
Dept: INTERNAL MEDICINE CLINIC | Age: 75
End: 2020-03-03

## 2020-03-12 ENCOUNTER — ANTI-COAG VISIT (OUTPATIENT)
Dept: INTERNAL MEDICINE | Age: 75
End: 2020-03-12
Payer: MEDICARE

## 2020-03-12 LAB — INR BLD: 2.9

## 2020-03-12 PROCEDURE — 93793 ANTICOAG MGMT PT WARFARIN: CPT | Performed by: NURSE PRACTITIONER

## 2020-03-12 NOTE — PROGRESS NOTES
HOME MONITORING REPORT    INR today:   Results for orders placed or performed in visit on 03/12/20   Protime-INR   Result Value Ref Range    INR 2.90        INR Goal: 2.0-3.0    Dosing Plan  As of 3/12/2020    TTR:   44.9 % (1.7 y)   Full warfarin instructions:   7.5 mg every Tue, Thu, Sat; 5 mg all other days              PLAN:PATIENT NOTIFIED TO  CONTINUE CURRENT DOSE AND RECHECK IN ONE WEEK. I have reviewed nursing plan for Coumadin management and agree with plan. I have reviewed nursing plan for Coumadin management and agree with plan.

## 2020-03-25 ENCOUNTER — ANTI-COAG VISIT (OUTPATIENT)
Dept: INTERNAL MEDICINE | Age: 75
End: 2020-03-25
Payer: MEDICARE

## 2020-03-25 LAB — INR BLD: 2.3

## 2020-03-25 PROCEDURE — 93793 ANTICOAG MGMT PT WARFARIN: CPT | Performed by: NURSE PRACTITIONER

## 2020-03-25 NOTE — PROGRESS NOTES
HOME MONITORING REPORT    INR today:   Results for orders placed or performed in visit on 03/25/20   Protime-INR   Result Value Ref Range    INR 2.30        INR Goal: 2.0-3.0    Dosing Plan  As of 3/25/2020    TTR:   46.0 % (1.7 y)   Full warfarin instructions:   7.5 mg every Tue, Thu, Sat; 5 mg all other days              PLAN:PATIENT NOTIFIED TO  CONTINUE CURRENT DOSE AND RECHECK IN ONE WEEK. I have reviewed nursing plan for Coumadin management and agree with plan.

## 2020-03-27 ENCOUNTER — TELEPHONE (OUTPATIENT)
Dept: INTERNAL MEDICINE CLINIC | Age: 75
End: 2020-03-27

## 2020-03-27 NOTE — TELEPHONE ENCOUNTER
Eros Ring called requesting a refill of the below medication which has been pended for you:     Requested Prescriptions     Pending Prescriptions Disp Refills    dilTIAZem (CARDIZEM CD) 180 MG extended release capsule [Pharmacy Med Name: DILTIAZEM HCL ER COATED GREG 180 Capsule] 30 capsule 1     Sig: TAKE ONE CAPSULE BY MOUTH DAILY.        Last Appointment Date: 2/26/2020  Next Appointment Date: 5/27/2020    Allergies   Allergen Reactions    Other Anaphylaxis and Itching     Cloth bandaids , causes redness of skin    Ciprofloxacin Itching    Codeine Itching    Perflutren Lipid Microsphere Other (See Comments)     Back pain    Tetanus Toxoids Itching     Itching around site    Tizanidine Hcl Itching    Tetanus Toxoid      Reaction: ITCHING AROUND SITE

## 2020-03-30 RX ORDER — DILTIAZEM HYDROCHLORIDE 180 MG/1
CAPSULE, COATED, EXTENDED RELEASE ORAL
Qty: 30 CAPSULE | Refills: 1 | Status: SHIPPED | OUTPATIENT
Start: 2020-03-30 | End: 2020-07-27

## 2020-04-06 ENCOUNTER — ANTI-COAG VISIT (OUTPATIENT)
Dept: INTERNAL MEDICINE | Age: 75
End: 2020-04-06
Payer: MEDICARE

## 2020-04-06 LAB — INR BLD: 3.1

## 2020-04-06 PROCEDURE — 93793 ANTICOAG MGMT PT WARFARIN: CPT | Performed by: NURSE PRACTITIONER

## 2020-04-07 ENCOUNTER — ANTI-COAG VISIT (OUTPATIENT)
Dept: INTERNAL MEDICINE | Age: 75
End: 2020-04-07
Payer: MEDICARE

## 2020-04-07 LAB — INR BLD: 2.2

## 2020-04-07 PROCEDURE — 93793 ANTICOAG MGMT PT WARFARIN: CPT | Performed by: NURSE PRACTITIONER

## 2020-04-08 RX ORDER — FUROSEMIDE 40 MG/1
40 TABLET ORAL DAILY
Qty: 90 TABLET | Refills: 4 | Status: SHIPPED | OUTPATIENT
Start: 2020-04-08

## 2020-04-16 ENCOUNTER — ANTI-COAG VISIT (OUTPATIENT)
Dept: INTERNAL MEDICINE | Age: 75
End: 2020-04-16
Payer: MEDICARE

## 2020-04-16 LAB — INR BLD: 5.1

## 2020-04-16 PROCEDURE — 93793 ANTICOAG MGMT PT WARFARIN: CPT | Performed by: INTERNAL MEDICINE

## 2020-04-20 LAB — INR BLD: 2.4

## 2020-04-21 ENCOUNTER — ANTI-COAG VISIT (OUTPATIENT)
Dept: INTERNAL MEDICINE | Age: 75
End: 2020-04-21
Payer: MEDICARE

## 2020-04-21 PROCEDURE — 93793 ANTICOAG MGMT PT WARFARIN: CPT | Performed by: NURSE PRACTITIONER

## 2020-04-21 NOTE — PROGRESS NOTES
HOME MONITORING REPORT    INR today:   Results for orders placed or performed in visit on 04/21/20   Protime-INR   Result Value Ref Range    INR 2.40        INR Goal: 2.0-3.0    Dosing Plan  As of 4/21/2020    TTR:   46.4 % (1.8 y)   Full warfarin instructions:   7.5 mg every Tue, Thu, Sat; 5 mg all other days              PLAN: Daughter advised to continue current dose and recheck in one week. If INR goes up too much, we may need to adjust dose. She voiced understanding    She said her mom confessed that she took a couple of ibuprofen's last week. I have reviewed nursing plan for Coumadin management and agree with plan.

## 2020-05-02 LAB — INR BLD: 3

## 2020-05-04 ENCOUNTER — ANTI-COAG VISIT (OUTPATIENT)
Dept: INTERNAL MEDICINE | Age: 75
End: 2020-05-04
Payer: MEDICARE

## 2020-05-04 PROCEDURE — 93793 ANTICOAG MGMT PT WARFARIN: CPT | Performed by: NURSE PRACTITIONER

## 2020-05-11 LAB — INR BLD: 2.9

## 2020-05-13 ENCOUNTER — ANTI-COAG VISIT (OUTPATIENT)
Dept: INTERNAL MEDICINE | Age: 75
End: 2020-05-13
Payer: MEDICARE

## 2020-05-13 PROCEDURE — 93793 ANTICOAG MGMT PT WARFARIN: CPT | Performed by: NURSE PRACTITIONER

## 2020-05-19 NOTE — TELEPHONE ENCOUNTER
Raul Morales called requesting a refill of the below medication which has been pended for you:     Requested Prescriptions     Pending Prescriptions Disp Refills    calcitRIOL (ROCALTROL) 0.25 MCG capsule [Pharmacy Med Name: CALCITRIOL 0.25 MCG CAPS 0.25 Capsule] 30 capsule 4     Sig: TAKE ONE CAPSULE BY MOUTH DAILY.  atorvastatin (LIPITOR) 40 MG tablet [Pharmacy Med Name: ATORVASTATIN CALCIUM 40 MG 40 Tablet] 30 tablet 1     Sig: TAKE 1 TABLET BY MOUTH AT BEDTIME.        Last Appointment Date: 2/26/2020  Next Appointment Date: 5/27/2020    Allergies   Allergen Reactions    Other Anaphylaxis and Itching     Cloth bandaids , causes redness of skin    Ciprofloxacin Itching    Codeine Itching    Perflutren Lipid Microsphere Other (See Comments)     Back pain    Tetanus Toxoids Itching     Itching around site    Tizanidine Hcl Itching    Tetanus Toxoid      Reaction: ITCHING AROUND SITE

## 2020-05-20 RX ORDER — ATORVASTATIN CALCIUM 40 MG/1
TABLET, FILM COATED ORAL
Qty: 30 TABLET | Refills: 1 | Status: SHIPPED | OUTPATIENT
Start: 2020-05-20 | End: 2020-07-27

## 2020-05-20 RX ORDER — CALCITRIOL 0.25 UG/1
CAPSULE, LIQUID FILLED ORAL
Qty: 30 CAPSULE | Refills: 4 | Status: SHIPPED | OUTPATIENT
Start: 2020-05-20 | End: 2020-11-11

## 2020-05-21 ENCOUNTER — ANTI-COAG VISIT (OUTPATIENT)
Dept: INTERNAL MEDICINE | Age: 75
End: 2020-05-21
Payer: MEDICARE

## 2020-05-21 LAB — INR BLD: 2.9

## 2020-05-21 PROCEDURE — 93793 ANTICOAG MGMT PT WARFARIN: CPT | Performed by: NURSE PRACTITIONER

## 2020-05-26 RX ORDER — WARFARIN SODIUM 7.5 MG/1
TABLET ORAL
Qty: 30 TABLET | Refills: 5 | Status: SHIPPED | OUTPATIENT
Start: 2020-05-26 | End: 2020-09-15 | Stop reason: CLARIF

## 2020-06-02 ENCOUNTER — ANTI-COAG VISIT (OUTPATIENT)
Dept: INTERNAL MEDICINE | Age: 75
End: 2020-06-02
Payer: MEDICARE

## 2020-06-02 LAB — INR BLD: 2.5

## 2020-06-02 PROCEDURE — 93793 ANTICOAG MGMT PT WARFARIN: CPT | Performed by: NURSE PRACTITIONER

## 2020-06-04 RX ORDER — ERGOCALCIFEROL 1.25 MG/1
CAPSULE ORAL
Qty: 4 CAPSULE | Refills: 3 | Status: SHIPPED | OUTPATIENT
Start: 2020-06-04 | End: 2020-09-21

## 2020-06-09 LAB — INR BLD: 2.7

## 2020-06-16 ENCOUNTER — ANTI-COAG VISIT (OUTPATIENT)
Dept: INTERNAL MEDICINE | Age: 75
End: 2020-06-16
Payer: MEDICARE

## 2020-06-16 LAB — INR BLD: 2.7

## 2020-06-16 PROCEDURE — 93793 ANTICOAG MGMT PT WARFARIN: CPT | Performed by: NURSE PRACTITIONER

## 2020-06-23 ENCOUNTER — TELEPHONE (OUTPATIENT)
Dept: CARDIOLOGY | Facility: CLINIC | Age: 75
End: 2020-06-23

## 2020-06-23 RX ORDER — METOPROLOL TARTRATE 50 MG/1
TABLET, FILM COATED ORAL
Qty: 60 TABLET | Refills: 0 | Status: SHIPPED | OUTPATIENT
Start: 2020-06-23 | End: 2020-08-10

## 2020-06-23 RX ORDER — WARFARIN SODIUM 5 MG/1
TABLET ORAL
Qty: 30 TABLET | Refills: 5 | Status: SHIPPED | OUTPATIENT
Start: 2020-06-23 | End: 2021-01-18 | Stop reason: SDUPTHER

## 2020-06-30 ENCOUNTER — ANTI-COAG VISIT (OUTPATIENT)
Dept: INTERNAL MEDICINE | Age: 75
End: 2020-06-30
Payer: MEDICARE

## 2020-06-30 LAB — INR BLD: 3.5

## 2020-06-30 PROCEDURE — 93793 ANTICOAG MGMT PT WARFARIN: CPT | Performed by: NURSE PRACTITIONER

## 2020-07-09 ENCOUNTER — ANTI-COAG VISIT (OUTPATIENT)
Dept: INTERNAL MEDICINE | Age: 75
End: 2020-07-09
Payer: MEDICARE

## 2020-07-09 LAB — INR BLD: 2.5

## 2020-07-09 PROCEDURE — 93793 ANTICOAG MGMT PT WARFARIN: CPT | Performed by: NURSE PRACTITIONER

## 2020-07-09 NOTE — PROGRESS NOTES
HOME MONITORING REPORT    INR today:   Results for orders placed or performed in visit on 07/09/20   Protime-INR   Result Value Ref Range    INR 2.50        INR Goal: 2.0-3.0    Dosing Plan  As of 7/9/2020    TTR:   50.6 % (2 y)   Full warfarin instructions:   7.5 mg every Tue, Thu, Sat; 5 mg all other days              PLAN:PATIENT NOTIFIED TO  CONTINUE CURRENT DOSE AND RECHECK IN ONE WEEK. I have reviewed nursing plan for Coumadin management and agree with plan.

## 2020-07-20 ENCOUNTER — ANTI-COAG VISIT (OUTPATIENT)
Dept: INTERNAL MEDICINE | Age: 75
End: 2020-07-20
Payer: MEDICARE

## 2020-07-20 LAB — INR BLD: 3.2

## 2020-07-20 PROCEDURE — 93793 ANTICOAG MGMT PT WARFARIN: CPT | Performed by: NURSE PRACTITIONER

## 2020-07-20 NOTE — PROGRESS NOTES
HOME MONITORING REPORT    INR today:   Results for orders placed or performed in visit on 07/20/20   Protime-INR   Result Value Ref Range    INR 3.20        INR Goal: 2.0-3.0    Dosing Plan  As of 7/20/2020    TTR:   50.8 % (2 y)   Full warfarin instructions:   7.5 mg every Tue, Thu, Sat; 5 mg all other days              PLAN:PATIENT NOTIFIED To Hold x 1 night and then  CONTINUE CURRENT DOSE AND RECHECK IN ONE WEEK. I have reviewed nursing plan for Coumadin management and agree with plan.

## 2020-07-23 ENCOUNTER — ANTI-COAG VISIT (OUTPATIENT)
Dept: INTERNAL MEDICINE | Age: 75
End: 2020-07-23
Payer: MEDICARE

## 2020-07-23 LAB — INR BLD: 2.3

## 2020-07-23 PROCEDURE — 93793 ANTICOAG MGMT PT WARFARIN: CPT | Performed by: NURSE PRACTITIONER

## 2020-07-23 NOTE — PROGRESS NOTES
HOME MONITORING REPORT    INR today:   Results for orders placed or performed in visit on 07/23/20   Protime-INR   Result Value Ref Range    INR 2.30        INR Goal: 2.0-3.0    Dosing Plan  As of 7/23/2020    TTR:   51.0 % (2 y)   Full warfarin instructions:   7.5 mg every Tue, Thu, Sat; 5 mg all other days              PLAN:  CONTINUE CURRENT DOSE AND RECHECK IN ONE WEEK. I have reviewed nursing plan for Coumadin management and agree with plan. yes

## 2020-07-27 RX ORDER — ATORVASTATIN CALCIUM 40 MG/1
TABLET, FILM COATED ORAL
Qty: 30 TABLET | Refills: 1 | Status: SHIPPED | OUTPATIENT
Start: 2020-07-27 | End: 2020-08-27

## 2020-07-27 RX ORDER — DILTIAZEM HYDROCHLORIDE 180 MG/1
CAPSULE, COATED, EXTENDED RELEASE ORAL
Qty: 30 CAPSULE | Refills: 1 | Status: SHIPPED | OUTPATIENT
Start: 2020-07-27 | End: 2020-08-27

## 2020-07-27 NOTE — TELEPHONE ENCOUNTER
Isabel Bolton called requesting a refill of the below medication which has been pended for you:     Requested Prescriptions     Pending Prescriptions Disp Refills    atorvastatin (LIPITOR) 40 MG tablet [Pharmacy Med Name: ATORVASTATIN 40 MG TABLET 40 Tablet] 30 tablet 1     Sig: TAKE 1 TABLET BY MOUTH AT BEDTIME.  dilTIAZem (CARDIZEM CD) 180 MG extended release capsule [Pharmacy Med Name: DILTIAZEM HCL ER 180MG COAT 180 Capsule] 30 capsule 1     Sig: TAKE ONE CAPSULE BY MOUTH DAILY.        Last Appointment Date: 2/26/2020  Next Appointment Date: Visit date not found    Allergies   Allergen Reactions    Other Anaphylaxis and Itching     Cloth bandaids , causes redness of skin    Ciprofloxacin Itching    Codeine Itching    Perflutren Lipid Microsphere Other (See Comments)     Back pain    Tetanus Toxoids Itching     Itching around site    Tizanidine Hcl Itching    Tetanus Toxoid      Reaction: [address]

## 2020-07-30 ENCOUNTER — ANTI-COAG VISIT (OUTPATIENT)
Dept: INTERNAL MEDICINE | Age: 75
End: 2020-07-30
Payer: MEDICARE

## 2020-07-30 LAB — INR BLD: 1.8

## 2020-07-30 PROCEDURE — 93793 ANTICOAG MGMT PT WARFARIN: CPT | Performed by: NURSE PRACTITIONER

## 2020-07-30 NOTE — PROGRESS NOTES
HOME MONITORING REPORT    INR today:   Results for orders placed or performed in visit on 07/30/20   Protime-INR   Result Value Ref Range    INR 1.80        INR Goal: 2.0-3.0    Dosing Plan  As of 7/30/2020    TTR:   51.1 % (2 y)   Full warfarin instructions:   7.5 mg every Tue, Thu, Sat; 5 mg all other days              PLAN:  CONTINUE CURRENT DOSE AND RECHECK IN ONE WEEK. I have reviewed nursing plan for Coumadin management and agree with plan.

## 2020-08-05 ENCOUNTER — ANTI-COAG VISIT (OUTPATIENT)
Dept: INTERNAL MEDICINE | Age: 75
End: 2020-08-05
Payer: MEDICARE

## 2020-08-05 LAB — INR BLD: 2.2

## 2020-08-05 PROCEDURE — 93793 ANTICOAG MGMT PT WARFARIN: CPT | Performed by: NURSE PRACTITIONER

## 2020-08-05 NOTE — PROGRESS NOTES
HOME MONITORING REPORT    INR today:   Results for orders placed or performed in visit on 08/05/20   Protime-INR   Result Value Ref Range    INR 2.20        INR Goal: 2.0-3.0    Dosing Plan  As of 8/5/2020    TTR:   51.1 % (2.1 y)   Full warfarin instructions:   7.5 mg every Tue, Thu, Sat; 5 mg all other days              PLAN:  CONTINUE CURRENT DOSE AND RECHECK IN ONE WEEK. I have reviewed nursing plan for Coumadin management and agree with plan.

## 2020-08-11 RX ORDER — METOPROLOL TARTRATE 50 MG/1
TABLET, FILM COATED ORAL
Qty: 180 TABLET | Refills: 4 | Status: SHIPPED | OUTPATIENT
Start: 2020-08-11 | End: 2020-09-28 | Stop reason: HOSPADM

## 2020-08-17 ENCOUNTER — ANTI-COAG VISIT (OUTPATIENT)
Dept: INTERNAL MEDICINE | Age: 75
End: 2020-08-17
Payer: MEDICARE

## 2020-08-17 LAB — INR BLD: 2.2

## 2020-08-17 PROCEDURE — 93793 ANTICOAG MGMT PT WARFARIN: CPT | Performed by: NURSE PRACTITIONER

## 2020-08-17 NOTE — PROGRESS NOTES
HOME MONITORING REPORT    INR today:   Results for orders placed or performed in visit on 08/17/20   Protime-INR   Result Value Ref Range    INR 2.20        INR Goal: 2.0-3.0    Dosing Plan  As of 8/17/2020    TTR:   51.7 % (2.1 y)   Full warfarin instructions:   7.5 mg every Tue, Thu, Sat; 5 mg all other days              PLAN:PATIENT NOTIFIED TO  CONTINUE CURRENT DOSE AND RECHECK IN ONE WEEK. I have reviewed nursing plan for Coumadin management and agree with plan.

## 2020-08-27 ENCOUNTER — ANTI-COAG VISIT (OUTPATIENT)
Dept: INTERNAL MEDICINE | Age: 75
End: 2020-08-27
Payer: MEDICARE

## 2020-08-27 LAB — INR BLD: 2.2

## 2020-08-27 PROCEDURE — 93793 ANTICOAG MGMT PT WARFARIN: CPT | Performed by: NURSE PRACTITIONER

## 2020-08-27 RX ORDER — DILTIAZEM HYDROCHLORIDE 180 MG/1
CAPSULE, COATED, EXTENDED RELEASE ORAL
Qty: 30 CAPSULE | Refills: 0 | Status: SHIPPED | OUTPATIENT
Start: 2020-08-27 | End: 2020-11-23

## 2020-08-27 RX ORDER — ATORVASTATIN CALCIUM 40 MG/1
TABLET, FILM COATED ORAL
Qty: 30 TABLET | Refills: 0 | Status: SHIPPED | OUTPATIENT
Start: 2020-08-27 | End: 2020-11-23

## 2020-09-07 LAB — INR BLD: 3.2

## 2020-09-08 ENCOUNTER — ANTI-COAG VISIT (OUTPATIENT)
Dept: INTERNAL MEDICINE | Age: 75
End: 2020-09-08
Payer: MEDICARE

## 2020-09-08 PROCEDURE — 93793 ANTICOAG MGMT PT WARFARIN: CPT | Performed by: NURSE PRACTITIONER

## 2020-09-08 NOTE — PROGRESS NOTES
HOME MONITORING REPORT    INR today:   Results for orders placed or performed in visit on 09/08/20   Protime-INR   Result Value Ref Range    INR 3.20        INR Goal: 2.0-3.0    Dosing Plan  As of 9/8/2020    TTR:   52.9 % (2.1 y)   Full warfarin instructions:   7.5 mg every Tue, Thu, Sat; 5 mg all other days              PLAN: Patient held her dose last night,  Will continue current dose and recheck in one week. I have reviewed nursing plan for Coumadin management and agree with plan.

## 2020-09-14 ENCOUNTER — ANTI-COAG VISIT (OUTPATIENT)
Dept: INTERNAL MEDICINE | Age: 75
End: 2020-09-14
Payer: MEDICARE

## 2020-09-14 LAB — INR BLD: 3.1

## 2020-09-14 PROCEDURE — 93793 ANTICOAG MGMT PT WARFARIN: CPT | Performed by: NURSE PRACTITIONER

## 2020-09-15 ENCOUNTER — OFFICE VISIT (OUTPATIENT)
Dept: INTERNAL MEDICINE | Age: 75
End: 2020-09-15
Payer: MEDICARE

## 2020-09-15 VITALS
HEIGHT: 69 IN | HEART RATE: 90 BPM | DIASTOLIC BLOOD PRESSURE: 79 MMHG | WEIGHT: 250 LBS | SYSTOLIC BLOOD PRESSURE: 152 MMHG | BODY MASS INDEX: 37.03 KG/M2

## 2020-09-15 DIAGNOSIS — I48.20 CHRONIC A-FIB (HCC): ICD-10-CM

## 2020-09-15 DIAGNOSIS — I48.20 CHRONIC ATRIAL FIBRILLATION (HCC): ICD-10-CM

## 2020-09-15 DIAGNOSIS — N18.30 STAGE 3 CHRONIC KIDNEY DISEASE (HCC): ICD-10-CM

## 2020-09-15 DIAGNOSIS — E66.01 CLASS 2 SEVERE OBESITY DUE TO EXCESS CALORIES WITH SERIOUS COMORBIDITY AND BODY MASS INDEX (BMI) OF 38.0 TO 38.9 IN ADULT (HCC): ICD-10-CM

## 2020-09-15 DIAGNOSIS — E55.9 VITAMIN D DEFICIENCY: ICD-10-CM

## 2020-09-15 LAB
ALBUMIN SERPL-MCNC: 3.8 G/DL (ref 3.5–5.2)
ALP BLD-CCNC: 72 U/L (ref 35–104)
ALT SERPL-CCNC: 16 U/L (ref 5–33)
AMORPHOUS: ABNORMAL /HPF
ANION GAP SERPL CALCULATED.3IONS-SCNC: 10 MMOL/L (ref 7–19)
AST SERPL-CCNC: 22 U/L (ref 5–32)
BACTERIA: ABNORMAL /HPF
BASOPHILS ABSOLUTE: 0 K/UL (ref 0–0.2)
BASOPHILS RELATIVE PERCENT: 0.5 % (ref 0–1)
BILIRUB SERPL-MCNC: 0.7 MG/DL (ref 0.2–1.2)
BILIRUBIN URINE: NEGATIVE
BLOOD, URINE: ABNORMAL
BUN BLDV-MCNC: 35 MG/DL (ref 8–23)
CALCIUM SERPL-MCNC: 10.6 MG/DL (ref 8.8–10.2)
CHLORIDE BLD-SCNC: 101 MMOL/L (ref 98–111)
CLARITY: ABNORMAL
CO2: 33 MMOL/L (ref 22–29)
COLOR: YELLOW
CREAT SERPL-MCNC: 1.5 MG/DL (ref 0.5–0.9)
EOSINOPHILS ABSOLUTE: 0.3 K/UL (ref 0–0.6)
EOSINOPHILS RELATIVE PERCENT: 4.2 % (ref 0–5)
EPITHELIAL CELLS, UA: ABNORMAL /HPF
GFR AFRICAN AMERICAN: 41
GFR NON-AFRICAN AMERICAN: 34
GLUCOSE BLD-MCNC: 93 MG/DL (ref 74–109)
GLUCOSE URINE: NEGATIVE MG/DL
HCT VFR BLD CALC: 44.7 % (ref 37–47)
HEMOGLOBIN: 14 G/DL (ref 12–16)
IMMATURE GRANULOCYTES #: 0 K/UL
KETONES, URINE: NEGATIVE MG/DL
LEUKOCYTE ESTERASE, URINE: ABNORMAL
LYMPHOCYTES ABSOLUTE: 0.8 K/UL (ref 1.1–4.5)
LYMPHOCYTES RELATIVE PERCENT: 12.6 % (ref 20–40)
MCH RBC QN AUTO: 33.5 PG (ref 27–31)
MCHC RBC AUTO-ENTMCNC: 31.3 G/DL (ref 33–37)
MCV RBC AUTO: 106.9 FL (ref 81–99)
MONOCYTES ABSOLUTE: 0.7 K/UL (ref 0–0.9)
MONOCYTES RELATIVE PERCENT: 10.9 % (ref 0–10)
NEUTROPHILS ABSOLUTE: 4.3 K/UL (ref 1.5–7.5)
NEUTROPHILS RELATIVE PERCENT: 71.6 % (ref 50–65)
NITRITE, URINE: POSITIVE
PDW BLD-RTO: 13 % (ref 11.5–14.5)
PH UA: 7 (ref 5–8)
PLATELET # BLD: 194 K/UL (ref 130–400)
PMV BLD AUTO: 9.3 FL (ref 9.4–12.3)
POTASSIUM SERPL-SCNC: 4.3 MMOL/L (ref 3.5–5)
PROTEIN UA: 100 MG/DL
RBC # BLD: 4.18 M/UL (ref 4.2–5.4)
RBC UA: ABNORMAL /HPF (ref 0–2)
SODIUM BLD-SCNC: 144 MMOL/L (ref 136–145)
SPECIFIC GRAVITY UA: 1.02 (ref 1–1.03)
TOTAL PROTEIN: 8 G/DL (ref 6.6–8.7)
TSH SERPL DL<=0.05 MIU/L-ACNC: 4.61 UIU/ML (ref 0.27–4.2)
UROBILINOGEN, URINE: 0.2 E.U./DL
VITAMIN D 25-HYDROXY: 49.7 NG/ML
WBC # BLD: 6 K/UL (ref 4.8–10.8)
WBC UA: ABNORMAL /HPF (ref 0–5)
YEAST: PRESENT /HPF

## 2020-09-15 PROCEDURE — 1123F ACP DISCUSS/DSCN MKR DOCD: CPT | Performed by: NURSE PRACTITIONER

## 2020-09-15 PROCEDURE — 1090F PRES/ABSN URINE INCON ASSESS: CPT | Performed by: NURSE PRACTITIONER

## 2020-09-15 PROCEDURE — G8417 CALC BMI ABV UP PARAM F/U: HCPCS | Performed by: NURSE PRACTITIONER

## 2020-09-15 PROCEDURE — 1036F TOBACCO NON-USER: CPT | Performed by: NURSE PRACTITIONER

## 2020-09-15 PROCEDURE — G8427 DOCREV CUR MEDS BY ELIG CLIN: HCPCS | Performed by: NURSE PRACTITIONER

## 2020-09-15 PROCEDURE — G0008 ADMIN INFLUENZA VIRUS VAC: HCPCS | Performed by: NURSE PRACTITIONER

## 2020-09-15 PROCEDURE — 4040F PNEUMOC VAC/ADMIN/RCVD: CPT | Performed by: NURSE PRACTITIONER

## 2020-09-15 PROCEDURE — 99214 OFFICE O/P EST MOD 30 MIN: CPT | Performed by: NURSE PRACTITIONER

## 2020-09-15 PROCEDURE — 90694 VACC AIIV4 NO PRSRV 0.5ML IM: CPT | Performed by: NURSE PRACTITIONER

## 2020-09-15 PROCEDURE — G8400 PT W/DXA NO RESULTS DOC: HCPCS | Performed by: NURSE PRACTITIONER

## 2020-09-15 ASSESSMENT — ENCOUNTER SYMPTOMS
TROUBLE SWALLOWING: 0
EYE DISCHARGE: 0
COLOR CHANGE: 0
COUGH: 0
SORE THROAT: 0
ABDOMINAL DISTENTION: 0
SHORTNESS OF BREATH: 0
NAUSEA: 0
ABDOMINAL PAIN: 0
VOMITING: 0
EYE ITCHING: 0
CONSTIPATION: 0
CHOKING: 0
DIARRHEA: 0
STRIDOR: 0
BLOOD IN STOOL: 0
WHEEZING: 0

## 2020-09-15 ASSESSMENT — PATIENT HEALTH QUESTIONNAIRE - PHQ9
1. LITTLE INTEREST OR PLEASURE IN DOING THINGS: 0
SUM OF ALL RESPONSES TO PHQ9 QUESTIONS 1 & 2: 0
SUM OF ALL RESPONSES TO PHQ QUESTIONS 1-9: 0
SUM OF ALL RESPONSES TO PHQ QUESTIONS 1-9: 0
2. FEELING DOWN, DEPRESSED OR HOPELESS: 0

## 2020-09-15 NOTE — PROGRESS NOTES
[address] [facility]  [address] [address] [place] [address]  Dept: [phone]  Dept Fax: [phone]  Loc: [phone]    Rupinder Heredia is a 67 y.o. female who presents today for her medical conditions/complaints as noted below. Rupinder Heredia is c/sam Hyperthyroidism (Patient is here for routine follow up visit. Patient has not had labs done for this visit.)        HPI:     HPI   1. Afib; On coumadin and cardizem   2. Hypertension;  hse is on metoprolol 50 BID andlasix 40  She is out of one of her meds;and has rx to ;   3. Sleep apnea; She sleeps with cpap with oxygen; She wears inconsistently; At times she wont wear the cpap routinely but she will wear the oxygen all the time; She always wears a pulse ox; The lowest was in the upper 80; s  And that was without oxygen;    3. Hyperthyroidism; She is on tapazole;   5. Dementia; she is taking aricept  ; and they think is it stable    6. Hyperlipidemia; She take lipitor 40 mg daily;    7. Depression; she is taking lexapro 10 mg daily    8. Vit d def; She is taking 50,000 weekly   Chief Complaint   Patient presents with    Hyperthyroidism     Patient is here for routine follow up visit. Patient has not had labs done for this visit.        Past Medical History:   Diagnosis Date    Atrial fibrillation ([facility]     Cancer ([facility]     bladder    Hyperlipidemia     Hypertension       Past Surgical History:   Procedure Laterality Date    APPENDECTOMY      BLADDER SURGERY      HYSTERECTOMY, VAGINAL      KIDNEY REMOVAL      TOTAL HIP ARTHROPLASTY         Vitals 9/15/2020 2/26/2020 8/23/2019 5/22/2019 3/1/2019 08/9/5747   SYSTOLIC 002 700 [phone]   DIASTOLIC 79 72 84 74 88 72   Pulse 90 70 72 58 73 74   Temp - - - - - 97.6   Resp - - 18 - 16 16   SpO2 - 98 95 97 98 97   Weight 250 lb - - 262 lb 275 lb 258 lb   Height 5' 9\" - - 5' 9\" 5' 8\" 5' 8\"   BMI (wt*703/ht~2) 36.91 kg/m2 - - 38.69 kg/m2 41.81 kg/m2 39.22 kg/m2       Family History   Problem Relation Age of Onset    Heart Disease Mother     Stroke Father     Cancer Sister        Social History     Tobacco Use    Smoking status: Never Smoker    Smokeless tobacco: Never Used   Substance Use Topics    Alcohol use: No      Current Outpatient Medications   Medication Sig Dispense Refill    dilTIAZem (CARDIZEM CD) 180 MG extended release capsule TAKE ONE CAPSULE BY MOUTH DAILY. 30 capsule 0    atorvastatin (LIPITOR) 40 MG tablet TAKE 1 TABLET BY MOUTH AT BEDTIME. 30 tablet 0    warfarin (COUMADIN) 5 MG tablet TAKE AS DIRECTED 30 tablet 5    vitamin D (ERGOCALCIFEROL) 1.25 MG (92299 UT) CAPS capsule TAKE 1 CAPSULE BY MOUTH WEEKLY 4 capsule 3    calcitRIOL (ROCALTROL) 0.25 MCG capsule TAKE ONE CAPSULE BY MOUTH DAILY. 30 capsule 4    Ostomy Supplies (CHAO-FIT NATURA STOMAHESIVE) WAFR USE AS DIRECTED 20 Wafer 11    Ostomy Supplies (CHAO-FIT NATURA UROSTOMY POUCH) Pouch MISC USE AS DIRECTED.  10 each 11    methIMAzole (TAPAZOLE) 10 MG tablet TAKE 1 TABLET BY MOUTH EVERY DAY 90 tablet 3    donepezil (ARICEPT) 5 MG tablet TAKE 1 TABLET BY MOUTH AT BEDTIME FOR MEMORY 90 tablet 3    warfarin (COUMADIN) 7.5 MG tablet TAKE 1 TABLET BY MOUTH DAILY 30 tablet 5    metoprolol tartrate (LOPRESSOR) 50 MG tablet TAKE 1 TABLET BY MOUTH TWO TIMES A DAY 60 tablet 11    lisinopril (PRINIVIL;ZESTRIL) 10 MG tablet Take 10 mg by mouth      escitalopram (LEXAPRO) 10 MG tablet Take 1 tablet by mouth daily 30 tablet 3    furosemide (LASIX) 40 MG tablet TAKE 1 TABLET BY MOUTH EVERY DAY 60 tablet 3    miconazole (MICOTIN) 2 % powder Apply topically      ZEASORB-AF 2 % powder APPLY TOPICALLY EVERY 12  TWELVE  HOURS  0    mupirocin (BACTROBAN) 2 % ointment Apply topically      Cetirizine HCl 10 MG TBDP Take 10 mg by mouth      acetaminophen (TYLENOL) 325 MG tablet Take 650 mg by mouth every 4 hours as needed for Pain      diclofenac sodium 1 % GEL Apply 2 g topically 4 times daily as needed for Pain      docusate sodium (COLACE) 100 MG capsule Take 100 mg by mouth 2 times daily as needed for Constipation      Ostomy Supplies (CHAO-FIT NATURA STOMAHESIVE) WAFR Use as directed 20 Wafer 11     No current facility-administered medications for this visit.       Allergies   Allergen Reactions    Other Anaphylaxis and Itching     Cloth bandaids , causes redness of skin    Ciprofloxacin Itching    Codeine Itching    Perflutren Lipid Microsphere Other (See Comments)     Back pain    Tetanus Toxoids Itching     Itching around site    Tizanidine Hcl Itching    Tetanus Toxoid      Reaction: [facility] Maintenance   Topic Date Due    DEXA (modify frequency per FRAX score)  04/13/1999    Annual Wellness Visit (AWV)  05/29/2019    Shingles Vaccine (1 of 2) 09/15/2021 (Originally 4/13/1994)    Lipid screen  02/26/2021    TSH testing  02/26/2021    Potassium monitoring  02/26/2021    Creatinine monitoring  02/26/2021    Flu vaccine  Completed    Pneumococcal 65+ years Vaccine  Completed    Hepatitis A vaccine  Aged Out    Hepatitis B vaccine  Aged Out    Hib vaccine  Aged Out    Meningococcal (ACWY) vaccine  Aged Out       No results found for: LABA1C  No results found for: PSA, PSADIA  TSH   Date Value Ref Range Status   02/26/2020 3.720 0.270 - 4.200 uIU/mL Final   ]  Lab Results   Component Value Date     02/26/2020    K 4.1 02/26/2020     02/26/2020    CO2 25 02/26/2020    BUN 26 (H) 02/26/2020    CREATININE 1.5 (H) 02/26/2020    GLUCOSE 96 02/26/2020    CALCIUM 10.8 (H) 02/26/2020    PROT 8.1 02/26/2020    LABALBU 3.9 02/26/2020    BILITOT 0.6 02/26/2020    ALKPHOS 70 02/26/2020    AST 20 02/26/2020    ALT 12 02/26/2020    LABGLOM 34 (A) 02/26/2020     Lab Results   Component Value Date    CHOL 142 (L) 02/26/2020    CHOL 148 (L) 08/23/2019    CHOL 141 (L) 11/02/2018     Lab Results   Component Value Date    TRIG 223 (H) Negative for adenopathy. Does not bruise/bleed easily. Psychiatric/Behavioral: Negative for confusion and hallucinations. Dementia         Objective:     Physical Exam  Constitutional:       General: She is not in acute distress. Appearance: She is well-developed. HENT:      Head: Normocephalic and atraumatic. Eyes:      General: No scleral icterus. Right eye: No discharge. Left eye: No discharge. Pupils: Pupils are equal, round, and reactive to light. Neck:      Musculoskeletal: Normal range of motion and neck supple. Thyroid: No thyromegaly. Vascular: No JVD. Cardiovascular:      Rate and Rhythm: Normal rate. Rhythm irregular. Heart sounds: Normal heart sounds. No murmur. Comments: atib about 80 but again she has been out of her Cardizem for 2 days    Pulmonary:      Effort: Pulmonary effort is normal. No respiratory distress. Breath sounds: Normal breath sounds. No wheezing or rales. Abdominal:      General: Bowel sounds are normal. There is no distension. Palpations: Abdomen is soft. There is no mass. Tenderness: There is no abdominal tenderness. There is no guarding or rebound. Musculoskeletal: Normal range of motion. General: No tenderness. Skin:     General: Skin is warm and dry. Findings: No erythema or rash. Neurological:      Mental Status: She is alert and oriented to person, place, and time. Cranial Nerves: No cranial nerve deficit. Coordination: Coordination normal.      Deep Tendon Reflexes: Reflexes are normal and symmetric. Reflexes normal.      Comments: Dementia is apparent but she does seem better today than previously   Psychiatric:         Mood and Affect: Mood is not depressed. Behavior: Behavior normal.         Thought Content:  Thought content normal.         Judgment: Judgment normal.       BP (!) 152/79   Pulse 90   Ht 5' 9\" (1.753 m)   Wt 250 lb (113.4 kg)   BMI 36.92 kg/m² spoken language to printed texts. The electronic translation of spoken language may be erroneous, or at times,nonsensical words or phrases may be inadvertently transcribed.   Although I have reviewed the note for such errors, some may still exist.

## 2020-09-15 NOTE — PATIENT INSTRUCTIONS
1. Atrial fib The current medical regimen is effective;  continue present plan and medications. 2.  Hypertension; The current medical regimen is effective;  continue present plan and medications. 3.  Sleep apnea;  Try to at least wear oxygen\  4. Hyperthyroidism; The current medical regimen is effective;  continue present plan and medications. 5.  Dementia; The current medical regimen is effective;  continue present plan and medications. 6.  Hyperlipidemia; Stable on lipitor   7. Depression; Stable on lexapro   8.   Vit d def stable on current meds;

## 2020-09-18 LAB
ORGANISM: ABNORMAL
ORGANISM: ABNORMAL
URINE CULTURE, ROUTINE: ABNORMAL

## 2020-09-21 ENCOUNTER — APPOINTMENT (OUTPATIENT)
Dept: GENERAL RADIOLOGY | Facility: HOSPITAL | Age: 75
End: 2020-09-21

## 2020-09-21 ENCOUNTER — HOSPITAL ENCOUNTER (INPATIENT)
Facility: HOSPITAL | Age: 75
LOS: 7 days | Discharge: NURSING, REHAB OR LONG-TERM CARE | End: 2020-09-28
Attending: EMERGENCY MEDICINE | Admitting: INTERNAL MEDICINE

## 2020-09-21 ENCOUNTER — APPOINTMENT (OUTPATIENT)
Dept: CT IMAGING | Facility: HOSPITAL | Age: 75
End: 2020-09-21

## 2020-09-21 DIAGNOSIS — J90 PLEURAL EFFUSION: ICD-10-CM

## 2020-09-21 DIAGNOSIS — Z78.9 DECREASED ACTIVITIES OF DAILY LIVING (ADL): ICD-10-CM

## 2020-09-21 DIAGNOSIS — J96.22 ACUTE AND CHRONIC RESPIRATORY FAILURE WITH HYPERCAPNIA (HCC): ICD-10-CM

## 2020-09-21 DIAGNOSIS — S72.141A CLOSED COMMINUTED INTERTROCHANTERIC FRACTURE OF RIGHT FEMUR, INITIAL ENCOUNTER (HCC): ICD-10-CM

## 2020-09-21 DIAGNOSIS — G47.33 OSA (OBSTRUCTIVE SLEEP APNEA): ICD-10-CM

## 2020-09-21 DIAGNOSIS — E87.29 RESPIRATORY ACIDOSIS: ICD-10-CM

## 2020-09-21 DIAGNOSIS — Z74.09 IMPAIRED FUNCTIONAL MOBILITY AND ACTIVITY TOLERANCE: ICD-10-CM

## 2020-09-21 DIAGNOSIS — S72.001A CLOSED FRACTURE OF RIGHT HIP, INITIAL ENCOUNTER (HCC): Primary | ICD-10-CM

## 2020-09-21 PROBLEM — E66.9 OBESITY (BMI 30-39.9): Status: ACTIVE | Noted: 2020-09-21

## 2020-09-21 LAB
ABO GROUP BLD: NORMAL
ALBUMIN SERPL-MCNC: 3.4 G/DL (ref 3.5–5.2)
ALBUMIN/GLOB SERPL: 1 G/DL
ALP SERPL-CCNC: 62 U/L (ref 39–117)
ALT SERPL W P-5'-P-CCNC: 14 U/L (ref 1–33)
ANION GAP SERPL CALCULATED.3IONS-SCNC: 5 MMOL/L (ref 5–15)
APTT PPP: 42.5 SECONDS (ref 24.1–35)
ARTERIAL PATENCY WRIST A: POSITIVE
ARTERIAL PATENCY WRIST A: POSITIVE
AST SERPL-CCNC: 19 U/L (ref 1–32)
ATMOSPHERIC PRESS: 758 MMHG
ATMOSPHERIC PRESS: 760 MMHG
BASE EXCESS BLDA CALC-SCNC: 5.2 MMOL/L (ref 0–2)
BASE EXCESS BLDA CALC-SCNC: 6.7 MMOL/L (ref 0–2)
BASOPHILS # BLD AUTO: 0.02 10*3/MM3 (ref 0–0.2)
BASOPHILS NFR BLD AUTO: 0.4 % (ref 0–1.5)
BDY SITE: ABNORMAL
BDY SITE: ABNORMAL
BILIRUB SERPL-MCNC: 0.6 MG/DL (ref 0–1.2)
BLD GP AB SCN SERPL QL: NEGATIVE
BODY TEMPERATURE: 37 C
BODY TEMPERATURE: 37 C
BUN SERPL-MCNC: 32 MG/DL (ref 8–23)
BUN/CREAT SERPL: 21.3 (ref 7–25)
CALCIUM SPEC-SCNC: 10.3 MG/DL (ref 8.6–10.5)
CHLORIDE SERPL-SCNC: 103 MMOL/L (ref 98–107)
CO2 SERPL-SCNC: 35 MMOL/L (ref 22–29)
CREAT SERPL-MCNC: 1.5 MG/DL (ref 0.57–1)
DEPRECATED RDW RBC AUTO: 48.6 FL (ref 37–54)
EOSINOPHIL # BLD AUTO: 0.24 10*3/MM3 (ref 0–0.4)
EOSINOPHIL NFR BLD AUTO: 4.3 % (ref 0.3–6.2)
EPAP: 6
ERYTHROCYTE [DISTWIDTH] IN BLOOD BY AUTOMATED COUNT: 12.7 % (ref 12.3–15.4)
GAS FLOW AIRWAY: 2 LPM
GFR SERPL CREATININE-BSD FRML MDRD: 34 ML/MIN/1.73
GLOBULIN UR ELPH-MCNC: 3.5 GM/DL
GLUCOSE SERPL-MCNC: 146 MG/DL (ref 65–99)
HBA1C MFR BLD: 5.9 % (ref 4.8–5.6)
HCO3 BLDA-SCNC: 34.3 MMOL/L (ref 20–26)
HCO3 BLDA-SCNC: 35.3 MMOL/L (ref 20–26)
HCT VFR BLD AUTO: 42.1 % (ref 34–46.6)
HGB BLD-MCNC: 13.3 G/DL (ref 12–15.9)
IMM GRANULOCYTES # BLD AUTO: 0.01 10*3/MM3 (ref 0–0.05)
IMM GRANULOCYTES NFR BLD AUTO: 0.2 % (ref 0–0.5)
INHALED O2 CONCENTRATION: 30 %
INR PPP: 2.98 (ref 0.91–1.09)
IPAP: 12
LYMPHOCYTES # BLD AUTO: 0.68 10*3/MM3 (ref 0.7–3.1)
LYMPHOCYTES NFR BLD AUTO: 12.2 % (ref 19.6–45.3)
Lab: ABNORMAL
MCH RBC QN AUTO: 32.9 PG (ref 26.6–33)
MCHC RBC AUTO-ENTMCNC: 31.6 G/DL (ref 31.5–35.7)
MCV RBC AUTO: 104.2 FL (ref 79–97)
MODALITY: ABNORMAL
MODALITY: ABNORMAL
MONOCYTES # BLD AUTO: 0.58 10*3/MM3 (ref 0.1–0.9)
MONOCYTES NFR BLD AUTO: 10.4 % (ref 5–12)
NEUTROPHILS NFR BLD AUTO: 4.04 10*3/MM3 (ref 1.7–7)
NEUTROPHILS NFR BLD AUTO: 72.5 % (ref 42.7–76)
NOTIFIED BY: ABNORMAL
NOTIFIED BY: ABNORMAL
NOTIFIED WHO: ABNORMAL
NOTIFIED WHO: ABNORMAL
NRBC BLD AUTO-RTO: 0 /100 WBC (ref 0–0.2)
NT-PROBNP SERPL-MCNC: 874.3 PG/ML (ref 0–1800)
PCO2 BLDA: 70 MM HG (ref 35–45)
PCO2 BLDA: 74.7 MM HG (ref 35–45)
PCO2 TEMP ADJ BLD: 70 MM HG (ref 35–45)
PCO2 TEMP ADJ BLD: 74.7 MM HG (ref 35–45)
PH BLDA: 7.27 PH UNITS (ref 7.35–7.45)
PH BLDA: 7.31 PH UNITS (ref 7.35–7.45)
PH, TEMP CORRECTED: 7.27 PH UNITS (ref 7.35–7.45)
PH, TEMP CORRECTED: 7.31 PH UNITS (ref 7.35–7.45)
PLATELET # BLD AUTO: 190 10*3/MM3 (ref 140–450)
PMV BLD AUTO: 9.4 FL (ref 6–12)
PO2 BLDA: 75.5 MM HG (ref 83–108)
PO2 BLDA: 96.6 MM HG (ref 83–108)
PO2 TEMP ADJ BLD: 75.5 MM HG (ref 83–108)
PO2 TEMP ADJ BLD: 96.6 MM HG (ref 83–108)
POTASSIUM SERPL-SCNC: 4.3 MMOL/L (ref 3.5–5.2)
PROCALCITONIN SERPL-MCNC: 0.02 NG/ML (ref 0–0.25)
PROT SERPL-MCNC: 6.9 G/DL (ref 6–8.5)
PROTHROMBIN TIME: 31.1 SECONDS (ref 11.9–14.6)
RBC # BLD AUTO: 4.04 10*6/MM3 (ref 3.77–5.28)
RH BLD: POSITIVE
SAO2 % BLDCOA: 94.1 % (ref 94–99)
SAO2 % BLDCOA: 97.4 % (ref 94–99)
SARS-COV-2 RNA PNL SPEC NAA+PROBE: NOT DETECTED
SET MECH RESP RATE: 12
SODIUM SERPL-SCNC: 143 MMOL/L (ref 136–145)
T&S EXPIRATION DATE: NORMAL
TSH SERPL DL<=0.05 MIU/L-ACNC: 5.22 UIU/ML (ref 0.27–4.2)
VENTILATOR MODE: ABNORMAL
VENTILATOR MODE: ABNORMAL
WBC # BLD AUTO: 5.57 10*3/MM3 (ref 3.4–10.8)

## 2020-09-21 PROCEDURE — 71275 CT ANGIOGRAPHY CHEST: CPT

## 2020-09-21 PROCEDURE — 83880 ASSAY OF NATRIURETIC PEPTIDE: CPT | Performed by: INTERNAL MEDICINE

## 2020-09-21 PROCEDURE — 25010000002 ONDANSETRON PER 1 MG: Performed by: INTERNAL MEDICINE

## 2020-09-21 PROCEDURE — 94799 UNLISTED PULMONARY SVC/PX: CPT

## 2020-09-21 PROCEDURE — 86927 PLASMA FRESH FROZEN: CPT

## 2020-09-21 PROCEDURE — 99285 EMERGENCY DEPT VISIT HI MDM: CPT

## 2020-09-21 PROCEDURE — 25010000002 CEFTRIAXONE PER 250 MG: Performed by: INTERNAL MEDICINE

## 2020-09-21 PROCEDURE — 80053 COMPREHEN METABOLIC PANEL: CPT | Performed by: INTERNAL MEDICINE

## 2020-09-21 PROCEDURE — 25010000002 ONDANSETRON PER 1 MG: Performed by: EMERGENCY MEDICINE

## 2020-09-21 PROCEDURE — 36600 WITHDRAWAL OF ARTERIAL BLOOD: CPT

## 2020-09-21 PROCEDURE — 71045 X-RAY EXAM CHEST 1 VIEW: CPT

## 2020-09-21 PROCEDURE — 70450 CT HEAD/BRAIN W/O DYE: CPT

## 2020-09-21 PROCEDURE — 83036 HEMOGLOBIN GLYCOSYLATED A1C: CPT | Performed by: INTERNAL MEDICINE

## 2020-09-21 PROCEDURE — 84443 ASSAY THYROID STIM HORMONE: CPT | Performed by: INTERNAL MEDICINE

## 2020-09-21 PROCEDURE — 86850 RBC ANTIBODY SCREEN: CPT | Performed by: NURSE PRACTITIONER

## 2020-09-21 PROCEDURE — 25010000002 ONDANSETRON PER 1 MG: Performed by: NURSE PRACTITIONER

## 2020-09-21 PROCEDURE — 85730 THROMBOPLASTIN TIME PARTIAL: CPT | Performed by: EMERGENCY MEDICINE

## 2020-09-21 PROCEDURE — 0 IOPAMIDOL PER 1 ML: Performed by: INTERNAL MEDICINE

## 2020-09-21 PROCEDURE — 73502 X-RAY EXAM HIP UNI 2-3 VIEWS: CPT

## 2020-09-21 PROCEDURE — 86900 BLOOD TYPING SEROLOGIC ABO: CPT | Performed by: NURSE PRACTITIONER

## 2020-09-21 PROCEDURE — 25010000002 MORPHINE PER 10 MG: Performed by: EMERGENCY MEDICINE

## 2020-09-21 PROCEDURE — 84145 PROCALCITONIN (PCT): CPT | Performed by: INTERNAL MEDICINE

## 2020-09-21 PROCEDURE — 86901 BLOOD TYPING SEROLOGIC RH(D): CPT | Performed by: NURSE PRACTITIONER

## 2020-09-21 PROCEDURE — 94660 CPAP INITIATION&MGMT: CPT

## 2020-09-21 PROCEDURE — 82803 BLOOD GASES ANY COMBINATION: CPT

## 2020-09-21 PROCEDURE — 36430 TRANSFUSION BLD/BLD COMPNT: CPT

## 2020-09-21 PROCEDURE — 87635 SARS-COV-2 COVID-19 AMP PRB: CPT | Performed by: EMERGENCY MEDICINE

## 2020-09-21 PROCEDURE — 25010000002 AZITHROMYCIN PER 500 MG: Performed by: INTERNAL MEDICINE

## 2020-09-21 PROCEDURE — 85610 PROTHROMBIN TIME: CPT | Performed by: EMERGENCY MEDICINE

## 2020-09-21 PROCEDURE — 25010000002 MORPHINE SULFATE (PF) 2 MG/ML SOLUTION: Performed by: INTERNAL MEDICINE

## 2020-09-21 PROCEDURE — P9017 PLASMA 1 DONOR FRZ W/IN 8 HR: HCPCS

## 2020-09-21 PROCEDURE — 85025 COMPLETE CBC W/AUTO DIFF WBC: CPT | Performed by: EMERGENCY MEDICINE

## 2020-09-21 RX ORDER — ONDANSETRON 4 MG/1
4 TABLET, FILM COATED ORAL EVERY 6 HOURS PRN
Status: DISCONTINUED | OUTPATIENT
Start: 2020-09-21 | End: 2020-09-28 | Stop reason: HOSPADM

## 2020-09-21 RX ORDER — ERGOCALCIFEROL 1.25 MG/1
CAPSULE ORAL
Qty: 4 CAPSULE | Refills: 3 | Status: SHIPPED | OUTPATIENT
Start: 2020-09-21 | End: 2021-01-25

## 2020-09-21 RX ORDER — SODIUM CHLORIDE 0.9 % (FLUSH) 0.9 %
10 SYRINGE (ML) INJECTION EVERY 12 HOURS SCHEDULED
Status: DISCONTINUED | OUTPATIENT
Start: 2020-09-21 | End: 2020-09-28 | Stop reason: HOSPADM

## 2020-09-21 RX ORDER — OXYCODONE HYDROCHLORIDE AND ACETAMINOPHEN 5; 325 MG/1; MG/1
1 TABLET ORAL EVERY 4 HOURS PRN
Status: DISCONTINUED | OUTPATIENT
Start: 2020-09-21 | End: 2020-09-22

## 2020-09-21 RX ORDER — MORPHINE SULFATE 2 MG/ML
2 INJECTION, SOLUTION INTRAMUSCULAR; INTRAVENOUS EVERY 4 HOURS PRN
Status: DISCONTINUED | OUTPATIENT
Start: 2020-09-21 | End: 2020-09-22

## 2020-09-21 RX ORDER — ASPIRIN 81 MG/1
81 TABLET ORAL DAILY
Status: DISCONTINUED | OUTPATIENT
Start: 2020-09-21 | End: 2020-09-28 | Stop reason: HOSPADM

## 2020-09-21 RX ORDER — DONEPEZIL HYDROCHLORIDE 5 MG/1
5 TABLET, FILM COATED ORAL NIGHTLY
Status: DISCONTINUED | OUTPATIENT
Start: 2020-09-21 | End: 2020-09-28 | Stop reason: HOSPADM

## 2020-09-21 RX ORDER — ONDANSETRON 2 MG/ML
4 INJECTION INTRAMUSCULAR; INTRAVENOUS ONCE
Status: COMPLETED | OUTPATIENT
Start: 2020-09-21 | End: 2020-09-21

## 2020-09-21 RX ORDER — DILTIAZEM HYDROCHLORIDE 180 MG/1
180 CAPSULE, COATED, EXTENDED RELEASE ORAL
Status: DISCONTINUED | OUTPATIENT
Start: 2020-09-21 | End: 2020-09-28 | Stop reason: HOSPADM

## 2020-09-21 RX ORDER — SODIUM CHLORIDE 0.9 % (FLUSH) 0.9 %
10 SYRINGE (ML) INJECTION AS NEEDED
Status: DISCONTINUED | OUTPATIENT
Start: 2020-09-21 | End: 2020-09-28 | Stop reason: HOSPADM

## 2020-09-21 RX ORDER — ATORVASTATIN CALCIUM 40 MG/1
40 TABLET, FILM COATED ORAL NIGHTLY
COMMUNITY

## 2020-09-21 RX ORDER — NALOXONE HCL 0.4 MG/ML
0.4 VIAL (ML) INJECTION
Status: DISCONTINUED | OUTPATIENT
Start: 2020-09-21 | End: 2020-09-22

## 2020-09-21 RX ORDER — CEFDINIR 300 MG/1
300 CAPSULE ORAL 2 TIMES DAILY
Qty: 14 CAPSULE | Refills: 0 | Status: SHIPPED | OUTPATIENT
Start: 2020-09-21 | End: 2020-09-28

## 2020-09-21 RX ORDER — ONDANSETRON 2 MG/ML
4 INJECTION INTRAMUSCULAR; INTRAVENOUS EVERY 6 HOURS PRN
Status: DISCONTINUED | OUTPATIENT
Start: 2020-09-21 | End: 2020-09-28 | Stop reason: HOSPADM

## 2020-09-21 RX ORDER — MORPHINE SULFATE 10 MG/ML
6 INJECTION INTRAMUSCULAR; INTRAVENOUS; SUBCUTANEOUS ONCE
Status: COMPLETED | OUTPATIENT
Start: 2020-09-21 | End: 2020-09-21

## 2020-09-21 RX ORDER — METHIMAZOLE 10 MG/1
10 TABLET ORAL DAILY
Status: DISCONTINUED | OUTPATIENT
Start: 2020-09-21 | End: 2020-09-28 | Stop reason: HOSPADM

## 2020-09-21 RX ORDER — ATORVASTATIN CALCIUM 40 MG/1
40 TABLET, FILM COATED ORAL DAILY
Status: DISCONTINUED | OUTPATIENT
Start: 2020-09-21 | End: 2020-09-28 | Stop reason: HOSPADM

## 2020-09-21 RX ORDER — LIDOCAINE HYDROCHLORIDE 10 MG/ML
1 INJECTION, SOLUTION EPIDURAL; INFILTRATION; INTRACAUDAL; PERINEURAL ONCE
Status: COMPLETED | OUTPATIENT
Start: 2020-09-21 | End: 2020-09-21

## 2020-09-21 RX ADMIN — LIDOCAINE HYDROCHLORIDE ANHYDROUS 1 ML: 10 INJECTION, SOLUTION INFILTRATION at 14:42

## 2020-09-21 RX ADMIN — AZITHROMYCIN 500 MG: 500 INJECTION, POWDER, LYOPHILIZED, FOR SOLUTION INTRAVENOUS at 16:51

## 2020-09-21 RX ADMIN — SODIUM CHLORIDE, PRESERVATIVE FREE 10 ML: 5 INJECTION INTRAVENOUS at 20:24

## 2020-09-21 RX ADMIN — METOPROLOL TARTRATE 25 MG: 25 TABLET, FILM COATED ORAL at 15:54

## 2020-09-21 RX ADMIN — ONDANSETRON HYDROCHLORIDE 4 MG: 2 SOLUTION INTRAMUSCULAR; INTRAVENOUS at 11:26

## 2020-09-21 RX ADMIN — ATORVASTATIN CALCIUM 40 MG: 40 TABLET, FILM COATED ORAL at 15:54

## 2020-09-21 RX ADMIN — SODIUM CHLORIDE, PRESERVATIVE FREE 10 ML: 5 INJECTION INTRAVENOUS at 15:55

## 2020-09-21 RX ADMIN — CEFTRIAXONE SODIUM 1 G: 1 INJECTION, POWDER, FOR SOLUTION INTRAMUSCULAR; INTRAVENOUS at 16:06

## 2020-09-21 RX ADMIN — MORPHINE SULFATE 6 MG: 10 INJECTION, SOLUTION INTRAMUSCULAR; INTRAVENOUS at 05:50

## 2020-09-21 RX ADMIN — DONEPEZIL HYDROCHLORIDE 5 MG: 5 TABLET, FILM COATED ORAL at 20:24

## 2020-09-21 RX ADMIN — MORPHINE SULFATE 2 MG: 2 INJECTION, SOLUTION INTRAMUSCULAR; INTRAVENOUS at 11:24

## 2020-09-21 RX ADMIN — ONDANSETRON HYDROCHLORIDE 4 MG: 2 SOLUTION INTRAMUSCULAR; INTRAVENOUS at 07:21

## 2020-09-21 RX ADMIN — DILTIAZEM HYDROCHLORIDE 180 MG: 180 CAPSULE, COATED, EXTENDED RELEASE ORAL at 15:54

## 2020-09-21 RX ADMIN — IOPAMIDOL 100 ML: 755 INJECTION, SOLUTION INTRAVENOUS at 07:52

## 2020-09-21 RX ADMIN — ONDANSETRON HYDROCHLORIDE 4 MG: 2 SOLUTION INTRAMUSCULAR; INTRAVENOUS at 05:50

## 2020-09-21 RX ADMIN — METHIMAZOLE 10 MG: 10 TABLET ORAL at 15:54

## 2020-09-21 RX ADMIN — METOPROLOL TARTRATE 25 MG: 25 TABLET, FILM COATED ORAL at 20:24

## 2020-09-22 ENCOUNTER — APPOINTMENT (OUTPATIENT)
Dept: CARDIOLOGY | Facility: HOSPITAL | Age: 75
End: 2020-09-22

## 2020-09-22 PROBLEM — J90 PLEURAL EFFUSION: Status: ACTIVE | Noted: 2020-09-22

## 2020-09-22 LAB
ALBUMIN SERPL-MCNC: 3.6 G/DL (ref 3.5–5.2)
ALBUMIN/GLOB SERPL: 1.1 G/DL
ALP SERPL-CCNC: 60 U/L (ref 39–117)
ALT SERPL W P-5'-P-CCNC: 19 U/L (ref 1–33)
ANION GAP SERPL CALCULATED.3IONS-SCNC: 6 MMOL/L (ref 5–15)
ARTERIAL PATENCY WRIST A: ABNORMAL
ARTERIAL PATENCY WRIST A: NORMAL
AST SERPL-CCNC: 25 U/L (ref 1–32)
ATMOSPHERIC PRESS: 757 MMHG
ATMOSPHERIC PRESS: NORMAL MM[HG]
BASE EXCESS BLDA CALC-SCNC: 7 MMOL/L (ref 0–2)
BASE EXCESS BLDA CALC-SCNC: NORMAL MMOL/L
BDY SITE: ABNORMAL
BDY SITE: NORMAL
BH CV ECHO MEAS - AO MAX PG (FULL): 28.2 MMHG
BH CV ECHO MEAS - AO MAX PG: 32.5 MMHG
BH CV ECHO MEAS - AO MEAN PG (FULL): 15 MMHG
BH CV ECHO MEAS - AO MEAN PG: 18 MMHG
BH CV ECHO MEAS - AO ROOT AREA (BSA CORRECTED): 1.3
BH CV ECHO MEAS - AO ROOT AREA: 6.2 CM^2
BH CV ECHO MEAS - AO ROOT DIAM: 2.8 CM
BH CV ECHO MEAS - AO V2 MAX: 285 CM/SEC
BH CV ECHO MEAS - AO V2 MEAN: 197.5 CM/SEC
BH CV ECHO MEAS - AO V2 VTI: 66.6 CM
BH CV ECHO MEAS - AVA(I,A): 1 CM^2
BH CV ECHO MEAS - AVA(I,D): 1 CM^2
BH CV ECHO MEAS - AVA(V,A): 1.1 CM^2
BH CV ECHO MEAS - AVA(V,D): 1.1 CM^2
BH CV ECHO MEAS - BSA(HAYCOCK): 2.3 M^2
BH CV ECHO MEAS - BSA: 2.2 M^2
BH CV ECHO MEAS - BZI_BMI: 36 KILOGRAMS/M^2
BH CV ECHO MEAS - BZI_METRIC_HEIGHT: 172.7 CM
BH CV ECHO MEAS - BZI_METRIC_WEIGHT: 107.5 KG
BH CV ECHO MEAS - EDV(CUBED): 118.4 ML
BH CV ECHO MEAS - EDV(MOD-SP4): 92 ML
BH CV ECHO MEAS - EDV(TEICH): 113.4 ML
BH CV ECHO MEAS - EF(CUBED): 63.5 %
BH CV ECHO MEAS - EF(MOD-SP4): 61.2 %
BH CV ECHO MEAS - EF(TEICH): 54.8 %
BH CV ECHO MEAS - ESV(CUBED): 43.2 ML
BH CV ECHO MEAS - ESV(MOD-SP4): 35.7 ML
BH CV ECHO MEAS - ESV(TEICH): 51.2 ML
BH CV ECHO MEAS - FS: 28.5 %
BH CV ECHO MEAS - IVS/LVPW: 0.91
BH CV ECHO MEAS - IVSD: 1 CM
BH CV ECHO MEAS - LA DIMENSION: 5.5 CM
BH CV ECHO MEAS - LA/AO: 2
BH CV ECHO MEAS - LAT PEAK E' VEL: 11.2 CM/SEC
BH CV ECHO MEAS - LV DIASTOLIC VOL/BSA (35-75): 41.9 ML/M^2
BH CV ECHO MEAS - LV MASS(C)D: 196.1 GRAMS
BH CV ECHO MEAS - LV MASS(C)DI: 89.3 GRAMS/M^2
BH CV ECHO MEAS - LV MAX PG: 4.3 MMHG
BH CV ECHO MEAS - LV MEAN PG: 3 MMHG
BH CV ECHO MEAS - LV SYSTOLIC VOL/BSA (12-30): 16.3 ML/M^2
BH CV ECHO MEAS - LV V1 MAX: 104 CM/SEC
BH CV ECHO MEAS - LV V1 MEAN: 76.7 CM/SEC
BH CV ECHO MEAS - LV V1 VTI: 21.6 CM
BH CV ECHO MEAS - LVIDD: 4.9 CM
BH CV ECHO MEAS - LVIDS: 3.5 CM
BH CV ECHO MEAS - LVLD AP4: 7.4 CM
BH CV ECHO MEAS - LVLS AP4: 6.3 CM
BH CV ECHO MEAS - LVOT AREA (M): 3.1 CM^2
BH CV ECHO MEAS - LVOT AREA: 3.1 CM^2
BH CV ECHO MEAS - LVOT DIAM: 2 CM
BH CV ECHO MEAS - LVPWD: 1.1 CM
BH CV ECHO MEAS - MED PEAK E' VEL: 7.5 CM/SEC
BH CV ECHO MEAS - MV DEC SLOPE: 772.5 CM/SEC^2
BH CV ECHO MEAS - MV DEC TIME: 0.16 SEC
BH CV ECHO MEAS - MV E MAX VEL: 120 CM/SEC
BH CV ECHO MEAS - MV P1/2T MAX VEL: 138 CM/SEC
BH CV ECHO MEAS - MV P1/2T: 52.3 MSEC
BH CV ECHO MEAS - MVA P1/2T LCG: 1.6 CM^2
BH CV ECHO MEAS - MVA(P1/2T): 4.2 CM^2
BH CV ECHO MEAS - PA MAX PG: 4.3 MMHG
BH CV ECHO MEAS - PA V2 MAX: 104 CM/SEC
BH CV ECHO MEAS - RAP SYSTOLE: 5 MMHG
BH CV ECHO MEAS - RVDD: 4.7 CM
BH CV ECHO MEAS - RVSP: 36.4 MMHG
BH CV ECHO MEAS - SI(AO): 186.7 ML/M^2
BH CV ECHO MEAS - SI(CUBED): 34.2 ML/M^2
BH CV ECHO MEAS - SI(LVOT): 30.9 ML/M^2
BH CV ECHO MEAS - SI(MOD-SP4): 25.6 ML/M^2
BH CV ECHO MEAS - SI(TEICH): 28.3 ML/M^2
BH CV ECHO MEAS - SV(AO): 410.1 ML
BH CV ECHO MEAS - SV(CUBED): 75.1 ML
BH CV ECHO MEAS - SV(LVOT): 67.9 ML
BH CV ECHO MEAS - SV(MOD-SP4): 56.3 ML
BH CV ECHO MEAS - SV(TEICH): 62.1 ML
BH CV ECHO MEAS - TR MAX VEL: 280 CM/SEC
BH CV ECHO MEASUREMENTS AVERAGE E/E' RATIO: 12.83
BILIRUB SERPL-MCNC: 0.5 MG/DL (ref 0–1.2)
BODY TEMPERATURE: 37 C
BODY TEMPERATURE: NORMAL
BUN SERPL-MCNC: 33 MG/DL (ref 8–23)
BUN/CREAT SERPL: 22.3 (ref 7–25)
CALCIUM SPEC-SCNC: 10.2 MG/DL (ref 8.6–10.5)
CHLORIDE SERPL-SCNC: 104 MMOL/L (ref 98–107)
CO2 SERPL-SCNC: 35 MMOL/L (ref 22–29)
CREAT SERPL-MCNC: 1.48 MG/DL (ref 0.57–1)
DEPRECATED RDW RBC AUTO: 51.1 FL (ref 37–54)
EPAP: 4
ERYTHROCYTE [DISTWIDTH] IN BLOOD BY AUTOMATED COUNT: 13.2 % (ref 12.3–15.4)
GAS FLOW AIRWAY: NORMAL L/MIN
GFR SERPL CREATININE-BSD FRML MDRD: 34 ML/MIN/1.73
GLOBULIN UR ELPH-MCNC: 3.4 GM/DL
GLUCOSE SERPL-MCNC: 102 MG/DL (ref 65–99)
HCO3 BLDA-SCNC: 34.3 MMOL/L (ref 20–26)
HCO3 BLDA-SCNC: NORMAL MMOL/L
HCT VFR BLD AUTO: 32.8 % (ref 34–46.6)
HGB BLD-MCNC: 10.1 G/DL (ref 12–15.9)
INHALED O2 CONCENTRATION: 40 %
INR PPP: 2.14 (ref 0.91–1.09)
IPAP: 16
LEFT ATRIUM VOLUME INDEX: 87 ML/M2
LV EF 2D ECHO EST: 60 %
Lab: ABNORMAL
Lab: NORMAL
Lab: NORMAL
MAXIMAL PREDICTED HEART RATE: 144 BPM
MCH RBC QN AUTO: 32.7 PG (ref 26.6–33)
MCHC RBC AUTO-ENTMCNC: 30.8 G/DL (ref 31.5–35.7)
MCV RBC AUTO: 106.1 FL (ref 79–97)
MODALITY: ABNORMAL
MODALITY: NORMAL
NOTIFIED BY: NORMAL
NOTIFIED WHO: NORMAL
PCO2 BLDA: 63.1 MM HG (ref 35–45)
PCO2 BLDA: NORMAL MM[HG]
PCO2 TEMP ADJ BLD: NORMAL MM[HG]
PH BLDA: 7.34 PH UNITS (ref 7.35–7.45)
PH BLDA: NORMAL [PH]
PH, TEMP CORRECTED: NORMAL
PLATELET # BLD AUTO: 164 10*3/MM3 (ref 140–450)
PMV BLD AUTO: 9.9 FL (ref 6–12)
PO2 BLDA: 131 MM HG (ref 83–108)
PO2 BLDA: NORMAL MM[HG]
PO2 TEMP ADJ BLD: NORMAL MM[HG]
POTASSIUM SERPL-SCNC: 5.2 MMOL/L (ref 3.5–5.2)
PROT SERPL-MCNC: 7 G/DL (ref 6–8.5)
PROTHROMBIN TIME: 23.8 SECONDS (ref 11.9–14.6)
RBC # BLD AUTO: 3.09 10*6/MM3 (ref 3.77–5.28)
SAO2 % BLDCOA: >98.5 % (ref 94–99)
SAO2 % BLDCOA: NORMAL %
SODIUM SERPL-SCNC: 145 MMOL/L (ref 136–145)
STRESS TARGET HR: 122 BPM
VENTILATOR MODE: ABNORMAL
VENTILATOR MODE: NORMAL
WBC # BLD AUTO: 7.24 10*3/MM3 (ref 3.4–10.8)

## 2020-09-22 PROCEDURE — 25010000002 METHYLPREDNISOLONE PER 125 MG: Performed by: NURSE PRACTITIONER

## 2020-09-22 PROCEDURE — 94799 UNLISTED PULMONARY SVC/PX: CPT

## 2020-09-22 PROCEDURE — 85027 COMPLETE CBC AUTOMATED: CPT | Performed by: INTERNAL MEDICINE

## 2020-09-22 PROCEDURE — 80053 COMPREHEN METABOLIC PANEL: CPT | Performed by: INTERNAL MEDICINE

## 2020-09-22 PROCEDURE — 93306 TTE W/DOPPLER COMPLETE: CPT | Performed by: INTERNAL MEDICINE

## 2020-09-22 PROCEDURE — 25010000002 PERFLUTREN 6.52 MG/ML SUSPENSION: Performed by: INTERNAL MEDICINE

## 2020-09-22 PROCEDURE — 25010000002 FUROSEMIDE PER 20 MG: Performed by: NURSE PRACTITIONER

## 2020-09-22 PROCEDURE — 36600 WITHDRAWAL OF ARTERIAL BLOOD: CPT | Performed by: INTERNAL MEDICINE

## 2020-09-22 PROCEDURE — 25010000002 VITAMIN K1 1 MG/1 ML SOLUTION: Performed by: ORTHOPAEDIC SURGERY

## 2020-09-22 PROCEDURE — 93306 TTE W/DOPPLER COMPLETE: CPT

## 2020-09-22 PROCEDURE — 82803 BLOOD GASES ANY COMBINATION: CPT | Performed by: INTERNAL MEDICINE

## 2020-09-22 PROCEDURE — 85610 PROTHROMBIN TIME: CPT | Performed by: NURSE PRACTITIONER

## 2020-09-22 PROCEDURE — 36600 WITHDRAWAL OF ARTERIAL BLOOD: CPT

## 2020-09-22 PROCEDURE — 36415 COLL VENOUS BLD VENIPUNCTURE: CPT | Performed by: INTERNAL MEDICINE

## 2020-09-22 RX ORDER — OXYCODONE HYDROCHLORIDE AND ACETAMINOPHEN 5; 325 MG/1; MG/1
1 TABLET ORAL EVERY 6 HOURS PRN
Status: DISCONTINUED | OUTPATIENT
Start: 2020-09-22 | End: 2020-09-28 | Stop reason: HOSPADM

## 2020-09-22 RX ORDER — FUROSEMIDE 10 MG/ML
40 INJECTION INTRAMUSCULAR; INTRAVENOUS ONCE
Status: DISCONTINUED | OUTPATIENT
Start: 2020-09-22 | End: 2020-09-22

## 2020-09-22 RX ORDER — FUROSEMIDE 10 MG/ML
40 INJECTION INTRAMUSCULAR; INTRAVENOUS
Status: DISCONTINUED | OUTPATIENT
Start: 2020-09-22 | End: 2020-09-23

## 2020-09-22 RX ORDER — PHYTONADIONE 2 MG/ML
5 INJECTION, EMULSION INTRAMUSCULAR; INTRAVENOUS; SUBCUTANEOUS ONCE
Status: COMPLETED | OUTPATIENT
Start: 2020-09-22 | End: 2020-09-22

## 2020-09-22 RX ORDER — METHYLPREDNISOLONE SODIUM SUCCINATE 125 MG/2ML
125 INJECTION, POWDER, LYOPHILIZED, FOR SOLUTION INTRAMUSCULAR; INTRAVENOUS ONCE
Status: COMPLETED | OUTPATIENT
Start: 2020-09-22 | End: 2020-09-22

## 2020-09-22 RX ORDER — NALOXONE HCL 0.4 MG/ML
0.4 VIAL (ML) INJECTION
Status: DISCONTINUED | OUTPATIENT
Start: 2020-09-22 | End: 2020-09-28 | Stop reason: HOSPADM

## 2020-09-22 RX ORDER — MORPHINE SULFATE 2 MG/ML
1 INJECTION, SOLUTION INTRAMUSCULAR; INTRAVENOUS EVERY 4 HOURS PRN
Status: DISCONTINUED | OUTPATIENT
Start: 2020-09-22 | End: 2020-09-23

## 2020-09-22 RX ADMIN — OXYCODONE HYDROCHLORIDE AND ACETAMINOPHEN 1 TABLET: 5; 325 TABLET ORAL at 00:46

## 2020-09-22 RX ADMIN — PHYTONADIONE 5 MG: 1 INJECTION, EMULSION INTRAMUSCULAR; INTRAVENOUS; SUBCUTANEOUS at 09:15

## 2020-09-22 RX ADMIN — SODIUM CHLORIDE, PRESERVATIVE FREE 10 ML: 5 INJECTION INTRAVENOUS at 20:03

## 2020-09-22 RX ADMIN — METHIMAZOLE 10 MG: 10 TABLET ORAL at 09:15

## 2020-09-22 RX ADMIN — SODIUM CHLORIDE, PRESERVATIVE FREE 10 ML: 5 INJECTION INTRAVENOUS at 09:15

## 2020-09-22 RX ADMIN — METOPROLOL TARTRATE 25 MG: 25 TABLET, FILM COATED ORAL at 09:15

## 2020-09-22 RX ADMIN — DONEPEZIL HYDROCHLORIDE 5 MG: 5 TABLET, FILM COATED ORAL at 20:03

## 2020-09-22 RX ADMIN — PERFLUTREN 8.48 MG: 6.52 INJECTION, SUSPENSION INTRAVENOUS at 10:38

## 2020-09-22 RX ADMIN — METHYLPREDNISOLONE SODIUM SUCCINATE 125 MG: 125 INJECTION, POWDER, FOR SOLUTION INTRAMUSCULAR; INTRAVENOUS at 09:20

## 2020-09-22 RX ADMIN — METOPROLOL TARTRATE 25 MG: 25 TABLET, FILM COATED ORAL at 20:03

## 2020-09-22 RX ADMIN — FUROSEMIDE 40 MG: 10 INJECTION, SOLUTION INTRAMUSCULAR; INTRAVENOUS at 17:54

## 2020-09-22 RX ADMIN — ATORVASTATIN CALCIUM 40 MG: 40 TABLET, FILM COATED ORAL at 09:15

## 2020-09-22 RX ADMIN — ASPIRIN 81 MG: 81 TABLET ORAL at 09:15

## 2020-09-22 RX ADMIN — DILTIAZEM HYDROCHLORIDE 180 MG: 180 CAPSULE, COATED, EXTENDED RELEASE ORAL at 09:15

## 2020-09-22 RX ADMIN — FUROSEMIDE 40 MG: 10 INJECTION, SOLUTION INTRAMUSCULAR; INTRAVENOUS at 09:20

## 2020-09-23 ENCOUNTER — APPOINTMENT (OUTPATIENT)
Dept: GENERAL RADIOLOGY | Facility: HOSPITAL | Age: 75
End: 2020-09-23

## 2020-09-23 LAB
ALBUMIN FLD-MCNC: 2 G/DL
ALBUMIN SERPL-MCNC: 3.1 G/DL (ref 3.5–5.2)
ALBUMIN/GLOB SERPL: 0.8 G/DL
ALP SERPL-CCNC: 55 U/L (ref 39–117)
ALT SERPL W P-5'-P-CCNC: 13 U/L (ref 1–33)
ANION GAP SERPL CALCULATED.3IONS-SCNC: 10 MMOL/L (ref 5–15)
APPEARANCE FLD: ABNORMAL
ARTERIAL PATENCY WRIST A: POSITIVE
AST SERPL-CCNC: 23 U/L (ref 1–32)
ATMOSPHERIC PRESS: 753 MMHG
BASE EXCESS BLDA CALC-SCNC: 6.7 MMOL/L (ref 0–2)
BDY SITE: ABNORMAL
BH BB BLOOD EXPIRATION DATE: NORMAL
BH BB BLOOD EXPIRATION DATE: NORMAL
BH BB BLOOD TYPE BARCODE: 6200
BH BB BLOOD TYPE BARCODE: 6200
BH BB DISPENSE STATUS: NORMAL
BH BB DISPENSE STATUS: NORMAL
BH BB PRODUCT CODE: NORMAL
BH BB PRODUCT CODE: NORMAL
BH BB UNIT NUMBER: NORMAL
BH BB UNIT NUMBER: NORMAL
BILIRUB SERPL-MCNC: 0.4 MG/DL (ref 0–1.2)
BODY TEMPERATURE: 37 C
BUN SERPL-MCNC: 47 MG/DL (ref 8–23)
BUN/CREAT SERPL: 24.6 (ref 7–25)
CALCIUM SPEC-SCNC: 9.9 MG/DL (ref 8.6–10.5)
CHLORIDE SERPL-SCNC: 101 MMOL/L (ref 98–107)
CO2 SERPL-SCNC: 28 MMOL/L (ref 22–29)
COLOR FLD: YELLOW
CREAT SERPL-MCNC: 1.91 MG/DL (ref 0.57–1)
DEPRECATED RDW RBC AUTO: 49.8 FL (ref 37–54)
ERYTHROCYTE [DISTWIDTH] IN BLOOD BY AUTOMATED COUNT: 12.8 % (ref 12.3–15.4)
GAS FLOW AIRWAY: 4 LPM
GFR SERPL CREATININE-BSD FRML MDRD: 26 ML/MIN/1.73
GLOBULIN UR ELPH-MCNC: 3.8 GM/DL
GLUCOSE SERPL-MCNC: 164 MG/DL (ref 65–99)
HCO3 BLDA-SCNC: 34.1 MMOL/L (ref 20–26)
HCT VFR BLD AUTO: 30.3 % (ref 34–46.6)
HGB BLD-MCNC: 9.6 G/DL (ref 12–15.9)
INR PPP: 1.38 (ref 0.91–1.09)
IRON 24H UR-MRATE: 19 MCG/DL (ref 37–145)
IRON SATN MFR SERPL: 7 % (ref 20–50)
LDH FLD-CCNC: 118 U/L
LYMPHOCYTES NFR FLD MANUAL: 91 %
Lab: ABNORMAL
MCH RBC QN AUTO: 33.6 PG (ref 26.6–33)
MCHC RBC AUTO-ENTMCNC: 31.7 G/DL (ref 31.5–35.7)
MCV RBC AUTO: 105.9 FL (ref 79–97)
MESOTHL CELL NFR FLD MANUAL: 2 %
MODALITY: ABNORMAL
MONOCYTES NFR FLD: 1 %
NEUTROPHILS NFR FLD MANUAL: 6 %
PCO2 BLDA: 64.9 MM HG (ref 35–45)
PCO2 TEMP ADJ BLD: 64.9 MM HG (ref 35–45)
PH BLDA: 7.33 PH UNITS (ref 7.35–7.45)
PH, TEMP CORRECTED: 7.33 PH UNITS (ref 7.35–7.45)
PLATELET # BLD AUTO: 148 10*3/MM3 (ref 140–450)
PMV BLD AUTO: 9.7 FL (ref 6–12)
PO2 BLDA: 75.9 MM HG (ref 83–108)
PO2 TEMP ADJ BLD: 75.9 MM HG (ref 83–108)
POTASSIUM SERPL-SCNC: 5.1 MMOL/L (ref 3.5–5.2)
PROT FLD-MCNC: 3.9 G/DL
PROT SERPL-MCNC: 6.9 G/DL (ref 6–8.5)
PROTHROMBIN TIME: 16.6 SECONDS (ref 11.9–14.6)
RBC # BLD AUTO: 2.86 10*6/MM3 (ref 3.77–5.28)
RBC # FLD AUTO: 1000 /MM3
SAO2 % BLDCOA: 95.5 % (ref 94–99)
SODIUM SERPL-SCNC: 139 MMOL/L (ref 136–145)
TIBC SERPL-MCNC: 285 MCG/DL (ref 298–536)
TRANSFERRIN SERPL-MCNC: 191 MG/DL (ref 200–360)
UNIT  ABO: NORMAL
UNIT  ABO: NORMAL
UNIT  RH: NORMAL
UNIT  RH: NORMAL
VENTILATOR MODE: ABNORMAL
WBC # BLD AUTO: 10.23 10*3/MM3 (ref 3.4–10.8)
WBC # FLD AUTO: 210 /MM3

## 2020-09-23 PROCEDURE — 94799 UNLISTED PULMONARY SVC/PX: CPT

## 2020-09-23 PROCEDURE — 87205 SMEAR GRAM STAIN: CPT | Performed by: INTERNAL MEDICINE

## 2020-09-23 PROCEDURE — 88112 CYTOPATH CELL ENHANCE TECH: CPT | Performed by: INTERNAL MEDICINE

## 2020-09-23 PROCEDURE — 87070 CULTURE OTHR SPECIMN AEROBIC: CPT | Performed by: INTERNAL MEDICINE

## 2020-09-23 PROCEDURE — 88305 TISSUE EXAM BY PATHOLOGIST: CPT | Performed by: INTERNAL MEDICINE

## 2020-09-23 PROCEDURE — 86923 COMPATIBILITY TEST ELECTRIC: CPT

## 2020-09-23 PROCEDURE — 83615 LACTATE (LD) (LDH) ENZYME: CPT | Performed by: INTERNAL MEDICINE

## 2020-09-23 PROCEDURE — 82042 OTHER SOURCE ALBUMIN QUAN EA: CPT | Performed by: INTERNAL MEDICINE

## 2020-09-23 PROCEDURE — 83540 ASSAY OF IRON: CPT | Performed by: NURSE PRACTITIONER

## 2020-09-23 PROCEDURE — 80053 COMPREHEN METABOLIC PANEL: CPT | Performed by: NURSE PRACTITIONER

## 2020-09-23 PROCEDURE — 85610 PROTHROMBIN TIME: CPT | Performed by: NURSE PRACTITIONER

## 2020-09-23 PROCEDURE — 88185 FLOWCYTOMETRY/TC ADD-ON: CPT | Performed by: INTERNAL MEDICINE

## 2020-09-23 PROCEDURE — 86900 BLOOD TYPING SEROLOGIC ABO: CPT

## 2020-09-23 PROCEDURE — 71045 X-RAY EXAM CHEST 1 VIEW: CPT

## 2020-09-23 PROCEDURE — 94660 CPAP INITIATION&MGMT: CPT

## 2020-09-23 PROCEDURE — 0W993ZZ DRAINAGE OF RIGHT PLEURAL CAVITY, PERCUTANEOUS APPROACH: ICD-10-PCS | Performed by: INTERNAL MEDICINE

## 2020-09-23 PROCEDURE — 82803 BLOOD GASES ANY COMBINATION: CPT

## 2020-09-23 PROCEDURE — 86901 BLOOD TYPING SEROLOGIC RH(D): CPT

## 2020-09-23 PROCEDURE — 89051 BODY FLUID CELL COUNT: CPT | Performed by: INTERNAL MEDICINE

## 2020-09-23 PROCEDURE — 85027 COMPLETE CBC AUTOMATED: CPT | Performed by: NURSE PRACTITIONER

## 2020-09-23 PROCEDURE — 84466 ASSAY OF TRANSFERRIN: CPT | Performed by: NURSE PRACTITIONER

## 2020-09-23 PROCEDURE — 25010000002 MORPHINE SULFATE (PF) 2 MG/ML SOLUTION: Performed by: NURSE PRACTITIONER

## 2020-09-23 PROCEDURE — 94640 AIRWAY INHALATION TREATMENT: CPT

## 2020-09-23 PROCEDURE — 36600 WITHDRAWAL OF ARTERIAL BLOOD: CPT

## 2020-09-23 PROCEDURE — 88184 FLOWCYTOMETRY/ TC 1 MARKER: CPT | Performed by: INTERNAL MEDICINE

## 2020-09-23 PROCEDURE — 84157 ASSAY OF PROTEIN OTHER: CPT | Performed by: INTERNAL MEDICINE

## 2020-09-23 RX ORDER — SODIUM CHLORIDE 9 MG/ML
75 INJECTION, SOLUTION INTRAVENOUS CONTINUOUS
Status: DISCONTINUED | OUTPATIENT
Start: 2020-09-24 | End: 2020-09-25

## 2020-09-23 RX ORDER — IPRATROPIUM BROMIDE AND ALBUTEROL SULFATE 2.5; .5 MG/3ML; MG/3ML
3 SOLUTION RESPIRATORY (INHALATION)
Status: DISCONTINUED | OUTPATIENT
Start: 2020-09-23 | End: 2020-09-28 | Stop reason: HOSPADM

## 2020-09-23 RX ADMIN — MORPHINE SULFATE 1 MG: 2 INJECTION, SOLUTION INTRAMUSCULAR; INTRAVENOUS at 09:10

## 2020-09-23 RX ADMIN — IPRATROPIUM BROMIDE AND ALBUTEROL SULFATE 3 ML: 2.5; .5 SOLUTION RESPIRATORY (INHALATION) at 15:19

## 2020-09-23 RX ADMIN — SODIUM CHLORIDE 75 ML/HR: 9 INJECTION, SOLUTION INTRAVENOUS at 23:58

## 2020-09-23 RX ADMIN — METOPROLOL TARTRATE 25 MG: 25 TABLET, FILM COATED ORAL at 09:10

## 2020-09-23 RX ADMIN — METHIMAZOLE 10 MG: 10 TABLET ORAL at 09:10

## 2020-09-23 RX ADMIN — ASPIRIN 81 MG: 81 TABLET ORAL at 09:10

## 2020-09-23 RX ADMIN — METOPROLOL TARTRATE 25 MG: 25 TABLET, FILM COATED ORAL at 20:15

## 2020-09-23 RX ADMIN — SODIUM CHLORIDE, PRESERVATIVE FREE 10 ML: 5 INJECTION INTRAVENOUS at 20:15

## 2020-09-23 RX ADMIN — OXYCODONE HYDROCHLORIDE AND ACETAMINOPHEN 1 TABLET: 5; 325 TABLET ORAL at 14:54

## 2020-09-23 RX ADMIN — SODIUM CHLORIDE, PRESERVATIVE FREE 10 ML: 5 INJECTION INTRAVENOUS at 09:11

## 2020-09-23 RX ADMIN — IPRATROPIUM BROMIDE AND ALBUTEROL SULFATE 3 ML: 2.5; .5 SOLUTION RESPIRATORY (INHALATION) at 10:35

## 2020-09-23 RX ADMIN — DONEPEZIL HYDROCHLORIDE 5 MG: 5 TABLET, FILM COATED ORAL at 20:15

## 2020-09-23 RX ADMIN — ATORVASTATIN CALCIUM 40 MG: 40 TABLET, FILM COATED ORAL at 09:10

## 2020-09-23 RX ADMIN — DILTIAZEM HYDROCHLORIDE 180 MG: 180 CAPSULE, COATED, EXTENDED RELEASE ORAL at 09:10

## 2020-09-24 ENCOUNTER — ANESTHESIA EVENT (OUTPATIENT)
Dept: PERIOP | Facility: HOSPITAL | Age: 75
End: 2020-09-24

## 2020-09-24 ENCOUNTER — APPOINTMENT (OUTPATIENT)
Dept: GENERAL RADIOLOGY | Facility: HOSPITAL | Age: 75
End: 2020-09-24

## 2020-09-24 ENCOUNTER — ANESTHESIA (OUTPATIENT)
Dept: PERIOP | Facility: HOSPITAL | Age: 75
End: 2020-09-24

## 2020-09-24 LAB
ANION GAP SERPL CALCULATED.3IONS-SCNC: 7 MMOL/L (ref 5–15)
ARTERIAL PATENCY WRIST A: POSITIVE
ATMOSPHERIC PRESS: 750 MMHG
BASE EXCESS BLDA CALC-SCNC: 6.2 MMOL/L (ref 0–2)
BDY SITE: ABNORMAL
BODY TEMPERATURE: 37 C
BUN SERPL-MCNC: 60 MG/DL (ref 8–23)
BUN/CREAT SERPL: 37.5 (ref 7–25)
CALCIUM SPEC-SCNC: 10.1 MG/DL (ref 8.6–10.5)
CHLORIDE SERPL-SCNC: 101 MMOL/L (ref 98–107)
CO2 SERPL-SCNC: 33 MMOL/L (ref 22–29)
CREAT SERPL-MCNC: 1.6 MG/DL (ref 0.57–1)
DEPRECATED RDW RBC AUTO: 50.5 FL (ref 37–54)
ERYTHROCYTE [DISTWIDTH] IN BLOOD BY AUTOMATED COUNT: 13 % (ref 12.3–15.4)
GAS FLOW AIRWAY: 2 LPM
GFR SERPL CREATININE-BSD FRML MDRD: 31 ML/MIN/1.73
GLUCOSE SERPL-MCNC: 133 MG/DL (ref 65–99)
HCO3 BLDA-SCNC: 34 MMOL/L (ref 20–26)
HCT VFR BLD AUTO: 31.8 % (ref 34–46.6)
HGB BLD-MCNC: 9.7 G/DL (ref 12–15.9)
INR PPP: 1.23 (ref 0.91–1.09)
Lab: ABNORMAL
Lab: ABNORMAL
MCH RBC QN AUTO: 32.6 PG (ref 26.6–33)
MCHC RBC AUTO-ENTMCNC: 30.5 G/DL (ref 31.5–35.7)
MCV RBC AUTO: 106.7 FL (ref 79–97)
MODALITY: ABNORMAL
NOTIFIED BY: ABNORMAL
NOTIFIED WHO: ABNORMAL
PCO2 BLDA: 67.2 MM HG (ref 35–45)
PCO2 TEMP ADJ BLD: 67.2 MM HG (ref 35–45)
PH BLDA: 7.31 PH UNITS (ref 7.35–7.45)
PH, TEMP CORRECTED: 7.31 PH UNITS (ref 7.35–7.45)
PLATELET # BLD AUTO: 180 10*3/MM3 (ref 140–450)
PMV BLD AUTO: 10.2 FL (ref 6–12)
PO2 BLDA: 63.5 MM HG (ref 83–108)
PO2 TEMP ADJ BLD: 63.5 MM HG (ref 83–108)
POTASSIUM SERPL-SCNC: 5 MMOL/L (ref 3.5–5.2)
PROTHROMBIN TIME: 15.1 SECONDS (ref 11.9–14.6)
RBC # BLD AUTO: 2.98 10*6/MM3 (ref 3.77–5.28)
SAO2 % BLDCOA: 91.6 % (ref 94–99)
SODIUM SERPL-SCNC: 141 MMOL/L (ref 136–145)
VENTILATOR MODE: ABNORMAL
VIT B12 BLD-MCNC: 247 PG/ML (ref 211–946)
WBC # BLD AUTO: 9.93 10*3/MM3 (ref 3.4–10.8)

## 2020-09-24 PROCEDURE — 82803 BLOOD GASES ANY COMBINATION: CPT

## 2020-09-24 PROCEDURE — 82607 VITAMIN B-12: CPT | Performed by: NURSE PRACTITIONER

## 2020-09-24 PROCEDURE — 25010000003 CEFAZOLIN PER 500 MG: Performed by: NURSE ANESTHETIST, CERTIFIED REGISTERED

## 2020-09-24 PROCEDURE — 86900 BLOOD TYPING SEROLOGIC ABO: CPT

## 2020-09-24 PROCEDURE — 25010000002 CEFAZOLIN 1-4 GM/50ML-% SOLUTION: Performed by: ORTHOPAEDIC SURGERY

## 2020-09-24 PROCEDURE — 76000 FLUOROSCOPY <1 HR PHYS/QHP: CPT

## 2020-09-24 PROCEDURE — C1713 ANCHOR/SCREW BN/BN,TIS/BN: HCPCS | Performed by: ORTHOPAEDIC SURGERY

## 2020-09-24 PROCEDURE — 73502 X-RAY EXAM HIP UNI 2-3 VIEWS: CPT

## 2020-09-24 PROCEDURE — 94799 UNLISTED PULMONARY SVC/PX: CPT

## 2020-09-24 PROCEDURE — C1769 GUIDE WIRE: HCPCS | Performed by: ORTHOPAEDIC SURGERY

## 2020-09-24 PROCEDURE — 25010000002 FENTANYL CITRATE (PF) 100 MCG/2ML SOLUTION: Performed by: NURSE ANESTHETIST, CERTIFIED REGISTERED

## 2020-09-24 PROCEDURE — 85610 PROTHROMBIN TIME: CPT | Performed by: NURSE PRACTITIONER

## 2020-09-24 PROCEDURE — 93010 ELECTROCARDIOGRAM REPORT: CPT | Performed by: INTERNAL MEDICINE

## 2020-09-24 PROCEDURE — 94660 CPAP INITIATION&MGMT: CPT

## 2020-09-24 PROCEDURE — 25010000002 PHENYLEPHRINE HCL 0.8 MG/10ML SOLUTION PREFILLED SYRINGE: Performed by: NURSE ANESTHETIST, CERTIFIED REGISTERED

## 2020-09-24 PROCEDURE — 0QH634Z INSERTION OF INTERNAL FIXATION DEVICE INTO RIGHT UPPER FEMUR, PERCUTANEOUS APPROACH: ICD-10-PCS | Performed by: ORTHOPAEDIC SURGERY

## 2020-09-24 PROCEDURE — 80048 BASIC METABOLIC PNL TOTAL CA: CPT | Performed by: NURSE PRACTITIONER

## 2020-09-24 PROCEDURE — 36600 WITHDRAWAL OF ARTERIAL BLOOD: CPT

## 2020-09-24 PROCEDURE — 25010000002 SUCCINYLCHOLINE PER 20 MG: Performed by: NURSE ANESTHETIST, CERTIFIED REGISTERED

## 2020-09-24 PROCEDURE — P9016 RBC LEUKOCYTES REDUCED: HCPCS

## 2020-09-24 PROCEDURE — 25010000002 IRON SUCROSE PER 1 MG: Performed by: NURSE PRACTITIONER

## 2020-09-24 PROCEDURE — 85027 COMPLETE CBC AUTOMATED: CPT | Performed by: NURSE PRACTITIONER

## 2020-09-24 PROCEDURE — 25010000002 PROPOFOL 10 MG/ML EMULSION: Performed by: NURSE ANESTHETIST, CERTIFIED REGISTERED

## 2020-09-24 PROCEDURE — 93005 ELECTROCARDIOGRAM TRACING: CPT | Performed by: INTERNAL MEDICINE

## 2020-09-24 PROCEDURE — 86923 COMPATIBILITY TEST ELECTRIC: CPT

## 2020-09-24 PROCEDURE — 36430 TRANSFUSION BLD/BLD COMPNT: CPT

## 2020-09-24 DEVICE — BLD FEM FIX HELI TFN ADV PERF 100MM STRL: Type: IMPLANTABLE DEVICE | Status: FUNCTIONAL

## 2020-09-24 DEVICE — SCRW LK STRDRV TI 5X34MM STRL: Type: IMPLANTABLE DEVICE | Status: FUNCTIONAL

## 2020-09-24 DEVICE — NAIL FEM TFN ADV PROX 130D SHT 10X235MM RT STRL: Type: IMPLANTABLE DEVICE | Status: FUNCTIONAL

## 2020-09-24 RX ORDER — FLUMAZENIL 0.1 MG/ML
0.2 INJECTION INTRAVENOUS AS NEEDED
Status: DISCONTINUED | OUTPATIENT
Start: 2020-09-24 | End: 2020-09-24 | Stop reason: HOSPADM

## 2020-09-24 RX ORDER — OXYCODONE AND ACETAMINOPHEN 7.5; 325 MG/1; MG/1
2 TABLET ORAL ONCE AS NEEDED
Status: DISCONTINUED | OUTPATIENT
Start: 2020-09-24 | End: 2020-09-24 | Stop reason: HOSPADM

## 2020-09-24 RX ORDER — SODIUM CHLORIDE 0.9 % (FLUSH) 0.9 %
10 SYRINGE (ML) INJECTION EVERY 12 HOURS SCHEDULED
Status: DISCONTINUED | OUTPATIENT
Start: 2020-09-24 | End: 2020-09-24 | Stop reason: HOSPADM

## 2020-09-24 RX ORDER — PROPOFOL 10 MG/ML
VIAL (ML) INTRAVENOUS AS NEEDED
Status: DISCONTINUED | OUTPATIENT
Start: 2020-09-24 | End: 2020-09-24 | Stop reason: SURG

## 2020-09-24 RX ORDER — MORPHINE SULFATE 2 MG/ML
2 INJECTION, SOLUTION INTRAMUSCULAR; INTRAVENOUS
Status: DISCONTINUED | OUTPATIENT
Start: 2020-09-24 | End: 2020-09-24 | Stop reason: HOSPADM

## 2020-09-24 RX ORDER — LABETALOL HYDROCHLORIDE 5 MG/ML
5 INJECTION, SOLUTION INTRAVENOUS
Status: DISCONTINUED | OUTPATIENT
Start: 2020-09-24 | End: 2020-09-24 | Stop reason: HOSPADM

## 2020-09-24 RX ORDER — NALOXONE HCL 0.4 MG/ML
0.04 VIAL (ML) INJECTION AS NEEDED
Status: DISCONTINUED | OUTPATIENT
Start: 2020-09-24 | End: 2020-09-24 | Stop reason: HOSPADM

## 2020-09-24 RX ORDER — FENTANYL CITRATE 50 UG/ML
INJECTION, SOLUTION INTRAMUSCULAR; INTRAVENOUS AS NEEDED
Status: DISCONTINUED | OUTPATIENT
Start: 2020-09-24 | End: 2020-09-24 | Stop reason: SURG

## 2020-09-24 RX ORDER — SODIUM CHLORIDE, SODIUM LACTATE, POTASSIUM CHLORIDE, CALCIUM CHLORIDE 600; 310; 30; 20 MG/100ML; MG/100ML; MG/100ML; MG/100ML
9 INJECTION, SOLUTION INTRAVENOUS CONTINUOUS
Status: DISCONTINUED | OUTPATIENT
Start: 2020-09-24 | End: 2020-09-24

## 2020-09-24 RX ORDER — DEXTROSE MONOHYDRATE 25 G/50ML
12.5 INJECTION, SOLUTION INTRAVENOUS AS NEEDED
Status: DISCONTINUED | OUTPATIENT
Start: 2020-09-24 | End: 2020-09-24 | Stop reason: HOSPADM

## 2020-09-24 RX ORDER — SODIUM CHLORIDE, SODIUM LACTATE, POTASSIUM CHLORIDE, CALCIUM CHLORIDE 600; 310; 30; 20 MG/100ML; MG/100ML; MG/100ML; MG/100ML
50 INJECTION, SOLUTION INTRAVENOUS CONTINUOUS
Status: DISCONTINUED | OUTPATIENT
Start: 2020-09-24 | End: 2020-09-25

## 2020-09-24 RX ORDER — SUCCINYLCHOLINE CHLORIDE 20 MG/ML
INJECTION INTRAMUSCULAR; INTRAVENOUS AS NEEDED
Status: DISCONTINUED | OUTPATIENT
Start: 2020-09-24 | End: 2020-09-24 | Stop reason: SURG

## 2020-09-24 RX ORDER — FENTANYL CITRATE 50 UG/ML
25 INJECTION, SOLUTION INTRAMUSCULAR; INTRAVENOUS
Status: DISCONTINUED | OUTPATIENT
Start: 2020-09-24 | End: 2020-09-24 | Stop reason: HOSPADM

## 2020-09-24 RX ORDER — CEFAZOLIN SODIUM 1 G/50ML
1 INJECTION, SOLUTION INTRAVENOUS EVERY 8 HOURS
Status: COMPLETED | OUTPATIENT
Start: 2020-09-24 | End: 2020-09-25

## 2020-09-24 RX ORDER — SODIUM CHLORIDE, SODIUM LACTATE, POTASSIUM CHLORIDE, CALCIUM CHLORIDE 600; 310; 30; 20 MG/100ML; MG/100ML; MG/100ML; MG/100ML
INJECTION, SOLUTION INTRAVENOUS CONTINUOUS PRN
Status: DISCONTINUED | OUTPATIENT
Start: 2020-09-24 | End: 2020-09-24 | Stop reason: SURG

## 2020-09-24 RX ORDER — SODIUM CHLORIDE 0.9 % (FLUSH) 0.9 %
10 SYRINGE (ML) INJECTION AS NEEDED
Status: DISCONTINUED | OUTPATIENT
Start: 2020-09-24 | End: 2020-09-24 | Stop reason: HOSPADM

## 2020-09-24 RX ORDER — ONDANSETRON 2 MG/ML
4 INJECTION INTRAMUSCULAR; INTRAVENOUS AS NEEDED
Status: DISCONTINUED | OUTPATIENT
Start: 2020-09-24 | End: 2020-09-24 | Stop reason: HOSPADM

## 2020-09-24 RX ORDER — CEFAZOLIN SODIUM 1 G/3ML
INJECTION, POWDER, FOR SOLUTION INTRAMUSCULAR; INTRAVENOUS AS NEEDED
Status: DISCONTINUED | OUTPATIENT
Start: 2020-09-24 | End: 2020-09-24 | Stop reason: SURG

## 2020-09-24 RX ORDER — MAGNESIUM HYDROXIDE 1200 MG/15ML
LIQUID ORAL AS NEEDED
Status: DISCONTINUED | OUTPATIENT
Start: 2020-09-24 | End: 2020-09-24 | Stop reason: HOSPADM

## 2020-09-24 RX ORDER — LIDOCAINE HYDROCHLORIDE 10 MG/ML
0.5 INJECTION, SOLUTION EPIDURAL; INFILTRATION; INTRACAUDAL; PERINEURAL ONCE AS NEEDED
Status: DISCONTINUED | OUTPATIENT
Start: 2020-09-24 | End: 2020-09-24 | Stop reason: HOSPADM

## 2020-09-24 RX ORDER — OXYCODONE AND ACETAMINOPHEN 10; 325 MG/1; MG/1
1 TABLET ORAL ONCE AS NEEDED
Status: DISCONTINUED | OUTPATIENT
Start: 2020-09-24 | End: 2020-09-24 | Stop reason: HOSPADM

## 2020-09-24 RX ORDER — PHENYLEPHRINE HCL IN 0.9% NACL 0.8MG/10ML
SYRINGE (ML) INTRAVENOUS AS NEEDED
Status: DISCONTINUED | OUTPATIENT
Start: 2020-09-24 | End: 2020-09-24 | Stop reason: SURG

## 2020-09-24 RX ORDER — ROCURONIUM BROMIDE 10 MG/ML
INJECTION, SOLUTION INTRAVENOUS AS NEEDED
Status: DISCONTINUED | OUTPATIENT
Start: 2020-09-24 | End: 2020-09-24 | Stop reason: SURG

## 2020-09-24 RX ADMIN — SODIUM CHLORIDE, PRESERVATIVE FREE 10 ML: 5 INJECTION INTRAVENOUS at 09:05

## 2020-09-24 RX ADMIN — SODIUM CHLORIDE, POTASSIUM CHLORIDE, SODIUM LACTATE AND CALCIUM CHLORIDE 9 ML/HR: 600; 310; 30; 20 INJECTION, SOLUTION INTRAVENOUS at 11:30

## 2020-09-24 RX ADMIN — IRON SUCROSE 200 MG: 20 INJECTION, SOLUTION INTRAVENOUS at 09:14

## 2020-09-24 RX ADMIN — ROCURONIUM BROMIDE 10 MG: 10 INJECTION INTRAVENOUS at 12:06

## 2020-09-24 RX ADMIN — SUCCINYLCHOLINE CHLORIDE 180 MG: 20 INJECTION, SOLUTION INTRAMUSCULAR; INTRAVENOUS at 12:06

## 2020-09-24 RX ADMIN — FENTANYL CITRATE 50 MCG: 50 INJECTION, SOLUTION INTRAMUSCULAR; INTRAVENOUS at 13:39

## 2020-09-24 RX ADMIN — METOPROLOL TARTRATE 25 MG: 25 TABLET, FILM COATED ORAL at 09:05

## 2020-09-24 RX ADMIN — IPRATROPIUM BROMIDE AND ALBUTEROL SULFATE 3 ML: 2.5; .5 SOLUTION RESPIRATORY (INHALATION) at 06:52

## 2020-09-24 RX ADMIN — SODIUM CHLORIDE, PRESERVATIVE FREE 10 ML: 5 INJECTION INTRAVENOUS at 20:40

## 2020-09-24 RX ADMIN — OXYCODONE HYDROCHLORIDE AND ACETAMINOPHEN 1 TABLET: 5; 325 TABLET ORAL at 09:05

## 2020-09-24 RX ADMIN — FENTANYL CITRATE 50 MCG: 50 INJECTION, SOLUTION INTRAMUSCULAR; INTRAVENOUS at 12:05

## 2020-09-24 RX ADMIN — OXYCODONE HYDROCHLORIDE AND ACETAMINOPHEN 1 TABLET: 5; 325 TABLET ORAL at 15:23

## 2020-09-24 RX ADMIN — SODIUM CHLORIDE, POTASSIUM CHLORIDE, SODIUM LACTATE AND CALCIUM CHLORIDE 50 ML/HR: 600; 310; 30; 20 INJECTION, SOLUTION INTRAVENOUS at 15:30

## 2020-09-24 RX ADMIN — METOPROLOL TARTRATE 25 MG: 25 TABLET, FILM COATED ORAL at 20:39

## 2020-09-24 RX ADMIN — CEFAZOLIN SODIUM 1 G: 1 INJECTION, SOLUTION INTRAVENOUS at 20:39

## 2020-09-24 RX ADMIN — Medication 160 MCG: at 12:10

## 2020-09-24 RX ADMIN — IPRATROPIUM BROMIDE AND ALBUTEROL SULFATE 3 ML: 2.5; .5 SOLUTION RESPIRATORY (INHALATION) at 10:47

## 2020-09-24 RX ADMIN — Medication 160 MCG: at 12:22

## 2020-09-24 RX ADMIN — SODIUM CHLORIDE, POTASSIUM CHLORIDE, SODIUM LACTATE AND CALCIUM CHLORIDE: 600; 310; 30; 20 INJECTION, SOLUTION INTRAVENOUS at 11:57

## 2020-09-24 RX ADMIN — CEFAZOLIN 2 G: 330 INJECTION, POWDER, FOR SOLUTION INTRAMUSCULAR; INTRAVENOUS at 12:30

## 2020-09-24 RX ADMIN — PROPOFOL 150 MG: 10 INJECTION, EMULSION INTRAVENOUS at 12:06

## 2020-09-24 RX ADMIN — DONEPEZIL HYDROCHLORIDE 5 MG: 5 TABLET, FILM COATED ORAL at 20:39

## 2020-09-24 RX ADMIN — Medication 160 MCG: at 12:27

## 2020-09-24 NOTE — ANESTHESIA PROCEDURE NOTES
Airway  Urgency: elective    Date/Time: 9/24/2020 12:06 PM  Airway not difficult    General Information and Staff    Patient location during procedure: OR  CRNA: Cate Barrientos CRNA    Indications and Patient Condition  Indications for airway management: airway protection    Preoxygenated: yes  Mask difficulty assessment: 1 - vent by mask    Final Airway Details  Final airway type: endotracheal airway      Successful airway: ETT  Cuffed: yes   Successful intubation technique: direct laryngoscopy  Facilitating devices/methods: intubating stylet  Endotracheal tube insertion site: oral  Blade: Belcher  Blade size: 2  ETT size (mm): 7.0  Cormack-Lehane Classification: grade I - full view of glottis  Placement verified by: chest auscultation and capnometry   Cuff volume (mL): 8  Measured from: lips  ETT/EBT  to lips (cm): 21  Number of attempts at approach: 1  Assessment: lips, teeth, and gum same as pre-op and atraumatic intubation

## 2020-09-24 NOTE — ANESTHESIA POSTPROCEDURE EVALUATION
"Patient: Jarvis Morgan    Procedure Summary     Date: 09/24/20 Room / Location: [facility] OR  /  [facility] OR    Anesthesia Start: 1203 Anesthesia Stop: 1352    Procedure: RIGHT SHORT TFN (Right Thigh) Diagnosis: (right hip fracture)    Surgeon: Betito Shetty MD Provider: Omid Hastings CRNA    Anesthesia Type: general ASA Status: 3          Anesthesia Type: general    Vitals  Vitals Value Taken Time   /76 09/24/20 1445   Temp 97.8 °F (36.6 °C) 09/24/20 1445   Pulse 92 09/24/20 1445   Resp 13 09/24/20 1445   SpO2 93 % 09/24/20 1445           Post Anesthesia Care and Evaluation    Patient location during evaluation: PACU  Patient participation: complete - patient participated  Level of consciousness: awake and alert  Pain management: adequate  Airway patency: patent  Anesthetic complications: No anesthetic complications  PONV Status: none  Cardiovascular status: acceptable and hemodynamically stable  Respiratory status: acceptable  Hydration status: acceptable    Comments: Blood pressure 141/78, pulse 93, temperature 97 °F (36.1 °C), temperature source Oral, resp. rate 18, height 172.7 cm (68\"), weight 105 kg (231 lb), SpO2 93 %, not currently breastfeeding.    Patient discharged from PACU based upon Dez score. Please see RN notes for further details      "

## 2020-09-24 NOTE — ANESTHESIA PREPROCEDURE EVALUATION
Anesthesia Evaluation     Patient summary reviewed   no history of anesthetic complications:  NPO Solid Status: > 8 hours             Airway   Mallampati: II  TM distance: >3 FB  Neck ROM: full  Dental      Pulmonary    (+) pleural effusion (s/p thoracocentesis), home oxygen, sleep apnea,   Cardiovascular     ECG reviewed    (+) hypertension, dysrhythmias Atrial Fib, CHF Diastolic >=55%, hyperlipidemia,       Neuro/Psych  (-) seizures, CVA  GI/Hepatic/Renal/Endo    (+) obesity,   renal disease CRI, thyroid problem hypothyroidism  (-) liver disease, diabetes    Musculoskeletal     Abdominal    Substance History      OB/GYN          Other                        Anesthesia Plan    ASA 3     general     intravenous induction     Anesthetic plan, all risks, benefits, and alternatives have been provided, discussed and informed consent has been obtained with: patient.

## 2020-09-25 LAB
ANION GAP SERPL CALCULATED.3IONS-SCNC: 5 MMOL/L (ref 5–15)
BASOPHILS # BLD AUTO: 0.02 10*3/MM3 (ref 0–0.2)
BASOPHILS NFR BLD AUTO: 0.3 % (ref 0–1.5)
BH BB BLOOD EXPIRATION DATE: NORMAL
BH BB BLOOD TYPE BARCODE: 6200
BH BB DISPENSE STATUS: NORMAL
BH BB PRODUCT CODE: NORMAL
BH BB UNIT NUMBER: NORMAL
BUN SERPL-MCNC: 50 MG/DL (ref 8–23)
BUN/CREAT SERPL: 39.4 (ref 7–25)
CALCIUM SPEC-SCNC: 9.8 MG/DL (ref 8.6–10.5)
CHLORIDE SERPL-SCNC: 107 MMOL/L (ref 98–107)
CO2 SERPL-SCNC: 31 MMOL/L (ref 22–29)
CREAT SERPL-MCNC: 1.27 MG/DL (ref 0.57–1)
CROSSMATCH INTERPRETATION: NORMAL
DEPRECATED RDW RBC AUTO: 56 FL (ref 37–54)
EOSINOPHIL # BLD AUTO: 0.07 10*3/MM3 (ref 0–0.4)
EOSINOPHIL NFR BLD AUTO: 0.9 % (ref 0.3–6.2)
ERYTHROCYTE [DISTWIDTH] IN BLOOD BY AUTOMATED COUNT: 14.9 % (ref 12.3–15.4)
GFR SERPL CREATININE-BSD FRML MDRD: 41 ML/MIN/1.73
GLUCOSE SERPL-MCNC: 113 MG/DL (ref 65–99)
HCT VFR BLD AUTO: 31.8 % (ref 34–46.6)
HGB BLD-MCNC: 10 G/DL (ref 12–15.9)
IMM GRANULOCYTES # BLD AUTO: 0.07 10*3/MM3 (ref 0–0.05)
IMM GRANULOCYTES NFR BLD AUTO: 0.9 % (ref 0–0.5)
INR PPP: 1.1 (ref 0.91–1.09)
LYMPHOCYTES # BLD AUTO: 0.64 10*3/MM3 (ref 0.7–3.1)
LYMPHOCYTES NFR BLD AUTO: 8.5 % (ref 19.6–45.3)
MCH RBC QN AUTO: 32.4 PG (ref 26.6–33)
MCHC RBC AUTO-ENTMCNC: 31.4 G/DL (ref 31.5–35.7)
MCV RBC AUTO: 102.9 FL (ref 79–97)
MONOCYTES # BLD AUTO: 1.23 10*3/MM3 (ref 0.1–0.9)
MONOCYTES NFR BLD AUTO: 16.4 % (ref 5–12)
NEUTROPHILS NFR BLD AUTO: 5.46 10*3/MM3 (ref 1.7–7)
NEUTROPHILS NFR BLD AUTO: 73 % (ref 42.7–76)
NRBC BLD AUTO-RTO: 0 /100 WBC (ref 0–0.2)
PLATELET # BLD AUTO: 166 10*3/MM3 (ref 140–450)
PMV BLD AUTO: 10.2 FL (ref 6–12)
POTASSIUM SERPL-SCNC: 5 MMOL/L (ref 3.5–5.2)
PROTHROMBIN TIME: 13.8 SECONDS (ref 11.9–14.6)
RBC # BLD AUTO: 3.09 10*6/MM3 (ref 3.77–5.28)
SODIUM SERPL-SCNC: 143 MMOL/L (ref 136–145)
UNIT  ABO: NORMAL
UNIT  RH: NORMAL
WBC # BLD AUTO: 7.49 10*3/MM3 (ref 3.4–10.8)

## 2020-09-25 PROCEDURE — 25010000002 IRON SUCROSE PER 1 MG: Performed by: ORTHOPAEDIC SURGERY

## 2020-09-25 PROCEDURE — 25010000002 CEFAZOLIN 1-4 GM/50ML-% SOLUTION: Performed by: ORTHOPAEDIC SURGERY

## 2020-09-25 PROCEDURE — 94799 UNLISTED PULMONARY SVC/PX: CPT

## 2020-09-25 PROCEDURE — 80048 BASIC METABOLIC PNL TOTAL CA: CPT | Performed by: ORTHOPAEDIC SURGERY

## 2020-09-25 PROCEDURE — 85025 COMPLETE CBC W/AUTO DIFF WBC: CPT | Performed by: ORTHOPAEDIC SURGERY

## 2020-09-25 PROCEDURE — 97166 OT EVAL MOD COMPLEX 45 MIN: CPT

## 2020-09-25 PROCEDURE — 85610 PROTHROMBIN TIME: CPT | Performed by: NURSE PRACTITIONER

## 2020-09-25 PROCEDURE — 97162 PT EVAL MOD COMPLEX 30 MIN: CPT

## 2020-09-25 RX ORDER — WARFARIN SODIUM 5 MG/1
2.5 TABLET ORAL
Status: DISCONTINUED | OUTPATIENT
Start: 2020-09-25 | End: 2020-09-26

## 2020-09-25 RX ADMIN — OXYCODONE HYDROCHLORIDE AND ACETAMINOPHEN 1 TABLET: 5; 325 TABLET ORAL at 23:43

## 2020-09-25 RX ADMIN — METOPROLOL TARTRATE 25 MG: 25 TABLET, FILM COATED ORAL at 09:02

## 2020-09-25 RX ADMIN — IPRATROPIUM BROMIDE AND ALBUTEROL SULFATE 3 ML: 2.5; .5 SOLUTION RESPIRATORY (INHALATION) at 10:07

## 2020-09-25 RX ADMIN — WARFARIN SODIUM 2.5 MG: 5 TABLET ORAL at 18:41

## 2020-09-25 RX ADMIN — DONEPEZIL HYDROCHLORIDE 5 MG: 5 TABLET, FILM COATED ORAL at 21:35

## 2020-09-25 RX ADMIN — ASPIRIN 81 MG: 81 TABLET ORAL at 09:02

## 2020-09-25 RX ADMIN — METOPROLOL TARTRATE 25 MG: 25 TABLET, FILM COATED ORAL at 21:35

## 2020-09-25 RX ADMIN — SODIUM CHLORIDE, POTASSIUM CHLORIDE, SODIUM LACTATE AND CALCIUM CHLORIDE 50 ML/HR: 600; 310; 30; 20 INJECTION, SOLUTION INTRAVENOUS at 04:58

## 2020-09-25 RX ADMIN — SODIUM CHLORIDE, PRESERVATIVE FREE 10 ML: 5 INJECTION INTRAVENOUS at 09:02

## 2020-09-25 RX ADMIN — IPRATROPIUM BROMIDE AND ALBUTEROL SULFATE 3 ML: 2.5; .5 SOLUTION RESPIRATORY (INHALATION) at 20:38

## 2020-09-25 RX ADMIN — ATORVASTATIN CALCIUM 40 MG: 40 TABLET, FILM COATED ORAL at 09:02

## 2020-09-25 RX ADMIN — METHIMAZOLE 10 MG: 10 TABLET ORAL at 09:02

## 2020-09-25 RX ADMIN — IPRATROPIUM BROMIDE AND ALBUTEROL SULFATE 3 ML: 2.5; .5 SOLUTION RESPIRATORY (INHALATION) at 16:11

## 2020-09-25 RX ADMIN — DILTIAZEM HYDROCHLORIDE 180 MG: 180 CAPSULE, COATED, EXTENDED RELEASE ORAL at 09:07

## 2020-09-25 RX ADMIN — IRON SUCROSE 200 MG: 20 INJECTION, SOLUTION INTRAVENOUS at 09:03

## 2020-09-25 RX ADMIN — IPRATROPIUM BROMIDE AND ALBUTEROL SULFATE 3 ML: 2.5; .5 SOLUTION RESPIRATORY (INHALATION) at 06:25

## 2020-09-25 RX ADMIN — SODIUM CHLORIDE, PRESERVATIVE FREE 10 ML: 5 INJECTION INTRAVENOUS at 21:40

## 2020-09-25 RX ADMIN — CEFAZOLIN SODIUM 1 G: 1 INJECTION, SOLUTION INTRAVENOUS at 04:57

## 2020-09-25 NOTE — ANESTHESIA POSTPROCEDURE EVALUATION
Patient: Jarvis Morgan    Procedure Summary     Date: 09/24/20 Room / Location: [facility] OR  /  [facility] OR    Anesthesia Start: 1203 Anesthesia Stop: 1352    Procedure: RIGHT SHORT TFN (Right Thigh) Diagnosis: (right hip fracture)    Surgeon: Betito Shetty MD Provider: Omid Hastings CRNA    Anesthesia Type: general ASA Status: 3          Anesthesia Type: general    Vitals  Vitals Value Taken Time   /76 09/24/20 1445   Temp 97.8 °F (36.6 °C) 09/24/20 1445   Pulse 92 09/24/20 1445   Resp 13 09/24/20 1445   SpO2 93 % 09/24/20 1445           Post Anesthesia Care and Evaluation    Patient location during evaluation: PACU  Patient participation: complete - patient participated  Level of consciousness: awake and alert  Pain management: adequate  Airway patency: patent  Anesthetic complications: No anesthetic complications    Cardiovascular status: acceptable  Respiratory status: acceptable  Hydration status: acceptable

## 2020-09-26 LAB
ANION GAP SERPL CALCULATED.3IONS-SCNC: 7 MMOL/L (ref 5–15)
BUN SERPL-MCNC: 38 MG/DL (ref 8–23)
BUN/CREAT SERPL: 32.5 (ref 7–25)
CALCIUM SPEC-SCNC: 9.9 MG/DL (ref 8.6–10.5)
CHLORIDE SERPL-SCNC: 106 MMOL/L (ref 98–107)
CO2 SERPL-SCNC: 30 MMOL/L (ref 22–29)
CREAT SERPL-MCNC: 1.17 MG/DL (ref 0.57–1)
DEPRECATED RDW RBC AUTO: 54.5 FL (ref 37–54)
ERYTHROCYTE [DISTWIDTH] IN BLOOD BY AUTOMATED COUNT: 14.5 % (ref 12.3–15.4)
GFR SERPL CREATININE-BSD FRML MDRD: 45 ML/MIN/1.73
GLUCOSE SERPL-MCNC: 122 MG/DL (ref 65–99)
HCT VFR BLD AUTO: 30.6 % (ref 34–46.6)
HGB BLD-MCNC: 9.6 G/DL (ref 12–15.9)
INR PPP: 1.17 (ref 0.91–1.09)
MCH RBC QN AUTO: 32.2 PG (ref 26.6–33)
MCHC RBC AUTO-ENTMCNC: 31.4 G/DL (ref 31.5–35.7)
MCV RBC AUTO: 102.7 FL (ref 79–97)
PLATELET # BLD AUTO: 177 10*3/MM3 (ref 140–450)
PMV BLD AUTO: 10 FL (ref 6–12)
POTASSIUM SERPL-SCNC: 4.8 MMOL/L (ref 3.5–5.2)
PROTHROMBIN TIME: 14.5 SECONDS (ref 11.9–14.6)
RBC # BLD AUTO: 2.98 10*6/MM3 (ref 3.77–5.28)
SODIUM SERPL-SCNC: 143 MMOL/L (ref 136–145)
WBC # BLD AUTO: 9.19 10*3/MM3 (ref 3.4–10.8)

## 2020-09-26 PROCEDURE — 25010000002 IRON SUCROSE PER 1 MG: Performed by: ORTHOPAEDIC SURGERY

## 2020-09-26 PROCEDURE — 80048 BASIC METABOLIC PNL TOTAL CA: CPT | Performed by: NURSE PRACTITIONER

## 2020-09-26 PROCEDURE — 25010000002 ENOXAPARIN PER 10 MG: Performed by: NURSE PRACTITIONER

## 2020-09-26 PROCEDURE — 94799 UNLISTED PULMONARY SVC/PX: CPT

## 2020-09-26 PROCEDURE — 85027 COMPLETE CBC AUTOMATED: CPT | Performed by: NURSE PRACTITIONER

## 2020-09-26 PROCEDURE — 97530 THERAPEUTIC ACTIVITIES: CPT

## 2020-09-26 PROCEDURE — 85610 PROTHROMBIN TIME: CPT | Performed by: NURSE PRACTITIONER

## 2020-09-26 RX ORDER — WARFARIN SODIUM 5 MG/1
5 TABLET ORAL
Status: DISCONTINUED | OUTPATIENT
Start: 2020-09-26 | End: 2020-09-26

## 2020-09-26 RX ORDER — WARFARIN SODIUM 5 MG/1
5 TABLET ORAL
Status: DISCONTINUED | OUTPATIENT
Start: 2020-09-27 | End: 2020-09-28 | Stop reason: HOSPADM

## 2020-09-26 RX ORDER — WARFARIN SODIUM 5 MG/1
7.5 TABLET ORAL
Status: COMPLETED | OUTPATIENT
Start: 2020-09-26 | End: 2020-09-26

## 2020-09-26 RX ORDER — BISACODYL 10 MG
10 SUPPOSITORY, RECTAL RECTAL DAILY PRN
Status: DISCONTINUED | OUTPATIENT
Start: 2020-09-26 | End: 2020-09-28 | Stop reason: HOSPADM

## 2020-09-26 RX ORDER — WARFARIN SODIUM 7.5 MG/1
7.5 TABLET ORAL 3 TIMES WEEKLY
Status: ON HOLD | COMMUNITY
End: 2020-11-16 | Stop reason: SDUPTHER

## 2020-09-26 RX ORDER — FUROSEMIDE 40 MG/1
40 TABLET ORAL DAILY
Status: DISCONTINUED | OUTPATIENT
Start: 2020-09-26 | End: 2020-09-28 | Stop reason: HOSPADM

## 2020-09-26 RX ADMIN — WARFARIN SODIUM 7.5 MG: 5 TABLET ORAL at 18:32

## 2020-09-26 RX ADMIN — FUROSEMIDE 40 MG: 40 TABLET ORAL at 14:59

## 2020-09-26 RX ADMIN — IPRATROPIUM BROMIDE AND ALBUTEROL SULFATE 3 ML: 2.5; .5 SOLUTION RESPIRATORY (INHALATION) at 10:43

## 2020-09-26 RX ADMIN — METOPROLOL TARTRATE 25 MG: 25 TABLET, FILM COATED ORAL at 21:12

## 2020-09-26 RX ADMIN — DONEPEZIL HYDROCHLORIDE 5 MG: 5 TABLET, FILM COATED ORAL at 21:12

## 2020-09-26 RX ADMIN — ATORVASTATIN CALCIUM 40 MG: 40 TABLET, FILM COATED ORAL at 10:14

## 2020-09-26 RX ADMIN — METHIMAZOLE 10 MG: 10 TABLET ORAL at 10:14

## 2020-09-26 RX ADMIN — SODIUM CHLORIDE, PRESERVATIVE FREE 10 ML: 5 INJECTION INTRAVENOUS at 10:14

## 2020-09-26 RX ADMIN — IPRATROPIUM BROMIDE AND ALBUTEROL SULFATE 3 ML: 2.5; .5 SOLUTION RESPIRATORY (INHALATION) at 06:15

## 2020-09-26 RX ADMIN — IPRATROPIUM BROMIDE AND ALBUTEROL SULFATE 3 ML: 2.5; .5 SOLUTION RESPIRATORY (INHALATION) at 16:43

## 2020-09-26 RX ADMIN — SODIUM CHLORIDE, PRESERVATIVE FREE 10 ML: 5 INJECTION INTRAVENOUS at 21:13

## 2020-09-26 RX ADMIN — OXYCODONE HYDROCHLORIDE AND ACETAMINOPHEN 1 TABLET: 5; 325 TABLET ORAL at 06:32

## 2020-09-26 RX ADMIN — METOPROLOL TARTRATE 25 MG: 25 TABLET, FILM COATED ORAL at 10:14

## 2020-09-26 RX ADMIN — BISACODYL 10 MG: 10 SUPPOSITORY RECTAL at 21:13

## 2020-09-26 RX ADMIN — IRON SUCROSE 200 MG: 20 INJECTION, SOLUTION INTRAVENOUS at 10:13

## 2020-09-26 RX ADMIN — DILTIAZEM HYDROCHLORIDE 180 MG: 180 CAPSULE, COATED, EXTENDED RELEASE ORAL at 10:14

## 2020-09-26 RX ADMIN — ASPIRIN 81 MG: 81 TABLET ORAL at 10:14

## 2020-09-26 RX ADMIN — ENOXAPARIN SODIUM 40 MG: 40 INJECTION SUBCUTANEOUS at 15:00

## 2020-09-27 LAB
ANION GAP SERPL CALCULATED.3IONS-SCNC: 6 MMOL/L (ref 5–15)
BUN SERPL-MCNC: 36 MG/DL (ref 8–23)
BUN/CREAT SERPL: 31 (ref 7–25)
CALCIUM SPEC-SCNC: 10 MG/DL (ref 8.6–10.5)
CHLORIDE SERPL-SCNC: 103 MMOL/L (ref 98–107)
CO2 SERPL-SCNC: 31 MMOL/L (ref 22–29)
CREAT SERPL-MCNC: 1.16 MG/DL (ref 0.57–1)
CYTO UR: NORMAL
GFR SERPL CREATININE-BSD FRML MDRD: 45 ML/MIN/1.73
GLUCOSE SERPL-MCNC: 124 MG/DL (ref 65–99)
INR PPP: 1.31 (ref 0.91–1.09)
LAB AP CASE REPORT: NORMAL
LAB AP FLOW CYTOMETRY SUMMARY: NORMAL
PATH REPORT.FINAL DX SPEC: NORMAL
PATH REPORT.GROSS SPEC: NORMAL
POTASSIUM SERPL-SCNC: 4.6 MMOL/L (ref 3.5–5.2)
PROTHROMBIN TIME: 15.9 SECONDS (ref 11.9–14.6)
SODIUM SERPL-SCNC: 140 MMOL/L (ref 136–145)

## 2020-09-27 PROCEDURE — 85610 PROTHROMBIN TIME: CPT | Performed by: NURSE PRACTITIONER

## 2020-09-27 PROCEDURE — 94799 UNLISTED PULMONARY SVC/PX: CPT

## 2020-09-27 PROCEDURE — 97530 THERAPEUTIC ACTIVITIES: CPT

## 2020-09-27 PROCEDURE — 97110 THERAPEUTIC EXERCISES: CPT

## 2020-09-27 PROCEDURE — 25010000002 ENOXAPARIN PER 10 MG: Performed by: NURSE PRACTITIONER

## 2020-09-27 PROCEDURE — 94660 CPAP INITIATION&MGMT: CPT

## 2020-09-27 PROCEDURE — 80048 BASIC METABOLIC PNL TOTAL CA: CPT | Performed by: NURSE PRACTITIONER

## 2020-09-27 RX ADMIN — DILTIAZEM HYDROCHLORIDE 180 MG: 180 CAPSULE, COATED, EXTENDED RELEASE ORAL at 09:55

## 2020-09-27 RX ADMIN — SODIUM CHLORIDE, PRESERVATIVE FREE 10 ML: 5 INJECTION INTRAVENOUS at 20:30

## 2020-09-27 RX ADMIN — IPRATROPIUM BROMIDE AND ALBUTEROL SULFATE 3 ML: 2.5; .5 SOLUTION RESPIRATORY (INHALATION) at 15:57

## 2020-09-27 RX ADMIN — ATORVASTATIN CALCIUM 40 MG: 40 TABLET, FILM COATED ORAL at 09:55

## 2020-09-27 RX ADMIN — METHIMAZOLE 10 MG: 10 TABLET ORAL at 09:55

## 2020-09-27 RX ADMIN — FUROSEMIDE 40 MG: 40 TABLET ORAL at 09:55

## 2020-09-27 RX ADMIN — ASPIRIN 81 MG: 81 TABLET ORAL at 09:55

## 2020-09-27 RX ADMIN — IPRATROPIUM BROMIDE AND ALBUTEROL SULFATE 3 ML: 2.5; .5 SOLUTION RESPIRATORY (INHALATION) at 11:55

## 2020-09-27 RX ADMIN — METOPROLOL TARTRATE 25 MG: 25 TABLET, FILM COATED ORAL at 20:30

## 2020-09-27 RX ADMIN — IPRATROPIUM BROMIDE AND ALBUTEROL SULFATE 3 ML: 2.5; .5 SOLUTION RESPIRATORY (INHALATION) at 20:46

## 2020-09-27 RX ADMIN — ENOXAPARIN SODIUM 40 MG: 40 INJECTION SUBCUTANEOUS at 14:08

## 2020-09-27 RX ADMIN — METOPROLOL TARTRATE 25 MG: 25 TABLET, FILM COATED ORAL at 09:55

## 2020-09-27 RX ADMIN — DONEPEZIL HYDROCHLORIDE 5 MG: 5 TABLET, FILM COATED ORAL at 20:30

## 2020-09-27 RX ADMIN — SODIUM CHLORIDE, PRESERVATIVE FREE 10 ML: 5 INJECTION INTRAVENOUS at 09:56

## 2020-09-27 RX ADMIN — IPRATROPIUM BROMIDE AND ALBUTEROL SULFATE 3 ML: 2.5; .5 SOLUTION RESPIRATORY (INHALATION) at 06:22

## 2020-09-27 RX ADMIN — WARFARIN SODIUM 5 MG: 5 TABLET ORAL at 17:50

## 2020-09-28 VITALS
WEIGHT: 229.5 LBS | BODY MASS INDEX: 34.78 KG/M2 | RESPIRATION RATE: 18 BRPM | HEART RATE: 93 BPM | DIASTOLIC BLOOD PRESSURE: 64 MMHG | HEIGHT: 68 IN | OXYGEN SATURATION: 98 % | SYSTOLIC BLOOD PRESSURE: 120 MMHG | TEMPERATURE: 97.8 F

## 2020-09-28 LAB
ANION GAP SERPL CALCULATED.3IONS-SCNC: 7 MMOL/L (ref 5–15)
BACTERIA FLD CULT: NORMAL
BACTERIA SPEC ANAEROBE CULT: NORMAL
BUN SERPL-MCNC: 41 MG/DL (ref 8–23)
BUN/CREAT SERPL: 33.1 (ref 7–25)
CALCIUM SPEC-SCNC: 9.8 MG/DL (ref 8.6–10.5)
CHLORIDE SERPL-SCNC: 102 MMOL/L (ref 98–107)
CO2 SERPL-SCNC: 32 MMOL/L (ref 22–29)
CREAT SERPL-MCNC: 1.24 MG/DL (ref 0.57–1)
DEPRECATED RDW RBC AUTO: 53.9 FL (ref 37–54)
ERYTHROCYTE [DISTWIDTH] IN BLOOD BY AUTOMATED COUNT: 14.6 % (ref 12.3–15.4)
GFR SERPL CREATININE-BSD FRML MDRD: 42 ML/MIN/1.73
GLUCOSE SERPL-MCNC: 115 MG/DL (ref 65–99)
GRAM STN SPEC: NORMAL
GRAM STN SPEC: NORMAL
HCT VFR BLD AUTO: 27.5 % (ref 34–46.6)
HGB BLD-MCNC: 8.7 G/DL (ref 12–15.9)
INR PPP: 1.45 (ref 0.91–1.09)
MCH RBC QN AUTO: 32.7 PG (ref 26.6–33)
MCHC RBC AUTO-ENTMCNC: 31.6 G/DL (ref 31.5–35.7)
MCV RBC AUTO: 103.4 FL (ref 79–97)
PLATELET # BLD AUTO: 212 10*3/MM3 (ref 140–450)
PMV BLD AUTO: 9.9 FL (ref 6–12)
POTASSIUM SERPL-SCNC: 4.5 MMOL/L (ref 3.5–5.2)
PROTHROMBIN TIME: 17.3 SECONDS (ref 11.9–14.6)
RBC # BLD AUTO: 2.66 10*6/MM3 (ref 3.77–5.28)
SARS-COV-2 RNA PNL SPEC NAA+PROBE: NOT DETECTED
SODIUM SERPL-SCNC: 141 MMOL/L (ref 136–145)
WBC # BLD AUTO: 9.32 10*3/MM3 (ref 3.4–10.8)

## 2020-09-28 PROCEDURE — 87635 SARS-COV-2 COVID-19 AMP PRB: CPT | Performed by: INTERNAL MEDICINE

## 2020-09-28 PROCEDURE — 94799 UNLISTED PULMONARY SVC/PX: CPT

## 2020-09-28 PROCEDURE — 85027 COMPLETE CBC AUTOMATED: CPT | Performed by: NURSE PRACTITIONER

## 2020-09-28 PROCEDURE — 97535 SELF CARE MNGMENT TRAINING: CPT

## 2020-09-28 PROCEDURE — 97530 THERAPEUTIC ACTIVITIES: CPT

## 2020-09-28 PROCEDURE — 97110 THERAPEUTIC EXERCISES: CPT

## 2020-09-28 PROCEDURE — 25010000002 ENOXAPARIN PER 10 MG: Performed by: NURSE PRACTITIONER

## 2020-09-28 PROCEDURE — 85610 PROTHROMBIN TIME: CPT | Performed by: NURSE PRACTITIONER

## 2020-09-28 PROCEDURE — 80048 BASIC METABOLIC PNL TOTAL CA: CPT | Performed by: NURSE PRACTITIONER

## 2020-09-28 RX ORDER — BISACODYL 10 MG
10 SUPPOSITORY, RECTAL RECTAL DAILY PRN
Start: 2020-09-28

## 2020-09-28 RX ORDER — OXYCODONE HYDROCHLORIDE AND ACETAMINOPHEN 5; 325 MG/1; MG/1
1 TABLET ORAL EVERY 6 HOURS PRN
Qty: 12 TABLET | Refills: 0 | Status: ON HOLD | OUTPATIENT
Start: 2020-09-28 | End: 2020-11-12

## 2020-09-28 RX ORDER — IPRATROPIUM BROMIDE AND ALBUTEROL SULFATE 2.5; .5 MG/3ML; MG/3ML
3 SOLUTION RESPIRATORY (INHALATION)
Qty: 360 ML
Start: 2020-09-28

## 2020-09-28 RX ADMIN — METOPROLOL TARTRATE 25 MG: 25 TABLET, FILM COATED ORAL at 08:51

## 2020-09-28 RX ADMIN — IPRATROPIUM BROMIDE AND ALBUTEROL SULFATE 3 ML: 2.5; .5 SOLUTION RESPIRATORY (INHALATION) at 14:33

## 2020-09-28 RX ADMIN — METHIMAZOLE 10 MG: 10 TABLET ORAL at 08:50

## 2020-09-28 RX ADMIN — IPRATROPIUM BROMIDE AND ALBUTEROL SULFATE 3 ML: 2.5; .5 SOLUTION RESPIRATORY (INHALATION) at 06:42

## 2020-09-28 RX ADMIN — IPRATROPIUM BROMIDE AND ALBUTEROL SULFATE 3 ML: 2.5; .5 SOLUTION RESPIRATORY (INHALATION) at 18:45

## 2020-09-28 RX ADMIN — ATORVASTATIN CALCIUM 40 MG: 40 TABLET, FILM COATED ORAL at 08:50

## 2020-09-28 RX ADMIN — WARFARIN SODIUM 5 MG: 5 TABLET ORAL at 17:38

## 2020-09-28 RX ADMIN — BISACODYL 10 MG: 10 SUPPOSITORY RECTAL at 14:56

## 2020-09-28 RX ADMIN — ASPIRIN 81 MG: 81 TABLET ORAL at 08:51

## 2020-09-28 RX ADMIN — IPRATROPIUM BROMIDE AND ALBUTEROL SULFATE 3 ML: 2.5; .5 SOLUTION RESPIRATORY (INHALATION) at 10:37

## 2020-09-28 RX ADMIN — SODIUM CHLORIDE, PRESERVATIVE FREE 10 ML: 5 INJECTION INTRAVENOUS at 08:51

## 2020-09-28 RX ADMIN — ENOXAPARIN SODIUM 40 MG: 40 INJECTION SUBCUTANEOUS at 14:56

## 2020-09-28 RX ADMIN — DILTIAZEM HYDROCHLORIDE 180 MG: 180 CAPSULE, COATED, EXTENDED RELEASE ORAL at 08:51

## 2020-09-28 RX ADMIN — FUROSEMIDE 40 MG: 40 TABLET ORAL at 08:51

## 2020-09-29 ENCOUNTER — LAB REQUISITION (OUTPATIENT)
Dept: LAB | Facility: HOSPITAL | Age: 75
End: 2020-09-29

## 2020-09-29 ENCOUNTER — TELEPHONE (OUTPATIENT)
Dept: INTERNAL MEDICINE | Age: 75
End: 2020-09-29

## 2020-09-29 DIAGNOSIS — Z00.00 ENCOUNTER FOR GENERAL ADULT MEDICAL EXAMINATION WITHOUT ABNORMAL FINDINGS: ICD-10-CM

## 2020-09-29 LAB
ALBUMIN SERPL-MCNC: 3 G/DL (ref 3.5–5.2)
ALP SERPL-CCNC: 65 U/L (ref 39–117)
ALT SERPL W P-5'-P-CCNC: 12 U/L (ref 1–33)
ANION GAP SERPL CALCULATED.3IONS-SCNC: 7 MMOL/L (ref 5–15)
AST SERPL-CCNC: 19 U/L (ref 1–32)
BASOPHILS # BLD AUTO: 0.02 10*3/MM3 (ref 0–0.2)
BASOPHILS NFR BLD AUTO: 0.2 % (ref 0–1.5)
BILIRUB CONJ SERPL-MCNC: 0.4 MG/DL (ref 0–0.3)
BILIRUB INDIRECT SERPL-MCNC: 0.7 MG/DL
BILIRUB SERPL-MCNC: 1.1 MG/DL (ref 0–1.2)
BUN SERPL-MCNC: 40 MG/DL (ref 8–23)
BUN/CREAT SERPL: 33.1 (ref 7–25)
CALCIUM SPEC-SCNC: 9.6 MG/DL (ref 8.6–10.5)
CHLORIDE SERPL-SCNC: 102 MMOL/L (ref 98–107)
CHOLEST SERPL-MCNC: 80 MG/DL (ref 0–200)
CO2 SERPL-SCNC: 32 MMOL/L (ref 22–29)
CREAT SERPL-MCNC: 1.21 MG/DL (ref 0.57–1)
DEPRECATED RDW RBC AUTO: 53.9 FL (ref 37–54)
EOSINOPHIL # BLD AUTO: 0.42 10*3/MM3 (ref 0–0.4)
EOSINOPHIL NFR BLD AUTO: 4.2 % (ref 0.3–6.2)
ERYTHROCYTE [DISTWIDTH] IN BLOOD BY AUTOMATED COUNT: 14.7 % (ref 12.3–15.4)
GFR SERPL CREATININE-BSD FRML MDRD: 43 ML/MIN/1.73
GLUCOSE SERPL-MCNC: 133 MG/DL (ref 65–99)
HBA1C MFR BLD: 5.8 % (ref 4.8–5.6)
HCT VFR BLD AUTO: 28.5 % (ref 34–46.6)
HDLC SERPL-MCNC: 29 MG/DL (ref 40–60)
HGB BLD-MCNC: 9.1 G/DL (ref 12–15.9)
IMM GRANULOCYTES # BLD AUTO: 0.08 10*3/MM3 (ref 0–0.05)
IMM GRANULOCYTES NFR BLD AUTO: 0.8 % (ref 0–0.5)
INR PPP: 1.44 (ref 0.91–1.09)
LDLC SERPL CALC-MCNC: 27 MG/DL (ref 0–100)
LDLC/HDLC SERPL: 0.94 {RATIO}
LYMPHOCYTES # BLD AUTO: 0.54 10*3/MM3 (ref 0.7–3.1)
LYMPHOCYTES NFR BLD AUTO: 5.4 % (ref 19.6–45.3)
MCH RBC QN AUTO: 33 PG (ref 26.6–33)
MCHC RBC AUTO-ENTMCNC: 31.9 G/DL (ref 31.5–35.7)
MCV RBC AUTO: 103.3 FL (ref 79–97)
MONOCYTES # BLD AUTO: 1.51 10*3/MM3 (ref 0.1–0.9)
MONOCYTES NFR BLD AUTO: 15.1 % (ref 5–12)
NEUTROPHILS NFR BLD AUTO: 7.42 10*3/MM3 (ref 1.7–7)
NEUTROPHILS NFR BLD AUTO: 74.3 % (ref 42.7–76)
NRBC BLD AUTO-RTO: 0 /100 WBC (ref 0–0.2)
PLATELET # BLD AUTO: 232 10*3/MM3 (ref 140–450)
PMV BLD AUTO: 11.1 FL (ref 6–12)
POTASSIUM SERPL-SCNC: 4.3 MMOL/L (ref 3.5–5.2)
PREALB SERPL-MCNC: 8.6 MG/DL (ref 20–40)
PROT SERPL-MCNC: 6 G/DL (ref 6–8.5)
PROTHROMBIN TIME: 17.2 SECONDS (ref 11.9–14.6)
RBC # BLD AUTO: 2.76 10*6/MM3 (ref 3.77–5.28)
SODIUM SERPL-SCNC: 141 MMOL/L (ref 136–145)
TRIGL SERPL-MCNC: 118 MG/DL (ref 0–150)
TSH SERPL DL<=0.05 MIU/L-ACNC: 4.13 UIU/ML (ref 0.27–4.2)
VIT B12 BLD-MCNC: 186 PG/ML (ref 211–946)
VLDLC SERPL-MCNC: 23.6 MG/DL
WBC # BLD AUTO: 9.99 10*3/MM3 (ref 3.4–10.8)

## 2020-09-29 PROCEDURE — 83036 HEMOGLOBIN GLYCOSYLATED A1C: CPT | Performed by: FAMILY MEDICINE

## 2020-09-29 PROCEDURE — 80076 HEPATIC FUNCTION PANEL: CPT | Performed by: FAMILY MEDICINE

## 2020-09-29 PROCEDURE — 84134 ASSAY OF PREALBUMIN: CPT | Performed by: FAMILY MEDICINE

## 2020-09-29 PROCEDURE — 80048 BASIC METABOLIC PNL TOTAL CA: CPT | Performed by: FAMILY MEDICINE

## 2020-09-29 PROCEDURE — 80061 LIPID PANEL: CPT | Performed by: FAMILY MEDICINE

## 2020-09-29 PROCEDURE — 36415 COLL VENOUS BLD VENIPUNCTURE: CPT | Performed by: FAMILY MEDICINE

## 2020-09-29 PROCEDURE — 85610 PROTHROMBIN TIME: CPT | Performed by: FAMILY MEDICINE

## 2020-09-29 PROCEDURE — 85025 COMPLETE CBC W/AUTO DIFF WBC: CPT | Performed by: FAMILY MEDICINE

## 2020-09-29 PROCEDURE — 82607 VITAMIN B-12: CPT | Performed by: FAMILY MEDICINE

## 2020-09-29 PROCEDURE — 84443 ASSAY THYROID STIM HORMONE: CPT | Performed by: FAMILY MEDICINE

## 2020-09-29 NOTE — TELEPHONE ENCOUNTER
SKILLED NURSING FACILITY:   INITIAL CALL POST-HOSPITAL DISCHARGE    SNF: [facility]     PHONE NUMBER: [phone]    THERAPY: PT/OT     ANTICIPATED LENGTH OF STAY: unknown    Per Bailey Hector staff at [facility], Mrs. Cherelle Olsen came in last night for short term rehab following a fracture of the right hip. She seems to be adjusting ok. She requested pain medication this morning. Therapy is in with her now for evaluation. There was a question about the high dose of Coumadin she is on. Jona Marquez asked me to fax her last several anticoagulation notes over. I have sent these.

## 2020-10-01 ENCOUNTER — LAB REQUISITION (OUTPATIENT)
Dept: LAB | Facility: HOSPITAL | Age: 75
End: 2020-10-01

## 2020-10-01 DIAGNOSIS — Z00.00 ENCOUNTER FOR GENERAL ADULT MEDICAL EXAMINATION WITHOUT ABNORMAL FINDINGS: ICD-10-CM

## 2020-10-01 LAB
INR PPP: 1.56 (ref 0.91–1.09)
PROTHROMBIN TIME: 18.4 SECONDS (ref 11.9–14.6)

## 2020-10-01 PROCEDURE — 36415 COLL VENOUS BLD VENIPUNCTURE: CPT | Performed by: NURSE PRACTITIONER

## 2020-10-01 PROCEDURE — 85610 PROTHROMBIN TIME: CPT | Performed by: NURSE PRACTITIONER

## 2020-10-02 ENCOUNTER — LAB REQUISITION (OUTPATIENT)
Dept: LAB | Facility: HOSPITAL | Age: 75
End: 2020-10-02

## 2020-10-02 DIAGNOSIS — Z00.00 ENCOUNTER FOR GENERAL ADULT MEDICAL EXAMINATION WITHOUT ABNORMAL FINDINGS: ICD-10-CM

## 2020-10-02 LAB
INR PPP: 1.71 (ref 0.91–1.09)
PROTHROMBIN TIME: 19.8 SECONDS (ref 11.9–14.6)

## 2020-10-02 PROCEDURE — 85610 PROTHROMBIN TIME: CPT | Performed by: NURSE PRACTITIONER

## 2020-10-02 PROCEDURE — 36415 COLL VENOUS BLD VENIPUNCTURE: CPT | Performed by: NURSE PRACTITIONER

## 2020-10-05 ENCOUNTER — LAB REQUISITION (OUTPATIENT)
Dept: LAB | Facility: HOSPITAL | Age: 75
End: 2020-10-05

## 2020-10-05 DIAGNOSIS — Z00.00 ENCOUNTER FOR GENERAL ADULT MEDICAL EXAMINATION WITHOUT ABNORMAL FINDINGS: ICD-10-CM

## 2020-10-05 LAB
INR PPP: 1.65 (ref 0.91–1.09)
PROTHROMBIN TIME: 19.2 SECONDS (ref 11.9–14.6)

## 2020-10-05 PROCEDURE — 85610 PROTHROMBIN TIME: CPT | Performed by: NURSE PRACTITIONER

## 2020-10-05 PROCEDURE — 36415 COLL VENOUS BLD VENIPUNCTURE: CPT | Performed by: NURSE PRACTITIONER

## 2020-10-06 ENCOUNTER — LAB REQUISITION (OUTPATIENT)
Dept: LAB | Facility: HOSPITAL | Age: 75
End: 2020-10-06

## 2020-10-06 DIAGNOSIS — Z00.00 ENCOUNTER FOR GENERAL ADULT MEDICAL EXAMINATION WITHOUT ABNORMAL FINDINGS: ICD-10-CM

## 2020-10-06 LAB
BASOPHILS # BLD AUTO: 0.05 10*3/MM3 (ref 0–0.2)
BASOPHILS NFR BLD AUTO: 0.5 % (ref 0–1.5)
DEPRECATED RDW RBC AUTO: 54.1 FL (ref 37–54)
EOSINOPHIL # BLD AUTO: 0.52 10*3/MM3 (ref 0–0.4)
EOSINOPHIL NFR BLD AUTO: 5.1 % (ref 0.3–6.2)
ERYTHROCYTE [DISTWIDTH] IN BLOOD BY AUTOMATED COUNT: 14.3 % (ref 12.3–15.4)
HCT VFR BLD AUTO: 34.1 % (ref 34–46.6)
HGB BLD-MCNC: 10.7 G/DL (ref 12–15.9)
IMM GRANULOCYTES # BLD AUTO: 0.08 10*3/MM3 (ref 0–0.05)
IMM GRANULOCYTES NFR BLD AUTO: 0.8 % (ref 0–0.5)
INR PPP: 1.56 (ref 0.91–1.09)
LYMPHOCYTES # BLD AUTO: 0.86 10*3/MM3 (ref 0.7–3.1)
LYMPHOCYTES NFR BLD AUTO: 8.4 % (ref 19.6–45.3)
MCH RBC QN AUTO: 32.9 PG (ref 26.6–33)
MCHC RBC AUTO-ENTMCNC: 31.4 G/DL (ref 31.5–35.7)
MCV RBC AUTO: 104.9 FL (ref 79–97)
MONOCYTES # BLD AUTO: 1.09 10*3/MM3 (ref 0.1–0.9)
MONOCYTES NFR BLD AUTO: 10.6 % (ref 5–12)
NEUTROPHILS NFR BLD AUTO: 7.67 10*3/MM3 (ref 1.7–7)
NEUTROPHILS NFR BLD AUTO: 74.6 % (ref 42.7–76)
NRBC BLD AUTO-RTO: 0 /100 WBC (ref 0–0.2)
PLATELET # BLD AUTO: 457 10*3/MM3 (ref 140–450)
PMV BLD AUTO: 10.1 FL (ref 6–12)
PROTHROMBIN TIME: 18.4 SECONDS (ref 11.9–14.6)
RBC # BLD AUTO: 3.25 10*6/MM3 (ref 3.77–5.28)
WBC # BLD AUTO: 10.27 10*3/MM3 (ref 3.4–10.8)

## 2020-10-06 PROCEDURE — 85025 COMPLETE CBC W/AUTO DIFF WBC: CPT | Performed by: NURSE PRACTITIONER

## 2020-10-06 PROCEDURE — 85610 PROTHROMBIN TIME: CPT | Performed by: NURSE PRACTITIONER

## 2020-10-06 PROCEDURE — 36415 COLL VENOUS BLD VENIPUNCTURE: CPT | Performed by: NURSE PRACTITIONER

## 2020-10-08 ENCOUNTER — LAB REQUISITION (OUTPATIENT)
Dept: LAB | Facility: HOSPITAL | Age: 75
End: 2020-10-08

## 2020-10-08 DIAGNOSIS — Z00.00 ENCOUNTER FOR GENERAL ADULT MEDICAL EXAMINATION WITHOUT ABNORMAL FINDINGS: ICD-10-CM

## 2020-10-08 LAB
INR PPP: 2.05 (ref 0.91–1.09)
PROTHROMBIN TIME: 22.9 SECONDS (ref 11.9–14.6)

## 2020-10-08 PROCEDURE — 36415 COLL VENOUS BLD VENIPUNCTURE: CPT | Performed by: NURSE PRACTITIONER

## 2020-10-08 PROCEDURE — 85610 PROTHROMBIN TIME: CPT | Performed by: NURSE PRACTITIONER

## 2020-10-09 ENCOUNTER — TELEPHONE (OUTPATIENT)
Dept: INTERNAL MEDICINE CLINIC | Age: 75
End: 2020-10-09

## 2020-10-09 NOTE — TELEPHONE ENCOUNTER
[address] [facility] [place] [place] [facility] will Tony ARCEO follow for [facility] [place] orders  ?       Please advise

## 2020-10-12 ENCOUNTER — ANTI-COAG VISIT (OUTPATIENT)
Dept: INTERNAL MEDICINE | Age: 75
End: 2020-10-12
Payer: MEDICARE

## 2020-10-12 ENCOUNTER — TELEPHONE (OUTPATIENT)
Dept: INTERNAL MEDICINE | Age: 75
End: 2020-10-12

## 2020-10-12 ENCOUNTER — TELEPHONE (OUTPATIENT)
Dept: INTERNAL MEDICINE CLINIC | Age: 75
End: 2020-10-12

## 2020-10-12 LAB
INR BLD: 2.2
INR BLD: 3

## 2020-10-12 PROCEDURE — 93793 ANTICOAG MGMT PT WARFARIN: CPT | Performed by: NURSE PRACTITIONER

## 2020-10-12 NOTE — TELEPHONE ENCOUNTER
[address] [address] nurse admitted pt 10/10  Nursing plans to see pt 1w3 for CP assess, incision assess, medication education/compliance. PT/INR on admit 29.4 / 2.2     Pt is currently on 6.5mg coumadin daily.       Please advise

## 2020-10-12 NOTE — TELEPHONE ENCOUNTER
[facility] Transitions Initial Follow Up Call    Outreach made within 2 business days of discharge: Yes    Patient: Delmy Mathis Patient : 1945   MRN: [id number]    Reason for Admission: Admitted 20 for closed fracture of right hip  Discharge Date: 20from [facility] then admitted to [facility] for rehab. Discharged 10/09/20 from [facility]  Discharge Diagnoses: Active Hospital Problems   Diagnosis    **Closed comminuted intertrochanteric fracture of proximal end of right femur (CMS/HCC)    Right pleural effusion    Obesity (BMI 30-39. 9)    Closed fracture of right hip (CMS/HCC)    Chronic anticoagulation    Chronic diastolic heart failure (CMS/HCC)    Dementia (CMS/HCC)    Hyperthyroidism    MORAIMA (obstructive sleep apnea)    Chronic atrial fibrillation (CMS/HCC)    Essential hypertension    History of aortic valvular stenosis      Spoke with: attempted to reach patient, no answer. No voicemail. I was unable to leave a message with intent of my call. I will reach out again later today.      Discharge department/facility: [facility] nursing and rehab      Scheduled appointment with PCP within 7-14 days    Follow Up  Future Appointments   Date Time Provider Jayant Rivera   10/14/2020  3:30 PM MARIELOS Caputo [facility]   12/15/2020 12:30 PM MARIELOS Caputo [address]

## 2020-10-12 NOTE — PROGRESS NOTES
HOME MONITORING REPORT    INR today:   Results for orders placed or performed in visit on 10/12/20   Protime-INR   Result Value Ref Range    INR 2.20        INR Goal: 2.0-3.0    Dosing Plan  As of 10/12/2020    TTR:   53.6 % (2.2 y)   Full warfarin instructions:   7.5 mg every Tue, Thu, Sat; 5 mg all other days              PLAN: Advised patient/caregiver to continue current dose and recheck in one week.   Patient/Caregiver voiced understanding

## 2020-10-12 NOTE — TELEPHONE ENCOUNTER
[facility] Transitions Initial Follow Up Call    Outreach made within 2 business days of discharge: Yes    Patient: Ben Adhikari           Patient : 1945   MRN: [id number]    Reason for Admission: Admitted 20 for closed fracture of right hip  Discharge Date: 20from [facility] then admitted to [facility] for rehab. Discharged 10/09/20 from [facility]  Discharge Diagnoses: Active Hospital Problems   Diagnosis    **Closed comminuted intertrochanteric fracture of proximal end of right femur (CMS/HCC)    Right pleural effusion    Obesity (BMI 30-39. 9)    Closed fracture of right hip (CMS/HCC)    Chronic anticoagulation    Chronic diastolic heart failure (CMS/HCC)    Dementia (CMS/HCC)    Hyperthyroidism    MORAIMA (obstructive sleep apnea)    Chronic atrial fibrillation (CMS/HCC)    Essential hypertension    History of aortic valvular stenosis                 Spoke with: 2nd attempted to reach patient, no answer.  Unable to leave a message with intent of my call.      Discharge department/facility: [facility]    Scheduled appointment with PCP within 7-14 days    Follow Up  Future Appointments   Date Time Provider Jayant Rivera   10/14/2020  3:30 PM MARIELOS Cody [facility]   12/15/2020 12:30 PM MARIELOS Cody [address], [place]

## 2020-10-13 ENCOUNTER — ANTI-COAG VISIT (OUTPATIENT)
Dept: INTERNAL MEDICINE | Age: 75
End: 2020-10-13

## 2020-10-13 NOTE — PROGRESS NOTES
HOME MONITORING REPORT    INR today:   Results for orders placed or performed in visit on 10/13/20   Protime-INR   Result Value Ref Range    INR 3.00        INR Goal: 2.0-3.0    Dosing Plan  As of 10/13/2020    TTR:   53.7 % (2.2 y)   Full warfarin instructions:   10/19: Hold; Otherwise 6.5 mg every day              PLAN:  Spoke with patient's daughter. Patient's dose was changed to 6.5mg daily while she was in the SNF. Updated track to reflect this. Advised her to hold dose today or eat extra serving of greens, then resume current dose and recheck in one week. Daughter also reports that patient had to cancel her TCM appt today d/t fx. She was agreeable to a VV tomorrow. She reports patient is very constipated. Said she hasn't had BM in two weeks and she's on pain medications. She is taking a stool softener and she has given patient an enema this morning. She said \"chunks are coming out. \"  She is drinking a lot of water and gatorade. If she doesn't get results from the enema, she will try Mag Citrate. I have reviewed nursing plan for Coumadin management and agree with plan.

## 2020-10-15 ENCOUNTER — TELEPHONE (OUTPATIENT)
Dept: INTERNAL MEDICINE CLINIC | Age: 75
End: 2020-10-15

## 2020-10-15 NOTE — TELEPHONE ENCOUNTER
[facility] PT [place] completed and PT Recommendation:  PT 2wk1, 3wk3, then 2wk3 to provide ther ex,  work on all transfers, standing,   progress to stand pivot transfer, and walking, working towards return to prior level of activity as tolerated    Please advise If approved

## 2020-10-19 ENCOUNTER — ANTI-COAG VISIT (OUTPATIENT)
Dept: INTERNAL MEDICINE | Age: 75
End: 2020-10-19
Payer: MEDICARE

## 2020-10-19 LAB — INR BLD: 2.6

## 2020-10-19 PROCEDURE — 93793 ANTICOAG MGMT PT WARFARIN: CPT | Performed by: NURSE PRACTITIONER

## 2020-10-26 ENCOUNTER — ANTI-COAG VISIT (OUTPATIENT)
Dept: INTERNAL MEDICINE | Age: 75
End: 2020-10-26
Payer: MEDICARE

## 2020-10-26 LAB — INR BLD: 3.2

## 2020-10-26 PROCEDURE — 93793 ANTICOAG MGMT PT WARFARIN: CPT | Performed by: NURSE PRACTITIONER

## 2020-10-28 NOTE — PROGRESS NOTES
HOME MONITORING REPORT    INR today:   Results for orders placed or performed in visit on 10/26/20   Protime-INR   Result Value Ref Range    INR 3.20        INR Goal: 2.0-3.0    Dosing Plan  As of 10/26/2020    TTR:   --   Full warfarin instructions:   6.5 mg every day              PLAN:  Patient was advised to hold 10/26, then resume current dose and recheck in one week. I have reviewed nursing plan for Coumadin management and agree with plan.

## 2020-10-29 RX ORDER — DONEPEZIL HYDROCHLORIDE 5 MG/1
TABLET, FILM COATED ORAL
Qty: 30 TABLET | Refills: 3 | Status: SHIPPED | OUTPATIENT
Start: 2020-10-29 | End: 2021-03-08

## 2020-11-11 ENCOUNTER — ANTI-COAG VISIT (OUTPATIENT)
Dept: INTERNAL MEDICINE | Age: 75
End: 2020-11-11
Payer: MEDICARE

## 2020-11-11 LAB — INR BLD: 1.5

## 2020-11-11 PROCEDURE — 93793 ANTICOAG MGMT PT WARFARIN: CPT | Performed by: NURSE PRACTITIONER

## 2020-11-11 RX ORDER — CALCITRIOL 0.25 UG/1
CAPSULE, LIQUID FILLED ORAL
Qty: 30 CAPSULE | Refills: 4 | Status: SHIPPED | OUTPATIENT
Start: 2020-11-11 | End: 2021-04-21

## 2020-11-11 NOTE — PROGRESS NOTES
HOME MONITORING REPORT    INR today:   Results for orders placed or performed in visit on 11/11/20   Protime-INR   Result Value Ref Range    INR 1.50        INR Goal: 2.0-3.0    Dosing Plan  As of 11/11/2020    TTR:   54.3 % (2.3 y)   Full warfarin instructions:   5 mg every Tue, Fri, Sat; 7.5 mg all other days              PLAN: Spoke with her daughter. They have changed her dose on their own and she has not been taking 6.5 mg. She ran out of 6 mg pills \"a while ago\" and they went back to her old 7.5 mg tablets 2 days a week. She was instructed to have her take 7.5 Wednesday, Thursday, Sunday and Monday and 5 mg the other three days. Recheck in one week. Patient/Caregiver voiced understanding  I have reviewed nursing plan for Coumadin management and agree with plan.

## 2020-11-11 NOTE — TELEPHONE ENCOUNTER
Cindi Quinn called requesting a refill of the below medication which has been pended for you:     Requested Prescriptions     Pending Prescriptions Disp Refills    calcitRIOL (ROCALTROL) 0.25 MCG capsule [Pharmacy Med Name: CALCITRIOL 0.25 MCG CAPS 0.25 Capsule] 30 capsule 4     Sig: TAKE ONE CAPSULE BY MOUTH DAILY.        Last Appointment Date: 10/20/2020  Next Appointment Date: 12/15/2020    Allergies   Allergen Reactions    Other Anaphylaxis and Itching     Cloth bandaids , causes redness of skin    Ciprofloxacin Itching    Codeine Itching    Perflutren Lipid Microsphere Other (See Comments)     Back pain    Tetanus Toxoids Itching     Itching around site    Tizanidine Hcl Itching    Tetanus Toxoid      Reaction: ITCHING AROUND SITE

## 2020-11-12 ENCOUNTER — APPOINTMENT (OUTPATIENT)
Dept: GENERAL RADIOLOGY | Facility: HOSPITAL | Age: 75
End: 2020-11-12

## 2020-11-12 ENCOUNTER — HOSPITAL ENCOUNTER (INPATIENT)
Facility: HOSPITAL | Age: 75
LOS: 4 days | Discharge: HOME HEALTH | End: 2020-11-16
Attending: EMERGENCY MEDICINE | Admitting: FAMILY MEDICINE

## 2020-11-12 DIAGNOSIS — Z74.09 IMPAIRED MOBILITY: ICD-10-CM

## 2020-11-12 DIAGNOSIS — Z74.09 IMPAIRED MOBILITY AND ADLS: ICD-10-CM

## 2020-11-12 DIAGNOSIS — R79.1 SUBTHERAPEUTIC INTERNATIONAL NORMALIZED RATIO (INR): ICD-10-CM

## 2020-11-12 DIAGNOSIS — S72.141A CLOSED COMMINUTED INTERTROCHANTERIC FRACTURE OF RIGHT FEMUR, INITIAL ENCOUNTER (HCC): ICD-10-CM

## 2020-11-12 DIAGNOSIS — Z78.9 IMPAIRED MOBILITY AND ADLS: ICD-10-CM

## 2020-11-12 DIAGNOSIS — S72.002A CLOSED FRACTURE OF LEFT HIP, INITIAL ENCOUNTER (HCC): Primary | ICD-10-CM

## 2020-11-12 DIAGNOSIS — Z86.718 HISTORY OF DVT (DEEP VEIN THROMBOSIS): ICD-10-CM

## 2020-11-12 DIAGNOSIS — Z79.01 CHRONIC ANTICOAGULATION: ICD-10-CM

## 2020-11-12 PROBLEM — M17.0 PRIMARY OSTEOARTHRITIS OF BOTH KNEES: Status: ACTIVE | Noted: 2018-11-09

## 2020-11-12 PROBLEM — I35.0 AORTIC VALVE STENOSIS: Status: ACTIVE | Noted: 2020-11-12

## 2020-11-12 PROBLEM — R41.3 MEMORY IMPAIRMENT: Status: ACTIVE | Noted: 2019-08-23

## 2020-11-12 PROBLEM — D49.4 NEOPLASM OF BLADDER: Status: ACTIVE | Noted: 2020-11-12

## 2020-11-12 PROBLEM — Z98.890: Status: ACTIVE | Noted: 2017-08-08

## 2020-11-12 PROBLEM — G60.9 IDIOPATHIC PERIPHERAL NEUROPATHY: Status: ACTIVE | Noted: 2018-11-09

## 2020-11-12 PROBLEM — F33.41 RECURRENT MAJOR DEPRESSIVE DISORDER, IN PARTIAL REMISSION (HCC): Status: ACTIVE | Noted: 2019-08-23

## 2020-11-12 PROBLEM — N18.30 STAGE 3 CHRONIC KIDNEY DISEASE: Status: ACTIVE | Noted: 2018-11-09

## 2020-11-12 LAB
ALBUMIN SERPL-MCNC: 3.3 G/DL (ref 3.5–5.2)
ALBUMIN/GLOB SERPL: 1 G/DL
ALP SERPL-CCNC: 148 U/L (ref 39–117)
ALT SERPL W P-5'-P-CCNC: 33 U/L (ref 1–33)
ANION GAP SERPL CALCULATED.3IONS-SCNC: 7 MMOL/L (ref 5–15)
ANION GAP SERPL CALCULATED.3IONS-SCNC: 7 MMOL/L (ref 5–15)
APTT PPP: 29.9 SECONDS (ref 24.1–35)
AST SERPL-CCNC: 18 U/L (ref 1–32)
BACTERIA UR QL AUTO: ABNORMAL /HPF
BASOPHILS # BLD AUTO: 0.02 10*3/MM3 (ref 0–0.2)
BASOPHILS NFR BLD AUTO: 0.2 % (ref 0–1.5)
BILIRUB SERPL-MCNC: 0.6 MG/DL (ref 0–1.2)
BILIRUB UR QL STRIP: NEGATIVE
BUN SERPL-MCNC: 38 MG/DL (ref 8–23)
BUN SERPL-MCNC: 38 MG/DL (ref 8–23)
BUN/CREAT SERPL: 28.8 (ref 7–25)
BUN/CREAT SERPL: 28.8 (ref 7–25)
CALCIUM SPEC-SCNC: 9.8 MG/DL (ref 8.6–10.5)
CALCIUM SPEC-SCNC: 9.8 MG/DL (ref 8.6–10.5)
CHLORIDE SERPL-SCNC: 105 MMOL/L (ref 98–107)
CHLORIDE SERPL-SCNC: 105 MMOL/L (ref 98–107)
CLARITY UR: CLEAR
CO2 SERPL-SCNC: 29 MMOL/L (ref 22–29)
CO2 SERPL-SCNC: 29 MMOL/L (ref 22–29)
COLOR UR: YELLOW
CREAT SERPL-MCNC: 1.32 MG/DL (ref 0.57–1)
CREAT SERPL-MCNC: 1.32 MG/DL (ref 0.57–1)
DEPRECATED RDW RBC AUTO: 50.8 FL (ref 37–54)
EOSINOPHIL # BLD AUTO: 0.32 10*3/MM3 (ref 0–0.4)
EOSINOPHIL NFR BLD AUTO: 2.5 % (ref 0.3–6.2)
ERYTHROCYTE [DISTWIDTH] IN BLOOD BY AUTOMATED COUNT: 13.8 % (ref 12.3–15.4)
GFR SERPL CREATININE-BSD FRML MDRD: 39 ML/MIN/1.73
GFR SERPL CREATININE-BSD FRML MDRD: 39 ML/MIN/1.73
GLOBULIN UR ELPH-MCNC: 3.4 GM/DL
GLUCOSE SERPL-MCNC: 129 MG/DL (ref 65–99)
GLUCOSE SERPL-MCNC: 129 MG/DL (ref 65–99)
GLUCOSE UR STRIP-MCNC: NEGATIVE MG/DL
HCT VFR BLD AUTO: 42.6 % (ref 34–46.6)
HGB BLD-MCNC: 14.2 G/DL (ref 12–15.9)
HGB UR QL STRIP.AUTO: ABNORMAL
HYALINE CASTS UR QL AUTO: ABNORMAL /LPF
IMM GRANULOCYTES # BLD AUTO: 0.05 10*3/MM3 (ref 0–0.05)
IMM GRANULOCYTES NFR BLD AUTO: 0.4 % (ref 0–0.5)
INR PPP: 1.63 (ref 0.91–1.09)
KETONES UR QL STRIP: NEGATIVE
LEUKOCYTE ESTERASE UR QL STRIP.AUTO: ABNORMAL
LYMPHOCYTES # BLD AUTO: 1.02 10*3/MM3 (ref 0.7–3.1)
LYMPHOCYTES NFR BLD AUTO: 7.9 % (ref 19.6–45.3)
MAGNESIUM SERPL-MCNC: 1.7 MG/DL (ref 1.6–2.4)
MCH RBC QN AUTO: 32.8 PG (ref 26.6–33)
MCHC RBC AUTO-ENTMCNC: 33.3 G/DL (ref 31.5–35.7)
MCV RBC AUTO: 98.4 FL (ref 79–97)
MONOCYTES # BLD AUTO: 1.46 10*3/MM3 (ref 0.1–0.9)
MONOCYTES NFR BLD AUTO: 11.4 % (ref 5–12)
NEUTROPHILS NFR BLD AUTO: 77.6 % (ref 42.7–76)
NEUTROPHILS NFR BLD AUTO: 9.99 10*3/MM3 (ref 1.7–7)
NITRITE UR QL STRIP: NEGATIVE
NRBC BLD AUTO-RTO: 0 /100 WBC (ref 0–0.2)
PH UR STRIP.AUTO: 6.5 [PH] (ref 5–8)
PLATELET # BLD AUTO: 189 10*3/MM3 (ref 140–450)
PMV BLD AUTO: 9.2 FL (ref 6–12)
POTASSIUM SERPL-SCNC: 4.1 MMOL/L (ref 3.5–5.2)
POTASSIUM SERPL-SCNC: 4.1 MMOL/L (ref 3.5–5.2)
PROT SERPL-MCNC: 6.7 G/DL (ref 6–8.5)
PROT UR QL STRIP: ABNORMAL
PROTHROMBIN TIME: 19 SECONDS (ref 11.9–14.6)
RBC # BLD AUTO: 4.33 10*6/MM3 (ref 3.77–5.28)
RBC # UR: ABNORMAL /HPF
REF LAB TEST METHOD: ABNORMAL
SARS-COV-2 RDRP RESP QL NAA+PROBE: NOT DETECTED
SODIUM SERPL-SCNC: 141 MMOL/L (ref 136–145)
SODIUM SERPL-SCNC: 141 MMOL/L (ref 136–145)
SP GR UR STRIP: 1.02 (ref 1–1.03)
SQUAMOUS #/AREA URNS HPF: ABNORMAL /HPF
UROBILINOGEN UR QL STRIP: ABNORMAL
WBC # BLD AUTO: 12.86 10*3/MM3 (ref 3.4–10.8)
WBC UR QL AUTO: ABNORMAL /HPF

## 2020-11-12 PROCEDURE — 71045 X-RAY EXAM CHEST 1 VIEW: CPT

## 2020-11-12 PROCEDURE — 63710000001 ONDANSETRON ODT 4 MG TABLET DISPERSIBLE: Performed by: EMERGENCY MEDICINE

## 2020-11-12 PROCEDURE — 25010000002 CEFTRIAXONE PER 250 MG: Performed by: NURSE PRACTITIONER

## 2020-11-12 PROCEDURE — 94640 AIRWAY INHALATION TREATMENT: CPT

## 2020-11-12 PROCEDURE — 94799 UNLISTED PULMONARY SVC/PX: CPT

## 2020-11-12 PROCEDURE — 73502 X-RAY EXAM HIP UNI 2-3 VIEWS: CPT

## 2020-11-12 PROCEDURE — 81001 URINALYSIS AUTO W/SCOPE: CPT | Performed by: INTERNAL MEDICINE

## 2020-11-12 PROCEDURE — 85025 COMPLETE CBC W/AUTO DIFF WBC: CPT | Performed by: EMERGENCY MEDICINE

## 2020-11-12 PROCEDURE — 87077 CULTURE AEROBIC IDENTIFY: CPT | Performed by: NURSE PRACTITIONER

## 2020-11-12 PROCEDURE — 87635 SARS-COV-2 COVID-19 AMP PRB: CPT | Performed by: EMERGENCY MEDICINE

## 2020-11-12 PROCEDURE — 83735 ASSAY OF MAGNESIUM: CPT | Performed by: INTERNAL MEDICINE

## 2020-11-12 PROCEDURE — 25010000002 MORPHINE PER 10 MG: Performed by: EMERGENCY MEDICINE

## 2020-11-12 PROCEDURE — 99284 EMERGENCY DEPT VISIT MOD MDM: CPT

## 2020-11-12 PROCEDURE — 85610 PROTHROMBIN TIME: CPT | Performed by: EMERGENCY MEDICINE

## 2020-11-12 PROCEDURE — 80053 COMPREHEN METABOLIC PANEL: CPT | Performed by: INTERNAL MEDICINE

## 2020-11-12 PROCEDURE — 85730 THROMBOPLASTIN TIME PARTIAL: CPT | Performed by: EMERGENCY MEDICINE

## 2020-11-12 PROCEDURE — 73552 X-RAY EXAM OF FEMUR 2/>: CPT

## 2020-11-12 PROCEDURE — 25010000002 ENOXAPARIN PER 10 MG: Performed by: NURSE PRACTITIONER

## 2020-11-12 PROCEDURE — 87186 SC STD MICRODIL/AGAR DIL: CPT | Performed by: NURSE PRACTITIONER

## 2020-11-12 PROCEDURE — 87086 URINE CULTURE/COLONY COUNT: CPT | Performed by: NURSE PRACTITIONER

## 2020-11-12 PROCEDURE — 97162 PT EVAL MOD COMPLEX 30 MIN: CPT

## 2020-11-12 RX ORDER — DILTIAZEM HYDROCHLORIDE 180 MG/1
180 CAPSULE, COATED, EXTENDED RELEASE ORAL
Status: DISCONTINUED | OUTPATIENT
Start: 2020-11-12 | End: 2020-11-16 | Stop reason: HOSPADM

## 2020-11-12 RX ORDER — BISACODYL 10 MG
10 SUPPOSITORY, RECTAL RECTAL DAILY PRN
Status: DISCONTINUED | OUTPATIENT
Start: 2020-11-12 | End: 2020-11-16 | Stop reason: HOSPADM

## 2020-11-12 RX ORDER — WARFARIN SODIUM 5 MG/1
5 TABLET ORAL
Status: DISCONTINUED | OUTPATIENT
Start: 2020-11-13 | End: 2020-11-15

## 2020-11-12 RX ORDER — HYDROMORPHONE HYDROCHLORIDE 1 MG/ML
0.5 INJECTION, SOLUTION INTRAMUSCULAR; INTRAVENOUS; SUBCUTANEOUS
Status: DISCONTINUED | OUTPATIENT
Start: 2020-11-12 | End: 2020-11-14

## 2020-11-12 RX ORDER — ASPIRIN 81 MG/1
81 TABLET ORAL DAILY
Status: DISCONTINUED | OUTPATIENT
Start: 2020-11-12 | End: 2020-11-12

## 2020-11-12 RX ORDER — SODIUM CHLORIDE 0.9 % (FLUSH) 0.9 %
10 SYRINGE (ML) INJECTION AS NEEDED
Status: DISCONTINUED | OUTPATIENT
Start: 2020-11-12 | End: 2020-11-16 | Stop reason: HOSPADM

## 2020-11-12 RX ORDER — SODIUM CHLORIDE 0.9 % (FLUSH) 0.9 %
10 SYRINGE (ML) INJECTION EVERY 12 HOURS SCHEDULED
Status: DISCONTINUED | OUTPATIENT
Start: 2020-11-12 | End: 2020-11-16 | Stop reason: HOSPADM

## 2020-11-12 RX ORDER — NALOXONE HCL 0.4 MG/ML
0.4 VIAL (ML) INJECTION
Status: DISCONTINUED | OUTPATIENT
Start: 2020-11-12 | End: 2020-11-14

## 2020-11-12 RX ORDER — DONEPEZIL HYDROCHLORIDE 5 MG/1
5 TABLET, FILM COATED ORAL NIGHTLY
Status: DISCONTINUED | OUTPATIENT
Start: 2020-11-12 | End: 2020-11-16 | Stop reason: HOSPADM

## 2020-11-12 RX ORDER — ONDANSETRON 2 MG/ML
4 INJECTION INTRAMUSCULAR; INTRAVENOUS EVERY 6 HOURS PRN
Status: DISCONTINUED | OUTPATIENT
Start: 2020-11-12 | End: 2020-11-16 | Stop reason: HOSPADM

## 2020-11-12 RX ORDER — AMOXICILLIN 250 MG
2 CAPSULE ORAL NIGHTLY
Status: DISCONTINUED | OUTPATIENT
Start: 2020-11-12 | End: 2020-11-16 | Stop reason: HOSPADM

## 2020-11-12 RX ORDER — OXYCODONE AND ACETAMINOPHEN 7.5; 325 MG/1; MG/1
1 TABLET ORAL EVERY 4 HOURS PRN
Status: DISCONTINUED | OUTPATIENT
Start: 2020-11-12 | End: 2020-11-12 | Stop reason: SDUPTHER

## 2020-11-12 RX ORDER — ONDANSETRON 4 MG/1
4 TABLET, FILM COATED ORAL EVERY 6 HOURS PRN
Status: DISCONTINUED | OUTPATIENT
Start: 2020-11-12 | End: 2020-11-16 | Stop reason: HOSPADM

## 2020-11-12 RX ORDER — ACETAMINOPHEN 160 MG/5ML
650 SOLUTION ORAL EVERY 4 HOURS PRN
Status: DISCONTINUED | OUTPATIENT
Start: 2020-11-12 | End: 2020-11-16 | Stop reason: HOSPADM

## 2020-11-12 RX ORDER — FUROSEMIDE 20 MG/1
20 TABLET ORAL DAILY
Status: DISCONTINUED | OUTPATIENT
Start: 2020-11-12 | End: 2020-11-16 | Stop reason: HOSPADM

## 2020-11-12 RX ORDER — ATORVASTATIN CALCIUM 40 MG/1
40 TABLET, FILM COATED ORAL DAILY
Status: DISCONTINUED | OUTPATIENT
Start: 2020-11-12 | End: 2020-11-16 | Stop reason: HOSPADM

## 2020-11-12 RX ORDER — METOPROLOL TARTRATE 50 MG/1
12.5 TABLET, FILM COATED ORAL 2 TIMES DAILY
COMMUNITY

## 2020-11-12 RX ORDER — IPRATROPIUM BROMIDE AND ALBUTEROL SULFATE 2.5; .5 MG/3ML; MG/3ML
3 SOLUTION RESPIRATORY (INHALATION)
Status: DISCONTINUED | OUTPATIENT
Start: 2020-11-12 | End: 2020-11-16 | Stop reason: HOSPADM

## 2020-11-12 RX ORDER — CALCITRIOL 0.25 UG/1
0.25 CAPSULE, LIQUID FILLED ORAL DAILY
Status: DISCONTINUED | OUTPATIENT
Start: 2020-11-12 | End: 2020-11-16 | Stop reason: HOSPADM

## 2020-11-12 RX ORDER — ALUMINA, MAGNESIA, AND SIMETHICONE 2400; 2400; 240 MG/30ML; MG/30ML; MG/30ML
15 SUSPENSION ORAL ONCE
Status: COMPLETED | OUTPATIENT
Start: 2020-11-12 | End: 2020-11-12

## 2020-11-12 RX ORDER — ACETAMINOPHEN 325 MG/1
650 TABLET ORAL EVERY 4 HOURS PRN
Status: DISCONTINUED | OUTPATIENT
Start: 2020-11-12 | End: 2020-11-16 | Stop reason: HOSPADM

## 2020-11-12 RX ORDER — METHIMAZOLE 10 MG/1
10 TABLET ORAL
Status: DISCONTINUED | OUTPATIENT
Start: 2020-11-14 | End: 2020-11-16 | Stop reason: HOSPADM

## 2020-11-12 RX ORDER — ACETAMINOPHEN 325 MG/1
650 TABLET ORAL EVERY 4 HOURS PRN
Status: DISCONTINUED | OUTPATIENT
Start: 2020-11-12 | End: 2020-11-12 | Stop reason: SDUPTHER

## 2020-11-12 RX ORDER — LIDOCAINE HYDROCHLORIDE 20 MG/ML
15 SOLUTION OROPHARYNGEAL ONCE
Status: COMPLETED | OUTPATIENT
Start: 2020-11-12 | End: 2020-11-12

## 2020-11-12 RX ORDER — POLYETHYLENE GLYCOL 3350 17 G/17G
17 POWDER, FOR SOLUTION ORAL DAILY
Status: DISCONTINUED | OUTPATIENT
Start: 2020-11-12 | End: 2020-11-16 | Stop reason: HOSPADM

## 2020-11-12 RX ORDER — METHIMAZOLE 10 MG/1
10 TABLET ORAL DAILY
Status: DISCONTINUED | OUTPATIENT
Start: 2020-11-12 | End: 2020-11-12

## 2020-11-12 RX ORDER — WARFARIN SODIUM 5 MG/1
7.5 TABLET ORAL
Status: DISCONTINUED | OUTPATIENT
Start: 2020-11-12 | End: 2020-11-16 | Stop reason: HOSPADM

## 2020-11-12 RX ORDER — SODIUM CHLORIDE 9 MG/ML
75 INJECTION, SOLUTION INTRAVENOUS CONTINUOUS
Status: DISPENSED | OUTPATIENT
Start: 2020-11-12 | End: 2020-11-12

## 2020-11-12 RX ORDER — OXYCODONE HYDROCHLORIDE AND ACETAMINOPHEN 5; 325 MG/1; MG/1
1 TABLET ORAL EVERY 6 HOURS PRN
Status: DISCONTINUED | OUTPATIENT
Start: 2020-11-12 | End: 2020-11-16 | Stop reason: HOSPADM

## 2020-11-12 RX ORDER — ACETAMINOPHEN 650 MG/1
650 SUPPOSITORY RECTAL EVERY 4 HOURS PRN
Status: DISCONTINUED | OUTPATIENT
Start: 2020-11-12 | End: 2020-11-16 | Stop reason: HOSPADM

## 2020-11-12 RX ORDER — ERGOCALCIFEROL 1.25 MG/1
50000 CAPSULE ORAL WEEKLY
COMMUNITY

## 2020-11-12 RX ORDER — SENNOSIDES 8.6 MG
650 CAPSULE ORAL EVERY 8 HOURS PRN
COMMUNITY

## 2020-11-12 RX ORDER — ONDANSETRON 4 MG/1
4 TABLET, ORALLY DISINTEGRATING ORAL ONCE
Status: COMPLETED | OUTPATIENT
Start: 2020-11-12 | End: 2020-11-12

## 2020-11-12 RX ADMIN — SODIUM CHLORIDE 75 ML/HR: 9 INJECTION, SOLUTION INTRAVENOUS at 06:50

## 2020-11-12 RX ADMIN — METOPROLOL TARTRATE 25 MG: 25 TABLET, FILM COATED ORAL at 21:34

## 2020-11-12 RX ADMIN — ALUMINUM HYDROXIDE, MAGNESIUM HYDROXIDE, AND DIMETHICONE 15 ML: 400; 400; 40 SUSPENSION ORAL at 03:41

## 2020-11-12 RX ADMIN — CALCITRIOL 0.25 MCG: 0.25 CAPSULE ORAL at 08:47

## 2020-11-12 RX ADMIN — POLYETHYLENE GLYCOL (3350) 17 G: 17 POWDER, FOR SOLUTION ORAL at 16:11

## 2020-11-12 RX ADMIN — DOCUSATE SODIUM 50 MG AND SENNOSIDES 8.6 MG 2 TABLET: 8.6; 5 TABLET, FILM COATED ORAL at 21:33

## 2020-11-12 RX ADMIN — IPRATROPIUM BROMIDE AND ALBUTEROL SULFATE 3 ML: 2.5; .5 SOLUTION RESPIRATORY (INHALATION) at 11:48

## 2020-11-12 RX ADMIN — LIDOCAINE HYDROCHLORIDE 15 ML: 20 SOLUTION ORAL; TOPICAL at 03:41

## 2020-11-12 RX ADMIN — ENOXAPARIN SODIUM 100 MG: 100 INJECTION SUBCUTANEOUS at 16:14

## 2020-11-12 RX ADMIN — ASPIRIN 81 MG: 81 TABLET ORAL at 08:53

## 2020-11-12 RX ADMIN — IPRATROPIUM BROMIDE AND ALBUTEROL SULFATE 3 ML: 2.5; .5 SOLUTION RESPIRATORY (INHALATION) at 08:08

## 2020-11-12 RX ADMIN — WARFARIN SODIUM 7.5 MG: 5 TABLET ORAL at 21:34

## 2020-11-12 RX ADMIN — METHIMAZOLE 10 MG: 10 TABLET ORAL at 12:29

## 2020-11-12 RX ADMIN — FUROSEMIDE 20 MG: 20 TABLET ORAL at 08:53

## 2020-11-12 RX ADMIN — MORPHINE SULFATE 4 MG: 4 INJECTION, SOLUTION INTRAMUSCULAR; INTRAVENOUS at 01:03

## 2020-11-12 RX ADMIN — DONEPEZIL HYDROCHLORIDE 5 MG: 5 TABLET, FILM COATED ORAL at 21:42

## 2020-11-12 RX ADMIN — MORPHINE SULFATE 4 MG: 4 INJECTION, SOLUTION INTRAMUSCULAR; INTRAVENOUS at 04:11

## 2020-11-12 RX ADMIN — IPRATROPIUM BROMIDE AND ALBUTEROL SULFATE 3 ML: 2.5; .5 SOLUTION RESPIRATORY (INHALATION) at 15:50

## 2020-11-12 RX ADMIN — OXYCODONE HYDROCHLORIDE AND ACETAMINOPHEN 1 TABLET: 5; 325 TABLET ORAL at 08:53

## 2020-11-12 RX ADMIN — ONDANSETRON 4 MG: 4 TABLET, ORALLY DISINTEGRATING ORAL at 01:03

## 2020-11-12 RX ADMIN — CEFTRIAXONE SODIUM 1 G: 1 INJECTION, POWDER, FOR SOLUTION INTRAMUSCULAR; INTRAVENOUS at 16:11

## 2020-11-12 RX ADMIN — DILTIAZEM HYDROCHLORIDE 180 MG: 180 CAPSULE, COATED, EXTENDED RELEASE ORAL at 08:47

## 2020-11-12 RX ADMIN — ACETAMINOPHEN 650 MG: 325 TABLET, FILM COATED ORAL at 21:33

## 2020-11-12 RX ADMIN — SODIUM CHLORIDE, PRESERVATIVE FREE 10 ML: 5 INJECTION INTRAVENOUS at 21:34

## 2020-11-12 RX ADMIN — ATORVASTATIN CALCIUM 40 MG: 40 TABLET, FILM COATED ORAL at 08:48

## 2020-11-12 RX ADMIN — METOPROLOL TARTRATE 25 MG: 25 TABLET, FILM COATED ORAL at 08:47

## 2020-11-12 NOTE — H&P
[facility] Medicine Services  HISTORY AND PHYSICAL    Date of Admission: 11/12/2020  Primary Care Physician: Mary Beth Izquierdo APRN    Subjective     Chief Complaint: Fall hip pain    History of Present Illness  Patient is a 75-year-old white female past medical history of aortic stenosis A. fib DVTs on chronic warfarin therapy.  Approximately 6 weeks ago she fell and fractured her right hip requiring ride be placed.  She has been working with physical therapy and improving her gait she still uses a walker.  Tonight she was going to the bathroom when she suddenly fell backwards lost her balance and landed up against a wall to things with her shoulder.  She landed on her left hip she did not lose consciousness she does not believe she struck her head.  She try to get up and was unable to due to pain and discomfort in her left hip.  She presented to the ER was evaluated shoulder x-rays and chest x-rays are negative her left hip though shows a fracture across the line longterm down her prosthesis.  Due to this fact orthopedics was contacted and said it is not a operable fracture however she needs to be hospitalized for PT.  She also needs it for pain control.  Neurologically she is completely intact she arrives in no acute distress.        Review of Systems   14 point review of systems negative except for as per HPI    Otherwise complete ROS reviewed and negative except as mentioned in the HPI.    Past Medical History:   Past Medical History:   Diagnosis Date   • A-fib (CMS/HCC)    • Aortic stenosis    • Arthritis    • Bradycardia    • Cancer (CMS/HCC)     bladder and skin   • Cardiomegaly    • CHF (congestive heart failure) (CMS/HCC)    • GERD (gastroesophageal reflux disease)    • Hard of hearing    • History of short term memory loss    • Hypercalcemia    • Hyperlipidemia    • Hypertension    • Hyperthyroidism 11/20/2017   • Hypothyroidism    • Kidney stone    • Long term  current use of anticoagulant therapy    • Open wound     left lower extremity,   • Palpitation    • PONV (postoperative nausea and vomiting)    • Shortness of breath    • Sick sinus syndrome (CMS/HCC)    • Sleep apnea     CPAP     Past Surgical History:  Past Surgical History:   Procedure Laterality Date   • AORTIC VALVE REPAIR/REPLACEMENT     • BLADDER SURGERY      removed   • CARDIAC CATHETERIZATION     • CARDIAC CATHETERIZATION N/A 12/9/2016    Procedure: Left Heart Cath;  Surgeon: Elia Flores MD;  Location:  [facility] CATH INVASIVE LOCATION;  Service:    • DISTAL FEMORAL NAILING Right 9/24/2020    Procedure: RIGHT SHORT TFN;  Surgeon: Betito Shetty MD;  Location:  [facility] OR;  Service: Orthopedics;  Laterality: Right;   • HIP BIPOLAR REPLACEMENT Left    • HYSTERECTOMY     • ILEOSTOMY     • JOINT REPLACEMENT     • KIDNEY SURGERY      right kidney removed   • NEPHRECTOMY RADICAL Right     from cancer   • VARICOSE VEIN SURGERY Left 4/10/2017    Procedure: LEFT LOWER EXTREMITY VENOGRAM. LEFT SAPHENOUS VEIN RADIO FREQUENCY ABLATION;  Surgeon: Blake Martinez DO;  Location:  [facility] OR;  Service:      Social History:  reports that she has never smoked. She has never used smokeless tobacco. She reports that she does not drink alcohol or use drugs.    Family History: family history includes Asthma in her daughter; Autism in her son; COPD in her daughter; Diabetes in her son; Gallbladder disease in her brother; Heart disease in her father; Heart failure in her mother, sister, sister, sister, sister, sister, and sister; Hypertension in her sister, sister, sister, sister, sister, and sister; No Known Problems in her brother, maternal grandfather, maternal grandmother, paternal grandfather, paternal grandmother, and son; Stroke in her father.       Allergies:  Allergies   Allergen Reactions   • Ciprofloxacin Itching   • Codeine Itching and GI Intolerance   • Definity [Perflutren Lipid Microsphere] Other (See Comments)      Back pain   • Tetanus Toxoids Itching     Itching around site   • Tizanidine Hcl Itching   • Other Itching     Cloth bandaids , causes redness of skin     Medications:  Prior to Admission medications    Medication Sig Start Date End Date Taking? Authorizing Provider   acetaminophen (TYLENOL) 325 MG tablet Take 2 tablets by mouth Every 4 (Four) Hours As Needed for Mild Pain  or Fever. 11/29/17   Rehan Loza MD   aspirin 81 MG EC tablet Take 81 mg by mouth Daily.    ProviderBony MD   atorvastatin (LIPITOR) 40 MG tablet Take 40 mg by mouth Daily.    ProviderBony MD   bisacodyl (DULCOLAX) 10 MG suppository Insert 1 suppository into the rectum Daily As Needed for Constipation. 9/28/20   Sarai Olivares APRN   calcitriol (ROCALTROL) 0.25 MCG capsule Take 1 capsule by mouth Daily. 1/4/18   Enzo Ayala MD   diltiaZEM CD (CARDIZEM CD) 180 MG 24 hr capsule Take 1 capsule by mouth Daily. 1/4/18   Enzo Ayala MD   donepezil (ARICEPT) 5 MG tablet Take 1 tablet by mouth Every Night. 1/4/18   Enzo Ayala MD   furosemide (LASIX) 40 MG tablet Take 1 tablet by mouth Daily.  Patient taking differently: Take 20 mg by mouth Daily. 4/8/20   Melba Murillo APRN   ipratropium-albuterol (DUO-NEB) 0.5-2.5 mg/3 ml nebulizer Take 3 mL by nebulization 4 (Four) Times a Day. 9/28/20   Sarai Olivares APRN   methIMAzole (TAPAZOLE) 10 MG tablet Take 10 mg by mouth Daily. Take everyday but fridays    Bony Evans MD   metoprolol tartrate (LOPRESSOR) 25 MG tablet Take 1 tablet by mouth 2 (Two) Times a Day. 9/28/20   Sarai Olivares APRN   oxyCODONE-acetaminophen (PERCOCET) 5-325 MG per tablet Take 1 tablet by mouth Every 6 (Six) Hours As Needed for Moderate Pain  or Severe Pain . 9/28/20   Sarai Olivares APRN   warfarin (COUMADIN) 5 MG tablet Take 5 mg by mouth 4 (Four) Times a Week. Sunday, Monday, Wednesday, Friday     Provider, MD Bony   warfarin (COUMADIN) 7.5  "MG tablet Take 7.5 mg by mouth 3 (Three) Times a Week. Tuesday, Thursday & Saturday    Provider, MD Bony     Objective     Vital Signs: /86 (BP Location: Right arm, Patient Position: Lying)   Pulse 103   Temp 98 °F (36.7 °C) (Oral)   Resp 16   Ht 170.2 cm (67\")   Wt 103 kg (227 lb 1.6 oz)   LMP  (LMP Unknown)   SpO2 92%   BMI 35.57 kg/m²   Physical Exam   Gen.:  Well-nourished well-developed white female in no acute distress  HEENT: Atraumatic, normocephalic.  Pupils equal, round, and reactive to light. Extraocular movements intact.  Sclerae anicteric.  External ears negative.  Mucous membranes moist.  Neck is supple without lymphadenopathy.  No JVD is noted.  No carotid bruits are auscultated.  Oropharynx is without erythema or exudate.   Chest: Clear to auscultation and percussion.  CV: Regular rate and rhythm.  Normal S1-S2.  No gallops, murmurs. or rubs.  Abdomen: Soft, nontender, nondistended.  Active bowel sounds,  No hepatosplenomegaly.  No masses.  No hernias.  Extremities: No clubbing, 3+ pitting edema with chronic stasis changes of the left leg, or cyanosis.  Capillary refill is normal.  Pulses are 2+ and symmetric at radial and dorsalis pedis.  Posterior tibial pulses are intact and 2+ palpable.  Pain with palpation of left hip and range of motion  Neuro: Patient is awake, alert, and oriented ×3.  Cranial nerves II through XII are grossly intact.  Motor is 5 out of 5 bilaterally.  DTRs are 2+ and symmetric bilaterally. Sensory exam is nonfocal  Skin: Warm, dry, and intact.  No evidence of breakdown.  Sensorium: Normal thought and affect    Nursing notes and vital signs reviewed        Results Reviewed:  Lab Results (last 24 hours)     Procedure Component Value Units Date/Time    COVID PRE-OP / PRE-PROCEDURE SCREENING ORDER (NO ISOLATION) - Swab, Nasal Cavity [[id number]]  (Normal) Collected: 11/12/20 0217    Specimen: Swab from Nasal Cavity Updated: 11/12/20 0258    Narrative:      The " following orders were created for panel order COVID PRE-OP / PRE-PROCEDURE SCREENING ORDER (NO ISOLATION) - Swab, Nasal Cavity.  Procedure                               Abnormality         Status                     ---------                               -----------         ------                     COVID-19, ABBOTT IN-HOUS...[[id number]]  Normal              Final result                 Please view results for these tests on the individual orders.    COVID-19, ABBOTT IN-HOUSE,NP Swab (NO TRANSPORT MEDIA) 2 HR TAT - Swab, Nasal Cavity [[id number]]  (Normal) Collected: 11/12/20 0217    Specimen: Swab from Nasal Cavity Updated: 11/12/20 0258     COVID19 Not Detected    Narrative:      Fact sheet for providers: https://www.fda.gov/media/233509/download     Fact sheet for patients: https://www.fda.gov/media/445662/download    Basic Metabolic Panel [[id number]]  (Abnormal) Collected: 11/12/20 0223    Specimen: Blood Updated: 11/12/20 0243     Glucose 129 mg/dL      BUN 38 mg/dL      Creatinine 1.32 mg/dL      Sodium 141 mmol/L      Potassium 4.1 mmol/L      Chloride 105 mmol/L      CO2 29.0 mmol/L      Calcium 9.8 mg/dL      eGFR Non African Amer 39 mL/min/1.73      BUN/Creatinine Ratio 28.8     Anion Gap 7.0 mmol/L     Narrative:      GFR Normal >60  Chronic Kidney Disease <60  Kidney Failure <15      Protime-INR [[id number]]  (Abnormal) Collected: 11/12/20 0223    Specimen: Blood Updated: 11/12/20 0239     Protime 19.0 Seconds      INR 1.63    aPTT [[id number]]  (Normal) Collected: 11/12/20 0223    Specimen: Blood Updated: 11/12/20 0239     PTT 29.9 seconds     CBC & Differential [[id number]]  (Abnormal) Collected: 11/12/20 0223    Specimen: Blood Updated: 11/12/20 0230    Narrative:      The following orders were created for panel order CBC & Differential.  Procedure                               Abnormality         Status                     ---------                               -----------         ------                      CBC Auto Differential[[id number]]        Abnormal            Final result                 Please view results for these tests on the individual orders.    CBC Auto Differential [[id number]]  (Abnormal) Collected: 11/12/20 0223    Specimen: Blood Updated: 11/12/20 0230     WBC 12.86 10*3/mm3      RBC 4.33 10*6/mm3      Hemoglobin 14.2 g/dL      Hematocrit 42.6 %      MCV 98.4 fL      MCH 32.8 pg      MCHC 33.3 g/dL      RDW 13.8 %      RDW-SD 50.8 fl      MPV 9.2 fL      Platelets 189 10*3/mm3      Neutrophil % 77.6 %      Lymphocyte % 7.9 %      Monocyte % 11.4 %      Eosinophil % 2.5 %      Basophil % 0.2 %      Immature Grans % 0.4 %      Neutrophils, Absolute 9.99 10*3/mm3      Lymphocytes, Absolute 1.02 10*3/mm3      Monocytes, Absolute 1.46 10*3/mm3      Eosinophils, Absolute 0.32 10*3/mm3      Basophils, Absolute 0.02 10*3/mm3      Immature Grans, Absolute 0.05 10*3/mm3      nRBC 0.0 /100 WBC         Imaging Results (Last 24 Hours)     Procedure Component Value Units Date/Time    XR Hip With or Without Pelvis 2 - 3 View Left [[id number]]  (Abnormal) Resulted: 11/12/20 0313     Updated: 11/12/20 0313    XR Femur 2 View Left [[id number]] Resulted: 11/12/20 0312     Updated: 11/12/20 0313    XR Chest 1 View [[id number]] Resulted: 11/12/20 0312     Updated: 11/12/20 0312        I have personally reviewed and interpreted the radiology studies and ECG obtained at time of admission.     Assessment / Plan     Assessment:   Active Hospital Problems    Diagnosis   • Closed fracture of left hip (CMS/HCC)   • S/P TAVR (transcatheter aortic valve replacement)   • Chronic anticoagulation   • Chronic diastolic heart failure (CMS/HCC)   • ASA (obstructive sleep apnea)   • Chronic atrial fibrillation (CMS/HCC)   • Deep vein thrombosis (DVT) of left lower extremity (CMS/HCC)   • Essential hypertension   1.  Closed fracture left hip plans to admit the patient consult orthopedics for recommendations about weightbearing status  the ER doctor apparently got mixed answers.  Will consult physical therapy for assistance.  Patient may need short-term SNF again if she is unable to ambulate prior to leaving.  She lives with her children.  2.  DVT on chronic anticoagulation patient is had several DVTs she is on lifelong warfarin.  She is slightly subtherapeutic we will bridge her with full dose Lovenox continue home warfarin.  Daily protimes.  3.  A. fib stable monitor on telemetry bridge therapy with Lovenox as above.  Continue rate control medications.  4.  Hypertension stable continue current meds monitor BP.  5.  Elevated creatinine she does have a slight bump in her creatinine we will give her hydration gently for a few hours today recheck labs in a.m.      Patient seen after midnight         Code Status: Full     I discussed the patient's findings and my recommendations with the patient.  She designates her daughter as her surrogate healthcare decision maker.    Estimated length of stay greater than 2 nights.    Patient seen and examined by me on November 12, 2020 at 4:40 AM.    Electronically signed by Mode Francisco MD, 11/12/20, 05:49 CST.

## 2020-11-12 NOTE — THERAPY EVALUATION
Patient Name: Jarvis Morgan  : 1945    MRN: [id number]                              Today's Date: 2020       Admit Date: 2020    Visit Dx:     ICD-10-CM ICD-9-CM   1. Closed fracture of left hip, initial encounter (CMS/HCC)  S72.002A 820.8   2. Impaired mobility  Z74.09 799.89     Patient Active Problem List   Diagnosis   • History of aortic valvular stenosis   • Chronic atrial fibrillation (CMS/[facility])   • Mixed hyperlipidemia   • Essential hypertension   • Deep vein thrombosis (DVT) of left lower extremity (CMS/[facility])   • Shortness of breath   • Physical deconditioning   • Venous insufficiency   • GERD (gastroesophageal reflux disease)   • History of bladder cancer   • Sick sinus syndrome (CMS/HCC)   • ASA (obstructive sleep apnea)   • Hyperthyroidism   • Chronic diastolic heart failure (CMS/HCC)   • History of DVT (deep vein thrombosis)   • Dementia (CMS/[facility])   • Vitamin D deficiency   • Class 2 obesity due to excess calories with serious comorbidity and body mass index (BMI) of 38.0 to 38.9 in adult   • Chronic anticoagulation   • Scabies   • MRSA bacteremia   • Weakness   • Acute on chronic diastolic heart failure (CMS/HCC)   • S/P TAVR (transcatheter aortic valve replacement)   • Sepsis (CMS/HCC)   • Acute UTI   • Pneumonia of both lower lobes due to infectious organism   • Acute and chronic respiratory failure with hypercapnia (CMS/HCC)   • Abnormal CXR - pneumonia cannot be ruled out at this time.   • Closed comminuted intertrochanteric fracture of proximal end of right femur (CMS/HCC)   • Obesity (BMI 30-39.9)   • Closed fracture of right hip (CMS/HCC)   • Right pleural effusion   • Closed fracture of left hip (CMS/HCC)   • Aortic valve stenosis   • H/O ureterostomy   • Idiopathic peripheral neuropathy   • Memory impairment   • Neoplasm of bladder   • Primary osteoarthritis of both knees   • Recurrent major depressive disorder, in partial remission (CMS/HCC)   • Stage 3 chronic kidney disease      Past Medical History:   Diagnosis Date   • A-fib (CMS/HCC)    • Aortic stenosis    • Arthritis    • Bradycardia    • Cancer (CMS/HCC)     bladder and skin   • Cardiomegaly    • CHF (congestive heart failure) (CMS/HCC)    • GERD (gastroesophageal reflux disease)    • Hard of hearing    • History of short term memory loss    • Hypercalcemia    • Hyperlipidemia    • Hypertension    • Hyperthyroidism 11/20/2017   • Hypothyroidism    • Kidney stone    • Long term current use of anticoagulant therapy    • Open wound     left lower extremity,   • Palpitation    • PONV (postoperative nausea and vomiting)    • Shortness of breath    • Sick sinus syndrome (CMS/HCC)    • Sleep apnea     CPAP     Past Surgical History:   Procedure Laterality Date   • AORTIC VALVE REPAIR/REPLACEMENT     • BLADDER SURGERY      removed   • CARDIAC CATHETERIZATION     • CARDIAC CATHETERIZATION N/A 12/9/2016    Procedure: Left Heart Cath;  Surgeon: Elia Flores MD;  Location:  [facility];  Service:    • DISTAL FEMORAL NAILING Right 9/24/2020    Procedure: RIGHT SHORT TFN;  Surgeon: Betito Shetty MD;  Location:  [facility] OR;  Service: Orthopedics;  Laterality: Right;   • HIP BIPOLAR REPLACEMENT Left    • HYSTERECTOMY     • ILEOSTOMY     • JOINT REPLACEMENT     • KIDNEY SURGERY      right kidney removed   • NEPHRECTOMY RADICAL Right     from cancer   • VARICOSE VEIN SURGERY Left 4/10/2017    Procedure: LEFT LOWER EXTREMITY VENOGRAM. LEFT SAPHENOUS VEIN RADIO FREQUENCY ABLATION;  Surgeon: Blake Martinez DO;  Location:  [facility] OR;  Service:      General Information     Row Name 11/12/20 8670          Physical Therapy Time and Intention    Document Type  evaluation Pt presents to facility with c/o L hip pain. Imaging found closed fracture of L hip, but is nonoperative. She has previous hx of R hip TFN in 9/2020.  -LH (r) CC (t) LH (c)     Mode of Treatment  physical therapy  -LH (r) CC (t) LH (c)     Row Name 11/12/20 1298           General Information    Patient Profile Reviewed  yes  - (r) CC (t) LH (c)     Prior Level of Function  independent:;all household mobility;bed mobility;transfer;gait uses RW and w/c at home for mobility  - (r) CC (t) LH (c)     Existing Precautions/Restrictions  fall;left;non-weight bearing  - (r) CC (t) LH (c)     Barriers to Rehab  medically complex;previous functional deficit;physical barrier;hearing deficit  - (r) CC (t) LH (c)     Row Name 11/12/20 1034          Living Environment    Lives With  child(jamin), adult  - (r) CC (t) LH (c)     Row Name 11/12/20 1034          Home Main Entrance    Number of Stairs, Main Entrance  none;other (see comments) Ramp  - (r) CC (t) LH (c)     Stair Railings, Main Entrance  railings on both sides of stairs  - (r) CC (t) LH (c)     Row Name 11/12/20 1034          Stairs Within Home, Primary    Number of Stairs, Within Home, Primary  none  - (r) CC (t) LH (c)     Row Name 11/12/20 1034          Cognition    Orientation Status (Cognition)  oriented x 4  - (r) CC (t) LH (c)     Row Name 11/12/20 1034          Safety Issues, Functional Mobility    Safety Issues Affecting Function (Mobility)  safety precaution awareness;safety precautions follow-through/compliance;judgment  - (r) CC (t) LH (c)     Impairments Affecting Function (Mobility)  balance;cognition;coordination;endurance/activity tolerance;pain;strength  - (r) CC (t) LH (c)     Cognitive Impairments, Mobility Safety/Performance  judgment;problem-solving/reasoning;safety precaution awareness  - (r) CC (t) LH (c)       User Key  (r) = Recorded By, (t) = Taken By, (c) = Cosigned By    Initials Name Provider Type     Aguila Nguyen, PT Physical Therapist    CC Natlaia Leiva, PT Student PT Student        Mobility     Row Name 11/12/20 1034          Bed Mobility    Bed Mobility  rolling left;rolling right;scooting/bridging;supine-sit  - (r) CC (t) LH (c)     Rolling Left [place] (Bed  Mobility)  minimum assist (75% patient effort);verbal cues  -LH (r) CC (t) LH (c)     Rolling Right [place] (Bed Mobility)  minimum assist (75% patient effort);verbal cues  -LH (r) CC (t) LH (c)     Scooting/Bridging [place] (Bed Mobility)  maximum assist (25% patient effort);verbal cues  -LH (r) CC (t) LH (c)     Supine-Sit [place] (Bed Mobility)  moderate assist (50% patient effort);1 person assist;verbal cues  -LH (r) CC (t) LH (c)     Assistive Device (Bed Mobility)  bed rails;draw sheet;head of bed elevated  -LH (r) CC (t) LH (c)     Comment (Bed Mobility)  Pt required Min to Mod A for BLE mobility  -LH (r) CC (t) LH (c)     Row Name 11/12/20 1034          Transfers    Comment (Transfers)  Pt refused to attempt sit<>stand transfer.  -LH (r) CC (t) LH (c)     Row Name 11/12/20 1034          Mobility    Extremity Weight-bearing Status  left lower extremity;right lower extremity  -LH (r) CC (t) LH (c)     Left Lower Extremity (Weight-bearing Status)  non weight-bearing (NWB)  -LH (r) CC (t) LH (c)     Right Lower Extremity (Weight-bearing Status)  weight-bearing as tolerated (WBAT)  - (r) CC (t) LH (c)       User Key  (r) = Recorded By, (t) = Taken By, (c) = Cosigned By    Initials Name Provider Type     Aguila Nguyen, PT Physical Therapist    CC Natalia Leiva, LIZ Student PT Student        Obj/Interventions     Row Name 11/12/20 1034          Range of Motion Comprehensive    General Range of Motion  bilateral upper extremity ROM WFL;bilateral lower extremity ROM WFL  - (r) CC (t) LH (c)     Row Name 11/12/20 1034          Strength Comprehensive (MMT)    General Manual Muscle Testing (MMT) Assessment  upper extremity strength deficits identified;lower extremity strength deficits identified  - (r) CC (t) LH (c)     Comment, General Manual Muscle Testing (MMT) Assessment  BUE functionally 3+/5; RLE functionally 3/5; LLE not formalled assessed d/t NWB status  -LH (r) CC (t) LH (c)     Row  Name 11/12/20 1034          Balance    Balance Assessment  sitting static balance;sitting dynamic balance  - (r) CC (t) LH (c)     Static Sitting Balance  WFL;sitting, edge of bed BUE support  - (r) CC (t) LH (c)     Dynamic Sitting Balance  mild impairment;sitting, edge of bed  - (r) CC (t) LH (c)     Balance Interventions  sitting;UE activity with balance activity  - (r) CC (t) LH (c)     Comment, Balance  Pt requried CGA to maintain sitting balance with UE movement.  - (r) CC (t) LH (c)     Row Name 11/12/20 1034          Sensory Assessment (Somatosensory)    Sensory Assessment (Somatosensory)  sensation intact  - (r) CC (t) LH (c)       User Key  (r) = Recorded By, (t) = Taken By, (c) = Cosigned By    Initials Name Provider Type     Aguila Nguyen, PT Physical Therapist    CC Natalia Leiva, PT Student PT Student        Goals/Plan     Row Name 11/12/20 1034          Bed Mobility Goal 1 (PT)    Activity/Assistive Device (Bed Mobility Goal 1, PT)  bed mobility activities, all  - (r) CC (t) PRIMO (c)     [place] Level/Cues Needed (Bed Mobility Goal 1, PT)  minimum assist (75% or more patient effort)  - (r) CC (t) LH (c)     Time Frame (Bed Mobility Goal 1, PT)  long term goal (LTG);by discharge  -PRIMO (r) CC (t) LH (c)     Progress/Outcomes (Bed Mobility Goal 1, PT)  goal ongoing  -PRIMO (r) CC (t) PRIMO (c)     Row Name 11/12/20 1034          Transfer Goal 1 (PT)    Activity/Assistive Device (Transfer Goal 1, PT)  sit-to-stand/stand-to-sit;bed-to-chair/chair-to-bed;walker, rolling  -PRMIO (r) CC (t) LH (c)     [place] Level/Cues Needed (Transfer Goal 1, PT)  moderate assist (50-74% patient effort)  -PRIMO (r) CC (t) LH (c)     Time Frame (Transfer Goal 1, PT)  long term goal (LTG);by discharge  - (r) CC (t) PRIMO (c)     Row Name 11/12/20 1034          Gait Training Goal 1 (PT)    Activity/Assistive Device (Gait Training Goal 1, PT)  gait (walking locomotion);maintain weight-bearing status;walker,  rolling  -LH (r) CC (t) LH (c)     [place] Level (Gait Training Goal 1, PT)  moderate assist (50-74% patient effort)  -LH (r) CC (t) LH (c)     Time Frame (Gait Training Goal 1, PT)  long term goal (LTG);by discharge  -LH (r) CC (t) LH (c)     Progress/Outcome (Gait Training Goal 1, PT)  goal ongoing  -LH (r) CC (t) LH (c)     Row Name 11/12/20 1034          Patient Education Goal (PT)    Activity (Patient Education Goal, PT)  Pt will sit EOB with/without UE support for >/= 8 minutes to demonstrate improved activity tolerance.  -LH (r) CC (t) LH (c)     Time Frame (Patient Education Goal, PT)  long term goal (LTG);by discharge  -LH (r) CC (t) LH (c)     Progress/Outcome (Patient Education Goal, PT)  goal ongoing  -LH (r) CC (t) LH (c)       User Key  (r) = Recorded By, (t) = Taken By, (c) = Cosigned By    Initials Name Provider Type     Aguila Nguyen, PT Physical Therapist    CC Natalia Leiva, PT Student PT Student        Clinical Impression     Row Name 11/12/20 1034          Pain    Additional Documentation  Pain Scale: Numbers Pre/Post-Treatment (Group)  -LH (r) CC (t) LH (c)     Row Name 11/12/20 1034          Pain Scale: Numbers Pre/Post-Treatment    Pretreatment Pain Rating  8/10  -LH (r) CC (t) LH (c)     Posttreatment Pain Rating  8/10  -LH (r) CC (t) LH (c)     Pain Location - Side  Left  -LH (r) CC (t) LH (c)     Pain Location  hip  - (r) CC (t) LH (c)     Pain Intervention(s)  Medication (See MAR);Repositioned;Ambulation/increased activity  -LH (r) CC (t) LH (c)     Row Name 11/12/20 1034          Plan of Care Review    Plan of Care Reviewed With  patient  -LH (r) CC (t) LH (c)     Progress  no change  -LH (r) CC (t) LH (c)     Outcome Summary  PT eval completed. Pt alert and oriented x4. Checked with RN before seeing pt d/t unknown WB status earlier this am. RN stated that Dr. Bauer told her that pt was to be NWB in LLE and RN placed a new PT order and stated that PT was good to see her. Pt  "was very reluctant to participate with PT. When asked to move her legs during bed mobility, she would state \"I can't\" and then procede to begin moving her legs. She performed supine <>sit transfer with Mod A but was unable to tolerate sitting EOB > 3 minutes. She refused to attempt sit<>stand transfer d/t increased pain and fatigue with sitting. Pt maintained NWB status of LLE throughout the session. Pt would benefit from skilled PT in order to address strength deficits and improve functional mobility. Recommend d/c to SNF for further rehab upon d/c from facility.  -LH (r) CC (t) LH (c)     Row Name 11/12/20 1034          Therapy Assessment/Plan (PT)    Patient/Family Therapy Goals Statement (PT)  To stop hurting  -LH (r) CC (t) LH (c)     Rehab Potential (PT)  fair, will monitor progress closely  -LH (r) CC (t) LH (c)     Criteria for Skilled Interventions Met (PT)  yes;skilled treatment is necessary  -LH (r) CC (t) LH (c)     Predicted Duration of Therapy Intervention (PT)  until d/c  -LH (r) CC (t) LH (c)     Row Name 11/12/20 1034          Positioning and Restraints    Pre-Treatment Position  in bed  -LH (r) CC (t) LH (c)     Post Treatment Position  bed  -LH (r) CC (t) LH (c)     In Bed  fowlers;call light within reach;encouraged to call for assist;exit alarm on  -LH (r) CC (t) LH (c)       User Key  (r) = Recorded By, (t) = Taken By, (c) = Cosigned By    Initials Name Provider Type     Aguila Nguyen, PT Physical Therapist    CC Natalia Leiva, PT Student PT Student        Outcome Measures     Row Name 11/12/20 1034          How much help from another person do you currently need...    Turning from your back to your side while in flat bed without using bedrails?  2  -LH (r) CC (t) LH (c)     Moving from lying on back to sitting on the side of a flat bed without bedrails?  2  -LH (r) CC (t) LH (c)     Moving to and from a bed to a chair (including a wheelchair)?  1  -LH (r) CC (t) LH (c)     Standing up from " a chair using your arms (e.g., wheelchair, bedside chair)?  1  -LH (r) CC (t) LH (c)     Climbing 3-5 steps with a railing?  1  -LH (r) CC (t) LH (c)     To walk in hospital room?  1  -LH (r) CC (t) LH (c)     AM-PAC 6 Clicks Score (PT)  8  -LH (r) CC (t)     Row Name 11/12/20 1034          Functional Assessment    Outcome Measure Options  AM-PAC 6 Clicks Basic Mobility (PT)  -LH (r) CC (t) LH (c)       User Key  (r) = Recorded By, (t) = Taken By, (c) = Cosigned By    Initials Name Provider Type     Aguila Nguyen, PT Physical Therapist     Natalia Leiva, PT Student PT Student        Physical Therapy Education                 Title: PT OT SLP Therapies (In Progress)     Topic: Physical Therapy (In Progress)     Point: Mobility training (Done)     Learning Progress Summary           Patient Acceptance, E, VU by  at 11/12/2020 1118    Comment: Pt educated on PT's role in POC                   Point: Home exercise program (Not Started)     Learner Progress:  Not documented in this visit.          Point: Body mechanics (Not Started)     Learner Progress:  Not documented in this visit.          Point: Precautions (Not Started)     Learner Progress:  Not documented in this visit.                      User Key     Initials Effective Dates Name Provider Type [facility] 09/02/20 -  Natalia Leiva, PT Student PT Student PT              PT Recommendation and Plan  Planned Therapy Interventions (PT): balance training, bed mobility training, gait training, home exercise program, patient/family education, ROM (range of motion), strengthening, transfer training  Plan of Care Reviewed With: patient  Progress: no change  Outcome Summary: PT eval completed. Pt alert and oriented x4. Checked with RN before seeing pt d/t unknown WB status earlier this am. RN stated that Dr. Bauer told her that pt was to be NWB in LLE and RN placed a new PT order and stated that PT was good to see her. Pt was very reluctant to participate  "with PT. When asked to move her legs during bed mobility, she would state \"I can't\" and then procede to begin moving her legs. She performed supine <>sit transfer with Mod A but was unable to tolerate sitting EOB > 3 minutes. She refused to attempt sit<>stand transfer d/t increased pain and fatigue with sitting. Pt maintained NWB status of LLE throughout the session. Pt would benefit from skilled PT in order to address strength deficits and improve functional mobility. Recommend d/c to SNF for further rehab upon d/c from facility.     Time Calculation:   PT Charges     Row Name 11/12/20 1034             Time Calculation    Start Time  1027 with 7 minute chart review; Mod eval 3  -LH (r) CC (t) LH (c)      Stop Time  1100  -LH (r) CC (t) LH (c)      Time Calculation (min)  33 min  -LH (r) CC (t)      PT Received On  11/12/20  -LH (r) CC (t) LH (c)      PT Goal Re-Cert Due Date  11/22/20  -LH (r) CC (t) LH (c)        User Key  (r) = Recorded By, (t) = Taken By, (c) = Cosigned By    Initials Name Provider Type    Aguila Puga, PT Physical Therapist    Natalia Raza, PT Student PT Student            PT G-Codes  Outcome Measure Options: AM-PAC 6 Clicks Basic Mobility (PT)  AM-PAC 6 Clicks Score (PT): 8    Natalia Leiva, PT Student  11/12/2020    "

## 2020-11-12 NOTE — PLAN OF CARE
"  Problem: Adult Inpatient Plan of Care  Goal: Plan of Care Review  Outcome: Ongoing, Progressing  Flowsheets (Taken 11/12/2020 1034)  Progress: no change  Plan of Care Reviewed With: patient  Outcome Summary: PT eval completed. Pt alert and oriented x4. Checked with RN before seeing pt d/t unknown WB status earlier this am. RN stated that Dr. Bauer told her that pt was to be NWB in LLE and RN placed a new PT order and stated that PT was good to see her. Pt was very reluctant to participate with PT. When asked to move her legs during bed mobility, she would state \"I can't\" and then procede to begin moving her legs. She performed supine <>sit transfer with Mod A but was unable to tolerate sitting EOB > 3 minutes. She refused to attempt sit<>stand transfer d/t increased pain and fatigue with sitting. Pt maintained NWB status of LLE throughout the session. Pt would benefit from skilled PT in order to address strength deficits and improve functional mobility. Recommend d/c to SNF for further rehab upon d/c from facility.     "

## 2020-11-12 NOTE — ED PROVIDER NOTES
Subjective   76 y/o female arrives for evaluation of LEFT HIP PAIN s/p mechanical fall in which she lost her balance while walking with her walker falling backwards and striking her shoulder on the wall. She denies head trauma or LOC. She denies cp, sob, numbness, tingling, loss of sensation, cp, sob, weakness, abdominal pain. She notes ONLY left hip pain after the fall. She tells me she actually had surgery on the right hip recently. She arrives in [facility].         Family, social and past history reviewed as below, prior documentation of H and Ps and other documentation are reviewed:    Past Medical History:  No date: A-fib (CMS/HCC)  No date: Aortic stenosis  No date: Arthritis  No date: Bradycardia  No date: Cancer (CMS/HCC)      Comment:  bladder and skin  No date: Cardiomegaly  No date: CHF (congestive heart failure) (CMS/HCC)  No date: GERD (gastroesophageal reflux disease)  No date: Hard of hearing  No date: History of short term memory loss  No date: Hypercalcemia  No date: Hyperlipidemia  No date: Hypertension  11/20/2017: Hyperthyroidism  No date: Hypothyroidism  No date: Kidney stone  No date: Long term current use of anticoagulant therapy  No date: Open wound      Comment:  left lower extremity,  No date: Palpitation  No date: PONV (postoperative nausea and vomiting)  No date: Shortness of breath  No date: Sick sinus syndrome (CMS/HCC)  No date: Sleep apnea      Comment:  CPAP    Past Surgical History:  No date: AORTIC VALVE REPAIR/REPLACEMENT  No date: BLADDER SURGERY      Comment:  removed  No date: CARDIAC CATHETERIZATION  12/9/2016: CARDIAC CATHETERIZATION; N/A      Comment:  Procedure: Left Heart Cath;  Surgeon: Elia Flores MD;                Location:  [facility] CATH INVASIVE LOCATION;  Service:   9/24/2020: DISTAL FEMORAL NAILING; Right      Comment:  Procedure: RIGHT SHORT TFN;  Surgeon: Betito Shetty MD;  Location: [facility] OR;  Service: Orthopedics;                 Laterality:  Right;  No date: HIP BIPOLAR REPLACEMENT; Left  No date: HYSTERECTOMY  No date: ILEOSTOMY  No date: JOINT REPLACEMENT  No date: KIDNEY SURGERY      Comment:  right kidney removed  No date: NEPHRECTOMY RADICAL; Right      Comment:  from cancer  4/10/2017: VARICOSE VEIN SURGERY; Left      Comment:  Procedure: LEFT LOWER EXTREMITY VENOGRAM. LEFT SAPHENOUS               VEIN RADIO FREQUENCY ABLATION;  Surgeon: Blake Martinez DO;  Location: [facility] OR;  Service:     Social History    Socioeconomic History      Marital status:       Spouse name: Not on file      Number of children: Not on file      Years of education: Not on file      Highest education level: Not on file    Tobacco Use      Smoking status: Never Smoker      Smokeless tobacco: Never Used    Substance and Sexual Activity      Alcohol use: No      Drug use: No      Sexual activity: Defer      Family history: reviewed and noncontributory           Review of Systems   All other systems reviewed and are negative.      Past Medical History:   Diagnosis Date   • A-fib (CMS/HCC)    • Aortic stenosis    • Arthritis    • Bradycardia    • Cancer (CMS/HCC)     bladder and skin   • Cardiomegaly    • CHF (congestive heart failure) (CMS/HCC)    • GERD (gastroesophageal reflux disease)    • Hard of hearing    • History of short term memory loss    • Hypercalcemia    • Hyperlipidemia    • Hypertension    • Hyperthyroidism 11/20/2017   • Hypothyroidism    • Kidney stone    • Long term current use of anticoagulant therapy    • Open wound     left lower extremity,   • Palpitation    • PONV (postoperative nausea and vomiting)    • Shortness of breath    • Sick sinus syndrome (CMS/HCC)    • Sleep apnea     CPAP       Allergies   Allergen Reactions   • Ciprofloxacin Itching   • Codeine Itching and GI Intolerance   • Definity [Perflutren Lipid Microsphere] Other (See Comments)     Back pain   • Tetanus Toxoids Itching     Itching around site   •  Tizanidine Hcl Itching   • Other Itching     Cloth bandaids , causes redness of skin       Past Surgical History:   Procedure Laterality Date   • AORTIC VALVE REPAIR/REPLACEMENT     • BLADDER SURGERY      removed   • CARDIAC CATHETERIZATION     • CARDIAC CATHETERIZATION N/A 12/9/2016    Procedure: Left Heart Cath;  Surgeon: Elia Flores MD;  Location:  [facility] CATH INVASIVE LOCATION;  Service:    • DISTAL FEMORAL NAILING Right 9/24/2020    Procedure: RIGHT SHORT TFN;  Surgeon: Betito Shetty MD;  Location: [facility] OR;  Service: Orthopedics;  Laterality: Right;   • HIP BIPOLAR REPLACEMENT Left    • HYSTERECTOMY     • ILEOSTOMY     • JOINT REPLACEMENT     • KIDNEY SURGERY      right kidney removed   • NEPHRECTOMY RADICAL Right     from cancer   • VARICOSE VEIN SURGERY Left 4/10/2017    Procedure: LEFT LOWER EXTREMITY VENOGRAM. LEFT SAPHENOUS VEIN RADIO FREQUENCY ABLATION;  Surgeon: Blake Martinez DO;  Location:  [facility] OR;  Service:        Family History   Problem Relation Age of Onset   • Heart failure Mother    • Heart disease Father    • Stroke Father    • Hypertension Sister    • Heart failure Sister    • No Known Problems Brother    • No Known Problems Maternal Grandmother    • No Known Problems Maternal Grandfather    • No Known Problems Paternal Grandmother    • No Known Problems Paternal Grandfather    • Hypertension Sister    • Heart failure Sister    • Hypertension Sister    • Heart failure Sister    • Hypertension Sister    • Heart failure Sister    • Hypertension Sister    • Heart failure Sister    • Hypertension Sister    • Heart failure Sister    • Gallbladder disease Brother    • COPD Daughter    • Asthma Daughter    • Diabetes Son    • No Known Problems Son    • Autism Son        Social History     Socioeconomic History   • Marital status:      Spouse name: Not on file   • Number of children: Not on file   • Years of education: Not on file   • Highest education level: Not on file    Tobacco Use   • Smoking status: Never Smoker   • Smokeless tobacco: Never Used   Substance and Sexual Activity   • Alcohol use: No   • Drug use: No   • Sexual activity: Defer           Objective   Physical Exam  Vitals signs and nursing note reviewed.   Constitutional:       Appearance: Normal appearance.   HENT:      Head: Normocephalic and atraumatic.      Comments: No signs of trauma      Right Ear: External ear normal.      Left Ear: External ear normal.      Nose: Nose normal.      Mouth/Throat:      Mouth: Mucous membranes are moist.   Eyes:      Extraocular Movements: Extraocular movements intact.      Pupils: Pupils are equal, round, and reactive to light.   Neck:      Musculoskeletal: Normal range of motion and neck supple. No neck rigidity or muscular tenderness.   Cardiovascular:      Rate and Rhythm: Normal rate and regular rhythm.      Pulses: Normal pulses.      Heart sounds: Normal heart sounds.   Pulmonary:      Effort: Pulmonary effort is normal.      Breath sounds: Normal breath sounds.   Abdominal:      General: Abdomen is flat. Bowel sounds are normal. There is no distension.      Palpations: Abdomen is soft. There is no mass.      Tenderness: There is no abdominal tenderness.   Musculoskeletal:         General: Tenderness present. No swelling, deformity or signs of injury.      Right shoulder: Normal.      Left shoulder: Normal.      Right hip: Normal.      Left hip: She exhibits tenderness. She exhibits normal range of motion, normal strength, no bony tenderness, no swelling, no crepitus, no deformity and no laceration.      Left knee: Normal.      Cervical back: Normal.      Thoracic back: Normal.      Lumbar back: Normal.      Comments: Pain on palpation over the left hip. There is no deformity, no shortening, no rotation. Distally pulses and sensation are intact, no pain to the back, no pain to the lower extremities further, no pain to the upper extremities.    Skin:     General: Skin is  warm and dry.      Capillary Refill: Capillary refill takes less than 2 seconds.      Coloration: Skin is not jaundiced or pale.      Findings: No bruising or erythema.   Neurological:      General: No focal deficit present.      Mental Status: She is alert and oriented to person, place, and time.      Cranial Nerves: No cranial nerve deficit.      Sensory: No sensory deficit.      Motor: No weakness.   Psychiatric:         Mood and Affect: Mood normal.         Behavior: Behavior normal.         Thought Content: Thought content normal.         Procedures           ED Course  ED Course as of Nov 12 0339   u Nov 12, 2020 0313 Dr. Bauer did state nonopeative but feels she may need admission to medicine for pain control. The patient cannot walk on this leg and I do agree this is the best course of action for her.     []   0314 Patient did not strike her head nor have LOC. She has normal neurological examination. Given this I did not order a head CT.     []      ED Course User Index  [JH] Ludin Phillips MD           XR Chest 1 View   ED Interpretation   Prosthetic aortic valve is noted, no acute findings.       XR Hip With or Without Pelvis 2 - 3 View Left   ED Interpretation   Abnormal   Rimma-hardware fracture noted.       XR Femur 2 View Left   ED Interpretation   No acute findings        Labs Reviewed   BASIC METABOLIC PANEL - Abnormal; Notable for the following components:       Result Value    Glucose 129 (*)     BUN 38 (*)     Creatinine 1.32 (*)     eGFR Non African Amer 39 (*)     BUN/Creatinine Ratio 28.8 (*)     All other components within normal limits    Narrative:     GFR Normal >60  Chronic Kidney Disease <60  Kidney Failure <15     PROTIME-INR - Abnormal; Notable for the following components:    Protime 19.0 (*)     INR 1.63 (*)     All other components within normal limits   CBC WITH AUTO DIFFERENTIAL - Abnormal; Notable for the following components:    WBC 12.86 (*)     MCV 98.4 (*)      Neutrophil % 77.6 (*)     Lymphocyte % 7.9 (*)     Neutrophils, Absolute 9.99 (*)     Monocytes, Absolute 1.46 (*)     All other components within normal limits   COVID-19,ABBOTT IN-HOUSE,NP SWAB (NO TRANSPORT MEDIA) 2 HR TAT - Normal    Narrative:     Fact sheet for providers: https://www.fda.gov/media/096205/download     Fact sheet for patients: https://www.fda.gov/media/020338/download   APTT - Normal   COVID PRE-OP / PRE-PROCEDURE SCREENING ORDER (NO ISOLATION)    Narrative:     The following orders were created for panel order COVID PRE-OP / PRE-PROCEDURE SCREENING ORDER (NO ISOLATION) - Swab, Nasal Cavity.  Procedure                               Abnormality         Status                     ---------                               -----------         ------                     COVID-19, ABBOTT IN-HOUS...[[id number]]  Normal              Final result                 Please view results for these tests on the individual orders.   URINALYSIS W/ MICROSCOPIC IF INDICATED (NO CULTURE)   CBC AND DIFFERENTIAL    Narrative:     The following orders were created for panel order CBC & Differential.  Procedure                               Abnormality         Status                     ---------                               -----------         ------                     CBC Auto Differential[[id number]]        Abnormal            Final result                 Please view results for these tests on the individual orders.                                     MDM    Final diagnoses:   Closed fracture of left hip, initial encounter (CMS/[facility])            Ludin Phillips MD  11/12/20 0316       Ludin Phillips MD  11/12/20 0339

## 2020-11-12 NOTE — PLAN OF CARE
Goal Outcome Evaluation:  Plan of Care Reviewed With: patient  Progress: no change  Outcome Summary: Patient A+Ox4, able to wiggle BLE but not attempting to lift off of bed, urostomy drainging to right side of bed- told afternoon therapist that she wanted something for pain, but then when offered pain pill- pt refused. Turn Q2 as patient is reluctant to move on own. Tried to start with miralax today but patient is reluctant as well- continue to try and educate of importance of taking miralax. Will continue to monitor and notify MD of any changes

## 2020-11-12 NOTE — PROGRESS NOTES
[facility] Medicine Services  INPATIENT PROGRESS NOTE    Length of Stay: 0  Date of Admission: 11/12/2020  Primary Care Physician: Mary Beth Izquierdo APRN    Subjective   Chief Complaint: Follow-up left hip pain  HPI   Patient seen after midnight by Dr. Francisco.    Patient resting in bed.  She rates her left hip pain an 8 out of 10 presently.  She states she was doing very well at home and was using a walker to ambulate.  She lost her balance when trying to walk back to the bedroom from the bathroom.  She states she fell backward.  She does not believe she hit her head.  She denies any chest pain or shortness of breath.  She denies abdominal pain, nausea, or vomiting.    Review of Systems   All pertinent negatives and positives are as above. All other systems have been reviewed and are negative unless otherwise stated.     Objective    Temp:  [97.9 °F (36.6 °C)-98.8 °F (37.1 °C)] 98.1 °F (36.7 °C)  Heart Rate:  [] 76  Resp:  [16-18] 16  BP: (101-158)/(68-98) 108/72  Physical Exam  Vitals signs and nursing note reviewed.   Constitutional:       General: She is not in acute distress.     Appearance: Normal appearance. She is obese. She is not toxic-appearing.   HENT:      Head: Normocephalic and atraumatic.   Neck:      Musculoskeletal: Normal range of motion and neck supple. No muscular tenderness.   Cardiovascular:      Rate and Rhythm: Normal rate. Rhythm irregular.      Pulses: Normal pulses.      Heart sounds: Murmur present.      Comments: A. fib 86-94  Pulmonary:      Effort: Pulmonary effort is normal.      Breath sounds: Normal breath sounds. No wheezing or rales.   Abdominal:      General: Bowel sounds are normal. There is no distension.      Palpations: Abdomen is soft.      Tenderness: There is no abdominal tenderness.      Comments: Protuberant   Musculoskeletal: Normal range of motion.         General: Tenderness present.      Right lower leg: Edema present.       Left lower leg: Edema present.   Skin:     General: Skin is warm and dry.      Findings: No erythema or rash.   Neurological:      General: No focal deficit present.      Mental Status: She is alert and oriented to person, place, and time.   Psychiatric:         Mood and Affect: Mood normal.         Behavior: Behavior normal.         Thought Content: Thought content normal.         Judgment: Judgment normal.       Results Review:  I have reviewed the labs, radiology results, and diagnostic studies.    Laboratory Data:   Results from last 7 days   Lab Units 11/12/20 0223   WBC 10*3/mm3 12.86*   HEMOGLOBIN g/dL 14.2   HEMATOCRIT % 42.6   PLATELETS 10*3/mm3 189     Results from last 7 days   Lab Units 11/12/20 0223   SODIUM mmol/L 141  141   POTASSIUM mmol/L 4.1  4.1   CHLORIDE mmol/L 105  105   CO2 mmol/L 29.0  29.0   BUN mg/dL 38*  38*   CREATININE mg/dL 1.32*  1.32*   CALCIUM mg/dL 9.8  9.8   BILIRUBIN mg/dL 0.6   ALK PHOS U/L 148*   ALT (SGPT) U/L 33   AST (SGOT) U/L 18   GLUCOSE mg/dL 129*  129*     Radiology Data:   Imaging Results (Last 24 Hours)     Procedure Component Value Units Date/Time    XR Chest 1 View [[id number]] Collected: 11/12/20 0716     Updated: 11/12/20 0721    Narrative:      EXAMINATION: XR CHEST 1 VW-  11/12/2020 7:16 AM CST 1 view     HISTORY: Hip fracture     COMPARISON: 09/23/2020.     FINDINGS:   Prosthetic aortic valve redemonstrated. No definite airspace  consolidation or pneumothorax.      The cardiac silhouette is mildly prominent, similar to the previous  examination given differences in technique and rotation.     Degenerative changes in the LEFT shoulder and in the spine.          Impression:         1.  No definite acute findings on this exam.        This report was finalized on 11/12/2020 07:18 by Dr. Umer Soria MD.    XR Femur 2 View Left [[id number]] Collected: 11/12/20 0712     Updated: 11/12/20 0715    Narrative:      LEFT FEMUR, 2 VIEWS 11/12/2020 12:41 AM  CST     HISTORY: fall with leg pain     COMPARISON: NONE     FINDINGS:     Frontal and lateral radiographs of the left femur were obtained.     LEFT hip arthroplasty hardware is in place. There is an acute fracture  line along the lateral cortex of the proximal LEFT femur adjacent to the  femoral stem component. The soft tissues are grossly unremarkable.  Limited evaluation of the hip and knee joints is unremarkable.  Degenerative changes are noted in the knee.       Impression:      1. Acute fracture of the proximal LEFT femur adjacent to the  arthroplasty hardware..  This report was finalized on 11/12/2020 07:12 by Dr. Umre Soria MD.    XR Hip With or Without Pelvis 2 - 3 View Left [[id number]] Collected: 11/12/20 0708     Updated: 11/12/20 0715    Narrative:      Left hip, 2 views 11/12/2020 12:41 AM CST  AP Pelvis 11/12/2020 12:41 AM CST     History: fall with hip pain     Comparison: 09/24/2020      Findings:   Frontal and lateral views of the left hip were obtained. Frontal view of  the pelvis was also obtained.      The patient is status post LEFT hip arthroplasty. There is a cortical  fracture along the lateral cortex of the LEFT proximal femur surrounding  the femoral stem component of the arthroplasty. Postoperative changes  are noted to the proximal RIGHT femur with incompletely healed fracture  lines. Extensive surgical change suspected in the pelvis with numerous  surgical clips. No gross soft tissue abnormality is visualized.        Impression:      Impression:   1. Acute, mildly displaced fracture line involving the lateral cortex of  the proximal LEFT femur.        This report was finalized on 11/12/2020 07:12 by Dr. Umer Soria MD.          I have reviewed the patient current medications.     Assessment/Plan     Active Hospital Problems    Diagnosis   • Closed fracture of left hip (CMS/HCC)   • S/P TAVR (transcatheter aortic valve replacement)   • Chronic anticoagulation   • Chronic  diastolic heart failure (CMS/HCC)   • ASA (obstructive sleep apnea)   • Chronic atrial fibrillation (CMS/HCC)   • Deep vein thrombosis (DVT) of left lower extremity (CMS/HCC)   • Essential hypertension     Plan:  1.  Patient admitted this morning following a fall at home.  She had resultant left hip pain.  X-ray shows acute, mildly displaced fracture line involving the lateral cortex of the proximal left femur.  2.  The patient had a recent admission from 9/21 through 9/28 following a right femur fracture status post trochanteric fixation nailing on 9/25.  The patient had been discharged to a skilled nursing facility for rehabilitation however had since been discharged home.  3.  Orthopedic surgery has evaluated.  They have opted for nonoperative management.  Will need follow-up within the next week with repeat x-rays.  Strict nonweightbearing to left lower extremity.  4.  PT has recommended discharge to skilled nursing facility for further rehabilitation efforts.  Patient refuses at this time, will need to monitor her progress.  5.  Patient with history of chronic atrial fibrillation and aortic valve disease status post TAVR.  She also has history of DVT.  She is chronically anticoagulated with warfarin.  INR subtherapeutic on admission at 1.6.  Lovenox added to bridge, pharmacy dosing warfarin.  6.  Renal function at or near baseline.  Ok to discontinue IV fluids as scheduled.  7.  Urinalysis positive for leukocytes, 31-50 white blood cells, and 1+ bacteria.  The patient had a urine culture in September positive for E. coli and Morganella, both susceptible to Rocephin.  Start Rocephin 1 g IV daily and add urine culture to existing urine specimen.  8.  MiraLAX and Senokot for bowel regimen  9.  Continue current medications for pain    Discharge Planning: I expect the patient to be discharged to home with home health in 1-2 days.    Electronically signed by IRINEO Go, 11/12/2020, 15:28 CST.     .I  personally evaluated and examined the patient in conjunction with IRINEO Rowe and agree with the assessment, treatment plan, and disposition of the patient as recorded by her. My history, exam, and further recommendations are: Left hip pain.  Status post fall per patient.  No surgical treatment discussed with patient.  Patient refused rehab placement.  Patient also refused home health.  Lungs clear to auscultation.  Cardiac irregular, murmur.  Assessment plan left hip fracture, conservative treatment per orthopedics.  UTI, continue Rocephin antibiotics.  KT      Electronically signed by Rehan Loza MD, 11/12/2020, 17:17 CST.

## 2020-11-12 NOTE — CONSULTS
Orthopaedic Surgery Consult Note      11/12/2020   14:54 CST    Name:  Jarvis Morgan  MRN:    [id number]     Acct:     [id number]   Room:  [address] [place] [address] Day: 0     Admit Date: 11/12/2020  PCP: Mary Beth Izquierdo APRN        Subjective:     HPI: 75 y.o. female who presents after a fall on her left hip.  She has a significant history of left NILE done by Dr. Shetty previously.  She is unsure of what caused her fall.  This occurred one day prior.  Xrays noted to have a periprosthetic fracture.  Orthopedics consulted for further guidance.  Currently her pain is well controlled.  She denies loss of sensation to her left lower extremity.  Denies LOC or current headaches.  Denies shortness of breath.  Does also have a history of a right hip fracture with right short TFN done by Dr. Shetty about 6 weeks prior.      Medications:     Allergies:   Allergies   Allergen Reactions   • Ciprofloxacin Itching   • Codeine Itching and GI Intolerance   • Definity [Perflutren Lipid Microsphere] Other (See Comments)     Back pain   • Tetanus Toxoids Itching     Itching around site   • Tizanidine Hcl Itching   • Other Itching     Cloth bandaids , causes redness of skin       Current Meds:   Current Facility-Administered Medications   Medication Dose Route Frequency Provider Last Rate Last Dose   • acetaminophen (TYLENOL) tablet 650 mg  650 mg Oral Q4H PRN Mode Francisco MD        Or   • acetaminophen (TYLENOL) 160 MG/5ML solution 650 mg  650 mg Oral Q4H PRN Mode Francisco MD        Or   • acetaminophen (TYLENOL) suppository 650 mg  650 mg Rectal Q4H PRN Mode Francisco MD       • atorvastatin (LIPITOR) tablet 40 mg  40 mg Oral Daily Mode Francisco MD   40 mg at 11/12/20 0848   • bisacodyl (DULCOLAX) suppository 10 mg  10 mg Rectal Daily PRN Mode Francisco MD       • calcitriol (ROCALTROL) capsule 0.25 mcg  0.25 mcg Oral Daily Mode Francisco MD   0.25 mcg at 11/12/20 0847   • dilTIAZem CD (CARDIZEM CD) 24 hr  capsule 180 mg  180 mg Oral Q24H Mode Francisco MD   180 mg at 11/12/20 0847   • donepezil (ARICEPT) tablet 5 mg  5 mg Oral Nightly Mode Francisco MD       • enoxaparin (LOVENOX) syringe 100 mg  1 mg/kg Subcutaneous Once Maddie Ruiz APRN       • [START ON 11/13/2020] enoxaparin (LOVENOX) syringe 100 mg  1 mg/kg Subcutaneous Q12H Maddie Ruiz APRN       • furosemide (LASIX) tablet 20 mg  20 mg Oral Daily Mode Francisco MD   20 mg at 11/12/20 0853   • HYDROmorphone (DILAUDID) injection 0.5 mg  0.5 mg Intravenous Q2H PRN Mode Francisco MD        And   • naloxone (NARCAN) injection 0.4 mg  0.4 mg Intravenous Q5 Min PRN Mode Francisco MD       • ipratropium-albuterol (DUO-NEB) nebulizer solution 3 mL  3 mL Nebulization 4x Daily - RT Mode Francisco MD   3 mL at 11/12/20 1148   • [START ON 11/14/2020] methIMAzole (TAPAZOLE) tablet 10 mg  10 mg Oral Once per day on Sun Mon Tue Wed Thu Sat Rehan Loza MD       • metoprolol tartrate (LOPRESSOR) tablet 25 mg  25 mg Oral BID Mode Francisco MD   25 mg at 11/12/20 0847   • ondansetron (ZOFRAN) tablet 4 mg  4 mg Oral Q6H PRN Mode Francisco MD        Or   • ondansetron (ZOFRAN) injection 4 mg  4 mg Intravenous Q6H PRN Mode Francisco MD       • oxyCODONE-acetaminophen (PERCOCET) 5-325 MG per tablet 1 tablet  1 tablet Oral Q6H PRN Mode Francisco MD   1 tablet at 11/12/20 0853   • sodium chloride 0.9 % flush 10 mL  10 mL Intravenous Q12H Mode Francisco MD       • sodium chloride 0.9 % flush 10 mL  10 mL Intravenous PRN Mode Francisco MD       • sodium chloride 0.9 % infusion  75 mL/hr Intravenous Continuous Mode Francisco MD 75 mL/hr at 11/12/20 0650 75 mL/hr at 11/12/20 0650   • [START ON 11/13/2020] warfarin (COUMADIN) tablet 5 mg  5 mg Oral Once per day on Sun Mon Wed Fri Mode Francisco MD       • warfarin (COUMADIN) tablet 7.5 mg  7.5 mg Oral Once per day on Tue Thu Sat Mode Francisco MD                Data:     Code Status:    Code Status and Medical Interventions:   Ordered at: 11/12/20 0548     Level Of Support Discussed With:    Patient     Code Status:    CPR     Medical Interventions (Level of Support Prior to Arrest):    Full       Family History   Problem Relation Age of Onset   • Heart failure Mother    • Heart disease Father    • Stroke Father    • Hypertension Sister    • Heart failure Sister    • No Known Problems Brother    • No Known Problems Maternal Grandmother    • No Known Problems Maternal Grandfather    • No Known Problems Paternal Grandmother    • No Known Problems Paternal Grandfather    • Hypertension Sister    • Heart failure Sister    • Hypertension Sister    • Heart failure Sister    • Hypertension Sister    • Heart failure Sister    • Hypertension Sister    • Heart failure Sister    • Hypertension Sister    • Heart failure Sister    • Gallbladder disease Brother    • COPD Daughter    • Asthma Daughter    • Diabetes Son    • No Known Problems Son    • Autism Son        Social History     Socioeconomic History   • Marital status:      Spouse name: Not on file   • Number of children: Not on file   • Years of education: Not on file   • Highest education level: Not on file   Tobacco Use   • Smoking status: Never Smoker   • Smokeless tobacco: Never Used   Substance and Sexual Activity   • Alcohol use: No   • Drug use: No   • Sexual activity: Defer       Past Medical History:   Diagnosis Date   • A-fib (CMS/HCC)    • Aortic stenosis    • Arthritis    • Bradycardia    • Cancer (CMS/HCC)     bladder and skin   • Cardiomegaly    • CHF (congestive heart failure) (CMS/HCC)    • GERD (gastroesophageal reflux disease)    • Hard of hearing    • History of short term memory loss    • Hypercalcemia    • Hyperlipidemia    • Hypertension    • Hyperthyroidism 11/20/2017   • Hypothyroidism    • Kidney stone    • Long term current use of anticoagulant therapy    • Open wound     left lower  extremity,   • Palpitation    • PONV (postoperative nausea and vomiting)    • Shortness of breath    • Sick sinus syndrome (CMS/HCC)    • Sleep apnea     CPAP     Past Surgical History:   Procedure Laterality Date   • AORTIC VALVE REPAIR/REPLACEMENT       • BLADDER SURGERY         removed   • CARDIAC CATHETERIZATION       • CARDIAC CATHETERIZATION N/A 12/9/2016     Procedure: Left Heart Cath;  Surgeon: Elia Flores MD;  Location:  [facility];  Service:    • DISTAL FEMORAL NAILING Right 9/24/2020     Procedure: RIGHT SHORT TFN;  Surgeon: Betito Shetty MD;  Location:  [facility] OR;  Service: Orthopedics;  Laterality: Right;   • HIP BIPOLAR REPLACEMENT Left     • HYSTERECTOMY       • ILEOSTOMY       • JOINT REPLACEMENT       • KIDNEY SURGERY         right kidney removed   • NEPHRECTOMY RADICAL Right       from cancer   • VARICOSE VEIN SURGERY Left 4/10/2017     Procedure: LEFT LOWER EXTREMITY VENOGRAM. LEFT SAPHENOUS VEIN RADIO FREQUENCY ABLATION;  Surgeon: Blake Martinez DO;  Location:  [facility] OR;  Service:          Review of Symptoms:  CONSTITUTIONAL:  negative for  fevers, chills, sweats, fatigue, malaise, anorexia and weight loss  EYES:  negative for  double vision, blurred vision and visual disturbance  HEENT:  negative for  hearing loss, tinnitus, ear drainage, earaches, nasal congestion, epistaxis, snoring, sore mouth, sore throat, hoarseness and voice change  RESPIRATORY:  negative for  dry cough, cough with sputum, dyspnea, wheezing, hemoptysis, chest pain, pleuritic pain and cyanosis  CARDIOVASCULAR:  negative for  chest pain, palpitations, orthopnea, exertional chest pressure/discomfort, edema  GASTROINTESTINAL:  negative for nausea, vomiting, change in bowel habits, diarrhea, constipation, abdominal pain, jaundice, dysphagia, regurgitation, hematemesis and hemtochezia  GENITOURINARY:  negative for frequency, dysuria, nocturia, hesitancy and hematuria  INTEGUMENT/BREAST:  negative for  "rash, skin lesion(s), dryness, skin color change, pruritus, changes in hair and changes in nails  HEMATOLOGIC/LYMPHATIC:  negative for easy bruising, bleeding, lymphadenopathy, petechiae and swelling/edema  ALLERGIC/IMMUNOLOGIC:  negative for recurrent infections and urticaria  ENDOCRINE:  negative for heat intolerance, cold intolerance, tremor, weight changes, hair loss and diabetic symptoms including neither polyuria nor polydipsia  MUSCULOSKELETAL:  See HPI  NEUROLOGICAL:  negative for headaches, dizziness, seizures, memory problems, speech problems, visual disturbance, coordination problems, gait problems, tremor, syncope and near syncope  BEHAVIOR/PSYCH:  negative for depressed mood, elated mood, increased agitation and anxiety      Vitals:  /72 (BP Location: Left arm, Patient Position: Lying)   Pulse 76   Temp 98.1 °F (36.7 °C) (Oral)   Resp 16   Ht 170.2 cm (67\")   Wt 103 kg (227 lb 1.6 oz)   LMP  (LMP Unknown)   SpO2 93%   BMI 35.57 kg/m²   Temp (24hrs), Av.2 °F (36.8 °C), Min:97.9 °F (36.6 °C), Max:98.8 °F (37.1 °C)      I/O (24Hr):    Intake/Output Summary (Last 24 hours) at 2020 1454  Last data filed at 2020 0530  Gross per 24 hour   Intake --   Output 200 ml   Net -200 ml       Labs:  Lab Results (last 72 hours)     Procedure Component Value Units Date/Time    Urinalysis, Microscopic Only - Urine, Clean Catch [[id number]]  (Abnormal) Collected: 20    Specimen: Urine, Clean Catch Updated: 20     RBC, UA 0-2 /HPF      WBC, UA 31-50 /HPF      Bacteria, UA 1+ /HPF      Squamous Epithelial Cells, UA None Seen /HPF      Hyaline Casts, UA 7-12 /LPF      Methodology Automated Microscopy    Comprehensive Metabolic Panel [[id number]]  (Abnormal) Collected: 20    Specimen: Blood Updated: 20     Glucose 129 mg/dL      BUN 38 mg/dL      Creatinine 1.32 mg/dL      Sodium 141 mmol/L      Potassium 4.1 mmol/L      Chloride 105 mmol/L      CO2 29.0 " mmol/L      Calcium 9.8 mg/dL      Total Protein 6.7 g/dL      Albumin 3.30 g/dL      ALT (SGPT) 33 U/L      AST (SGOT) 18 U/L      Alkaline Phosphatase 148 U/L      Total Bilirubin 0.6 mg/dL      eGFR Non African Amer 39 mL/min/1.73      Globulin 3.4 gm/dL      A/G Ratio 1.0 g/dL      BUN/Creatinine Ratio 28.8     Anion Gap 7.0 mmol/L     Narrative:      GFR Normal >60  Chronic Kidney Disease <60  Kidney Failure <15      Urinalysis With Microscopic If Indicated (No Culture) - Urine, Clean Catch [[id number]]  (Abnormal) Collected: 11/12/20 0701    Specimen: Urine, Clean Catch Updated: 11/12/20 0716     Color, UA Yellow     Appearance, UA Clear     pH, UA 6.5     Specific Gravity, UA 1.016     Glucose, UA Negative     Ketones, UA Negative     Bilirubin, UA Negative     Blood, UA Trace     Protein, UA 30 mg/dL (1+)     Leuk Esterase, UA Moderate (2+)     Nitrite, UA Negative     Urobilinogen, UA 0.2 E.U./dL    Magnesium [[id number]]  (Normal) Collected: 11/12/20 0223    Specimen: Blood Updated: 11/12/20 0711     Magnesium 1.7 mg/dL     COVID PRE-OP / PRE-PROCEDURE SCREENING ORDER (NO ISOLATION) - Swab, Nasal Cavity [[id number]]  (Normal) Collected: 11/12/20 0217    Specimen: Swab from Nasal Cavity Updated: 11/12/20 0258    Narrative:      The following orders were created for panel order COVID PRE-OP / PRE-PROCEDURE SCREENING ORDER (NO ISOLATION) - Swab, Nasal Cavity.  Procedure                               Abnormality         Status                     ---------                               -----------         ------                     COVID-19, ABBOTT IN-HOUS...[[id number]]  Normal              Final result                 Please view results for these tests on the individual orders.    COVID-19, ABBOTT IN-HOUSE,NP Swab (NO TRANSPORT MEDIA) 2 HR TAT - Swab, Nasal Cavity [[id number]]  (Normal) Collected: 11/12/20 0217    Specimen: Swab from Nasal Cavity Updated: 11/12/20 0258     COVID19 Not Detected    Narrative:       Fact sheet for providers: https://www.fda.gov/media/704007/download     Fact sheet for patients: https://www.fda.gov/media/093703/download    Basic Metabolic Panel [[id number]]  (Abnormal) Collected: 11/12/20 0223    Specimen: Blood Updated: 11/12/20 0243     Glucose 129 mg/dL      BUN 38 mg/dL      Creatinine 1.32 mg/dL      Sodium 141 mmol/L      Potassium 4.1 mmol/L      Chloride 105 mmol/L      CO2 29.0 mmol/L      Calcium 9.8 mg/dL      eGFR Non African Amer 39 mL/min/1.73      BUN/Creatinine Ratio 28.8     Anion Gap 7.0 mmol/L     Narrative:      GFR Normal >60  Chronic Kidney Disease <60  Kidney Failure <15      Protime-INR [[id number]]  (Abnormal) Collected: 11/12/20 0223    Specimen: Blood Updated: 11/12/20 0239     Protime 19.0 Seconds      INR 1.63    aPTT [[id number]]  (Normal) Collected: 11/12/20 0223    Specimen: Blood Updated: 11/12/20 0239     PTT 29.9 seconds     CBC & Differential [[id number]]  (Abnormal) Collected: 11/12/20 0223    Specimen: Blood Updated: 11/12/20 0230    Narrative:      The following orders were created for panel order CBC & Differential.  Procedure                               Abnormality         Status                     ---------                               -----------         ------                     CBC Auto Differential[[id number]]        Abnormal            Final result                 Please view results for these tests on the individual orders.    CBC Auto Differential [[id number]]  (Abnormal) Collected: 11/12/20 0223    Specimen: Blood Updated: 11/12/20 0230     WBC 12.86 10*3/mm3      RBC 4.33 10*6/mm3      Hemoglobin 14.2 g/dL      Hematocrit 42.6 %      MCV 98.4 fL      MCH 32.8 pg      MCHC 33.3 g/dL      RDW 13.8 %      RDW-SD 50.8 fl      MPV 9.2 fL      Platelets 189 10*3/mm3      Neutrophil % 77.6 %      Lymphocyte % 7.9 %      Monocyte % 11.4 %      Eosinophil % 2.5 %      Basophil % 0.2 %      Immature Grans % 0.4 %      Neutrophils, Absolute 9.99 10*3/mm3       Lymphocytes, Absolute 1.02 10*3/mm3      Monocytes, Absolute 1.46 10*3/mm3      Eosinophils, Absolute 0.32 10*3/mm3      Basophils, Absolute 0.02 10*3/mm3      Immature Grans, Absolute 0.05 10*3/mm3      nRBC 0.0 /100 WBC           Imaging:  Xr Femur 2 View Left    Result Date: 11/12/2020  1. Acute fracture of the proximal LEFT femur adjacent to the arthroplasty hardware.. This report was finalized on 11/12/2020 07:12 by Dr. Umer Soria MD.    Xr Chest 1 View    Result Date: 11/12/2020   1.  No definite acute findings on this exam.   This report was finalized on 11/12/2020 07:18 by Dr. Umer Soria MD.    Xr Hip With Or Without Pelvis 2 - 3 View Left    Result Date: 11/12/2020  Impression: 1. Acute, mildly displaced fracture line involving the lateral cortex of the proximal LEFT femur.   This report was finalized on 11/12/2020 07:12 by Dr. Umer Soria MD.          Physical Exam:     General: Pleasant mood and appropriate affect. Well nourished.  HEENT: NC/AT, PHILIP, EOMI, Nares patent bilaterally with no epistaxis noted.  Neck: Soft throughout with no obvious masses.  Chest: +2 distal pulses throughout, no heaves or lifts seen.  Respiratory: Equal rise and fall bilaterally, no retractions or rhonchi or wheezes noted.  Abdomen: Obese and non tender.  Skin: Pink and dry with appropriate turgor.  Neuro: CN II-XII grossly intact, no focal deficits noted.  Left Hip: No skin breakdown or open wounds.  TTP over lateral hip.  No abnormal alignment present.  Unable to WB on the left side.  NVI.       Assessment:     Primary Orthopaedic Problem  Left hip periprosthetic fracture, proximal aspect, minimally displaced, closed, initial encounter  S/p fall on left hip     Plan:     · Continue current pain control measures.  · Will have patient follow up with Dr. Shetty next week with repeat xrays  · Currently, patient advised to continue strict NWB to LLE    Electronically signed by Gigi Felix PA-C on 11/12/2020  at 14:54 CST       ADDENDUM: Imaging of patient's hip reviewed by myself and Dr. Bauer.  Patient has a minimally displaced freddy-prosthetic fracture.  There is a NILE on the left and a short TFN on the right.  Evidence of recent fracture on right side.

## 2020-11-12 NOTE — PLAN OF CARE
Goal Outcome Evaluation:  Plan of Care Reviewed With: patient  Progress: no change  Outcome Summary: Pt arrived to floor from ER late this shift. A/OX4. C/o mild pain to Kemal hips. Urostomy bag changed, awaitng urine production so UA can be collected. PPP. VSS. Denies n/v. Denies n/t. Safety maintained.

## 2020-11-13 LAB
ANION GAP SERPL CALCULATED.3IONS-SCNC: 5 MMOL/L (ref 5–15)
BUN SERPL-MCNC: 39 MG/DL (ref 8–23)
BUN/CREAT SERPL: 31.7 (ref 7–25)
CALCIUM SPEC-SCNC: 9.2 MG/DL (ref 8.6–10.5)
CHLORIDE SERPL-SCNC: 104 MMOL/L (ref 98–107)
CO2 SERPL-SCNC: 30 MMOL/L (ref 22–29)
CREAT SERPL-MCNC: 1.23 MG/DL (ref 0.57–1)
GFR SERPL CREATININE-BSD FRML MDRD: 42 ML/MIN/1.73
GLUCOSE SERPL-MCNC: 110 MG/DL (ref 65–99)
INR PPP: 1.88 (ref 0.91–1.09)
POTASSIUM SERPL-SCNC: 4.8 MMOL/L (ref 3.5–5.2)
PROTHROMBIN TIME: 21.4 SECONDS (ref 11.9–14.6)
SODIUM SERPL-SCNC: 139 MMOL/L (ref 136–145)

## 2020-11-13 PROCEDURE — 85610 PROTHROMBIN TIME: CPT | Performed by: INTERNAL MEDICINE

## 2020-11-13 PROCEDURE — 94799 UNLISTED PULMONARY SVC/PX: CPT

## 2020-11-13 PROCEDURE — 97166 OT EVAL MOD COMPLEX 45 MIN: CPT

## 2020-11-13 PROCEDURE — 80048 BASIC METABOLIC PNL TOTAL CA: CPT | Performed by: NURSE PRACTITIONER

## 2020-11-13 PROCEDURE — 25010000002 CEFTRIAXONE PER 250 MG: Performed by: NURSE PRACTITIONER

## 2020-11-13 PROCEDURE — 25010000002 ENOXAPARIN PER 10 MG: Performed by: NURSE PRACTITIONER

## 2020-11-13 PROCEDURE — 97110 THERAPEUTIC EXERCISES: CPT

## 2020-11-13 RX ADMIN — SODIUM CHLORIDE, PRESERVATIVE FREE 10 ML: 5 INJECTION INTRAVENOUS at 21:30

## 2020-11-13 RX ADMIN — ATORVASTATIN CALCIUM 40 MG: 40 TABLET, FILM COATED ORAL at 09:06

## 2020-11-13 RX ADMIN — DOCUSATE SODIUM 50 MG AND SENNOSIDES 8.6 MG 2 TABLET: 8.6; 5 TABLET, FILM COATED ORAL at 21:29

## 2020-11-13 RX ADMIN — IPRATROPIUM BROMIDE AND ALBUTEROL SULFATE 3 ML: 2.5; .5 SOLUTION RESPIRATORY (INHALATION) at 10:10

## 2020-11-13 RX ADMIN — CEFTRIAXONE SODIUM 1 G: 1 INJECTION, POWDER, FOR SOLUTION INTRAMUSCULAR; INTRAVENOUS at 17:24

## 2020-11-13 RX ADMIN — DILTIAZEM HYDROCHLORIDE 180 MG: 180 CAPSULE, COATED, EXTENDED RELEASE ORAL at 09:06

## 2020-11-13 RX ADMIN — ENOXAPARIN SODIUM 100 MG: 100 INJECTION SUBCUTANEOUS at 21:26

## 2020-11-13 RX ADMIN — CALCITRIOL 0.25 MCG: 0.25 CAPSULE ORAL at 09:06

## 2020-11-13 RX ADMIN — IPRATROPIUM BROMIDE AND ALBUTEROL SULFATE 3 ML: 2.5; .5 SOLUTION RESPIRATORY (INHALATION) at 14:14

## 2020-11-13 RX ADMIN — ENOXAPARIN SODIUM 100 MG: 100 INJECTION SUBCUTANEOUS at 06:06

## 2020-11-13 RX ADMIN — ACETAMINOPHEN 650 MG: 325 TABLET, FILM COATED ORAL at 09:03

## 2020-11-13 RX ADMIN — WARFARIN SODIUM 5 MG: 5 TABLET ORAL at 17:24

## 2020-11-13 RX ADMIN — POLYETHYLENE GLYCOL (3350) 17 G: 17 POWDER, FOR SOLUTION ORAL at 09:05

## 2020-11-13 RX ADMIN — DONEPEZIL HYDROCHLORIDE 5 MG: 5 TABLET, FILM COATED ORAL at 21:29

## 2020-11-13 RX ADMIN — METOPROLOL TARTRATE 25 MG: 25 TABLET, FILM COATED ORAL at 21:29

## 2020-11-13 RX ADMIN — METOPROLOL TARTRATE 25 MG: 25 TABLET, FILM COATED ORAL at 09:05

## 2020-11-13 RX ADMIN — FUROSEMIDE 20 MG: 20 TABLET ORAL at 09:05

## 2020-11-13 NOTE — THERAPY TREATMENT NOTE
Acute Care - Physical Therapy Treatment Note  [facility]     Patient Name: Jarvis Morgan  : 1945  MRN: [id number]  Today's Date: 2020           PT Assessment (last 12 hours)      PT Evaluation and Treatment     Row Name 20          Physical Therapy Time and Intention    Subjective Information  complains of;weakness;fatigue;pain  -DEE     Document Type  therapy note (daily note)  -DEE     Mode of Treatment  physical therapy  -DEE     Patient Effort  fair  -DEE     Comment  pt c/o worse pain in right knee than LLE  -DEE     Row Name 20          General Information    Existing Precautions/Restrictions  fall;left;non-weight bearing  -DEE     Row Name 20          Pain Scale: Numbers Pre/Post-Treatment    Pretreatment Pain Rating  10  -DEE     Posttreatment Pain Rating  8/10  -DEE     Pain Location - Side  Right  -DEE     Pain Location  knee  -DEE     Row Name 20          Bed Mobility    Comment (Bed Mobility)  pt refused due to have just sat EOB with OT  -DEE     Row Name 20          Motor Skills    Therapeutic Exercise  hip;knee;ankle  -     Row Name 20          Hip (Therapeutic Exercise)    Hip (Therapeutic Exercise)  AAROM (active assistive range of motion)  -DEE     Hip AAROM (Therapeutic Exercise)  bilateral;flexion;aBduction;aDduction;supine  -DEE     Row Name 20          Knee (Therapeutic Exercise)    Knee (Therapeutic Exercise)  AAROM (active assistive range of motion)  -DEE     Knee AAROM (Therapeutic Exercise)  flexion;supine  -DEE     Row Name 20          Ankle (Therapeutic Exercise)    Ankle (Therapeutic Exercise)  AROM (active range of motion)  -     Ankle AROM (Therapeutic Exercise)  bilateral;dorsiflexion;plantarflexion;supine  -DEE     Row Name             Wound 20 Bilateral medial gluteal Pressure Injury    Wound - Properties Group Placement Date: 20  -KH Placement Time: 513 Present on  Hospital Admission: Y  -KH Side: Bilateral  -KH Orientation: medial  -KH Location: gluteal  -KH Primary Wound Type: Pressure inj  -KH    Retired Wound - Properties Group Date first assessed: 11/12/20  -KH Time first assessed: 0513 -KH Present on Hospital Admission: Y  -KH Side: Bilateral  -KH Location: gluteal  -KH Primary Wound Type: Pressure inj  -KH    Row Name 11/13/20 0941          Positioning and Restraints    Pre-Treatment Position  in bed  -DEE     Post Treatment Position  bed  -DEE     In Bed  fowlers;call light within reach;encouraged to call for assist;side rails up x2  -DEE       User Key  (r) = Recorded By, (t) = Taken By, (c) = Cosigned By    Initials Name Provider Type    Zeus Argueta PTA Physical Therapy Assistant    Anabella Nolasco, RN Registered Nurse        Physical Therapy Education                 Title: PT OT SLP Therapies (In Progress)     Topic: Physical Therapy (In Progress)     Point: Mobility training (In Progress)     Learning Progress Summary           Patient Acceptance, E, NR by DEE at 11/13/2020 0941    Comment: benefits of activity    Acceptance, E, VU by CC at 11/12/2020 1118    Comment: Pt educated on PT's role in POC                   Point: Home exercise program (Not Started)     Learner Progress:  Not documented in this visit.          Point: Body mechanics (Not Started)     Learner Progress:  Not documented in this visit.          Point: Precautions (Not Started)     Learner Progress:  Not documented in this visit.                      User Key     Initials Effective Dates Name Provider Type Discipline    DEE 12/08/16 -  Zeus Malin PTA Physical Therapy Assistant PT    CC 09/02/20 -  Natalia Leiva PT Student PT Student PT              PT Recommendation and Plan     Plan of Care Reviewed With: patient  Progress: no change  Outcome Summary: Pt agreed to perform LE exercises, but declined to sit EOB due to previously getting up with OT.  Performed AAROM  to BLE.  Pt complained of increase pain more in her right knee than her LLE.  Will continue to work with pt to increase stregnth and progress mobility  Outcome Measures     Row Name 11/13/20 0941             How much help from another person do you currently need...    Turning from your back to your side while in flat bed without using bedrails?  2  -DEE      Moving from lying on back to sitting on the side of a flat bed without bedrails?  2  -DEE      Moving to and from a bed to a chair (including a wheelchair)?  1  -DEE      Standing up from a chair using your arms (e.g., wheelchair, bedside chair)?  1  -DEE      Climbing 3-5 steps with a railing?  1  -DEE      To walk in hospital room?  1  -DEE      AM-PAC 6 Clicks Score (PT)  8  -DEE         Functional Assessment    Outcome Measure Options  AM-PAC 6 Clicks Basic Mobility (PT)  -DEE        User Key  (r) = Recorded By, (t) = Taken By, (c) = Cosigned By    Initials Name Provider Type    Zeus Argueta PTA Physical Therapy Assistant           Time Calculation:   PT Charges     Row Name 11/13/20 0941             Time Calculation    Start Time  0941  -DEE      Stop Time  1005  -DEE      Time Calculation (min)  24 min  -DEE      PT Received On  11/13/20  -DEE         Time Calculation- PT    Total Timed Code Minutes- PT  24 minute(s)  -DEE         Timed Charges    96584 - PT Therapeutic Exercise Minutes  24  -DEE        User Key  (r) = Recorded By, (t) = Taken By, (c) = Cosigned By    Initials Name Provider Type    Zeus Argueta PTA Physical Therapy Assistant        Therapy Charges for Today     Code Description Service Date Service Provider Modifiers Qty    04926504455 HC PT THER PROC EA 15 MIN 11/13/2020 Zeus Malin PTA GP 2          PT G-Codes  Outcome Measure Options: AM-PAC 6 Clicks Basic Mobility (PT)  AM-PAC 6 Clicks Score (PT): 8  AM-PAC 6 Clicks Score (OT): 15    Zeus Malin PTA  11/13/2020

## 2020-11-13 NOTE — THERAPY EVALUATION
Patient Name: Jarvis Morgan  : 1945    MRN: [id number]                              Today's Date: 2020       Admit Date: 2020    Visit Dx:     ICD-10-CM ICD-9-CM   1. Closed fracture of left hip, initial encounter (CMS/HCC)  S72.002A 820.8   2. Impaired mobility  Z74.09 799.89   3. Impaired mobility and ADLs  Z74.09 V49.89    Z78.9      Patient Active Problem List   Diagnosis   • History of aortic valvular stenosis   • Chronic atrial fibrillation (CMS/[facility])   • Mixed hyperlipidemia   • Essential hypertension   • Deep vein thrombosis (DVT) of left lower extremity (CMS/HCC)   • Shortness of breath   • Physical deconditioning   • Venous insufficiency   • GERD (gastroesophageal reflux disease)   • History of bladder cancer   • Sick sinus syndrome (CMS/HCC)   • ASA (obstructive sleep apnea)   • Hyperthyroidism   • Chronic diastolic heart failure (CMS/HCC)   • History of DVT (deep vein thrombosis)   • Dementia (CMS/HCC)   • Vitamin D deficiency   • Class 2 obesity due to excess calories with serious comorbidity and body mass index (BMI) of 38.0 to 38.9 in adult   • Chronic anticoagulation   • Scabies   • MRSA bacteremia   • Weakness   • Acute on chronic diastolic heart failure (CMS/HCC)   • S/P TAVR (transcatheter aortic valve replacement)   • Sepsis (CMS/HCC)   • Acute UTI   • Pneumonia of both lower lobes due to infectious organism   • Acute and chronic respiratory failure with hypercapnia (CMS/HCC)   • Abnormal CXR - pneumonia cannot be ruled out at this time.   • Closed comminuted intertrochanteric fracture of proximal end of right femur (CMS/[facility])   • Obesity (BMI 30-39.9)   • Closed fracture of right hip (CMS/HCC)   • Right pleural effusion   • Closed fracture of left hip (CMS/HCC)   • Aortic valve stenosis   • H/O ureterostomy   • Idiopathic peripheral neuropathy   • Memory impairment   • Neoplasm of bladder   • Primary osteoarthritis of both knees   • Recurrent major depressive disorder, in  partial remission (CMS/HCC)   • Stage 3 chronic kidney disease     Past Medical History:   Diagnosis Date   • A-fib (CMS/HCC)    • Aortic stenosis    • Arthritis    • Bradycardia    • Cancer (CMS/HCC)     bladder and skin   • Cardiomegaly    • CHF (congestive heart failure) (CMS/HCC)    • GERD (gastroesophageal reflux disease)    • Hard of hearing    • History of short term memory loss    • Hypercalcemia    • Hyperlipidemia    • Hypertension    • Hyperthyroidism 11/20/2017   • Hypothyroidism    • Kidney stone    • Long term current use of anticoagulant therapy    • Open wound     left lower extremity,   • Palpitation    • PONV (postoperative nausea and vomiting)    • Shortness of breath    • Sick sinus syndrome (CMS/HCC)    • Sleep apnea     CPAP     Past Surgical History:   Procedure Laterality Date   • AORTIC VALVE REPAIR/REPLACEMENT     • BLADDER SURGERY      removed   • CARDIAC CATHETERIZATION     • CARDIAC CATHETERIZATION N/A 12/9/2016    Procedure: Left Heart Cath;  Surgeon: Elia Flores MD;  Location:  [facility] CATH INVASIVE [facility];  Service:    • DISTAL FEMORAL NAILING Right 9/24/2020    Procedure: RIGHT SHORT TFN;  Surgeon: Betito Shetty MD;  Location: [facility] OR;  Service: Orthopedics;  Laterality: Right;   • HIP BIPOLAR REPLACEMENT Left    • HYSTERECTOMY     • ILEOSTOMY     • JOINT REPLACEMENT     • KIDNEY SURGERY      right kidney removed   • NEPHRECTOMY RADICAL Right     from cancer   • VARICOSE VEIN SURGERY Left 4/10/2017    Procedure: LEFT LOWER EXTREMITY VENOGRAM. LEFT SAPHENOUS VEIN RADIO FREQUENCY ABLATION;  Surgeon: Blake Martinez DO;  Location:  [facility] OR;  Service:      General Information     Row Name 11/13/20 0732          OT Time and Intention    Document Type  evaluation  -MW     Mode of Treatment  occupational therapy  -MW     Row Name 11/13/20 0732          General Information    Patient Profile Reviewed  yes  -MW     Prior Level of Function  independent:;all household  mobility;ADL's  -MW     Existing Precautions/Restrictions  (S) fall;left;non-weight bearing urostomy connected to pruett bag  -     Barriers to Rehab  previous functional deficit;physical barrier;ineffective coping  -     Row Name 11/13/20 0732          Occupational Profile    Reason for Services/Referral (Occupational Profile)  Pt presents to facility with c/o L hip pain s/p fall. Minimally displaced periprosthetic fx of L hip - nonoperative. She has previous hx of R hip TFN in 9/2020  -     Environmental Supports and Barriers (Occupational Profile)  RW, wc  -     Row Name 11/13/20 0732          Living Environment    Lives With  child(jamin), adult  -     Row Name 11/13/20 07          Home Main Entrance    Number of Stairs, Main Entrance  -- ramp  -MW     Stair Railings, Main Entrance  railings on both sides of stairs  -     Row Name 11/13/20 07          Stairs Within Home, Primary    Number of Stairs, Within Home, Primary  none  -     Row Name 11/13/20 0732          Cognition    Orientation Status (Cognition)  oriented x 4  -MW     Row Name 11/13/20 07          Safety Issues, Functional Mobility    Impairments Affecting Function (Mobility)  balance;cognition;endurance/activity tolerance;strength;pain  -MW     Cognitive Impairments, Mobility Safety/Performance  insight into deficits/self-awareness;problem-solving/reasoning  -       User Key  (r) = Recorded By, (t) = Taken By, (c) = Cosigned By    Initials Name Provider Type    MW Cate Noe, OTR/L Occupational Therapist        Mobility/ADL's     Row Name 11/13/20 0732          Bed Mobility    Scooting/Bridging [place] (Bed Mobility)  maximum assist (25% patient effort);2 person assist  -     Supine-Sit [place] (Bed Mobility)  moderate assist (50% patient effort);maximum assist (25% patient effort);verbal cues  -     Assistive Device (Bed Mobility)  bed rails;draw sheet;head of bed elevated  -     Comment (Bed Mobility)  Pt  "requires max encouragement to participate and assist with bed mobility, pushes posteriorly trying to avoid coming to sitting. Once in sitting demos no impairment in sitting balance or c/o pain  -     Row Name 11/13/20 32          Transfers    Transfers  sit-stand transfer  -     Comment (Transfers)  Significant time spent encouraging pt to attempt tranfer. x4 attempts, 2 of which able to bring bottom off of bed but states \"I can't\" and sits herself back down. Pt is self-limiting saying \"I can't\" throughout session without putting forth good effort and following cues per OT  -     Sit-Stand [place] (Transfers)  maximum assist (25% patient effort);1 person assist;2 person assist;verbal cues;nonverbal cues (demo/gesture)  -[facility] Name 11/13/20 [facility]          Sit-Stand Transfer    Assistive Device (Sit-Stand Transfers)  walker, front-wheeled  -     Row Name 11/13/20 32          Activities of Daily Living    BADL Assessment/Intervention  lower body dressing  -[facility] Name 11/13/20 07          Mobility    Extremity Weight-bearing Status  left lower extremity;right lower extremity  -     Left Lower Extremity (Weight-bearing Status)  non weight-bearing (NWB)  -     Right Lower Extremity (Weight-bearing Status)  weight-bearing as tolerated (WBAT)  -[facility] Name 11/13/20 [facility]          Lower Body Dressing Assessment/Training    [place] Level (Lower Body Dressing)  don;doff;socks;dependent (less than 25% patient effort)  -     Position (Lower Body Dressing)  edge of bed sitting  -       User Key  (r) = Recorded By, (t) = Taken By, (c) = Cosigned By    Initials Name Provider Type     Cate Noe, OTR/L Occupational Therapist        Obj/Interventions     Row Name 11/13/20 0732          Sensory Assessment (Somatosensory)    Sensory Assessment (Somatosensory)  UE sensation intact  -[facility] Name 11/13/20 0732          Range of Motion Comprehensive    General Range of Motion  " bilateral upper extremity ROM WFL  -MW     [facility] Name 11/13/20 0732          Strength Comprehensive (MMT)    Comment, General Manual Muscle Testing (MMT) Assessment  BUE functionally 4-/5  -MW     [facility] Name 11/13/20 0732          Balance    Balance Assessment  sitting static balance  -MW     Static Sitting Balance  WNL;sitting, edge of bed  -MW       User Key  (r) = Recorded By, (t) = Taken By, (c) = Cosigned By    Initials Name Provider Type    MW Cate Noe, OTR/L Occupational Therapist        Goals/Plan     Row Name 11/13/20 0732          Transfer Goal 1 (OT)    Activity/Assistive Device (Transfer Goal 1, OT)  sit-to-stand/stand-to-sit;bed-to-chair/chair-to-bed;toilet;shower chair;commode, 3-in-1;wheelchair transfer  -     [place] Level/Cues Needed (Transfer Goal 1, OT)  moderate assist (50-74% patient effort)  -MW     Time Frame (Transfer Goal 1, OT)  long term goal (LTG);10 days  -MW     Progress/Outcome (Transfer Goal 1, OT)  goal ongoing  -MW     Row Name 11/13/20 0732          Toileting Goal 1 (OT)    Activity/Device (Toileting Goal 1, OT)  toileting skills, all;commode, 3-in-1  -MW     [place] Level/Cues Needed (Toileting Goal 1, OT)  minimum assist (75% or more patient effort)  -MW     Time Frame (Toileting Goal 1, OT)  long term goal (LTG);10 days  -MW     Progress/Outcome (Toileting Goal 1, OT)  goal ongoing  -MW     Row Name 11/13/20 0732          Strength Goal 1 (OT)    Strength Goal 1 (OT)  increase BUE strength 4/5 to increase independence with ADL, bed mobility, and transfer  -     Time Frame (Strength Goal 1, OT)  long term goal (LTG);10 days  -MW     Progress/Outcome (Strength Goal 1, OT)  goal ongoing  -MW     Row Name 11/13/20 0732          Therapy Assessment/Plan (OT)    Planned Therapy Interventions (OT)  activity tolerance training;BADL retraining;cognitive/visual perception retraining;functional balance retraining;occupation/activity based interventions;patient/caregiver  "education/training;strengthening exercise;transfer/mobility retraining  -       User Key  (r) = Recorded By, (t) = Taken By, (c) = Cosigned By    Initials Name Provider Type    MW Cate Noe, OTR/L Occupational Therapist        Clinical Impression     Row Name 11/13/20 0732          Pain Assessment    Additional Documentation  Pain Scale: Numbers Pre/Post-Treatment (Group)  -     Row Name 11/13/20 0732          Pain Scale: Numbers Pre/Post-Treatment    Pretreatment Pain Rating  9/10  -MW     Posttreatment Pain Rating  9/10  -MW     Pain Location - Side  Left  -MW     Pain Location  hip  -MW     Pre/Posttreatment Pain Comment  \"itchy\"  -     Pain Intervention(s)  Repositioned;Ambulation/increased activity  -     Row Name 11/13/20 0732          Plan of Care Review    Plan of Care Reviewed With  patient  -     Progress  no change  -     Outcome Summary  OT eval completed. Pt asleep upon entering room but arouses to verbal stim. Comes to EOB with maxA, pushing posteriorly not wanting to get to EOB. Once in sitting pt independently sits x15 min with no impairment and no obs signs of pain. Attempted to stand x4, pt frequently saying \"I can't\" and is self-limiting. With attempts pt unable to follow NWB status and refuses physical assist from OT to prevent WB. Pt demos decreased strength BUE and decreased core strength and endurance limiting independence with ADL, bed mobility, and transfer. OT indicated to address stated deficits. Recommend d/c SNF.  -     Row Name 11/13/20 0732          Therapy Assessment/Plan (OT)    Patient/Family Therapy Goal Statement (OT)  decrease pain  -MW     Rehab Potential (OT)  good, to achieve stated therapy goals  -     Criteria for Skilled Therapeutic Interventions Met (OT)  yes;skilled treatment is necessary  -MW     Therapy Frequency (OT)  5 times/wk  -MW     Predicted Duration of Therapy Intervention (OT)  10 days  -     Row Name 11/13/20 0732          Therapy Plan " Review/Discharge Plan (OT)    Anticipated Discharge Disposition (OT)  skilled nursing facility  -     Row Name 11/13/20 0732          Vital Signs    O2 Delivery Pre Treatment  room air  -MW     O2 Delivery Intra Treatment  room air  -MW     O2 Delivery Post Treatment  room air  -     Row Name 11/13/20 0732          Positioning and Restraints    Pre-Treatment Position  in bed  -MW     Post Treatment Position  bed  -MW     In Bed  fowlers;call light within reach;encouraged to call for assist;side rails up x2  -MW       User Key  (r) = Recorded By, (t) = Taken By, (c) = Cosigned By    Initials Name Provider Type    Cate Hunter, OTR/L Occupational Therapist        Outcome Measures     Row Name 11/13/20 0732          How much help from another is currently needed...    Putting on and taking off regular lower body clothing?  2  -MW     Bathing (including washing, rinsing, and drying)  2  -MW     Toileting (which includes using toilet bed pan or urinal)  2  -MW     Putting on and taking off regular upper body clothing  3  -MW     Taking care of personal grooming (such as brushing teeth)  3  -MW     Eating meals  3  -MW     AM-PAC 6 Clicks Score (OT)  15  -MW     Row Name 11/13/20 0732          Functional Assessment    Outcome Measure Options  AM-PAC 6 Clicks Daily Activity (OT)  -MW       User Key  (r) = Recorded By, (t) = Taken By, (c) = Cosigned By    Initials Name Provider Type    Cate Hunter, OTR/L Occupational Therapist        Occupational Therapy Education                 Title: PT OT SLP Therapies (In Progress)     Topic: Occupational Therapy (Done)     Point: ADL training (Done)     Description:   Instruct learner(s) on proper safety adaptation and remediation techniques during self care or transfers.   Instruct in proper use of assistive devices.              Learning Progress Summary           Patient Acceptance, E, VU by CONNIE at 11/13/2020 0836                   Point: Home exercise program  "(Done)     Description:   Instruct learner(s) on appropriate technique for monitoring, assisting and/or progressing therapeutic exercises/activities.              Learning Progress Summary           Patient Acceptance, E, VU by  at 11/13/2020 0836                   Point: Precautions (Done)     Description:   Instruct learner(s) on prescribed precautions during self-care and functional transfers.              Learning Progress Summary           Patient Acceptance, E, VU by  at 11/13/2020 0836                   Point: Body mechanics (Done)     Description:   Instruct learner(s) on proper positioning and spine alignment during self-care, functional mobility activities and/or exercises.              Learning Progress Summary           Patient Acceptance, E, VU by  at 11/13/2020 0836                               User Key     Initials Effective Dates Name Provider Type Discipline     08/28/18 -  Cate Noe, OTR/L Occupational Therapist OT              OT Recommendation and Plan  Planned Therapy Interventions (OT): activity tolerance training, BADL retraining, cognitive/visual perception retraining, functional balance retraining, occupation/activity based interventions, patient/caregiver education/training, strengthening exercise, transfer/mobility retraining  Therapy Frequency (OT): 5 times/wk  Plan of Care Review  Plan of Care Reviewed With: patient  Progress: no change  Outcome Summary: OT eval completed. Pt asleep upon entering room but arouses to verbal stim. Comes to EOB with maxA, pushing posteriorly not wanting to get to EOB. Once in sitting pt independently sits x15 min with no impairment and no obs signs of pain. Attempted to stand x4, pt frequently saying \"I can't\" and is self-limiting. With attempts pt unable to follow NWB status and refuses physical assist from OT to prevent WB. Pt demos decreased strength BUE and decreased core strength and endurance limiting independence with ADL, bed " mobility, and transfer. OT indicated to address stated deficits. Recommend d/c SNF.     Time Calculation:   Time Calculation- OT     Row Name 11/13/20 0837             Time Calculation- OT    OT Start Time  0730 +5 min chart review  -MW      OT Stop Time  0819  -MW      OT Time Calculation (min)  49 min  -MW      OT Received On  11/13/20  -      OT Goal Re-Cert Due Date  11/23/20  -        User Key  (r) = Recorded By, (t) = Taken By, (c) = Cosigned By    Initials Name Provider Type     Cate Noe OTR/L Occupational Therapist        Therapy Charges for Today     Code Description Service Date Service Provider Modifiers Qty    22402109717 HC OT EVAL MOD COMPLEXITY 4 11/13/2020 Cate Noe OTR/L GO 1               SHARMILA Beck/ELLEN  11/13/2020

## 2020-11-13 NOTE — PROGRESS NOTES
Discharge Planning Assessment   [place]     Patient Name: Jarvis Morgan  MRN: [id number]  Today's Date: 11/13/2020    Admit Date: 11/12/2020    Discharge Needs Assessment     Row Name 11/13/20 1255       Living Environment    Lives With  child(jamin), adult    Name(s) of Who Lives With Patient  Dtr (Swetha)    Current Living Arrangements  home/apartment/condo    Primary Care Provided by  self    Provides Primary Care For  no one    Family Caregiver if Needed  child(jamin), adult    Family Caregiver Names  Dtr (Swetha)    Quality of Family Relationships  helpful;involved    Able to Return to Prior Arrangements  yes    Living Arrangement Comments  Per dtr states plan is for home at dc       Resource/Environmental Concerns    Resource/Environmental Concerns  none    Transportation Concerns  car, none       Transition Planning    Patient/Family Anticipates Transition to  home with help/services;home with family    Patient/Family Anticipated Services at Transition  home health care    Transportation Anticipated  family or friend will provide       Discharge Needs Assessment    Readmission Within the Last 30 Days  no previous admission in last 30 days    Current Outpatient/Agency/Support Group  homecare agency    Equipment Currently Used at Home  hospital bed;walker, rolling;wheelchair;commode    Concerns to be Addressed  denies needs/concerns at this time    Anticipated Changes Related to Illness  inability to care for self    Equipment Needed After Discharge  none    Outpatient/Agency/Support Group Needs  homecare agency    Discharge Facility/Level of Care Needs  home with home health    Patient's Choice of Community Agency(s)  Pt is followed by [facility]    Discharge Coordination/Progress  Spoke to pt dtr (Swetha) regarding dc plans. Swetha states plan is for pt to return home with her at dc. Pt is followed by [facility] and plan is for resumption of care thru [facility]. Dtr states pt has all DME needed at home including hospital  bed, WC, walker and commode. Dtr is refusing SNF        Discharge Plan    No documentation.       Continued Care and Services - Admitted Since 11/12/2020     Home Medical Care     Service Provider Request Status Selected Services Address Phone Fax    [facility]  Pending - No Request Sent N/A [address], [place] [id number] [phone]            Selected Continued Care - Prior Encounters Includes selections from prior encounters from 8/14/2020 to 11/13/2020    Discharged on 9/28/2020 Admission date: 9/21/2020 - Discharge disposition: Skilled Nursing Facility (DC - External)    Destination     Service Provider Selected Services Address Phone Fax    [facility]  Skilled Nursing [address], [place] [id number] [phone]                      Demographic Summary    No documentation.       Functional Status    No documentation.       Psychosocial    No documentation.       Abuse/Neglect    No documentation.       Legal    No documentation.       Substance Abuse    No documentation.       Patient Forms    No documentation.           [place] MSW

## 2020-11-13 NOTE — PLAN OF CARE
Nutrition: Assessment completed. Pt reports fair appetite and hungry this AM. Pt noted with bilateral pressure injury. RDN discussed added oral supplement to help increase protein intake but pt refused. No PO intake doc at this time. RDN will continue to follow pt and encourage PO intake.

## 2020-11-13 NOTE — PLAN OF CARE
Problem: Adult Inpatient Plan of Care  Goal: Plan of Care Review  Outcome: Ongoing, Progressing  Flowsheets (Taken 11/13/2020 0912)  Progress: no change  Plan of Care Reviewed With: patient  Outcome Summary: Pt agreed to perform LE exercises, but declined to sit EOB due to previously getting up with OT.  Performed AAROM to BLE.  Pt complained of increase pain more in her right knee than her LLE.  Will continue to work with pt to increase stregnth and progress mobility

## 2020-11-13 NOTE — PROGRESS NOTES
[facility] Medicine Services  INPATIENT PROGRESS NOTE    Length of Stay: 1  Date of Admission: 11/12/2020  Primary Care Physician: Mary Beth Izquierdo APRN    Subjective   Chief Complaint: Follow-up left hip pain  HPI   Patient resting in bed.  She denies any new complaints today.  She continues to have pain in her left hip.  She denies chest pain or shortness of breath.  She states she is eating and drinking well.  She continues to remain adamant that she does not go to a nursing facility for rehab.  She was unable to attempt to stand or take steps today due to pain.    Review of Systems   All pertinent negatives and positives are as above. All other systems have been reviewed and are negative unless otherwise stated.     Objective    Temp:  [97.9 °F (36.6 °C)-98.4 °F (36.9 °C)] 98.3 °F (36.8 °C)  Heart Rate:  [] 80  Resp:  [14-18] 18  BP: (109-133)/(62-92) 126/72  Physical Exam  Vitals signs and nursing note reviewed.   Constitutional:       General: She is not in acute distress.     Appearance: Normal appearance. She is obese. She is not toxic-appearing.   HENT:      Head: Normocephalic and atraumatic.   Neck:      Musculoskeletal: Normal range of motion and neck supple. No muscular tenderness.   Cardiovascular:      Rate and Rhythm: Normal rate. Rhythm irregular.      Pulses: Normal pulses.      Heart sounds: Murmur present.      Comments: A. fib   Pulmonary:      Effort: Pulmonary effort is normal.      Breath sounds: Normal breath sounds. No wheezing or rales.   Abdominal:      General: Bowel sounds are normal. There is no distension.      Palpations: Abdomen is soft.      Tenderness: There is no abdominal tenderness.      Comments: Protuberant   Musculoskeletal: Normal range of motion.         General: Tenderness present.      Right lower leg: Edema present.      Left lower leg: Edema present.   Skin:     General: Skin is warm and dry.      Findings: No  erythema or rash.   Neurological:      General: No focal deficit present.      Mental Status: She is alert and oriented to person, place, and time.   Psychiatric:         Mood and Affect: Mood normal.         Behavior: Behavior normal.         Thought Content: Thought content normal.         Judgment: Judgment normal.     Results Review:  I have reviewed the labs, radiology results, and diagnostic studies.    Laboratory Data:   Results from last 7 days   Lab Units 11/12/20  0223   WBC 10*3/mm3 12.86*   HEMOGLOBIN g/dL 14.2   HEMATOCRIT % 42.6   PLATELETS 10*3/mm3 189     Results from last 7 days   Lab Units 11/13/20  0510 11/12/20 0223   SODIUM mmol/L 139 141  141   POTASSIUM mmol/L 4.8 4.1  4.1   CHLORIDE mmol/L 104 105  105   CO2 mmol/L 30.0* 29.0  29.0   BUN mg/dL 39* 38*  38*   CREATININE mg/dL 1.23* 1.32*  1.32*   CALCIUM mg/dL 9.2 9.8  9.8   BILIRUBIN mg/dL  --  0.6   ALK PHOS U/L  --  148*   ALT (SGPT) U/L  --  33   AST (SGOT) U/L  --  18   GLUCOSE mg/dL 110* 129*  129*     I have reviewed the patient current medications.     Assessment/Plan     Active Hospital Problems    Diagnosis   • Closed fracture of left hip (CMS/HCC)   • S/P TAVR (transcatheter aortic valve replacement)   • Chronic anticoagulation   • Chronic diastolic heart failure (CMS/HCC)   • ASA (obstructive sleep apnea)   • Chronic atrial fibrillation (CMS/HCC)   • Deep vein thrombosis (DVT) of left lower extremity (CMS/[facility])   • Essential hypertension     Plan:  1.  Patient admitted this morning following a fall at home.  She had resultant left hip pain.  X-ray shows acute, mildly displaced fracture line involving the lateral cortex of the proximal left femur.  2.  The patient had a recent admission from 9/21 through 9/28 following a right femur fracture status post trochanteric fixation nailing on 9/25.  The patient had been discharged to a skilled nursing facility for rehabilitation however had since been discharged home.  3.   Orthopedic surgery has evaluated.  They have opted for nonoperative management.  Will need follow-up within the next week with repeat x-rays.  Strict nonweightbearing to left lower extremity.  4.  Standing was attempted 4 times with therapy.  Therapy notes patient is self-limiting.  She was unable to follow nonweightbearing status today.  Therapy has recommended skilled nursing telemetry.  Patient adamantly refuses.  She is at significant risk for reinjury or recurrent falls should she return home.  Have attempted to reach her daughter to discuss this further, no answer at this time.   5.  Patient with history of chronic atrial fibrillation and aortic valve disease status post TAVR.  She also has history of DVT.  She is chronically anticoagulated with warfarin.  Her INR was subtherapeutic on admission at 1.6.  Currently bridging with Lovenox.  INR 1.8 today.  Discontinue Lovenox when INR is greater than 2.  6.  Urinalysis positive for leukocytes and 31-50 white blood cells, and 1+ bacteria.  The patient had a urine culture in September positive for E. coli and Morganella, both susceptible to Rocephin.  Urine culture currently showing growth of gram-negative bacilli, continue Rocephin and monitor for final results.  7.  Continue MiraLAX and Senokot for bowel regimen.  8.  Per nursing, patient's daughter requests a bone density test.  This is not appropriate in the inpatient setting.  Patient does take vitamin D supplementation at home.  9.  PT/INR in a.m.     Discharge Planning: I expect the patient to be discharged to ? in ? days.    Electronically signed by IRINEO Go, 11/13/2020, 12:48 CST.    .I personally evaluated and examined the patient in conjunction with IRINEO Rowe and agree with the assessment, treatment plan, and disposition of the patient as recorded by her. My history, exam, and further recommendations are: Left hip pain.  Status post fall per patient.  No surgical treatment..   Patient refused rehab placement.  Patient also refused home health.  Lungs clear to auscultation.  Cardiac irregular, murmur.  Assessment plan left hip fracture, conservative treatment per orthopedics.  UTI, continue Rocephin antibiotics.  Anticoagulation for atrial fibrillation. KT  Electronically signed by Rehan Loza MD, 11/13/2020, 18:48 CST.

## 2020-11-13 NOTE — PLAN OF CARE
Goal Outcome Evaluation:  Plan of Care Reviewed With: patient  Progress: no change  Outcome Summary: A/Ox4. C/o mild pain with movement such as turning.  Denies n/t. Q2 turning when pt allows it, education provided about importance of repositioning. Urostomy bag in place draining. No changes noted. VSS. Safety maintained.

## 2020-11-13 NOTE — NURSING NOTE
[facility] Note      Patient: Jarvis Morgan  MRN: [id number] : 1945         Problem List:   Patient Active Problem List    Diagnosis   • Closed fracture of left hip (CMS/[facility]) [S72.002A]   • Aortic valve stenosis [I35.0]   • Neoplasm of bladder [D49.4]   • Right pleural effusion [J90]   • Closed comminuted intertrochanteric fracture of proximal end of right femur (CMS/[facility]) [S72.141A]   • Obesity (BMI 30-39.9) [E66.9]   • Closed fracture of right hip (CMS/[facility]) [S72.001A]   • Memory impairment [R41.3]   • Recurrent major depressive disorder, in partial remission (CMS/[facility]) [F33.41]   • Pneumonia of both lower lobes due to infectious organism [J18.9]   • Acute and chronic respiratory failure with hypercapnia (CMS/[facility]) [J96.22]   • Abnormal CXR - pneumonia cannot be ruled out at this time. [R93.89]   • Acute UTI [N39.0]   • Idiopathic peripheral neuropathy [G60.9]   • Primary osteoarthritis of both knees [M17.0]   • Stage 3 chronic kidney disease [N18.30]   • Sepsis (CMS/[facility]) [A41.9]   • S/P TAVR (transcatheter aortic valve replacement) [Z95.2]   • Acute on chronic diastolic heart failure (CMS/[facility]) [I50.33]   • Weakness [R53.1]   • Scabies [B86]   • MRSA bacteremia [R78.81, B95.62]   • GERD (gastroesophageal reflux disease) [K21.9]   • History of bladder cancer [Z85.51]   • Sick sinus syndrome (CMS/[facility]) [I49.5]   • ASA (obstructive sleep apnea) [G47.33]   • Hyperthyroidism [E05.90]   • Chronic diastolic heart failure (CMS/[facility]) [I50.32]   • History of DVT (deep vein thrombosis) [Z86.718]   • Dementia (CMS/[facility]) [F03.90]   • Vitamin D deficiency [E55.9]   • Class 2 obesity due to excess calories with serious comorbidity and body mass index (BMI) of 38.0 to 38.9 in adult [AQU8712]   • Chronic anticoagulation [Z79.01]   • H/O ureterostomy [Z98.890]   • Venous insufficiency [I87.2]   • Shortness of breath [R06.02]   • Physical deconditioning [R53.81]   • History of aortic valvular stenosis [Z86.79]   • Chronic atrial  "fibrillation (CMS/HCC) [I48.20]   • Mixed hyperlipidemia [E78.2]   • Essential hypertension [I10]   • Deep vein thrombosis (DVT) of left lower extremity (CMS/HCC) [I82.402]         Reason for Visit: Patient is an 75 y.o. female, being seen by [facility] for blanchable redness of bilateral buttocks.     Patient presents with blanchable redness of bilateral buttocks.  She is currently receiving a bed bath from nursing students.  She has complaints of right knee pain.  Patient states her daughter \"doctors her butt at home\".  Nursing staff states she has been refusing to turn.      She also has a urostomy at the [facility] that she states \"I've had for years\".  She cares for her own ostomy and has no needs at this time.      Had discussion with the patient about the need to turn and reposition self with the nursing staff.  Discussed her risk of skin breakdown.  She states she understands and will try her best.        Sensory Perception: 4-->no impairment (11/12/20 1955 : Grace Preciado RN)  Moisture: 4-->rarely moist (11/12/20 1955 : Grace Preciado, RN)  Activity: 3-->walks occasionally (11/12/20 1955 : Grace Preciado RN)  Mobility: 3-->slightly limited (11/12/20 1955 : Grace Preciado, RN)  Nutrition: 3-->adequate (11/12/20 1955 : Grace Preciado, GERARDO)  Friction and Shear: 2-->potential problem (11/12/20 1955 : Grace Preciado RN)  Deven Score: 19 (11/12/20 1955 : Grace Preciado, GERARDO)               Recommendations:    - Elevate Heels Off of Bed  - Turn Patient Q2H  - Use Repositioning Sheet and Wedges to Position Patient  - Use Seat Cushion When Up In Chair  - Apply Moisture Barrier After Any Incontinence  - Consider Waffle Mattress Overlay if she continues to refuse to turn       )Macarena Mar RN 11/13/2020       "

## 2020-11-13 NOTE — PLAN OF CARE
"  Problem: Adult Inpatient Plan of Care  Goal: Plan of Care Review  Recent Flowsheet Documentation  Taken 11/13/2020 0732 by Cate Noe, OTR/L  Progress: no change  Plan of Care Reviewed With: patient  Outcome Summary: OT gretta completed. Pt asleep upon entering room but arouses to verbal stim. Comes to EOB with maxA, pushing posteriorly not wanting to get to EOB. Once in sitting pt independently sits x15 min with no impairment and no obs signs of pain. Attempted to stand x4, pt frequently saying \"I can't\" and is self-limiting. With attempts pt unable to follow NWB status and refuses physical assist from OT to prevent WB. Pt demos decreased strength BUE and decreased core strength and endurance limiting independence with ADL, bed mobility, and transfer. OT indicated to address stated deficits. Recommend d/c SNF.     "

## 2020-11-14 PROBLEM — N30.01 ACUTE CYSTITIS WITH HEMATURIA: Status: ACTIVE | Noted: 2020-11-14

## 2020-11-14 LAB
BACTERIA SPEC AEROBE CULT: ABNORMAL
HOLD SPECIMEN: NORMAL
INR PPP: 2.14 (ref 0.91–1.09)
PROTHROMBIN TIME: 23.8 SECONDS (ref 11.9–14.6)
WHOLE BLOOD HOLD SPECIMEN: NORMAL

## 2020-11-14 PROCEDURE — 25010000002 CEFTRIAXONE PER 250 MG: Performed by: NURSE PRACTITIONER

## 2020-11-14 PROCEDURE — 94799 UNLISTED PULMONARY SVC/PX: CPT

## 2020-11-14 PROCEDURE — 97530 THERAPEUTIC ACTIVITIES: CPT

## 2020-11-14 PROCEDURE — 85610 PROTHROMBIN TIME: CPT | Performed by: INTERNAL MEDICINE

## 2020-11-14 PROCEDURE — 97535 SELF CARE MNGMENT TRAINING: CPT

## 2020-11-14 RX ADMIN — CEFTRIAXONE SODIUM 1 G: 1 INJECTION, POWDER, FOR SOLUTION INTRAMUSCULAR; INTRAVENOUS at 18:44

## 2020-11-14 RX ADMIN — SODIUM CHLORIDE, PRESERVATIVE FREE 10 ML: 5 INJECTION INTRAVENOUS at 08:30

## 2020-11-14 RX ADMIN — DILTIAZEM HYDROCHLORIDE 180 MG: 180 CAPSULE, COATED, EXTENDED RELEASE ORAL at 08:30

## 2020-11-14 RX ADMIN — IPRATROPIUM BROMIDE AND ALBUTEROL SULFATE 3 ML: 2.5; .5 SOLUTION RESPIRATORY (INHALATION) at 07:20

## 2020-11-14 RX ADMIN — ATORVASTATIN CALCIUM 40 MG: 40 TABLET, FILM COATED ORAL at 08:30

## 2020-11-14 RX ADMIN — METHIMAZOLE 10 MG: 10 TABLET ORAL at 08:30

## 2020-11-14 RX ADMIN — FUROSEMIDE 20 MG: 20 TABLET ORAL at 08:30

## 2020-11-14 RX ADMIN — METOPROLOL TARTRATE 25 MG: 25 TABLET, FILM COATED ORAL at 22:48

## 2020-11-14 RX ADMIN — DONEPEZIL HYDROCHLORIDE 5 MG: 5 TABLET, FILM COATED ORAL at 22:48

## 2020-11-14 RX ADMIN — OXYCODONE HYDROCHLORIDE AND ACETAMINOPHEN 1 TABLET: 5; 325 TABLET ORAL at 13:08

## 2020-11-14 RX ADMIN — METOPROLOL TARTRATE 25 MG: 25 TABLET, FILM COATED ORAL at 08:31

## 2020-11-14 RX ADMIN — CALCITRIOL 0.25 MCG: 0.25 CAPSULE ORAL at 08:30

## 2020-11-14 RX ADMIN — DOCUSATE SODIUM 50 MG AND SENNOSIDES 8.6 MG 2 TABLET: 8.6; 5 TABLET, FILM COATED ORAL at 22:48

## 2020-11-14 RX ADMIN — WARFARIN SODIUM 7.5 MG: 5 TABLET ORAL at 18:44

## 2020-11-14 NOTE — PLAN OF CARE
Problem: Adult Inpatient Plan of Care  Goal: Plan of Care Review  Recent Flowsheet Documentation  Taken 11/14/2020 0740 by Mary Mensah OTA  Progress: improving  Plan of Care Reviewed With: patient  Outcome Summary: OT tx completed. pt. required Mod encouragement to partcipate and was self-limiting throughout tx. Supine>sit with Mod A and sit>supine with Min A. Ms. Morgan sat EOB with SBA for approx 10 minutes to increase sitting balance. She completed grooming tasks with set-up sitting EOB to increase BUE ROM. pt. performed 10 rep BUE reaching activity away from JAXON to increase sitting balance for independence in bed mobility and functional mobility. pt. demonstrated no LOB. Recommend inpatient rehab vs. SNF pending progress at d/c to increase independence in ADLs. Cont. OT POC.

## 2020-11-14 NOTE — PLAN OF CARE
Problem: Adult Inpatient Plan of Care  Goal: Plan of Care Review  11/14/2020 1421 by Zeus Malin, PTA  Outcome: Ongoing, Progressing  Flowsheets (Taken 11/14/2020 1345)  Progress: no change  Plan of Care Reviewed With: patient  Outcome Summary: Pt was premedicated for tx.  She required min assist for supine to sit.  At EOB, pt agreed to attempt to stand, attempted X 5 with bed elevated, but pt was unable.  Pt is limited by pain and weakness on RLE and having to maintain NWB on LLE.  If pt continues to want to d/c home, would recommend a mechanical lift until pt is able to be WBAT on LLE.  11/14/2020 1154 by Zeus Malin, PTA  Outcome: Ongoing, Progressing  Flowsheets (Taken 11/14/2020 1110)  Plan of Care Reviewed With: patient  Outcome Summary: Pt required min/mod assist for bed mobility.  She refused to attempt to stand and needed constant encourgement to participate with exercises.  Discussed safety issues with pt wanting to go home since she is unable to stand and would be bed bound.  Will continue to work with pt to increase strength and progress with transfers as pt is able

## 2020-11-14 NOTE — PROGRESS NOTES
[facility] Medicine Services  INPATIENT PROGRESS NOTE    Length of Stay: 2  Date of Admission: 11/12/2020  Primary Care Physician: Mary Beth Izquierdo APRN    Subjective   Chief Complaint: Follow-up  HPI   Patient resting in bed eating lunch.  She had some complaints of right knee pain yesterday, but she feels this is better today.  She denies chest pain or shortness of breath.  She denies abdominal pain, nausea, or vomiting.  She has not moved her bowels since admission.  She would not attempt to stand with therapy this morning.    Review of Systems   All pertinent negatives and positives are as above. All other systems have been reviewed and are negative unless otherwise stated.     Objective    Temp:  [97.9 °F (36.6 °C)-98.2 °F (36.8 °C)] 98.2 °F (36.8 °C)  Heart Rate:  [75-85] 85  Resp:  [16-18] 16  BP: (116-145)/(62-95) 145/95  Physical Exam  Vitals signs and nursing note reviewed.   Constitutional:       General: She is not in acute distress.     Appearance: Normal appearance. She is obese. She is not toxic-appearing.   HENT:      Head: Normocephalic and atraumatic.   Neck:      Musculoskeletal: Normal range of motion and neck supple. No muscular tenderness.   Cardiovascular:      Rate and Rhythm: Normal rate. Rhythm irregular.      Pulses: Normal pulses.      Heart sounds: Murmur present.      Comments: A. fib   Pulmonary:      Effort: Pulmonary effort is normal.      Breath sounds: Normal breath sounds. No wheezing or rales.   Abdominal:      General: Bowel sounds are normal. There is no distension.      Palpations: Abdomen is soft.      Tenderness: There is no abdominal tenderness.      Comments: Protuberant   Musculoskeletal: Normal range of motion.         General: Tenderness present.      Right lower leg: Edema present.      Left lower leg: Edema present.   Skin:     General: Skin is warm and dry.      Findings: No erythema or rash.  Chronic skin changes left lower  extremity  Neurological:      General: No focal deficit present.      Mental Status: She is alert and oriented to person, place, and time.   Psychiatric:         Mood and Affect: Mood normal.         Behavior: Behavior normal.         Thought Content: Thought content normal.         Judgment: Judgment normal.      Results Review:  I have reviewed the labs, radiology results, and diagnostic studies.    Laboratory Data:   Results from last 7 days   Lab Units 11/12/20  0223   WBC 10*3/mm3 12.86*   HEMOGLOBIN g/dL 14.2   HEMATOCRIT % 42.6   PLATELETS 10*3/mm3 189     Results from last 7 days   Lab Units 11/13/20  0510 11/12/20 0223   SODIUM mmol/L 139 141  141   POTASSIUM mmol/L 4.8 4.1  4.1   CHLORIDE mmol/L 104 105  105   CO2 mmol/L 30.0* 29.0  29.0   BUN mg/dL 39* 38*  38*   CREATININE mg/dL 1.23* 1.32*  1.32*   CALCIUM mg/dL 9.2 9.8  9.8   BILIRUBIN mg/dL  --  0.6   ALK PHOS U/L  --  148*   ALT (SGPT) U/L  --  33   AST (SGOT) U/L  --  18   GLUCOSE mg/dL 110* 129*  129*     I have reviewed the patient current medications.     Assessment/Plan     Active Hospital Problems    Diagnosis   • Closed fracture of left hip (CMS/HCC)   • S/P TAVR (transcatheter aortic valve replacement)   • Chronic anticoagulation   • Chronic diastolic heart failure (CMS/[facility])   • ASA (obstructive sleep apnea)   • Chronic atrial fibrillation (CMS/[facility])   • Deep vein thrombosis (DVT) of left lower extremity (CMS/[facility])   • Essential hypertension     Plan:  1.  Patient admitted this morning following a fall at home.  She had resultant left hip pain.  X-ray shows acute, mildly displaced fracture line involving the lateral cortex of the proximal left femur.  2.  The patient had a recent admission from 9/21 through 9/28 following a right femur fracture status post trochanteric fixation nailing on 9/25.  The patient had been discharged to a skilled nursing facility for rehabilitation however had since been discharged home.  3.  Orthopedic  surgery has evaluated.  They have opted for nonoperative management.  Will need follow-up within the next week with repeat x-rays.  Strict nonweightbearing to left lower extremity.  4.  Patient refused standing with therapy this morning.  Discussed with patient and physical therapist, therapy is to reevaluate this afternoon.  Patient will be premedicated prior to session, and will attempt to stand.  Therapy recommends a skilled nursing facility.  Patient and her daughter adamantly refused.  She is at significant risk for reinjury or recurrent falls should she return home.  We will attempt to reach her daughter again today to discuss.  5.  Patient with history of chronic atrial fibrillation and aortic valve disease status post TAVR.  She also has history of DVT.  She is chronically anticoagulated with warfarin.  Her INR was subtherapeutic on admission at 1.6.  Lovenox was used for bridging on admission, will discontinue as INR is now 2.1.  6.  Urinalysis positive for leukocytes and 31-50 white blood cells, and 1+ bacteria.  Urine culture shows growth of Morganella, which patient had a urine culture in September as well.  This is susceptible to Rocephin.  Patient will receive her third and final dose today.  7.  Continue MiraLAX and Senokot for bowel regimen.  Dulcolax suppository as needed.  8.  Per nursing, patient's daughter requests a bone density test.  This is not appropriate in the inpatient setting.  Patient does take vitamin D supplementation at home.  9.  Daily PT/INR    Discharge Planning: I expect the patient to be discharged to ? in ? days.    Electronically signed by IRINEO Go, 11/14/2020, 13:32 CST.       .I personally evaluated and examined the patient in conjunction with IRINEO Rowe and agree with the assessment, treatment plan, and disposition of the patient as recorded by her. My history, exam, and further recommendations are: Left hip pain.  Status post fall per patient.  No  surgical treatment..  Patient refused rehab placement.  Patient also refused home health.  Lungs clear to auscultation.  Cardiac irregular, murmur.  Assessment plan left hip fracture, conservative treatment per orthopedics.  UTI, continue Rocephin antibiotics.  Anticoagulation for atrial fibrillation.  Patient still remain extremely weak.  Recommended rehab placement.  KT  Electronically signed by Rehan Loza MD, 11/14/2020, 20:36 CST.

## 2020-11-14 NOTE — THERAPY TREATMENT NOTE
"Acute Care - Physical Therapy Treatment Note  [facility]     Patient Name: Jarvis Morgan  : 1945  MRN: [id number]  Today's Date: 2020           PT Assessment (last 12 hours)      PT Evaluation and Treatment     Row Name 20 1345 20 1110       Physical Therapy Time and Intention    Subjective Information  complains of;pain  -DEE  complains of;pain  -DEE    Document Type  therapy note (daily note)  -DEE  therapy note (daily note)  -DEE    Mode of Treatment  physical therapy  -  physical therapy  -DEE    Row Name 20 1345 20 1110       General Information    Existing Precautions/Restrictions  fall;non-weight bearing;left  -DEE  fall;non-weight bearing;left  -DEE    Row Name 20 1345 20 1110       Pain Scale: Numbers Pre/Post-Treatment    Pretreatment Pain Rating  6/10  -DEE  6/10  -DEE    Posttreatment Pain Rating  6/10  -DEE  6/10  -DEE    Pain Location - Side  Left  -DEE  Left  -DEE    Pain Location - Orientation  lower  -DEE  lower  -DEE    Pain Location  extremity  -DEE  extremity  -DEE    Row Name 20 1345 20 1110       Bed Mobility    Supine-Sit [place] (Bed Mobility)  verbal cues;minimum assist (75% patient effort)  -DEE  verbal cues;minimum assist (75% patient effort);moderate assist (50% patient effort)  -DEE    Sit-Supine [place] (Bed Mobility)  verbal cues;minimum assist (75% patient effort)  -DEE  verbal cues;moderate assist (50% patient effort)  -DEE    Assistive Device (Bed Mobility)  --  bed rails;head of bed elevated  -DEE    Row Name 20 1345 20 1110       Transfers    Comment (Transfers)  attempted to stand X 5, with elevated bed, pt is unable  -DEE  refused  -DEE    Row Name 20 111          Motor Skills    Therapeutic Exercise  -- pt performed minimal exercises, would state \" I can't\"   -DEE     Row Name             Wound 20 0513 Bilateral medial gluteal Pressure Injury    Wound - Properties Group Placement Date: 20  -KH " Placement Time: 0513 -KH Present on Hospital Admission: Y  -KH Side: Bilateral  -KH Orientation: medial  -KH Location: gluteal  -KH Primary Wound Type: Pressure inj  -KH    Retired Wound - Properties Group Date first assessed: 11/12/20  - Time first assessed: 0513 -KH Present on Hospital Admission: Y  -KH Side: Bilateral  -KH Location: gluteal  -KH Primary Wound Type: Pressure inj  -KH    Row Name 11/14/20 1345 11/14/20 1110       Positioning and Restraints    Pre-Treatment Position  in bed  -DEE  in bed  -DEE    Post Treatment Position  bed  -DEE  bed  -DEE    In Bed  fowlers;call light within reach;encouraged to call for assist;side rails up x2  -DEE  fowlers;call light within reach;encouraged to call for assist;exit alarm on;side rails up x2  -DEE      User Key  (r) = Recorded By, (t) = Taken By, (c) = Cosigned By    Initials Name Provider Type    DEE Zeus Malin PTA Physical Therapy Assistant    Anabella Nolasco, RN Registered Nurse        Physical Therapy Education                 Title: PT OT SLP Therapies (In Progress)     Topic: Physical Therapy (In Progress)     Point: Mobility training (In Progress)     Learning Progress Summary           Patient Acceptance, E, NR by  at 11/14/2020 1110    Comment: benefits of activity    Acceptance, E, NR by DEE at 11/13/2020 0941    Comment: benefits of activity    Acceptance, E, VU by  at 11/12/2020 1118    Comment: Pt educated on PT's role in POC                   Point: Home exercise program (Not Started)     Learner Progress:  Not documented in this visit.          Point: Body mechanics (Not Started)     Learner Progress:  Not documented in this visit.          Point: Precautions (Not Started)     Learner Progress:  Not documented in this visit.                      User Key     Initials Effective Dates Name Provider Type Discipline     12/08/16 -  Zeus Malin PTA Physical Therapy Assistant PT    CC 09/02/20 -  Natalia Leiva PT Student  PT Student PT              PT Recommendation and Plan     Plan of Care Reviewed With: patient  Progress: no change  Outcome Summary: Pt was premedicated for tx.  She required min assist for supine to sit.  At EOB, pt agreed to attempt to stand, attempted X 5 with bed elevated, but pt was unable.  Pt is limited by pain and weakness on RLE and having to maintain NWB on LLE.  If pt continues to want to d/c home, would recommend a mechanical lift until pt is able to be WBAT on LLE.  Outcome Measures     Row Name 11/14/20 1110 11/13/20 0941          How much help from another person do you currently need...    Turning from your back to your side while in flat bed without using bedrails?  2  -DEE  2  -DEE     Moving from lying on back to sitting on the side of a flat bed without bedrails?  2  -DEE  2  -DEE     Moving to and from a bed to a chair (including a wheelchair)?  1  -DEE  1  -DEE     Standing up from a chair using your arms (e.g., wheelchair, bedside chair)?  1  -DEE  1  -DEE     Climbing 3-5 steps with a railing?  1  -DEE  1  -DEE     To walk in hospital room?  1  -DEE  1  -DEE     AM-PAC 6 Clicks Score (PT)  8  -DEE  8  -DEE        Functional Assessment    Outcome Measure Options  AM-PAC 6 Clicks Basic Mobility (PT)  -DEE  AM-PAC 6 Clicks Basic Mobility (PT)  -DEE       User Key  (r) = Recorded By, (t) = Taken By, (c) = Cosigned By    Initials Name Provider Type    Zeus Argueta, PTA Physical Therapy Assistant           Time Calculation:   PT Charges     Row Name 11/14/20 1345 11/14/20 1110          Time Calculation    Start Time  1345  -DEE  1110  -DEE     Stop Time  1420  -DEE  1135  -DEE     Time Calculation (min)  35 min  -DEE  25 min  -DEE     PT Received On  11/14/20  -DEE  11/14/20  -DEE        Time Calculation- PT    Total Timed Code Minutes- PT  35 minute(s)  -DEE  25 minute(s)  -DEE        Timed Charges    93053 - PT Therapeutic Activity Minutes  35  -DEE  25  -DEE       User Key  (r) = Recorded By, (t) = Taken By, (c) =  Cosigned By    Initials Name Provider Type    Zeus Argueta PTA Physical Therapy Assistant        Therapy Charges for Today     Code Description Service Date Service Provider Modifiers Qty    04359247684 HC PT THER PROC EA 15 MIN 11/13/2020 Zeus Malin, SALO GP 2    69959104285 HC PT THERAPEUTIC ACT EA 15 MIN 11/14/2020 Zeus Malin, SALO GP 2    40618765462 HC PT THERAPEUTIC ACT EA 15 MIN 11/14/2020 Zeus Malin, SALO GP 2          PT G-Codes  Outcome Measure Options: AM-PAC 6 Clicks Basic Mobility (PT)  AM-PAC 6 Clicks Score (PT): 8  AM-PAC 6 Clicks Score (OT): 18    Zeus Malin PTA  11/14/2020

## 2020-11-14 NOTE — THERAPY TREATMENT NOTE
Patient Name: Jarvis Morgan  : 1945    MRN: [id number]                              Today's Date: 2020       Admit Date: 2020    Visit Dx:     ICD-10-CM ICD-9-CM   1. Closed fracture of left hip, initial encounter (CMS/HCC)  S72.002A 820.8   2. Impaired mobility  Z74.09 799.89   3. Impaired mobility and ADLs  Z74.09 V49.89    Z78.9      Patient Active Problem List   Diagnosis   • History of aortic valvular stenosis   • Chronic atrial fibrillation (CMS/[facility])   • Mixed hyperlipidemia   • Essential hypertension   • Deep vein thrombosis (DVT) of left lower extremity (CMS/HCC)   • Shortness of breath   • Physical deconditioning   • Venous insufficiency   • GERD (gastroesophageal reflux disease)   • History of bladder cancer   • Sick sinus syndrome (CMS/HCC)   • ASA (obstructive sleep apnea)   • Hyperthyroidism   • Chronic diastolic heart failure (CMS/HCC)   • History of DVT (deep vein thrombosis)   • Dementia (CMS/HCC)   • Vitamin D deficiency   • Class 2 obesity due to excess calories with serious comorbidity and body mass index (BMI) of 38.0 to 38.9 in adult   • Chronic anticoagulation   • Scabies   • MRSA bacteremia   • Weakness   • Acute on chronic diastolic heart failure (CMS/HCC)   • S/P TAVR (transcatheter aortic valve replacement)   • Sepsis (CMS/HCC)   • Acute UTI   • Pneumonia of both lower lobes due to infectious organism   • Acute and chronic respiratory failure with hypercapnia (CMS/HCC)   • Abnormal CXR - pneumonia cannot be ruled out at this time.   • Closed comminuted intertrochanteric fracture of proximal end of right femur (CMS/[facility])   • Obesity (BMI 30-39.9)   • Closed fracture of right hip (CMS/HCC)   • Right pleural effusion   • Closed fracture of left hip (CMS/HCC)   • Aortic valve stenosis   • H/O ureterostomy   • Idiopathic peripheral neuropathy   • Memory impairment   • Neoplasm of bladder   • Primary osteoarthritis of both knees   • Recurrent major depressive disorder, in  partial remission (CMS/HCC)   • Stage 3 chronic kidney disease     Past Medical History:   Diagnosis Date   • A-fib (CMS/HCC)    • Aortic stenosis    • Arthritis    • Bradycardia    • Cancer (CMS/HCC)     bladder and skin   • Cardiomegaly    • CHF (congestive heart failure) (CMS/HCC)    • GERD (gastroesophageal reflux disease)    • Hard of hearing    • History of short term memory loss    • Hypercalcemia    • Hyperlipidemia    • Hypertension    • Hyperthyroidism 11/20/2017   • Hypothyroidism    • Kidney stone    • Long term current use of anticoagulant therapy    • Open wound     left lower extremity,   • Palpitation    • PONV (postoperative nausea and vomiting)    • Shortness of breath    • Sick sinus syndrome (CMS/HCC)    • Sleep apnea     CPAP     Past Surgical History:   Procedure Laterality Date   • AORTIC VALVE REPAIR/REPLACEMENT     • BLADDER SURGERY      removed   • CARDIAC CATHETERIZATION     • CARDIAC CATHETERIZATION N/A 12/9/2016    Procedure: Left Heart Cath;  Surgeon: Elia Flores MD;  Location:  [facility] CATH INVASIVE LOCATION;  Service:    • DISTAL FEMORAL NAILING Right 9/24/2020    Procedure: RIGHT SHORT TFN;  Surgeon: Betito Shetty MD;  Location: [facility] OR;  Service: Orthopedics;  Laterality: Right;   • HIP BIPOLAR REPLACEMENT Left    • HYSTERECTOMY     • ILEOSTOMY     • JOINT REPLACEMENT     • KIDNEY SURGERY      right kidney removed   • NEPHRECTOMY RADICAL Right     from cancer   • VARICOSE VEIN SURGERY Left 4/10/2017    Procedure: LEFT LOWER EXTREMITY VENOGRAM. LEFT SAPHENOUS VEIN RADIO FREQUENCY ABLATION;  Surgeon: Blake Martinez DO;  Location:  [facility] OR;  Service:      General Information     Row Name 11/14/20 0740          OT Time and Intention    Document Type  therapy note (daily note)  -MT (r) NA (t) MT (c)     Mode of Treatment  occupational therapy  -MT (r) NA (t) MT (c)     Row Name 11/14/20 0740          General Information    Existing Precautions/Restrictions  fall;non-weight  bearing  -MT (r) NA (t) MT (c)     Barriers to Rehab  hearing deficit  -MT (r) NA (t) MT (c)     Row Name 11/14/20 0740          Cognition    Orientation Status (Cognition)  oriented x 4  -MT (r) NA (t) MT (c)     Row Name 11/14/20 0740          Safety Issues, Functional Mobility    Safety Issues Affecting Function (Mobility)  awareness of need for assistance  -MT (r) NA (t) MT (c)     Impairments Affecting Function (Mobility)  pain;strength;range of motion (ROM);endurance/activity tolerance  -MT (r) NA (t) MT (c)     Cognitive Impairments, Mobility Safety/Performance  awareness, need for assistance  -MT (r) NA (t) MT (c)       User Key  (r) = Recorded By, (t) = Taken By, (c) = Cosigned By    Initials Name Provider Type    Jenna Dang COTA/L Occupational Therapy Assistant    Mary Moses OTA OT Student        Mobility/ADL's     Row Name 11/14/20 0740          Bed Mobility    Bed Mobility  supine-sit;sit-supine  -MT (r) NA (t) MT (c)     Supine-Sit [place] (Bed Mobility)  moderate assist (50% patient effort);verbal cues  -MT (r) NA (t) MT (c)     Sit-Supine [place] (Bed Mobility)  verbal cues;minimum assist (75% patient effort)  -MT (r) NA (t) MT (c)     Assistive Device (Bed Mobility)  bed rails;head of bed elevated  -MT (r) NA (t) MT (c)     Row Name 11/14/20 0740          Activities of Daily Living    BADL Assessment/Intervention  grooming  -MT (r) NA (t) MT (c)     Row Name 11/14/20 0740          Mobility    Left Lower Extremity (Weight-bearing Status)  non weight-bearing (NWB)  -MT (r) NA (t) MT (c)     Row Name 11/14/20 0740          Grooming Assessment/Training    [place] Level (Grooming)  hair care, combing/brushing;wash face, hands;set up;standby assist  -MT (r) NA (t) MT (c)     Position (Grooming)  edge of bed sitting  -MT (r) NA (t) MT (c)       User Key  (r) = Recorded By, (t) = Taken By, (c) = Cosigned By    Initials Name Provider Type    Jenna Dang COTA/ELLEN  Occupational Therapy Assistant    Mary Moses OTA OT Student        Obj/Interventions     Row Name 11/14/20 0740          Balance    Balance Assessment  sitting static balance;sitting dynamic balance  -MT (r) NA (t) MT (c)     Static Sitting Balance  WFL  -MT (r) NA (t) MT (c)     Dynamic Sitting Balance  WFL  -MT (r) NA (t) MT (c)     Balance Interventions  sitting;dynamic;static;occupation based/functional task;UE activity with balance activity  -MT (r) NA (t) MT (c)       User Key  (r) = Recorded By, (t) = Taken By, (c) = Cosigned By    Initials Name Provider Type    MT Jenna Schumacher COTA/L Occupational Therapy Assistant    Mary Moses OTA OT Student        Goals/Plan    No documentation.       Clinical Impression     Row Name 11/14/20 0740          Pain Scale: Numbers Pre/Post-Treatment    Pretreatment Pain Rating  9/10  -MT (r) NA (t) MT (c)     Posttreatment Pain Rating  9/10  -MT (r) NA (t) MT (c)     Pain Location - Side  Bilateral  -MT (r) NA (t) MT (c)     Pain Location - Orientation  lower  -MT (r) NA (t) MT (c)     Pain Location  extremity;knee  -MT (r) NA (t) MT (c)     Pain Intervention(s)  Repositioned;Rest  -MT (r) NA (t) MT (c)     Row Name 11/14/20 0740          Plan of Care Review    Plan of Care Reviewed With  patient  -MT (r) NA (t) MT (c)     Progress  improving  -MT (r) NA (t) MT (c)     Outcome Summary  OT tx completed. pt. required Mod encouragement to partcipate and was self-limiting throughout tx. Supine>sit with Mod A and sit>supine with Min A. Ms. Morgan sat EOB with SBA for approx 10 minutes to increase sitting balance. She completed grooming tasks with set-up sitting EOB to increase BUE ROM. pt. performed 10 rep BUE reaching activity away from JAXON to increase sitting balance for independence in bed mobility and functional mobility. pt. demonstrated no LOB. Recommend inpatient rehab vs. SNF pending progress at d/c to increase independence in ADLs. Cont. OT POC.  -MT (r)  NA (t) MT (c)     Row Name 11/14/20 0740          Positioning and Restraints    Pre-Treatment Position  in bed  -MT (r) NA (t) MT (c)     Post Treatment Position  bed  -MT (r) NA (t) MT (c)     In Bed  fowlers;call light within reach;encouraged to call for assist;exit alarm on;side rails up x3;legs elevated  -MT (r) NA (t) MT (c)       User Key  (r) = Recorded By, (t) = Taken By, (c) = Cosigned By    Initials Name Provider Type    Jenna Dang COTA/L Occupational Therapy Assistant    Mary Moses OTA OT Student        Outcome Measures     Row Name 11/14/20 0740          How much help from another is currently needed...    Putting on and taking off regular lower body clothing?  2  -MT (r) NA (t) MT (c)     Bathing (including washing, rinsing, and drying)  3  -MT (r) NA (t) MT (c)     Toileting (which includes using toilet bed pan or urinal)  2  -MT (r) NA (t) MT (c)     Putting on and taking off regular upper body clothing  3  -MT (r) NA (t) MT (c)     Taking care of personal grooming (such as brushing teeth)  4  -MT (r) NA (t) MT (c)     Eating meals  4  -MT (r) NA (t) MT (c)     AM-PAC 6 Clicks Score (OT)  18  -MT (r) NA (t)       User Key  (r) = Recorded By, (t) = Taken By, (c) = Cosigned By    Initials Name Provider Type    Jenna Dang COTA/L Occupational Therapy Assistant    Mary Moses OTA OT Student        Occupational Therapy Education                 Title: PT OT SLP Therapies (In Progress)     Topic: Occupational Therapy (Done)     Point: ADL training (Done)     Description:   Instruct learner(s) on proper safety adaptation and remediation techniques during self care or transfers.   Instruct in proper use of assistive devices.              Learning Progress Summary           Patient Acceptance, E,TB, VU by VALERIA at 11/14/2020 0740    Comment: pt. completed ADLs and BUE reaching activity to increase sitting balance sitting EOB and ROM.    Acceptance, E, VU by CONNIE at 11/13/2020 0836                    Point: Home exercise program (Done)     Description:   Instruct learner(s) on appropriate technique for monitoring, assisting and/or progressing therapeutic exercises/activities.              Learning Progress Summary           Patient Acceptance, E,TB, VU by  at 11/14/2020 0740    Comment: pt. completed ADLs and BUE reaching activity to increase sitting balance sitting EOB and ROM.    Acceptance, E, VU by  at 11/13/2020 0836                   Point: Precautions (Done)     Description:   Instruct learner(s) on prescribed precautions during self-care and functional transfers.              Learning Progress Summary           Patient Acceptance, E,TB, VU by  at 11/14/2020 0740    Comment: pt. completed ADLs and BUE reaching activity to increase sitting balance sitting EOB and ROM.    Acceptance, E, VU by  at 11/13/2020 0836                   Point: Body mechanics (Done)     Description:   Instruct learner(s) on proper positioning and spine alignment during self-care, functional mobility activities and/or exercises.              Learning Progress Summary           Patient Acceptance, E,TB, VU by  at 11/14/2020 0740    Comment: pt. completed ADLs and BUE reaching activity to increase sitting balance sitting EOB and ROM.    Acceptance, E, VU by  at 11/13/2020 0836                               User Key     Initials Effective Dates Name Provider Type Discipline     08/28/18 -  Cate Noe, OTR/L Occupational Therapist OT     09/15/20 -  Mary Mensah OTA OT Student OT              OT Recommendation and Plan     Plan of Care Review  Plan of Care Reviewed With: patient  Progress: improving  Outcome Summary: OT tx completed. pt. required Mod encouragement to partcipate and was self-limiting throughout tx. Supine>sit with Mod A and sit>supine with Min A. Ms. Morgan sat EOB with SBA for approx 10 minutes to increase sitting balance. She completed grooming tasks with set-up sitting EOB to  increase BUE ROM. pt. performed 10 rep BUE reaching activity away from JAXON to increase sitting balance for independence in bed mobility and functional mobility. pt. demonstrated no LOB. Recommend inpatient rehab vs. SNF pending progress at d/c to increase independence in ADLs. Cont. OT POC.     Time Calculation:   Time Calculation- OT     Row Name 11/14/20 0740             Time Calculation- OT    OT Start Time  0740  -MT (r) NA (t) MT (c)      OT Stop Time  0820  -MT (r) NA (t) MT (c)      OT Time Calculation (min)  40 min  -MT (r) NA (t)      Total Timed Code Minutes- OT  40 minute(s)  -MT (r) NA (t) MT (c)      OT Received On  11/14/20  -MT (r) NA (t) MT (c)         Timed Charges    71711 - OT Therapeutic Activity Minutes  30  -MT (r) NA (t) MT (c)      38577 - OT Self Care/Mgmt Minutes  10  -MT (r) NA (t) MT (c)        User Key  (r) = Recorded By, (t) = Taken By, (c) = Cosigned By    Initials Name Provider Type    MT Jenna Schumacher COTA/L Occupational Therapy Assistant    Mary Moses OTA OT Student                 SHASTA García OTA  11/14/2020

## 2020-11-14 NOTE — PLAN OF CARE
Goal Outcome Evaluation:  Medicated x 1 today prior to therapy working with her. Pt still unable to stand. Remains non weight bearing to left leg. Urostomy to bedside drainage bag. Pt a little more compliant with turning today. Waffle mattress at bedside if needed. Moisture barrier applied to bottom as needed. Abx cont. Safety maintained. Dc plan to home with home health at [place].

## 2020-11-14 NOTE — PLAN OF CARE
Problem: Adult Inpatient Plan of Care  Goal: Plan of Care Review  Outcome: Ongoing, Progressing  Flowsheets (Taken 11/14/2020 1110)  Plan of Care Reviewed With: patient  Outcome Summary: Pt required min/mod assist for bed mobility.  She refused to attempt to stand and needed constant encourgement to participate with exercises.  Discussed safety issues with pt wanting to go home since she is unable to stand and would be bed bound.  Will continue to work with pt to increase strength and progress with transfers as pt is able

## 2020-11-14 NOTE — PLAN OF CARE
Goal Outcome Evaluation:  Plan of Care Reviewed With: patient  Progress: no change  Outcome Summary: Pt is A&Ox4, no neuro changes. Denies N/T. Pt c/o pain only when turning. Pt has been resting well throughout shift. Pt refuses to turn at times. This shift pt has been turned q2-4 hrs. Urostomy bag. VSS. Safety maintained. Will continue to monitor.

## 2020-11-14 NOTE — THERAPY TREATMENT NOTE
"Acute Care - Physical Therapy Treatment Note  [facility]     Patient Name: Jarvis Morgan  : 1945  MRN: [id number]  Today's Date: 2020           PT Assessment (last 12 hours)      PT Evaluation and Treatment     Row Name 20 1110          Physical Therapy Time and Intention    Subjective Information  complains of;pain  -     Document Type  therapy note (daily note)  -     Mode of Treatment  physical therapy  -     Row Name 20 111          General Information    Existing Precautions/Restrictions  fall;non-weight bearing;left  -     Row Name 20 111          Pain Scale: Numbers Pre/Post-Treatment    Pretreatment Pain Rating  6/10  -DEE     Posttreatment Pain Rating  10  -DEE     Pain Location - Side  Left  -     Pain Location - Orientation  lower  -     Pain Location  extremity  -     Row Name 20 111          Bed Mobility    Supine-Sit [place] (Bed Mobility)  verbal cues;minimum assist (75% patient effort);moderate assist (50% patient effort)  -     Sit-Supine [place] (Bed Mobility)  verbal cues;moderate assist (50% patient effort)  -     Assistive Device (Bed Mobility)  bed rails;head of bed elevated  -     Row Name 20 111          Transfers    Comment (Transfers)  refused  -     Row Name 20          Motor Skills    Therapeutic Exercise  -- pt performed minimal exercises, would state \" I can't\"   -     Row Name             Wound 20 05 Bilateral medial gluteal Pressure Injury    Wound - Properties Group Placement Date: 20  - Placement Time: 513 Present on Hospital Admission: Y  -KH Side: Bilateral  -KH Orientation: medial  -KH Location: gluteal  -KH Primary Wound Type: Pressure inj  -KH    Retired Wound - Properties Group Date first assessed: 20  - Time first assessed: 513 Present on Hospital Admission: Y  -KH Side: Bilateral  -KH Location: gluteal  -KH Primary Wound Type: Pressure inj  -KH    " Row Name 11/14/20 1110          Positioning and Restraints    Pre-Treatment Position  in bed  -DEE     Post Treatment Position  bed  -DEE     In Bed  fowlers;call light within reach;encouraged to call for assist;exit alarm on;side rails up x2  -DEE       User Key  (r) = Recorded By, (t) = Taken By, (c) = Cosigned By    Initials Name Provider Type    Zeus Argueta PTA Physical Therapy Assistant    Anabella Nolasco, RN Registered Nurse        Physical Therapy Education                 Title: PT OT SLP Therapies (In Progress)     Topic: Physical Therapy (In Progress)     Point: Mobility training (In Progress)     Learning Progress Summary           Patient Acceptance, E, NR by DEE at 11/14/2020 1110    Comment: benefits of activity    Acceptance, E, NR by DEE at 11/13/2020 0941    Comment: benefits of activity    Acceptance, E, VU by CC at 11/12/2020 1118    Comment: Pt educated on PT's role in POC                   Point: Home exercise program (Not Started)     Learner Progress:  Not documented in this visit.          Point: Body mechanics (Not Started)     Learner Progress:  Not documented in this visit.          Point: Precautions (Not Started)     Learner Progress:  Not documented in this visit.                      User Key     Initials Effective Dates Name Provider Type Discipline    DEE 12/08/16 -  Zeus Malin PTA Physical Therapy Assistant PT    CC 09/02/20 -  Natalia Leiva, LIZ Student PT Student PT              PT Recommendation and Plan     Plan of Care Reviewed With: patient  Progress: no change  Outcome Summary: Pt required min/mod assist for bed mobility.  She refused to attempt to stand and needed constant encourgement to participate with exercises.  Discussed safety issues with pt wanting to go home since she is unable to stand and would be bed bound.  Will continue to work with pt to increase strength and progress with transfers as pt is able  Outcome Measures     Row Name  11/14/20 1110 11/13/20 0941          How much help from another person do you currently need...    Turning from your back to your side while in flat bed without using bedrails?  2  -DEE  2  -DEE     Moving from lying on back to sitting on the side of a flat bed without bedrails?  2  -DEE  2  -DEE     Moving to and from a bed to a chair (including a wheelchair)?  1  -DEE  1  -DEE     Standing up from a chair using your arms (e.g., wheelchair, bedside chair)?  1  -DEE  1  -DEE     Climbing 3-5 steps with a railing?  1  -DEE  1  -DEE     To walk in hospital room?  1  -DEE  1  -DEE     AM-PAC 6 Clicks Score (PT)  8  -DEE  8  -DEE        Functional Assessment    Outcome Measure Options  AM-PAC 6 Clicks Basic Mobility (PT)  -DEE  AM-PAC 6 Clicks Basic Mobility (PT)  -DEE       User Key  (r) = Recorded By, (t) = Taken By, (c) = Cosigned By    Initials Name Provider Type    Zeus Argueta PTA Physical Therapy Assistant           Time Calculation:   PT Charges     Row Name 11/14/20 1110             Time Calculation    Start Time  1110  -DEE      Stop Time  1135  -DEE      Time Calculation (min)  25 min  -DEE      PT Received On  11/14/20  -DEE         Time Calculation- PT    Total Timed Code Minutes- PT  25 minute(s)  -DEE         Timed Charges    88050 - PT Therapeutic Activity Minutes  25  -DEE        User Key  (r) = Recorded By, (t) = Taken By, (c) = Cosigned By    Initials Name Provider Type    Zeus Argueta PTA Physical Therapy Assistant        Therapy Charges for Today     Code Description Service Date Service Provider Modifiers Qty    85022081518 HC PT THER PROC EA 15 MIN 11/13/2020 Zeus Malin PTA GP 2    90543765754 HC PT THERAPEUTIC ACT EA 15 MIN 11/14/2020 Zeus Malin PTA GP 2          PT G-Codes  Outcome Measure Options: AM-PAC 6 Clicks Basic Mobility (PT)  AM-PAC 6 Clicks Score (PT): 8  AM-PAC 6 Clicks Score (OT): 18    Zeus Malin PTA  11/14/2020

## 2020-11-14 NOTE — PLAN OF CARE
Goal Outcome Evaluation:  No co pain this shift. Refuses to turn most of the time. Appetite good. Safety maintained. Abx for uti. Continue to monitor. Dc plan home with home health

## 2020-11-15 LAB
HOLD SPECIMEN: NORMAL
INR PPP: 1.73 (ref 0.91–1.09)
PROTHROMBIN TIME: 20 SECONDS (ref 11.9–14.6)
WHOLE BLOOD HOLD SPECIMEN: NORMAL

## 2020-11-15 PROCEDURE — 94799 UNLISTED PULMONARY SVC/PX: CPT

## 2020-11-15 PROCEDURE — 97110 THERAPEUTIC EXERCISES: CPT

## 2020-11-15 PROCEDURE — 85610 PROTHROMBIN TIME: CPT | Performed by: INTERNAL MEDICINE

## 2020-11-15 PROCEDURE — 97530 THERAPEUTIC ACTIVITIES: CPT

## 2020-11-15 RX ORDER — WARFARIN SODIUM 5 MG/1
10 TABLET ORAL
Status: COMPLETED | OUTPATIENT
Start: 2020-11-15 | End: 2020-11-15

## 2020-11-15 RX ORDER — LACTULOSE 20 G/30ML
20 SOLUTION ORAL ONCE
Status: COMPLETED | OUTPATIENT
Start: 2020-11-15 | End: 2020-11-15

## 2020-11-15 RX ORDER — WARFARIN SODIUM 5 MG/1
5 TABLET ORAL
Status: DISCONTINUED | OUTPATIENT
Start: 2020-11-16 | End: 2020-11-16 | Stop reason: HOSPADM

## 2020-11-15 RX ADMIN — ATORVASTATIN CALCIUM 40 MG: 40 TABLET, FILM COATED ORAL at 16:54

## 2020-11-15 RX ADMIN — METHIMAZOLE 10 MG: 10 TABLET ORAL at 16:54

## 2020-11-15 RX ADMIN — CALCITRIOL 0.25 MCG: 0.25 CAPSULE ORAL at 16:55

## 2020-11-15 RX ADMIN — IPRATROPIUM BROMIDE AND ALBUTEROL SULFATE 3 ML: 2.5; .5 SOLUTION RESPIRATORY (INHALATION) at 06:36

## 2020-11-15 RX ADMIN — WARFARIN SODIUM 10 MG: 5 TABLET ORAL at 16:54

## 2020-11-15 RX ADMIN — DILTIAZEM HYDROCHLORIDE 180 MG: 180 CAPSULE, COATED, EXTENDED RELEASE ORAL at 16:51

## 2020-11-15 RX ADMIN — LACTULOSE 20 G: 20 SOLUTION ORAL at 16:55

## 2020-11-15 RX ADMIN — DOCUSATE SODIUM 50 MG AND SENNOSIDES 8.6 MG 2 TABLET: 8.6; 5 TABLET, FILM COATED ORAL at 22:50

## 2020-11-15 RX ADMIN — POLYETHYLENE GLYCOL (3350) 17 G: 17 POWDER, FOR SOLUTION ORAL at 16:55

## 2020-11-15 RX ADMIN — DONEPEZIL HYDROCHLORIDE 5 MG: 5 TABLET, FILM COATED ORAL at 22:50

## 2020-11-15 RX ADMIN — OXYCODONE HYDROCHLORIDE AND ACETAMINOPHEN 1 TABLET: 5; 325 TABLET ORAL at 22:51

## 2020-11-15 RX ADMIN — METOPROLOL TARTRATE 25 MG: 25 TABLET, FILM COATED ORAL at 22:49

## 2020-11-15 NOTE — THERAPY TREATMENT NOTE
Patient Name: Jarvis Morgan  : 1945    MRN: [id number]                              Today's Date: 11/15/2020       Admit Date: 2020    Visit Dx:     ICD-10-CM ICD-9-CM   1. Closed fracture of left hip, initial encounter (CMS/HCC)  S72.002A 820.8   2. Impaired mobility  Z74.09 799.89   3. Impaired mobility and ADLs  Z74.09 V49.89    Z78.9      Patient Active Problem List   Diagnosis   • History of aortic valvular stenosis   • Chronic atrial fibrillation (CMS/[facility])   • Mixed hyperlipidemia   • Essential hypertension   • Deep vein thrombosis (DVT) of left lower extremity (CMS/HCC)   • Shortness of breath   • Physical deconditioning   • Venous insufficiency   • GERD (gastroesophageal reflux disease)   • History of bladder cancer   • Sick sinus syndrome (CMS/HCC)   • ASA (obstructive sleep apnea)   • Hyperthyroidism   • Chronic diastolic heart failure (CMS/HCC)   • History of DVT (deep vein thrombosis)   • Dementia (CMS/HCC)   • Vitamin D deficiency   • Class 2 obesity due to excess calories with serious comorbidity and body mass index (BMI) of 38.0 to 38.9 in adult   • Chronic anticoagulation   • Scabies   • MRSA bacteremia   • Weakness   • Acute on chronic diastolic heart failure (CMS/HCC)   • S/P TAVR (transcatheter aortic valve replacement)   • Sepsis (CMS/HCC)   • Acute UTI   • Pneumonia of both lower lobes due to infectious organism   • Acute and chronic respiratory failure with hypercapnia (CMS/HCC)   • Abnormal CXR - pneumonia cannot be ruled out at this time.   • Closed comminuted intertrochanteric fracture of proximal end of right femur (CMS/[facility])   • Obesity (BMI 30-39.9)   • Closed fracture of right hip (CMS/HCC)   • Right pleural effusion   • Closed fracture of left hip (CMS/HCC)   • Aortic valve stenosis   • H/O ureterostomy   • Idiopathic peripheral neuropathy   • Memory impairment   • Neoplasm of bladder   • Primary osteoarthritis of both knees   • Recurrent major depressive disorder, in  partial remission (CMS/HCC)   • Stage 3 chronic kidney disease   • Acute cystitis with hematuria     Past Medical History:   Diagnosis Date   • A-fib (CMS/HCC)    • Aortic stenosis    • Arthritis    • Bradycardia    • Cancer (CMS/HCC)     bladder and skin   • Cardiomegaly    • CHF (congestive heart failure) (CMS/HCC)    • GERD (gastroesophageal reflux disease)    • Hard of hearing    • History of short term memory loss    • Hypercalcemia    • Hyperlipidemia    • Hypertension    • Hyperthyroidism 11/20/2017   • Hypothyroidism    • Kidney stone    • Long term current use of anticoagulant therapy    • Open wound     left lower extremity,   • Palpitation    • PONV (postoperative nausea and vomiting)    • Shortness of breath    • Sick sinus syndrome (CMS/HCC)    • Sleep apnea     CPAP     Past Surgical History:   Procedure Laterality Date   • AORTIC VALVE REPAIR/REPLACEMENT     • BLADDER SURGERY      removed   • CARDIAC CATHETERIZATION     • CARDIAC CATHETERIZATION N/A 12/9/2016    Procedure: Left Heart Cath;  Surgeon: Elia Flores MD;  Location:  [facility] CATH INVASIVE LOCATION;  Service:    • DISTAL FEMORAL NAILING Right 9/24/2020    Procedure: RIGHT SHORT TFN;  Surgeon: Betito Shetty MD;  Location: [facility] OR;  Service: Orthopedics;  Laterality: Right;   • HIP BIPOLAR REPLACEMENT Left    • HYSTERECTOMY     • ILEOSTOMY     • JOINT REPLACEMENT     • KIDNEY SURGERY      right kidney removed   • NEPHRECTOMY RADICAL Right     from cancer   • VARICOSE VEIN SURGERY Left 4/10/2017    Procedure: LEFT LOWER EXTREMITY VENOGRAM. LEFT SAPHENOUS VEIN RADIO FREQUENCY ABLATION;  Surgeon: Blake Martinez DO;  Location:  [facility] OR;  Service:      General Information     Row Name 11/15/20 0730          OT Time and Intention    Document Type  therapy note (daily note)  -MT (r) NA (t) MT (c)     Mode of Treatment  occupational therapy  -MT (r) NA (t) MT (c)     Row Name 11/15/20 0730          General Information    Existing  Precautions/Restrictions  non-weight bearing;fall  -MT (r) NA (t) MT (c)     Row Name 11/15/20 0730          Cognition    Orientation Status (Cognition)  oriented x 4  -MT (r) NA (t) MT (c)     Row Name 11/15/20 0730          Safety Issues, Functional Mobility    Safety Issues Affecting Function (Mobility)  awareness of need for assistance  -MT (r) NA (t) MT (c)     Impairments Affecting Function (Mobility)  endurance/activity tolerance;pain;strength  -MT (r) NA (t) MT (c)     Cognitive Impairments, Mobility Safety/Performance  attention;awareness, need for assistance  -MT (r) NA (t) MT (c)       User Key  (r) = Recorded By, (t) = Taken By, (c) = Cosigned By    Initials Name Provider Type    Jenna Dang COTA/L Occupational Therapy Assistant    Mary Moses OTA OT Student        Mobility/ADL's     Row Name 11/15/20 0730          Bed Mobility    Bed Mobility  supine-sit;sit-supine  -MT (r) NA (t) MT (c)     Supine-Sit [place] (Bed Mobility)  moderate assist (50% patient effort)  -MT (r) NA (t) MT (c)     Sit-Supine [place] (Bed Mobility)  minimum assist (75% patient effort)  -MT (r) NA (t) MT (c)     Bed Mobility, Safety Issues  decreased use of legs for bridging/pushing  -MT (r) NA (t) MT (c)     Assistive Device (Bed Mobility)  bed rails;head of bed elevated  -MT (r) NA (t) MT (c)     Row Name 11/15/20 0730          Mobility    Extremity Weight-bearing Status  left lower extremity  -MT (r) NA (t) MT (c)     Left Lower Extremity (Weight-bearing Status)  non weight-bearing (NWB)  -MT (r) NA (t) MT (c)       User Key  (r) = Recorded By, (t) = Taken By, (c) = Cosigned By    Initials Name Provider Type    Jenna Dang COTA/L Occupational Therapy Assistant    Mary Moses OTA OT Student        Obj/Interventions     Row Name 11/15/20 0730          Shoulder (Therapeutic Exercise)    Shoulder (Therapeutic Exercise)  AAROM (active assistive range of motion);strengthening exercise  -MT (r) NA  (t) MT (c)     Shoulder AAROM (Therapeutic Exercise)  bilateral;flexion;extension;10 repetitions;sitting;other (see comments) 10x2 sets  -MT (r) NA (t) MT (c)     Shoulder Strengthening (Therapeutic Exercise)  bilateral;flexion;extension;10 repetitions;other (see comments);sitting 10x2 sets  -MT (r) NA (t) MT (c)     Row Name 11/15/20 [facility]          Elbow/Forearm (Therapeutic Exercise)    Elbow/Forearm (Therapeutic Exercise)  AAROM (active assistive range of motion);strengthening exercise  -MT (r) NA (t) MT (c)     Elbow/Forearm AAROM (Therapeutic Exercise)  bilateral;flexion;extension;10 repetitions [id number]  -MT (r) NA (t) MT (c)     Elbow/Forearm Strengthening (Therapeutic Exercise)  bilateral;flexion;extension;sitting;10 repetitions [id number]  -MT (r) NA (t) MT (c)     Row Name 11/15/20 6136          Balance    Balance Assessment  sitting dynamic balance  -MT (r) NA (t) MT (c)     Dynamic Sitting Balance  WFL;sitting, edge of bed  -MT (r) NA (t) MT (c)     Balance Interventions  UE activity with balance activity  -MT (r) NA (t) MT (c)     Row Name 11/15/20 5968          Therapeutic Exercise    Therapeutic Exercise  elbow/forearm;shoulder  -MT (r) NA (t) MT (c)       User Key  (r) = Recorded By, (t) = Taken By, (c) = Cosigned By    Initials Name Provider Type    MT Jenna Schumacher COTA/L Occupational Therapy Assistant    Mary Moses OTA OT Student        Goals/Plan    No documentation.       Clinical Impression     Row Name 11/15/20 8262          Pain Scale: Numbers Pre/Post-Treatment    Pretreatment Pain Rating  8/10  -MT (r) NA (t) MT (c)     Posttreatment Pain Rating  8/10  -MT (r) NA (t) MT (c)     Pain Location - Side  Left  -MT (r) NA (t) MT (c)     Pain Location - Orientation  lower  -MT (r) NA (t) MT (c)     Pain Location  extremity  -MT (r) NA (t) MT (c)     Pain Intervention(s)  Repositioned;Rest  -MT (r) NA (t) MT (c)     Row Name 11/15/20 8607          Plan of Care Review    Plan of Care Reviewed  "With  patient  -MT (r) NA (t) MT (c)     Progress  improving  -MT (r) NA (t) MT (c)     Outcome Summary  OT tx completed. pt. required a lot of encouragement this tx date. pt. would state \"I can't do it\" or \"I'm not going to do it.\" OTAS educated the pt. on importance of participating in therapy. pt. required increased time for bed mobility, anticipate d/t being self-limiting. Supine>sit with Mod A and sit>supine with Min A. Ms. Morgan completed 10 reps x2 sets of BUE ther ex sitting EOB requiring AAROM anticipate due to not wanting to participate and occassionally resisiting OTAS during AAROM. Tx was completed to increase pt.'s core/trunk strength and BUE strength for bed mobility and functional mobility independence. Recommend SNF at d/c. Cont. OT POC.  -MT (r) NA (t) MT (c)     Row Name 11/15/20 3891          Positioning and Restraints    Pre-Treatment Position  in bed  -MT (r) NA (t) MT (c)     Post Treatment Position  bed  -MT (r) NA (t) MT (c)     In Bed  fowlers;call light within reach;encouraged to call for assist;exit alarm on;side rails up x3;legs elevated  -MT (r) NA (t) MT (c)       User Key  (r) = Recorded By, (t) = Taken By, (c) = Cosigned By    Initials Name Provider Type    MT Jenna Schumacher COTA/L Occupational Therapy Assistant    Mary Moses OTA OT Student        Outcome Measures     Row Name 11/15/20 9126          How much help from another is currently needed...    Putting on and taking off regular lower body clothing?  2  -MT (r) NA (t) MT (c)     Bathing (including washing, rinsing, and drying)  2  -MT (r) NA (t) MT (c)     Toileting (which includes using toilet bed pan or urinal)  2  -MT (r) NA (t) MT (c)     Putting on and taking off regular upper body clothing  3  -MT (r) NA (t) MT (c)     Taking care of personal grooming (such as brushing teeth)  4  -MT (r) NA (t) MT (c)     Eating meals  4  -MT (r) NA (t) MT (c)     AM-PAC 6 Clicks Score (OT)  17  -MT (r) NA (t)     Row Name " 11/15/20 0730          Functional Assessment    Outcome Measure Options  AM-PAC 6 Clicks Daily Activity (OT)  -MT (r) NA (t) MT (c)       User Key  (r) = Recorded By, (t) = Taken By, (c) = Cosigned By    Initials Name Provider Type    Jenna Dang COTA/L Occupational Therapy Assistant    Mary Moses OTA OT Student        Occupational Therapy Education                 Title: PT OT SLP Therapies (In Progress)     Topic: Occupational Therapy (Done)     Point: ADL training (Done)     Description:   Instruct learner(s) on proper safety adaptation and remediation techniques during self care or transfers.   Instruct in proper use of assistive devices.              Learning Progress Summary           Patient Acceptance, E,TB, VU by VALERIA at 11/14/2020 0740    Comment: pt. completed ADLs and BUE reaching activity to increase sitting balance sitting EOB and ROM.    Acceptance, E, VU by CONNIE at 11/13/2020 0836                   Point: Home exercise program (Done)     Description:   Instruct learner(s) on appropriate technique for monitoring, assisting and/or progressing therapeutic exercises/activities.              Learning Progress Summary           Patient Acceptance, E,TB, VU by VALERIA at 11/15/2020 0730    Comment: pt. educated on importance of participating with therapy and BUE ther ex for strengthening to compensate for LLE weakness.    Acceptance, E,TB, VU by VALERIA at 11/14/2020 0740    Comment: pt. completed ADLs and BUE reaching activity to increase sitting balance sitting EOB and ROM.    Acceptance, E, VU by CONNIE at 11/13/2020 0836                   Point: Precautions (Done)     Description:   Instruct learner(s) on prescribed precautions during self-care and functional transfers.              Learning Progress Summary           Patient Acceptance, E,TB, VU by VALERIA at 11/15/2020 0730    Comment: pt. educated on importance of participating with therapy and BUE ther ex for strengthening to compensate for LLE weakness.     "Acceptance, E,TB, VU by NA at 11/14/2020 0740    Comment: pt. completed ADLs and BUE reaching activity to increase sitting balance sitting EOB and ROM.    Acceptance, E, VU by  at 11/13/2020 0836                   Point: Body mechanics (Done)     Description:   Instruct learner(s) on proper positioning and spine alignment during self-care, functional mobility activities and/or exercises.              Learning Progress Summary           Patient Acceptance, E,TB, VU by VALERIA at 11/15/2020 0730    Comment: pt. educated on importance of participating with therapy and BUE ther ex for strengthening to compensate for LLE weakness.    Acceptance, E,TB, VU by NA at 11/14/2020 0740    Comment: pt. completed ADLs and BUE reaching activity to increase sitting balance sitting EOB and ROM.    Acceptance, E, VU by  at 11/13/2020 0836                               User Key     Initials Effective Dates Name Provider Type Discipline     08/28/18 -  Cate Noe, OTR/L Occupational Therapist OT    VALERIA 09/15/20 -  Mary Mensah OTA OT Student OT              OT Recommendation and Plan     Plan of Care Review  Plan of Care Reviewed With: patient  Progress: improving  Outcome Summary: OT tx completed. pt. required a lot of encouragement this tx date. pt. would state \"I can't do it\" or \"I'm not going to do it.\" OTAS educated the pt. on importance of participating in therapy. pt. required increased time for bed mobility, anticipate d/t being self-limiting. Supine>sit with Mod A and sit>supine with Min A. Ms. Morgan completed 10 reps x2 sets of BUE ther ex sitting EOB requiring AAROM anticipate due to not wanting to participate and occassionally resisiting OTAS during AAROM. Tx was completed to increase pt.'s core/trunk strength and BUE strength for bed mobility and functional mobility independence. Recommend SNF at d/c. Cont. OT POC.     Time Calculation:   Time Calculation- OT     Row Name 11/15/20 0730             Time " Calculation- OT    OT Start Time  0730  -MT (r) NA (t) MT (c)      OT Stop Time  0800  -MT (r) NA (t) MT (c)      OT Time Calculation (min)  30 min  -MT (r) NA (t)      Total Timed Code Minutes- OT  30 minute(s)  -MT (r) NA (t) MT (c)      OT Received On  11/15/20  -MT (r) NA (t) MT (c)         Timed Charges    76854 - OT Therapeutic Exercise Minutes  15  -MT (r) NA (t) MT (c)      20134 - OT Therapeutic Activity Minutes  15  -MT (r) NA (t) MT (c)        User Key  (r) = Recorded By, (t) = Taken By, (c) = Cosigned By    Initials Name Provider Type    MT Jenna Schumacher COTA/L Occupational Therapy Assistant    Mary Moses OTA OT Student                 SHASTA García Student, SHASTA  11/15/2020

## 2020-11-15 NOTE — PLAN OF CARE
"  Problem: Adult Inpatient Plan of Care  Goal: Plan of Care Review  Recent Flowsheet Documentation  Taken 11/15/2020 4130 by Mary Mensah OTA  Progress: improving  Plan of Care Reviewed With: patient  Outcome Summary: OT tx completed. pt. required a lot of encouragement this tx date. pt. would state \"I can't do it\" or \"I'm not going to do it.\" OTAS educated the pt. on importance of participating in therapy. pt. required increased time for bed mobility, anticipate d/t being self-limiting. Supine>sit with Mod A and sit>supine with Min A. Ms. Morgan completed 10 reps x2 sets of BUE ther ex sitting EOB requiring AAROM anticipate due to not wanting to participate and occassionally resisiting OTAS during AAROM. Tx was completed to increase pt.'s core/trunk strength and BUE strength for bed mobility and functional mobility independence. Recommend SNF at d/c. Cont. OT POC.     "

## 2020-11-15 NOTE — PROGRESS NOTES
"Continued Stay Note   [place]     Patient Name: Jarvis Morgan  MRN: [id number]  Today's Date: 11/15/2020    Admit Date: 11/12/2020    Discharge Plan     Row Name 11/15/20 1451       Plan    Plan Comments  SW received consult stating \"anticipate patient will go home at discharge (not SNF).  Daughter inquires if transportation home via ambulance if possible?  Anticipate d/c possibly tomorrow.\" Pt is already current with [facility] and will need new orders tomorrow at discharge if pts family not interested in SNF. Pt is able to be transferred home by EMS if RN can qualify pt. SW will follow and assist with discharge plan        Discharge Codes    No documentation.             Jami Weeks    "

## 2020-11-15 NOTE — PROGRESS NOTES
[facility] Medicine Services  INPATIENT PROGRESS NOTE    Patient Name: Jarvis Morgan  Date of Admission: 11/12/2020  Today's Date: 11/15/20  Length of Stay: 3  Primary Care Physician: Mary Beth Izquierdo APRN    Subjective   Chief Complaint: follow-up periprosthetic fracture  HPI   Patient reports that she is doing okay.  She is resting in bed, but does report a pain level of 6/10 in severity.  No chest pain or chest pressure.  No shortness of breath.  Has not required any supplemental oxygen.  She does not think that she has had a bowel movement during this hospitalization.  She states that she is eating and drinking normally.  Very reluctant to consider skilled nursing facility placement for rehabilitation, and would like me to contact her daughter to discuss that matter further.    Review of Systems     All pertinent negatives and positives are as above. All other systems have been reviewed and are negative unless otherwise stated.     Objective    Temp:  [97.9 °F (36.6 °C)-98.7 °F (37.1 °C)] 98.7 °F (37.1 °C)  Heart Rate:  [77-92] 77  Resp:  [14-18] 16  BP: (116-140)/(71-73) 140/73  Physical Exam  Vitals signs reviewed. Exam conducted with a chaperone present.   Constitutional:       Appearance: She is not diaphoretic.   HENT:      Head: Normocephalic.      Mouth/Throat:      Pharynx: No oropharyngeal exudate.   Eyes:      General: No scleral icterus.  Neck:      Musculoskeletal: Neck supple.   Cardiovascular:      Rate and Rhythm: Normal rate.   Pulmonary:      Effort: Pulmonary effort is normal. No respiratory distress.      Comments: On room air  Abdominal:      Comments: Obese but soft   Musculoskeletal:         General: Swelling present.   Skin:     General: Skin is warm.   Neurological:      General: No focal deficit present.      Mental Status: She is alert.   Psychiatric:         Mood and Affect: Mood normal.       Results Review:  I have reviewed the labs, radiology  results, and diagnostic studies.    Laboratory Data:   Results from last 7 days   Lab Units 11/12/20 0223   WBC 10*3/mm3 12.86*   HEMOGLOBIN g/dL 14.2   HEMATOCRIT % 42.6   PLATELETS 10*3/mm3 189        Results from last 7 days   Lab Units 11/13/20  0510 11/12/20 0223   SODIUM mmol/L 139 141  141   POTASSIUM mmol/L 4.8 4.1  4.1   CHLORIDE mmol/L 104 105  105   CO2 mmol/L 30.0* 29.0  29.0   BUN mg/dL 39* 38*  38*   CREATININE mg/dL 1.23* 1.32*  1.32*   CALCIUM mg/dL 9.2 9.8  9.8   BILIRUBIN mg/dL  --  0.6   ALK PHOS U/L  --  148*   ALT (SGPT) U/L  --  33   AST (SGOT) U/L  --  18   GLUCOSE mg/dL 110* 129*  129*       Culture Data:   Urine Culture   Date Value Ref Range Status   11/12/2020 >100,000 CFU/mL Morganella morganii ssp morganii (A)  Final       Radiology Data:   Imaging Results (Last 24 Hours)     ** No results found for the last 24 hours. **          I have reviewed the patient's current medications.     Assessment/Plan     Active Hospital Problems    Diagnosis   • Acute cystitis with hematuria   • Closed fracture of left hip (CMS/HCC)   • S/P TAVR (transcatheter aortic valve replacement)   • Chronic anticoagulation   • Chronic diastolic heart failure (CMS/HCC)   • ASA (obstructive sleep apnea)   • Chronic atrial fibrillation (CMS/HCC)   • Deep vein thrombosis (DVT) of left lower extremity (CMS/[facility])   • Essential hypertension     Plan:  1.  Has received Rocephin IV X 3 days  2.  Coumadin 10mg PO tonight  3.  PT/INR and CBC in AM  4.  PT and OT  5.  Lactulose PO X 1; continue other bowel regimen  6.  Judging from the MAR it looks like patient has only had 2 doses of oxycodone for pain control so far this hospitalization.  Premedication with oxycodone prior to therapy did not seem to make much of a differrence.  6.  Phone conversation this afternoon with patient's daughter, Swetha.  She reports that patient was sent to [facility] in the past and had a very bad experience.  She  states that it was not until patient got back home that she really started making progress. Swetha states that they have a very supportive family that will be able to take care of Ms. Morgan at home at discharge.  They have all the home equipment that they need, although she does request a prescription for a new wheelchair.  She is also concerned that if she goes into a skilled nursing facility, and given the Covid visiting restrictions, that this will also have an adverse impact on her recovery.  In short, she wants her mother to come home at discharge.  She also understands that when she gets home if her condition proves to be more than the family can take care of, then arrangements can move forward with possible skilled nursing facility placement at that time, even after hospital discharge.  7.  Daughter also inquires if transportation home via ambulance is possible.  Will ask case management/SW to assess if this is possible/covered.  8.  Dispo: anticipate home possibly 1-2 days pending the above.      Electronically signed by Miquel Valle MD, 11/15/20, 13:55 CST.

## 2020-11-15 NOTE — THERAPY TREATMENT NOTE
Acute Care - Physical Therapy Treatment Note  [facility]     Patient Name: Jarvis Morgan  : 1945  MRN: [id number]  Today's Date: 11/15/2020           PT Assessment (last 12 hours)      PT Evaluation and Treatment     Row Name 11/15/20 1133          Physical Therapy Time and Intention    Subjective Information  complains of;weakness;fatigue;pain  -     Document Type  therapy note (daily note)  -     Mode of Treatment  physical therapy  -     Patient Effort  fair  -     Row Name 11/15/20 1133          General Information    Existing Precautions/Restrictions  fall;non-weight bearing;left  -     Row Name 11/15/20 1133          Pain Scale: Numbers Pre/Post-Treatment    Pretreatment Pain Rating  10  -DEE     Posttreatment Pain Rating  7/10  -DEE     Pain Location - Side  Left  -     Pain Location - Orientation  lower  -     Pain Location  extremity  -     Row Name 11/15/20 1133          Bed Mobility    Supine-Sit [place] (Bed Mobility)  verbal cues;minimum assist (75% patient effort)  -     Sit-Supine [place] (Bed Mobility)  verbal cues;minimum assist (75% patient effort)  -     Assistive Device (Bed Mobility)  draw sheet;head of bed elevated  -     Row Name 11/15/20 1133          Hip (Therapeutic Exercise)    Hip AAROM (Therapeutic Exercise)  bilateral;flexion;aBduction;aDduction  -     Row Name 11/15/20 1133          Knee (Therapeutic Exercise)    Knee AAROM (Therapeutic Exercise)  bilateral;flexion;extension  -     Row Name 11/15/20 1133          Ankle (Therapeutic Exercise)    Ankle AROM (Therapeutic Exercise)  bilateral;dorsiflexion;plantarflexion  -     Row Name             Wound 20 Bilateral medial gluteal Pressure Injury    Wound - Properties Group Placement Date: 20  - Placement Time: 513 Present on Hospital Admission: Y  -KH Side: Bilateral  - Orientation: medial  - Location: gluteal  - Primary Wound Type: Pressure inj  -KH    Retired  Wound - Properties Group Date first assessed: 11/12/20  -KH Time first assessed: 0513 -KH Present on Hospital Admission: Y  -KH Side: Bilateral  -KH Location: gluteal  -KH Primary Wound Type: Pressure inj  -KH    Row Name 11/15/20 1133          Positioning and Restraints    Pre-Treatment Position  in bed  -DEE     Post Treatment Position  bed  -DEE     In Bed  fowlers;call light within reach;encouraged to call for assist;side rails up x2  -DEE       User Key  (r) = Recorded By, (t) = Taken By, (c) = Cosigned By    Initials Name Provider Type    DEE Zeus Malin PTA Physical Therapy Assistant    Anabella Nolasco, RN Registered Nurse        Physical Therapy Education                 Title: PT OT SLP Therapies (In Progress)     Topic: Physical Therapy (In Progress)     Point: Mobility training (Done)     Learning Progress Summary           Patient Acceptance, E, VU,NR by  at 11/15/2020 1133    Comment: benefits of activity    Acceptance, E, NR by DEE at 11/14/2020 1110    Comment: benefits of activity    Acceptance, E, NR by DEE at 11/13/2020 0941    Comment: benefits of activity    Acceptance, E, VU by CC at 11/12/2020 1118    Comment: Pt educated on PT's role in POC                   Point: Home exercise program (Not Started)     Learner Progress:  Not documented in this visit.          Point: Body mechanics (Not Started)     Learner Progress:  Not documented in this visit.          Point: Precautions (Not Started)     Learner Progress:  Not documented in this visit.                      User Key     Initials Effective Dates Name Provider Type Discipline     12/08/16 -  Zeus Malin PTA Physical Therapy Assistant PT    CC 09/02/20 -  Natalia Leiva PT Student PT Student PT              PT Recommendation and Plan     Plan of Care Reviewed With: patient  Progress: no change  Outcome Summary: Pt was premedicated for tx.  She required min assist for supine to sit.  At EOB, pt agreed to attempt to  stand, attempted X 5 with bed elevated, but pt was unable.  Pt is limited by pain and weakness on RLE and having to maintain NWB on LLE.  If pt continues to want to d/c home, would recommend a mechanical lift until pt is able to be WBAT on LLE.  Outcome Measures     Row Name 11/15/20 1133 11/14/20 1110 11/13/20 0941       How much help from another person do you currently need...    Turning from your back to your side while in flat bed without using bedrails?  2  -DEE  2  -DEE  2  -DEE    Moving from lying on back to sitting on the side of a flat bed without bedrails?  2  -DEE  2  -DEE  2  -DEE    Moving to and from a bed to a chair (including a wheelchair)?  1  -DEE  1  -DEE  1  -DEE    Standing up from a chair using your arms (e.g., wheelchair, bedside chair)?  1  -DEE  1  -DEE  1  -DEE    Climbing 3-5 steps with a railing?  1  -DEE  1  -DEE  1  -DEE    To walk in hospital room?  1  -DEE  1  -DEE  1  -DEE    AM-PAC 6 Clicks Score (PT)  8  -DEE  8  -DEE  8  -DEE       Functional Assessment    Outcome Measure Options  AM-PAC 6 Clicks Basic Mobility (PT)  -DEE  AM-PAC 6 Clicks Basic Mobility (PT)  -DEE  AM-PAC 6 Clicks Basic Mobility (PT)  -DEE      User Key  (r) = Recorded By, (t) = Taken By, (c) = Cosigned By    Initials Name Provider Type    Zeus Argueta PTA Physical Therapy Assistant           Time Calculation:   PT Charges     Row Name 11/15/20 1133             Time Calculation    Start Time  1133  -DEE      Stop Time  1200  -DEE      Time Calculation (min)  27 min  -DEE      PT Received On  11/15/20  -DEE         Time Calculation- PT    Total Timed Code Minutes- PT  27 minute(s)  -DEE         Timed Charges    56827 - PT Therapeutic Activity Minutes  27  -DEE        User Key  (r) = Recorded By, (t) = Taken By, (c) = Cosigned By    Initials Name Provider Type    Zeus Argueta PTA Physical Therapy Assistant        Therapy Charges for Today     Code Description Service Date Service Provider Modifiers Qty    02563159515 HC PT  THERAPEUTIC ACT EA 15 MIN 11/14/2020 Zeus Malin, PTA GP 2    43126220824 HC PT THERAPEUTIC ACT EA 15 MIN 11/14/2020 eZus Malin, PTA GP 2    06390409914 HC PT THERAPEUTIC ACT EA 15 MIN 11/15/2020 Zeus Malin, PTA GP 2          PT G-Codes  Outcome Measure Options: AM-PAC 6 Clicks Basic Mobility (PT)  AM-PAC 6 Clicks Score (PT): 8  AM-PAC 6 Clicks Score (OT): 17    Zeus Malin PTA  11/15/2020

## 2020-11-15 NOTE — THERAPY TREATMENT NOTE
Acute Care - Physical Therapy Treatment Note  [facility]     Patient Name: Jarvis Morgan  : 1945  MRN: [id number]  Today's Date: 11/15/2020           PT Assessment (last 12 hours)      PT Evaluation and Treatment     Row Name 11/15/20 1520 11/15/20 1133       Physical Therapy Time and Intention    Subjective Information  complains of;pain  -DEE  complains of;weakness;fatigue;pain  -DEE    Document Type  therapy note (daily note)  -DEE  therapy note (daily note)  -DEE    Mode of Treatment  physical therapy  -DEE  physical therapy  -DEE    Patient Effort  --  fair  -DEE    Row Name 11/15/20 1520 11/15/20 1133       General Information    Existing Precautions/Restrictions  fall;non-weight bearing;left  -DEE  fall;non-weight bearing;left  -DEE    Row Name 11/15/20 1133          Pain Scale: Numbers Pre/Post-Treatment    Pretreatment Pain Rating  7/10  -DEE     Posttreatment Pain Rating  7/10  -DEE     Pain Location - Side  Left  -DEE     Pain Location - Orientation  lower  -DEE     Pain Location  extremity  -DEE     Row Name 11/15/20 1133          Bed Mobility    Supine-Sit [place] (Bed Mobility)  verbal cues;minimum assist (75% patient effort)  -DEE     Sit-Supine [place] (Bed Mobility)  verbal cues;minimum assist (75% patient effort)  -DEE     Assistive Device (Bed Mobility)  draw sheet;head of bed elevated  -     Row Name 11/15/20 1520 11/15/20 1133       Hip (Therapeutic Exercise)    Hip AAROM (Therapeutic Exercise)  bilateral;flexion;extension;aBduction;aDduction;supine  -DEE  bilateral;flexion;aBduction;aDduction  -    Row Name 11/15/20 1520 11/15/20 1133       Knee (Therapeutic Exercise)    Knee AAROM (Therapeutic Exercise)  bilateral;flexion;extension;supine  -DEE  bilateral;flexion;extension  -DEE    Row Name 11/15/20 1520 11/15/20 1133       Ankle (Therapeutic Exercise)    Ankle AROM (Therapeutic Exercise)  bilateral;dorsiflexion;plantarflexion;supine  -DEE  bilateral;dorsiflexion;plantarflexion  -    Row  Name             Wound 11/12/20 0513 Bilateral medial gluteal Pressure Injury    Wound - Properties Group Placement Date: 11/12/20  -KH Placement Time: 0513 -KH Present on Hospital Admission: Y  -KH Side: Bilateral  -KH Orientation: medial  -KH Location: gluteal  -KH Primary Wound Type: Pressure inj  -KH    Retired Wound - Properties Group Date first assessed: 11/12/20  -KH Time first assessed: 0513 -KH Present on Hospital Admission: Y  -KH Side: Bilateral  -KH Location: gluteal  -KH Primary Wound Type: Pressure inj  -KH    Row Name 11/15/20 1520 11/15/20 1133       Positioning and Restraints    Pre-Treatment Position  in bed  -DEE  in bed  -DEE    Post Treatment Position  bed  -DEE  bed  -DEE    In Bed  fowlers;call light within reach;encouraged to call for assist;side rails up x2  -DEE  fowlers;call light within reach;encouraged to call for assist;side rails up x2  -DEE      User Key  (r) = Recorded By, (t) = Taken By, (c) = Cosigned By    Initials Name Provider Type    Zeus Argueta PTA Physical Therapy Assistant    Anabella Nolasco, RN Registered Nurse        Physical Therapy Education                 Title: PT OT SLP Therapies (In Progress)     Topic: Physical Therapy (In Progress)     Point: Mobility training (Done)     Learning Progress Summary           Patient Acceptance, E, VU,NR by DEE at 11/15/2020 1133    Comment: benefits of activity    Acceptance, E, NR by DEE at 11/14/2020 1110    Comment: benefits of activity    Acceptance, E, NR by DEE at 11/13/2020 0941    Comment: benefits of activity    Acceptance, E, VU by CC at 11/12/2020 1118    Comment: Pt educated on PT's role in POC                   Point: Home exercise program (Not Started)     Learner Progress:  Not documented in this visit.          Point: Body mechanics (Not Started)     Learner Progress:  Not documented in this visit.          Point: Precautions (Not Started)     Learner Progress:  Not documented in this visit.                       User Key     Initials Effective Dates Name Provider Type Discipline    DEE 12/08/16 -  Zeus Malin PTA Physical Therapy Assistant PT    CC 09/02/20 -  Natalia Leiva, PT Student PT Student PT              PT Recommendation and Plan     Plan of Care Reviewed With: patient  Progress: no change  Outcome Summary: Pt was premedicated for tx.  She required min assist for supine to sit.  At EOB, pt agreed to attempt to stand, attempted X 5 with bed elevated, but pt was unable.  Pt is limited by pain and weakness on RLE and having to maintain NWB on LLE.  If pt continues to want to d/c home, would recommend a mechanical lift until pt is able to be WBAT on LLE.  Outcome Measures     Row Name 11/15/20 1133 11/14/20 1110 11/13/20 0941       How much help from another person do you currently need...    Turning from your back to your side while in flat bed without using bedrails?  2  -DEE  2  -DEE  2  -DEE    Moving from lying on back to sitting on the side of a flat bed without bedrails?  2  -DEE  2  -DEE  2  -DEE    Moving to and from a bed to a chair (including a wheelchair)?  1  -DEE  1  -DEE  1  -DEE    Standing up from a chair using your arms (e.g., wheelchair, bedside chair)?  1  -DEE  1  -DEE  1  -DEE    Climbing 3-5 steps with a railing?  1  -DEE  1  -DEE  1  -DEE    To walk in hospital room?  1  -DEE  1  -DEE  1  -DEE    AM-PAC 6 Clicks Score (PT)  8  -DEE  8  -DEE  8  -DEE       Functional Assessment    Outcome Measure Options  AM-PAC 6 Clicks Basic Mobility (PT)  -DEE  AM-PAC 6 Clicks Basic Mobility (PT)  -DEE  AM-PAC 6 Clicks Basic Mobility (PT)  -DEE      User Key  (r) = Recorded By, (t) = Taken By, (c) = Cosigned By    Initials Name Provider Type    Zeus Argueta PTA Physical Therapy Assistant           Time Calculation:   PT Charges     Row Name 11/15/20 1520 11/15/20 1133          Time Calculation    Start Time  1520  -DEE  1133  -DEE     Stop Time  1543  -DEE  1200  -DEE     Time Calculation (min)  23 min  -DEE   27 min  -DEE     PT Received On  11/15/20  -DEE  11/15/20  -DEE        Time Calculation- PT    Total Timed Code Minutes- PT  23 minute(s)  -DEE  27 minute(s)  -DEE        Timed Charges    46988 - PT Therapeutic Exercise Minutes  23  -DEE  --     97062 - PT Therapeutic Activity Minutes  --  27  -DEE       User Key  (r) = Recorded By, (t) = Taken By, (c) = Cosigned By    Initials Name Provider Type    Zeus Argueta PTA Physical Therapy Assistant        Therapy Charges for Today     Code Description Service Date Service Provider Modifiers Qty    35802750384 HC PT THERAPEUTIC ACT EA 15 MIN 11/14/2020 Zeus Malin, PTA GP 2    76091522158 HC PT THERAPEUTIC ACT EA 15 MIN 11/14/2020 Zeus Malin, PTA GP 2    10599329183 HC PT THERAPEUTIC ACT EA 15 MIN 11/15/2020 Zeus Malin, PTA GP 2    22913151402 HC PT THER PROC EA 15 MIN 11/15/2020 Zeus Malin, PTA GP 2          PT G-Codes  Outcome Measure Options: AM-PAC 6 Clicks Basic Mobility (PT)  AM-PAC 6 Clicks Score (PT): 8  AM-PAC 6 Clicks Score (OT): 17    Zeus Malin PTA  11/15/2020

## 2020-11-16 VITALS
DIASTOLIC BLOOD PRESSURE: 83 MMHG | TEMPERATURE: 97.9 F | OXYGEN SATURATION: 95 % | WEIGHT: 227.07 LBS | HEART RATE: 92 BPM | BODY MASS INDEX: 35.64 KG/M2 | HEIGHT: 67 IN | RESPIRATION RATE: 16 BRPM | SYSTOLIC BLOOD PRESSURE: 144 MMHG

## 2020-11-16 PROBLEM — R79.1 SUBTHERAPEUTIC INTERNATIONAL NORMALIZED RATIO (INR): Status: ACTIVE | Noted: 2020-11-16

## 2020-11-16 LAB
DEPRECATED RDW RBC AUTO: 52.8 FL (ref 37–54)
ERYTHROCYTE [DISTWIDTH] IN BLOOD BY AUTOMATED COUNT: 13.9 % (ref 12.3–15.4)
GLUCOSE BLDC GLUCOMTR-MCNC: 129 MG/DL (ref 70–130)
HCT VFR BLD AUTO: 37.3 % (ref 34–46.6)
HGB BLD-MCNC: 11.8 G/DL (ref 12–15.9)
INR PPP: 1.77 (ref 0.91–1.09)
MCH RBC QN AUTO: 32.6 PG (ref 26.6–33)
MCHC RBC AUTO-ENTMCNC: 31.6 G/DL (ref 31.5–35.7)
MCV RBC AUTO: 103 FL (ref 79–97)
PLATELET # BLD AUTO: 178 10*3/MM3 (ref 140–450)
PMV BLD AUTO: 10.1 FL (ref 6–12)
PROTHROMBIN TIME: 20.4 SECONDS (ref 11.9–14.6)
RBC # BLD AUTO: 3.62 10*6/MM3 (ref 3.77–5.28)
WBC # BLD AUTO: 9.23 10*3/MM3 (ref 3.4–10.8)

## 2020-11-16 PROCEDURE — 85027 COMPLETE CBC AUTOMATED: CPT | Performed by: INTERNAL MEDICINE

## 2020-11-16 PROCEDURE — 85610 PROTHROMBIN TIME: CPT | Performed by: INTERNAL MEDICINE

## 2020-11-16 PROCEDURE — 94799 UNLISTED PULMONARY SVC/PX: CPT

## 2020-11-16 PROCEDURE — 82962 GLUCOSE BLOOD TEST: CPT

## 2020-11-16 RX ORDER — OXYCODONE HYDROCHLORIDE AND ACETAMINOPHEN 5; 325 MG/1; MG/1
1 TABLET ORAL EVERY 6 HOURS PRN
Qty: 12 TABLET | Refills: 0 | Status: SHIPPED | OUTPATIENT
Start: 2020-11-16

## 2020-11-16 RX ORDER — WARFARIN SODIUM 7.5 MG/1
7.5 TABLET ORAL 3 TIMES WEEKLY
Start: 2020-11-16

## 2020-11-16 RX ORDER — BISACODYL 10 MG
10 SUPPOSITORY, RECTAL RECTAL ONCE
Status: DISCONTINUED | OUTPATIENT
Start: 2020-11-16 | End: 2020-11-16 | Stop reason: HOSPADM

## 2020-11-16 RX ORDER — WARFARIN SODIUM 5 MG/1
5 TABLET ORAL
Start: 2020-11-17

## 2020-11-16 RX ORDER — AMOXICILLIN 250 MG
2 CAPSULE ORAL NIGHTLY
Start: 2020-11-16

## 2020-11-16 RX ORDER — POLYETHYLENE GLYCOL 3350 17 G/17G
17 POWDER, FOR SOLUTION ORAL DAILY
Start: 2020-11-16

## 2020-11-16 RX ADMIN — METOPROLOL TARTRATE 25 MG: 25 TABLET, FILM COATED ORAL at 08:58

## 2020-11-16 RX ADMIN — ATORVASTATIN CALCIUM 40 MG: 40 TABLET, FILM COATED ORAL at 08:59

## 2020-11-16 RX ADMIN — METHIMAZOLE 10 MG: 10 TABLET ORAL at 08:58

## 2020-11-16 RX ADMIN — CALCITRIOL 0.25 MCG: 0.25 CAPSULE ORAL at 08:59

## 2020-11-16 RX ADMIN — FUROSEMIDE 20 MG: 20 TABLET ORAL at 08:59

## 2020-11-16 RX ADMIN — DILTIAZEM HYDROCHLORIDE 180 MG: 180 CAPSULE, COATED, EXTENDED RELEASE ORAL at 08:57

## 2020-11-16 RX ADMIN — IPRATROPIUM BROMIDE AND ALBUTEROL SULFATE 3 ML: 2.5; .5 SOLUTION RESPIRATORY (INHALATION) at 06:41

## 2020-11-16 NOTE — PLAN OF CARE
Goal Outcome Evaluation:  Plan of Care Reviewed With: patient  Progress: improving  Outcome Summary: Pt alert and oriented , c/o mild pain, and prn meds controlling pain. Non weight bearing on left leg. to be D/c and will be going by ambulance to home. Daughter to be there to take care of her.

## 2020-11-16 NOTE — DISCHARGE SUMMARY
[facility] Medicine Services  DISCHARGE SUMMARY       Date of Admission: 11/12/2020  Date of Discharge:  11/16/2020  Primary Care Physician: Mary Beth Izquierdo APRN    Presenting Problem/History of Present Illness:  Fall, left hip pain    Final Discharge Diagnoses:  Active Hospital Problems    Diagnosis   • Subtherapeutic international normalized ratio (INR)   • Acute cystitis with hematuria   • Closed fracture of left hip (CMS/HCC)   • S/P TAVR (transcatheter aortic valve replacement)   • Chronic anticoagulation   • Chronic diastolic heart failure (CMS/HCC)   • ASA (obstructive sleep apnea)   • Chronic atrial fibrillation (CMS/HCC)   • Deep vein thrombosis (DVT) of left lower extremity (CMS/HCC)   • Essential hypertension     Consults:   1.  Orthopedic surgery    Procedures Performed: None    Pertinent Test Results:   Lab Results (all)     Procedure Component Value Units Date/Time    Protime-INR [[id number]]  (Abnormal) Collected: 11/16/20 0545    Specimen: Blood Updated: 11/16/20 0630     Protime 20.4 Seconds      INR 1.77    CBC (No Diff) [[id number]]  (Abnormal) Collected: 11/16/20 0544    Specimen: Blood Updated: 11/16/20 0619     WBC 9.23 10*3/mm3      RBC 3.62 10*6/mm3      Hemoglobin 11.8 g/dL      Hematocrit 37.3 %      .0 fL      MCH 32.6 pg      MCHC 31.6 g/dL      RDW 13.9 %      RDW-SD 52.8 fl      MPV 10.1 fL     Protime-INR [[id number]]  (Abnormal) Collected: 11/15/20 0515    Specimen: Blood Updated: 11/15/20 0537     Protime 20.0 Seconds      INR 1.73    Urine Culture - Urine, Urine, Clean Catch [[id number]]  (Abnormal)  (Susceptibility) Collected: 11/12/20 0701    Specimen: Urine, Clean Catch Updated: 11/14/20 1038     Urine Culture >100,000 CFU/mL Morganella morganii ssp morganii    Susceptibility      Morganella morganii ssp morganii     INDIO     Ampicillin Resistant     Ampicillin + Sulbactam Resistant     Cefazolin Resistant     Cefepime Susceptible      Ceftazidime Resistant     Ceftriaxone Susceptible     Gentamicin Susceptible     Levofloxacin Susceptible     Nitrofurantoin Resistant     Piperacillin + Tazobactam Susceptible     Tetracycline Susceptible     Trimethoprim + Sulfamethoxazole Susceptible                    Protime-INR [[id number]]  (Abnormal) Collected: 11/14/20 0544    Specimen: Blood Updated: 11/14/20 0617     Protime 23.8 Seconds      INR 2.14    Basic Metabolic Panel [[id number]]  (Abnormal) Collected: 11/13/20 0510    Specimen: Blood Updated: 11/13/20 0721     Glucose 110 mg/dL      BUN 39 mg/dL      Creatinine 1.23 mg/dL      Sodium 139 mmol/L      Potassium 4.8 mmol/L      Chloride 104 mmol/L      CO2 30.0 mmol/L      Calcium 9.2 mg/dL      eGFR Non African Amer 42 mL/min/1.73      BUN/Creatinine Ratio 31.7     Anion Gap 5.0 mmol/L     Protime-INR [[id number]]  (Abnormal) Collected: 11/13/20 0509    Specimen: Blood Updated: 11/13/20 0554     Protime 21.4 Seconds      INR 1.88    Urinalysis, Microscopic Only - Urine, Clean Catch [[id number]]  (Abnormal) Collected: 11/12/20 0701    Specimen: Urine, Clean Catch Updated: 11/12/20 0717     RBC, UA 0-2 /HPF      WBC, UA 31-50 /HPF      Bacteria, UA 1+ /HPF      Squamous Epithelial Cells, UA None Seen /HPF      Hyaline Casts, UA 7-12 /LPF      Methodology Automated Microscopy    Comprehensive Metabolic Panel [[id number]]  (Abnormal) Collected: 11/12/20 0223    Specimen: Blood Updated: 11/12/20 0717     Glucose 129 mg/dL      BUN 38 mg/dL      Creatinine 1.32 mg/dL      Sodium 141 mmol/L      Potassium 4.1 mmol/L      Chloride 105 mmol/L      CO2 29.0 mmol/L      Calcium 9.8 mg/dL      Total Protein 6.7 g/dL      Albumin 3.30 g/dL      ALT (SGPT) 33 U/L      AST (SGOT) 18 U/L      Alkaline Phosphatase 148 U/L      Total Bilirubin 0.6 mg/dL      eGFR Non African Amer 39 mL/min/1.73      Globulin 3.4 gm/dL      A/G Ratio 1.0 g/dL      BUN/Creatinine Ratio 28.8     Anion Gap 7.0 mmol/L     Urinalysis With  Microscopic If Indicated (No Culture) - Urine, Clean Catch [[id number]]  (Abnormal) Collected: 11/12/20 0701    Specimen: Urine, Clean Catch Updated: 11/12/20 0716     Color, UA Yellow     Appearance, UA Clear     pH, UA 6.5     Specific Gravity, UA 1.016     Glucose, UA Negative     Ketones, UA Negative     Bilirubin, UA Negative     Blood, UA Trace     Protein, UA 30 mg/dL (1+)     Leuk Esterase, UA Moderate (2+)     Nitrite, UA Negative     Urobilinogen, UA 0.2 E.U./dL    Magnesium [[id number]]  (Normal) Collected: 11/12/20 0223    Specimen: Blood Updated: 11/12/20 0711     Magnesium 1.7 mg/dL     COVID-19, [facility]OTT IN-HOUSE,NP Swab (NO TRANSPORT MEDIA) 2 HR TAT - Swab, Nasal Cavity [[id number]]  (Normal) Collected: 11/12/20 0217    Specimen: Swab from Nasal Cavity Updated: 11/12/20 0258     COVID19 Not Detected    Narrative:      Fact sheet for providers: https://www.fda.gov/media/742992/download     Fact sheet for patients: https://www.fda.gov/media/641490/download    Basic Metabolic Panel [[id number]]  (Abnormal) Collected: 11/12/20 0223    Specimen: Blood Updated: 11/12/20 0243     Glucose 129 mg/dL      BUN 38 mg/dL      Creatinine 1.32 mg/dL      Sodium 141 mmol/L      Potassium 4.1 mmol/L      Chloride 105 mmol/L      CO2 29.0 mmol/L      Calcium 9.8 mg/dL      eGFR Non African Amer 39 mL/min/1.73      BUN/Creatinine Ratio 28.8     Anion Gap 7.0 mmol/L     Protime-INR [[id number]]  (Abnormal) Collected: 11/12/20 0223    Specimen: Blood Updated: 11/12/20 0239     Protime 19.0 Seconds      INR 1.63    aPTT [[id number]]  (Normal) Collected: 11/12/20 0223    Specimen: Blood Updated: 11/12/20 0239     PTT 29.9 seconds     CBC Auto Differential [[id number]]  (Abnormal) Collected: 11/12/20 0223    Specimen: Blood Updated: 11/12/20 0230     WBC 12.86 10*3/mm3      RBC 4.33 10*6/mm3      Hemoglobin 14.2 g/dL      Hematocrit 42.6 %      MCV 98.4 fL      MCH 32.8 pg      MCHC 33.3 g/dL      RDW 13.8 %      RDW-SD 50.8 fl       MPV 9.2 fL      Platelets 189 10*3/mm3      Neutrophil % 77.6 %      Lymphocyte % 7.9 %      Monocyte % 11.4 %      Eosinophil % 2.5 %      Basophil % 0.2 %      Immature Grans % 0.4 %      Neutrophils, Absolute 9.99 10*3/mm3      Lymphocytes, Absolute 1.02 10*3/mm3      Monocytes, Absolute 1.46 10*3/mm3      Eosinophils, Absolute 0.32 10*3/mm3      Basophils, Absolute 0.02 10*3/mm3      Immature Grans, Absolute 0.05 10*3/mm3      nRBC 0.0 /100 WBC         Imaging Results (All)     Procedure Component Value Units Date/Time    XR Chest 1 View [[id number]] Collected: 11/12/20 0716     Updated: 11/12/20 0721    Narrative:      EXAMINATION: XR CHEST 1 VW-  11/12/2020 7:16 AM CST 1 view     HISTORY: Hip fracture     COMPARISON: 09/23/2020.     FINDINGS:   Prosthetic aortic valve redemonstrated. No definite airspace  consolidation or pneumothorax.      The cardiac silhouette is mildly prominent, similar to the previous  examination given differences in technique and rotation.     Degenerative changes in the LEFT shoulder and in the spine.          Impression:         1.  No definite acute findings on this exam.        This report was finalized on 11/12/2020 07:18 by Dr. Umer Soria MD.    XR Femur 2 View Left [[id number]] Collected: 11/12/20 0712     Updated: 11/12/20 0715    Narrative:      LEFT FEMUR, 2 VIEWS 11/12/2020 12:41 AM CST     HISTORY: fall with leg pain     COMPARISON: NONE     FINDINGS:     Frontal and lateral radiographs of the left femur were obtained.     LEFT hip arthroplasty hardware is in place. There is an acute fracture  line along the lateral cortex of the proximal LEFT femur adjacent to the  femoral stem component. The soft tissues are grossly unremarkable.  Limited evaluation of the hip and knee joints is unremarkable.  Degenerative changes are noted in the knee.       Impression:      1. Acute fracture of the proximal LEFT femur adjacent to the  arthroplasty hardware..  This report was  finalized on 11/12/2020 07:12 by Dr. Umer Soria MD.    XR Hip With or Without Pelvis 2 - 3 View Left [[id number]] Collected: 11/12/20 0708     Updated: 11/12/20 0715    Narrative:      Left hip, 2 views 11/12/2020 12:41 AM CST  AP Pelvis 11/12/2020 12:41 AM CST     History: fall with hip pain     Comparison: 09/24/2020      Findings:   Frontal and lateral views of the left hip were obtained. Frontal view of  the pelvis was also obtained.      The patient is status post LEFT hip arthroplasty. There is a cortical  fracture along the lateral cortex of the LEFT proximal femur surrounding  the femoral stem component of the arthroplasty. Postoperative changes  are noted to the proximal RIGHT femur with incompletely healed fracture  lines. Extensive surgical change suspected in the pelvis with numerous  surgical clips. No gross soft tissue abnormality is visualized.        Impression:      Impression:   1. Acute, mildly displaced fracture line involving the lateral cortex of  the proximal LEFT femur.        This report was finalized on 11/12/2020 07:12 by Dr. Umer Soria MD.        History of Present Illness on Day of Discharge: Patient resting bed.  Her biggest complaint today is pain in the right pinky toe.  It appears her toenail is very close to falling off.  She has no complaints of left hip pain presently.  She would like to go home today possible.    Hospital Course:  Ms. Morgan is a 77-year-old  female who follows [facility] for primary care.  She has a medical history significant for aortic stenosis status post TAVR, chronic atrial fibrillation, DVT-chronically anticoagulated with warfarin, hypertension, hypothyroidism, and sleep apnea.  The patient had a recent admission to our facility from 9/21 through 9/28 following a right femur fracture status post trochanteric extension nailing on 9/25.  The patient was discharged to a skilled nursing facility for rehabilitation and had since been  discharged home.  The patient presented to our facility 11/12/2020 with complaint of left hip pain following a fall at home.  X-ray showed an acute, mildly displaced fracture line involving the lateral cortex of the proximal left femur adjacent to previous arthroplasty hardware.  The patient was admitted to the hospital service for further evaluation and management.    The patient was evaluated orthopedic surgery.  They recommended strict nonweightbearing to the left lower extremity and have elected for nonoperative management at this time.  The patient will need to follow-up later this week or early next week with repeat x-rays.  The patient was evaluated by physical and Occupational Therapy.  She has had much difficulty with standing and has not been able to ambulate or transfer, despite being premedicated prior to treatment.  Her discharge disposition was heavily discussed with both she and her daughter, and they are refusing skilled nursing facility placement at this time as they state they have a very supportive family at home.  They also currently have medical equipment.  A lift device will be ordered as recommended by therapy services.  It was discussed that if the patient's condition proves to be more challenging than her family originally anticipated, arrangements can be made for skilled nursing facility placement at that time per patient's primary care provider.    On admission, the patient's urinalysis was positive for leukocytes, 31-50 white blood cells, and 1+ bacteria.  Her urine culture showed growth of Morganella which patient had in a urine culture in September as well.  She received 3 doses of Rocephin.    The patient's INR was subtherapeutic on admission at 1.6.  Lovenox was decerebrating on admission, and was stopped when INR reached 2.1.  Her INR is subtherapeutic again today at 1.7.  Her Coumadin dosage will be adjusted.  She received 10 mg last night, and will be instructed to take 10 mg  "tonight and tomorrow night, and resume her regular dosage of 5 mg on Wednesday.  Home health will check a PT/INR on Thursday.    Overall, the patient is appropriate for discharge home with home health services today.  She can follow-up with her primary care provider via telehealth visit if this is more convenient within the next week.      Condition on Discharge: Medically stable, high risk for readmission secondary to fall risk    Physical Exam on Discharge:  /83   Pulse 92   Temp 97.9 °F (36.6 °C) (Oral)   Resp 16   Ht 170.2 cm (67.01\")   Wt 103 kg (227 lb 1.2 oz)   LMP  (LMP Unknown)   SpO2 95%   BMI 35.56 kg/m²   Physical Exam  Vitals signs and nursing note reviewed.   Constitutional:       General: She is not in acute distress.     Appearance: Normal appearance. She is obese. She is not toxic-appearing.   HENT:      Head: Normocephalic and atraumatic.   Neck:      Musculoskeletal: Normal range of motion and neck supple. No muscular tenderness.   Cardiovascular:      Rate and Rhythm: Normal rate. Rhythm irregular.      Pulses: Normal pulses.      Heart sounds: Murmur present.      Comments: A. fib  with frequent PVCs  Pulmonary:      Effort: Pulmonary effort is normal.      Breath sounds: Normal breath sounds. No wheezing or rales.   Abdominal:      General: Bowel sounds are normal. There is no distension.      Palpations: Abdomen is soft.      Tenderness: There is no abdominal tenderness.      Comments: Protuberant   Musculoskeletal: Normal range of motion.         General: Tenderness present.      Right lower leg: Edema present.      Left lower leg: Edema present.   Right fifth toenail appears as though it is close to falling off.  Minor erythema distal portion of the toe.  Skin:     General: Skin is warm and dry.      Findings: No erythema or rash.  Chronic skin changes left lower extremity.    Neurological:      General: No focal deficit present.      Mental Status: She is alert and " oriented to person, place, and time.   Psychiatric:         Mood and Affect: Mood normal.         Behavior: Behavior normal.         Thought Content: Thought content normal.         Judgment: Judgment normal.     Discharge Disposition:  Home-Health Care [facility]    Discharge Medications:     Discharge Medications      New Medications      Instructions Start Date   polyethylene glycol 17 g packet  Commonly known as: MIRALAX   17 g, Oral, Daily      sennosides-docusate 8.6-50 MG per tablet  Commonly known as: PERICOLACE   2 tablets, Oral, Nightly         Changes to Medications      Instructions Start Date   warfarin 7.5 MG tablet  Commonly known as: COUMADIN  What changed: additional instructions   7.5 mg, Oral, 3 Times Weekly, Take 10 mg Monday and Tuesday, then resume normal schedule with 5 mg on Wednesday      warfarin 5 MG tablet  Commonly known as: COUMADIN  What changed: additional instructions   5 mg, Oral, 4 Times Weekly, Take 10 mg Monday and Tuesday, then resume normal schedule taking 5 mg Wednesday.   Start Date: November 17, 2020        Continue These Medications      Instructions Start Date   acetaminophen 650 MG 8 hr tablet  Commonly known as: TYLENOL   650 mg, Oral, Every 8 Hours PRN      atorvastatin 40 MG tablet  Commonly known as: LIPITOR   40 mg, Oral, Nightly      bisacodyl 10 MG suppository  Commonly known as: DULCOLAX   10 mg, Rectal, Daily PRN      calcitriol 0.25 MCG capsule  Commonly known as: ROCALTROL   0.25 mcg, Oral, Daily      dilTIAZem  MG 24 hr capsule  Commonly known as: CARDIZEM CD   180 mg, Oral, Every 24 Hours Scheduled      donepezil 5 MG tablet  Commonly known as: ARICEPT   5 mg, Oral, Nightly      furosemide 40 MG tablet  Commonly known as: LASIX   40 mg, Oral, Daily      ipratropium-albuterol 0.5-2.5 mg/3 ml nebulizer  Commonly known as: DUO-NEB   3 mL, Nebulization, 4 Times Daily - RT      Lopressor 50 MG tablet  Generic drug: metoprolol tartrate   50 mg, Oral, 2 Times Daily       methIMAzole 10 MG tablet  Commonly known as: TAPAZOLE   10 mg, Oral, Take As Directed, Take every day except Fridays      oxyCODONE-acetaminophen 5-325 MG per tablet  Commonly known as: PERCOCET   1 tablet, Oral, Every 6 Hours PRN      vitamin D 1.25 MG (69005 UT) capsule capsule  Commonly known as: ERGOCALCIFEROL   50,000 Units, Oral, Weekly, Take every Wednesday           Discharge Diet:   Diet Instructions     Diet: Regular, Cardiac; Thin      Discharge Diet:  Regular  Cardiac       Fluid Consistency: Thin        Activity at Discharge:   Activity Instructions     Activity as Tolerated      Non weight bearing to left lower extremity        Discharge Care Plan/Instructions:   1.  Return for any acute worsening symptoms.  2.  Nonweightbearing to the left lower extremity.  3.  MiraLAX and Senokot for bowel regimen.  Patient received adequate suppository today to help facilitate bowel movement prior to discharge, she refused enema.  4.  Home health services for skilled nursing, physical therapy, Occupational Therapy.  Lift device ordered.  5.  Take 10 mg warfarin tonight and tomorrow night, resume regular dosage on Wednesday.  PT/INR checked by home health on Thursday.    Follow-up Appointments:   1.  Primary care provider within the next week, may follow-up via telehealth if this is more convenient.  2.  Orthopedics later this week or early next week with repeat x-rays.    Test Results Pending at Discharge: None    Electronically signed by IRINEO Go, 11/16/2020, 10:38 CST.    Time: 45 minutes    Jayesh #[id number]

## 2020-11-16 NOTE — PROGRESS NOTES
Continued Stay Note  KRYSTAL Mccracken     Patient Name: Jarvis Morgan  MRN: [id number]  Today's Date: 11/16/2020    Admit Date: 11/12/2020    Discharge Plan     Row Name 11/16/20 1209       Plan    Final Note  DME was ordered thru [facility] not [facility]    Row Name 11/16/20 1208       Plan    Final Discharge Disposition Code  06 - home with home health care    Final Note  Pt is being dcd home today. Spoke to dtr to inform. Ordered DME (WC and lift) thru [facility] per dtr request. DME will be delivered to pt home today. Notified [facility] of pt dc and faxed updated HH orders. No other dc needs per dtr.        Discharge Codes    No documentation.       Expected Discharge Date and Time     Expected Discharge Date Expected Discharge Time    Nov 16, 2020             KELTON Chavez

## 2020-11-16 NOTE — PLAN OF CARE
Goal Outcome Evaluation:  Plan of Care Reviewed With: patient  Progress: no change  Outcome Summary: VSS. No change in NV status,ppp. Pt did c/o pain x 1, prn given. Pt moves in bed, but does not turn completely. Assist w/bedpan use, no bm. Safety maintained.

## 2020-11-16 NOTE — THERAPY DISCHARGE NOTE
Acute Care - Physical Therapy Discharge Summary  [facility]       Patient Name: Jarvis Morgan  : 1945  MRN: [id number]    Today's Date: 2020                 Admit Date: 2020      PT Recommendation and Plan    Visit Dx:    ICD-10-CM ICD-9-CM   1. Closed fracture of left hip, initial encounter (CMS/[facility])  S72.002A 820.8   2. Impaired mobility  Z74.09 799.89   3. Impaired mobility and ADLs  Z74.09 V49.89    Z78.9    4. Closed comminuted intertrochanteric fracture of right femur, initial encounter (CMS/[facility])  S72.141A 820.21   5. Subtherapeutic international normalized ratio (INR)  R79.1 790.92   6. Chronic anticoagulation  Z79.01 V58.61   7. History of DVT (deep vein thrombosis)  Z86.718 V12.51       Outcome Measures     Row Name 11/15/20 1133 20 1110          How much help from another person do you currently need...    Turning from your back to your side while in flat bed without using bedrails?  2  -DEE  2  -DEE     Moving from lying on back to sitting on the side of a flat bed without bedrails?  2  -DEE  2  -DEE     Moving to and from a bed to a chair (including a wheelchair)?  1  -DEE  1  -DEE     Standing up from a chair using your arms (e.g., wheelchair, bedside chair)?  1  -DEE  1  -DEE     Climbing 3-5 steps with a railing?  1  -DEE  1  -DEE     To walk in hospital room?  1  -DEE  1  -DEE     AM-PAC 6 Clicks Score (PT)  8  -DEE  8  -DEE        Functional Assessment    Outcome Measure Options  AM-PAC 6 Clicks Basic Mobility (PT)  -DEE  AM-PAC 6 Clicks Basic Mobility (PT)  -DEE       User Key  (r) = Recorded By, (t) = Taken By, (c) = Cosigned By    Initials Name Provider Type    Zeus Argueta PTA Physical Therapy Assistant              Rehab Goal Summary     Row Name 20 1516             Physical Therapy Goals    Transfer Goal Selection (PT)  transfer, PT goal 1  -         Bed Mobility Goal 1 (PT)    Activity/Assistive Device (Bed Mobility Goal 1, PT)  bed mobility activities, all  -       Gallia Level/Cues Needed (Bed Mobility Goal 1, PT)  minimum assist (75% or more patient effort)  -      Time Frame (Bed Mobility Goal 1, PT)  long term goal (LTG);by discharge  -      Progress/Outcomes (Bed Mobility Goal 1, PT)  goal not met  -         Transfer Goal 1 (PT)    Activity/Assistive Device (Transfer Goal 1, PT)  sit-to-stand/stand-to-sit;bed-to-chair/chair-to-bed;walker, rolling  -AH      Gallia Level/Cues Needed (Transfer Goal 1, PT)  moderate assist (50-74% patient effort)  -      Time Frame (Transfer Goal 1, PT)  long term goal (LTG);by discharge  -      Progress/Outcome (Transfer Goal 1, PT)  goal not met  -         Gait Training Goal 1 (PT)    Activity/Assistive Device (Gait Training Goal 1, PT)  gait (walking locomotion);maintain weight-bearing status;walker, rolling  -      Gallia Level (Gait Training Goal 1, PT)  moderate assist (50-74% patient effort)  -      Time Frame (Gait Training Goal 1, PT)  long term goal (LTG);by discharge  -      Progress/Outcome (Gait Training Goal 1, PT)  goal not met  -         Patient Education Goal (PT)    Activity (Patient Education Goal, PT)  Pt will sit EOB with/without UE support for >/= 8 minutes to demonstrate improved activity tolerance.  -      Time Frame (Patient Education Goal, PT)  long term goal (LTG);by discharge  -      Progress/Outcome (Patient Education Goal, PT)  goal not met  -        User Key  (r) = Recorded By, (t) = Taken By, (c) = Cosigned By    Initials Name Provider Type Vivien Mariscal PTA Physical Therapy Assistant PT              PT Discharge Summary  Reason for Discharge: Discharge from facility  Outcomes Achieved: Refer to plan of care for updates on goals achieved  Discharge Destination: Home with home health      Vivien Ospina PTA   11/16/2020

## 2020-11-17 ENCOUNTER — READMISSION MANAGEMENT (OUTPATIENT)
Dept: CALL CENTER | Facility: HOSPITAL | Age: 75
End: 2020-11-17

## 2020-11-17 ENCOUNTER — TELEPHONE (OUTPATIENT)
Dept: INTERNAL MEDICINE | Age: 75
End: 2020-11-17

## 2020-11-17 NOTE — OUTREACH NOTE
Prep Survey      Responses   [facility] facility patient discharged from?  [facility]   Is LACE score < 7 ?  No   Eligibility  Readm Mgmt   Discharge diagnosis  Left femur fracture, fall   Does the patient have one of the following disease processes/diagnoses(primary or secondary)?  Other   Does the patient have Home health ordered?  Yes   What is the Home health agency?   [facility]   Is there a DME ordered?  Yes   What DME was ordered?  [facility] for wheelchair and lift   Comments regarding appointments  scheduled per AVS   Medication alerts for this patient  continue warfarin with changes   Prep survey completed?  Yes          Martina Hilliard RN

## 2020-11-17 NOTE — TELEPHONE ENCOUNTER
Brian 45 Transitions Initial Follow Up Call    Outreach made within 2 business days of discharge: Yes    Patient: Sienna Musa Patient : 1945    MRN: [id number]   Reason for Admission: Admitted 20 for closed fracture of left hip   Discharge Date: 20    Final Discharge Diagnoses: Active Hospital Problems   Diagnosis    Subtherapeutic international normalized ratio (INR)    Acute cystitis with hematuria    Closed fracture of left hip (CMS/HCC)    S/P TAVR (transcatheter aortic valve replacement)    Chronic anticoagulation    Chronic diastolic heart failure (CMS/HCC)    MORAIMA (obstructive sleep apnea)    Chronic atrial fibrillation (CMS/HCC)    Deep vein thrombosis (DVT) of left lower extremity (CMS/[facility])    Essential hypertension      Spoke with: Lo Felix     Discharge department/facility: [facility] Interactive Patient Contact:  Was patient able to fill all prescriptions: Yes  Was patient instructed to bring all medications to the follow-up visit: Yes  Is patient taking all medications as directed in the discharge summary? Yes  Does patient understand their discharge instructions: Yes  Does patient have questions or concerns that need addressed prior to 7-14 day follow up office visit: no    I spoke Lo Felix, Mrs. Nicole's daughter. She is staying with and caring for Mrs. Nicole. She states Mrs. Nicole is bed bound at the time. Home health is coming out for nursing and therapy. She states her pain is well managed. She had a very large bowel movement last night. Her bladder is moving ok. She reports her appetite is good. She denies any needs at this time and will assist Mrs. Nicole with her video visit scheduled for Monday.      Scheduled appointment with PCP within 7-14 days    Follow Up  Future Appointments   Date Time Provider Jayant Rivera   2020  2:45 PM Yoana Check, APRN [facility]   12/15/2020 12:30 PM Yoana CheckMARIELOSP-[place] Annie Forbes MA

## 2020-11-18 ENCOUNTER — TELEPHONE (OUTPATIENT)
Dept: INTERNAL MEDICINE CLINIC | Age: 75
End: 2020-11-18

## 2020-11-18 ENCOUNTER — ANTI-COAG VISIT (OUTPATIENT)
Dept: INTERNAL MEDICINE | Age: 75
End: 2020-11-18
Payer: MEDICARE

## 2020-11-18 LAB — INR BLD: 2.9

## 2020-11-18 PROCEDURE — 93793 ANTICOAG MGMT PT WARFARIN: CPT | Performed by: NURSE PRACTITIONER

## 2020-11-18 NOTE — THERAPY DISCHARGE NOTE
Acute Care - Occupational Therapy Discharge Summary  [facility]     Patient Name: Jarvis Morgan  : 1945  MRN: [id number]    Today's Date: 2020                 Admit Date: 2020        OT Recommendation and Plan    Visit Dx:    ICD-10-CM ICD-9-CM   1. Closed fracture of left hip, initial encounter (CMS/[facility])  S72.002A 820.8   2. Impaired mobility  Z74.09 799.89   3. Impaired mobility and ADLs  Z74.09 V49.89    Z78.9    4. Closed comminuted intertrochanteric fracture of right femur, initial encounter (CMS/[facility])  S72.141A 820.21   5. Subtherapeutic international normalized ratio (INR)  R79.1 790.92   6. Chronic anticoagulation  Z79.01 V58.61   7. History of DVT (deep vein thrombosis)  Z86.718 V12.51               Rehab Goal Summary     Row Name 20 0700             Transfer Goal 1 (OT)    Activity/Assistive Device (Transfer Goal 1, OT)  sit-to-stand/stand-to-sit;bed-to-chair/chair-to-bed;toilet;shower chair;commode, 3-in-1;wheelchair transfer  -TS      Dickson Level/Cues Needed (Transfer Goal 1, OT)  moderate assist (50-74% patient effort)  -TS      Time Frame (Transfer Goal 1, OT)  long term goal (LTG);10 days  -TS      Progress/Outcome (Transfer Goal 1, OT)  goal not met  -TS         Toileting Goal 1 (OT)    Activity/Device (Toileting Goal 1, OT)  toileting skills, all;commode, 3-in-1  -TS      Dickson Level/Cues Needed (Toileting Goal 1, OT)  minimum assist (75% or more patient effort)  -TS      Time Frame (Toileting Goal 1, OT)  long term goal (LTG);10 days  -TS      Progress/Outcome (Toileting Goal 1, OT)  goal not met  -TS         Strength Goal 1 (OT)    Strength Goal 1 (OT)  increase BUE strength 4/5 to increase independence with ADL, bed mobility, and transfer  -TS      Time Frame (Strength Goal 1, OT)  long term goal (LTG);10 days  -TS      Progress/Outcome (Strength Goal 1, OT)  goal not met  -TS        User Key  (r) = Recorded By, (t) = Taken By, (c) = Cosigned By    Initials  Name Provider Type Discipline    TS Alice Porras COTA/L Occupational Therapy Assistant OT          Outcome Measures     Row Name 11/15/20 1133             How much help from another person do you currently need...    Turning from your back to your side while in flat bed without using bedrails?  2  -DEE      Moving from lying on back to sitting on the side of a flat bed without bedrails?  2  -DEE      Moving to and from a bed to a chair (including a wheelchair)?  1  -DEE      Standing up from a chair using your arms (e.g., wheelchair, bedside chair)?  1  -EDE      Climbing 3-5 steps with a railing?  1  -DEE      To walk in hospital room?  1  -DEE      AM-PAC 6 Clicks Score (PT)  8  -DEE         Functional Assessment    Outcome Measure Options  AM-PAC 6 Clicks Basic Mobility (PT)  -DEE        User Key  (r) = Recorded By, (t) = Taken By, (c) = Cosigned By    Initials Name Provider Type    DEE Zeus Malin, PTA Physical Therapy Assistant          Timed Therapy Charges  Total Units: 2    Charges  Total Units: 2    Procedure Name Documented Minutes Units Code    HC OT THERAPEUTIC ACT EA 15 MIN 15  1    27676 (CPT®)      HC OT THER PROC EA 15 MIN 15  1    45063 (CPT®)               Documented Minutes  Total Minutes: 30    Therapy Provided Minutes    13994 - OT Therapeutic Exercise Minutes 15    97516 - OT Therapeutic Activity Minutes 15                    OT Discharge Summary  Anticipated Discharge Disposition (OT): home with assist  Reason for Discharge: Discharge from facility  Outcomes Achieved: Refer to plan of care for updates on goals achieved  Discharge Destination: Home with assist, Home with home health      LYNDSEY Chin  11/18/2020

## 2020-11-18 NOTE — TELEPHONE ENCOUNTER
[address] [address] PT kenya completed and Recommendation:  continue therapy at 2x wk to work on family training with use of betty lift or sliding board if tolerated.   pt may need a new hospital bed ( has one that does not go up or down that was from a friend ? ) which would allow use of sliding board if pt able to  tolerated sitting and use of sliding board with assist.  pt very slow with progression,  takes time to  move through her fear of falling and pain , needing much encouragement, has very supportive dtr and family to help with care in home    Please advise if PT orders approved

## 2020-11-18 NOTE — PROGRESS NOTES
HOME MONITORING REPORT    INR today:   Results for orders placed or performed in visit on 11/18/20   Protime-INR   Result Value Ref Range    INR 2.90        INR Goal: 2.0-3.0    Dosing Plan  As of 11/18/2020    TTR:   54.3 % (2.3 y)   Full warfarin instructions:   11/18: 5 mg; Otherwise 7.5 mg every Sun, Mon, Thu; 5 mg all other days              PLAN: Advised patient/caregiver to decrease today's dose to 5 mg then continue 7.5 mg Sun, Mon, Thu, and 5 mg all other days. recheck in one week. Patient/Caregiver voiced understanding  I have reviewed nursing plan for Coumadin management and agree with plan.

## 2020-11-20 ENCOUNTER — READMISSION MANAGEMENT (OUTPATIENT)
Dept: CALL CENTER | Facility: HOSPITAL | Age: 75
End: 2020-11-20

## 2020-11-20 NOTE — OUTREACH NOTE
Medical Week 1 Survey      Responses   [facility] [place] patient discharged from?  [place]   Does the patient have one of the following disease processes/diagnoses(primary or secondary)?  Other   Week 1 attempt successful?  Yes   Call start time  1620   Call end time  1622   Discharge diagnosis  Left femur fracture, fall   Is patient permission given to speak with other caregiver?  Yes   Person spoke with today (if not patient) and izzy Posada   Medication alerts for this patient  continue warfarin with changes   Meds reviewed with patient/caregiver?  Yes   Is the patient having any side effects they believe may be caused by any medication additions or changes?  No   Does the patient have all medications ordered at discharge?  Yes   Is the patient taking all medications as directed (includes completed medication regime)?  Yes   Does the patient have a primary care provider?   Yes   Does the patient have an appointment with their PCP within 7 days of discharge?  Yes   Has the patient kept scheduled appointments due by today?  N/A   Comments  virtual with pcp on Monday   What is the Home health agency?   [facility]   Has home health visited the patient within 72 hours of discharge?  Yes   What DME was ordered?  [facility] for wheelchair and lift   Has all DME been delivered?  Yes   Psychosocial issues?  No   Did the patient receive a copy of their discharge instructions?  Yes   Nursing interventions  Reviewed instructions with patient   What is the patient's perception of their health status since discharge?  Improving   Is the patient/caregiver able to teach back signs and symptoms related to disease process for when to call PCP?  Yes   Is the patient/caregiver able to teach back signs and symptoms related to disease process for when to call 911?  Yes   Is the patient/caregiver able to teach back the hierarchy of who to call/visit for symptoms/problems? PCP, Specialist, Home health nurse,  Urgent Care, ED, 911  Yes   If the patient is a current smoker, are they able to teach back resources for cessation?  Not a smoker   Additional teach back comments  Dtr says she is up on side of bed, worked with pt/ot, waiting on Xray report.   Week 1 call completed?  Yes   Wrap up additional comments  Doing well, eating well, sleeping well, no constipation, no pain meds.          Leora Collins RN

## 2020-11-20 NOTE — TELEPHONE ENCOUNTER
Lisa Ayala called requesting a refill of the below medication which has been pended for you:     Requested Prescriptions     Pending Prescriptions Disp Refills    dilTIAZem (CARDIZEM CD) 180 MG extended release capsule [Pharmacy Med Name: DILTIAZEM HCL ER 180MG COAT 180 Capsule] 30 capsule 0     Sig: TAKE ONE CAPSULE BY MOUTH DAILY.  NEEDS APPT BEFORE REFILLS       Last Appointment Date: 10/20/2020  Next Appointment Date: 11/23/2020    Allergies   Allergen Reactions    Other Anaphylaxis and Itching     Cloth bandaids , causes redness of skin    Ciprofloxacin Itching    Codeine Itching    Perflutren Lipid Microsphere Other (See Comments)     Back pain    Tetanus Toxoids Itching     Itching around site    Tizanidine Hcl Itching    Tetanus Toxoid      Reaction: ITCHING AROUND SITE

## 2020-11-23 ENCOUNTER — VIRTUAL VISIT (OUTPATIENT)
Dept: INTERNAL MEDICINE | Age: 75
End: 2020-11-23
Payer: MEDICARE

## 2020-11-23 PROBLEM — S72.001D CLOSED FRACTURE OF RIGHT HIP WITH ROUTINE HEALING: Status: ACTIVE | Noted: 2020-11-23

## 2020-11-23 PROCEDURE — 99495 TRANSJ CARE MGMT MOD F2F 14D: CPT | Performed by: NURSE PRACTITIONER

## 2020-11-23 PROCEDURE — 1111F DSCHRG MED/CURRENT MED MERGE: CPT | Performed by: NURSE PRACTITIONER

## 2020-11-23 RX ORDER — ATORVASTATIN CALCIUM 40 MG/1
40 TABLET, FILM COATED ORAL DAILY
Qty: 90 TABLET | Refills: 1 | Status: SHIPPED | OUTPATIENT
Start: 2020-11-23 | End: 2021-07-02

## 2020-11-23 RX ORDER — DILTIAZEM HYDROCHLORIDE 180 MG/1
CAPSULE, COATED, EXTENDED RELEASE ORAL
Qty: 30 CAPSULE | Refills: 0 | Status: SHIPPED | OUTPATIENT
Start: 2020-11-23 | End: 2020-11-23 | Stop reason: SDUPTHER

## 2020-11-23 RX ORDER — DILTIAZEM HYDROCHLORIDE 180 MG/1
180 CAPSULE, COATED, EXTENDED RELEASE ORAL DAILY
Qty: 90 CAPSULE | Refills: 1 | Status: SHIPPED | OUTPATIENT
Start: 2020-11-23 | End: 2021-06-22

## 2020-11-23 RX ORDER — ATORVASTATIN CALCIUM 40 MG/1
TABLET, FILM COATED ORAL
Qty: 30 TABLET | Refills: 0 | Status: SHIPPED | OUTPATIENT
Start: 2020-11-23 | End: 2020-11-23 | Stop reason: SDUPTHER

## 2020-11-23 RX ORDER — METHIMAZOLE 10 MG/1
TABLET ORAL
Qty: 30 TABLET | Refills: 3 | Status: SHIPPED | OUTPATIENT
Start: 2020-11-23 | End: 2021-04-21

## 2020-11-23 NOTE — PROGRESS NOTES
Post-Discharge Transitional Care Management Services or Hospital Follow Up      Jane Heck   YOB: 1945    Date of Office Visit:  11/23/2020  Date of Hospital Admission: 11/12/2020  Date of Hospital Discharge: 11/17/2020    Care management risk score Rising risk (score 2-5) and Complex Care (Scores >=6): 5     Non face to face  following discharge, date last encounter closed (first attempt may have been earlier): 11/17/2020 10:59 AM 11/17/2020 10:59 AM    Call initiated 2 business days of discharge: Yes    Patient Active Problem List   Diagnosis    History of bladder cancer    H/O ureterostomy    Chronic atrial fibrillation ([address]    Essential (primary) hypertension    Other hyperlipidemia    Chronic venous insufficiency    Long term current use of anticoagulant    Primary osteoarthritis of both knees    Stage 3 chronic kidney disease    Idiopathic peripheral neuropathy    Hyperthyroidism    Chronic pruritic rash in adult    Vitamin D deficiency    Recurrent major depressive disorder, in partial remission ([address]    Memory impairment    Other sleep apnea    Obesity due to excess calories with serious comorbidity    History of DVT (deep vein thrombosis)       Allergies   Allergen Reactions    Other Anaphylaxis and Itching     Cloth bandaids , causes redness of skin    Ciprofloxacin Itching    Codeine Itching    Perflutren Lipid Microsphere Other (See Comments)     Back pain    Tetanus Toxoids Itching     Itching around site    Tizanidine Hcl Itching    Tetanus Toxoid      Reaction: ITCHING AROUND SITE       Medications listed as ordered at the time of discharge from hospital  Yes     Medications marked \"taking\" at this time  Outpatient Medications Marked as Taking for the 11/23/20 encounter (Virtual Visit) with MARIELOS Cedillo   Medication Sig Dispense Refill    atorvastatin (LIPITOR) 40 MG tablet Take 1 tablet by mouth daily 90 tablet 1    dilTIAZem (CARDIZEM CD) 180 MG extended release capsule Take 1 capsule by mouth daily 90 capsule 1        Medications patient taking as of now reconciled against medications ordered at time of hospital discharge: Yes    Chief Complaint   Patient presents with    Hip Injury       History of Present illness - Follow up of Hospital diagnosis(es):   1. Fracture of right; she originally had this fracture and then was sent to nursing home and had a decline so her family brought her home she ultimately fell in the bathroom and sustained a left hip fracture  2. Fracture of the left she had this fracture second    After getting home they are doing good; She has [facility]    Inpatient course: Discharge summary reviewed- see chart. Interval history/Current status:   1. Fracture right hip is been surgically repaired she is now at home she has physical therapy  2. Left hip fracture has been surgically repaired she is in a hospital bed now and they are asking for a wheelchair from [facility]    There were no vitals filed for this visit. There is no height or weight on file to calculate BMI. Wt Readings from Last 3 Encounters:   09/15/20 250 lb (113.4 kg)   05/22/19 262 lb (118.8 kg)   03/01/19 275 lb (124.7 kg)     BP Readings from Last 3 Encounters:   09/15/20 (!) 152/79   02/26/20 132/72   08/23/19 134/84       A comprehensive review of systems was negative except for what was noted in the HPI. Physical Exam:  . DGEVISITPE      GENERAL:   Afebrile alert no acute distress  Respiratory no use of accessory muscles no acute distress no audible wheezing  Mental status alert oriented adequate thought processes      Patient-Reported Vitals 11/23/2020   Patient-Reported Systolic 707   Patient-Reported Diastolic 68   Patient-Reported Pulse 61   Patient-Reported Temperature 98.9   Patient-Reported SpO2 96.6      Patient-Reported Vitals 11/23/2020   Patient-Reported Systolic 348   Patient-Reported Diastolic 68   Patient-Reported Pulse 61   Patient-Reported Temperature 98.9   Patient-Reported SpO2 96.6          Assessment/Plan:  1. Closed fracture of left hip, initial encounter ([facility]  [address] and PT; with hospital bed     2. Closed fracture of right hip, initial encounter ([facility]      [facility] with pt and hospital bed;    Hey need a q/c for transportation   She is already on coumadin therapy;   They have home reporting;   Medical Decision Making: moderate complexity

## 2020-11-23 NOTE — TELEPHONE ENCOUNTER
Kat Artist called requesting a refill of the below medication which has been pended for you:     Requested Prescriptions     Pending Prescriptions Disp Refills    atorvastatin (LIPITOR) 40 MG tablet [Pharmacy Med Name: ATORVASTATIN 40 MG TABLET 40 Tablet] 30 tablet 0     Sig: TAKE 1 TABLET BY MOUTH AT BEDTIME.  NEEDS APPOINTMENT BEFORE REFILLS    methIMAzole (TAPAZOLE) 10 MG tablet [Pharmacy Med Name: METHIMAZOLE 10 MG TABS 10 Tablet] 30 tablet 3     Sig: TAKE 1 TABLET BY MOUTH EVERY DAY       Last Appointment Date: 10/20/2020  Next Appointment Date: 11/23/2020    Allergies   Allergen Reactions    Other Anaphylaxis and Itching     Cloth bandaids , causes redness of skin    Ciprofloxacin Itching    Codeine Itching    Perflutren Lipid Microsphere Other (See Comments)     Back pain    Tetanus Toxoids Itching     Itching around site    Tizanidine Hcl Itching    Tetanus Toxoid      Reaction: ITCHING AROUND SITE

## 2020-11-24 ENCOUNTER — TELEPHONE (OUTPATIENT)
Dept: INTERNAL MEDICINE CLINIC | Age: 75
End: 2020-11-24

## 2020-11-24 NOTE — TELEPHONE ENCOUNTER
[address] [address] PT reporting that OT contacted pt's daughter; she declines OT evaluation at this time and prefers to focus efforts on basic mobility with Physical Therapy; she declines PCA level of care stating she is able to assist; she is able to verbalize understanding re: how to initiate either OT or PCA services if needs arise   Cancelled OT evaluation per pt's daughter's request  maurice

## 2020-11-25 ENCOUNTER — ANTI-COAG VISIT (OUTPATIENT)
Dept: INTERNAL MEDICINE | Age: 75
End: 2020-11-25
Payer: MEDICARE

## 2020-11-25 LAB — INR BLD: 3.6

## 2020-11-25 PROCEDURE — 93793 ANTICOAG MGMT PT WARFARIN: CPT | Performed by: NURSE PRACTITIONER

## 2020-11-25 NOTE — PROGRESS NOTES
HOME MONITORING REPORT    INR today:   Results for orders placed or performed in visit on 11/25/20   Protime-INR   Result Value Ref Range    INR 3.60        INR Goal: 2.0-3.0    Dosing Plan  As of 11/25/2020    TTR:   54.0 % (2.4 y)   Full warfarin instructions:   11/25: 2.5 mg; 11/26: 5 mg; Otherwise 7.5 mg every Sun, Mon, Thu; 5 mg all other days              PLAN: Advised patient/caregiver to decrease dose to 2.5 mg tonight and 5 mg tomorrow. then continue current dose and recheck in one week. Patient/Caregiver voiced understanding  I have reviewed nursing plan for Coumadin management and agree with plan.

## 2020-11-30 ENCOUNTER — READMISSION MANAGEMENT (OUTPATIENT)
Dept: CALL CENTER | Facility: HOSPITAL | Age: 75
End: 2020-11-30

## 2020-11-30 NOTE — OUTREACH NOTE
Medical Week 1 Survey      Responses   [facility] patient discharged from?  [place]   Does the patient have one of the following disease processes/diagnoses(primary or secondary)?  Butch Moran LPN

## 2020-12-01 ENCOUNTER — READMISSION MANAGEMENT (OUTPATIENT)
Dept: CALL CENTER | Facility: HOSPITAL | Age: 75
End: 2020-12-01

## 2020-12-01 ENCOUNTER — TELEPHONE (OUTPATIENT)
Dept: INTERNAL MEDICINE | Age: 75
End: 2020-12-01

## 2020-12-01 NOTE — OUTREACH NOTE
Medical Week 2 Survey      Responses   [facility] patient discharged from?  [place]   Does the patient have one of the following disease processes/diagnoses(primary or secondary)?  Other   Week 2 attempt successful?  No   Unsuccessful attempts  Attempt 2          Leora Collins RN

## 2020-12-02 LAB — INR BLD: 2

## 2020-12-04 ENCOUNTER — ANTI-COAG VISIT (OUTPATIENT)
Dept: INTERNAL MEDICINE | Age: 75
End: 2020-12-04
Payer: MEDICARE

## 2020-12-04 LAB
BILIRUBIN URINE: NEGATIVE
BLOOD, URINE: ABNORMAL
CLARITY: CLEAR
COLOR: YELLOW
EPITHELIAL CELLS, UA: ABNORMAL /HPF
GLUCOSE URINE: NEGATIVE MG/DL
HYALINE CASTS: ABNORMAL /LPF (ref 0–5)
KETONES, URINE: NEGATIVE MG/DL
LEUKOCYTE ESTERASE, URINE: NEGATIVE
NITRITE, URINE: NEGATIVE
PH UA: 7 (ref 5–8)
PROTEIN UA: NEGATIVE MG/DL
RBC UA: ABNORMAL /HPF (ref 0–2)
SPECIFIC GRAVITY UA: 1.01 (ref 1–1.03)
UROBILINOGEN, URINE: 0.2 E.U./DL
WBC UA: ABNORMAL /HPF (ref 0–5)

## 2020-12-04 PROCEDURE — 93793 ANTICOAG MGMT PT WARFARIN: CPT | Performed by: NURSE PRACTITIONER

## 2020-12-04 NOTE — PROGRESS NOTES
HOME MONITORING REPORT    INR today:   Results for orders placed or performed in visit on 12/04/20   Protime-INR   Result Value Ref Range    INR 2.00        INR Goal: 2.0-3.0    Dosing Plan  As of 12/4/2020    TTR:   54.1 % (2.4 y)   Full warfarin instructions:   7.5 mg every Sun, Mon, Thu; 5 mg all other days              PLAN:PATIENT NOTIFIED TO  CONTINUE CURRENT DOSE AND RECHECK IN ONE WEEK. Electronically signed by Duarte Malhotra MD on 12/4/2020 at 3:20 PM    I have reviewed nursing plan for Coumadin management and agree with plan.

## 2020-12-07 LAB
ALBUMIN SERPL-MCNC: 3.4 G/DL (ref 3.5–5.2)
ALP BLD-CCNC: 213 U/L (ref 35–104)
ALT SERPL-CCNC: 15 U/L (ref 5–33)
ANION GAP SERPL CALCULATED.3IONS-SCNC: 10 MMOL/L (ref 7–19)
AST SERPL-CCNC: 14 U/L (ref 5–32)
BILIRUB SERPL-MCNC: 0.6 MG/DL (ref 0.2–1.2)
BUN BLDV-MCNC: 35 MG/DL (ref 8–23)
CALCIUM SERPL-MCNC: 10.4 MG/DL (ref 8.8–10.2)
CHLORIDE BLD-SCNC: 110 MMOL/L (ref 98–111)
CHOLESTEROL, TOTAL: 97 MG/DL (ref 160–199)
CO2: 24 MMOL/L (ref 22–29)
CREAT SERPL-MCNC: 0.8 MG/DL (ref 0.5–0.9)
GFR AFRICAN AMERICAN: >59
GFR NON-AFRICAN AMERICAN: >60
GLUCOSE BLD-MCNC: 88 MG/DL (ref 74–109)
HDLC SERPL-MCNC: 40 MG/DL (ref 65–121)
LDL CHOLESTEROL CALCULATED: 33 MG/DL
POTASSIUM SERPL-SCNC: 4.1 MMOL/L (ref 3.5–5)
SODIUM BLD-SCNC: 144 MMOL/L (ref 136–145)
TOTAL PROTEIN: 7 G/DL (ref 6.6–8.7)
TRIGL SERPL-MCNC: 118 MG/DL (ref 0–149)
TSH SERPL DL<=0.05 MIU/L-ACNC: 0.28 UIU/ML (ref 0.27–4.2)
VITAMIN D 25-HYDROXY: 53.2 NG/ML

## 2020-12-09 ENCOUNTER — READMISSION MANAGEMENT (OUTPATIENT)
Dept: CALL CENTER | Facility: HOSPITAL | Age: 75
End: 2020-12-09

## 2020-12-09 ENCOUNTER — ANTI-COAG VISIT (OUTPATIENT)
Dept: INTERNAL MEDICINE | Age: 75
End: 2020-12-09
Payer: MEDICARE

## 2020-12-09 LAB — INR BLD: 1.7

## 2020-12-09 PROCEDURE — 93793 ANTICOAG MGMT PT WARFARIN: CPT | Performed by: NURSE PRACTITIONER

## 2020-12-09 NOTE — OUTREACH NOTE
Medical Week 3 Survey      Responses   [facility] patient discharged from?  [place]   Does the patient have one of the following disease processes/diagnoses(primary or secondary)?  Other   Week 3 attempt successful?  Yes   Call start time  1734   Unsuccessful attempts  Attempt 1   Call end time  1736   Medication alerts for this patient  no new medications   Meds reviewed with patient/caregiver?  Yes   Comments regarding appointments  has seen dr stewart   Has the patient kept scheduled appointments due by today?  Yes   What is the patient's perception of their health status since discharge?  Improving   Week 3 Call Completed?  Yes   Wrap up additional comments  able to bear weight , using walker, can use bsc          Lavinia Arnett RN

## 2020-12-09 NOTE — PROGRESS NOTES
HOME MONITORING REPORT    INR today:   Results for orders placed or performed in visit on 12/09/20   Protime-INR   Result Value Ref Range    INR 1.70        INR Goal: 2.0-3.0    Dosing Plan  As of 12/9/2020    TTR:   53.6 % (2.4 y)   Full warfarin instructions:   12/9: 7.5 mg; Otherwise 7.5 mg every Sun, Mon, Thu; 5 mg all other days              PLAN: Advised patient/caregiver to increase tonight's dose to 7.5 mg then continue current dose and recheck in one week. Patient/Caregiver voiced understanding    I have reviewed nursing plan for Coumadin management and agree with plan.

## 2020-12-15 ENCOUNTER — VIRTUAL VISIT (OUTPATIENT)
Dept: INTERNAL MEDICINE | Age: 75
End: 2020-12-15
Payer: MEDICARE

## 2020-12-15 PROCEDURE — G0439 PPPS, SUBSEQ VISIT: HCPCS | Performed by: NURSE PRACTITIONER

## 2020-12-15 PROCEDURE — 4040F PNEUMOC VAC/ADMIN/RCVD: CPT | Performed by: NURSE PRACTITIONER

## 2020-12-15 PROCEDURE — 1123F ACP DISCUSS/DSCN MKR DOCD: CPT | Performed by: NURSE PRACTITIONER

## 2020-12-15 ASSESSMENT — PATIENT HEALTH QUESTIONNAIRE - PHQ9
SUM OF ALL RESPONSES TO PHQ9 QUESTIONS 1 & 2: 0
SUM OF ALL RESPONSES TO PHQ QUESTIONS 1-9: 0
SUM OF ALL RESPONSES TO PHQ QUESTIONS 1-9: 0
1. LITTLE INTEREST OR PLEASURE IN DOING THINGS: 0
2. FEELING DOWN, DEPRESSED OR HOPELESS: 0
SUM OF ALL RESPONSES TO PHQ QUESTIONS 1-9: 0

## 2020-12-15 ASSESSMENT — LIFESTYLE VARIABLES: HOW OFTEN DO YOU HAVE A DRINK CONTAINING ALCOHOL: 0

## 2020-12-15 NOTE — PATIENT INSTRUCTIONS
Personalized Preventive Plan for Odalis Gustafson - 12/15/2020  Medicare offers a range of preventive health benefits. Some of the tests and screenings are paid in full while other may be subject to a deductible, co-insurance, and/or copay. Some of these benefits include a comprehensive review of your medical history including lifestyle, illnesses that may run in your family, and various assessments and screenings as appropriate. After reviewing your medical record and screening and assessments performed today your provider may have ordered immunizations, labs, imaging, and/or referrals for you. A list of these orders (if applicable) as well as your Preventive Care list are included within your After Visit Summary for your review. Other Preventive Recommendations:    · A preventive eye exam performed by an eye specialist is recommended every 1-2 years to screen for glaucoma; cataracts, macular degeneration, and other eye disorders. · A preventive dental visit is recommended every 6 months. · Try to get at least 150 minutes of exercise per week or 10,000 steps per day on a pedometer . · Order or download the FREE \"Exercise & Physical Activity: Your Everyday Guide\" from [employer]. Call 4-556.407.5959 or search [employer] online. · You need 4705-0302 mg of calcium and 1353-9709 IU of vitamin D per day. It is possible to meet your calcium requirement with diet alone, but a vitamin D supplement is usually necessary to meet this goal.  · When exposed to the sun, use a sunscreen that protects against both UVA and UVB radiation with an SPF of 30 or greater. Reapply every 2 to 3 hours or after sweating, drying off with a towel, or swimming. · Always wear a seat belt when traveling in a car. Always wear a helmet when riding a bicycle or motorcycle.

## 2020-12-15 NOTE — PROGRESS NOTES
Nephology; Once time visit and refer back if needed     Wt Readings from Last 3 Encounters:   09/15/20 250 lb (113.4 kg)   05/22/19 262 lb (118.8 kg)   03/01/19 275 lb (124.7 kg)      Patient-Reported Vitals 11/23/2020   Patient-Reported Systolic 236   Patient-Reported Diastolic 68   Patient-Reported Pulse 61   Patient-Reported Temperature 98.9   Patient-Reported SpO2 96.6      There is no height or weight on file to calculate BMI. Based upon direct observation of the patient, evaluation of cognition reveals global memory impairment noted. ROS:    Review of Systems -   General no fever chills no malaise fatigue  Diet no dietary restrictions  Skin no rash eruption pigment changes  HEENT no headache dizziness difficulty swallowing foods or medications   Neck no complaint of masses or pain  Chest  no cough shortness of breath exertional dyspnea  Cardiovascular no chest pain palpitations  Hematology no history of anemia or bruising    no urgency frequency nocturia hematuria  GI no indigestion, constipation or diarrhea    Musculoskeletal no joint pain or swelling  Neuro no numbness tingling ataxia   Mental status no problems with  anxiety depression      DGEVISITPE      GENERAL:   Afebrile alert no acute distress  Respiratory no use of accessory muscles no acute distress no audible wheezing  Mental status alert oriented adequate thought processes        Vital signs were not taken at this visit as no equipment was available;          Patient's complete Health Risk Assessment and screening values have been reviewed and are found in Flowsheets. The following problems were reviewed today and where indicated follow up appointments were made and/or referrals ordered. Positive Risk Factor Screenings with Interventions:     Fall Risk:  Timed Up and Go Test > 12 seconds?  (Complete if either Fall Risk answers are Yes): (unknown, tele visit)  2 or more falls in past year?: (!) yes  Fall with injury in past year?: (!) yes Fall Risk Interventions:    · physical therapy ;   · She has fallen           General Health and ACP:  General  In general, how would you say your health is?: Good  In the past 7 days, have you experienced any of the following? New or Increased Pain, New or Increased Fatigue, Loneliness, Social Isolation, Stress or Anger?: None of These(daremieer answered due to pt's condition)  Do you get the social and emotional support that you need?: Yes  Do you have a Living Will?: (!) No  Advance Directives     Power of [address] Will ACP-Advance Directive ACP-Power of     Not on File Not on File Not on File Not on File      General Health Risk Interventions:  · No Living Will: Patient declines ACP discussion/assistance    Health Habits/Nutrition:  Health Habits/Nutrition  Do you exercise for at least 20 minutes 2-3 times per week?: Yes(therapy)  Have you lost any weight without trying in the past 3 months?: (!) Yes  Do you eat fewer than 2 meals per day?: No  Have you seen a dentist within the past year?: (!) No     Health Habits/Nutrition Interventions:  · Dental exam overdue:  patient declines dental evaluation    Hearing/Vision:  No exam data present  Hearing/Vision  Do you or your family notice any trouble with your hearing?: (!) Yes  Do you have difficulty driving, watching TV, or doing any of your daily activities because of your eyesight?: (not driving)  Have you had an eye exam within the past year?: (!) No  Hearing/Vision Interventions:  · Hearing concerns:  patient declines any further evaluation/treatment for hearing issues  · Vision concerns:  patient declines any further evaluation/treatment for this issue     ADL:  ADLs  In the past 7 days, did you need help from others to perform any of the following everyday activities?  Eating, dressing, grooming, bathing, toileting, or walking/balance?: (!) Eating, Dressing, Grooming, Bathing, Toileting, Walking/Balance In the past 7 days, did you need help from others to take care of any of the following? Laundry, housekeeping, banking/finances, shopping, telephone use, food preparation, transportation, or taking medications?: [employer], Housekeeping, Banking/Finances, Shopping, Telephone Use, Food Preparation, Transportation, Taking Medications  ADL Interventions:  · she lives with her daughter who takes care of everything for her;  she just had 2 separatefalls with fx hips;  she is getting PT and now llives with her daughter     Personalized Preventive Plan   Current Health Maintenance Status  Immunization History   Administered Date(s) Administered    Influenza, High Dose (Fluzone 65 yrs and older) 10/09/2017, 11/09/2018    Influenza, Quadv, adjuvanted, 65 yrs +, IM, PF (Fluad) 09/15/2020    Pneumococcal Conjugate 13-valent (Rogemy Peabody) 10/18/2016        Health Maintenance   Topic Date Due    Hepatitis C screen  1945    DEXA (modify frequency per FRAX score)  04/13/1999    Shingles Vaccine (1 of 2) 09/15/2021 (Originally 4/13/1994)    Lipid screen  12/07/2021    TSH testing  12/07/2021    Potassium monitoring  12/07/2021    Creatinine monitoring  12/07/2021    Annual Wellness Visit (AWV)  12/16/2021    Flu vaccine  Completed    Pneumococcal 65+ years Vaccine  Completed    Hepatitis A vaccine  Aged Out    Hepatitis B vaccine  Aged Out    Hib vaccine  Aged Out    Meningococcal (ACWY) vaccine  Aged Out     Recommendations for Anhelo Due: see orders and patient instructions/AVS.  . Recommended screening schedule for the next 5-10 years is provided to the patient in written form: see Patient Instructions/AVS.    Salma Rodriguez was seen today for medicare awv. Diagnoses and all orders for this visit:    Healthcare maintenance  -     CBC Auto Differential; Future  -     Comprehensive Metabolic Panel; Future  -     Hemoglobin A1C; Future  -     Vitamin D 25 Hydroxy;  Future

## 2020-12-17 ENCOUNTER — ANTI-COAG VISIT (OUTPATIENT)
Dept: INTERNAL MEDICINE | Age: 75
End: 2020-12-17
Payer: MEDICARE

## 2020-12-17 LAB — INR BLD: 2.5

## 2020-12-17 PROCEDURE — 93793 ANTICOAG MGMT PT WARFARIN: CPT | Performed by: NURSE PRACTITIONER

## 2020-12-17 NOTE — PROGRESS NOTES
HOME MONITORING REPORT    INR today:   Results for orders placed or performed in visit on 12/17/20   Protime-INR   Result Value Ref Range    INR 2.50        INR Goal: 2.0-3.0    Dosing Plan  As of 12/17/2020    TTR:  53.7 % (2.4 y)   Full warfarin instructions:  7.5 mg every Sun, Mon, Thu; 5 mg all other days              PLAN: Advised patient/caregiver to continue current dose and recheck in one week. Patient/Caregiver voiced understanding  I have reviewed nursing plan for Coumadin management and agree with plan.

## 2020-12-24 LAB — INR BLD: 2.7

## 2020-12-28 ENCOUNTER — ANTI-COAG VISIT (OUTPATIENT)
Dept: INTERNAL MEDICINE | Age: 75
End: 2020-12-28

## 2020-12-28 NOTE — PROGRESS NOTES
HOME MONITORING REPORT    INR today:   Results for orders placed or performed in visit on 12/28/20   Protime-INR   Result Value Ref Range    INR 2.70        INR Goal: 2.0-3.0    Dosing Plan  As of 12/28/2020    TTR:  54.1 % (2.4 y)   Full warfarin instructions:  7.5 mg every Sun, Mon, Thu; 5 mg all other days              PLAN: Advised patient/caregiver to continue current dose and recheck in one week. Patient/Caregiver voiced understanding    I have reviewed nursing plan for Coumadin management and agree with plan.

## 2020-12-31 ENCOUNTER — ANTI-COAG VISIT (OUTPATIENT)
Dept: INTERNAL MEDICINE | Age: 75
End: 2020-12-31
Payer: MEDICARE

## 2020-12-31 ENCOUNTER — TELEPHONE (OUTPATIENT)
Dept: INTERNAL MEDICINE | Age: 75
End: 2020-12-31

## 2020-12-31 DIAGNOSIS — Z86.718 HISTORY OF DVT (DEEP VEIN THROMBOSIS): ICD-10-CM

## 2020-12-31 LAB — INR BLD: 5.5

## 2020-12-31 PROCEDURE — 93793 ANTICOAG MGMT PT WARFARIN: CPT | Performed by: NURSE PRACTITIONER

## 2020-12-31 NOTE — PROGRESS NOTES
HOME MONITORING REPORT    INR today:   Results for orders placed or performed in visit on 12/31/20   Protime-INR   Result Value Ref Range    INR 5.50        INR Goal: 2.0-3.0    Dosing Plan  As of 12/31/2020    TTR:  53.7 % (2.5 y)   Full warfarin instructions:  7.5 mg every Sun, Mon, Thu; 5 mg all other days              PLAN: Advised patient/caregiver to Hold coumadin 12/31/2020 and 1/1/2021. Take 1/2 dose 1/2/2021 and 1/3/2021. Repeat INR 1/4/2021. I have reviewed nursing plan for Coumadin management and agree with plan.

## 2021-01-11 ENCOUNTER — ANTI-COAG VISIT (OUTPATIENT)
Dept: INTERNAL MEDICINE | Age: 76
End: 2021-01-11
Payer: MEDICARE

## 2021-01-11 DIAGNOSIS — Z86.718 HISTORY OF DVT (DEEP VEIN THROMBOSIS): ICD-10-CM

## 2021-01-11 LAB — INR BLD: 1.6

## 2021-01-11 PROCEDURE — 93793 ANTICOAG MGMT PT WARFARIN: CPT | Performed by: NURSE PRACTITIONER

## 2021-01-11 NOTE — PROGRESS NOTES
HOME MONITORING REPORT    INR today:   Results for orders placed or performed in visit on 01/11/21   Protime-INR   Result Value Ref Range    INR 1.60        INR Goal: 2.0-3.0    Dosing Plan  As of 1/11/2021    TTR:  53.4 % (2.5 y)   Full warfarin instructions:  1/12: 7.5 mg; Otherwise 7.5 mg every Sun, Mon, Thu; 5 mg all other days              PLAN: Advised patient/caregiver to increase tomorrow's dose to 7.5 mg, then take 5 mg on Wednesday and re-check Thursday morning so I get the result. Patient/Caregiver voiced understanding    I have reviewed nursing plan for Coumadin management and agree with plan.

## 2021-01-14 ENCOUNTER — ANTI-COAG VISIT (OUTPATIENT)
Dept: INTERNAL MEDICINE | Age: 76
End: 2021-01-14
Payer: MEDICARE

## 2021-01-14 DIAGNOSIS — Z86.718 HISTORY OF DVT (DEEP VEIN THROMBOSIS): ICD-10-CM

## 2021-01-14 LAB — INR BLD: 2.4

## 2021-01-14 PROCEDURE — 93793 ANTICOAG MGMT PT WARFARIN: CPT | Performed by: NURSE PRACTITIONER

## 2021-01-14 RX ORDER — WARFARIN SODIUM 5 MG/1
TABLET ORAL
Qty: 30 TABLET | Refills: 5 | Status: CANCELLED | OUTPATIENT
Start: 2021-01-14

## 2021-01-14 RX ORDER — WARFARIN SODIUM 7.5 MG/1
TABLET ORAL
Qty: 30 TABLET | Refills: 5 | Status: CANCELLED | OUTPATIENT
Start: 2021-01-14

## 2021-01-14 NOTE — TELEPHONE ENCOUNTER
Caregiver has called sating that her mothers Coumadin was canceled per Amparo Blunt orders . I have looked back at the visit and that was not [phone]. I have requested reorder of the Coumadin 7.5MG and 5MG. Please Advise.

## 2021-01-14 NOTE — PROGRESS NOTES
HOME MONITORING REPORT    INR today:   Results for orders placed or performed in visit on 01/14/21   Protime-INR   Result Value Ref Range    INR 2.40        INR Goal: 2.0-3.0    Dosing Plan  As of 1/14/2021    TTR:  53.4 % (2.5 y)   Full warfarin instructions:  7.5 mg every Sun, Mon, Thu; 5 mg all other days              PLAN: Advised patient/caregiver to continue current dose and recheck in one week. Patient/Caregiver voiced understanding  I have reviewed nursing plan for Coumadin management and agree with plan.

## 2021-01-18 RX ORDER — WARFARIN SODIUM 7.5 MG/1
TABLET ORAL
Qty: 30 TABLET | Refills: 5 | Status: SHIPPED | OUTPATIENT
Start: 2021-01-18 | End: 2021-11-02

## 2021-01-18 RX ORDER — WARFARIN SODIUM 5 MG/1
TABLET ORAL
Qty: 30 TABLET | Refills: 5 | Status: SHIPPED | OUTPATIENT
Start: 2021-01-18 | End: 2022-04-25

## 2021-01-21 ENCOUNTER — ANTI-COAG VISIT (OUTPATIENT)
Dept: INTERNAL MEDICINE | Age: 76
End: 2021-01-21
Payer: MEDICARE

## 2021-01-21 DIAGNOSIS — Z86.718 HISTORY OF DVT (DEEP VEIN THROMBOSIS): ICD-10-CM

## 2021-01-21 LAB — INR BLD: 1.8

## 2021-01-21 PROCEDURE — 93793 ANTICOAG MGMT PT WARFARIN: CPT | Performed by: NURSE PRACTITIONER

## 2021-01-21 NOTE — PROGRESS NOTES
HOME MONITORING REPORT    INR today:   Results for orders placed or performed in visit on 01/21/21   Protime-INR   Result Value Ref Range    INR 1.80        INR Goal: 2.0-3.0    Dosing Plan  As of 1/21/2021    TTR:  53.5 % (2.5 y)   Full warfarin instructions:  1/22: 7.5 mg; Otherwise 7.5 mg every Sun, Mon, Thu; 5 mg all other days              PLAN: Advised patient/caregiver to increase tomorrows dose to 7.5 mg, then continue current dose and recheck in one week. Patient/Caregiver voiced understanding  I have reviewed nursing plan for Coumadin management and agree with plan.

## 2021-01-25 RX ORDER — ERGOCALCIFEROL 1.25 MG/1
CAPSULE ORAL
Qty: 4 CAPSULE | Refills: 3 | Status: SHIPPED | OUTPATIENT
Start: 2021-01-25 | End: 2021-06-17

## 2021-01-27 ENCOUNTER — ANTI-COAG VISIT (OUTPATIENT)
Dept: INTERNAL MEDICINE | Age: 76
End: 2021-01-27
Payer: MEDICARE

## 2021-01-27 DIAGNOSIS — Z86.718 HISTORY OF DVT (DEEP VEIN THROMBOSIS): ICD-10-CM

## 2021-01-27 LAB — INR BLD: 1.6

## 2021-01-27 PROCEDURE — 93793 ANTICOAG MGMT PT WARFARIN: CPT | Performed by: NURSE PRACTITIONER

## 2021-01-27 NOTE — PROGRESS NOTES
HOME MONITORING REPORT    INR today:   Results for orders placed or performed in visit on 01/27/21   Protime-INR   Result Value Ref Range    INR 1.60        INR Goal: 2.0-3.0    Dosing Plan  As of 1/27/2021    TTR:  53.2 % (2.5 y)   Full warfarin instructions:  1/27: 7.5 mg; 1/30: 7.5 mg; Otherwise 7.5 mg every Sun, Mon, Thu; 5 mg all other days              PLAN: Advised patient/caregiver to increase tonight's and Saturday night's dose to 7.5 mg. This will have her taking 5 mg Friday and Tuesday, 7.5 all other days this week only. Recheck in one week. Patient/Caregiver voiced understanding  I have reviewed nursing plan for Coumadin management and agree with plan.

## 2021-02-04 ENCOUNTER — ANTI-COAG VISIT (OUTPATIENT)
Dept: INTERNAL MEDICINE | Age: 76
End: 2021-02-04
Payer: MEDICARE

## 2021-02-04 DIAGNOSIS — Z86.718 HISTORY OF DVT (DEEP VEIN THROMBOSIS): ICD-10-CM

## 2021-02-04 LAB — INR BLD: 2.3

## 2021-02-04 PROCEDURE — 93793 ANTICOAG MGMT PT WARFARIN: CPT | Performed by: NURSE PRACTITIONER

## 2021-02-04 NOTE — PROGRESS NOTES
HOME MONITORING REPORT    INR today:   Results for orders placed or performed in visit on 02/04/21   Protime-INR   Result Value Ref Range    INR 2.30        INR Goal: 2.0-3.0    Dosing Plan  As of 2/4/2021    TTR:  53.1 % (2.6 y)   Full warfarin instructions:  7.5 mg every Sun, Mon, Thu; 5 mg all other days              PLAN: Advised patient/caregiver to continue current dose and recheck in one week. Patient/Caregiver voiced understanding    I have reviewed nursing plan for Coumadin management and agree with plan.

## 2021-02-10 ENCOUNTER — ANTI-COAG VISIT (OUTPATIENT)
Dept: INTERNAL MEDICINE | Age: 76
End: 2021-02-10
Payer: MEDICARE

## 2021-02-10 DIAGNOSIS — Z85.51 HISTORY OF BLADDER CANCER: ICD-10-CM

## 2021-02-10 DIAGNOSIS — Z86.718 HISTORY OF DVT (DEEP VEIN THROMBOSIS): ICD-10-CM

## 2021-02-10 DIAGNOSIS — Z98.890 H/O URETEROSTOMY: ICD-10-CM

## 2021-02-10 LAB — INR BLD: 2.2

## 2021-02-10 PROCEDURE — 93793 ANTICOAG MGMT PT WARFARIN: CPT | Performed by: NURSE PRACTITIONER

## 2021-02-10 RX ORDER — FECAL COLL W-CHARCOAL/CATH/SYR
MISCELLANEOUS RECTAL
Qty: 10 WAFER | Refills: 11 | Status: SHIPPED | OUTPATIENT
Start: 2021-02-10 | End: 2022-02-14

## 2021-02-10 RX ORDER — OSTOMY SUPPLY 2 1/4"
EACH MISCELLANEOUS
Qty: 10 EACH | Refills: 11 | Status: SHIPPED | OUTPATIENT
Start: 2021-02-10 | End: 2021-11-15

## 2021-02-10 NOTE — TELEPHONE ENCOUNTER
Radhika Trotter called requesting a refill of the below medication which has been pended for you:     Requested Prescriptions     Pending Prescriptions Disp Refills    Ostomy Supplies (CHAO-FIT [place]) WAFR [Pharmacy Med Name: CHAO-FIT NATURA STOMAHES[facility] Wafer] 10 Wafer 11     Sig: USE  [facility] (CHAO-FIT [address] [Pharmacy Med Name: CHAO-FIT Patsi Ripa [place] Miscellaneous] 10 each 11     Sig: USE AS DIRECTED.        Last Appointment Date: 12/15/2020  Next Appointment Date: 3/16/2021    Allergies   Allergen Reactions    Other Anaphylaxis and Itching     Cloth bandaids , causes redness of skin    Ciprofloxacin Itching    Codeine Itching    Perflutren Lipid Microsphere Other (See Comments)     Back pain    Tetanus Toxoids Itching     Itching around site    Tizanidine Hcl Itching    Tetanus Toxoid      Reaction: ITCHING AROUND SITE

## 2021-02-10 NOTE — PROGRESS NOTES
HOME MONITORING REPORT    INR today:   Results for orders placed or performed in visit on 02/10/21   Protime-INR   Result Value Ref Range    INR 2.20        INR Goal: 2.0-3.0    Dosing Plan  As of 2/10/2021    TTR:  53.4 % (2.6 y)   Full warfarin instructions:  7.5 mg every Sun, Mon, Thu; 5 mg all other days              PLAN: Advised patient/caregiver to continue current dose and recheck in one week. Patient/Caregiver voiced understanding  I have reviewed nursing plan for Coumadin management and agree with plan.

## 2021-02-19 LAB — INR BLD: 2.2

## 2021-02-22 ENCOUNTER — ANTI-COAG VISIT (OUTPATIENT)
Dept: INTERNAL MEDICINE | Age: 76
End: 2021-02-22
Payer: MEDICARE

## 2021-02-22 DIAGNOSIS — Z86.718 HISTORY OF DVT (DEEP VEIN THROMBOSIS): ICD-10-CM

## 2021-02-22 PROCEDURE — 93793 ANTICOAG MGMT PT WARFARIN: CPT | Performed by: NURSE PRACTITIONER

## 2021-02-22 NOTE — PROGRESS NOTES
HOME MONITORING REPORT    INR today:   Results for orders placed or performed in visit on 02/22/21   Protime-INR   Result Value Ref Range    INR 2.20        INR Goal: 2.0-3.0    Dosing Plan  As of 2/22/2021    TTR:  53.8 % (2.6 y)   Full warfarin instructions:  7.5 mg every Sun, Mon, Thu; 5 mg all other days              PLAN: Advised patient/caregiver to continue current dose and recheck in one week. Patient/Caregiver voiced understanding  I have reviewed nursing plan for Coumadin management and agree with plan.

## 2021-02-26 ENCOUNTER — ANTI-COAG VISIT (OUTPATIENT)
Dept: INTERNAL MEDICINE | Age: 76
End: 2021-02-26
Payer: MEDICARE

## 2021-02-26 DIAGNOSIS — Z86.718 HISTORY OF DVT (DEEP VEIN THROMBOSIS): ICD-10-CM

## 2021-02-26 LAB — INR BLD: 1.4

## 2021-02-26 PROCEDURE — 93793 ANTICOAG MGMT PT WARFARIN: CPT | Performed by: INTERNAL MEDICINE

## 2021-02-26 NOTE — PROGRESS NOTES
Pt was notified to take 10 mg of coumadin tonight, then resume current dose of coumadin and recheck on Wednesday. Electronically signed by Adalberto Chen MD on 2/26/2021 at 3:35 PM      I have reviewed nursing plan for Coumadin management and agree with plan.

## 2021-03-04 NOTE — TELEPHONE ENCOUNTER
Sumeet Nieto called requesting a refill of the below medication which has been pended for you:     Requested Prescriptions     Pending Prescriptions Disp Refills    donepezil (ARICEPT) 5 MG tablet [Pharmacy Med Name: DONEPEZIL HCL 5 MG TABS 5 Tablet] 30 tablet 3     Sig: TAKE 1 TABLET BY MOUTH AT BEDTIME FOR MEMORY       Last Appointment Date: 12/15/2020  Next Appointment Date: 3/16/2021    Allergies   Allergen Reactions    Other Anaphylaxis and Itching     Cloth bandaids , causes redness of skin    Ciprofloxacin Itching    Codeine Itching    Perflutren Lipid Microsphere Other (See Comments)     Back pain    Tetanus Toxoids Itching     Itching around site    Tizanidine Hcl Itching    Tetanus Toxoid      Reaction: [phone]

## 2021-03-08 RX ORDER — DONEPEZIL HYDROCHLORIDE 5 MG/1
TABLET, FILM COATED ORAL
Qty: 30 TABLET | Refills: 3 | Status: SHIPPED | OUTPATIENT
Start: 2021-03-08 | End: 2021-07-02

## 2021-03-10 ENCOUNTER — ANTI-COAG VISIT (OUTPATIENT)
Dept: INTERNAL MEDICINE | Age: 76
End: 2021-03-10
Payer: MEDICARE

## 2021-03-10 DIAGNOSIS — Z86.718 HISTORY OF DVT (DEEP VEIN THROMBOSIS): ICD-10-CM

## 2021-03-10 LAB — INR BLD: 2.5

## 2021-03-10 PROCEDURE — 93793 ANTICOAG MGMT PT WARFARIN: CPT | Performed by: NURSE PRACTITIONER

## 2021-03-10 NOTE — PROGRESS NOTES
HOME MONITORING REPORT    INR today:   Results for orders placed or performed in visit on 03/10/21   Protime-INR   Result Value Ref Range    INR 2.50        INR Goal: 2.0-3.0    Dosing Plan  As of 3/10/2021    TTR:  53.5 % (2.6 y)   Full warfarin instructions:  7.5 mg every Sun, Mon, Thu; 5 mg all other days              PLAN: Advised patient/caregiver to continue current dose and recheck in one week. Patient/Caregiver voiced understanding    I have reviewed nursing plan for Coumadin management and agree with plan.

## 2021-03-17 ENCOUNTER — ANTI-COAG VISIT (OUTPATIENT)
Dept: INTERNAL MEDICINE | Age: 76
End: 2021-03-17
Payer: MEDICARE

## 2021-03-17 DIAGNOSIS — I48.20 CHRONIC ATRIAL FIBRILLATION (HCC): ICD-10-CM

## 2021-03-17 DIAGNOSIS — Z86.718 HISTORY OF DVT (DEEP VEIN THROMBOSIS): ICD-10-CM

## 2021-03-17 LAB — INR BLD: 1.7

## 2021-03-17 PROCEDURE — 93793 ANTICOAG MGMT PT WARFARIN: CPT | Performed by: NURSE PRACTITIONER

## 2021-03-22 ENCOUNTER — OFFICE VISIT (OUTPATIENT)
Dept: INTERNAL MEDICINE | Age: 76
End: 2021-03-22
Payer: MEDICARE

## 2021-03-22 VITALS — HEART RATE: 70 BPM | DIASTOLIC BLOOD PRESSURE: 79 MMHG | SYSTOLIC BLOOD PRESSURE: 127 MMHG

## 2021-03-22 DIAGNOSIS — Z00.00 HEALTH CARE MAINTENANCE: ICD-10-CM

## 2021-03-22 DIAGNOSIS — Z00.00 HEALTHCARE MAINTENANCE: ICD-10-CM

## 2021-03-22 DIAGNOSIS — M89.9 DISORDER OF BONE, UNSPECIFIED: ICD-10-CM

## 2021-03-22 DIAGNOSIS — Z91.81 AT HIGH RISK FOR FALLS: ICD-10-CM

## 2021-03-22 DIAGNOSIS — B02.7 DISSEMINATED HERPES ZOSTER: Primary | ICD-10-CM

## 2021-03-22 LAB
ALBUMIN SERPL-MCNC: 3.5 G/DL (ref 3.5–5.2)
ALP BLD-CCNC: 97 U/L (ref 35–104)
ALT SERPL-CCNC: 21 U/L (ref 5–33)
ANION GAP SERPL CALCULATED.3IONS-SCNC: 11 MMOL/L (ref 7–19)
AST SERPL-CCNC: 20 U/L (ref 5–32)
ATYPICAL LYMPHOCYTE RELATIVE PERCENT: 5 % (ref 0–8)
BANDED NEUTROPHILS RELATIVE PERCENT: 2 % (ref 0–5)
BASOPHILS ABSOLUTE: 0.1 K/UL (ref 0–0.2)
BASOPHILS RELATIVE PERCENT: 1 % (ref 0–1)
BILIRUB SERPL-MCNC: 0.9 MG/DL (ref 0.2–1.2)
BUN BLDV-MCNC: 29 MG/DL (ref 8–23)
CALCIUM SERPL-MCNC: 10.3 MG/DL (ref 8.8–10.2)
CHLORIDE BLD-SCNC: 103 MMOL/L (ref 98–111)
CO2: 28 MMOL/L (ref 22–29)
CREAT SERPL-MCNC: 1.4 MG/DL (ref 0.5–0.9)
EOSINOPHILS ABSOLUTE: 0.36 K/UL (ref 0–0.6)
EOSINOPHILS RELATIVE PERCENT: 5 % (ref 0–5)
GFR AFRICAN AMERICAN: 44
GFR NON-AFRICAN AMERICAN: 36
GLUCOSE BLD-MCNC: 92 MG/DL (ref 74–109)
HBA1C MFR BLD: 5.6 % (ref 4–6)
HCT VFR BLD CALC: 47.5 % (ref 37–47)
HEMOGLOBIN: 15 G/DL (ref 12–16)
HEPATITIS C ANTIBODY INTERPRETATION: NORMAL
IMMATURE GRANULOCYTES #: 0 K/UL
LYMPHOCYTES ABSOLUTE: 0.6 K/UL (ref 1.1–4.5)
LYMPHOCYTES RELATIVE PERCENT: 4 % (ref 20–40)
MACROCYTES: ABNORMAL
MCH RBC QN AUTO: 32.4 PG (ref 27–31)
MCHC RBC AUTO-ENTMCNC: 31.6 G/DL (ref 33–37)
MCV RBC AUTO: 102.6 FL (ref 81–99)
MONOCYTES ABSOLUTE: 1.5 K/UL (ref 0–0.9)
MONOCYTES RELATIVE PERCENT: 21 % (ref 0–10)
MYELOCYTE PERCENT: 1 %
NEUTROPHILS ABSOLUTE: 4.6 K/UL (ref 1.5–7.5)
NEUTROPHILS RELATIVE PERCENT: 61 % (ref 50–65)
PDW BLD-RTO: 12.5 % (ref 11.5–14.5)
PLATELET # BLD: 119 K/UL (ref 130–400)
PLATELET SLIDE REVIEW: ABNORMAL
PMV BLD AUTO: 10.6 FL (ref 9.4–12.3)
POTASSIUM SERPL-SCNC: 4.3 MMOL/L (ref 3.5–5)
RBC # BLD: 4.63 M/UL (ref 4.2–5.4)
SODIUM BLD-SCNC: 142 MMOL/L (ref 136–145)
TOTAL PROTEIN: 7.6 G/DL (ref 6.6–8.7)
TSH SERPL DL<=0.05 MIU/L-ACNC: 1.24 UIU/ML (ref 0.27–4.2)
VITAMIN D 25-HYDROXY: 66.3 NG/ML
WBC # BLD: 7.2 K/UL (ref 4.8–10.8)

## 2021-03-22 PROCEDURE — 1090F PRES/ABSN URINE INCON ASSESS: CPT | Performed by: NURSE PRACTITIONER

## 2021-03-22 PROCEDURE — G8400 PT W/DXA NO RESULTS DOC: HCPCS | Performed by: NURSE PRACTITIONER

## 2021-03-22 PROCEDURE — G8484 FLU IMMUNIZE NO ADMIN: HCPCS | Performed by: NURSE PRACTITIONER

## 2021-03-22 PROCEDURE — 99213 OFFICE O/P EST LOW 20 MIN: CPT | Performed by: NURSE PRACTITIONER

## 2021-03-22 PROCEDURE — 4040F PNEUMOC VAC/ADMIN/RCVD: CPT | Performed by: NURSE PRACTITIONER

## 2021-03-22 PROCEDURE — 1123F ACP DISCUSS/DSCN MKR DOCD: CPT | Performed by: NURSE PRACTITIONER

## 2021-03-22 PROCEDURE — 1036F TOBACCO NON-USER: CPT | Performed by: NURSE PRACTITIONER

## 2021-03-22 PROCEDURE — 96372 THER/PROPH/DIAG INJ SC/IM: CPT | Performed by: NURSE PRACTITIONER

## 2021-03-22 PROCEDURE — G8417 CALC BMI ABV UP PARAM F/U: HCPCS | Performed by: NURSE PRACTITIONER

## 2021-03-22 PROCEDURE — G8427 DOCREV CUR MEDS BY ELIG CLIN: HCPCS | Performed by: NURSE PRACTITIONER

## 2021-03-22 RX ORDER — ONDANSETRON 4 MG/1
4 TABLET, FILM COATED ORAL DAILY PRN
Qty: 30 TABLET | Refills: 0 | Status: SHIPPED | OUTPATIENT
Start: 2021-03-22 | End: 2021-10-18

## 2021-03-22 RX ORDER — VALACYCLOVIR HYDROCHLORIDE 1 G/1
1000 TABLET, FILM COATED ORAL 3 TIMES DAILY
Qty: 21 TABLET | Refills: 0 | Status: SHIPPED | OUTPATIENT
Start: 2021-03-22 | End: 2021-03-29

## 2021-03-22 RX ORDER — METHYLPREDNISOLONE ACETATE 80 MG/ML
80 INJECTION, SUSPENSION INTRA-ARTICULAR; INTRALESIONAL; INTRAMUSCULAR; SOFT TISSUE ONCE
Status: COMPLETED | OUTPATIENT
Start: 2021-03-22 | End: 2021-03-22

## 2021-03-22 RX ADMIN — METHYLPREDNISOLONE ACETATE 80 MG: 80 INJECTION, SUSPENSION INTRA-ARTICULAR; INTRALESIONAL; INTRAMUSCULAR; SOFT TISSUE at 13:14

## 2021-03-22 ASSESSMENT — ENCOUNTER SYMPTOMS
SHORTNESS OF BREATH: 0
NAUSEA: 0
COLOR CHANGE: 0
STRIDOR: 0
TROUBLE SWALLOWING: 0
WHEEZING: 0
SORE THROAT: 0
CONSTIPATION: 0
EYE DISCHARGE: 0
BLOOD IN STOOL: 0
VOMITING: 0
DIARRHEA: 0
EYE ITCHING: 0
ABDOMINAL DISTENTION: 0
CHOKING: 0
COUGH: 0
ABDOMINAL PAIN: 0

## 2021-03-22 NOTE — PATIENT INSTRUCTIONS
1.  Shingles we will give her a steroid shot today okay IEM of Medrol also Valtrex 1000 mg 3 times a day for 7 days. Have also given them a prescription for compounded shingles begun to [facility]. I did advise them if she starts having pain to please let us know it is of note that she is having some abdominal pain which is likely related to the shingles and were not going to be able to do much about that I did give them some Zofran as well for the nausea.

## 2021-03-22 NOTE — PROGRESS NOTES
[address] [facility]  [address] [id number] [address] [address]  Dept: [phone]  Dept Fax: [phone]: [phone]    Lenin Rice (:  1945) is a 67 y.o. female,Established patient, here for evaluation of the following chief complaint(s): Rash (Patient has rash right side abdomen.)      Lenin Rice is a 67 y.o. female who presents today for her medical conditions/complaints as noted below. Lenin Rice is c/sam Rash (Patient has rash right side abdomen.)        HPI:     HPI   1. Shingles right thoracic dematone   She has been having abd pain and they thought it was just constipation but Sat they noticed a rash on her back and now around to the front   Chief Complaint   Patient presents with    Rash     Patient has rash right side abdomen.        Past Medical History:   Diagnosis Date    Atrial fibrillation ([facility]     Cancer ([facility]     bladder    Hyperlipidemia     Hypertension       Past Surgical History:   Procedure Laterality Date    APPENDECTOMY      BLADDER SURGERY      HYSTERECTOMY, VAGINAL      KIDNEY REMOVAL      TOTAL HIP ARTHROPLASTY         Vitals 3/22/2021 9/15/2020 2020 2019 2019 7699   SYSTOLIC 226 650 [phone]   DIASTOLIC 79 79 72 84 74 88   Pulse 70 90 70 72 58 73   Temp - - - - - -   Resp - - - 18 - 16   SpO2 - - 98 95 97 98   Weight - 250 lb - - 262 lb 275 lb   Height - 5' 9\" - - 5' 9\" 5' 8\"   Body mass index - 36.91 kg/m2 - - 38.69 kg/m2 41.81 kg/m2   Some recent data might be hidden       Family History   Problem Relation Age of Onset    Heart Disease Mother     Stroke Father     Cancer Sister        Social History     Tobacco Use    Smoking status: Never Smoker    Smokeless tobacco: Never Used   Substance Use Topics    Alcohol use: No      Current Outpatient Medications   Medication Sig Dispense Refill    valACYclovir (VALTREX) 1 g tablet Take 1 tablet by mouth 3 times daily for 7 days 21 tablet 0    ondansetron (ZOFRAN) 4 MG tablet Take 1 tablet by mouth daily as needed for Nausea or Vomiting 30 tablet 0    donepezil (ARICEPT) 5 MG tablet TAKE 1 TABLET BY MOUTH AT BEDTIME FOR MEMORY 30 tablet 3    Ostomy Supplies (CHAO-FIT NATURA STOMAHESIVE) WAFR USE AS DIRECTED 10 Wafer 11    Ostomy Supplies (CHAO-FIT NATURA UROSTOMY POUCH) Pouch MISC USE AS DIRECTED. 10 each 11    vitamin D (ERGOCALCIFEROL) 1.25 MG (12728 UT) CAPS capsule TAKE 1 CAPSULE BY MOUTH WEEKLY 4 capsule 3    warfarin (COUMADIN) 5 MG tablet TAKE AS DIRECTED 30 tablet 5    warfarin (COUMADIN) 7.5 MG tablet TAKE 1 TABLET BY MOUTH DAILY 30 tablet 5    methIMAzole (TAPAZOLE) 10 MG tablet TAKE 1 TABLET BY MOUTH EVERY DAY 30 tablet 3    atorvastatin (LIPITOR) 40 MG tablet Take 1 tablet by mouth daily 90 tablet 1    dilTIAZem (CARDIZEM CD) 180 MG extended release capsule Take 1 capsule by mouth daily 90 capsule 1    calcitRIOL (ROCALTROL) 0.25 MCG capsule TAKE ONE CAPSULE BY MOUTH DAILY. 30 capsule 4    metoprolol tartrate (LOPRESSOR) 50 MG tablet TAKE 1 TABLET BY MOUTH TWO TIMES A DAY (Patient taking differently: Take 25 mg by mouth 2 times daily TAKE 1 TABLET BY MOUTH TWO TIMES A DAY) 60 tablet 11    lisinopril (PRINIVIL;ZESTRIL) 10 MG tablet Take 10 mg by mouth      escitalopram (LEXAPRO) 10 MG tablet Take 1 tablet by mouth daily 30 tablet 3    furosemide (LASIX) 40 MG tablet TAKE 1 TABLET BY MOUTH EVERY DAY 60 tablet 3    ZEASORB-AF 2 % powder APPLY TOPICALLY EVERY 12  TWELVE  HOURS  0    acetaminophen (TYLENOL) 325 MG tablet Take 650 mg by mouth every 4 hours as needed for Pain      diclofenac sodium 1 % GEL Apply 2 g topically 4 times daily as needed for Pain      docusate sodium (COLACE) 100 MG capsule Take 100 mg by mouth 2 times daily as needed for Constipation      Ostomy Supplies (CHAO-FIT NATURA STOMAHESIVE) WAFR Use as directed 20 Wafer 11     No current facility-administered medications for this visit. Allergies   Allergen Reactions    Other Anaphylaxis and Itching     Cloth bandaids , causes redness of skin    Ciprofloxacin Itching    Codeine Itching    Perflutren Lipid Microsphere Other (See Comments)     Back pain    Tetanus Toxoids Itching     Itching around site    Tizanidine Hcl Itching    Tetanus Toxoid      Reaction: [id number] Maintenance   Topic Date Due    Hepatitis C screen  Never done    COVID-19 Vaccine (1) Never done    DEXA (modify frequency per FRAX score)  Never done    Shingles Vaccine (1 of 2) 09/15/2021 (Originally 4/13/1994)    Lipid screen  12/07/2021    Annual Wellness Visit (AWV)  12/16/2021    TSH testing  03/22/2022    Potassium monitoring  03/22/2022    Creatinine monitoring  03/22/2022    Flu vaccine  Completed    Pneumococcal 65+ years Vaccine  Completed    Hepatitis A vaccine  Aged Out    Hepatitis B vaccine  Aged Out    Hib vaccine  Aged Out    Meningococcal (ACWY) vaccine  Aged Out       Lab Results   Component Value Date    LABA1C 5.6 03/22/2021     No results found for: PSA, PSADIA  TSH   Date Value Ref Range Status   03/22/2021 1.240 0.270 - 4.200 uIU/mL Final   ]  Lab Results   Component Value Date     03/22/2021    K 4.3 03/22/2021     03/22/2021    CO2 28 03/22/2021    BUN 29 (H) 03/22/2021    CREATININE 1.4 (H) 03/22/2021    GLUCOSE 92 03/22/2021    CALCIUM 10.3 (H) 03/22/2021    PROT 7.6 03/22/2021    LABALBU 3.5 03/22/2021    BILITOT 0.9 03/22/2021    ALKPHOS 97 03/22/2021    AST 20 03/22/2021    ALT 21 03/22/2021    LABGLOM 36 (A) 03/22/2021    GFRAA 44 (L) 03/22/2021     Lab Results   Component Value Date    CHOL 97 (L) 12/07/2020    CHOL 142 (L) 02/26/2020    CHOL 148 (L) 08/23/2019     Lab Results   Component Value Date    TRIG 118 12/07/2020    TRIG 223 (H) 02/26/2020    TRIG 171 (H) 08/23/2019     Lab Results   Component Value Date    HDL 40 (L) 12/07/2020    HDL 39 (L) 02/26/2020    HDL 43 (L) 08/23/2019 Lab Results   Component Value Date    LDLCALC 33 12/07/2020    LDLCALC 58 02/26/2020    LDLCALC 71 08/23/2019     Lab Results   Component Value Date     03/22/2021    K 4.3 03/22/2021     03/22/2021    CO2 28 03/22/2021    BUN 29 03/22/2021    CREATININE 1.4 03/22/2021    GLUCOSE 92 03/22/2021    CALCIUM 10.3 03/22/2021      Lab Results   Component Value Date    WBC 7.2 03/22/2021    HGB 15.0 03/22/2021    HCT 47.5 (H) 03/22/2021    .6 (H) 03/22/2021     (L) 03/22/2021    LYMPHOPCT 4.0 (L) 03/22/2021    RBC 4.63 03/22/2021    MCH 32.4 (H) 03/22/2021    MCHC 31.6 (L) 03/22/2021    RDW 12.5 03/22/2021     Lab Results   Component Value Date    VITD25 66.3 03/22/2021       Subjective:      Review of Systems   Constitutional: Negative for fatigue, fever and unexpected weight change. HENT: Negative for ear discharge, ear pain, mouth sores, sore throat and trouble swallowing. Eyes: Negative for discharge, itching and visual disturbance. Respiratory: Negative for cough, choking, shortness of breath, wheezing and stridor. Cardiovascular: Negative for chest pain, palpitations and leg swelling. Gastrointestinal: Negative for abdominal distention, abdominal pain, blood in stool, constipation, diarrhea, nausea and vomiting. Endocrine: Negative for cold intolerance, polydipsia and polyuria. Genitourinary: Negative for difficulty urinating, dysuria, frequency and urgency. Musculoskeletal: Negative for arthralgias and gait problem. Skin: Positive for rash. Negative for color change. Allergic/Immunologic: Negative for food allergies and immunocompromised state. Neurological: Positive for weakness. Negative for dizziness, tremors, syncope, speech difficulty and headaches. Ataxia  Dementia   Hematological: Negative for adenopathy. Does not bruise/bleed easily. Psychiatric/Behavioral: Negative for confusion and hallucinations.        Objective:     Physical Exam  Constitutional: General: She is not in acute distress. Appearance: She is well-developed. HENT:      Head: Normocephalic and atraumatic. Eyes:      General: No scleral icterus. Right eye: No discharge. Left eye: No discharge. Pupils: Pupils are equal, round, and reactive to light. Neck:      Musculoskeletal: Normal range of motion and neck supple. Thyroid: No thyromegaly. Vascular: No JVD. Cardiovascular:      Rate and Rhythm: Normal rate and regular rhythm. Heart sounds: Normal heart sounds. No murmur. Pulmonary:      Effort: Pulmonary effort is normal. No respiratory distress. Breath sounds: Normal breath sounds. No wheezing or rales. Abdominal:      General: Bowel sounds are normal. There is no distension. Palpations: Abdomen is soft. There is no mass. Tenderness: There is no abdominal tenderness. There is no guarding or rebound. Musculoskeletal: Normal range of motion. General: No tenderness. Skin:     General: Skin is warm and dry. Findings: No erythema or rash. Comments: She has quite an impressive shingles in the right abdomen around to the back. There are various stages with a large blisters . She says she is not hurting at all which is quite impressive given the extent of her shingles. Neurological:      Mental Status: She is alert and oriented to person, place, and time. Cranial Nerves: No cranial nerve deficit. Motor: Weakness present. Coordination: Coordination normal.      Deep Tendon Reflexes: Reflexes are normal and symmetric. Reflexes normal.   Psychiatric:         Mood and Affect: Mood is not depressed. Behavior: Behavior normal.         Thought Content: Thought content normal.         Judgment: Judgment normal.       /79   Pulse 70     Assessment:       Diagnosis Orders   1. Disseminated herpes zoster     2. Health care maintenance  Hepatitis C Antibody   3.  At high risk for falls Plan:        Patient given educational materials - see patient instructions. Discussed use, benefit, and side effects of prescribed medications. Allpatient questions answered. Pt voiced understanding. Reviewed health maintenance. Instructed to continue current medications, diet and exercise. Patient agreed with treatment plan. Follow up as directed. MEDICATIONS:  Orders Placed This Encounter   Medications    valACYclovir (VALTREX) 1 g tablet     Sig: Take 1 tablet by mouth 3 times daily for 7 days     Dispense:  21 tablet     Refill:  0    methylPREDNISolone acetate (DEPO-MEDROL) injection 80 mg    ondansetron (ZOFRAN) 4 MG tablet     Sig: Take 1 tablet by mouth daily as needed for Nausea or Vomiting     Dispense:  30 tablet     Refill:  0         ORDERS:  Orders Placed This Encounter   Procedures    Hepatitis C Antibody       Follow-up:  Return for keep fu appt, have labs done prior to appt. PATIENT INSTRUCTIONS:  Patient Instructions   1. Shingles we will give her a steroid shot today okay IEM of Medrol also Valtrex 1000 mg 3 times a day for 7 days. Have also given them a prescription for compounded shingles begun to [facility]. I did advise them if she starts having pain to please let us know it is of note that she is having some abdominal pain which is likely related to the shingles and were not going to be able to do much about that I did give them some Zofran as well for the nausea. Electronically signed by MARIELOS Owens on 3/22/2021 at 4:19 PM        EMRDragon/transcription disclaimer:  Much of this encounter note is electronic transcription/translation of spoken language to printed texts. The electronic translation of spoken language may be erroneous, or at times,nonsensical words or phrases may be inadvertently transcribed.   Although I have reviewed the note for such errors, some may still exist.  On the basis of positive falls risk screening, assessment and plan is as follows: home safety tips provided.

## 2021-03-24 ENCOUNTER — ANTI-COAG VISIT (OUTPATIENT)
Dept: INTERNAL MEDICINE | Age: 76
End: 2021-03-24
Payer: MEDICARE

## 2021-03-24 ENCOUNTER — TELEPHONE (OUTPATIENT)
Dept: INTERNAL MEDICINE | Age: 76
End: 2021-03-24

## 2021-03-24 DIAGNOSIS — Z86.718 HISTORY OF DVT (DEEP VEIN THROMBOSIS): ICD-10-CM

## 2021-03-24 DIAGNOSIS — Z79.01 LONG TERM CURRENT USE OF ANTICOAGULANT: ICD-10-CM

## 2021-03-24 LAB — INR BLD: 1.8

## 2021-03-24 PROCEDURE — 93793 ANTICOAG MGMT PT WARFARIN: CPT | Performed by: NURSE PRACTITIONER

## 2021-03-24 RX ORDER — ACYCLOVIR 400 MG/1
400 TABLET ORAL
Qty: 50 TABLET | Refills: 0 | Status: SHIPPED | OUTPATIENT
Start: 2021-03-24 | End: 2021-04-03

## 2021-03-24 NOTE — PROGRESS NOTES
HOME MONITORING REPORT    INR today:   Results for orders placed or performed in visit on 03/24/21   Protime-INR   Result Value Ref Range    INR 1.80        INR Goal: 2.0-3.0    Dosing Plan  As of 3/24/2021    TTR:  53.2 % (2.7 y)   Full warfarin instructions:  3/24: 10 mg; Otherwise 7.5 mg every Sun, Mon, Thu; 5 mg all other days              PLAN: Advised patient/caregiver to increase tonight's dose to 10 mg, then continue current dose and recheck in one week. Patient/Caregiver voiced understanding  I have reviewed nursing plan for Coumadin management and agree with plan.

## 2021-03-24 NOTE — TELEPHONE ENCOUNTER
Daughter called stating since starting Valtrex she has developed a rash/hives.  Per Alyx send in acyclovir 400 mg 5 times daily

## 2021-03-25 ENCOUNTER — VIRTUAL VISIT (OUTPATIENT)
Dept: INTERNAL MEDICINE | Age: 76
End: 2021-03-25
Payer: MEDICARE

## 2021-03-25 DIAGNOSIS — E55.9 VITAMIN D DEFICIENCY: ICD-10-CM

## 2021-03-25 DIAGNOSIS — I10 ESSENTIAL (PRIMARY) HYPERTENSION: ICD-10-CM

## 2021-03-25 DIAGNOSIS — Z98.890 H/O URETEROSTOMY: ICD-10-CM

## 2021-03-25 DIAGNOSIS — E78.49 OTHER HYPERLIPIDEMIA: ICD-10-CM

## 2021-03-25 DIAGNOSIS — F02.81 ALZHEIMER'S DEMENTIA WITH BEHAVIORAL DISTURBANCE, UNSPECIFIED TIMING OF DEMENTIA ONSET: ICD-10-CM

## 2021-03-25 DIAGNOSIS — I50.32 CHRONIC DIASTOLIC HEART FAILURE (HCC): ICD-10-CM

## 2021-03-25 DIAGNOSIS — F33.41 RECURRENT MAJOR DEPRESSIVE DISORDER, IN PARTIAL REMISSION (HCC): ICD-10-CM

## 2021-03-25 DIAGNOSIS — N18.31 STAGE 3A CHRONIC KIDNEY DISEASE (HCC): ICD-10-CM

## 2021-03-25 DIAGNOSIS — E66.01 MORBIDLY OBESE (HCC): ICD-10-CM

## 2021-03-25 DIAGNOSIS — Z85.51 HISTORY OF BLADDER CANCER: ICD-10-CM

## 2021-03-25 DIAGNOSIS — E05.90 HYPERTHYROIDISM: ICD-10-CM

## 2021-03-25 DIAGNOSIS — I82.502 CHRONIC DEEP VEIN THROMBOSIS (DVT) OF LEFT LOWER EXTREMITY, UNSPECIFIED VEIN (HCC): ICD-10-CM

## 2021-03-25 DIAGNOSIS — G30.9 ALZHEIMER'S DEMENTIA WITH BEHAVIORAL DISTURBANCE, UNSPECIFIED TIMING OF DEMENTIA ONSET: ICD-10-CM

## 2021-03-25 DIAGNOSIS — I48.20 CHRONIC ATRIAL FIBRILLATION (HCC): ICD-10-CM

## 2021-03-25 DIAGNOSIS — B02.8 HERPES ZOSTER WITH COMPLICATION: Primary | ICD-10-CM

## 2021-03-25 PROBLEM — F02.818 ALZHEIMER'S DEMENTIA WITH BEHAVIORAL DISTURBANCE (HCC): Status: ACTIVE | Noted: 2021-03-25

## 2021-03-25 PROCEDURE — 1090F PRES/ABSN URINE INCON ASSESS: CPT | Performed by: NURSE PRACTITIONER

## 2021-03-25 PROCEDURE — 99214 OFFICE O/P EST MOD 30 MIN: CPT | Performed by: NURSE PRACTITIONER

## 2021-03-25 PROCEDURE — G8428 CUR MEDS NOT DOCUMENT: HCPCS | Performed by: NURSE PRACTITIONER

## 2021-03-25 PROCEDURE — G8484 FLU IMMUNIZE NO ADMIN: HCPCS | Performed by: NURSE PRACTITIONER

## 2021-03-25 PROCEDURE — 1123F ACP DISCUSS/DSCN MKR DOCD: CPT | Performed by: NURSE PRACTITIONER

## 2021-03-25 PROCEDURE — G8400 PT W/DXA NO RESULTS DOC: HCPCS | Performed by: NURSE PRACTITIONER

## 2021-03-25 PROCEDURE — G8417 CALC BMI ABV UP PARAM F/U: HCPCS | Performed by: NURSE PRACTITIONER

## 2021-03-25 PROCEDURE — 1036F TOBACCO NON-USER: CPT | Performed by: NURSE PRACTITIONER

## 2021-03-25 PROCEDURE — 4040F PNEUMOC VAC/ADMIN/RCVD: CPT | Performed by: NURSE PRACTITIONER

## 2021-03-25 ASSESSMENT — ENCOUNTER SYMPTOMS
EYE ITCHING: 0
SHORTNESS OF BREATH: 0
BLOOD IN STOOL: 0
WHEEZING: 0
TROUBLE SWALLOWING: 0
CONSTIPATION: 0
ABDOMINAL PAIN: 0
COUGH: 0
ABDOMINAL DISTENTION: 0
SORE THROAT: 0
STRIDOR: 0
DIARRHEA: 0
CHOKING: 0
NAUSEA: 1
EYE DISCHARGE: 0
COLOR CHANGE: 0
VOMITING: 1

## 2021-03-25 NOTE — PROGRESS NOTES
nephrologist on Calcitrol GFR is 39  Chief Complaint   Patient presents with    Hypertension       Past Medical History:   Diagnosis Date    Atrial fibrillation ([facility]     Cancer ([facility]     bladder    Hyperlipidemia     Hypertension       Past Surgical History:   Procedure Laterality Date    APPENDECTOMY      BLADDER SURGERY      HYSTERECTOMY, VAGINAL      KIDNEY REMOVAL      TOTAL HIP ARTHROPLASTY         Vitals 3/22/2021 9/15/2020 2/26/2020 8/23/2019 5/22/2019 8/8/3860   SYSTOLIC [phone]   DIASTOLIC 79 79 72 84 74 88   Pulse 70 90 70 72 58 73   Temp - - - - - -   Resp - - - 18 - 16   SpO2 - - 98 95 97 98   Weight - 250 lb - - 262 lb 275 lb   Height - 5' 9\" - - 5' 9\" 5' 8\"   Body mass index - 36.91 kg/m2 - - 38.69 kg/m2 41.81 kg/m2   Some recent data might be hidden       Family History   Problem Relation Age of Onset    Heart Disease Mother     Stroke Father     Cancer Sister        Social History     Tobacco Use    Smoking status: Never Smoker    Smokeless tobacco: Never Used   Substance Use Topics    Alcohol use: No      Current Outpatient Medications   Medication Sig Dispense Refill    acyclovir (ZOVIRAX) 400 MG tablet Take 1 tablet by mouth 5 times daily for 10 days 50 tablet 0    valACYclovir (VALTREX) 1 g tablet Take 1 tablet by mouth 3 times daily for 7 days 21 tablet 0    ondansetron (ZOFRAN) 4 MG tablet Take 1 tablet by mouth daily as needed for Nausea or Vomiting 30 tablet 0    donepezil (ARICEPT) 5 MG tablet TAKE 1 TABLET BY MOUTH AT BEDTIME FOR MEMORY 30 tablet 3    Ostomy Supplies (CHAO-FIT NATURA STOMAHESIVE) WAFR USE AS DIRECTED 10 Wafer 11    Ostomy Supplies (CHAO-FIT NATURA UROSTOMY POUCH) Pouch MISC USE AS DIRECTED.  10 each 11    vitamin D (ERGOCALCIFEROL) 1.25 MG (43552 UT) CAPS capsule TAKE 1 CAPSULE BY MOUTH WEEKLY 4 capsule 3    warfarin (COUMADIN) 5 MG tablet TAKE AS DIRECTED 30 tablet 5    warfarin (COUMADIN) 7.5 MG tablet TAKE 1 TABLET BY MOUTH DAILY 30 tablet 5    methIMAzole (TAPAZOLE) 10 MG tablet TAKE 1 TABLET BY MOUTH EVERY DAY 30 tablet 3    atorvastatin (LIPITOR) 40 MG tablet Take 1 tablet by mouth daily 90 tablet 1    dilTIAZem (CARDIZEM CD) 180 MG extended release capsule Take 1 capsule by mouth daily 90 capsule 1    calcitRIOL (ROCALTROL) 0.25 MCG capsule TAKE ONE CAPSULE BY MOUTH DAILY. 30 capsule 4    metoprolol tartrate (LOPRESSOR) 50 MG tablet TAKE 1 TABLET BY MOUTH TWO TIMES A DAY (Patient taking differently: Take 25 mg by mouth 2 times daily TAKE 1 TABLET BY MOUTH TWO TIMES A DAY) 60 tablet 11    lisinopril (PRINIVIL;ZESTRIL) 10 MG tablet Take 10 mg by mouth      escitalopram (LEXAPRO) 10 MG tablet Take 1 tablet by mouth daily 30 tablet 3    furosemide (LASIX) 40 MG tablet TAKE 1 TABLET BY MOUTH EVERY DAY 60 tablet 3    ZEASORB-AF 2 % powder APPLY TOPICALLY EVERY 12  TWELVE  HOURS  0    acetaminophen (TYLENOL) 325 MG tablet Take 650 mg by mouth every 4 hours as needed for Pain      diclofenac sodium 1 % GEL Apply 2 g topically 4 times daily as needed for Pain      docusate sodium (COLACE) 100 MG capsule Take 100 mg by mouth 2 times daily as needed for Constipation      Ostomy Supplies (CHAO-FIT NATURA STOMAHESIVE) WAFR Use as directed 20 Wafer 11     No current facility-administered medications for this visit.       Allergies   Allergen Reactions    Other Anaphylaxis and Itching     Cloth bandaids , causes redness of skin    Ciprofloxacin Itching    Codeine Itching    Perflutren Lipid Microsphere Other (See Comments)     Back pain    Tetanus Toxoids Itching     Itching around site    Tizanidine Hcl Itching    Tetanus Toxoid      Reaction: [facility] Maintenance   Topic Date Due    COVID-19 Vaccine (1) Never done    DEXA (modify frequency per FRAX score)  Never done    Shingles Vaccine (1 of 2) 09/15/2021 (Originally 4/13/1994)    Lipid screen  12/07/2021    Annual Wellness Visit (AWV)  12/16/2021    TSH Assessment:       Diagnosis Orders   1. Herpes zoster with complication     2. Alzheimer's dementia with behavioral disturbance, unspecified timing of dementia onset ([facility]     3. Chronic diastolic heart failure ([facility]     4. Recurrent major depressive disorder, in partial remission ([facility]     5. Morbidly obese ([facility]     6. Chronic deep vein thrombosis (DVT) of left lower extremity, unspecified vein (HCC)     7. Essential (primary) hypertension     8. Chronic atrial fibrillation (HCC)     9. Hyperthyroidism     10. Stage 3a chronic kidney disease     11. History of bladder cancer     12. H/O ureterostomy     13. Other hyperlipidemia       Labs reviewed from 3/22/2021    Plan:        Patient given educational materials - see patient instructions. Discussed use, benefit, and side effects of prescribed medications. Allpatient questions answered. Pt voiced understanding. Reviewed health maintenance. Instructed to continue current medications, diet and exercise. Patient agreed with treatment plan. Follow up as directed. MEDICATIONS:  No orders of the defined types were placed in this encounter. ORDERS:  No orders of the defined types were placed in this encounter. Follow-up:  Return in about 3 months (around 6/25/2021) for have labs done prior to appt. PATIENT INSTRUCTIONS:  Patient Instructions   Shingles he did  the acyclovir and the shingles begun cream for comfort  2. Hypertension stable current dose of meds  3. Chronic atrial fib on chronic Coumadin therapy continue with Cardizem as well  4. Dementia continue with Aricept 5 mg daily  5. Depression stable with current dose of Lexapro daily  6. Morbid obesity we will just monitor this for now  7. Chronic DVT she is on chronic Coumadin therapy  8. Hypothyroidism stable on current dose of Tapazole  9.   History of bladder cancer stable    Electronically signed by MARIELOS Ayoub on 3/25/2021 at 10:16 AM    @ EMRDragon/transcription disclaimer:  Much of this encounter note is electronic transcription/translation of spoken language to printed texts. The electronic translation of spoken language may be erroneous, or at times,nonsensical words or phrases may be inadvertently transcribed.   Although I have reviewed the note for such errors, some may still exist.

## 2021-03-25 NOTE — PATIENT INSTRUCTIONS
Shingles he did  the acyclovir and the shingles begun cream for comfort  2. Hypertension stable current dose of meds  3. Chronic atrial fib on chronic Coumadin therapy continue with Cardizem as well  4. Dementia continue with Aricept 5 mg daily  5. Depression stable with current dose of Lexapro daily  6. Morbid obesity we will just monitor this for now  7. Chronic DVT she is on chronic Coumadin therapy  8. Hypothyroidism stable on current dose of Tapazole  9.   History of bladder cancer stable with urostomy bag no changes are necessary

## 2021-04-20 NOTE — TELEPHONE ENCOUNTER
Iván Overall called requesting a refill of the below medication which has been pended for you:     Requested Prescriptions     Pending Prescriptions Disp Refills    calcitRIOL (ROCALTROL) 0.25 MCG capsule [Pharmacy Med Name: CALCITRIOL 0.25 MCG CAPS 0.25 Capsule] 30 capsule 4     Sig: TAKE 1 CAPSULE BY MOUTH DAILY    methIMAzole (TAPAZOLE) 10 MG tablet [Pharmacy Med Name: METHIMAZOLE 10 MG TABS 10 Tablet] 30 tablet 3     Sig: TAKE 1 TABLET BY MOUTH EVERY DAY       Last Appointment Date: 3/25/2021  Next Appointment Date: 6/28/2021    Allergies   Allergen Reactions    Other Anaphylaxis and Itching     Cloth bandaids , causes redness of skin    Ciprofloxacin Itching    Codeine Itching    Perflutren Lipid Microsphere Other (See Comments)     Back pain    Tetanus Toxoids Itching     Itching around site    Tizanidine Hcl Itching    Tetanus Toxoid      Reaction: [id number]

## 2021-04-21 RX ORDER — CALCITRIOL 0.25 UG/1
CAPSULE, LIQUID FILLED ORAL
Qty: 30 CAPSULE | Refills: 4 | Status: SHIPPED | OUTPATIENT
Start: 2021-04-21 | End: 2021-08-25

## 2021-04-21 RX ORDER — METHIMAZOLE 10 MG/1
TABLET ORAL
Qty: 30 TABLET | Refills: 3 | Status: SHIPPED | OUTPATIENT
Start: 2021-04-21 | End: 2021-10-04

## 2021-04-27 ENCOUNTER — ANTI-COAG VISIT (OUTPATIENT)
Dept: INTERNAL MEDICINE | Age: 76
End: 2021-04-27
Payer: MEDICARE

## 2021-04-27 DIAGNOSIS — Z86.718 HISTORY OF DVT (DEEP VEIN THROMBOSIS): ICD-10-CM

## 2021-04-27 DIAGNOSIS — Z79.01 LONG TERM CURRENT USE OF ANTICOAGULANT: ICD-10-CM

## 2021-04-27 LAB — INR BLD: 1.5

## 2021-04-27 PROCEDURE — 93793 ANTICOAG MGMT PT WARFARIN: CPT | Performed by: NURSE PRACTITIONER

## 2021-04-27 NOTE — PROGRESS NOTES
HOME MONITORING REPORT    INR today:   Results for orders placed or performed in visit on 04/27/21   Protime-INR   Result Value Ref Range    INR 1.50        INR Goal: 2.0-3.0    Dosing Plan  As of 4/27/2021    TTR:  51.4 % (2.8 y)   Full warfarin instructions:  4/27: 7.5 mg; Otherwise 7.5 mg every Sun, Mon, Thu; 5 mg all other days            I have reviewed nursing plan for Coumadin management and agree with plan. PLAN: Advised patient/caregiver to increase her dose tonight to 10 mg and tomorrow to 7.5 mg, then continue current dose and recheck in one week.   Patient/Caregiver voiced understanding

## 2021-05-04 ENCOUNTER — ANTI-COAG VISIT (OUTPATIENT)
Dept: INTERNAL MEDICINE | Age: 76
End: 2021-05-04
Payer: MEDICARE

## 2021-05-04 DIAGNOSIS — Z86.718 HISTORY OF DVT (DEEP VEIN THROMBOSIS): ICD-10-CM

## 2021-05-04 DIAGNOSIS — Z79.01 LONG TERM (CURRENT) USE OF ANTICOAGULANTS: ICD-10-CM

## 2021-05-04 LAB — INR BLD: 1.8

## 2021-05-04 PROCEDURE — 93793 ANTICOAG MGMT PT WARFARIN: CPT | Performed by: NURSE PRACTITIONER

## 2021-05-04 NOTE — PROGRESS NOTES
HOME MONITORING REPORT    INR today:   Results for orders placed or performed in visit on 05/04/21   Protime-INR   Result Value Ref Range    INR 1.80        INR Goal: 2.0-3.0    Dosing Plan  As of 5/4/2021    TTR:  51.1 % (2.8 y)   Full warfarin instructions:  5/4: 7.5 mg; 5/8: 7.5 mg; Otherwise 7.5 mg every Sun, Mon, Thu; 5 mg all other days              PLAN: Advised patient/caregiver to increase her dose to 7.5 mg tonight and again on Saturday. This weeks dose will be 5 mg on Wednesday and Friday and 7.5 mg all other days. Recheck in one week. Patient/Caregiver voiced understanding  I have reviewed nursing plan for Coumadin management and agree with plan.

## 2021-05-12 ENCOUNTER — ANTI-COAG VISIT (OUTPATIENT)
Dept: INTERNAL MEDICINE | Age: 76
End: 2021-05-12
Payer: MEDICARE

## 2021-05-12 DIAGNOSIS — Z79.01 MONITORING FOR LONG-TERM ANTICOAGULANT USE: ICD-10-CM

## 2021-05-12 DIAGNOSIS — Z86.718 HISTORY OF DVT (DEEP VEIN THROMBOSIS): ICD-10-CM

## 2021-05-12 DIAGNOSIS — Z51.81 MONITORING FOR LONG-TERM ANTICOAGULANT USE: ICD-10-CM

## 2021-05-12 LAB — INR BLD: 1.9

## 2021-05-12 PROCEDURE — 93793 ANTICOAG MGMT PT WARFARIN: CPT | Performed by: NURSE PRACTITIONER

## 2021-05-20 ENCOUNTER — ANTI-COAG VISIT (OUTPATIENT)
Dept: INTERNAL MEDICINE | Age: 76
End: 2021-05-20
Payer: MEDICARE

## 2021-05-20 DIAGNOSIS — Z79.01 LONG TERM CURRENT USE OF ANTICOAGULANT: ICD-10-CM

## 2021-05-20 DIAGNOSIS — Z86.718 HISTORY OF DVT (DEEP VEIN THROMBOSIS): Primary | ICD-10-CM

## 2021-05-20 LAB — INR BLD: 2

## 2021-05-20 PROCEDURE — 93793 ANTICOAG MGMT PT WARFARIN: CPT | Performed by: NURSE PRACTITIONER

## 2021-05-20 NOTE — PROGRESS NOTES
HOME MONITORING REPORT    INR today:   Results for orders placed or performed in visit on 05/20/21   Protime-INR   Result Value Ref Range    INR 2.00        INR Goal: 2.0-3.0    Dosing Plan  As of 5/20/2021    TTR:  50.3 % (2.8 y)   Full warfarin instructions:  5 mg every Wed, Fri; 7.5 mg all other days              PLAN: Advised patient/caregiver to increase her dose this Friday to 7.5 mg, then continue current dose and recheck in one week. Patient/Caregiver voiced understanding  I have reviewed nursing plan for Coumadin management and agree with plan.

## 2021-05-28 ENCOUNTER — ANTI-COAG VISIT (OUTPATIENT)
Dept: INTERNAL MEDICINE | Age: 76
End: 2021-05-28
Payer: MEDICARE

## 2021-05-28 ENCOUNTER — TELEPHONE (OUTPATIENT)
Dept: INTERNAL MEDICINE | Age: 76
End: 2021-05-28

## 2021-05-28 DIAGNOSIS — Z86.718 HISTORY OF DVT (DEEP VEIN THROMBOSIS): Primary | ICD-10-CM

## 2021-05-28 LAB — INR BLD: 2.6

## 2021-05-28 PROCEDURE — 93793 ANTICOAG MGMT PT WARFARIN: CPT | Performed by: INTERNAL MEDICINE

## 2021-05-28 NOTE — TELEPHONE ENCOUNTER
Dr. January Ahumada on call. 5/28 INR 2.6. Target range 2-3. Current dose 5mg Wed/Fri and 7.5mg all other days. Please advise.

## 2021-05-28 NOTE — PROGRESS NOTES
HOME MONITORING REPORT    INR today:   Results for orders placed or performed in visit on 05/28/21   Protime-INR   Result Value Ref Range    INR 2.60        INR Goal: 2.0-3.0    Dosing Plan  As of 5/28/2021    TTR:  50.7 % (2.9 y)   Full warfarin instructions:  5 mg every Wed, Fri; 7.5 mg all other days              PLAN: Advised patient/caregiver to continue current dose and recheck in one week. Patient/Caregiver voiced understanding    I have reviewed nursing plan for Coumadin management and agree with plan.

## 2021-06-03 ENCOUNTER — ANTI-COAG VISIT (OUTPATIENT)
Dept: INTERNAL MEDICINE | Age: 76
End: 2021-06-03
Payer: MEDICARE

## 2021-06-03 DIAGNOSIS — Z86.718 HISTORY OF DVT (DEEP VEIN THROMBOSIS): Primary | ICD-10-CM

## 2021-06-03 DIAGNOSIS — Z79.01 LONG TERM CURRENT USE OF ANTICOAGULANT: ICD-10-CM

## 2021-06-03 LAB — INR BLD: 2.1

## 2021-06-03 PROCEDURE — 93793 ANTICOAG MGMT PT WARFARIN: CPT | Performed by: NURSE PRACTITIONER

## 2021-06-04 RX ORDER — FUROSEMIDE 40 MG/1
TABLET ORAL
Qty: 90 TABLET | Refills: 4 | OUTPATIENT
Start: 2021-06-04

## 2021-06-04 NOTE — TELEPHONE ENCOUNTER
Yanna aCrson called requesting a refill of the below medication which has been pended for you:     Requested Prescriptions     Pending Prescriptions Disp Refills    furosemide (LASIX) 40 MG tablet [Pharmacy Med Name: FUROSEMIDE 40 MG TABS 40 Tablet] 90 tablet 4     Sig: TAKE 1 TABLET BY MOUTH EVERY DAY       Last Appointment Date: 3/25/2021  Next Appointment Date: 6/28/2021    Allergies   Allergen Reactions    Other Anaphylaxis and Itching     Cloth bandaids , causes redness of skin    Ciprofloxacin Itching    Codeine Itching    Perflutren Lipid Microsphere Other (See Comments)     Back pain    Tetanus Toxoids Itching     Itching around site    Tizanidine Hcl Itching    Tetanus Toxoid      Reaction: [address]

## 2021-06-07 RX ORDER — FUROSEMIDE 40 MG/1
TABLET ORAL
Qty: 90 TABLET | Refills: 4 | Status: SHIPPED | OUTPATIENT
Start: 2021-06-07 | End: 2022-06-20

## 2021-06-10 ENCOUNTER — ANTI-COAG VISIT (OUTPATIENT)
Dept: INTERNAL MEDICINE | Age: 76
End: 2021-06-10
Payer: MEDICARE

## 2021-06-10 DIAGNOSIS — Z86.718 HISTORY OF DVT (DEEP VEIN THROMBOSIS): Primary | ICD-10-CM

## 2021-06-10 LAB — INR BLD: 3.8

## 2021-06-10 PROCEDURE — 93793 ANTICOAG MGMT PT WARFARIN: CPT | Performed by: NURSE PRACTITIONER

## 2021-06-10 NOTE — PROGRESS NOTES
HOME MONITORING REPORT    INR today:   Results for orders placed or performed in visit on 06/10/21   Protime-INR   Result Value Ref Range    INR 3.80        INR Goal: 2.0-3.0    Dosing Plan  As of 6/10/2021    TTR:  51.0 % (2.9 y)   Full warfarin instructions:  6/10: Hold; 6/12: 5 mg; Otherwise 5 mg every Wed, Fri; 7.5 mg all other days              PLAN: Advised patient/caregiver to hold dose tonight and reduce Saturday dose to 5 mg, then continue current dose and recheck in one week. Patient/Caregiver voiced understanding  I have reviewed nursing plan for Coumadin management and agree with plan.

## 2021-06-17 ENCOUNTER — ANTI-COAG VISIT (OUTPATIENT)
Dept: PRIMARY CARE CLINIC | Age: 76
End: 2021-06-17

## 2021-06-17 DIAGNOSIS — Z86.718 HISTORY OF DVT (DEEP VEIN THROMBOSIS): Primary | ICD-10-CM

## 2021-06-17 DIAGNOSIS — I48.20 CHRONIC ATRIAL FIBRILLATION (HCC): ICD-10-CM

## 2021-06-17 LAB — INR BLD: 2.7

## 2021-06-17 PROCEDURE — 93793 ANTICOAG MGMT PT WARFARIN: CPT | Performed by: NURSE PRACTITIONER

## 2021-06-17 RX ORDER — ERGOCALCIFEROL 1.25 MG/1
CAPSULE ORAL
Qty: 4 CAPSULE | Refills: 3 | Status: SHIPPED | OUTPATIENT
Start: 2021-06-17 | End: 2021-11-08

## 2021-06-17 NOTE — TELEPHONE ENCOUNTER
Andria Manrique called requesting a refill of the below medication which has been pended for you:     Requested Prescriptions     Pending Prescriptions Disp Refills    vitamin D (ERGOCALCIFEROL) 1.25 MG (51336 UT) CAPS capsule [Pharmacy Med Name: VIT D2 1.25 MG (50,000 UNIT 1.25 MG Capsule] 4 capsule 3     Sig: TAKE 1 CAPSULE BY MOUTH WEEKLY       Last Appointment Date: 3/25/2021  Next Appointment Date: 6/28/2021    Allergies   Allergen Reactions    Other Anaphylaxis and Itching     Cloth bandaids , causes redness of skin    Ciprofloxacin Itching    Codeine Itching    Perflutren Lipid Microsphere Other (See Comments)     Back pain    Tetanus Toxoids Itching     Itching around site    Tizanidine Hcl Itching    Tetanus Toxoid      Reaction: ITCHING AROUND SITE

## 2021-06-22 RX ORDER — DILTIAZEM HYDROCHLORIDE 180 MG/1
180 CAPSULE, COATED, EXTENDED RELEASE ORAL DAILY
Qty: 30 CAPSULE | Refills: 1 | Status: SHIPPED | OUTPATIENT
Start: 2021-06-22 | End: 2021-08-25

## 2021-06-22 NOTE — TELEPHONE ENCOUNTER
Nae Haddad called requesting a refill of the below medication which has been pended for you:     Requested Prescriptions     Pending Prescriptions Disp Refills    dilTIAZem (CARDIZEM CD) 180 MG extended release capsule [Pharmacy Med Name: DILTIAZEM HCL ER 180MG COAT 180 Capsule] 30 capsule 1     Sig: TAKE 1 CAPSULE BY MOUTH DAILY       Last Appointment Date: 3/25/2021  Next Appointment Date: 6/28/2021    Allergies   Allergen Reactions    Other Anaphylaxis and Itching     Cloth bandaids , causes redness of skin    Ciprofloxacin Itching    Codeine Itching    Perflutren Lipid Microsphere Other (See Comments)     Back pain    Tetanus Toxoids Itching     Itching around site    Tizanidine Hcl Itching    Tetanus Toxoid      Reaction: [phone]

## 2021-06-24 ENCOUNTER — ANTI-COAG VISIT (OUTPATIENT)
Dept: PRIMARY CARE CLINIC | Age: 76
End: 2021-06-24

## 2021-06-24 DIAGNOSIS — Z86.718 HISTORY OF DVT (DEEP VEIN THROMBOSIS): Primary | ICD-10-CM

## 2021-06-24 DIAGNOSIS — Z79.01 CHRONIC ANTICOAGULATION: ICD-10-CM

## 2021-06-24 PROBLEM — G47.33 OSA (OBSTRUCTIVE SLEEP APNEA): Status: ACTIVE | Noted: 2017-11-20

## 2021-06-24 PROBLEM — E55.9 VITAMIN D DEFICIENCY: Status: ACTIVE | Noted: 2017-11-20

## 2021-06-24 PROBLEM — I50.33 ACUTE ON CHRONIC DIASTOLIC HEART FAILURE (HCC): Status: ACTIVE | Noted: 2017-11-20

## 2021-06-24 PROBLEM — S72.009A CLOSED FRACTURE OF HIP (HCC): Status: ACTIVE | Noted: 2020-09-21

## 2021-06-24 PROBLEM — E05.90 HYPERTHYROIDISM: Status: ACTIVE | Noted: 2017-11-20

## 2021-06-24 PROBLEM — E66.9 OBESITY (BMI 30-39.9): Status: ACTIVE | Noted: 2017-11-20

## 2021-06-24 LAB — INR BLD: 2.6

## 2021-06-24 PROCEDURE — 93793 ANTICOAG MGMT PT WARFARIN: CPT | Performed by: NURSE PRACTITIONER

## 2021-06-24 NOTE — PROGRESS NOTES
HOME MONITORING REPORT    INR today:   Results for orders placed or performed in visit on 06/24/21   Protime-INR   Result Value Ref Range    INR 2.60        INR Goal: 2.0-3.0    Dosing Plan  As of 6/24/2021    TTR:  51.1 % (2.9 y)   Full warfarin instructions:  5 mg every Wed, Fri; 7.5 mg all other days              PLAN: Advised patient/caregiver to continue current dose and recheck in one week. Patient/Caregiver voiced understanding    I have reviewed nursing plan for Coumadin management and agree with plan.

## 2021-06-28 ENCOUNTER — OFFICE VISIT (OUTPATIENT)
Dept: INTERNAL MEDICINE | Age: 76
End: 2021-06-28

## 2021-06-28 VITALS
HEIGHT: 68 IN | OXYGEN SATURATION: 95 % | WEIGHT: 212 LBS | SYSTOLIC BLOOD PRESSURE: 132 MMHG | BODY MASS INDEX: 32.13 KG/M2 | HEART RATE: 78 BPM | DIASTOLIC BLOOD PRESSURE: 72 MMHG

## 2021-06-28 DIAGNOSIS — Z98.890 H/O URETEROSTOMY: ICD-10-CM

## 2021-06-28 DIAGNOSIS — F02.81 ALZHEIMER'S DEMENTIA WITH BEHAVIORAL DISTURBANCE, UNSPECIFIED TIMING OF DEMENTIA ONSET: ICD-10-CM

## 2021-06-28 DIAGNOSIS — I82.502 CHRONIC DEEP VEIN THROMBOSIS (DVT) OF LEFT LOWER EXTREMITY, UNSPECIFIED VEIN (HCC): ICD-10-CM

## 2021-06-28 DIAGNOSIS — E78.2 MIXED HYPERLIPIDEMIA: ICD-10-CM

## 2021-06-28 DIAGNOSIS — E55.9 VITAMIN D DEFICIENCY: ICD-10-CM

## 2021-06-28 DIAGNOSIS — Z85.51 HISTORY OF BLADDER CANCER: ICD-10-CM

## 2021-06-28 DIAGNOSIS — E05.90 HYPERTHYROIDISM: ICD-10-CM

## 2021-06-28 DIAGNOSIS — I48.91 ATRIAL FIBRILLATION, UNSPECIFIED TYPE (HCC): Primary | ICD-10-CM

## 2021-06-28 DIAGNOSIS — I10 ESSENTIAL (PRIMARY) HYPERTENSION: ICD-10-CM

## 2021-06-28 DIAGNOSIS — G30.9 ALZHEIMER'S DEMENTIA WITH BEHAVIORAL DISTURBANCE, UNSPECIFIED TIMING OF DEMENTIA ONSET: ICD-10-CM

## 2021-06-28 LAB
ALBUMIN SERPL-MCNC: 3.7 G/DL (ref 3.5–5.2)
ALP BLD-CCNC: 89 U/L (ref 35–104)
ALT SERPL-CCNC: 14 U/L (ref 5–33)
ANION GAP SERPL CALCULATED.3IONS-SCNC: 9 MMOL/L (ref 7–19)
AST SERPL-CCNC: 18 U/L (ref 5–32)
BASOPHILS ABSOLUTE: 0 K/UL (ref 0–0.2)
BASOPHILS RELATIVE PERCENT: 0.2 % (ref 0–1)
BILIRUB SERPL-MCNC: 0.8 MG/DL (ref 0.2–1.2)
BUN BLDV-MCNC: 35 MG/DL (ref 8–23)
CALCIUM SERPL-MCNC: 11 MG/DL (ref 8.8–10.2)
CHLORIDE BLD-SCNC: 103 MMOL/L (ref 98–111)
CHOLESTEROL, TOTAL: 104 MG/DL (ref 160–199)
CO2: 31 MMOL/L (ref 22–29)
CREAT SERPL-MCNC: 1.5 MG/DL (ref 0.5–0.9)
EOSINOPHILS ABSOLUTE: 0.2 K/UL (ref 0–0.6)
EOSINOPHILS RELATIVE PERCENT: 2.3 % (ref 0–5)
GFR AFRICAN AMERICAN: 41
GFR NON-AFRICAN AMERICAN: 34
GLUCOSE BLD-MCNC: 97 MG/DL (ref 74–109)
HCT VFR BLD CALC: 43.6 % (ref 37–47)
HDLC SERPL-MCNC: 43 MG/DL (ref 65–121)
HEMOGLOBIN: 14.6 G/DL (ref 12–16)
IMMATURE GRANULOCYTES #: 0 K/UL
LDL CHOLESTEROL CALCULATED: 38 MG/DL
LYMPHOCYTES ABSOLUTE: 1 K/UL (ref 1.1–4.5)
LYMPHOCYTES RELATIVE PERCENT: 12.3 % (ref 20–40)
MCH RBC QN AUTO: 34 PG (ref 27–31)
MCHC RBC AUTO-ENTMCNC: 33.5 G/DL (ref 33–37)
MCV RBC AUTO: 101.6 FL (ref 81–99)
MONOCYTES ABSOLUTE: 1 K/UL (ref 0–0.9)
MONOCYTES RELATIVE PERCENT: 12.1 % (ref 0–10)
NEUTROPHILS ABSOLUTE: 5.9 K/UL (ref 1.5–7.5)
NEUTROPHILS RELATIVE PERCENT: 72.7 % (ref 50–65)
PDW BLD-RTO: 12.4 % (ref 11.5–14.5)
PLATELET # BLD: 154 K/UL (ref 130–400)
PMV BLD AUTO: 10.7 FL (ref 9.4–12.3)
POTASSIUM SERPL-SCNC: 4.1 MMOL/L (ref 3.5–5)
RBC # BLD: 4.29 M/UL (ref 4.2–5.4)
SODIUM BLD-SCNC: 143 MMOL/L (ref 136–145)
TOTAL PROTEIN: 7.4 G/DL (ref 6.6–8.7)
TRIGL SERPL-MCNC: 116 MG/DL (ref 0–149)
TSH SERPL DL<=0.05 MIU/L-ACNC: 1.39 UIU/ML (ref 0.27–4.2)
VITAMIN D 25-HYDROXY: 49.3 NG/ML
WBC # BLD: 8.2 K/UL (ref 4.8–10.8)

## 2021-06-28 PROCEDURE — G8400 PT W/DXA NO RESULTS DOC: HCPCS | Performed by: NURSE PRACTITIONER

## 2021-06-28 PROCEDURE — 4040F PNEUMOC VAC/ADMIN/RCVD: CPT | Performed by: NURSE PRACTITIONER

## 2021-06-28 PROCEDURE — 1090F PRES/ABSN URINE INCON ASSESS: CPT | Performed by: NURSE PRACTITIONER

## 2021-06-28 PROCEDURE — 1036F TOBACCO NON-USER: CPT | Performed by: NURSE PRACTITIONER

## 2021-06-28 PROCEDURE — 1123F ACP DISCUSS/DSCN MKR DOCD: CPT | Performed by: NURSE PRACTITIONER

## 2021-06-28 PROCEDURE — G8417 CALC BMI ABV UP PARAM F/U: HCPCS | Performed by: NURSE PRACTITIONER

## 2021-06-28 PROCEDURE — 99214 OFFICE O/P EST MOD 30 MIN: CPT | Performed by: NURSE PRACTITIONER

## 2021-06-28 PROCEDURE — G8427 DOCREV CUR MEDS BY ELIG CLIN: HCPCS | Performed by: NURSE PRACTITIONER

## 2021-06-28 RX ORDER — BUSPIRONE HYDROCHLORIDE 5 MG/1
5 TABLET ORAL 3 TIMES DAILY
Qty: 90 TABLET | Refills: 0 | Status: SHIPPED | OUTPATIENT
Start: 2021-06-28 | End: 2021-08-23

## 2021-06-28 ASSESSMENT — ENCOUNTER SYMPTOMS
SHORTNESS OF BREATH: 0
VOMITING: 0
TROUBLE SWALLOWING: 0
COLOR CHANGE: 0
WHEEZING: 0
ABDOMINAL PAIN: 0
NAUSEA: 0
COUGH: 0
BLOOD IN STOOL: 0
DIARRHEA: 0
STRIDOR: 0
EYE ITCHING: 0
CHOKING: 0
CONSTIPATION: 0
ABDOMINAL DISTENTION: 0
SORE THROAT: 0
EYE DISCHARGE: 0

## 2021-06-28 NOTE — PROGRESS NOTES
- 36.91 kg/m2 - - 38.69 kg/m2   Some recent data might be hidden       Family History   Problem Relation Age of Onset    Heart Disease Mother     Stroke Father     Cancer Sister        Social History     Tobacco Use    Smoking status: Never Smoker    Smokeless tobacco: Never Used   Substance Use Topics    Alcohol use: No      Current Outpatient Medications   Medication Sig Dispense Refill    busPIRone (BUSPAR) 5 MG tablet Take 1 tablet by mouth 3 times daily 90 tablet 0    dilTIAZem (CARDIZEM CD) 180 MG extended release capsule TAKE 1 CAPSULE BY MOUTH DAILY 30 capsule 1    vitamin D (ERGOCALCIFEROL) 1.25 MG (80011 UT) CAPS capsule TAKE 1 CAPSULE BY MOUTH WEEKLY 4 capsule 3    furosemide (LASIX) 40 MG tablet TAKE 1 TABLET BY MOUTH EVERY DAY 90 tablet 4    calcitRIOL (ROCALTROL) 0.25 MCG capsule TAKE 1 CAPSULE BY MOUTH DAILY 30 capsule 4    methIMAzole (TAPAZOLE) 10 MG tablet TAKE 1 TABLET BY MOUTH EVERY DAY 30 tablet 3    ondansetron (ZOFRAN) 4 MG tablet Take 1 tablet by mouth daily as needed for Nausea or Vomiting 30 tablet 0    donepezil (ARICEPT) 5 MG tablet TAKE 1 TABLET BY MOUTH AT BEDTIME FOR MEMORY 30 tablet 3    Ostomy Supplies (CHAO-FIT NATURA STOMAHESIVE) WAFR USE AS DIRECTED 10 Wafer 11    Ostomy Supplies (CHAO-FIT NATURA UROSTOMY POUCH) Pouch MISC USE AS DIRECTED.  10 each 11    warfarin (COUMADIN) 5 MG tablet TAKE AS DIRECTED 30 tablet 5    warfarin (COUMADIN) 7.5 MG tablet TAKE 1 TABLET BY MOUTH DAILY 30 tablet 5    atorvastatin (LIPITOR) 40 MG tablet Take 1 tablet by mouth daily 90 tablet 1    metoprolol tartrate (LOPRESSOR) 50 MG tablet TAKE 1 TABLET BY MOUTH TWO TIMES A DAY (Patient taking differently: Take 25 mg by mouth 2 times daily TAKE 1 TABLET BY MOUTH TWO TIMES A DAY) 60 tablet 11    lisinopril (PRINIVIL;ZESTRIL) 10 MG tablet Take 10 mg by mouth      escitalopram (LEXAPRO) 10 MG tablet Take 1 tablet by mouth daily 30 tablet 3    ZEASORB-AF 2 % powder APPLY TOPICALLY EVERY 12  TWELVE  HOURS  0    acetaminophen (TYLENOL) 325 MG tablet Take 650 mg by mouth every 4 hours as needed for Pain      docusate sodium (COLACE) 100 MG capsule Take 100 mg by mouth 2 times daily as needed for Constipation      Ostomy Supplies (CHAO-FIT NATURA STOMAHESIVE) WAFR Use as directed 20 Wafer 11     No current facility-administered medications for this visit.      Allergies   Allergen Reactions    Other Anaphylaxis and Itching     Cloth bandaids , causes redness of skin    Ciprofloxacin Itching    Codeine Itching    Perflutren Lipid Microsphere Other (See Comments)     Back pain    Tetanus Toxoids Itching     Itching around site    Tizanidine Hcl Itching    Tetanus Toxoid      Reaction: [id number] [facility] Maintenance   Topic Date Due    Shingles Vaccine (1 of 2) 09/15/2021 (Originally 4/13/1995)    DEXA (modify frequency per FRAX score)  06/28/2022 (Originally 4/13/1999)    COVID-19 Vaccine (1) 07/11/2022 (Originally 4/13/1957)    Lipid screen  12/07/2021    Annual Wellness Visit (AWV)  12/16/2021    TSH testing  03/22/2022    Potassium monitoring  03/22/2022    Creatinine monitoring  03/22/2022    Flu vaccine  Completed    Pneumococcal 65+ years Vaccine  Completed    Hepatitis C screen  Completed    Hepatitis A vaccine  Aged Out    Hepatitis B vaccine  Aged Out    Hib vaccine  Aged Out    Meningococcal (ACWY) vaccine  Aged Out       Lab Results   Component Value Date    LABA1C 5.6 03/22/2021     No results found for: PSA, PSADIA  TSH   Date Value Ref Range Status   03/22/2021 1.240 0.270 - 4.200 uIU/mL Final   ]  Lab Results   Component Value Date     03/22/2021    K 4.3 03/22/2021     03/22/2021    CO2 28 03/22/2021    BUN 29 (H) 03/22/2021    CREATININE 1.4 (H) 03/22/2021    GLUCOSE 92 03/22/2021    CALCIUM 10.3 (H) 03/22/2021    PROT 7.6 03/22/2021    LABALBU 3.5 03/22/2021    BILITOT 0.9 03/22/2021    ALKPHOS 97 03/22/2021    AST 20 03/22/2021    ALT 21 03/22/2021    LABGLOM 36 (A) 03/22/2021    GFRAA 44 (L) 03/22/2021     Lab Results   Component Value Date    CHOL 97 (L) 12/07/2020    CHOL 142 (L) 02/26/2020    CHOL 148 (L) 08/23/2019     Lab Results   Component Value Date    TRIG 118 12/07/2020    TRIG 223 (H) 02/26/2020    TRIG 171 (H) 08/23/2019     Lab Results   Component Value Date    HDL 40 (L) 12/07/2020    HDL 39 (L) 02/26/2020    HDL 43 (L) 08/23/2019     Lab Results   Component Value Date    LDLCALC 33 12/07/2020    LDLCALC 58 02/26/2020    LDLCALC 71 08/23/2019     Lab Results   Component Value Date     03/22/2021    K 4.3 03/22/2021     03/22/2021    CO2 28 03/22/2021    BUN 29 03/22/2021    CREATININE 1.4 03/22/2021    GLUCOSE 92 03/22/2021    CALCIUM 10.3 03/22/2021      Lab Results   Component Value Date    WBC 8.2 06/28/2021    HGB 14.6 06/28/2021    HCT 43.6 06/28/2021    .6 (H) 06/28/2021     06/28/2021    LYMPHOPCT 12.3 (L) 06/28/2021    RBC 4.29 06/28/2021    MCH 34.0 (H) 06/28/2021    MCHC 33.5 06/28/2021    RDW 12.4 06/28/2021     Lab Results   Component Value Date    VITD25 66.3 03/22/2021     Labs reviewed from  Didn't get labs for today     Subjective:      Review of Systems   Constitutional: Negative for fatigue, fever and unexpected weight change. HENT: Negative for ear discharge, ear pain, mouth sores, sore throat and trouble swallowing. Eyes: Negative for discharge, itching and visual disturbance. Respiratory: Negative for cough, choking, shortness of breath, wheezing and stridor. Cardiovascular: Negative for chest pain, palpitations and leg swelling. Gastrointestinal: Negative for abdominal distention, abdominal pain, blood in stool, constipation, diarrhea, nausea and vomiting. Endocrine: Negative for cold intolerance, polydipsia and polyuria. Genitourinary: Negative for difficulty urinating, dysuria, frequency and urgency. Musculoskeletal: Negative for arthralgias and gait problem. months (around 9/28/2021) for labs day of appt . PATIENT INSTRUCTIONS:  Patient Instructions     Problem List     Alzheimer's dementia with behavioral disturbance (HCC)    Relevant Medications    escitalopram (LEXAPRO) 10 MG tablet    donepezil (ARICEPT) 5 MG tablet    busPIRone (BUSPAR) 5 MG tablet    Atrial fibrillation (HCC) - Primary    Chronic deep vein thrombosis (DVT) of left lower extremity (HCC)    H/O ureterostomy    Relevant Medications    Ostomy Supplies (CHAO-FIT NATURA STOMAHESIVE) WAFR    Ostomy Supplies (CHAO-FIT NATURA UROSTOMY POUCH) Pouch MISC    History of bladder cancer    Relevant Medications    Ostomy Supplies (CHAO-FIT NATURA STOMAHESIVE) WAFR    Ostomy Supplies (CHAO-FIT NATURA UROSTOMY POUCH) Pouch MISC    Mixed hyperlipidemia    Relevant Medications    lisinopril (PRINIVIL;ZESTRIL) 10 MG tablet    metoprolol tartrate (LOPRESSOR) 50 MG tablet    atorvastatin (LIPITOR) 40 MG tablet    warfarin (COUMADIN) 5 MG tablet    warfarin (COUMADIN) 7.5 MG tablet    furosemide (LASIX) 40 MG tablet    dilTIAZem (CARDIZEM CD) 180 MG extended release capsule    Other Relevant Orders    Lipid Panel    Vitamin D deficiency        Electronically signed by MARIELOS Mitchell on 6/28/2021 at 12:41 PM        EMRDragon/transcription disclaimer:  Much of this encounter note is electronic transcription/translation of spoken language to printed texts. The electronic translation of spoken language may be erroneous, or at times,nonsensical words or phrases may be inadvertently transcribed.   Although I have reviewed the note for such errors, some may still exist.

## 2021-06-28 NOTE — PATIENT INSTRUCTIONS
Problem List     Alzheimer's dementia with behavioral disturbance (HCC)    Relevant Medications    escitalopram (LEXAPRO) 10 MG tablet    donepezil (ARICEPT) 5 MG tablet    busPIRone (BUSPAR) 5 MG tablet    Atrial fibrillation (HCC) - Primary    Chronic deep vein thrombosis (DVT) of left lower extremity (HCC)    H/O ureterostomy    Relevant Medications    Ostomy Supplies (CHAO-FIT NATURA STOMAHESIVE) WAFR    Ostomy Supplies (CHAO-FIT NATURA UROSTOMY POUCH) Pouch MISC    History of bladder cancer    Relevant Medications    Ostomy Supplies (CHAO-FIT NATURA STOMAHESIVE) WAFR    Ostomy Supplies (CHAO-FIT NATURA UROSTOMY POUCH) Pouch MISC    Mixed hyperlipidemia    Relevant Medications    lisinopril (PRINIVIL;ZESTRIL) 10 MG tablet    metoprolol tartrate (LOPRESSOR) 50 MG tablet    atorvastatin (LIPITOR) 40 MG tablet    warfarin (COUMADIN) 5 MG tablet    warfarin (COUMADIN) 7.5 MG tablet    furosemide (LASIX) 40 MG tablet    dilTIAZem (CARDIZEM CD) 180 MG extended release capsule    Other Relevant Orders    Lipid Panel    Vitamin D deficiency

## 2021-07-02 RX ORDER — DONEPEZIL HYDROCHLORIDE 5 MG/1
TABLET, FILM COATED ORAL
Qty: 30 TABLET | Refills: 3 | Status: SHIPPED | OUTPATIENT
Start: 2021-07-02 | End: 2022-01-10

## 2021-07-02 RX ORDER — ATORVASTATIN CALCIUM 40 MG/1
40 TABLET, FILM COATED ORAL DAILY
Qty: 30 TABLET | Refills: 1 | Status: SHIPPED | OUTPATIENT
Start: 2021-07-02 | End: 2021-09-16

## 2021-07-06 ENCOUNTER — ANTI-COAG VISIT (OUTPATIENT)
Dept: PRIMARY CARE CLINIC | Age: 76
End: 2021-07-06

## 2021-07-06 DIAGNOSIS — Z86.718 HISTORY OF DVT (DEEP VEIN THROMBOSIS): Primary | ICD-10-CM

## 2021-07-06 LAB — INR BLD: 3.6

## 2021-07-06 PROCEDURE — 93793 ANTICOAG MGMT PT WARFARIN: CPT | Performed by: NURSE PRACTITIONER

## 2021-07-06 NOTE — PROGRESS NOTES
HOME MONITORING REPORT    INR today:   Results for orders placed or performed in visit on 07/06/21   Protime-INR   Result Value Ref Range    INR 3.60        INR Goal: 2.0-3.0    Dosing Plan  As of 7/6/2021    TTR:  51.0 % (3 y)   Full warfarin instructions:  7/6: 2.5 mg; Otherwise 5 mg every Wed, Fri; 7.5 mg all other days              PLAN: Advised patient/caregiver to decrease her dose tonight only to 2.5 mg, then continue current dose and recheck in one week. Patient/Caregiver voiced understanding    I have reviewed nursing plan for Coumadin management and agree with plan.

## 2021-07-28 ENCOUNTER — ANTI-COAG VISIT (OUTPATIENT)
Dept: INTERNAL MEDICINE | Age: 76
End: 2021-07-28

## 2021-07-28 DIAGNOSIS — Z86.718 HISTORY OF DVT (DEEP VEIN THROMBOSIS): Primary | ICD-10-CM

## 2021-07-28 LAB — INR BLD: 3.2

## 2021-07-29 ENCOUNTER — ANTI-COAG VISIT (OUTPATIENT)
Dept: PRIMARY CARE CLINIC | Age: 76
End: 2021-07-29
Payer: MEDICARE

## 2021-07-29 DIAGNOSIS — Z79.01 CHRONIC ANTICOAGULATION: ICD-10-CM

## 2021-07-29 LAB — INR BLD: 3.2

## 2021-07-29 PROCEDURE — 93793 ANTICOAG MGMT PT WARFARIN: CPT | Performed by: NURSE PRACTITIONER

## 2021-07-29 NOTE — PROGRESS NOTES
HOME MONITORING REPORT    INR today:   Results for orders placed or performed in visit on 07/29/21   Protime-INR   Result Value Ref Range    INR 3.20    Protime-INR   Result Value Ref Range    INR 3.20        INR Goal: 2.0-3.0    Dosing Plan  As of 7/29/2021    TTR:  49.9 % (3 y)   Full warfarin instructions:  5 mg every Wed, Fri; 7.5 mg all other days              PLAN: caregiver had her hold her 7.5 mg dose last night. She will restart today and  continue current dose and recheck in one week. Patient/Caregiver voiced understanding      I have reviewed nursing plan for Coumadin management and agree with plan.

## 2021-08-05 ENCOUNTER — ANTI-COAG VISIT (OUTPATIENT)
Dept: PRIMARY CARE CLINIC | Age: 76
End: 2021-08-05
Payer: MEDICARE

## 2021-08-05 DIAGNOSIS — Z79.01 CHRONIC ANTICOAGULATION: ICD-10-CM

## 2021-08-05 DIAGNOSIS — Z86.718 HISTORY OF DVT (DEEP VEIN THROMBOSIS): Primary | ICD-10-CM

## 2021-08-05 LAB — INR BLD: 2

## 2021-08-05 PROCEDURE — 93793 ANTICOAG MGMT PT WARFARIN: CPT | Performed by: NURSE PRACTITIONER

## 2021-08-05 NOTE — PROGRESS NOTES
HOME MONITORING REPORT    INR today:   Results for orders placed or performed in visit on 08/05/21   Protime-INR   Result Value Ref Range    INR 2.00        INR Goal: 2.0-3.0    Dosing Plan  As of 8/5/2021    TTR:  50.1 % (3.1 y)   Full warfarin instructions:  5 mg every Wed, Fri; 7.5 mg all other days              PLAN: Advised patient/caregiver to continue current dose and recheck in one week. Patient/Caregiver voiced understanding  I have reviewed nursing plan for Coumadin management and agree with plan.

## 2021-08-12 ENCOUNTER — ANTI-COAG VISIT (OUTPATIENT)
Dept: PRIMARY CARE CLINIC | Age: 76
End: 2021-08-12
Payer: MEDICARE

## 2021-08-12 DIAGNOSIS — Z79.01 CHRONIC ANTICOAGULATION: ICD-10-CM

## 2021-08-12 LAB — INR BLD: 2.5

## 2021-08-12 PROCEDURE — 93793 ANTICOAG MGMT PT WARFARIN: CPT | Performed by: NURSE PRACTITIONER

## 2021-08-12 NOTE — PROGRESS NOTES
HOME MONITORING REPORT    INR today:   Results for orders placed or performed in visit on 08/12/21   Protime-INR   Result Value Ref Range    INR 2.50        INR Goal: 2.0-3.0    Dosing Plan  As of 8/12/2021    TTR:  50.5 % (3.1 y)   Full warfarin instructions:  5 mg every Wed, Fri; 7.5 mg all other days              PLAN: Patient aware to continue current dose and recheck in one week or as ordered. I have reviewed nursing plan for Coumadin management and agree with plan.

## 2021-08-19 NOTE — TELEPHONE ENCOUNTER
Justice Ingram called requesting a refill of the below medication which has been pended for you:     Requested Prescriptions     Pending Prescriptions Disp Refills    busPIRone (BUSPAR) 5 MG tablet [Pharmacy Med Name: BUSPIRONE HCL 5 MG TABS 5 Tablet] 90 tablet 0     Sig: TAKE 1 TABLET BY MOUTH 3 TIMES DAILY       Last Appointment Date: 6/28/2021  Next Appointment Date: 9/28/2021    Allergies   Allergen Reactions    Other Anaphylaxis and Itching     Cloth bandaids , causes redness of skin    Ciprofloxacin Itching    Codeine Itching    Perflutren Lipid Microsphere Other (See Comments)     Back pain    Tetanus Toxoids Itching     Itching around site    Tizanidine Hcl Itching    Tetanus Toxoid      Reaction: [phone]

## 2021-08-23 RX ORDER — BUSPIRONE HYDROCHLORIDE 5 MG/1
5 TABLET ORAL 3 TIMES DAILY
Qty: 90 TABLET | Refills: 0 | Status: SHIPPED | OUTPATIENT
Start: 2021-08-23 | End: 2021-09-16

## 2021-08-25 RX ORDER — DILTIAZEM HYDROCHLORIDE 180 MG/1
180 CAPSULE, COATED, EXTENDED RELEASE ORAL DAILY
Qty: 30 CAPSULE | Refills: 1 | Status: SHIPPED | OUTPATIENT
Start: 2021-08-25 | End: 2021-10-20

## 2021-08-25 RX ORDER — CALCITRIOL 0.25 UG/1
CAPSULE, LIQUID FILLED ORAL
Qty: 30 CAPSULE | Refills: 4 | Status: SHIPPED | OUTPATIENT
Start: 2021-08-25 | End: 2022-02-25 | Stop reason: SDUPTHER

## 2021-08-26 ENCOUNTER — ANTI-COAG VISIT (OUTPATIENT)
Dept: PRIMARY CARE CLINIC | Age: 76
End: 2021-08-26
Payer: MEDICARE

## 2021-08-26 DIAGNOSIS — Z79.01 CHRONIC ANTICOAGULATION: ICD-10-CM

## 2021-08-26 LAB — INR BLD: 2.3

## 2021-08-26 PROCEDURE — 93793 ANTICOAG MGMT PT WARFARIN: CPT | Performed by: NURSE PRACTITIONER

## 2021-08-26 NOTE — PROGRESS NOTES
HOME MONITORING REPORT    INR today:   Results for orders placed or performed in visit on 08/26/21   Protime-INR   Result Value Ref Range    INR 2.30        INR Goal: 2.0-3.0    Dosing Plan  As of 8/26/2021    TTR:  51.1 % (3.1 y)   Full warfarin instructions:  5 mg every Wed, Fri; 7.5 mg all other days              PLAN: Advised patient/caregiver to continue current dose and recheck in one week. Patient/Caregiver voiced understanding  I have reviewed nursing plan for Coumadin management and agree with plan.

## 2021-09-07 ENCOUNTER — ANTI-COAG VISIT (OUTPATIENT)
Dept: PRIMARY CARE CLINIC | Age: 76
End: 2021-09-07
Payer: MEDICARE

## 2021-09-07 DIAGNOSIS — Z86.718 HISTORY OF DVT (DEEP VEIN THROMBOSIS): Primary | ICD-10-CM

## 2021-09-07 LAB — INR BLD: 2.2

## 2021-09-07 PROCEDURE — 93793 ANTICOAG MGMT PT WARFARIN: CPT | Performed by: NURSE PRACTITIONER

## 2021-09-07 NOTE — PROGRESS NOTES
HOME MONITORING REPORT    INR today:   Results for orders placed or performed in visit on 09/07/21   Protime-INR   Result Value Ref Range    INR 2.20        INR Goal: 2.0-3.0    Dosing Plan  As of 9/7/2021    TTR:  51.6 % (3.1 y)   Full warfarin instructions:  5 mg every Wed, Fri; 7.5 mg all other days              PLAN: Advised patient/caregiver to continue current dose and recheck in one week. Patient/Caregiver voiced understanding      I have reviewed nursing plan for Coumadin management and agree with plan.

## 2021-09-14 ENCOUNTER — ANTI-COAG VISIT (OUTPATIENT)
Dept: PRIMARY CARE CLINIC | Age: 76
End: 2021-09-14
Payer: MEDICARE

## 2021-09-14 DIAGNOSIS — Z79.01 CHRONIC ANTICOAGULATION: ICD-10-CM

## 2021-09-14 LAB — INR BLD: 1.8

## 2021-09-14 PROCEDURE — 93793 ANTICOAG MGMT PT WARFARIN: CPT | Performed by: NURSE PRACTITIONER

## 2021-09-14 NOTE — PROGRESS NOTES
HOME MONITORING REPORT    INR today:   Results for orders placed or performed in visit on 09/14/21   Protime-INR   Result Value Ref Range    INR 1.80        INR Goal: 2.0-3.0    Dosing Plan  As of 9/14/2021    TTR:  51.6 % (3.2 y)   Full warfarin instructions:  9/15: 7.5 mg; Otherwise 5 mg every Wed, Fri; 7.5 mg all other days              PLAN: Advised patient/caregiver to increase tonight's dose to 10 mg then continue current dose and recheck in one week. Patient/Caregiver voiced understanding  I have reviewed nursing plan for Coumadin management and agree with plan.

## 2021-09-16 RX ORDER — ATORVASTATIN CALCIUM 40 MG/1
40 TABLET, FILM COATED ORAL DAILY
Qty: 30 TABLET | Refills: 1 | Status: SHIPPED | OUTPATIENT
Start: 2021-09-16 | End: 2021-11-15

## 2021-09-16 RX ORDER — BUSPIRONE HYDROCHLORIDE 5 MG/1
5 TABLET ORAL 3 TIMES DAILY
Qty: 90 TABLET | Refills: 0 | Status: SHIPPED | OUTPATIENT
Start: 2021-09-16 | End: 2021-10-18

## 2021-09-16 NOTE — TELEPHONE ENCOUNTER
Lakhwinder Castaneda called requesting a refill of the below medication which has been pended for you:     Requested Prescriptions     Pending Prescriptions Disp Refills    busPIRone (BUSPAR) 5 MG tablet [Pharmacy Med Name: BUSPIRONE HCL 5 MG TABS 5 Tablet] 90 tablet 0     Sig: TAKE 1 TABLET BY MOUTH 3 TIMES DAILY    atorvastatin (LIPITOR) 40 MG tablet [Pharmacy Med Name: ATORVASTATIN 40 MG TABLET 40 Tablet] 30 tablet 1     Sig: TAKE 1 TABLET BY MOUTH DAILY       Last Appointment Date: 6/28/2021  Next Appointment Date: 9/29/2021    Allergies   Allergen Reactions    Other Anaphylaxis and Itching     Cloth bandaids , causes redness of skin    Ciprofloxacin Itching    Codeine Itching    Perflutren Lipid Microsphere Other (See Comments)     Back pain    Tetanus Toxoids Itching     Itching around site    Tizanidine Hcl Itching    Tetanus Toxoid      Reaction: [phone]

## 2021-10-04 RX ORDER — METHIMAZOLE 10 MG/1
TABLET ORAL
Qty: 30 TABLET | Refills: 3 | Status: SHIPPED | OUTPATIENT
Start: 2021-10-04 | End: 2022-04-13

## 2021-10-07 NOTE — TELEPHONE ENCOUNTER
Post Anesthesia Note





- EVALUATION WITHIN 48HRS OF ANESTHETIC


Vital Signs in Normal Range: Yes


Patient Participated in Evaluation: Yes


Respiratory Function Stable: Yes


Airway Patent: Yes


Cardiovascular Function Stable: Yes


Hydration Status Stable: Yes


Pain Control Satisfactory: Yes


Nausea and Vomiting Control Satisfactory: Yes


Mental Status Recovered: Yes


Vital Signs: 


                                Last Vital Signs











Temp  36.2 C   10/07/21 12:08


 


Pulse  89   10/07/21 12:08


 


Resp  10 L  10/07/21 12:30


 


BP  136/102 H  10/07/21 12:30


 


Pulse Ox  96   10/07/21 12:30 Pt has VV AWV on 12/15 and needs lab orders faxed to home care at [phone]. No orders currently in chart for me to fax.

## 2021-10-11 ENCOUNTER — ANTI-COAG VISIT (OUTPATIENT)
Dept: PRIMARY CARE CLINIC | Age: 76
End: 2021-10-11
Payer: MEDICARE

## 2021-10-11 DIAGNOSIS — Z86.718 HISTORY OF DVT (DEEP VEIN THROMBOSIS): Primary | ICD-10-CM

## 2021-10-11 LAB — INR BLD: 2.9

## 2021-10-11 PROCEDURE — 93793 ANTICOAG MGMT PT WARFARIN: CPT | Performed by: NURSE PRACTITIONER

## 2021-10-11 NOTE — PROGRESS NOTES
HOME MONITORING REPORT    INR today:   Results for orders placed or performed in visit on 10/11/21   Protime-INR   Result Value Ref Range    INR 2.90        INR Goal: 2.0-3.0    Dosing Plan  As of 10/11/2021    TTR:  52.3 % (3.2 y)   Full warfarin instructions:  5 mg every Wed, Fri; 7.5 mg all other days              PLAN: Patient aware to continue current dose and recheck in one week or as ordered. I have reviewed nursing plan for Coumadin management and agree with plan.

## 2021-10-12 RX ORDER — METOPROLOL TARTRATE 50 MG/1
TABLET, FILM COATED ORAL
Qty: 180 TABLET | Refills: 4 | OUTPATIENT
Start: 2021-10-12

## 2021-10-18 ENCOUNTER — ANTI-COAG VISIT (OUTPATIENT)
Dept: PRIMARY CARE CLINIC | Age: 76
End: 2021-10-18
Payer: MEDICARE

## 2021-10-18 ENCOUNTER — OFFICE VISIT (OUTPATIENT)
Dept: INTERNAL MEDICINE | Age: 76
End: 2021-10-18
Payer: MEDICARE

## 2021-10-18 VITALS
BODY MASS INDEX: 32.88 KG/M2 | OXYGEN SATURATION: 97 % | WEIGHT: 222 LBS | HEIGHT: 69 IN | TEMPERATURE: 98 F | DIASTOLIC BLOOD PRESSURE: 96 MMHG | HEART RATE: 64 BPM | SYSTOLIC BLOOD PRESSURE: 154 MMHG

## 2021-10-18 DIAGNOSIS — I10 ESSENTIAL HYPERTENSION: ICD-10-CM

## 2021-10-18 DIAGNOSIS — Z86.718 HISTORY OF DVT (DEEP VEIN THROMBOSIS): Primary | ICD-10-CM

## 2021-10-18 DIAGNOSIS — I10 ESSENTIAL HYPERTENSION: Primary | ICD-10-CM

## 2021-10-18 DIAGNOSIS — E55.9 VITAMIN D DEFICIENCY: ICD-10-CM

## 2021-10-18 DIAGNOSIS — H91.90 HEARING LOSS, UNSPECIFIED HEARING LOSS TYPE, UNSPECIFIED LATERALITY: ICD-10-CM

## 2021-10-18 DIAGNOSIS — F05 SUNDOWN SYNDROME: ICD-10-CM

## 2021-10-18 DIAGNOSIS — E78.2 MIXED HYPERLIPIDEMIA: ICD-10-CM

## 2021-10-18 DIAGNOSIS — F41.9 ANXIETY: ICD-10-CM

## 2021-10-18 LAB
ALBUMIN SERPL-MCNC: 4.1 G/DL (ref 3.5–5.2)
ALP BLD-CCNC: 85 U/L (ref 35–104)
ALT SERPL-CCNC: 20 U/L (ref 5–33)
ANION GAP SERPL CALCULATED.3IONS-SCNC: 13 MMOL/L (ref 7–19)
AST SERPL-CCNC: 21 U/L (ref 5–32)
BASOPHILS ABSOLUTE: 0 K/UL (ref 0–0.2)
BASOPHILS RELATIVE PERCENT: 0.3 % (ref 0–1)
BILIRUB SERPL-MCNC: 0.4 MG/DL (ref 0.2–1.2)
BUN BLDV-MCNC: 41 MG/DL (ref 8–23)
CALCIUM SERPL-MCNC: 11.1 MG/DL (ref 8.8–10.2)
CHLORIDE BLD-SCNC: 109 MMOL/L (ref 98–111)
CHOLESTEROL, TOTAL: 105 MG/DL (ref 160–199)
CO2: 22 MMOL/L (ref 22–29)
CREAT SERPL-MCNC: 1.4 MG/DL (ref 0.5–0.9)
EOSINOPHILS ABSOLUTE: 0.2 K/UL (ref 0–0.6)
EOSINOPHILS RELATIVE PERCENT: 3 % (ref 0–5)
GFR AFRICAN AMERICAN: 44
GFR NON-AFRICAN AMERICAN: 36
GLUCOSE BLD-MCNC: 92 MG/DL (ref 74–109)
HCT VFR BLD CALC: 45.3 % (ref 37–47)
HDLC SERPL-MCNC: 41 MG/DL (ref 65–121)
HEMOGLOBIN: 14.1 G/DL (ref 12–16)
IMMATURE GRANULOCYTES #: 0 K/UL
INR BLD: 2.6
LDL CHOLESTEROL CALCULATED: 37 MG/DL
LYMPHOCYTES ABSOLUTE: 0.8 K/UL (ref 1.1–4.5)
LYMPHOCYTES RELATIVE PERCENT: 14 % (ref 20–40)
MCH RBC QN AUTO: 33.3 PG (ref 27–31)
MCHC RBC AUTO-ENTMCNC: 31.1 G/DL (ref 33–37)
MCV RBC AUTO: 106.8 FL (ref 81–99)
MONOCYTES ABSOLUTE: 0.6 K/UL (ref 0–0.9)
MONOCYTES RELATIVE PERCENT: 9.3 % (ref 0–10)
NEUTROPHILS ABSOLUTE: 4.4 K/UL (ref 1.5–7.5)
NEUTROPHILS RELATIVE PERCENT: 73.2 % (ref 50–65)
PDW BLD-RTO: 12.9 % (ref 11.5–14.5)
PLATELET # BLD: 197 K/UL (ref 130–400)
PMV BLD AUTO: 9.6 FL (ref 9.4–12.3)
POTASSIUM SERPL-SCNC: 4.9 MMOL/L (ref 3.5–5)
RBC # BLD: 4.24 M/UL (ref 4.2–5.4)
SODIUM BLD-SCNC: 144 MMOL/L (ref 136–145)
TOTAL PROTEIN: 7.9 G/DL (ref 6.6–8.7)
TRIGL SERPL-MCNC: 137 MG/DL (ref 0–149)
TSH SERPL DL<=0.05 MIU/L-ACNC: 0.39 UIU/ML (ref 0.27–4.2)
VITAMIN D 25-HYDROXY: 67.7 NG/ML
WBC # BLD: 5.9 K/UL (ref 4.8–10.8)

## 2021-10-18 PROCEDURE — 93793 ANTICOAG MGMT PT WARFARIN: CPT | Performed by: NURSE PRACTITIONER

## 2021-10-18 PROCEDURE — 99214 OFFICE O/P EST MOD 30 MIN: CPT | Performed by: NURSE PRACTITIONER

## 2021-10-18 PROCEDURE — 1036F TOBACCO NON-USER: CPT | Performed by: NURSE PRACTITIONER

## 2021-10-18 PROCEDURE — 1123F ACP DISCUSS/DSCN MKR DOCD: CPT | Performed by: NURSE PRACTITIONER

## 2021-10-18 PROCEDURE — 90694 VACC AIIV4 NO PRSRV 0.5ML IM: CPT | Performed by: NURSE PRACTITIONER

## 2021-10-18 PROCEDURE — G8417 CALC BMI ABV UP PARAM F/U: HCPCS | Performed by: NURSE PRACTITIONER

## 2021-10-18 PROCEDURE — G8427 DOCREV CUR MEDS BY ELIG CLIN: HCPCS | Performed by: NURSE PRACTITIONER

## 2021-10-18 PROCEDURE — G0008 ADMIN INFLUENZA VIRUS VAC: HCPCS | Performed by: NURSE PRACTITIONER

## 2021-10-18 PROCEDURE — 4040F PNEUMOC VAC/ADMIN/RCVD: CPT | Performed by: NURSE PRACTITIONER

## 2021-10-18 PROCEDURE — 1090F PRES/ABSN URINE INCON ASSESS: CPT | Performed by: NURSE PRACTITIONER

## 2021-10-18 PROCEDURE — G8400 PT W/DXA NO RESULTS DOC: HCPCS | Performed by: NURSE PRACTITIONER

## 2021-10-18 PROCEDURE — G8484 FLU IMMUNIZE NO ADMIN: HCPCS | Performed by: NURSE PRACTITIONER

## 2021-10-18 RX ORDER — QUETIAPINE FUMARATE 25 MG/1
25 TABLET, FILM COATED ORAL 2 TIMES DAILY
Qty: 60 TABLET | Refills: 1 | Status: SHIPPED | OUTPATIENT
Start: 2021-10-18 | End: 2022-01-10

## 2021-10-18 ASSESSMENT — ENCOUNTER SYMPTOMS
BLOOD IN STOOL: 0
TROUBLE SWALLOWING: 0
CHOKING: 0
DIARRHEA: 0
NAUSEA: 0
COLOR CHANGE: 0
COUGH: 0
CONSTIPATION: 0
EYE DISCHARGE: 0
STRIDOR: 0
SORE THROAT: 0
WHEEZING: 0
ABDOMINAL DISTENTION: 0
EYE ITCHING: 0
SHORTNESS OF BREATH: 0
ABDOMINAL PAIN: 0
VOMITING: 0

## 2021-10-18 NOTE — ASSESSMENT & PLAN NOTE
We will try Seroquel in the afternoon 25 mg and then 20 5 at night hopefully that will help her rest.  If not I gave her instructions that she can go up to 50 and 50

## 2021-10-18 NOTE — PATIENT INSTRUCTIONS
1.  Anxiety; stable with lexapro  2. Hypertension ; new script for meds sent   3. Hearing loss; No better with hearing aids   4. Agitation;  Start seroquel 25 in the afternoon and bedtime;   You can increase to 50 mg if you need to;

## 2021-10-18 NOTE — PROGRESS NOTES
[address] [facility]  [address] [facility] [phone] [place] [address]  Dept: [phone]  Dept Fax: [phone]: [phone]    Lucia Marti (:  1945) is a 67 y.o. female,Established patient, here for evaluation of the following chief complaint(s): Other (ck up ,needs lab orders for today,out of lopressor)      Lucia Marti is a 67 y.o. female who presents today for her medical conditions/complaints as noted below. Lucia Marti is c/sam Other (ck up ,needs lab orders for today,out of lopressor)        HPI:     Chief Complaint   Patient presents with    Other     ck up ,needs lab orders for today,out of lopressor     HPI   1. Anxiety on lexapro 10 mg this seems to be working fairly well  2. Hypertension; She ahs been out of her meds the last week as was told she needed new script;    3. Hearing loss but she will not tolerate the ONline ones and the others are over $5000 ;    4. agitation in the early evening and diff sleeping. She said that she is always scratching and itching mostly in her private area when she is trying to go to sleep at night.   Past Medical History:   Diagnosis Date    Atrial fibrillation ([address]     Cancer ([facility] [address]     bladder    Hyperlipidemia     Hypertension       Past Surgical History:   Procedure Laterality Date    APPENDECTOMY      BLADDER SURGERY      HYSTERECTOMY, VAGINAL      KIDNEY REMOVAL      TOTAL HIP ARTHROPLASTY         Vitals 10/18/2021 10/18/2021 2021 3/22/2021 9/15/2020    SYSTOLIC 045 [phone]   DIASTOLIC 96 96 72 79 79 72   Pulse - 64 78 70 90 70   Temp - 98 - - - -   Resp - - - - - -   SpO2 - 97 95 - - 98   Weight - 222 lb 212 lb - 250 lb -   Height - 5' 9\" 5' 8\" - 5' 9\" -   Body mass index - 32.78 kg/m2 32.23 kg/m2 - 36.91 kg/m2 -   Some recent data might be hidden       Family History   Problem Relation Age of Onset    Heart Disease Mother     Stroke Father     Cancer Sister Social History     Tobacco Use    Smoking status: Never Smoker    Smokeless tobacco: Never Used   Substance Use Topics    Alcohol use: No      Current Outpatient Medications   Medication Sig Dispense Refill    metoprolol tartrate (LOPRESSOR) 25 MG tablet TAKE 1 TABLET BY MOUTH TWO TIMES A  tablet 1    QUEtiapine (SEROQUEL) 25 MG tablet Take 1 tablet by mouth 2 times daily 60 tablet 1    methIMAzole (TAPAZOLE) 10 MG tablet TAKE 1 TABLET BY MOUTH DAILY 30 tablet 3    atorvastatin (LIPITOR) 40 MG tablet TAKE 1 TABLET BY MOUTH DAILY 30 tablet 1    dilTIAZem (CARDIZEM CD) 180 MG extended release capsule TAKE 1 CAPSULE BY MOUTH DAILY 30 capsule 1    calcitRIOL (ROCALTROL) 0.25 MCG capsule TAKE 1 CAPSULE BY MOUTH DAILY 30 capsule 4    donepezil (ARICEPT) 5 MG tablet TAKE 1 TABLET BY MOUTH AT BEDTIME FOR MEMORY 30 tablet 3    vitamin D (ERGOCALCIFEROL) 1.25 MG (95297 UT) CAPS capsule TAKE 1 CAPSULE BY MOUTH WEEKLY 4 capsule 3    furosemide (LASIX) 40 MG tablet TAKE 1 TABLET BY MOUTH EVERY DAY 90 tablet 4    Ostomy Supplies (CHAO-FIT NATURA STOMAHESIVE) WAFR USE AS DIRECTED 10 Wafer 11    Ostomy Supplies (CHAO-FIT NATURA UROSTOMY POUCH) Pouch MISC USE AS DIRECTED. 10 each 11    warfarin (COUMADIN) 5 MG tablet TAKE AS DIRECTED 30 tablet 5    warfarin (COUMADIN) 7.5 MG tablet TAKE 1 TABLET BY MOUTH DAILY 30 tablet 5    lisinopril (PRINIVIL;ZESTRIL) 10 MG tablet Take 10 mg by mouth      escitalopram (LEXAPRO) 10 MG tablet Take 1 tablet by mouth daily 30 tablet 3    acetaminophen (TYLENOL) 325 MG tablet Take 650 mg by mouth every 4 hours as needed for Pain      docusate sodium (COLACE) 100 MG capsule Take 100 mg by mouth 2 times daily as needed for Constipation      Ostomy Supplies (CHAO-FIT NATURA STOMAHESIVE) WAFR Use as directed 20 Wafer 11     No current facility-administered medications for this visit.      Allergies   Allergen Reactions    Other Anaphylaxis and Itching     Cloth bandaids , causes redness of skin    Ciprofloxacin Itching    Codeine Itching    Perflutren Lipid Microsphere Other (See Comments)     Back pain    Tetanus Toxoids Itching     Itching around site    Tizanidine Hcl Itching    Tetanus Toxoid      Reaction: [address] [facility] Maintenance   Topic Date Due    Shingles Vaccine (1 of 2) Never done    DEXA (modify frequency per FRAX score)  06/28/2022 (Originally 4/13/1999)    COVID-19 Vaccine (1) 07/11/2022 (Originally 4/13/1957)    Annual Wellness Visit (AWV)  12/16/2021    Lipid screen  06/28/2022    TSH testing  06/28/2022    Potassium monitoring  06/28/2022    Creatinine monitoring  06/28/2022    Flu vaccine  Completed    Pneumococcal 65+ years Vaccine  Completed    Hepatitis C screen  Completed    Hepatitis A vaccine  Aged Out    Hepatitis B vaccine  Aged Out    Hib vaccine  Aged Out    Meningococcal (ACWY) vaccine  Aged Out       Lab Results   Component Value Date    LABA1C 5.6 03/22/2021     No results found for: PSA, PSADIA  TSH   Date Value Ref Range Status   06/28/2021 1.390 0.270 - 4.200 uIU/mL Final   ]  Lab Results   Component Value Date     06/28/2021    K 4.1 06/28/2021     06/28/2021    CO2 31 (H) 06/28/2021    BUN 35 (H) 06/28/2021    CREATININE 1.5 (H) 06/28/2021    GLUCOSE 97 06/28/2021    CALCIUM 11.0 (H) 06/28/2021    PROT 7.4 06/28/2021    LABALBU 3.7 06/28/2021    BILITOT 0.8 06/28/2021    ALKPHOS 89 06/28/2021    AST 18 06/28/2021    ALT 14 06/28/2021    LABGLOM 34 (A) 06/28/2021    GFRAA 41 (L) 06/28/2021     Lab Results   Component Value Date    CHOL 104 (L) 06/28/2021    CHOL 97 (L) 12/07/2020    CHOL 142 (L) 02/26/2020     Lab Results   Component Value Date    TRIG 116 06/28/2021    TRIG 118 12/07/2020    TRIG 223 (H) 02/26/2020     Lab Results   Component Value Date    HDL 43 (L) 06/28/2021    HDL 40 (L) 12/07/2020    HDL 39 (L) 02/26/2020     Lab Results   Component Value Date    LDLCALC 38 06/28/2021 LDLCALC 33 12/07/2020    LDLCALC 58 02/26/2020     Lab Results   Component Value Date     06/28/2021    K 4.1 06/28/2021     06/28/2021    CO2 31 06/28/2021    BUN 35 06/28/2021    CREATININE 1.5 06/28/2021    GLUCOSE 97 06/28/2021    CALCIUM 11.0 06/28/2021      Lab Results   Component Value Date    WBC 5.9 10/18/2021    HGB 14.1 10/18/2021    HCT 45.3 10/18/2021    .8 (H) 10/18/2021     10/18/2021    LYMPHOPCT 14.0 (L) 10/18/2021    RBC 4.24 10/18/2021    MCH 33.3 (H) 10/18/2021    MCHC 31.1 (L) 10/18/2021    RDW 12.9 10/18/2021     Lab Results   Component Value Date    VITD25 49.3 06/28/2021     Labs reviewed from 10/18/2021    Subjective:      Review of Systems   Constitutional: Negative for fatigue, fever and unexpected weight change. HENT: Positive for hearing loss. Negative for ear discharge, ear pain, mouth sores, sore throat and trouble swallowing. Eyes: Negative for discharge, itching and visual disturbance. Respiratory: Negative for cough, choking, shortness of breath, wheezing and stridor. Cardiovascular: Negative for chest pain, palpitations and leg swelling. Gastrointestinal: Negative for abdominal distention, abdominal pain, blood in stool, constipation, diarrhea, nausea and vomiting. Endocrine: Negative for cold intolerance, polydipsia and polyuria. Genitourinary: Negative for difficulty urinating, dysuria, frequency and urgency. Musculoskeletal: Negative for arthralgias and gait problem. Skin: Negative for color change and rash. Allergic/Immunologic: Negative for food allergies and immunocompromised state. Neurological: Negative for dizziness, tremors, syncope, speech difficulty, weakness and headaches. Hematological: Negative for adenopathy. Does not bruise/bleed easily. Psychiatric/Behavioral: Positive for agitation. Negative for confusion and hallucinations. The patient is nervous/anxious.         Objective:     Physical Exam  Constitutional: over 5 days          Relevant Orders    CBC Auto Differential (Completed)    Comprehensive Metabolic Panel    TSH without Reflex    CBC Auto Differential    Comprehensive Metabolic Panel    TSH without Reflex    Hearing loss     He tried some cheaper hearing aids but the daughter said she just did not tolerate them she would take them and throw them so they were not in a spin and excessive amount of money to get her hearing aids          Mixed hyperlipidemia    Relevant Medications    lisinopril (PRINIVIL;ZESTRIL) 10 MG tablet    warfarin (COUMADIN) 5 MG tablet    warfarin (COUMADIN) 7.5 MG tablet    furosemide (LASIX) 40 MG tablet    dilTIAZem (CARDIZEM CD) 180 MG extended release capsule    atorvastatin (LIPITOR) 40 MG tablet    metoprolol tartrate (LOPRESSOR) 25 MG tablet    Other Relevant Orders    Lipid Panel    Lipid Panel    SunDown syndrome     We will try Seroquel in the afternoon 25 mg and then 20 5 at night hopefully that will help her rest.  If not I gave her instructions that she can go up to 50 and 50          Vitamin D deficiency    Relevant Orders    Vitamin D 25 Hydroxy    Vitamin D 25 Hydroxy          Plan:        Patient given educational materials - see patient instructions. Discussed use, benefit, and side effects of prescribed medications. Allpatient questions answered. Pt voiced understanding. Reviewed health maintenance. Instructed to continue current medications, diet and exercise. Patient agreed with treatment plan. Follow up as directed.    MEDICATIONS:  Orders Placed This Encounter   Medications    metoprolol tartrate (LOPRESSOR) 25 MG tablet     Sig: TAKE 1 TABLET BY MOUTH TWO TIMES A DAY     Dispense:  180 tablet     Refill:  1    QUEtiapine (SEROQUEL) 25 MG tablet     Sig: Take 1 tablet by mouth 2 times daily     Dispense:  60 tablet     Refill:  1         ORDERS:  Orders Placed This Encounter   Procedures    INFLUENZA, QUADV, ADJUVANTED, 65 YRS =, IM, PF, PREFILL SYR, 0.5ML (FLUAD)    CBC Auto Differential    Comprehensive Metabolic Panel    Vitamin D 25 Hydroxy    TSH without Reflex    Lipid Panel    CBC Auto Differential    Comprehensive Metabolic Panel    Lipid Panel    Vitamin D 25 Hydroxy    TSH without Reflex       Follow-up:  Return in 6 months (on 4/18/2022) for have labs done prior to appt. PATIENT INSTRUCTIONS:  Patient Instructions   1. Anxiety; stable with lexapro  2. Hypertension ; new script for meds sent   3. Hearing loss; No better with hearing aids   4. Agitation;  Start seroquel 25 in the afternoon and bedtime; You can increase to 50 mg if you need to;     Electronically signed by MARIELOS Ferrari on 10/18/2021 at 12:34 PM    @    Mary/transcription disclaimer:  Much of this encounter note is electronic transcription/translation of spoken language to printed texts. The electronic translation of spoken language may be erroneous, or at times,nonsensical words or phrases may be inadvertently transcribed.   Although I have reviewed the note for such errors, some may still exist.

## 2021-10-18 NOTE — PROGRESS NOTES
HOME MONITORING REPORT    INR today:   Results for orders placed or performed in visit on 10/18/21   Protime-INR   Result Value Ref Range    INR 2.60        INR Goal: 2.0-3.0    Dosing Plan  As of 10/18/2021    TTR:  52.4 % (3.3 y)   Full warfarin instructions:  5 mg every Wed, Fri; 7.5 mg all other days              PLAN: Patient aware to continue current dose and recheck in one week or as ordered. I have reviewed nursing plan for Coumadin management and agree with plan.

## 2021-10-18 NOTE — ASSESSMENT & PLAN NOTE
He tried some cheaper hearing aids but the daughter said she just did not tolerate them she would take them and throw them so they were not in a spin and excessive amount of money to get her hearing aids

## 2021-10-20 RX ORDER — DILTIAZEM HYDROCHLORIDE 180 MG/1
180 CAPSULE, COATED, EXTENDED RELEASE ORAL DAILY
Qty: 30 CAPSULE | Refills: 5 | Status: SHIPPED | OUTPATIENT
Start: 2021-10-20 | End: 2022-04-13

## 2021-10-20 NOTE — TELEPHONE ENCOUNTER
Susan Harden called requesting a refill of the below medication which has been pended for you:     Requested Prescriptions     Pending Prescriptions Disp Refills    dilTIAZem (CARDIZEM CD) 180 MG extended release capsule [Pharmacy Med Name: DILTIAZEM HCL ER 180MG COAT 180 Capsule] 30 capsule 5     Sig: TAKE 1 CAPSULE BY MOUTH DAILY       Last Appointment Date: 10/18/2021  Next Appointment Date: Visit date not found    Allergies   Allergen Reactions    Other Anaphylaxis and Itching     Cloth bandaids , causes redness of skin    Ciprofloxacin Itching    Codeine Itching    Perflutren Lipid Microsphere Other (See Comments)     Back pain    Tetanus Toxoids Itching     Itching around site    Tizanidine Hcl Itching    Tetanus Toxoid      Reaction: [address]

## 2021-10-25 ENCOUNTER — ANTI-COAG VISIT (OUTPATIENT)
Dept: PRIMARY CARE CLINIC | Age: 76
End: 2021-10-25
Payer: MEDICARE

## 2021-10-25 DIAGNOSIS — Z86.718 HISTORY OF DVT (DEEP VEIN THROMBOSIS): Primary | ICD-10-CM

## 2021-10-25 LAB — INR BLD: 2.5

## 2021-10-25 PROCEDURE — 93793 ANTICOAG MGMT PT WARFARIN: CPT | Performed by: NURSE PRACTITIONER

## 2021-10-25 NOTE — PROGRESS NOTES
HOME MONITORING REPORT    INR today:   Results for orders placed or performed in visit on 10/25/21   Protime-INR   Result Value Ref Range    INR 2.50        INR Goal: 2.0-3.0    Dosing Plan  As of 10/25/2021    TTR:  52.8 % (3.3 y)   Full warfarin instructions:  5 mg every Wed, Fri; 7.5 mg all other days              PLAN: Patient aware to continue current dose and recheck in one week or as ordered. I have reviewed nursing plan for Coumadin management and agree with plan.

## 2021-11-02 RX ORDER — WARFARIN SODIUM 7.5 MG/1
TABLET ORAL
Qty: 30 TABLET | Refills: 5 | Status: SHIPPED | OUTPATIENT
Start: 2021-11-02 | End: 2022-08-29

## 2021-11-06 DIAGNOSIS — E55.9 VITAMIN D DEFICIENCY: Primary | ICD-10-CM

## 2021-11-08 RX ORDER — ERGOCALCIFEROL 1.25 MG/1
CAPSULE ORAL
Qty: 4 CAPSULE | Refills: 3 | Status: SHIPPED | OUTPATIENT
Start: 2021-11-08 | End: 2022-03-28

## 2021-11-08 NOTE — TELEPHONE ENCOUNTER
Hazeline Blades called to request a refill on her medication.       Last office visit : 10/18/2021   Next office visit : 4/19/2022     Requested Prescriptions     Pending Prescriptions Disp Refills    vitamin D (ERGOCALCIFEROL) 1.25 MG (77753 UT) CAPS capsule [Pharmacy Med Name: VIT D2 1.25 MG (50,000 UNIT 1.25 MG Capsule] 4 capsule 3     Sig: TAKE 1 CAPSULE BY MOUTH WEEKLY            Miguel Monroy MA

## 2021-11-09 ENCOUNTER — ANTI-COAG VISIT (OUTPATIENT)
Dept: PRIMARY CARE CLINIC | Age: 76
End: 2021-11-09
Payer: MEDICARE

## 2021-11-09 DIAGNOSIS — Z86.718 HISTORY OF DVT (DEEP VEIN THROMBOSIS): Primary | ICD-10-CM

## 2021-11-09 LAB — INR BLD: 3.2

## 2021-11-09 PROCEDURE — 93793 ANTICOAG MGMT PT WARFARIN: CPT | Performed by: NURSE PRACTITIONER

## 2021-11-09 NOTE — PROGRESS NOTES
HOME MONITORING REPORT    INR today:   Results for orders placed or performed in visit on 11/09/21   Protime-INR   Result Value Ref Range    INR 3.20        INR Goal: 2.0-3.0    Dosing Plan  As of 11/9/2021    TTR:  53.0 % (3.3 y)   Full warfarin instructions:  11/9: 5 mg; Otherwise 5 mg every Wed, Fri; 7.5 mg all other days              PLAN: Advised patient/caregiver to continue current dose and recheck in one week. Patient/Caregiver voiced understanding  I have reviewed nursing plan for Coumadin management and agree with plan.

## 2021-11-12 DIAGNOSIS — I10 ESSENTIAL HYPERTENSION: Primary | ICD-10-CM

## 2021-11-12 NOTE — TELEPHONE ENCOUNTER
Requested Prescriptions     Pending Prescriptions Disp Refills    atorvastatin (LIPITOR) 40 MG tablet [Pharmacy Med Name: ATORVASTATIN 40 MG TABLET 40 Tablet] 30 tablet 1     Sig: TAKE 1 TABLET BY MOUTH DAILY

## 2021-11-15 DIAGNOSIS — Z98.890 H/O URETEROSTOMY: ICD-10-CM

## 2021-11-15 DIAGNOSIS — Z85.51 HISTORY OF BLADDER CANCER: ICD-10-CM

## 2021-11-15 RX ORDER — ATORVASTATIN CALCIUM 40 MG/1
40 TABLET, FILM COATED ORAL DAILY
Qty: 30 TABLET | Refills: 1 | Status: SHIPPED | OUTPATIENT
Start: 2021-11-15 | End: 2022-01-24

## 2021-11-15 RX ORDER — OSTOMY SUPPLY 2 1/4"
EACH MISCELLANEOUS
Qty: 10 EACH | Refills: 11 | Status: SHIPPED | OUTPATIENT
Start: 2021-11-15 | End: 2022-10-11

## 2021-11-22 ENCOUNTER — ANTI-COAG VISIT (OUTPATIENT)
Dept: PRIMARY CARE CLINIC | Age: 76
End: 2021-11-22
Payer: MEDICARE

## 2021-11-22 DIAGNOSIS — Z86.718 HISTORY OF DVT (DEEP VEIN THROMBOSIS): Primary | ICD-10-CM

## 2021-11-22 LAB — INR BLD: 1.8

## 2021-11-22 PROCEDURE — 93793 ANTICOAG MGMT PT WARFARIN: CPT | Performed by: NURSE PRACTITIONER

## 2021-11-22 NOTE — PROGRESS NOTES
HOME MONITORING REPORT    INR today:   Results for orders placed or performed in visit on 11/22/21   Protime-INR   Result Value Ref Range    INR 1.80        INR Goal: 2.0-3.0    Dosing Plan  As of 11/22/2021    TTR:  53.2 % (3.4 y)   Full warfarin instructions:  11/22: 10 mg; Otherwise 5 mg every Wed, Fri; 7.5 mg all other days              PLAN: Advised patient/caregiver to increase her dose tonight only to 10 mg, then continue current dose and recheck in one week. Patient/Caregiver voiced understanding  I have reviewed nursing plan for Coumadin management and agree with plan.

## 2021-12-07 ENCOUNTER — ANTI-COAG VISIT (OUTPATIENT)
Dept: PRIMARY CARE CLINIC | Age: 76
End: 2021-12-07
Payer: MEDICARE

## 2021-12-07 DIAGNOSIS — Z86.718 HISTORY OF DVT (DEEP VEIN THROMBOSIS): Primary | ICD-10-CM

## 2021-12-07 LAB — INR BLD: 4.1

## 2021-12-07 PROCEDURE — 93793 ANTICOAG MGMT PT WARFARIN: CPT | Performed by: NURSE PRACTITIONER

## 2021-12-07 NOTE — PROGRESS NOTES
HOME MONITORING REPORT    INR today:   Results for orders placed or performed in visit on 12/07/21   Protime-INR   Result Value Ref Range    INR 4.10        INR Goal: 2.0-3.0    Dosing Plan  As of 12/7/2021    TTR:  53.1 % (3.4 y)   Full warfarin instructions:  12/7: Hold; Otherwise 5 mg every Wed, Fri; 7.5 mg all other days              PLAN: Advised patient/caregiver to hold her dose tonight, then continue current dose and recheck in one week. Patient/Caregiver voiced understanding  I have reviewed nursing plan for Coumadin management and agree with plan.

## 2021-12-15 ENCOUNTER — ANTI-COAG VISIT (OUTPATIENT)
Dept: PRIMARY CARE CLINIC | Age: 76
End: 2021-12-15
Payer: MEDICARE

## 2021-12-15 DIAGNOSIS — Z86.718 HISTORY OF DVT (DEEP VEIN THROMBOSIS): Primary | ICD-10-CM

## 2021-12-15 LAB — INR BLD: 3.7

## 2021-12-15 PROCEDURE — 93793 ANTICOAG MGMT PT WARFARIN: CPT | Performed by: NURSE PRACTITIONER

## 2021-12-15 NOTE — PROGRESS NOTES
HOME MONITORING REPORT    INR today:   Results for orders placed or performed in visit on 12/15/21   Protime-INR   Result Value Ref Range    INR 3.70        INR Goal: 2.0-3.0    Dosing Plan  As of 12/15/2021    TTR:  52.8 % (3.4 y)   Full warfarin instructions:  12/15: Hold; 12/16: 5 mg; Otherwise 5 mg every Wed, Fri; 7.5 mg all other days              PLAN: Advised patient's daughter had her hold dose last night. She will take 5 mg a day for the next three days and then  continue current dose and recheck in one week. Patient/Caregiver voiced understanding  I have reviewed nursing plan for Coumadin management and agree with plan.

## 2021-12-24 LAB — INR BLD: 3.9

## 2021-12-27 ENCOUNTER — ANTI-COAG VISIT (OUTPATIENT)
Dept: PRIMARY CARE CLINIC | Age: 76
End: 2021-12-27
Payer: MEDICARE

## 2021-12-27 DIAGNOSIS — Z86.718 HISTORY OF DVT (DEEP VEIN THROMBOSIS): Primary | ICD-10-CM

## 2021-12-27 PROCEDURE — 93793 ANTICOAG MGMT PT WARFARIN: CPT | Performed by: NURSE PRACTITIONER

## 2022-01-05 ENCOUNTER — ANTI-COAG VISIT (OUTPATIENT)
Dept: INTERNAL MEDICINE | Age: 77
End: 2022-01-05
Payer: MEDICARE

## 2022-01-05 DIAGNOSIS — Z86.718 HISTORY OF DVT (DEEP VEIN THROMBOSIS): Primary | ICD-10-CM

## 2022-01-05 LAB — INR BLD: 3.3

## 2022-01-05 PROCEDURE — 93793 ANTICOAG MGMT PT WARFARIN: CPT | Performed by: NURSE PRACTITIONER

## 2022-01-05 NOTE — PROGRESS NOTES
HOME MONITORING REPORT    INR today:   Results for orders placed or performed in visit on 01/05/22   Protime-INR   Result Value Ref Range    INR 3.30        INR Goal: 2.0-3.0    Dosing Plan  As of 1/5/2022    TTR:  51.9 % (3.5 y)   Full warfarin instructions:  5 mg every Wed, Fri; 7.5 mg all other days              PLAN:     Clarissa 37. I have reviewed nursing plan for Coumadin management and agree with plan.

## 2022-01-10 RX ORDER — QUETIAPINE FUMARATE 25 MG/1
25 TABLET, FILM COATED ORAL 2 TIMES DAILY
Qty: 60 TABLET | Refills: 1 | Status: SHIPPED | OUTPATIENT
Start: 2022-01-10 | End: 2022-06-22

## 2022-01-10 RX ORDER — DONEPEZIL HYDROCHLORIDE 5 MG/1
TABLET, FILM COATED ORAL
Qty: 30 TABLET | Refills: 3 | Status: SHIPPED | OUTPATIENT
Start: 2022-01-10 | End: 2022-05-16

## 2022-01-16 LAB — INR BLD: 3.5

## 2022-01-17 ENCOUNTER — ANTI-COAG VISIT (OUTPATIENT)
Dept: PRIMARY CARE CLINIC | Age: 77
End: 2022-01-17
Payer: MEDICARE

## 2022-01-17 DIAGNOSIS — Z86.718 HISTORY OF DVT (DEEP VEIN THROMBOSIS): Primary | ICD-10-CM

## 2022-01-17 PROCEDURE — 93793 ANTICOAG MGMT PT WARFARIN: CPT | Performed by: NURSE PRACTITIONER

## 2022-01-17 NOTE — PROGRESS NOTES
HOME MONITORING REPORT    INR today:   Results for orders placed or performed in visit on 01/17/22   Protime-INR   Result Value Ref Range    INR 3.50        INR Goal: 2.0-3.0    Dosing Plan  As of 1/17/2022    TTR:  51.5 % (3.5 y)   Full warfarin instructions:  5 mg every Wed, Fri; 7.5 mg all other days              PLAN: patient/caregiver held her dose last night. Instructed to continue current dose and recheck in one week. Patient/Caregiver voiced understanding  I have reviewed nursing plan for Coumadin management and agree with plan.

## 2022-01-22 DIAGNOSIS — I10 ESSENTIAL HYPERTENSION: ICD-10-CM

## 2022-01-24 RX ORDER — ATORVASTATIN CALCIUM 40 MG/1
40 TABLET, FILM COATED ORAL DAILY
Qty: 30 TABLET | Refills: 2 | Status: SHIPPED | OUTPATIENT
Start: 2022-01-24 | End: 2022-04-13

## 2022-01-24 NOTE — TELEPHONE ENCOUNTER
Marta Uribe called requesting a refill of the below medication which has been pended for you:     Requested Prescriptions     Pending Prescriptions Disp Refills    atorvastatin (LIPITOR) 40 MG tablet [Pharmacy Med Name: ATORVASTATIN 40 MG TABLET 40 Tablet] 30 tablet 1     Sig: TAKE 1 TABLET BY MOUTH DAILY       Last Appointment Date: 10/18/2021  Next Appointment Date: 4/19/2022    Allergies   Allergen Reactions    Other Anaphylaxis and Itching     Cloth bandaids , causes redness of skin    Ciprofloxacin Itching    Codeine Itching    Perflutren Lipid Microsphere Other (See Comments)     Back pain    Tetanus Toxoids Itching     Itching around site    Tizanidine Hcl Itching    Tetanus Toxoid      Reaction: [address]

## 2022-01-27 ENCOUNTER — ANTI-COAG VISIT (OUTPATIENT)
Dept: PRIMARY CARE CLINIC | Age: 77
End: 2022-01-27
Payer: MEDICARE

## 2022-01-27 DIAGNOSIS — Z86.718 HISTORY OF DVT (DEEP VEIN THROMBOSIS): Primary | ICD-10-CM

## 2022-01-27 LAB — INR BLD: 2.4

## 2022-01-27 PROCEDURE — 93793 ANTICOAG MGMT PT WARFARIN: CPT | Performed by: NURSE PRACTITIONER

## 2022-01-27 NOTE — PROGRESS NOTES
HOME MONITORING REPORT    INR today:   Results for orders placed or performed in visit on 01/27/22   Protime-INR   Result Value Ref Range    INR 2.40        INR Goal: 2.0-3.0    Dosing Plan  As of 1/27/2022    TTR:  51.5 % (3.5 y)   Full warfarin instructions:  5 mg every Wed, Fri; 7.5 mg all other days              PLAN: Patient aware to continue current dose and recheck in one week or as ordered. \I have reviewed nursing plan for Coumadin management and agree with plan.

## 2022-02-11 ENCOUNTER — ANTI-COAG VISIT (OUTPATIENT)
Dept: INTERNAL MEDICINE | Age: 77
End: 2022-02-11
Payer: MEDICARE

## 2022-02-11 DIAGNOSIS — Z79.01 LONG TERM (CURRENT) USE OF ANTICOAGULANTS: ICD-10-CM

## 2022-02-11 DIAGNOSIS — Z86.718 HISTORY OF DVT (DEEP VEIN THROMBOSIS): Primary | ICD-10-CM

## 2022-02-11 LAB — INR BLD: 3.9

## 2022-02-11 PROCEDURE — 93793 ANTICOAG MGMT PT WARFARIN: CPT | Performed by: INTERNAL MEDICINE

## 2022-02-11 NOTE — PROGRESS NOTES
Patient had INR of 3.9 resulted on 2/11/2022. Her range is 2-3. Per Dr Marin Flanagan: Patient is aware to hold the coumadin for two days and check on MOnday.

## 2022-02-14 RX ORDER — FECAL COLL W-CHARCOAL/CATH/SYR
MISCELLANEOUS RECTAL
Qty: 10 WAFER | Refills: 11 | Status: SHIPPED | OUTPATIENT
Start: 2022-02-14

## 2022-02-14 NOTE — TELEPHONE ENCOUNTER
Becky Deng called requesting a refill of the below medication which has been pended for you:     Requested Prescriptions     Pending Prescriptions Disp Refills    Ostomy Supplies (CHAO-FIT [place]) [facility] [Pharmacy Med Name: Geronimo Williamson] 10 [address] 11     Sig: USE AS DIRECTED       Last Appointment Date: 10/18/2021  Next Appointment Date: 4/19/2022    Allergies   Allergen Reactions    Other Anaphylaxis and Itching     Cloth bandaids , causes redness of skin    Ciprofloxacin Itching    Codeine Itching    Perflutren Lipid Microsphere Other (See Comments)     Back pain    Tetanus Toxoids Itching     Itching around site    Tizanidine Hcl Itching    Tetanus Toxoid      Reaction: [address]

## 2022-02-23 ENCOUNTER — ANTI-COAG VISIT (OUTPATIENT)
Dept: PRIMARY CARE CLINIC | Age: 77
End: 2022-02-23
Payer: MEDICARE

## 2022-02-23 DIAGNOSIS — Z86.718 HISTORY OF DVT (DEEP VEIN THROMBOSIS): Primary | ICD-10-CM

## 2022-02-23 LAB — INR BLD: 2.6

## 2022-02-23 PROCEDURE — 93793 ANTICOAG MGMT PT WARFARIN: CPT | Performed by: NURSE PRACTITIONER

## 2022-02-23 NOTE — PROGRESS NOTES
HOME MONITORING REPORT    INR today:   Results for orders placed or performed in visit on 02/23/22   Protime-INR   Result Value Ref Range    INR 2.60        INR Goal: 2.0-3.0    Dosing Plan  As of 2/23/2022    TTR:  51.2 % (3.6 y)   Full warfarin instructions:  5 mg every Wed, Fri; 7.5 mg all other days              PLAN: Patient aware to continue current dose and recheck in one week or as ordered. \\  I have reviewed nursing plan for Coumadin management and agree with plan.

## 2022-02-25 ENCOUNTER — TELEPHONE (OUTPATIENT)
Dept: INTERNAL MEDICINE | Age: 77
End: 2022-02-25

## 2022-02-25 RX ORDER — CALCITRIOL 0.25 UG/1
CAPSULE, LIQUID FILLED ORAL
Qty: 30 CAPSULE | Refills: 4 | OUTPATIENT
Start: 2022-02-25

## 2022-02-25 NOTE — TELEPHONE ENCOUNTER
Requested Prescriptions     Pending Prescriptions Disp Refills    calcitRIOL (ROCALTROL) 0.25 MCG capsule 30 capsule 4     Sig: Take 1 capsule by mouth daily

## 2022-02-25 NOTE — TELEPHONE ENCOUNTER
Patients daughter Maddie Brian is calling and states they need a refill however the bottle says to call office.  Patient needs calcitriol 0.25mcg  [facility]

## 2022-02-28 RX ORDER — CALCITRIOL 0.25 UG/1
0.25 CAPSULE, LIQUID FILLED ORAL DAILY
Qty: 30 CAPSULE | Refills: 4 | Status: SHIPPED | OUTPATIENT
Start: 2022-02-28 | End: 2022-08-01

## 2022-03-01 ENCOUNTER — TELEPHONE (OUTPATIENT)
Dept: INTERNAL MEDICINE | Age: 77
End: 2022-03-01

## 2022-03-01 NOTE — TELEPHONE ENCOUNTER
S/w Eva Leiva, states a medication was requested but the pharmacy is refusing to fill it stating we took her off of it. I could neither confirm nor deny this because the last HIPAA form is dated 2020.

## 2022-03-07 ENCOUNTER — ANTI-COAG VISIT (OUTPATIENT)
Dept: PRIMARY CARE CLINIC | Age: 77
End: 2022-03-07
Payer: MEDICARE

## 2022-03-07 DIAGNOSIS — Z86.718 HISTORY OF DVT (DEEP VEIN THROMBOSIS): Primary | ICD-10-CM

## 2022-03-07 LAB — INR BLD: 2.2

## 2022-03-07 PROCEDURE — 93793 ANTICOAG MGMT PT WARFARIN: CPT | Performed by: NURSE PRACTITIONER

## 2022-03-07 NOTE — PROGRESS NOTES
HOME MONITORING REPORT    INR today:   Results for orders placed or performed in visit on 03/07/22   Protime-INR   Result Value Ref Range    INR 2.20        INR Goal: 2.0-3.0    Dosing Plan  As of 3/7/2022    TTR:  51.6 % (3.6 y)   Full warfarin instructions:  5 mg every Wed, Fri; 7.5 mg all other days              PLAN: Patient aware to continue current dose and recheck in one week or as ordered. \\I have reviewed nursing plan for Coumadin management and agree with plan.

## 2022-03-15 ENCOUNTER — ANTI-COAG VISIT (OUTPATIENT)
Dept: INTERNAL MEDICINE | Age: 77
End: 2022-03-15
Payer: MEDICARE

## 2022-03-15 DIAGNOSIS — Z86.718 HISTORY OF DVT (DEEP VEIN THROMBOSIS): Primary | ICD-10-CM

## 2022-03-15 LAB — INR BLD: 4.2

## 2022-03-15 PROCEDURE — 93793 ANTICOAG MGMT PT WARFARIN: CPT | Performed by: NURSE PRACTITIONER

## 2022-03-15 NOTE — PROGRESS NOTES
HOME MONITORING REPORT    INR today:   Results for orders placed or performed in visit on 03/15/22   Protime-INR   Result Value Ref Range    INR 4.20        INR Goal: 2.0-3.0    Dosing Plan  As of 3/15/2022    TTR:  51.5 % (3.7 y)   Full warfarin instructions:  3/15: Hold; Otherwise 5 mg every Wed, Fri; 7.5 mg all other days              PLAN: Advised patient/caregiver to  DUSTIN Jaffe then current dose and recheck in one week. Patient/Caregiver voiced understanding      I have reviewed nursing plan for Coumadin management and agree with plan.

## 2022-03-25 DIAGNOSIS — E55.9 VITAMIN D DEFICIENCY: ICD-10-CM

## 2022-03-28 RX ORDER — ERGOCALCIFEROL 1.25 MG/1
CAPSULE ORAL
Qty: 4 CAPSULE | Refills: 3 | Status: SHIPPED | OUTPATIENT
Start: 2022-03-28 | End: 2022-08-29

## 2022-04-11 ENCOUNTER — ANTI-COAG VISIT (OUTPATIENT)
Dept: PRIMARY CARE CLINIC | Age: 77
End: 2022-04-11
Payer: MEDICARE

## 2022-04-11 DIAGNOSIS — Z86.718 HISTORY OF DVT (DEEP VEIN THROMBOSIS): Primary | ICD-10-CM

## 2022-04-11 LAB — INR BLD: 2.7

## 2022-04-11 PROCEDURE — 93793 ANTICOAG MGMT PT WARFARIN: CPT | Performed by: NURSE PRACTITIONER

## 2022-04-11 NOTE — PROGRESS NOTES
HOME MONITORING REPORT    INR today:   Results for orders placed or performed in visit on 04/11/22   Protime-INR   Result Value Ref Range    INR 2.70        INR Goal: 2.0-3.0    Dosing Plan  As of 4/11/2022    TTR:  50.9 % (3.7 y)   Full warfarin instructions:  5 mg every Wed, Fri; 7.5 mg all other days              PLAN: Patient aware to continue current dose and recheck in one week or as ordered. I have reviewed nursing plan for Coumadin management and agree with plan.

## 2022-04-12 DIAGNOSIS — E05.90 HYPERTHYROIDISM: Primary | ICD-10-CM

## 2022-04-12 DIAGNOSIS — E78.2 MIXED HYPERLIPIDEMIA: ICD-10-CM

## 2022-04-12 DIAGNOSIS — I48.91 ATRIAL FIBRILLATION, UNSPECIFIED TYPE (HCC): ICD-10-CM

## 2022-04-12 DIAGNOSIS — I10 ESSENTIAL HYPERTENSION: ICD-10-CM

## 2022-04-13 RX ORDER — ATORVASTATIN CALCIUM 40 MG/1
40 TABLET, FILM COATED ORAL DAILY
Qty: 30 TABLET | Refills: 5 | Status: SHIPPED | OUTPATIENT
Start: 2022-04-13

## 2022-04-13 RX ORDER — METHIMAZOLE 10 MG/1
TABLET ORAL
Qty: 30 TABLET | Refills: 5 | Status: SHIPPED | OUTPATIENT
Start: 2022-04-13

## 2022-04-13 RX ORDER — DILTIAZEM HYDROCHLORIDE 180 MG/1
180 CAPSULE, COATED, EXTENDED RELEASE ORAL DAILY
Qty: 30 CAPSULE | Refills: 5 | Status: SHIPPED | OUTPATIENT
Start: 2022-04-13 | End: 2022-10-26

## 2022-04-13 NOTE — TELEPHONE ENCOUNTER
Sienna Lencho called to request a refill on her medication.       Last office visit : 10/18/2021   Next office visit : 4/19/2022     Requested Prescriptions     Pending Prescriptions Disp Refills    atorvastatin (LIPITOR) 40 MG tablet [Pharmacy Med Name: ATORVASTATIN 40 MG TABLET 40 Tablet] 30 tablet 2     Sig: TAKE 1 TABLET BY MOUTH DAILY    dilTIAZem (CARDIZEM CD) 180 MG extended release capsule [Pharmacy Med Name: DILTIAZEM HCL ER 180MG COAT 180 Capsule] 30 capsule 5     Sig: TAKE 1 CAPSULE BY MOUTH DAILY    methIMAzole (TAPAZOLE) 10 MG tablet [Pharmacy Med Name: METHIMAZOLE 10 MG TABS 10 Tablet] 30 tablet 3     Sig: TAKE 1 TABLET BY MOUTH DAILY            Ross Vernon MA

## 2022-04-19 ENCOUNTER — OFFICE VISIT (OUTPATIENT)
Dept: INTERNAL MEDICINE | Age: 77
End: 2022-04-19
Payer: MEDICARE

## 2022-04-19 VITALS — DIASTOLIC BLOOD PRESSURE: 70 MMHG | HEART RATE: 46 BPM | SYSTOLIC BLOOD PRESSURE: 112 MMHG | OXYGEN SATURATION: 97 %

## 2022-04-19 DIAGNOSIS — I48.91 ATRIAL FIBRILLATION, UNSPECIFIED TYPE (HCC): ICD-10-CM

## 2022-04-19 DIAGNOSIS — E78.2 MIXED HYPERLIPIDEMIA: ICD-10-CM

## 2022-04-19 DIAGNOSIS — E55.9 VITAMIN D DEFICIENCY: ICD-10-CM

## 2022-04-19 DIAGNOSIS — I10 ESSENTIAL HYPERTENSION: ICD-10-CM

## 2022-04-19 DIAGNOSIS — G30.9 ALZHEIMER'S DEMENTIA WITH BEHAVIORAL DISTURBANCE, UNSPECIFIED TIMING OF DEMENTIA ONSET: ICD-10-CM

## 2022-04-19 DIAGNOSIS — Z98.890 HISTORY OF UROSTOMY: ICD-10-CM

## 2022-04-19 DIAGNOSIS — Z00.00 MEDICARE ANNUAL WELLNESS VISIT, SUBSEQUENT: ICD-10-CM

## 2022-04-19 DIAGNOSIS — F33.41 RECURRENT MAJOR DEPRESSIVE DISORDER, IN PARTIAL REMISSION (HCC): ICD-10-CM

## 2022-04-19 DIAGNOSIS — F02.81 ALZHEIMER'S DEMENTIA WITH BEHAVIORAL DISTURBANCE, UNSPECIFIED TIMING OF DEMENTIA ONSET: ICD-10-CM

## 2022-04-19 DIAGNOSIS — N18.31 STAGE 3A CHRONIC KIDNEY DISEASE (HCC): ICD-10-CM

## 2022-04-19 DIAGNOSIS — N20.0 NEPHROLITHIASIS: Primary | ICD-10-CM

## 2022-04-19 DIAGNOSIS — I82.502 CHRONIC DEEP VEIN THROMBOSIS (DVT) OF LEFT LOWER EXTREMITY, UNSPECIFIED VEIN (HCC): ICD-10-CM

## 2022-04-19 DIAGNOSIS — I50.32 CHRONIC DIASTOLIC HEART FAILURE (HCC): ICD-10-CM

## 2022-04-19 LAB
ALBUMIN SERPL-MCNC: 3.9 G/DL (ref 3.5–5.2)
ALP BLD-CCNC: 96 U/L (ref 35–104)
ALT SERPL-CCNC: 19 U/L (ref 5–33)
ANION GAP SERPL CALCULATED.3IONS-SCNC: 13 MMOL/L (ref 7–19)
AST SERPL-CCNC: 20 U/L (ref 5–32)
BASOPHILS ABSOLUTE: 0 K/UL (ref 0–0.2)
BASOPHILS RELATIVE PERCENT: 0.2 % (ref 0–1)
BILIRUB SERPL-MCNC: 0.7 MG/DL (ref 0.2–1.2)
BUN BLDV-MCNC: 39 MG/DL (ref 8–23)
CALCIUM SERPL-MCNC: 10.7 MG/DL (ref 8.8–10.2)
CHLORIDE BLD-SCNC: 107 MMOL/L (ref 98–111)
CHOLESTEROL, TOTAL: 127 MG/DL (ref 160–199)
CO2: 27 MMOL/L (ref 22–29)
CREAT SERPL-MCNC: 1.5 MG/DL (ref 0.5–0.9)
EOSINOPHILS ABSOLUTE: 0.2 K/UL (ref 0–0.6)
EOSINOPHILS RELATIVE PERCENT: 4.1 % (ref 0–5)
GFR AFRICAN AMERICAN: 41
GFR NON-AFRICAN AMERICAN: 34
GLUCOSE BLD-MCNC: 106 MG/DL (ref 74–109)
HCT VFR BLD CALC: 43.6 % (ref 37–47)
HDLC SERPL-MCNC: 42 MG/DL (ref 65–121)
HEMOGLOBIN: 13.9 G/DL (ref 12–16)
IMMATURE GRANULOCYTES #: 0 K/UL
LDL CHOLESTEROL CALCULATED: 53 MG/DL
LYMPHOCYTES ABSOLUTE: 0.9 K/UL (ref 1.1–4.5)
LYMPHOCYTES RELATIVE PERCENT: 15.5 % (ref 20–40)
MCH RBC QN AUTO: 33 PG (ref 27–31)
MCHC RBC AUTO-ENTMCNC: 31.9 G/DL (ref 33–37)
MCV RBC AUTO: 103.6 FL (ref 81–99)
MONOCYTES ABSOLUTE: 0.6 K/UL (ref 0–0.9)
MONOCYTES RELATIVE PERCENT: 11.3 % (ref 0–10)
NEUTROPHILS ABSOLUTE: 3.9 K/UL (ref 1.5–7.5)
NEUTROPHILS RELATIVE PERCENT: 68.7 % (ref 50–65)
PDW BLD-RTO: 12.6 % (ref 11.5–14.5)
PLATELET # BLD: 172 K/UL (ref 130–400)
PMV BLD AUTO: 10.2 FL (ref 9.4–12.3)
POTASSIUM SERPL-SCNC: 4.7 MMOL/L (ref 3.5–5)
RBC # BLD: 4.21 M/UL (ref 4.2–5.4)
SODIUM BLD-SCNC: 147 MMOL/L (ref 136–145)
TOTAL PROTEIN: 7.2 G/DL (ref 6.6–8.7)
TRIGL SERPL-MCNC: 159 MG/DL (ref 0–149)
TSH SERPL DL<=0.05 MIU/L-ACNC: 2.77 UIU/ML (ref 0.27–4.2)
VITAMIN D 25-HYDROXY: 85.3 NG/ML
WBC # BLD: 5.6 K/UL (ref 4.8–10.8)

## 2022-04-19 PROCEDURE — G8400 PT W/DXA NO RESULTS DOC: HCPCS | Performed by: NURSE PRACTITIONER

## 2022-04-19 PROCEDURE — 99214 OFFICE O/P EST MOD 30 MIN: CPT | Performed by: NURSE PRACTITIONER

## 2022-04-19 PROCEDURE — 1036F TOBACCO NON-USER: CPT | Performed by: NURSE PRACTITIONER

## 2022-04-19 PROCEDURE — G8427 DOCREV CUR MEDS BY ELIG CLIN: HCPCS | Performed by: NURSE PRACTITIONER

## 2022-04-19 PROCEDURE — 4040F PNEUMOC VAC/ADMIN/RCVD: CPT | Performed by: NURSE PRACTITIONER

## 2022-04-19 PROCEDURE — 1090F PRES/ABSN URINE INCON ASSESS: CPT | Performed by: NURSE PRACTITIONER

## 2022-04-19 PROCEDURE — G8417 CALC BMI ABV UP PARAM F/U: HCPCS | Performed by: NURSE PRACTITIONER

## 2022-04-19 PROCEDURE — G0439 PPPS, SUBSEQ VISIT: HCPCS | Performed by: NURSE PRACTITIONER

## 2022-04-19 PROCEDURE — 1123F ACP DISCUSS/DSCN MKR DOCD: CPT | Performed by: NURSE PRACTITIONER

## 2022-04-19 PROCEDURE — 93000 ELECTROCARDIOGRAM COMPLETE: CPT | Performed by: NURSE PRACTITIONER

## 2022-04-19 SDOH — ECONOMIC STABILITY: FOOD INSECURITY: WITHIN THE PAST 12 MONTHS, YOU WORRIED THAT YOUR FOOD WOULD RUN OUT BEFORE YOU GOT MONEY TO BUY MORE.: NEVER TRUE

## 2022-04-19 SDOH — ECONOMIC STABILITY: FOOD INSECURITY: WITHIN THE PAST 12 MONTHS, THE FOOD YOU BOUGHT JUST DIDN'T LAST AND YOU DIDN'T HAVE MONEY TO GET MORE.: NEVER TRUE

## 2022-04-19 ASSESSMENT — ENCOUNTER SYMPTOMS
CONSTIPATION: 0
WHEEZING: 0
ABDOMINAL PAIN: 0
SHORTNESS OF BREATH: 0
EYE DISCHARGE: 0
EYE ITCHING: 0
STRIDOR: 0
ABDOMINAL DISTENTION: 0
BLOOD IN STOOL: 0
COUGH: 0
TROUBLE SWALLOWING: 0
VOMITING: 0
NAUSEA: 0
CHOKING: 0
DIARRHEA: 0
COLOR CHANGE: 0
SORE THROAT: 0

## 2022-04-19 ASSESSMENT — PATIENT HEALTH QUESTIONNAIRE - PHQ9
SUM OF ALL RESPONSES TO PHQ QUESTIONS 1-9: 0
SUM OF ALL RESPONSES TO PHQ QUESTIONS 1-9: 0
SUM OF ALL RESPONSES TO PHQ9 QUESTIONS 1 & 2: 0
2. FEELING DOWN, DEPRESSED OR HOPELESS: 0
1. LITTLE INTEREST OR PLEASURE IN DOING THINGS: 0
SUM OF ALL RESPONSES TO PHQ QUESTIONS 1-9: 0
SUM OF ALL RESPONSES TO PHQ QUESTIONS 1-9: 0

## 2022-04-19 ASSESSMENT — SOCIAL DETERMINANTS OF HEALTH (SDOH): HOW HARD IS IT FOR YOU TO PAY FOR THE VERY BASICS LIKE FOOD, HOUSING, MEDICAL CARE, AND HEATING?: NOT HARD AT ALL

## 2022-04-19 ASSESSMENT — LIFESTYLE VARIABLES: HOW OFTEN DO YOU HAVE A DRINK CONTAINING ALCOHOL: NEVER

## 2022-04-19 NOTE — PROGRESS NOTES
[address] [facility]  [address] [address] [address] [place] [address]  Dept: [phone]  Dept Fax: [phone]: [phone]    Priscilla Chavira (:  1945) is a 65 y.o. female,Established patient  with green, here for evaluation of the following chief complaint(s): Medicare AWV      Priscilla Chavira is a 65 y.o. female who presents today for her medical conditions/complaints as noted below. Priscilla Chavira is c/sam Medicare AWV        HPI:     Chief Complaint   Patient presents with    Medicare AWV     HPI   1. Kidney  Stones; She is passing them in her urostomy bag;  With history of bladder cancer   2. Pressure wound  ; She is in the bed most of the day; She wont get up; She likes to be isolated;     3. Atrial fib; Stable with coumadin; She has been on metoprolol for rate control  Rate is 50 in the office today;    4.   Hypertension  Stable with metoprolol    Past Medical History:   Diagnosis Date    Atrial fibrillation ([facility]     Cancer ([facility]     bladder    Hyperlipidemia     Hypertension       Past Surgical History:   Procedure Laterality Date    APPENDECTOMY      BLADDER SURGERY      HYSTERECTOMY, VAGINAL      KIDNEY REMOVAL      TOTAL HIP ARTHROPLASTY         Vitals 2022 10/18/2021 10/18/2021 2021 3/22/2021    SYSTOLIC [phone]   DIASTOLIC 70 96 96 72 79 79   Pulse 46 - 64 78 70 90   Temp - - 98 - - -   Resp - - - - - -   SpO2 97 - 97 95 - -   Weight - - 222 lb 212 lb - 250 lb   Height - - 5' 9\" 5' 8\" - 5' 9\"   Body mass index - - 32.78 kg/m2 32.23 kg/m2 - 36.91 kg/m2   Some recent data might be hidden       Family History   Problem Relation Age of Onset    Heart Disease Mother     Stroke Father     Cancer Sister        Social History     Tobacco Use    Smoking status: Never Smoker    Smokeless tobacco: Never Used   Substance Use Topics    Alcohol use: No      Current Outpatient Medications   Medication Sig Dispense Refill    silver sulfADIAZINE (SILVADENE) 1 % cream Apply topically daily. 50 g 2    metoprolol tartrate (LOPRESSOR) 25 MG tablet TAKE 1 TABLET BY MOUTH TWO TIMES A  tablet 1    atorvastatin (LIPITOR) 40 MG tablet TAKE 1 TABLET BY MOUTH DAILY 30 tablet 5    dilTIAZem (CARDIZEM CD) 180 MG extended release capsule TAKE 1 CAPSULE BY MOUTH DAILY 30 capsule 5    methIMAzole (TAPAZOLE) 10 MG tablet TAKE 1 TABLET BY MOUTH DAILY 30 tablet 5    vitamin D (ERGOCALCIFEROL) 1.25 MG (32648 UT) CAPS capsule TAKE 1 CAPSULE BY MOUTH WEEKLY 4 capsule 3    calcitRIOL (ROCALTROL) 0.25 MCG capsule Take 1 capsule by mouth daily 30 capsule 4    Ostomy Supplies (CHAO-FIT NATURA STOMAHESIVE) WAFR USE AS DIRECTED 10 Wafer 11    QUEtiapine (SEROQUEL) 25 MG tablet TAKE 1 TABLET BY MOUTH 2 TIMES DAILY 60 tablet 1    donepezil (ARICEPT) 5 MG tablet TAKE 1 TABLET BY MOUTH AT BEDTIME FOR MEMORY 30 tablet 3    Ostomy Supplies (CHAO-FIT NATURA UROSTOMY POUCH) Pouch MISC USE AS DIRECTED. 10 each 11    warfarin (COUMADIN) 7.5 MG tablet TAKE 1 TABLET BY MOUTH DAILY 30 tablet 5    furosemide (LASIX) 40 MG tablet TAKE 1 TABLET BY MOUTH EVERY DAY 90 tablet 4    warfarin (COUMADIN) 5 MG tablet TAKE AS DIRECTED 30 tablet 5    lisinopril (PRINIVIL;ZESTRIL) 10 MG tablet Take 10 mg by mouth      escitalopram (LEXAPRO) 10 MG tablet Take 1 tablet by mouth daily 30 tablet 3    acetaminophen (TYLENOL) 325 MG tablet Take 650 mg by mouth every 4 hours as needed for Pain      docusate sodium (COLACE) 100 MG capsule Take 100 mg by mouth 2 times daily as needed for Constipation      Ostomy Supplies (CHAO-FIT NATURA STOMAHESIVE) WAFR Use as directed 20 Wafer 11     No current facility-administered medications for this visit.      Allergies   Allergen Reactions    Other Anaphylaxis and Itching     Cloth bandaids , causes redness of skin    Ciprofloxacin Itching    Codeine Itching    Perflutren Lipid Microsphere Other (See Comments) Back pain    Tetanus Toxoids Itching     Itching around site    Tizanidine Hcl Itching    Tetanus Toxoid      Reaction: [id number] Maintenance   Topic Date Due    Shingles Vaccine (1 of 2) Never done    Depression Monitoring  12/15/2021    Annual Wellness Visit (AWV)  12/16/2021    DEXA (modify frequency per FRAX score)  06/28/2022 (Originally 4/13/1999)    COVID-19 Vaccine (1) 07/11/2022 (Originally 4/13/1950)    Lipid screen  10/18/2022    TSH testing  10/18/2022    Potassium monitoring  10/18/2022    Creatinine monitoring  10/18/2022    Flu vaccine  Completed    Pneumococcal 65+ years Vaccine  Completed    Hepatitis C screen  Completed    Hepatitis A vaccine  Aged Out    Hepatitis B vaccine  Aged Out    Hib vaccine  Aged Out    Meningococcal (ACWY) vaccine  Aged Out       Lab Results   Component Value Date    LABA1C 5.6 03/22/2021     No results found for: PSA, PSADIA  TSH   Date Value Ref Range Status   10/18/2021 0.385 0.270 - 4.200 uIU/mL Final   ]  Lab Results   Component Value Date     10/18/2021    K 4.9 10/18/2021     10/18/2021    CO2 22 10/18/2021    BUN 41 (H) 10/18/2021    CREATININE 1.4 (H) 10/18/2021    GLUCOSE 92 10/18/2021    CALCIUM 11.1 (H) 10/18/2021    PROT 7.9 10/18/2021    LABALBU 4.1 10/18/2021    BILITOT 0.4 10/18/2021    ALKPHOS 85 10/18/2021    AST 21 10/18/2021    ALT 20 10/18/2021    LABGLOM 36 (A) 10/18/2021    GFRAA 44 (L) 10/18/2021     Lab Results   Component Value Date    CHOL 105 (L) 10/18/2021    CHOL 104 (L) 06/28/2021    CHOL 97 (L) 12/07/2020     Lab Results   Component Value Date    TRIG 137 10/18/2021    TRIG 116 06/28/2021    TRIG 118 12/07/2020     Lab Results   Component Value Date    HDL 41 (L) 10/18/2021    HDL 43 (L) 06/28/2021    HDL 40 (L) 12/07/2020     Lab Results   Component Value Date    LDLCALC 37 10/18/2021    LDLCALC 38 06/28/2021    LDLCALC 33 12/07/2020     Lab Results   Component Value Date     10/18/2021    K 4.9 10/18/2021     10/18/2021    CO2 22 10/18/2021    BUN 41 10/18/2021    CREATININE 1.4 10/18/2021    GLUCOSE 92 10/18/2021    CALCIUM 11.1 10/18/2021      Lab Results   Component Value Date    WBC 5.6 04/19/2022    HGB 13.9 04/19/2022    HCT 43.6 04/19/2022    .6 (H) 04/19/2022     04/19/2022    LYMPHOPCT 15.5 (L) 04/19/2022    RBC 4.21 04/19/2022    MCH 33.0 (H) 04/19/2022    MCHC 31.9 (L) 04/19/2022    RDW 12.6 04/19/2022     Lab Results   Component Value Date    VITD25 67.7 10/18/2021     Labs reviewed from today     Subjective:      Review of Systems   Constitutional: Negative for fatigue, fever and unexpected weight change. HENT: Negative for ear discharge, ear pain, mouth sores, sore throat and trouble swallowing. Eyes: Negative for discharge, itching and visual disturbance. Respiratory: Negative for cough, choking, shortness of breath, wheezing and stridor. Cardiovascular: Negative for chest pain, palpitations and leg swelling. Gastrointestinal: Negative for abdominal distention, abdominal pain, blood in stool, constipation, diarrhea, nausea and vomiting. Endocrine: Negative for cold intolerance, polydipsia and polyuria. Genitourinary: Negative for difficulty urinating, dysuria, frequency and urgency. Urostomy   Musculoskeletal: Negative for arthralgias and gait problem. Skin: Positive for wound. Negative for color change and rash. Allergic/Immunologic: Negative for food allergies and immunocompromised state. Neurological: Negative for dizziness, tremors, syncope, speech difficulty, weakness and headaches. Hematological: Negative for adenopathy. Does not bruise/bleed easily. Psychiatric/Behavioral: Negative for confusion and hallucinations. Objective:     Physical Exam  Constitutional:       General: She is not in acute distress. Appearance: She is well-developed. HENT:      Head: Normocephalic and atraumatic.    Eyes: General: No scleral icterus. Right eye: No discharge. Left eye: No discharge. Pupils: Pupils are equal, round, and reactive to light. Neck:      Thyroid: No thyromegaly. Vascular: No JVD. Cardiovascular:      Rate and Rhythm: Bradycardia present. Rhythm irregular. Heart sounds: Normal heart sounds. No murmur heard. Comments: afib on BB rate is 50    Pulmonary:      Effort: Pulmonary effort is normal. No respiratory distress. Breath sounds: Normal breath sounds. No wheezing or rales. Abdominal:      General: Bowel sounds are normal. There is no distension. Palpations: Abdomen is soft. There is no mass. Tenderness: There is no abdominal tenderness. There is no guarding or rebound. Musculoskeletal:         General: No tenderness. Normal range of motion. Cervical back: Normal range of motion and neck supple. Skin:     General: Skin is warm and dry. Findings: No erythema or rash. Comments: SHe has stage I wound to both buttocks. Neurological:      Mental Status: She is alert and oriented to person, place, and time. Cranial Nerves: No cranial nerve deficit. Coordination: Coordination normal.      Deep Tendon Reflexes: Reflexes are normal and symmetric. Reflexes normal.   Psychiatric:         Mood and Affect: Mood is not depressed. Behavior: Behavior normal.         Thought Content:  Thought content normal.         Judgment: Judgment normal.     RHYTHM: Atrial fib  RATE: 52   NE INTERVAL: N/A  ECTOPY: None  ISCHEMIA: Some nonspecific ST wave abnormalities in the lateral leads    /70   Pulse (!) 46   SpO2 97%           Assessment:      Problem List     Alzheimer's dementia with behavioral disturbance (HCC)    Relevant Medications    escitalopram (LEXAPRO) 10 MG tablet    QUEtiapine (SEROQUEL) 25 MG tablet    donepezil (ARICEPT) 5 MG tablet    Atrial fibrillation (HCC)    Relevant Medications    dilTIAZem (CARDIZEM CD) 180 MG extended release capsule    Other Relevant Orders    CBC with Auto Differential    Comprehensive Metabolic Panel    Chronic deep vein thrombosis (DVT) of left lower extremity (HCC)    Essential hypertension    Relevant Medications    dilTIAZem (CARDIZEM CD) 180 MG extended release capsule    Other Relevant Orders    Lipid Panel    Urinalysis with Reflex to Culture    TSH    History of urostomy    Relevant Medications    Ostomy Supplies (CHAO-FIT NATURA STOMAHESIVE) WAFR    Ostomy Supplies (CHAO-FIT NATURA UROSTOMY POUCH) Pouch MISC    Other Relevant Orders    Stone Analysis    Nephrolithiasis - Primary    Relevant Orders    Stone Analysis    Recurrent major depressive disorder, in partial remission (HCC)    Relevant Medications    escitalopram (LEXAPRO) 10 MG tablet    QUEtiapine (SEROQUEL) 25 MG tablet    donepezil (ARICEPT) 5 MG tablet    Stage 3 chronic kidney disease (HCC)    Vitamin D deficiency    Relevant Medications    vitamin D (ERGOCALCIFEROL) 1.25 MG (47023 UT) CAPS capsule    Other Relevant Orders    Vitamin D 25 Hydroxy          Plan:        Patient given educational materials - see patient instructions. Discussed use, benefit, and side effects of prescribed medications. Allpatient questions answered. Pt voiced understanding. Reviewed health maintenance. Instructed to continue current medications, diet and exercise. Patient agreed with treatment plan. Follow up as directed. MEDICATIONS:  Orders Placed This Encounter   Medications    silver sulfADIAZINE (SILVADENE) 1 % cream     Sig: Apply topically daily. Dispense:  50 g     Refill:  2         ORDERS:  Orders Placed This Encounter   Procedures    Stone Analysis    CBC with Auto Differential    Comprehensive Metabolic Panel    Lipid Panel    Vitamin D 25 Hydroxy    Urinalysis with Reflex to Culture    TSH    EKG 12 lead       Follow-up:  Return in about 6 months (around 10/19/2022) for have labs done prior to appt.     PATIENT INSTRUCTIONS:  Patient Instructions   1. Kidney stones  Collect and bring to the lab  2. Pressure wound to buttocks;  Silvadene at least twice dailly   3. Atrial fib stable with coumadin but her rate is slowed from the 70s to 50s so we will just need to cut back her metoprolol  4. Hypertension we will cut metoprolol in half to take 12.5 twice daily     Electronically signed by MARIELOS Duron on 4/19/2022 at 12:17 PM      EMRDragon/transcription disclaimer:  Much of this encounter note is electronic transcription/translation of spoken language to printed texts. The electronic translation of spoken language may be erroneous, or at times,nonsensical words or phrases may be inadvertently transcribed. Although I have reviewed the note for such errors, some may still exist.    Medicare Annual Wellness Visit    Maria Fernanda Jones is here for Medicare AW    Assessment & Plan   Nephrolithiasis  -     Stone Analysis; Future  Alzheimer's dementia with behavioral disturbance, unspecified timing of dementia onset ([facility] [address]  Chronic diastolic heart failure (HCC)  -     EKG 12 lead  Recurrent major depressive disorder, in partial remission (HCC)  Chronic deep vein thrombosis (DVT) of left lower extremity, unspecified vein (HCC)  Atrial fibrillation, unspecified type (HCC)  -     CBC with Auto Differential; Future  -     Comprehensive Metabolic Panel; Future  Stage 3a chronic kidney disease ([facility] [address]  History of urostomy  -     [employer]; Future  Essential hypertension  -     Lipid Panel; Future  -     Urinalysis with Reflex to Culture; Future  -     TSH; Future  Vitamin D deficiency  -     Vitamin D 25 Hydroxy; Future      Recommendations for Preventive Services Due: see orders and patient instructions/AVS.  Recommended screening schedule for the next 5-10 years is provided to the patient in written form: see Patient Instructions/AVS.     Return in about 6 months (around 10/19/2022) for have labs done prior to appt. Subjective   1. Atrial fib on BBb and coumadin  ; they no longer see cardiology   2. Hypertension  jus the metoprolol is all she is on     Patient's complete Health Risk Assessment and screening values have been reviewed and are found in Flowsheets. The following problems were reviewed today and where indicated follow up appointments were made and/or referrals ordered. Positive Risk Factor Screenings with Interventions:               General Health and ACP:  General  In general, how would you say your health is?: Fair  In the past 7 days, have you experienced any of the following: New or Increased Pain, New or Increased Fatigue, Loneliness, Social Isolation, Stress or Anger?: No  Do you get the social and emotional support that you need?: Yes  Do you have a Living Will?: Yes    Advance Directives     Power of  Living Will ACP-Advance Directive ACP-Power of     Not on File Not on File Not on File Not on File      General Health Risk Interventions:  · na    Health Habits/Nutrition:     Physical Activity: Inactive    Days of Exercise per Week: 0 days    Minutes of Exercise per Session: 0 min     Have you lost any weight without trying in the past 3 months?: No     Have you seen the dentist within the past year?: Yes    Health Habits/Nutrition Interventions:  · na             Objective   Vitals:    04/19/22 1125   BP: 112/70   Pulse: (!) 46   SpO2: 97%      There is no height or weight on file to calculate BMI. Allergies   Allergen Reactions    Other Anaphylaxis and Itching     Cloth bandaids , causes redness of skin    Ciprofloxacin Itching    Codeine Itching    Perflutren Lipid Microsphere Other (See Comments)     Back pain    Tetanus Toxoids Itching     Itching around site    Tizanidine Hcl Itching    Tetanus Toxoid      Reaction: ITCHING AROUND SITE     Prior to Visit Medications    Medication Sig Taking?  Authorizing Provider   silver sulfADIAZINE (SILVADENE) 1 % cream Apply topically daily. Yes Upperco Yoo, APRN   metoprolol tartrate (LOPRESSOR) 25 MG tablet TAKE 1 TABLET BY MOUTH TWO TIMES A DAY  Upperco Yoo, APRN   atorvastatin (LIPITOR) 40 MG tablet TAKE 1 TABLET BY MOUTH DAILY  Upperco Yoo, APRN   dilTIAZem (CARDIZEM CD) 180 MG extended release capsule TAKE 1 CAPSULE BY MOUTH DAILY  Upperco Yoo, APRN   methIMAzole (TAPAZOLE) 10 MG tablet TAKE 1 TABLET BY MOUTH DAILY  Upperco Yoo, APRN   vitamin D (ERGOCALCIFEROL) 1.25 MG (59887 UT) CAPS capsule TAKE 1 CAPSULE BY MOUTH WEEKLY  Upperco Yoo, APRN   calcitRIOL (ROCALTROL) 0.25 MCG capsule Take 1 capsule by mouth daily  Upperco Yoo, APRN   Ostomy Supplies (CHAO-FIT NATURA STOMAHESIVE) [place] USE AS DIRECTED  Upperco Yoo APRN   QUEtiapine (SEROQUEL) 25 MG tablet TAKE 1 TABLET BY MOUTH 2 TIMES DAILY  Upperco Yoo, APRN   donepezil (ARICEPT) 5 MG tablet TAKE 1 TABLET BY MOUTH AT BEDTIME FOR MEMORY  Upperco Yoo, APRN   Ostomy Supplies (CHAO-FIT NATURA UROSTOMY POUCH) Pouch MISC USE AS DIRECTED.   Upperco Yoo APRN   warfarin (COUMADIN) 7.5 MG tablet TAKE 1 TABLET BY MOUTH DAILY  Upperco Yoo, APRN   furosemide (LASIX) 40 MG tablet TAKE 1 TABLET BY MOUTH EVERY DAY  Upperco Yoo, APRN   warfarin (COUMADIN) 5 MG tablet TAKE AS DIRECTED  Upperco Yoo, APRKWAME   lisinopril (PRINIVIL;ZESTRIL) 10 MG tablet Take 10 mg by mouth  Historical Provider, MD   escitalopram (LEXAPRO) 10 MG tablet Take 1 tablet by mouth daily  Upperco Yoo APRKWAME   acetaminophen (TYLENOL) 325 MG tablet Take 650 mg by mouth every 4 hours as needed for Pain  Historical Provider, MD   docusate sodium (COLACE) 100 MG capsule Take 100 mg by mouth 2 times daily as needed for Constipation  Historical Provider, MD   Ostomy Supplies (CHAO-FIT [place]) [place] Use as directed  Upperco YooMARIELOS       CareTeam (Including outside providers/suppliers regularly involved in providing care):   Patient Care Team:  MARIELOS Mustafa as PCP - General (Family Nurse Practitioner)  MARIELOS Goodrich as PCP - [facility] Empaneled Provider    Reviewed and updated this visit:  Tobacco  Allergies  Meds  Med Hx  Surg Hx  Soc Hx  Fam Hx

## 2022-04-19 NOTE — PATIENT INSTRUCTIONS
1.  Kidney stones  Collect and bring to the lab  2. Pressure wound to buttocks;  Silvadene at least twice dailly   3. Atrial fib stable with coumadin but her rate is slowed from the 70s to 50s so we will just need to cut back her metoprolol  4. Hypertension we will cut metoprolol in half to take 12.5 twice daily   Personalized Preventive Plan for Sophy Fischer - 4/19/2022  Medicare offers a range of preventive health benefits. Some of the tests and screenings are paid in full while other may be subject to a deductible, co-insurance, and/or copay. Some of these benefits include a comprehensive review of your medical history including lifestyle, illnesses that may run in your family, and various assessments and screenings as appropriate. After reviewing your medical record and screening and assessments performed today your provider may have ordered immunizations, labs, imaging, and/or referrals for you. A list of these orders (if applicable) as well as your Preventive Care list are included within your After Visit Summary for your review. Other Preventive Recommendations:    · A preventive eye exam performed by an eye specialist is recommended every 1-2 years to screen for glaucoma; cataracts, macular degeneration, and other eye disorders. · A preventive dental visit is recommended every 6 months. · Try to get at least 150 minutes of exercise per week or 10,000 steps per day on a pedometer . · Order or download the FREE \"Exercise & Physical Activity: Your Everyday Guide\" from [employer]. Call 6-829.669.9227 or search [employer] online. · You need 0381-4765 mg of calcium and 1456-8702 IU of vitamin D per day. It is possible to meet your calcium requirement with diet alone, but a vitamin D supplement is usually necessary to meet this goal.  · When exposed to the sun, use a sunscreen that protects against both UVA and UVB radiation with an SPF of 30 or greater. Reapply every 2 to 3 hours or after sweating, drying off with a towel, or swimming. · Always wear a seat belt when traveling in a car. Always wear a helmet when riding a bicycle or motorcycle.

## 2022-04-25 ENCOUNTER — ANTI-COAG VISIT (OUTPATIENT)
Dept: PRIMARY CARE CLINIC | Age: 77
End: 2022-04-25
Payer: MEDICARE

## 2022-04-25 DIAGNOSIS — Z86.718 HISTORY OF DVT (DEEP VEIN THROMBOSIS): Primary | ICD-10-CM

## 2022-04-25 LAB — INR BLD: 2.7

## 2022-04-25 PROCEDURE — 93793 ANTICOAG MGMT PT WARFARIN: CPT | Performed by: NURSE PRACTITIONER

## 2022-04-25 RX ORDER — WARFARIN SODIUM 5 MG/1
TABLET ORAL
Qty: 30 TABLET | Refills: 5 | Status: SHIPPED | OUTPATIENT
Start: 2022-04-25 | End: 2022-10-19

## 2022-04-25 NOTE — PROGRESS NOTES
HOME MONITORING REPORT    INR today:   Results for orders placed or performed in visit on 04/25/22   Protime-INR   Result Value Ref Range    INR 2.70        INR Goal: 2.0-3.0    Dosing Plan  As of 4/25/2022    TTR:  51.4 % (3.8 y)   Full warfarin instructions:  5 mg every Wed, Fri; 7.5 mg all other days              PLAN: Patient aware to continue current dose and recheck in one week or as ordered. I have reviewed nursing plan for Coumadin management and agree with plan.

## 2022-05-02 ENCOUNTER — ANTI-COAG VISIT (OUTPATIENT)
Dept: PRIMARY CARE CLINIC | Age: 77
End: 2022-05-02
Payer: MEDICARE

## 2022-05-02 DIAGNOSIS — Z86.718 HISTORY OF DVT (DEEP VEIN THROMBOSIS): Primary | ICD-10-CM

## 2022-05-02 LAB — INR BLD: 2.3

## 2022-05-02 PROCEDURE — 93793 ANTICOAG MGMT PT WARFARIN: CPT | Performed by: NURSE PRACTITIONER

## 2022-05-02 NOTE — PROGRESS NOTES
HOME MONITORING REPORT    INR today:   Results for orders placed or performed in visit on 05/02/22   Protime-INR   Result Value Ref Range    INR 2.30        INR Goal: 2.0-3.0    Dosing Plan  As of 5/2/2022    TTR:  51.7 % (3.8 y)   Full warfarin instructions:  5 mg every Wed, Fri; 7.5 mg all other days              PLAN: Patient aware to continue current dose and recheck in one week or as ordered. I have reviewed nursing plan for Coumadin management and agree with plan.

## 2022-05-11 LAB — INR BLD: 3.4

## 2022-05-12 ENCOUNTER — ANTI-COAG VISIT (OUTPATIENT)
Dept: PRIMARY CARE CLINIC | Age: 77
End: 2022-05-12
Payer: MEDICARE

## 2022-05-12 DIAGNOSIS — Z86.718 HISTORY OF DVT (DEEP VEIN THROMBOSIS): Primary | ICD-10-CM

## 2022-05-12 PROCEDURE — 93793 ANTICOAG MGMT PT WARFARIN: CPT | Performed by: NURSE PRACTITIONER

## 2022-05-12 NOTE — PROGRESS NOTES
HOME MONITORING REPORT    INR today:   Results for orders placed or performed in visit on 05/12/22   Protime-INR   Result Value Ref Range    INR 3.40        INR Goal: 2.0-3.0    Dosing Plan  As of 5/12/2022    TTR:  51.7 % (3.8 y)   Full warfarin instructions:  5 mg every Wed, Fri; 7.5 mg all other days          Patient's daughter John Rubin told her to hold her dose last night. PLAN: Advised patient/caregiver to continue current dose and recheck in one week. Patient/Caregiver voiced understanding  I have reviewed nursing plan for Coumadin management and agree with plan.

## 2022-05-16 RX ORDER — DONEPEZIL HYDROCHLORIDE 5 MG/1
TABLET, FILM COATED ORAL
Qty: 30 TABLET | Refills: 3 | Status: SHIPPED | OUTPATIENT
Start: 2022-05-16 | End: 2022-09-21

## 2022-05-16 NOTE — TELEPHONE ENCOUNTER
Tawana King called to request a refill on her medication.       Last office visit : 4/19/2022   Next office visit : 7/19/2022     Requested Prescriptions     Pending Prescriptions Disp Refills    donepezil (ARICEPT) 5 MG tablet [Pharmacy Med Name: DONEPEZIL HCL 5 MG TABS 5 Tablet] 30 tablet 3     Sig: TAKE 1 TABLET BY MOUTH AT BEDTIME FOR MEMORY            Sheila Marie MA

## 2022-05-29 LAB — INR BLD: 2.2

## 2022-05-31 ENCOUNTER — ANTI-COAG VISIT (OUTPATIENT)
Dept: INTERNAL MEDICINE | Age: 77
End: 2022-05-31
Payer: MEDICARE

## 2022-05-31 DIAGNOSIS — Z86.718 HISTORY OF DVT (DEEP VEIN THROMBOSIS): Primary | ICD-10-CM

## 2022-05-31 PROCEDURE — 93793 ANTICOAG MGMT PT WARFARIN: CPT | Performed by: NURSE PRACTITIONER

## 2022-05-31 NOTE — PATIENT INSTRUCTIONS
PLAN: Advised patient/caregiver to continue current dose and recheck in one week.   Patient/Caregiver voiced understanding

## 2022-05-31 NOTE — PROGRESS NOTES
HOME MONITORING REPORT    INR today:   Results for orders placed or performed in visit on 05/31/22   Protime-INR   Result Value Ref Range    INR 2.20        INR Goal: 2.0-3.0    Dosing Plan  As of 5/31/2022    TTR:  51.9 % (3.9 y)   Full warfarin instructions:  5 mg every Wed, Fri; 7.5 mg all other days              PLAN: Attempted to call pt to let her know to stay on the same dose. There was no answer and no VM set up. Will try again.

## 2022-06-10 ENCOUNTER — ANTI-COAG VISIT (OUTPATIENT)
Dept: INTERNAL MEDICINE | Age: 77
End: 2022-06-10
Payer: MEDICARE

## 2022-06-10 DIAGNOSIS — Z86.718 HISTORY OF DVT (DEEP VEIN THROMBOSIS): Primary | ICD-10-CM

## 2022-06-10 LAB — INR BLD: 2.9

## 2022-06-10 PROCEDURE — 93793 ANTICOAG MGMT PT WARFARIN: CPT | Performed by: NURSE PRACTITIONER

## 2022-06-10 NOTE — PROGRESS NOTES
HOME MONITORING REPORT    INR today:   Results for orders placed or performed in visit on 06/10/22   Protime-INR   Result Value Ref Range    INR 2.90        INR Goal: 2.0-3.0    Dosing Plan  As of 6/10/2022    TTR:  52.3 % (3.9 y)   Full warfarin instructions:  5 mg every Wed, Fri; 7.5 mg all other days              PLAN: Advised patient/caregiver to continue current dose and recheck in one week. Patient/Caregiver voiced understanding  I have reviewed nursing plan for Coumadin management and agree with plan.

## 2022-06-20 RX ORDER — FUROSEMIDE 40 MG/1
TABLET ORAL
Qty: 90 TABLET | Refills: 1 | Status: SHIPPED | OUTPATIENT
Start: 2022-06-20

## 2022-06-20 NOTE — TELEPHONE ENCOUNTER
Yanna Kearns called requesting a refill of the below medication which has been pended for you:     Requested Prescriptions     Pending Prescriptions Disp Refills    furosemide (LASIX) 40 MG tablet [Pharmacy Med Name: FUROSEMIDE 40 MG TABS 40 Tablet] 90 tablet 1     Sig: TAKE 1 TABLET BY MOUTH EVERY DAY       Last Appointment Date: 4/19/2022  Next Appointment Date: 7/19/2022    Allergies   Allergen Reactions    Other Anaphylaxis and Itching     Cloth bandaids , causes redness of skin    Ciprofloxacin Itching    Codeine Itching    Perflutren Lipid Microsphere Other (See Comments)     Back pain    Tetanus Toxoids Itching     Itching around site    Tizanidine Hcl Itching    Tetanus Toxoid      Reaction: [address]

## 2022-06-22 RX ORDER — QUETIAPINE FUMARATE 25 MG/1
25 TABLET, FILM COATED ORAL 2 TIMES DAILY
Qty: 60 TABLET | Refills: 1 | Status: SHIPPED | OUTPATIENT
Start: 2022-06-22 | End: 2022-09-13

## 2022-06-22 NOTE — TELEPHONE ENCOUNTER
Algie Marion called requesting a refill of the below medication which has been pended for you:     Requested Prescriptions     Pending Prescriptions Disp Refills    QUEtiapine (SEROQUEL) 25 MG tablet [Pharmacy Med Name: QUETIAPINE 25 MG T 25 Tablet] 60 tablet 1     Sig: TAKE 1 TABLET BY MOUTH 2 TIMES DAILY       Last Appointment Date: 4/19/2022  Next Appointment Date: 7/19/2022    Allergies   Allergen Reactions    Other Anaphylaxis and Itching     Cloth bandaids , causes redness of skin    Ciprofloxacin Itching    Codeine Itching    Perflutren Lipid Microsphere Other (See Comments)     Back pain    Tetanus Toxoids Itching     Itching around site    Tizanidine Hcl Itching    Tetanus Toxoid      Reaction: [id number]

## 2022-06-26 LAB — INR BLD: 3

## 2022-06-27 ENCOUNTER — ANTI-COAG VISIT (OUTPATIENT)
Dept: INTERNAL MEDICINE | Age: 77
End: 2022-06-27
Payer: MEDICARE

## 2022-06-27 DIAGNOSIS — Z86.718 HISTORY OF DVT (DEEP VEIN THROMBOSIS): Primary | ICD-10-CM

## 2022-06-27 PROCEDURE — 93793 ANTICOAG MGMT PT WARFARIN: CPT | Performed by: NURSE PRACTITIONER

## 2022-06-27 NOTE — PROGRESS NOTES
HOME MONITORING REPORT    INR today:   Results for orders placed or performed in visit on 06/27/22   Protime-INR   Result Value Ref Range    INR 3.00        INR Goal: 2.0-3.0    Dosing Plan  As of 6/27/2022    TTR:  52.9 % (3.9 y)   Full warfarin instructions:  5 mg every Wed, Fri; 7.5 mg all other days              PLAN: Advised patient/caregiver to continue current dose and recheck in one week. Patient/Caregiver voiced understanding  I have reviewed nursing plan for Coumadin management and agree with plan.

## 2022-07-16 LAB — INR BLD: 2.7

## 2022-07-18 ENCOUNTER — ANTI-COAG VISIT (OUTPATIENT)
Dept: INTERNAL MEDICINE | Age: 77
End: 2022-07-18
Payer: MEDICARE

## 2022-07-18 DIAGNOSIS — Z86.718 HISTORY OF DVT (DEEP VEIN THROMBOSIS): Primary | ICD-10-CM

## 2022-07-18 PROCEDURE — 93793 ANTICOAG MGMT PT WARFARIN: CPT | Performed by: NURSE PRACTITIONER

## 2022-07-18 NOTE — PROGRESS NOTES
HOME MONITORING REPORT    INR today:   Results for orders placed or performed in visit on 07/18/22   Protime-INR   Result Value Ref Range    INR 2.70        INR Goal: 2.0-3.0    Dosing Plan  As of 7/18/2022      TTR:  53.4 % (4 y)   Full warfarin instructions:  5 mg every Wed, Fri; 7.5 mg all other days                PLAN: Advised patient/caregiver to continue current dose and recheck in one week. Patient/Caregiver voiced understanding   I have reviewed nursing plan for Coumadin management and agree with plan.

## 2022-08-01 ENCOUNTER — APPOINTMENT (OUTPATIENT)
Dept: GENERAL RADIOLOGY | Facility: HOSPITAL | Age: 77
End: 2022-08-01

## 2022-08-01 ENCOUNTER — TELEPHONE (OUTPATIENT)
Dept: INTERNAL MEDICINE | Age: 77
End: 2022-08-01

## 2022-08-01 ENCOUNTER — APPOINTMENT (OUTPATIENT)
Dept: CT IMAGING | Facility: HOSPITAL | Age: 77
End: 2022-08-01

## 2022-08-01 ENCOUNTER — NURSE TRIAGE (OUTPATIENT)
Dept: CALL CENTER | Facility: HOSPITAL | Age: 77
End: 2022-08-01

## 2022-08-01 ENCOUNTER — HOSPITAL ENCOUNTER (EMERGENCY)
Facility: HOSPITAL | Age: 77
Discharge: HOME | End: 2022-08-01
Attending: STUDENT IN AN ORGANIZED HEALTH CARE EDUCATION/TRAINING PROGRAM | Admitting: STUDENT IN AN ORGANIZED HEALTH CARE EDUCATION/TRAINING PROGRAM

## 2022-08-01 VITALS
WEIGHT: 220 LBS | OXYGEN SATURATION: 94 % | SYSTOLIC BLOOD PRESSURE: 146 MMHG | TEMPERATURE: 98.5 F | RESPIRATION RATE: 20 BRPM | BODY MASS INDEX: 30.8 KG/M2 | HEIGHT: 71 IN | DIASTOLIC BLOOD PRESSURE: 87 MMHG | HEART RATE: 73 BPM

## 2022-08-01 DIAGNOSIS — U07.1 COVID: Primary | ICD-10-CM

## 2022-08-01 DIAGNOSIS — U07.1 COVID-19: Primary | ICD-10-CM

## 2022-08-01 LAB
ALBUMIN SERPL-MCNC: 3.4 G/DL (ref 3.5–5.2)
ALBUMIN/GLOB SERPL: 0.9 G/DL
ALP SERPL-CCNC: 69 U/L (ref 39–117)
ALT SERPL W P-5'-P-CCNC: 20 U/L (ref 1–33)
ANION GAP SERPL CALCULATED.3IONS-SCNC: 8 MMOL/L (ref 5–15)
APTT PPP: 46.6 SECONDS (ref 24.1–35)
AST SERPL-CCNC: 28 U/L (ref 1–32)
BACTERIA UR QL AUTO: ABNORMAL /HPF
BASOPHILS # BLD AUTO: 0.01 10*3/MM3 (ref 0–0.2)
BASOPHILS NFR BLD AUTO: 0.3 % (ref 0–1.5)
BILIRUB SERPL-MCNC: 0.2 MG/DL (ref 0–1.2)
BILIRUB UR QL STRIP: NEGATIVE
BUN SERPL-MCNC: 38 MG/DL (ref 8–23)
BUN/CREAT SERPL: 22.4 (ref 7–25)
CALCIUM SPEC-SCNC: 10.2 MG/DL (ref 8.6–10.5)
CHLORIDE SERPL-SCNC: 108 MMOL/L (ref 98–107)
CLARITY UR: ABNORMAL
CO2 SERPL-SCNC: 28 MMOL/L (ref 22–29)
COLOR UR: ABNORMAL
CREAT SERPL-MCNC: 1.7 MG/DL (ref 0.57–1)
CYSTINE CRY URNS QL MICRO: ABNORMAL /HPF
D-LACTATE SERPL-SCNC: 0.9 MMOL/L (ref 0.5–2)
DEPRECATED RDW RBC AUTO: 50.6 FL (ref 37–54)
EGFRCR SERPLBLD CKD-EPI 2021: 30.6 ML/MIN/1.73
EOSINOPHIL # BLD AUTO: 0.04 10*3/MM3 (ref 0–0.4)
EOSINOPHIL NFR BLD AUTO: 1.2 % (ref 0.3–6.2)
ERYTHROCYTE [DISTWIDTH] IN BLOOD BY AUTOMATED COUNT: 13.2 % (ref 12.3–15.4)
GLOBULIN UR ELPH-MCNC: 3.7 GM/DL
GLUCOSE SERPL-MCNC: 97 MG/DL (ref 65–99)
GLUCOSE UR STRIP-MCNC: ABNORMAL MG/DL
HCT VFR BLD AUTO: 41.9 % (ref 34–46.6)
HGB BLD-MCNC: 13.5 G/DL (ref 12–15.9)
HGB UR QL STRIP.AUTO: ABNORMAL
HYALINE CASTS UR QL AUTO: ABNORMAL /LPF
IMM GRANULOCYTES # BLD AUTO: 0.01 10*3/MM3 (ref 0–0.05)
IMM GRANULOCYTES NFR BLD AUTO: 0.3 % (ref 0–0.5)
INR PPP: 2.7 (ref 0.91–1.09)
KETONES UR QL STRIP: NEGATIVE
LEUKOCYTE ESTERASE UR QL STRIP.AUTO: ABNORMAL
LYMPHOCYTES # BLD AUTO: 0.8 10*3/MM3 (ref 0.7–3.1)
LYMPHOCYTES NFR BLD AUTO: 24.7 % (ref 19.6–45.3)
MCH RBC QN AUTO: 33.3 PG (ref 26.6–33)
MCHC RBC AUTO-ENTMCNC: 32.2 G/DL (ref 31.5–35.7)
MCV RBC AUTO: 103.5 FL (ref 79–97)
MONOCYTES # BLD AUTO: 0.69 10*3/MM3 (ref 0.1–0.9)
MONOCYTES NFR BLD AUTO: 21.3 % (ref 5–12)
NEUTROPHILS NFR BLD AUTO: 1.69 10*3/MM3 (ref 1.7–7)
NEUTROPHILS NFR BLD AUTO: 52.2 % (ref 42.7–76)
NITRITE UR QL STRIP: NEGATIVE
NRBC BLD AUTO-RTO: 0 /100 WBC (ref 0–0.2)
PH UR STRIP.AUTO: >=9 [PH] (ref 5–8)
PLATELET # BLD AUTO: 108 10*3/MM3 (ref 140–450)
PMV BLD AUTO: 9.2 FL (ref 6–12)
POTASSIUM SERPL-SCNC: 4.8 MMOL/L (ref 3.5–5.2)
PROT SERPL-MCNC: 7.1 G/DL (ref 6–8.5)
PROT UR QL STRIP: ABNORMAL
PROTHROMBIN TIME: 27.7 SECONDS (ref 11.9–14.6)
RBC # BLD AUTO: 4.05 10*6/MM3 (ref 3.77–5.28)
RBC # UR STRIP: ABNORMAL /HPF
REF LAB TEST METHOD: ABNORMAL
SARS-COV-2 RNA PNL SPEC NAA+PROBE: DETECTED
SODIUM SERPL-SCNC: 144 MMOL/L (ref 136–145)
SP GR UR STRIP: 1.02 (ref 1–1.03)
SQUAMOUS #/AREA URNS HPF: ABNORMAL /HPF
TRI-PHOS CRY URNS QL MICRO: ABNORMAL /HPF
TROPONIN T SERPL-MCNC: <0.01 NG/ML (ref 0–0.03)
UROBILINOGEN UR QL STRIP: ABNORMAL
WBC # UR STRIP: ABNORMAL /HPF
WBC NRBC COR # BLD: 3.24 10*3/MM3 (ref 3.4–10.8)

## 2022-08-01 PROCEDURE — 36415 COLL VENOUS BLD VENIPUNCTURE: CPT

## 2022-08-01 PROCEDURE — 83605 ASSAY OF LACTIC ACID: CPT | Performed by: NURSE PRACTITIONER

## 2022-08-01 PROCEDURE — 80053 COMPREHEN METABOLIC PANEL: CPT | Performed by: NURSE PRACTITIONER

## 2022-08-01 PROCEDURE — P9612 CATHETERIZE FOR URINE SPEC: HCPCS

## 2022-08-01 PROCEDURE — 84484 ASSAY OF TROPONIN QUANT: CPT | Performed by: NURSE PRACTITIONER

## 2022-08-01 PROCEDURE — 85730 THROMBOPLASTIN TIME PARTIAL: CPT | Performed by: NURSE PRACTITIONER

## 2022-08-01 PROCEDURE — 93010 ELECTROCARDIOGRAM REPORT: CPT | Performed by: INTERNAL MEDICINE

## 2022-08-01 PROCEDURE — 70450 CT HEAD/BRAIN W/O DYE: CPT

## 2022-08-01 PROCEDURE — 71045 X-RAY EXAM CHEST 1 VIEW: CPT

## 2022-08-01 PROCEDURE — 93005 ELECTROCARDIOGRAM TRACING: CPT | Performed by: NURSE PRACTITIONER

## 2022-08-01 PROCEDURE — 99283 EMERGENCY DEPT VISIT LOW MDM: CPT

## 2022-08-01 PROCEDURE — 85025 COMPLETE CBC W/AUTO DIFF WBC: CPT | Performed by: NURSE PRACTITIONER

## 2022-08-01 PROCEDURE — 81001 URINALYSIS AUTO W/SCOPE: CPT | Performed by: NURSE PRACTITIONER

## 2022-08-01 PROCEDURE — 87635 SARS-COV-2 COVID-19 AMP PRB: CPT | Performed by: NURSE PRACTITIONER

## 2022-08-01 PROCEDURE — 85610 PROTHROMBIN TIME: CPT | Performed by: NURSE PRACTITIONER

## 2022-08-01 RX ORDER — AZITHROMYCIN 250 MG/1
250 TABLET, FILM COATED ORAL SEE ADMIN INSTRUCTIONS
Qty: 6 TABLET | Refills: 0 | Status: SHIPPED | OUTPATIENT
Start: 2022-08-01 | End: 2022-08-06

## 2022-08-01 RX ORDER — METHYLPREDNISOLONE 4 MG/1
TABLET ORAL
Qty: 1 KIT | Refills: 0 | Status: SHIPPED | OUTPATIENT
Start: 2022-08-01 | End: 2022-08-07

## 2022-08-01 RX ORDER — QUETIAPINE FUMARATE 25 MG/1
25 TABLET, FILM COATED ORAL NIGHTLY
COMMUNITY

## 2022-08-01 RX ORDER — CALCITRIOL 0.25 UG/1
CAPSULE, LIQUID FILLED ORAL
Qty: 30 CAPSULE | Refills: 4 | Status: SHIPPED | OUTPATIENT
Start: 2022-08-01

## 2022-08-01 NOTE — TELEPHONE ENCOUNTER
Pt's daughter Dorie Merino stated COVID Mild symptoms Started 07-28 Low Grade fever , Congestion . Done 2 at home tests both positive would like to know what she should do and if she could get something called in to [facility] in [place].

## 2022-08-01 NOTE — TELEPHONE ENCOUNTER
"Caller states patient has had covid s/s since 07/28/22, tested positive for covid per home test on 07/31/22. [facility] patient is somewhat lethargic today, O2 sats are staying at 89-90% on home O2 at rest.  also has fever of 100.0 after Tylenol, sore throat, loose cough. Instructed per Care Advice- will take immediately to ER for evaluation.    Reason for Disposition  • Oxygen level (e.g., pulse oximetry) 90 percent or lower    Additional Information  • Negative: SEVERE difficulty breathing (e.g., struggling for each breath, speaks in single words)  • Negative: Difficult to awaken or acting confused (e.g., disoriented, slurred speech)  • Negative: Bluish (or gray) lips or face now  • Negative: Shock suspected (e.g., cold/pale/clammy skin, too weak to stand, low BP, rapid pulse)  • Negative: Sounds like a life-threatening emergency to the triager  • Negative: [1] Diagnosed or suspected COVID-19 AND [2] symptoms lasting 3 or more weeks  • Negative: [1] COVID-19 exposure AND [2] no symptoms  • Negative: COVID-19 vaccine reaction suspected (e.g., fever, headache, muscle aches) occurring 1 to 3 days after getting vaccine  • Negative: COVID-19 vaccine, questions about  • Negative: [1] Lives with someone known to have influenza (flu test positive) AND [2] flu-like symptoms (e.g., cough, runny nose, sore throat, SOB; with or without fever)  • Negative: [1] Adult with possible COVID-19 symptoms AND [2] triager concerned about severity of symptoms or other causes  • Negative: COVID-19 and breastfeeding, questions about  • Negative: SEVERE or constant chest pain or pressure  (Exception: Mild central chest pain, present only when coughing.)  • Negative: MODERATE difficulty breathing (e.g., speaks in phrases, SOB even at rest, pulse 100-120)  • Negative: [1] Headache AND [2] stiff neck (can't touch chin to chest)    Answer Assessment - Initial Assessment Questions  1. COVID-19 DIAGNOSIS: \"Who made your COVID-19 " "diagnosis?\" \"Was it confirmed by a positive lab test or self-test?\" If not diagnosed by a doctor (or NP/PA), ask \"Are there lots of cases (community spread) where you live?\" Note: See public health department website, if unsure.      Positive test 07/31/22.  2. COVID-19 EXPOSURE: \"Was there any known exposure to COVID before the symptoms began?\" CDC Definition of close contact: within 6 feet (2 meters) for a total of 15 minutes or more over a 24-hour period.       unknown  3. ONSET: \"When did the COVID-19 symptoms start?\"       07/28/22.  4. WORST SYMPTOM: \"What is your worst symptom?\" (e.g., cough, fever, shortness of breath, muscle aches)      Concerned about O2 sats.  5. COUGH: \"Do you have a cough?\" If Yes, ask: \"How bad is the cough?\"        yes  6. FEVER: \"Do you have a fever?\" If Yes, ask: \"What is your temperature, how was it measured, and when did it start?\"      100.0  7. RESPIRATORY STATUS: \"Describe your breathing?\" (e.g., shortness of breath, wheezing, unable to speak)       Using home O2-O2 sats staying at 89-90% with 2L O2.  8. BETTER-SAME-WORSE: \"Are you getting better, staying the same or getting worse compared to yesterday?\"  If getting worse, ask, \"In what way?\"      worse  9. HIGH RISK DISEASE: \"Do you have any chronic medical problems?\" (e.g., asthma, heart or lung disease, weak immune system, obesity, etc.)      Heart and lung disease, kidney disease.  10. VACCINE: \"Have you had the COVID-19 vaccine?\" If Yes, ask: \"Which one, how many shots, when did you get it?\"        no  11. BOOSTER: \"Have you received your COVID-19 booster?\" If Yes, ask: \"Which one and when did you get it?\"        na  12. PREGNANCY: \"Is there any chance you are pregnant?\" \"When was your last menstrual period?\"        na  13. OTHER SYMPTOMS: \"Do you have any other symptoms?\"  (e.g., chills, fatigue, headache, loss of smell or taste, muscle pain, sore throat)        Sore throat.  14. O2 SATURATION MONITOR:  \"Do you use an " "oxygen saturation monitor (pulse oximeter) at home?\" If Yes, ask \"What is your reading (oxygen level) today?\" \"What is your usual oxygen saturation reading?\" (e.g., 95%)        Yes-89-90% on home O2.    Protocols used: CORONAVIRUS (COVID-19) DIAGNOSED OR SUSPECTED-ADULT-AH      "

## 2022-08-02 LAB
QT INTERVAL: 412 MS
QTC INTERVAL: 414 MS

## 2022-08-02 NOTE — DISCHARGE INSTRUCTIONS
It was very nice to meet you, Jarvis. Thank you for allowing us to take care of you today at [facility].    You were evaluated in the ER for covid-19. Please use tylenol and fluids for improvement. Your work-up today did not show any emergent findings or emergent indications for admission to the hospital. While it is unclear what exactly is the cause of your symptoms, please understand that an ER evaluation is considered to be just the start of your evaluation. We will do what we can in one visit, but we are often unable to fully figure out what is causing your symptoms from one evaluation. Thus our primary goal is to determine whether you need to be evaluated in the hospital or if it is safe for you to go home and see other doctors such as a primary care physician or a specialist on an outpatient basis. A copy of your results should be included in your paperwork.     It is VERY IMPORTANT that you follow up (call them to set up an appointment) with your primary care doctor* within the next few days or as soon as possible so that you can be re-evaluated for improvement in your symptoms or for any other questions. If you were prescribed any medications, please take them as directed or call us back with any questions.     Please return to the emergency room within 12-48 hours if you experience fever, chills, chest pain or shortness of breath, pain with inspiration/expiration, pain that travels to your arms, neck or back, nausea, vomiting, severe headache, tearing pain in your chest, dizziness, feel as though you are about to pass out, have any worsening symptoms, or any other concerns.

## 2022-08-02 NOTE — ED PROVIDER NOTES
Subjective   Patient daughter provides most of the history.  She states that the patient tested positive for COVID at home and she was concerned about her weakness and so brought her in for further evaluation.  Patient currently states that she has no chest pain or shortness of breath or abdominal pain.  Patient daughter states that she has a urostomy that appears to have some slight pus around it.  States that the patient has not had any vomiting or blood in her urostomy bag or stool.          Review of Systems   All other systems reviewed and are negative.      Past Medical History:   Diagnosis Date   • A-fib ([facility])    • Aortic stenosis    • Arthritis    • Bradycardia    • Cancer ([facility])     bladder and skin   • Cardiomegaly    • CHF (congestive heart failure) ([facility])    • GERD (gastroesophageal reflux disease)    • Hard of hearing    • History of short term memory loss    • Hypercalcemia    • Hyperlipidemia    • Hypertension    • Hyperthyroidism 11/20/2017   • Hypothyroidism    • Kidney stone    • Long term current use of anticoagulant therapy    • Open wound     left lower extremity,   • Palpitation    • PONV (postoperative nausea and vomiting)    • Shortness of breath    • Sick sinus syndrome ([facility])    • Sleep apnea     CPAP       Allergies   Allergen Reactions   • Ciprofloxacin Itching   • Codeine Itching and GI Intolerance   • Definity [Perflutren Lipid Microsphere] Other (See Comments)     Back pain   • Tetanus Toxoids Itching     Itching around site   • Tizanidine Hcl Itching   • Other Itching     Cloth bandaids , causes redness of skin       Past Surgical History:   Procedure Laterality Date   • AORTIC VALVE REPAIR/REPLACEMENT     • BLADDER SURGERY      removed   • CARDIAC CATHETERIZATION     • CARDIAC CATHETERIZATION N/A 12/9/2016    Procedure: Left Heart Cath;  Surgeon: Elia Flores MD;  Location:  [facility] CATH INVASIVE LOCATION;  Service:    • DISTAL FEMORAL NAILING Right 9/24/2020    Procedure: RIGHT SHORT  TFN;  Surgeon: Betito Shetty MD;  Location:  [facility] OR;  Service: Orthopedics;  Laterality: Right;   • HIP BIPOLAR REPLACEMENT Left    • HYSTERECTOMY     • ILEOSTOMY     • JOINT REPLACEMENT     • KIDNEY SURGERY      right kidney removed   • NEPHRECTOMY RADICAL Right     from cancer   • VARICOSE VEIN SURGERY Left 4/10/2017    Procedure: LEFT LOWER EXTREMITY VENOGRAM. LEFT SAPHENOUS VEIN RADIO FREQUENCY ABLATION;  Surgeon: Blake Martinez DO;  Location:  [facility] OR;  Service:        Family History   Problem Relation Age of Onset   • Heart failure Mother    • Heart disease Father    • Stroke Father    • Hypertension Sister    • Heart failure Sister    • No Known Problems Brother    • No Known Problems Maternal Grandmother    • No Known Problems Maternal Grandfather    • No Known Problems Paternal Grandmother    • No Known Problems Paternal Grandfather    • Hypertension Sister    • Heart failure Sister    • Hypertension Sister    • Heart failure Sister    • Hypertension Sister    • Heart failure Sister    • Hypertension Sister    • Heart failure Sister    • Hypertension Sister    • Heart failure Sister    • Gallbladder disease Brother    • COPD Daughter    • Asthma Daughter    • Diabetes Son    • No Known Problems Son    • Autism Son        Social History     Socioeconomic History   • Marital status:    Tobacco Use   • Smoking status: Never Smoker   • Smokeless tobacco: Never Used   Vaping Use   • Vaping Use: Never used   Substance and Sexual Activity   • Alcohol use: No   • Drug use: No   • Sexual activity: Defer           Objective   Physical Exam  Vitals and nursing note reviewed.   Constitutional:       General: She is not in acute distress.     Appearance: Normal appearance. She is not toxic-appearing or diaphoretic.   HENT:      Head: Normocephalic and atraumatic.      Nose: Nose normal.   Eyes:      General:         Right eye: No discharge.         Left eye: No discharge.      Extraocular Movements:  Extraocular movements intact.      Conjunctiva/sclera: Conjunctivae normal.   Cardiovascular:      Rate and Rhythm: Normal rate and regular rhythm.      Pulses: Normal pulses.   Pulmonary:      Effort: Pulmonary effort is normal. No respiratory distress.   Abdominal:      General: Abdomen is flat.      Tenderness: There is no abdominal tenderness. There is no guarding.      Comments: Urostomy bag w/ urine. Stoma appears pink but has some slight sediment around it.   Musculoskeletal:         General: Normal range of motion.      Cervical back: Normal range of motion.   Skin:     General: Skin is warm.      Capillary Refill: Capillary refill takes less than 2 seconds.   Neurological:      General: No focal deficit present.      Mental Status: She is alert and oriented to person, place, and time. Mental status is at baseline.   Psychiatric:         Mood and Affect: Mood normal.         Behavior: Behavior normal.         Thought Content: Thought content normal.         Judgment: Judgment normal.         Procedures           ED Course                                           MDM  Number of Diagnoses or Management Options  COVID-19  Diagnosis management comments: This is a 77yoF presenting with fever and cough. She was placed on the monitor and IV access was established. Labs were obtained and reviewed including a cbc, cmp, ua, lactic acid, troponin, CT head w/o contrast and CXR. She does have evidence of COVID-19. She is not hypoxic. She has been able to tolerate oral intake. CT head without any new abnormalities. She feels well and her daughter states that she does look better than she did earlier prior to her bringing her in. I reassessed the patient and discussed the findings of the work up so far. I told the patient's family member that if her symptoms do not improve then she should bring her back. I answered all their questions regarding the emergency department evaluation, diagnosis, and treatment plan in plain  and simple language that they were able to understand.     The family member in the room voiced agreement with the plan of care so far and had no further questions. I told them that there is always some diagnostic uncertainty in the ER and that Jarvis's work up, physical exam, and even the current presentation may not always reveal other underlying conditions. I also went over the fact that Jarvis's condition may change or show itself after being discharged. They expressed understanding and agreed that there are reasonable limitations with the practice of emergency medicine.    I gave them return precautions and told them to return to the emergency department within 24 - 48hrs if they notice any new, worsening, or concerning symptoms.     I told them that it is VERY IMPORTANT that they follow up (by calling to set up an appointment) with Jarvis's primary care doctor within the next few days or as soon as reasonably possible so that Jarvis can be re-evaluated for improvement in symptoms or for any other questions. The family member verbalized understanding of these instructions.     Jarvis was discharged in stable condition.         Amount and/or Complexity of Data Reviewed  Clinical lab tests: reviewed and ordered  Tests in the radiology section of CPT®: reviewed and ordered  Tests in the medicine section of CPT®: reviewed  Decide to obtain previous medical records or to obtain history from someone other than the patient: yes        Final diagnoses:   COVID-19       ED Disposition  ED Disposition     ED Disposition   Discharge    Condition   Stable    Comment   --             Mary Beth Izquierdo, [address]  [facility] [id number] [phone]    Call in 1 day  As needed, If symptoms worsen         Medication List      Changed    * warfarin 7.5 MG tablet  Commonly known as: COUMADIN  Take 1 tablet by mouth 3 (Three) Times a Week. Take 10 mg Monday and Tuesday, then resume normal schedule with 5 mg on  Wednesday  What changed: additional instructions     * warfarin 5 MG tablet  Commonly known as: COUMADIN  Take 1 tablet by mouth 4 (Four) Times a Week. Take 10 mg Monday and Tuesday, then resume normal schedule taking 5 mg Wednesday.  What changed: additional instructions         * This list has 2 medication(s) that are the same as other medications prescribed for you. Read the directions carefully, and ask your doctor or other care provider to review them with you.                 Latanya Figueroa MD  08/07/22 2017

## 2022-08-07 LAB — INR BLD: 4.1

## 2022-08-08 ENCOUNTER — ANTI-COAG VISIT (OUTPATIENT)
Dept: INTERNAL MEDICINE | Age: 77
End: 2022-08-08
Payer: MEDICARE

## 2022-08-08 DIAGNOSIS — Z86.718 HISTORY OF DVT (DEEP VEIN THROMBOSIS): Primary | ICD-10-CM

## 2022-08-08 PROCEDURE — 93793 ANTICOAG MGMT PT WARFARIN: CPT | Performed by: NURSE PRACTITIONER

## 2022-08-08 NOTE — PROGRESS NOTES
HOME MONITORING REPORT    INR today:   Results for orders placed or performed in visit on 08/08/22   Protime-INR   Result Value Ref Range    INR 4.10        INR Goal: 2.0-3.0    Dosing Plan  As of 8/8/2022      TTR:  52.9 % (4.1 y)   Full warfarin instructions:  5 mg every Wed, Fri; 7.5 mg all other days                PLAN: hold for 2 days and then resume same

## 2022-08-19 ENCOUNTER — ANTI-COAG VISIT (OUTPATIENT)
Dept: INTERNAL MEDICINE | Age: 77
End: 2022-08-19
Payer: MEDICARE

## 2022-08-19 DIAGNOSIS — Z86.718 HISTORY OF DVT (DEEP VEIN THROMBOSIS): Primary | ICD-10-CM

## 2022-08-19 LAB — INR BLD: 3.5

## 2022-08-19 PROCEDURE — 93793 ANTICOAG MGMT PT WARFARIN: CPT | Performed by: NURSE PRACTITIONER

## 2022-08-19 NOTE — PROGRESS NOTES
Rhonda Shah has been notified to hold Saturday and go back on current dose on Sunday. Check in a week. She VU.

## 2022-08-29 DIAGNOSIS — E55.9 VITAMIN D DEFICIENCY: ICD-10-CM

## 2022-08-29 RX ORDER — ERGOCALCIFEROL 1.25 MG/1
CAPSULE ORAL
Qty: 4 CAPSULE | Refills: 3 | Status: SHIPPED | OUTPATIENT
Start: 2022-08-29

## 2022-08-29 RX ORDER — WARFARIN SODIUM 7.5 MG/1
TABLET ORAL
Qty: 30 TABLET | Refills: 5 | Status: SHIPPED | OUTPATIENT
Start: 2022-08-29 | End: 2022-10-19 | Stop reason: SDUPTHER

## 2022-08-30 ENCOUNTER — ANTI-COAG VISIT (OUTPATIENT)
Dept: INTERNAL MEDICINE | Age: 77
End: 2022-08-30
Payer: MEDICARE

## 2022-08-30 DIAGNOSIS — Z86.718 HISTORY OF DVT (DEEP VEIN THROMBOSIS): Primary | ICD-10-CM

## 2022-08-30 LAB — INR BLD: 3.4

## 2022-08-30 PROCEDURE — 93793 ANTICOAG MGMT PT WARFARIN: CPT | Performed by: NURSE PRACTITIONER

## 2022-08-30 NOTE — PROGRESS NOTES
HOME MONITORING REPORT    INR today:   Results for orders placed or performed in visit on 08/30/22   Protime-INR   Result Value Ref Range    INR 3.40        INR Goal: 2.0-3.0    Dosing Plan  As of 8/30/2022      TTR:  52.2 % (4.1 y)   Full warfarin instructions:  5 mg every Wed, Fri; 7.5 mg all other days                PLAN:   Hold for one day;  then resume usual orders

## 2022-09-11 LAB — INR BLD: 3.1

## 2022-09-12 ENCOUNTER — ANTI-COAG VISIT (OUTPATIENT)
Dept: INTERNAL MEDICINE | Age: 77
End: 2022-09-12
Payer: MEDICARE

## 2022-09-12 DIAGNOSIS — Z86.718 HISTORY OF DVT (DEEP VEIN THROMBOSIS): Primary | ICD-10-CM

## 2022-09-12 PROCEDURE — 93793 ANTICOAG MGMT PT WARFARIN: CPT | Performed by: NURSE PRACTITIONER

## 2022-09-12 NOTE — PROGRESS NOTES
HOME MONITORING REPORT    INR today:   Results for orders placed or performed in visit on 09/12/22   Protime-INR   Result Value Ref Range    INR 3.10        INR Goal: 2.0-3.0    Dosing Plan  As of 9/12/2022      TTR:  51.7 % (4.2 y)   Full warfarin instructions:  5 mg every Wed, Fri; 7.5 mg all other days                PLAN: hold for 1 day, then resume

## 2022-09-13 RX ORDER — QUETIAPINE FUMARATE 25 MG/1
TABLET, FILM COATED ORAL
Qty: 60 TABLET | Refills: 1 | Status: SHIPPED | OUTPATIENT
Start: 2022-09-13

## 2022-09-21 RX ORDER — DONEPEZIL HYDROCHLORIDE 5 MG/1
TABLET, FILM COATED ORAL
Qty: 30 TABLET | Refills: 3 | Status: SHIPPED | OUTPATIENT
Start: 2022-09-21 | End: 2022-10-19

## 2022-09-21 NOTE — TELEPHONE ENCOUNTER
Lila Odessa called to request a refill on her medication.       Last office visit : 4/19/2022   Next office visit : 10/19/2022     Requested Prescriptions     Pending Prescriptions Disp Refills    donepezil (ARICEPT) 5 MG tablet [Pharmacy Med Name: DONEPEZIL HCL 5 MG TABS 5 Tablet] 30 tablet 3     Sig: TAKE 1 TABLET BY MOUTH AT BEDTIME FOR MEMORY            Bhavesh Mendoza MA

## 2022-09-23 ENCOUNTER — ANTI-COAG VISIT (OUTPATIENT)
Dept: INTERNAL MEDICINE | Age: 77
End: 2022-09-23
Payer: MEDICARE

## 2022-09-23 DIAGNOSIS — Z86.718 HISTORY OF DVT (DEEP VEIN THROMBOSIS): Primary | ICD-10-CM

## 2022-09-23 LAB — INR BLD: 3.7

## 2022-09-23 PROCEDURE — 93793 ANTICOAG MGMT PT WARFARIN: CPT | Performed by: NURSE PRACTITIONER

## 2022-09-23 NOTE — PROGRESS NOTES
HOME MONITORING REPORT    INR today:   Results for orders placed or performed in visit on 09/23/22   Protime-INR   Result Value Ref Range    INR 3.70        INR Goal: 2.0-3.0    Dosing Plan  As of 9/23/2022      TTR:  51.3 % (4.2 y)   Full warfarin instructions:  5 mg every Wed, Fri; 7.5 mg all other days                PLAN:   Hold coumadin tonight. Resume current dose tomorrow.  Check on Wednesday

## 2022-09-29 ENCOUNTER — ANTI-COAG VISIT (OUTPATIENT)
Dept: INTERNAL MEDICINE | Age: 77
End: 2022-09-29
Payer: MEDICARE

## 2022-09-29 DIAGNOSIS — Z86.718 HISTORY OF DVT (DEEP VEIN THROMBOSIS): Primary | ICD-10-CM

## 2022-09-29 LAB — INR BLD: 3

## 2022-09-29 PROCEDURE — 93793 ANTICOAG MGMT PT WARFARIN: CPT | Performed by: NURSE PRACTITIONER

## 2022-09-29 NOTE — PROGRESS NOTES
HOME MONITORING REPORT    INR today:   Results for orders placed or performed in visit on 09/29/22   Protime-INR   Result Value Ref Range    INR 3.00        INR Goal: 2.0-3.0    Dosing Plan  As of 9/29/2022      TTR:  51.2 % (4.2 y)   Full warfarin instructions:  5 mg every Wed, Fri; 7.5 mg all other days                PLAN: Advised patient/caregiver to continue current dose and recheck in one week. Patient/Caregiver voiced understanding   I have reviewed nursing plan for Coumadin management and agree with plan.

## 2022-10-11 DIAGNOSIS — Z98.890 H/O URETEROSTOMY: ICD-10-CM

## 2022-10-11 DIAGNOSIS — Z85.51 HISTORY OF BLADDER CANCER: ICD-10-CM

## 2022-10-11 RX ORDER — OSTOMY SUPPLY 2 1/4"
EACH MISCELLANEOUS
Qty: 20 EACH | Refills: 5 | Status: SHIPPED | OUTPATIENT
Start: 2022-10-11

## 2022-10-11 NOTE — TELEPHONE ENCOUNTER
Karlos Barrera called requesting a refill of the below medication which has been pended for you:     Requested Prescriptions     Pending Prescriptions Disp Refills    Ostomy Supplies (CHAO-FIT [address] [employer]) [address] 18 [Pharmacy Med Name: CONV SF DEBBIE UROSTOMY POUCH POUCH Miscellaneous] 20 each 5     Sig: USE AS DIRECTED.        Last Appointment Date: 4/19/2022  Next Appointment Date: 10/19/2022    Allergies   Allergen Reactions    Other Anaphylaxis and Itching     Cloth bandaids , causes redness of skin    Ciprofloxacin Itching    Codeine Itching    Perflutren Lipid Microsphere Other (See Comments)     Back pain    Tetanus Toxoids Itching     Itching around site    Tizanidine Hcl Itching    Tetanus Toxoid      Reaction: [address]

## 2022-10-18 ASSESSMENT — ENCOUNTER SYMPTOMS
VOMITING: 0
NAUSEA: 0
EYE ITCHING: 0
BACK PAIN: 1
ABDOMINAL DISTENTION: 0
EYE DISCHARGE: 0
STRIDOR: 0
SORE THROAT: 0
CHOKING: 0
CONSTIPATION: 0
WHEEZING: 0
SHORTNESS OF BREATH: 0
ABDOMINAL PAIN: 0
BLOOD IN STOOL: 0
DIARRHEA: 0
COUGH: 0
TROUBLE SWALLOWING: 0
COLOR CHANGE: 0

## 2022-10-18 NOTE — PROGRESS NOTES
[address] [facility]  [id number] [id number] [place] [id number]  Dept: [phone]  Dept Fax: [phone]: [phone]    Brenden Mccarthy (:  1945) is a 65 y.o. female,Established patient  with green, here for evaluation of the following chief complaint(s): Follow-up      Brenden Mccarthy is a 65 y.o. female who presents today for her medical conditions/complaints as noted below. Brenden Mccarthy is c/sam Follow-up        HPI:     Chief Complaint   Patient presents with    Follow-up       HPI   #1 hypertension Lasix 40 daily metoprolol 25 twice daily and lisinopril 10 daily  2. Atrial fibs chronic on Coumadin she is on Cardizem 180 daily  3. Idiopathic peripheral neuropathy  4 dementia stable with Aricept 5 daily  ? Allergy  we are going to stop this to see if it john help the itching   5. Depression stable Lexapro 10 daily  #6 hyperthyroidism she is on Tapazole 10  7.   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 she is doing well with Seroquel 25 twice daily   #8 vitamin D deficiency stable with 50,000 units weekly    Past Medical History:   Diagnosis Date    Atrial fibrillation ([facility]     Cancer ([facility] [address]     bladder    Hyperlipidemia     Hypertension       Past Surgical History:   Procedure Laterality Date    APPENDECTOMY      BLADDER SURGERY      HYSTERECTOMY, VAGINAL      NEPHRECTOMY      TOTAL HIP ARTHROPLASTY         Vitals 10/19/2022 2022 10/18/2021 10/18/2021 2021    SYSTOLIC 576 409 [phone]   DIASTOLIC 70 70 96 96 72 79   Pulse 62 46 - 64 78 70   Temp 97.8 - - 98 - -   Resp - - - - - -   SpO2 97 97 - 97 95 -   Weight 222 lb - - 222 lb 212 lb -   Height 5' 9\" - - 5' 9\" 5' 8\" -   Body mass index 32.78 kg/m2 - - 32.78 kg/m2 32.23 kg/m2 -   Some recent data might be hidden       Family History   Problem Relation Age of Onset    Heart Disease Mother     Stroke Father     Cancer Sister        Social History Tobacco Use    Smoking status: Never    Smokeless tobacco: Never   Substance Use Topics    Alcohol use: No      Current Outpatient Medications   Medication Sig Dispense Refill    hydrOXYzine HCl (ATARAX) 25 MG tablet Take 1 tablet by mouth every 8 hours as needed for Itching 36 tablet 3    warfarin (COUMADIN) 7.5 MG tablet Take 1 tablet by mouth daily Take 4 days a week 90 tablet 1    warfarin (COUMADIN) 5 MG tablet Take 1 tablet by mouth daily 90 tablet 1    Ostomy Supplies (CHAO-FIT NATURA UROSTOMY POUCH) Pouch MISC USE AS DIRECTED. 20 each 5    QUEtiapine (SEROQUEL) 25 MG tablet TAKE 1 TABLET BY MOUTH TWO TIMES A DAY 60 tablet 1    vitamin D (ERGOCALCIFEROL) 1.25 MG (69211 UT) CAPS capsule TAKE ONE CAPSULE BY MOUTH ONCE WEEKLY 4 capsule 3    calcitRIOL (ROCALTROL) 0.25 MCG capsule TAKE ONE CAPSULE BY MOUTH EVERY DAY 30 capsule 4    furosemide (LASIX) 40 MG tablet TAKE 1 TABLET BY MOUTH EVERY DAY 90 tablet 1    silver sulfADIAZINE (SILVADENE) 1 % cream Apply topically daily.  50 g 2    metoprolol tartrate (LOPRESSOR) 25 MG tablet TAKE 1 TABLET BY MOUTH TWO TIMES A  tablet 1    atorvastatin (LIPITOR) 40 MG tablet TAKE 1 TABLET BY MOUTH DAILY 30 tablet 5    dilTIAZem (CARDIZEM CD) 180 MG extended release capsule TAKE 1 CAPSULE BY MOUTH DAILY 30 capsule 5    methIMAzole (TAPAZOLE) 10 MG tablet TAKE 1 TABLET BY MOUTH DAILY 30 tablet 5    Ostomy Supplies (CHAO-FIT NATURA STOMAHESIVE) WAFR USE AS DIRECTED 10 Wafer 11    lisinopril (PRINIVIL;ZESTRIL) 10 MG tablet Take 10 mg by mouth      escitalopram (LEXAPRO) 10 MG tablet Take 1 tablet by mouth daily 30 tablet 3    acetaminophen (TYLENOL) 325 MG tablet Take 650 mg by mouth every 4 hours as needed for Pain      docusate sodium (COLACE) 100 MG capsule Take 100 mg by mouth 2 times daily as needed for Constipation      Ostomy Supplies (CHAO-FIT NATURA STOMAHESIVE) WAFR Use as directed 20 Wafer 11     No current facility-administered medications for this visit.      Allergies   Allergen Reactions    Other Anaphylaxis and Itching     Cloth bandaids , causes redness of skin    Ciprofloxacin Itching    Codeine Itching    Perflutren Lipid Microsphere Other (See Comments)     Back pain    Tetanus Toxoids Itching     Itching around site    Tizanidine Hcl Itching    Tetanus Toxoid      Reaction: [facility] Maintenance   Topic Date Due    COVID-19 Vaccine (1) Never done    Shingles vaccine (1 of 2) Never done    DEXA (modify frequency per FRAX score)  Never done    Flu vaccine (1) 08/01/2022    Lipids  04/19/2023    Depression Monitoring  04/19/2023    Annual Wellness Visit (AWV)  04/20/2023    Pneumococcal 65+ years Vaccine  Completed    Hepatitis C screen  Completed    Hepatitis A vaccine  Aged Out    Hib vaccine  Aged Out    Meningococcal (ACWY) vaccine  Aged Out       Lab Results   Component Value Date    LABA1C 5.6 03/22/2021     No results found for: PSA, PSADIA  TSH   Date Value Ref Range Status   04/19/2022 2.770 0.270 - 4.200 uIU/mL Final   ]  Lab Results   Component Value Date     (H) 04/19/2022    K 4.7 04/19/2022     04/19/2022    CO2 27 04/19/2022    BUN 39 (H) 04/19/2022    CREATININE 1.5 (H) 04/19/2022    GLUCOSE 106 04/19/2022    CALCIUM 10.7 (H) 04/19/2022    PROT 7.2 04/19/2022    LABALBU 3.9 04/19/2022    BILITOT 0.7 04/19/2022    ALKPHOS 96 04/19/2022    AST 20 04/19/2022    ALT 19 04/19/2022    LABGLOM 34 (A) 04/19/2022    GFRAA 41 (L) 04/19/2022     Lab Results   Component Value Date    CHOL 127 (L) 04/19/2022    CHOL 105 (L) 10/18/2021    CHOL 104 (L) 06/28/2021     Lab Results   Component Value Date    TRIG 159 (H) 04/19/2022    TRIG 137 10/18/2021    TRIG 116 06/28/2021     Lab Results   Component Value Date    HDL 42 (L) 04/19/2022    HDL 41 (L) 10/18/2021    HDL 43 (L) 06/28/2021     Lab Results   Component Value Date    LDLCALC 53 04/19/2022    LDLCALC 37 10/18/2021    LDLCALC 38 06/28/2021     Lab Results   Component Value Date/Time     04/19/2022 10:58 AM    K 4.7 04/19/2022 10:58 AM     04/19/2022 10:58 AM    CO2 27 04/19/2022 10:58 AM    BUN 39 04/19/2022 10:58 AM    CREATININE 1.5 04/19/2022 10:58 AM    GLUCOSE 106 04/19/2022 10:58 AM    CALCIUM 10.7 04/19/2022 10:58 AM      Lab Results   Component Value Date    WBC 5.6 04/19/2022    HGB 13.9 04/19/2022    HCT 43.6 04/19/2022    .6 (H) 04/19/2022     04/19/2022    LYMPHOPCT 15.5 (L) 04/19/2022    RBC 4.21 04/19/2022    MCH 33.0 (H) 04/19/2022    MCHC 31.9 (L) 04/19/2022    RDW 12.6 04/19/2022     Lab Results   Component Value Date    VITD25 85.3 04/19/2022     Subjective:      Review of Systems   Constitutional:  Negative for fatigue, fever and unexpected weight change. HENT:  Negative for ear discharge, ear pain, mouth sores, sore throat and trouble swallowing. Eyes:  Negative for discharge, itching and visual disturbance. Respiratory:  Negative for cough, choking, shortness of breath, wheezing and stridor. Cardiovascular:  Negative for chest pain, palpitations and leg swelling. Gastrointestinal:  Negative for abdominal distention, abdominal pain, blood in stool, constipation, diarrhea, nausea and vomiting. Endocrine: Negative for cold intolerance, polydipsia and polyuria. Genitourinary:  Negative for difficulty urinating, dysuria, frequency and urgency. Musculoskeletal:  Positive for back pain. Negative for arthralgias and gait problem. Skin:  Negative for color change and rash. Allergic/Immunologic: Negative for food allergies and immunocompromised state. Neurological:  Negative for dizziness, tremors, syncope, speech difficulty, weakness and headaches. Hematological:  Negative for adenopathy. Does not bruise/bleed easily. Psychiatric/Behavioral:  Negative for confusion and hallucinations. The patient is nervous/anxious.          Anxiety with sundowners     Objective:     Physical Exam  Constitutional:       General: She is not in acute distress. Appearance: She is well-developed. HENT:      Head: Normocephalic and atraumatic. Eyes:      General: No scleral icterus. Right eye: No discharge. Left eye: No discharge. Pupils: Pupils are equal, round, and reactive to light. Neck:      Thyroid: No thyromegaly. Vascular: No JVD. Cardiovascular:      Rate and Rhythm: Normal rate and regular rhythm. Heart sounds: Normal heart sounds. No murmur heard. Pulmonary:      Effort: Pulmonary effort is normal. No respiratory distress. Breath sounds: Normal breath sounds. No wheezing or rales. Abdominal:      General: Bowel sounds are normal. There is no distension. Palpations: Abdomen is soft. There is no mass. Tenderness: There is no abdominal tenderness. There is no guarding or rebound. Musculoskeletal:         General: No tenderness. Normal range of motion. Cervical back: Normal range of motion and neck supple. Right lower leg: Edema present. Left lower leg: Edema present. Comments: She is now using compression stockings with good results    Skin:     General: Skin is warm and dry. Findings: No erythema or rash. Neurological:      Mental Status: She is alert and oriented to person, place, and time. Cranial Nerves: No cranial nerve deficit. Coordination: Coordination normal.      Deep Tendon Reflexes: Reflexes are normal and symmetric. Reflexes normal.   Psychiatric:         Mood and Affect: Mood is not depressed. Behavior: Behavior normal.         Thought Content:  Thought content normal.         Judgment: Judgment normal.     /70   Pulse 62   Temp 97.8 °F (36.6 °C)   Ht 5' 9\" (1.753 m)   Wt 222 lb (100.7 kg)   SpO2 97%   BMI 32.78 kg/m²           Assessment:      Problem List       Alzheimer's dementia with behavioral disturbance (HCC)     Stable with Aricept 5 daily          Relevant Medications    escitalopram (LEXAPRO) 10 MG tablet    QUEtiapine (SEROQUEL) 25 MG tablet    hydrOXYzine HCl (ATARAX) 25 MG tablet    Anxiety     She is stable for the most part the Seroquel helps with her anxiety          Relevant Medications    escitalopram (LEXAPRO) 10 MG tablet    hydrOXYzine HCl (ATARAX) 25 MG tablet    Atrial fibrillation (HCC)     Stable with Coumadin and Cardizem          Relevant Medications    lisinopril (PRINIVIL;ZESTRIL) 10 MG tablet    atorvastatin (LIPITOR) 40 MG tablet    dilTIAZem (CARDIZEM CD) 180 MG extended release capsule    metoprolol tartrate (LOPRESSOR) 25 MG tablet    furosemide (LASIX) 40 MG tablet    warfarin (COUMADIN) 7.5 MG tablet    warfarin (COUMADIN) 5 MG tablet    Other Relevant Orders    CBC with Auto Differential    Essential hypertension     Stable with Lasix 40 metoprolol 25 twice daily and lisinopril 10 daily          Relevant Medications    dilTIAZem (CARDIZEM CD) 180 MG extended release capsule    Other Relevant Orders    CBC with Auto Differential    Comprehensive Metabolic Panel    Lipid Panel    TSH    Hyperthyroidism     Stable Tapazole 10 daily          Relevant Medications    methIMAzole (TAPAZOLE) 10 MG tablet    Other Relevant Orders    TSH    Idiopathic peripheral neuropathy     Stable off meds          Relevant Medications    escitalopram (LEXAPRO) 10 MG tablet    QUEtiapine (SEROQUEL) 25 MG tablet    hydrOXYzine HCl (ATARAX) 25 MG tablet    Recurrent major depressive disorder, in partial remission (HCC)     She is stable with Lexapro 10 daily          Relevant Medications    escitalopram (LEXAPRO) 10 MG tablet    QUEtiapine (SEROQUEL) 25 MG tablet    hydrOXYzine HCl (ATARAX) 25 MG tablet    SunDown syndrome     Stable on current dose. flow[[id number]] 25 twice daily          Vitamin D deficiency     She is taking 50,000 weekly stable          Relevant Medications    vitamin D (ERGOCALCIFEROL) 1.25 MG (40510 UT) CAPS capsule    Other Relevant Orders Vitamin D 25 Hydroxy       Plan:        Patient given educational materials - see patient instructions. Discussed use, benefit, and side effects of prescribed medications. Allpatient questions answered. Pt voiced understanding. Reviewed health maintenance. Instructed to continue current medications, diet and exercise. Patient agreed with treatment plan. Follow up as directed. MEDICATIONS:  Orders Placed This Encounter   Medications    hydrOXYzine HCl (ATARAX) 25 MG tablet     Sig: Take 1 tablet by mouth every 8 hours as needed for Itching     Dispense:  36 tablet     Refill:  3    warfarin (COUMADIN) 7.5 MG tablet     Sig: Take 1 tablet by mouth daily Take 4 days a week     Dispense:  90 tablet     Refill:  1    warfarin (COUMADIN) 5 MG tablet     Sig: Take 1 tablet by mouth daily     Dispense:  90 tablet     Refill:  1           ORDERS:  Orders Placed This Encounter   Procedures    Influenza, FLUAD, (age 72 y+), IM, Preservative Free, 0.5 mL    CBC with Auto Differential    Comprehensive Metabolic Panel    Lipid Panel    Vitamin D 25 Hydroxy    TSH         Follow-up:  Return in about 6 months (around 4/19/2023) for have labs done prior to leighton pelayo. PATIENT INSTRUCTIONS:   Afib;  stable with coumadin daughter managmes with home machine  Hypertension The current medical regimen is effective;  continue present plan and medications. Idiopathic peripheral neuropathy  stable off meds for now   Alzheimers  stable with aricpet  Depression  . stabel with  lexapro   Vit d def; The current medical regimen is effective;  continue present plan and medications. Sundowners;  continue seroquel   Hyperthyroidism; The current medical regimen is effective;  continue present plan and medications.   Anxiety   stable with current meds;  no changes   10  urticaria;  we will stop the aricept for now to see if that is the problem;  we will try atarax;  you can hold the seroquel if the atarax helps   Patient Instructions   PATIENT INSTRUCTIONS:   Afib;  stable with coumadin daughter managmes with home machine  Hypertension The current medical regimen is effective;  continue present plan and medications. Idiopathic peripheral neuropathy  stable off meds for now   Alzheimers  stable with aricpet  Depression  . stabel with  lexapro   Vit d def; The current medical regimen is effective;  continue present plan and medications. Sundowners;  continue seroquel   Hyperthyroidism; The current medical regimen is effective;  continue present plan and medications. Anxiety   stable with current meds;  no changes   10  urticaria;  we will stop the aricept for now to see if that is the problem;  we will try atarax;  you can hold the seroquel if the atarax helps   Electronically signed by MARIELOS Ingram on 10/19/2022 at 11:48 AM    @    Mary/transcription disclaimer:  Much of this encounter note is electronic transcription/translation of spoken language to printed texts. The electronic translation of spoken language may be erroneous, or at times,nonsensical words or phrases may be inadvertently transcribed.   Although I have reviewed the note for such errors, some may still exist.

## 2022-10-19 ENCOUNTER — OFFICE VISIT (OUTPATIENT)
Dept: INTERNAL MEDICINE | Age: 77
End: 2022-10-19
Payer: MEDICARE

## 2022-10-19 ENCOUNTER — ANTI-COAG VISIT (OUTPATIENT)
Dept: PRIMARY CARE CLINIC | Age: 77
End: 2022-10-19
Payer: MEDICARE

## 2022-10-19 VITALS
HEIGHT: 69 IN | WEIGHT: 222 LBS | DIASTOLIC BLOOD PRESSURE: 70 MMHG | BODY MASS INDEX: 32.88 KG/M2 | SYSTOLIC BLOOD PRESSURE: 126 MMHG | HEART RATE: 62 BPM | TEMPERATURE: 97.8 F | OXYGEN SATURATION: 97 %

## 2022-10-19 DIAGNOSIS — E05.90 HYPERTHYROIDISM: ICD-10-CM

## 2022-10-19 DIAGNOSIS — F33.41 RECURRENT MAJOR DEPRESSIVE DISORDER, IN PARTIAL REMISSION (HCC): ICD-10-CM

## 2022-10-19 DIAGNOSIS — G30.9 ALZHEIMER'S DEMENTIA WITH BEHAVIORAL DISTURBANCE (HCC): ICD-10-CM

## 2022-10-19 DIAGNOSIS — I10 ESSENTIAL HYPERTENSION: ICD-10-CM

## 2022-10-19 DIAGNOSIS — E55.9 VITAMIN D DEFICIENCY: ICD-10-CM

## 2022-10-19 DIAGNOSIS — F05 SUNDOWN SYNDROME: ICD-10-CM

## 2022-10-19 DIAGNOSIS — I48.91 ATRIAL FIBRILLATION, UNSPECIFIED TYPE (HCC): ICD-10-CM

## 2022-10-19 DIAGNOSIS — F02.818 ALZHEIMER'S DEMENTIA WITH BEHAVIORAL DISTURBANCE (HCC): ICD-10-CM

## 2022-10-19 DIAGNOSIS — G60.9 IDIOPATHIC PERIPHERAL NEUROPATHY: ICD-10-CM

## 2022-10-19 DIAGNOSIS — Z86.718 HISTORY OF DVT (DEEP VEIN THROMBOSIS): Primary | ICD-10-CM

## 2022-10-19 DIAGNOSIS — F41.9 ANXIETY: ICD-10-CM

## 2022-10-19 LAB
ALBUMIN SERPL-MCNC: 4.3 G/DL (ref 3.5–5.2)
ALP BLD-CCNC: 81 U/L (ref 35–104)
ALT SERPL-CCNC: 15 U/L (ref 5–33)
ANION GAP SERPL CALCULATED.3IONS-SCNC: 10 MMOL/L (ref 7–19)
AST SERPL-CCNC: 19 U/L (ref 5–32)
BASOPHILS ABSOLUTE: 0 K/UL (ref 0–0.2)
BASOPHILS RELATIVE PERCENT: 0.3 % (ref 0–1)
BILIRUB SERPL-MCNC: 0.5 MG/DL (ref 0.2–1.2)
BUN BLDV-MCNC: 44 MG/DL (ref 8–23)
CALCIUM SERPL-MCNC: 10.7 MG/DL (ref 8.8–10.2)
CHLORIDE BLD-SCNC: 109 MMOL/L (ref 98–111)
CHOLESTEROL, TOTAL: 117 MG/DL (ref 160–199)
CO2: 27 MMOL/L (ref 22–29)
CREAT SERPL-MCNC: 1.4 MG/DL (ref 0.5–0.9)
EOSINOPHILS ABSOLUTE: 0.3 K/UL (ref 0–0.6)
EOSINOPHILS RELATIVE PERCENT: 4.1 % (ref 0–5)
GFR SERPL CREATININE-BSD FRML MDRD: 38 ML/MIN/{1.73_M2}
GLUCOSE BLD-MCNC: 97 MG/DL (ref 74–109)
HCT VFR BLD CALC: 44.1 % (ref 37–47)
HDLC SERPL-MCNC: 40 MG/DL (ref 65–121)
HEMOGLOBIN: 13.8 G/DL (ref 12–16)
IMMATURE GRANULOCYTES #: 0 K/UL
INR BLD: 3.6
LDL CHOLESTEROL CALCULATED: 53 MG/DL
LYMPHOCYTES ABSOLUTE: 0.9 K/UL (ref 1.1–4.5)
LYMPHOCYTES RELATIVE PERCENT: 13.2 % (ref 20–40)
MCH RBC QN AUTO: 33.2 PG (ref 27–31)
MCHC RBC AUTO-ENTMCNC: 31.3 G/DL (ref 33–37)
MCV RBC AUTO: 106 FL (ref 81–99)
MONOCYTES ABSOLUTE: 0.8 K/UL (ref 0–0.9)
MONOCYTES RELATIVE PERCENT: 10.9 % (ref 0–10)
NEUTROPHILS ABSOLUTE: 5 K/UL (ref 1.5–7.5)
NEUTROPHILS RELATIVE PERCENT: 71.2 % (ref 50–65)
PDW BLD-RTO: 13.7 % (ref 11.5–14.5)
PLATELET # BLD: 204 K/UL (ref 130–400)
PMV BLD AUTO: 9.3 FL (ref 9.4–12.3)
POTASSIUM SERPL-SCNC: 4.9 MMOL/L (ref 3.5–5)
RBC # BLD: 4.16 M/UL (ref 4.2–5.4)
SODIUM BLD-SCNC: 146 MMOL/L (ref 136–145)
TOTAL PROTEIN: 7.5 G/DL (ref 6.6–8.7)
TRIGL SERPL-MCNC: 119 MG/DL (ref 0–149)
TSH SERPL DL<=0.05 MIU/L-ACNC: 1.53 UIU/ML (ref 0.27–4.2)
VITAMIN D 25-HYDROXY: 61.1 NG/ML
WBC # BLD: 7.1 K/UL (ref 4.8–10.8)

## 2022-10-19 PROCEDURE — G8484 FLU IMMUNIZE NO ADMIN: HCPCS | Performed by: NURSE PRACTITIONER

## 2022-10-19 PROCEDURE — G8400 PT W/DXA NO RESULTS DOC: HCPCS | Performed by: NURSE PRACTITIONER

## 2022-10-19 PROCEDURE — 93793 ANTICOAG MGMT PT WARFARIN: CPT | Performed by: NURSE PRACTITIONER

## 2022-10-19 PROCEDURE — 1036F TOBACCO NON-USER: CPT | Performed by: NURSE PRACTITIONER

## 2022-10-19 PROCEDURE — G8417 CALC BMI ABV UP PARAM F/U: HCPCS | Performed by: NURSE PRACTITIONER

## 2022-10-19 PROCEDURE — 99214 OFFICE O/P EST MOD 30 MIN: CPT | Performed by: NURSE PRACTITIONER

## 2022-10-19 PROCEDURE — G8427 DOCREV CUR MEDS BY ELIG CLIN: HCPCS | Performed by: NURSE PRACTITIONER

## 2022-10-19 PROCEDURE — 1090F PRES/ABSN URINE INCON ASSESS: CPT | Performed by: NURSE PRACTITIONER

## 2022-10-19 PROCEDURE — 1123F ACP DISCUSS/DSCN MKR DOCD: CPT | Performed by: NURSE PRACTITIONER

## 2022-10-19 RX ORDER — WARFARIN SODIUM 5 MG/1
5 TABLET ORAL DAILY
Qty: 90 TABLET | Refills: 1 | Status: SHIPPED | OUTPATIENT
Start: 2022-10-19

## 2022-10-19 RX ORDER — WARFARIN SODIUM 7.5 MG/1
7.5 TABLET ORAL DAILY
Qty: 90 TABLET | Refills: 1 | Status: SHIPPED | OUTPATIENT
Start: 2022-10-19

## 2022-10-19 RX ORDER — HYDROXYZINE HYDROCHLORIDE 25 MG/1
25 TABLET, FILM COATED ORAL EVERY 8 HOURS PRN
Qty: 36 TABLET | Refills: 3 | Status: SHIPPED | OUTPATIENT
Start: 2022-10-19

## 2022-10-19 NOTE — PATIENT INSTRUCTIONS
PATIENT INSTRUCTIONS:   Afib;  stable with coumadin daughter managmes with home machine  Hypertension The current medical regimen is effective;  continue present plan and medications. Idiopathic peripheral neuropathy  stable off meds for now   Alzheimers  stable with aricpet  Depression  . stabel with  lexapro   Vit d def; The current medical regimen is effective;  continue present plan and medications. Sundowners;  continue seroquel   Hyperthyroidism; The current medical regimen is effective;  continue present plan and medications.   Anxiety   stable with current meds;  no changes   10  urticaria;  we will stop the aricept for now to see if that is the problem;  we will try atarax;  you can hold the seroquel if the atarax helps

## 2022-10-19 NOTE — PROGRESS NOTES
HOME MONITORING REPORT    INR today:   Results for orders placed or performed in visit on 10/19/22   Protime-INR   Result Value Ref Range    INR 3.60        INR Goal: 2.0-3.0    Dosing Plan  As of 10/19/2022      TTR:  50.6 % (4.3 y)   Full warfarin instructions:  5 mg every Wed, Fri; 7.5 mg all other days                PLAN: patient/caregiver held her dose last night. New dose will be 5 mg Mon, Wed, Fri. 7.5 mg all other days. Recheck in one week. Patient/Caregiver voiced understanding    I have reviewed nursing plan for Coumadin management and agree with plan.

## 2022-10-20 PROCEDURE — 90694 VACC AIIV4 NO PRSRV 0.5ML IM: CPT | Performed by: NURSE PRACTITIONER

## 2022-10-20 PROCEDURE — G0008 ADMIN INFLUENZA VIRUS VAC: HCPCS | Performed by: NURSE PRACTITIONER

## 2022-10-20 NOTE — PROGRESS NOTES
After obtaining consent, and per orders of Dr. Piero Ruth , influenza vaccine given in Left deltoid by Mihai Lewis  Patient instructed to remain in clinic for 20 minutes afterwards, and to report any adverse reaction to me immediately. Patient given influenza education materials. Patient did not have any questions for the Medical Assistant at this time.

## 2022-10-26 DIAGNOSIS — I48.91 ATRIAL FIBRILLATION, UNSPECIFIED TYPE (HCC): ICD-10-CM

## 2022-10-26 DIAGNOSIS — I10 ESSENTIAL HYPERTENSION: ICD-10-CM

## 2022-10-26 RX ORDER — DILTIAZEM HYDROCHLORIDE 180 MG/1
180 CAPSULE, COATED, EXTENDED RELEASE ORAL DAILY
Qty: 30 CAPSULE | Refills: 5 | Status: SHIPPED | OUTPATIENT
Start: 2022-10-26

## 2022-10-28 ENCOUNTER — ANTI-COAG VISIT (OUTPATIENT)
Dept: INTERNAL MEDICINE | Age: 77
End: 2022-10-28
Payer: MEDICARE

## 2022-10-28 DIAGNOSIS — Z86.718 HISTORY OF DVT (DEEP VEIN THROMBOSIS): Primary | ICD-10-CM

## 2022-10-28 LAB — INR BLD: 3

## 2022-10-28 PROCEDURE — 93793 ANTICOAG MGMT PT WARFARIN: CPT | Performed by: NURSE PRACTITIONER

## 2022-10-28 NOTE — PROGRESS NOTES
HOME MONITORING REPORT    INR today:   Results for orders placed or performed in visit on 10/28/22   Protime-INR   Result Value Ref Range    INR 3.00        INR Goal: 2.0-3.0    Dosing Plan  As of 10/28/2022      TTR:  50.4 % (4.3 y)   Full warfarin instructions:  5 mg every Wed, Fri; 7.5 mg all other days; Starting 10/28/2022                PLAN: Advised patient/caregiver to continue current dose and recheck in one week. Patient/Caregiver voiced understanding   I have reviewed nursing plan for Coumadin management and agree with plan.

## 2022-11-11 DIAGNOSIS — E05.90 HYPERTHYROIDISM: ICD-10-CM

## 2022-11-11 DIAGNOSIS — E78.2 MIXED HYPERLIPIDEMIA: ICD-10-CM

## 2022-11-11 RX ORDER — ATORVASTATIN CALCIUM 40 MG/1
40 TABLET, FILM COATED ORAL DAILY
Qty: 30 TABLET | Refills: 0 | Status: SHIPPED | OUTPATIENT
Start: 2022-11-11 | End: 2022-12-14

## 2022-11-11 RX ORDER — METHIMAZOLE 10 MG/1
TABLET ORAL
Qty: 30 TABLET | Refills: 0 | Status: SHIPPED | OUTPATIENT
Start: 2022-11-11 | End: 2022-12-14

## 2022-11-24 LAB — INR BLD: 3.3

## 2022-11-28 ENCOUNTER — ANTI-COAG VISIT (OUTPATIENT)
Dept: PRIMARY CARE CLINIC | Age: 77
End: 2022-11-28
Payer: MEDICARE

## 2022-11-28 DIAGNOSIS — Z86.718 HISTORY OF DVT (DEEP VEIN THROMBOSIS): Primary | ICD-10-CM

## 2022-11-28 PROCEDURE — 93793 ANTICOAG MGMT PT WARFARIN: CPT | Performed by: NURSE PRACTITIONER

## 2022-11-28 NOTE — PROGRESS NOTES
HOME MONITORING REPORT    INR today:   Results for orders placed or performed in visit on 11/28/22   Protime-INR   Result Value Ref Range    INR 3.30        INR Goal: 2.0-3.0    Dosing Plan  As of 11/28/2022      TTR:  49.5 % (4.4 y)   Full warfarin instructions:  5 mg every Wed, Fri; 7.5 mg all other days; Starting 11/28/2022                PLAN: caregiver had her take 5 mg that night, then continued current dose and will recheck in one week. Patient/Caregiver voiced understanding    I have reviewed nursing plan for Coumadin management and agree with plan.

## 2022-12-05 ENCOUNTER — ANTI-COAG VISIT (OUTPATIENT)
Dept: PRIMARY CARE CLINIC | Age: 77
End: 2022-12-05
Payer: MEDICARE

## 2022-12-05 DIAGNOSIS — Z86.718 HISTORY OF DVT (DEEP VEIN THROMBOSIS): Primary | ICD-10-CM

## 2022-12-05 LAB — INR BLD: 4.2

## 2022-12-05 PROCEDURE — 93793 ANTICOAG MGMT PT WARFARIN: CPT | Performed by: NURSE PRACTITIONER

## 2022-12-05 NOTE — PROGRESS NOTES
HOME MONITORING REPORT    INR today:   Results for orders placed or performed in visit on 12/05/22   Protime-INR   Result Value Ref Range    INR 4.20        INR Goal: 2.0-3.0    Dosing Plan  As of 12/5/2022      TTR:  49.2 % (4.4 y)   Full warfarin instructions:  5 mg every Wed, Fri; 7.5 mg all other days; Starting 12/5/2022                PLAN: Advised patient/caregiver to hold her dose tonight, reduce her dose tomorrow to 5 mg. Then continue current dose and recheck in one week. Patient/Caregiver voiced understanding    I have reviewed nursing plan for Coumadin management and agree with plan.

## 2022-12-13 RX ORDER — FUROSEMIDE 40 MG/1
TABLET ORAL
Qty: 90 TABLET | Refills: 1 | Status: SHIPPED | OUTPATIENT
Start: 2022-12-13

## 2022-12-13 NOTE — TELEPHONE ENCOUNTER
Sriram Parada called requesting a refill of the below medication which has been pended for you:     Requested Prescriptions     Pending Prescriptions Disp Refills    furosemide (LASIX) 40 MG tablet [Pharmacy Med Name: FUROSEMIDE 40 MG TABS 40 Tablet] 90 tablet 1     Sig: TAKE 1 TABLET BY MOUTH DAILY       Last Appointment Date: 10/19/2022  Next Appointment Date: 4/20/2023    Allergies   Allergen Reactions    Other Anaphylaxis and Itching     Cloth bandaids , causes redness of skin    Ciprofloxacin Itching    Codeine Itching    Perflutren Lipid Microsphere Other (See Comments)     Back pain    Tetanus Toxoids Itching     Itching around site    Tizanidine Hcl Itching    Tetanus Toxoid      Reaction: [phone]

## 2022-12-14 DIAGNOSIS — E05.90 HYPERTHYROIDISM: ICD-10-CM

## 2022-12-14 DIAGNOSIS — E78.2 MIXED HYPERLIPIDEMIA: ICD-10-CM

## 2022-12-14 RX ORDER — METHIMAZOLE 10 MG/1
TABLET ORAL
Qty: 30 TABLET | Refills: 2 | Status: SHIPPED | OUTPATIENT
Start: 2022-12-14

## 2022-12-14 RX ORDER — ATORVASTATIN CALCIUM 40 MG/1
40 TABLET, FILM COATED ORAL DAILY
Qty: 30 TABLET | Refills: 5 | Status: SHIPPED | OUTPATIENT
Start: 2022-12-14

## 2022-12-14 NOTE — TELEPHONE ENCOUNTER
Chandrakant Ko called requesting a refill of the below medication which has been pended for you:     Requested Prescriptions     Pending Prescriptions Disp Refills    methIMAzole (TAPAZOLE) 10 MG tablet [Pharmacy Med Name: METHIMAZOLE 10 MG TABS 10 Tablet] 30 tablet 0     Sig: TAKE 1 TABLET BY MOUTH DAILY    atorvastatin (LIPITOR) 40 MG tablet [Pharmacy Med Name: ATORVASTATIN 40 MG TABLET 40 Tablet] 30 tablet 0     Sig: TAKE 1 TABLET BY MOUTH DAILY       Last Appointment Date: Visit date not found  Next Appointment Date: 4/20/2023    Allergies   Allergen Reactions    Other Anaphylaxis and Itching     Cloth bandaids , causes redness of skin    Ciprofloxacin Itching    Codeine Itching    Perflutren Lipid Microsphere Other (See Comments)     Back pain    Tetanus Toxoids Itching     Itching around site    Tizanidine Hcl Itching    Tetanus Toxoid      Reaction: [phone]

## 2022-12-15 ENCOUNTER — ANTI-COAG VISIT (OUTPATIENT)
Dept: PRIMARY CARE CLINIC | Age: 77
End: 2022-12-15
Payer: MEDICARE

## 2022-12-15 DIAGNOSIS — Z86.718 HISTORY OF DVT (DEEP VEIN THROMBOSIS): Primary | ICD-10-CM

## 2022-12-15 LAB — INR BLD: 3.3

## 2022-12-15 PROCEDURE — 93793 ANTICOAG MGMT PT WARFARIN: CPT | Performed by: NURSE PRACTITIONER

## 2022-12-15 NOTE — PROGRESS NOTES
HOME MONITORING REPORT    INR today:   Results for orders placed or performed in visit on 12/15/22   Protime-INR   Result Value Ref Range    INR 3.30        INR Goal: 2.0-3.0    Dosing Plan  As of 12/15/2022      TTR:  48.9 % (4.4 y)   Full warfarin instructions:  12/15: 2.5 mg; Otherwise 5 mg every Wed, Fri; 7.5 mg all other days; Starting 12/15/2022                PLAN: Advised patient/caregiver to reduce tonight's dose to 2.5 mg, then continue current dose and recheck in one week. Patient/Caregiver voiced understanding    I have reviewed nursing plan for Coumadin management and agree with plan.

## 2023-01-02 DIAGNOSIS — E55.9 VITAMIN D DEFICIENCY: ICD-10-CM

## 2023-01-02 NOTE — TELEPHONE ENCOUNTER
Zuleyma Elizabeth called requesting a refill of the below medication which has been pended for you:     Requested Prescriptions     Pending Prescriptions Disp Refills    vitamin D (ERGOCALCIFEROL) 1.25 MG (13923 UT) CAPS capsule [Pharmacy Med Name: VIT D2 1.25 MG (50,000 UNIT 1.25 MG Capsule] 4 capsule 3     Sig: TAKE ONE CAPSULE BY MOUTH ONCE WEEKLY    calcitRIOL (ROCALTROL) 0.25 MCG capsule [Pharmacy Med Name: CALCITRIOL 0.25 MCG CAPS 0.25 Capsule] 30 capsule 4     Sig: TAKE ONE CAPSULE BY MOUTH EVERY DAY       Last Appointment Date: 10/19/2022  Next Appointment Date: 4/20/2023    Allergies   Allergen Reactions    Other Anaphylaxis and Itching     Cloth bandaids , causes redness of skin    Ciprofloxacin Itching    Codeine Itching    Perflutren Lipid Microsphere Other (See Comments)     Back pain    Tetanus Toxoids Itching     Itching around site    Tizanidine Hcl Itching    Tetanus Toxoid      Reaction: [phone]

## 2023-01-03 RX ORDER — CALCITRIOL 0.25 UG/1
CAPSULE, LIQUID FILLED ORAL
Qty: 30 CAPSULE | Refills: 4 | Status: SHIPPED | OUTPATIENT
Start: 2023-01-03

## 2023-01-03 RX ORDER — ERGOCALCIFEROL 1.25 MG/1
CAPSULE ORAL
Qty: 4 CAPSULE | Refills: 3 | Status: SHIPPED | OUTPATIENT
Start: 2023-01-03

## 2023-01-06 ENCOUNTER — ANTI-COAG VISIT (OUTPATIENT)
Dept: INTERNAL MEDICINE | Age: 78
End: 2023-01-06

## 2023-01-06 DIAGNOSIS — Z86.718 HISTORY OF DVT (DEEP VEIN THROMBOSIS): Primary | ICD-10-CM

## 2023-01-06 LAB — INR BLD: 1.5

## 2023-01-06 NOTE — PROGRESS NOTES
Was able to reach [facility] she Vu the instructions and will have her checked next week.

## 2023-01-06 NOTE — PROGRESS NOTES
HOME MONITORING REPORT    INR today:   Results for orders placed or performed in visit on 01/06/23   Protime-INR   Result Value Ref Range    INR 1.50        INR Goal: 2.0-3.0    Dosing Plan  As of 1/6/2023      TTR:  48.9 % (4.5 y)   Full warfarin instructions:  5 mg every Wed, Fri; 7.5 mg all other days; Starting 1/6/2023                PLAN:   10 mg tonight. Resume current dose tomorrow.  Back to coumadin clinic next week

## 2023-01-19 ENCOUNTER — ANTI-COAG VISIT (OUTPATIENT)
Dept: PRIMARY CARE CLINIC | Age: 78
End: 2023-01-19
Payer: MEDICARE

## 2023-01-19 DIAGNOSIS — Z86.718 HISTORY OF DVT (DEEP VEIN THROMBOSIS): Primary | ICD-10-CM

## 2023-01-19 LAB — INR BLD: 2.1

## 2023-01-19 PROCEDURE — 93793 ANTICOAG MGMT PT WARFARIN: CPT | Performed by: NURSE PRACTITIONER

## 2023-01-19 NOTE — PROGRESS NOTES
HOME MONITORING REPORT    INR today:   Results for orders placed or performed in visit on 01/19/23   Protime-INR   Result Value Ref Range    INR 2.10        INR Goal: 2.0-3.0    Dosing Plan  As of 1/19/2023      TTR:  48.7 % (4.5 y)   Full warfarin instructions:  5 mg every Wed, Fri; 7.5 mg all other days; Starting 1/19/2023            This result was received through the [facility] Portal.      PLAN: Patient aware to continue current dose and recheck in one week or as ordered. I have reviewed nursing plan for Coumadin management and agree with plan.

## 2023-01-30 LAB — INR BLD: 2.5

## 2023-01-31 ENCOUNTER — ANTI-COAG VISIT (OUTPATIENT)
Dept: INTERNAL MEDICINE | Age: 78
End: 2023-01-31
Payer: MEDICARE

## 2023-01-31 DIAGNOSIS — Z86.718 HISTORY OF DVT (DEEP VEIN THROMBOSIS): Primary | ICD-10-CM

## 2023-01-31 PROCEDURE — 99999 PR OFFICE/OUTPT VISIT,PROCEDURE ONLY: CPT | Performed by: NURSE PRACTITIONER

## 2023-01-31 PROCEDURE — 93793 ANTICOAG MGMT PT WARFARIN: CPT | Performed by: NURSE PRACTITIONER

## 2023-01-31 NOTE — PROGRESS NOTES
HOME MONITORING REPORT    INR today:   Results for orders placed or performed in visit on 01/31/23   Protime-INR   Result Value Ref Range    INR 2.50        INR Goal: 2.0-3.0    Dosing Plan  As of 1/31/2023      TTR:  49.0 % (4.5 y)   Full warfarin instructions:  5 mg every Wed, Fri; 7.5 mg all other days; Starting 1/31/2023                PLAN: Advised patient/caregiver to continue current dose and recheck in one week. Patient/Caregiver voiced understanding   I have reviewed nursing plan for Coumadin management and agree with plan.

## 2023-02-08 ENCOUNTER — ANTI-COAG VISIT (OUTPATIENT)
Dept: PRIMARY CARE CLINIC | Age: 78
End: 2023-02-08

## 2023-02-08 DIAGNOSIS — Z86.718 HISTORY OF DVT (DEEP VEIN THROMBOSIS): Primary | ICD-10-CM

## 2023-02-08 LAB — INR BLD: 3

## 2023-02-08 NOTE — PROGRESS NOTES
HOME MONITORING REPORT    INR today:   Results for orders placed or performed in visit on 02/08/23   Protime-INR   Result Value Ref Range    INR 3.00        INR Goal: 2.0-3.0    Dosing Plan  As of 2/8/2023      TTR:  49.3 % (4.6 y)   Full warfarin instructions:  5 mg every Wed, Fri; 7.5 mg all other days; Starting 2/8/2023                PLAN: Patient aware to continue current dose and recheck in one week or as ordered. I have reviewed nursing plan for Coumadin management and agree with plan.

## 2023-02-20 ENCOUNTER — ANTI-COAG VISIT (OUTPATIENT)
Dept: PRIMARY CARE CLINIC | Age: 78
End: 2023-02-20

## 2023-02-20 DIAGNOSIS — Z86.718 HISTORY OF DVT (DEEP VEIN THROMBOSIS): Primary | ICD-10-CM

## 2023-02-20 LAB — INR BLD: 2.7

## 2023-02-20 NOTE — PROGRESS NOTES
HOME MONITORING REPORT    INR today:   Results for orders placed or performed in visit on 02/20/23   Protime-INR   Result Value Ref Range    INR 2.70        INR Goal: 2.0-3.0    Dosing Plan  As of 2/20/2023      TTR:  49.7 % (4.6 y)   Full warfarin instructions:  5 mg every Wed, Fri; 7.5 mg all other days; Starting 2/20/2023                PLAN: Patient aware to continue current dose and recheck in one week or as ordered. \I have reviewed nursing plan for Coumadin management and agree with plan.

## 2023-02-22 RX ORDER — FECAL COLL W-CHARCOAL/CATH/SYR
MISCELLANEOUS RECTAL
Qty: 10 WAFER | Refills: 11 | Status: SHIPPED | OUTPATIENT
Start: 2023-02-22

## 2023-02-22 NOTE — TELEPHONE ENCOUNTER
Jeannie Dawson called requesting a refill of the below medication which has been pended for you:     Requested Prescriptions     Pending Prescriptions Disp Refills    Ostomy Supplies (CHAO-FIT [place]) [facility] [Pharmacy Med Name: CHAO-FIT Sylvia Wafer] 10 [address] 11     Sig: USE AS DIRECTED       Last Appointment Date: 10/19/2022  Next Appointment Date: 4/20/2023    Allergies   Allergen Reactions    Other Anaphylaxis and Itching     Cloth bandaids , causes redness of skin    Ciprofloxacin Itching    Codeine Itching    Perflutren Lipid Microsphere Other (See Comments)     Back pain    Tetanus Toxoids Itching     Itching around site    Tizanidine Hcl Itching    Tetanus Toxoid      Reaction: [address]

## 2023-02-25 LAB — INR BLD: 3.2

## 2023-02-27 ENCOUNTER — ANTI-COAG VISIT (OUTPATIENT)
Dept: PRIMARY CARE CLINIC | Age: 78
End: 2023-02-27
Payer: MEDICARE

## 2023-02-27 DIAGNOSIS — Z86.718 HISTORY OF DVT (DEEP VEIN THROMBOSIS): Primary | ICD-10-CM

## 2023-02-27 LAB — INR BLD: 3.2

## 2023-02-27 PROCEDURE — 93793 ANTICOAG MGMT PT WARFARIN: CPT | Performed by: NURSE PRACTITIONER

## 2023-02-27 NOTE — PROGRESS NOTES
HOME MONITORING REPORT    INR today:   Results for orders placed or performed in visit on 02/27/23   Protime-INR   Result Value Ref Range    INR 3.20        INR Goal: 2.0-3.0    Dosing Plan  As of 2/27/2023      TTR:  49.7 % (4.6 y)   Full warfarin instructions:  5 mg every Wed, Fri; 7.5 mg all other days; Starting 2/27/2023                PLAN: This result is from 2/25, my error that it was entered as 2/27. Patient's daughter had her reduce her dose to 5 mg on 2/25 and she has continued current dose and will recheck in one week. Patient/Caregiver voiced understanding  I have reviewed nursing plan for Coumadin management and agree with plan.

## 2023-03-07 ENCOUNTER — ANTI-COAG VISIT (OUTPATIENT)
Dept: PRIMARY CARE CLINIC | Age: 78
End: 2023-03-07

## 2023-03-07 DIAGNOSIS — Z86.718 HISTORY OF DVT (DEEP VEIN THROMBOSIS): Primary | ICD-10-CM

## 2023-03-07 LAB — INR BLD: 2.7

## 2023-03-07 NOTE — PROGRESS NOTES
HOME MONITORING REPORT    INR today:   Results for orders placed or performed in visit on 03/07/23   Protime-INR   Result Value Ref Range    INR 2.70        INR Goal: 2.0-3.0    Dosing Plan  As of 3/7/2023      TTR:  49.7 % (4.6 y)   Full warfarin instructions:  5 mg every Wed, Fri; 7.5 mg all other days; Starting 3/7/2023                PLAN: Patient aware to continue current dose and recheck in one week or as ordered. I have reviewed nursing plan for Coumadin management and agree with plan.

## 2023-03-13 LAB — INR BLD: 3.8

## 2023-03-14 ENCOUNTER — ANTI-COAG VISIT (OUTPATIENT)
Dept: PRIMARY CARE CLINIC | Age: 78
End: 2023-03-14
Payer: MEDICARE

## 2023-03-14 DIAGNOSIS — Z86.718 HISTORY OF DVT (DEEP VEIN THROMBOSIS): Primary | ICD-10-CM

## 2023-03-14 PROCEDURE — 93793 ANTICOAG MGMT PT WARFARIN: CPT | Performed by: NURSE PRACTITIONER

## 2023-03-14 NOTE — PROGRESS NOTES
HOME MONITORING REPORT    INR today:   Results for orders placed or performed in visit on 03/14/23   Protime-INR   Result Value Ref Range    INR 3.80        INR Goal: 2.0-3.0    Dosing Plan  As of 3/14/2023      TTR:  49.6 % (4.7 y)   Full warfarin instructions:  5 mg every Wed, Fri; 7.5 mg all other days; Starting 3/14/2023                PLAN: caregiver held her dose last night, she will  continue current dose and recheck in one week. Patient/Caregiver voiced understanding  I have reviewed nursing plan for Coumadin management and agree with plan.

## 2023-03-23 NOTE — PROGRESS NOTES
HOME MONITORING REPORT    INR today:   Results for orders placed or performed in visit on 12/27/21   Protime-INR   Result Value Ref Range    INR 3.90        INR Goal: 2.0-3.0    Dosing Plan  As of 12/27/2021    TTR:  52.4 % (3.4 y)   Full warfarin instructions:  5 mg every Wed, Fri; 7.5 mg all other days              PLAN: caregiver had her hold her dose Friday PM, she then had her continue current dose and recheck in one week. Patient/Caregiver voiced understanding  I have reviewed nursing plan for Coumadin management and agree with plan. Star Wedge Flap Text: The defect edges were debeveled with a #15 scalpel blade.  Given the location of the defect, shape of the defect and the proximity to free margins a star wedge flap was deemed most appropriate.  Using a sterile surgical marker, an appropriate rotation flap was drawn incorporating the defect and placing the expected incisions within the relaxed skin tension lines where possible. The area thus outlined was incised deep to adipose tissue with a #15 scalpel blade.  The skin margins were undermined to an appropriate distance in all directions utilizing iris scissors.

## 2023-03-27 ENCOUNTER — ANTI-COAG VISIT (OUTPATIENT)
Dept: PRIMARY CARE CLINIC | Age: 78
End: 2023-03-27
Payer: MEDICARE

## 2023-03-27 DIAGNOSIS — Z86.718 HISTORY OF DVT (DEEP VEIN THROMBOSIS): Primary | ICD-10-CM

## 2023-03-27 LAB — INR BLD: 3.4

## 2023-03-27 PROCEDURE — 93793 ANTICOAG MGMT PT WARFARIN: CPT | Performed by: NURSE PRACTITIONER

## 2023-03-27 NOTE — PROGRESS NOTES
HOME MONITORING REPORT    INR today:   Results for orders placed or performed in visit on 03/27/23   Protime-INR   Result Value Ref Range    INR 3.40        INR Goal: 2.0-3.0    Dosing Plan  As of 3/27/2023      TTR:  49.2 % (4.7 y)   Full warfarin instructions:  5 mg every Mon, Wed, Fri; 7.5 mg all other days; Starting 3/27/2023                PLAN: Advised patient/caregiver to decrease her weekly dose to 7.5 mg on Tuesday and Thursday and Sunday, 5 mg all other days. Recheck in one week. Patient/Caregiver voiced understanding  I have reviewed nursing plan for Coumadin management and agree with plan.

## 2023-04-05 DIAGNOSIS — E05.90 HYPERTHYROIDISM: ICD-10-CM

## 2023-04-05 DIAGNOSIS — E55.9 VITAMIN D DEFICIENCY: ICD-10-CM

## 2023-04-05 RX ORDER — ERGOCALCIFEROL 1.25 MG/1
CAPSULE ORAL
Qty: 4 CAPSULE | Refills: 3 | Status: SHIPPED | OUTPATIENT
Start: 2023-04-05

## 2023-04-05 RX ORDER — METHIMAZOLE 10 MG/1
TABLET ORAL
Qty: 30 TABLET | Refills: 2 | Status: SHIPPED | OUTPATIENT
Start: 2023-04-05

## 2023-04-07 ENCOUNTER — ANTI-COAG VISIT (OUTPATIENT)
Dept: INTERNAL MEDICINE | Age: 78
End: 2023-04-07

## 2023-04-07 DIAGNOSIS — Z86.718 HISTORY OF DVT (DEEP VEIN THROMBOSIS): Primary | ICD-10-CM

## 2023-04-07 LAB — INR BLD: 3.2

## 2023-04-07 NOTE — PROGRESS NOTES
HOME MONITORING REPORT    INR today:   Results for orders placed or performed in visit on 04/07/23   Protime-INR   Result Value Ref Range    INR 3.20        INR Goal: 2.0-3.0    Dosing Plan  As of 4/7/2023      TTR:  48.9 % (4.7 y)   Full warfarin instructions:  7.5 mg every Sun, Tue, Thu; 5 mg all other days                PLAN:   Keven Leventhal, APRN  You Just now (3:21 PM)     HA  Reduce today's dose to 2.5 mg. Then continue current dosing and recheck in one week.

## 2023-04-17 ENCOUNTER — ANTI-COAG VISIT (OUTPATIENT)
Dept: PRIMARY CARE CLINIC | Age: 78
End: 2023-04-17
Payer: MEDICARE

## 2023-04-17 DIAGNOSIS — Z86.718 HISTORY OF DVT (DEEP VEIN THROMBOSIS): Primary | ICD-10-CM

## 2023-04-17 LAB — INR BLD: 2.8

## 2023-04-17 PROCEDURE — 93793 ANTICOAG MGMT PT WARFARIN: CPT | Performed by: NURSE PRACTITIONER

## 2023-04-17 NOTE — PROGRESS NOTES
HOME MONITORING REPORT    INR today:   Results for orders placed or performed in visit on 04/17/23   Protime-INR   Result Value Ref Range    INR 2.80        INR Goal: 2.0-3.0    Dosing Plan  As of 4/17/2023      TTR:  48.9 % (4.8 y)   Full warfarin instructions:  7.5 mg every Sun, Tue, Thu; 5 mg all other days                PLAN: Patient aware to continue current dose and recheck in one week or as ordered.    I have reviewed nursing plan for Coumadin management and agree with plan

## 2023-04-20 ENCOUNTER — OFFICE VISIT (OUTPATIENT)
Dept: INTERNAL MEDICINE | Age: 78
End: 2023-04-20

## 2023-04-20 VITALS — HEART RATE: 64 BPM | OXYGEN SATURATION: 98 %

## 2023-04-20 DIAGNOSIS — I48.91 ATRIAL FIBRILLATION, UNSPECIFIED TYPE (HCC): ICD-10-CM

## 2023-04-20 DIAGNOSIS — Z00.00 MEDICARE ANNUAL WELLNESS VISIT, SUBSEQUENT: ICD-10-CM

## 2023-04-20 DIAGNOSIS — G89.4 CHRONIC PAIN SYNDROME: Primary | ICD-10-CM

## 2023-04-20 DIAGNOSIS — F33.41 RECURRENT MAJOR DEPRESSIVE DISORDER, IN PARTIAL REMISSION (HCC): ICD-10-CM

## 2023-04-20 DIAGNOSIS — F02.818 ALZHEIMER'S DEMENTIA WITH BEHAVIORAL DISTURBANCE (HCC): ICD-10-CM

## 2023-04-20 DIAGNOSIS — G30.9 ALZHEIMER'S DEMENTIA WITH BEHAVIORAL DISTURBANCE (HCC): ICD-10-CM

## 2023-04-20 DIAGNOSIS — N18.31 STAGE 3A CHRONIC KIDNEY DISEASE (HCC): ICD-10-CM

## 2023-04-20 DIAGNOSIS — I50.33 ACUTE ON CHRONIC DIASTOLIC HEART FAILURE (HCC): ICD-10-CM

## 2023-04-20 DIAGNOSIS — I10 ESSENTIAL HYPERTENSION: ICD-10-CM

## 2023-04-20 DIAGNOSIS — I50.32 CHRONIC DIASTOLIC HEART FAILURE (HCC): ICD-10-CM

## 2023-04-20 DIAGNOSIS — E55.9 VITAMIN D DEFICIENCY: ICD-10-CM

## 2023-04-20 DIAGNOSIS — I82.502 CHRONIC DEEP VEIN THROMBOSIS (DVT) OF LEFT LOWER EXTREMITY, UNSPECIFIED VEIN (HCC): ICD-10-CM

## 2023-04-20 DIAGNOSIS — E05.90 HYPERTHYROIDISM: ICD-10-CM

## 2023-04-20 LAB
25(OH)D3 SERPL-MCNC: 67.8 NG/ML
ALBUMIN SERPL-MCNC: 3.7 G/DL (ref 3.5–5.2)
ALP SERPL-CCNC: 79 U/L (ref 35–104)
ALT SERPL-CCNC: 13 U/L (ref 5–33)
ANION GAP SERPL CALCULATED.3IONS-SCNC: 10 MMOL/L (ref 7–19)
AST SERPL-CCNC: 15 U/L (ref 5–32)
BASOPHILS # BLD: 0 K/UL (ref 0–0.2)
BASOPHILS NFR BLD: 0.3 % (ref 0–1)
BILIRUB SERPL-MCNC: 0.5 MG/DL (ref 0.2–1.2)
BUN SERPL-MCNC: 38 MG/DL (ref 8–23)
CALCIUM SERPL-MCNC: 10.9 MG/DL (ref 8.8–10.2)
CHLORIDE SERPL-SCNC: 107 MMOL/L (ref 98–111)
CHOLEST SERPL-MCNC: 106 MG/DL (ref 160–199)
CO2 SERPL-SCNC: 26 MMOL/L (ref 22–29)
CREAT SERPL-MCNC: 1.5 MG/DL (ref 0.5–0.9)
EOSINOPHIL # BLD: 0.2 K/UL (ref 0–0.6)
EOSINOPHIL NFR BLD: 3 % (ref 0–5)
ERYTHROCYTE [DISTWIDTH] IN BLOOD BY AUTOMATED COUNT: 12.6 % (ref 11.5–14.5)
GLUCOSE SERPL-MCNC: 94 MG/DL (ref 74–109)
HCT VFR BLD AUTO: 44.2 % (ref 37–47)
HDLC SERPL-MCNC: 42 MG/DL (ref 65–121)
HGB BLD-MCNC: 14.2 G/DL (ref 12–16)
IMM GRANULOCYTES # BLD: 0 K/UL
LDLC SERPL CALC-MCNC: 39 MG/DL
LYMPHOCYTES # BLD: 0.7 K/UL (ref 1.1–4.5)
LYMPHOCYTES NFR BLD: 11.2 % (ref 20–40)
MCH RBC QN AUTO: 33.3 PG (ref 27–31)
MCHC RBC AUTO-ENTMCNC: 32.1 G/DL (ref 33–37)
MCV RBC AUTO: 103.5 FL (ref 81–99)
MONOCYTES # BLD: 0.8 K/UL (ref 0–0.9)
MONOCYTES NFR BLD: 12 % (ref 0–10)
NEUTROPHILS # BLD: 4.7 K/UL (ref 1.5–7.5)
NEUTS SEG NFR BLD: 73.3 % (ref 50–65)
PLATELET # BLD AUTO: 156 K/UL (ref 130–400)
PMV BLD AUTO: 9.8 FL (ref 9.4–12.3)
POTASSIUM SERPL-SCNC: 4.2 MMOL/L (ref 3.5–5)
PROT SERPL-MCNC: 7.5 G/DL (ref 6.6–8.7)
RBC # BLD AUTO: 4.27 M/UL (ref 4.2–5.4)
SODIUM SERPL-SCNC: 143 MMOL/L (ref 136–145)
TRIGL SERPL-MCNC: 124 MG/DL (ref 0–149)
TSH SERPL DL<=0.005 MIU/L-ACNC: 3.97 UIU/ML (ref 0.27–4.2)
WBC # BLD AUTO: 6.4 K/UL (ref 4.8–10.8)

## 2023-04-20 RX ORDER — ZOSTER VACCINE RECOMBINANT, ADJUVANTED 50 MCG/0.5
0.5 KIT INTRAMUSCULAR SEE ADMIN INSTRUCTIONS
Qty: 0.5 ML | Refills: 0 | Status: SHIPPED | OUTPATIENT
Start: 2023-04-20 | End: 2023-10-17

## 2023-04-20 RX ORDER — HYDROCODONE BITARTRATE AND ACETAMINOPHEN 5; 325 MG/1; MG/1
1 TABLET ORAL EVERY 6 HOURS PRN
Qty: 120 TABLET | Refills: 0 | Status: SHIPPED | OUTPATIENT
Start: 2023-04-20 | End: 2023-05-20

## 2023-04-20 SDOH — ECONOMIC STABILITY: FOOD INSECURITY: WITHIN THE PAST 12 MONTHS, THE FOOD YOU BOUGHT JUST DIDN'T LAST AND YOU DIDN'T HAVE MONEY TO GET MORE.: NEVER TRUE

## 2023-04-20 SDOH — ECONOMIC STABILITY: INCOME INSECURITY: HOW HARD IS IT FOR YOU TO PAY FOR THE VERY BASICS LIKE FOOD, HOUSING, MEDICAL CARE, AND HEATING?: NOT HARD AT ALL

## 2023-04-20 SDOH — ECONOMIC STABILITY: HOUSING INSECURITY
IN THE LAST 12 MONTHS, WAS THERE A TIME WHEN YOU DID NOT HAVE A STEADY PLACE TO SLEEP OR SLEPT IN A SHELTER (INCLUDING NOW)?: NO

## 2023-04-20 SDOH — ECONOMIC STABILITY: FOOD INSECURITY: WITHIN THE PAST 12 MONTHS, YOU WORRIED THAT YOUR FOOD WOULD RUN OUT BEFORE YOU GOT MONEY TO BUY MORE.: NEVER TRUE

## 2023-04-20 ASSESSMENT — ENCOUNTER SYMPTOMS
ABDOMINAL DISTENTION: 0
TROUBLE SWALLOWING: 0
NAUSEA: 0
VOMITING: 0
COUGH: 0
ABDOMINAL PAIN: 0
WHEEZING: 0
BLOOD IN STOOL: 0
STRIDOR: 0
CHOKING: 0
CONSTIPATION: 0
EYE DISCHARGE: 0
DIARRHEA: 0
SHORTNESS OF BREATH: 0
EYE ITCHING: 0
SORE THROAT: 0
COLOR CHANGE: 0
BACK PAIN: 1

## 2023-04-20 ASSESSMENT — LIFESTYLE VARIABLES: HOW OFTEN DO YOU HAVE A DRINK CONTAINING ALCOHOL: NEVER

## 2023-04-20 ASSESSMENT — PATIENT HEALTH QUESTIONNAIRE - PHQ9
9. THOUGHTS THAT YOU WOULD BE BETTER OFF DEAD, OR OF HURTING YOURSELF: 0
SUM OF ALL RESPONSES TO PHQ QUESTIONS 1-9: 0
4. FEELING TIRED OR HAVING LITTLE ENERGY: 0
SUM OF ALL RESPONSES TO PHQ QUESTIONS 1-9: 0
SUM OF ALL RESPONSES TO PHQ QUESTIONS 1-9: 0
2. FEELING DOWN, DEPRESSED OR HOPELESS: 0
6. FEELING BAD ABOUT YOURSELF - OR THAT YOU ARE A FAILURE OR HAVE LET YOURSELF OR YOUR FAMILY DOWN: 0
SUM OF ALL RESPONSES TO PHQ QUESTIONS 1-9: 0
7. TROUBLE CONCENTRATING ON THINGS, SUCH AS READING THE NEWSPAPER OR WATCHING TELEVISION: 0
3. TROUBLE FALLING OR STAYING ASLEEP: 0
8. MOVING OR SPEAKING SO SLOWLY THAT OTHER PEOPLE COULD HAVE NOTICED. OR THE OPPOSITE, BEING SO FIGETY OR RESTLESS THAT YOU HAVE BEEN MOVING AROUND A LOT MORE THAN USUAL: 0
1. LITTLE INTEREST OR PLEASURE IN DOING THINGS: 0
10. IF YOU CHECKED OFF ANY PROBLEMS, HOW DIFFICULT HAVE THESE PROBLEMS MADE IT FOR YOU TO DO YOUR WORK, TAKE CARE OF THINGS AT HOME, OR GET ALONG WITH OTHER PEOPLE: 0
5. POOR APPETITE OR OVEREATING: 0
SUM OF ALL RESPONSES TO PHQ9 QUESTIONS 1 & 2: 0

## 2023-04-20 NOTE — PATIENT INSTRUCTIONS
saying. Face the person you are talking to, and have them face you. Make sure the lighting is good. You need to see the other person's face clearly. Think about counseling if you need help to adjust to your hearing loss. When should you call for help? Watch closely for changes in your health, and be sure to contact your doctor if:    You think your hearing is getting worse.     You have new symptoms, such as dizziness or nausea. Where can you learn more? Go to http://www.reyes.com/ and enter R798 to learn more about \"Hearing Loss: Care Instructions. \"  Current as of: May 4, 2022               Content Version: 13.6  © 8326-0595 "[employer]". Care instructions adapted under license by [facility] ([facility]). If you have questions about a medical condition or this instruction, always ask your healthcare professional. [address] any warranty or liability for your use of this information. Learning About Activities of Daily Living  What are activities of daily living? Activities of daily living (ADLs) are the basic self-care tasks you do every day. As you age, and if you have health problems, you may find that it's harder to do these things for yourself. That's when you may need some help. Your doctor uses ADLs to measure how much help you need. Knowing what you can and can't do for yourself is an important first step to getting help. And when you have the help you need, you can stay as independent as possible. Your doctor will want to know if you are able to do tasks such as: Take a bath or shower without help. Go to the bathroom by yourself. Dress and undress without help. Shave, comb your hair, and brush teeth on your own. Get in and out of bed or a chair without help. Feed yourself without help. If you are having trouble doing basic self-care tasks, talk with your doctor.  You may want to bring a caregiver or family member who can help the doctor

## 2023-04-20 NOTE — PROGRESS NOTES
Heart Disease Mother     Stroke Father     Cancer Sister        Social History     Tobacco Use    Smoking status: Never    Smokeless tobacco: Never   Substance Use Topics    Alcohol use: No      Current Outpatient Medications   Medication Sig Dispense Refill    zoster recombinant adjuvanted vaccine (SHINGRIX) 50 MCG/0.5ML SUSR injection Inject 0.5 mLs into the muscle See Admin Instructions 1 dose now and repeat in 2-6 months 0.5 mL 0    HYDROcodone-acetaminophen (NORCO) 5-325 MG per tablet Take 1 tablet by mouth every 6 hours as needed for Pain for up to 30 days. Intended supply: 30 days Max Daily Amount: 4 tablets 120 tablet 0    metoprolol tartrate (LOPRESSOR) 25 MG tablet TAKE 1 TABLET BY MOUTH TWO TIMES A  tablet 1    vitamin D (ERGOCALCIFEROL) 1.25 MG (22742 UT) CAPS capsule TAKE ONE CAPSULE BY MOUTH ONCE WEEKLY 4 capsule 3    methIMAzole (TAPAZOLE) 10 MG tablet TAKE 1 TABLET BY MOUTH DAILY 30 tablet 2    Ostomy Supplies (CHAO-FIT [place]UR[place] STOMAHES[facility]) WAFR USE AS DIRECTED 10 Wafer 11    calcitRIOL (ROCALTROL) 0.25 MCG capsule TAKE ONE CAPSULE BY MOUTH EVERY DAY 30 capsule 4    atorvastatin (LIPITOR) 40 MG tablet TAKE 1 TABLET BY MOUTH DAILY 30 tablet 5    furosemide (LASIX) 40 MG tablet TAKE 1 TABLET BY MOUTH DAILY 90 tablet 1    dilTIAZem (CARDIZEM CD) 180 MG extended release capsule TAKE 1 CAPSULE BY MOUTH DAILY 30 capsule 5    hydrOXYzine HCl (ATARAX) 25 MG tablet Take 1 tablet by mouth every 8 hours as needed for Itching 36 tablet 3    warfarin (COUMADIN) 7.5 MG tablet Take 1 tablet by mouth daily Take 4 days a week 90 tablet 1    warfarin (COUMADIN) 5 MG tablet Take 1 tablet by mouth daily 90 tablet 1    Ostomy Supplies (CHAO-FIT NATURA UROSTOMY POUCH) Pouch MISC USE AS DIRECTED. 20 each 5    QUEtiapine (SEROQUEL) 25 MG tablet TAKE 1 TABLET BY MOUTH TWO TIMES A DAY 60 tablet 1    silver sulfADIAZINE (SILVADENE) 1 % cream Apply topically daily.  50 g 2    lisinopril (PRINIVIL;ZESTRIL) 10 MG tablet

## 2023-04-24 DIAGNOSIS — I48.91 ATRIAL FIBRILLATION, UNSPECIFIED TYPE (HCC): ICD-10-CM

## 2023-04-24 DIAGNOSIS — I10 ESSENTIAL HYPERTENSION: ICD-10-CM

## 2023-04-24 RX ORDER — DILTIAZEM HYDROCHLORIDE 180 MG/1
CAPSULE, COATED, EXTENDED RELEASE ORAL
Qty: 30 CAPSULE | Refills: 5 | Status: SHIPPED | OUTPATIENT
Start: 2023-04-24

## 2023-04-24 NOTE — TELEPHONE ENCOUNTER
Ryne Humphries called requesting a refill of the below medication which has been pended for you:     Requested Prescriptions     Pending Prescriptions Disp Refills    dilTIAZem (CARDIZEM CD) 180 MG extended release capsule [Pharmacy Med Name: DILTIAZEM ER 180MG COATED B 180 Capsule] 30 capsule 5     Sig: TAKE 1 CAPSULE BY MOUTH EVERY DAY       Last Appointment Date: 4/20/2023  Next Appointment Date: 7/17/2023    Allergies   Allergen Reactions    Other Anaphylaxis and Itching     Cloth bandaids , causes redness of skin    Ciprofloxacin Itching    Codeine Itching    Perflutren Lipid Microsphere Other (See Comments)     Back pain    Tetanus Toxoids Itching     Itching around site    Tizanidine Hcl Itching    Tetanus Toxoid      Reaction: [phone]

## 2023-04-27 ENCOUNTER — ANTI-COAG VISIT (OUTPATIENT)
Dept: PRIMARY CARE CLINIC | Age: 78
End: 2023-04-27
Payer: MEDICARE

## 2023-04-27 DIAGNOSIS — Z86.718 HISTORY OF DVT (DEEP VEIN THROMBOSIS): Primary | ICD-10-CM

## 2023-04-27 LAB — INR BLD: 3.4

## 2023-04-27 PROCEDURE — 93793 ANTICOAG MGMT PT WARFARIN: CPT | Performed by: NURSE PRACTITIONER

## 2023-05-01 DIAGNOSIS — H61.23 IMPACTED CERUMEN OF BOTH EARS: Primary | ICD-10-CM

## 2023-05-09 ENCOUNTER — ANTI-COAG VISIT (OUTPATIENT)
Dept: PRIMARY CARE CLINIC | Age: 78
End: 2023-05-09
Payer: MEDICARE

## 2023-05-09 DIAGNOSIS — Z86.718 HISTORY OF DVT (DEEP VEIN THROMBOSIS): Primary | ICD-10-CM

## 2023-05-09 LAB — INR BLD: 2.7

## 2023-05-09 PROCEDURE — 93793 ANTICOAG MGMT PT WARFARIN: CPT | Performed by: NURSE PRACTITIONER

## 2023-05-09 NOTE — PROGRESS NOTES
HOME MONITORING REPORT    INR today:   Results for orders placed or performed in visit on 05/09/23   Protime-INR   Result Value Ref Range    INR 2.70        INR Goal: 2.0-3.0    Dosing Plan  As of 5/9/2023      TTR:  48.8 % (4.8 y)   Full warfarin instructions:  7.5 mg every Sun, Tue, Thu; 5 mg all other days                PLAN: Patient aware to continue current dose and recheck in one week or as ordered. I have reviewed nursing plan for Coumadin management and agree with plan.

## 2023-05-22 DIAGNOSIS — G89.4 CHRONIC PAIN SYNDROME: ICD-10-CM

## 2023-05-22 RX ORDER — HYDROCODONE BITARTRATE AND ACETAMINOPHEN 5; 325 MG/1; MG/1
1 TABLET ORAL EVERY 6 HOURS PRN
Qty: 120 TABLET | Refills: 0 | Status: SHIPPED | OUTPATIENT
Start: 2023-05-22 | End: 2023-06-21

## 2023-05-22 NOTE — TELEPHONE ENCOUNTER
Vallorie Mcardle called to request a refill on her medication. Last office visit : 4/20/2023   Next office visit : 7/17/2023     Last UDS: No results found for: [place] Habermann, LABBENZ, BUPRENUR, COCAIMETSCRU, GABAPENTIN, MDMA, METAMPU, OPIATESCREENURINE, OXTCOSU, PHENCYCLIDINESCREENURINE, PROPOXYPHENE, THCSCREENUR, TRICYUR    Last Alida Hayes:    Medication Contract:      Last Fill:      Requested Prescriptions     Pending Prescriptions Disp Refills    HYDROcodone-acetaminophen ([facility]) 5-325 MG per tablet [Pharmacy Med Name: HYDROCODON-APAP 5-325 5-325 Tablet] 120 tablet 0     Sig: TAKE 1 TABLET BY MOUTH EVERY 6 HOURS AS NEEDED FOR PAIN FOR UP TO 30 DAYS. INTENDED SUPPLY: 30 DAYS MAX DAILY AMOUNT: 4 TABLETS         Please approve or refuse this medication.    Pastor Johnson MA

## 2023-05-24 ENCOUNTER — ANTI-COAG VISIT (OUTPATIENT)
Dept: PRIMARY CARE CLINIC | Age: 78
End: 2023-05-24
Payer: MEDICARE

## 2023-05-24 DIAGNOSIS — Z86.718 HISTORY OF DVT (DEEP VEIN THROMBOSIS): Primary | ICD-10-CM

## 2023-05-24 LAB — INR BLD: 2.8

## 2023-05-24 PROCEDURE — 93793 ANTICOAG MGMT PT WARFARIN: CPT | Performed by: NURSE PRACTITIONER

## 2023-05-24 NOTE — PROGRESS NOTES
HOME MONITORING REPORT    INR today:   Results for orders placed or performed in visit on 05/24/23   Protime-INR   Result Value Ref Range    INR 2.80        INR Goal: 2.0-3.0    Dosing Plan  As of 5/24/2023      TTR:  49.3 % (4.9 y)   Full warfarin instructions:  7.5 mg every Sun, Tue, Thu; 5 mg all other days                PLAN: Patient aware to continue current dose and recheck in one week or as ordered.    I have reviewed nursing plan for Coumadin management and agree with plan

## 2023-06-06 RX ORDER — CALCITRIOL 0.25 UG/1
CAPSULE, LIQUID FILLED ORAL
Qty: 30 CAPSULE | Refills: 4 | Status: SHIPPED | OUTPATIENT
Start: 2023-06-06

## 2023-06-06 NOTE — TELEPHONE ENCOUNTER
Wero Griffiths called requesting a refill of the below medication which has been pended for you:     Requested Prescriptions     Pending Prescriptions Disp Refills    calcitRIOL (ROCALTROL) 0.25 MCG capsule [Pharmacy Med Name: CALCITRIOL 0.25 MCG CAPS 0.25 Capsule] 30 capsule 4     Sig: TAKE ONE CAPSULE BY MOUTH EVERY DAY       Last Appointment Date: 4/20/2023  Next Appointment Date: 7/17/2023    Allergies   Allergen Reactions    Other Anaphylaxis and Itching     Cloth bandaids , causes redness of skin    Ciprofloxacin Itching    Codeine Itching    Perflutren Lipid Microsphere Other (See Comments)     Back pain    Tetanus Toxoids Itching     Itching around site    Tizanidine Hcl Itching    Tetanus Toxoid      Reaction: [address]

## 2023-06-13 PROBLEM — H61.23 BILATERAL IMPACTED CERUMEN: Status: ACTIVE | Noted: 2023-06-13

## 2023-06-19 RX ORDER — HYDROXYZINE HYDROCHLORIDE 25 MG/1
25 TABLET, FILM COATED ORAL EVERY 8 HOURS PRN
Qty: 36 TABLET | Refills: 3 | Status: SHIPPED | OUTPATIENT
Start: 2023-06-19

## 2023-06-19 NOTE — TELEPHONE ENCOUNTER
Zelda Blizzard called requesting a refill of the below medication which has been pended for you:     Requested Prescriptions     Pending Prescriptions Disp Refills    hydrOXYzine HCl (ATARAX) 25 MG tablet [Pharmacy Med Name: HYDROXYZINE 25 MG TABS 25 Tablet] 36 tablet 3     Sig: TAKE 1 TABLET BY MOUTH EVERY 8 HOURS AS NEEDED FOR ITCHING       Last Appointment Date: 4/20/2023  Next Appointment Date: 7/17/2023    Allergies   Allergen Reactions    Other Anaphylaxis and Itching     Cloth bandaids , causes redness of skin    Ciprofloxacin Itching    Codeine Itching    Perflutren Lipid Microsphere Other (See Comments)     Back pain    Tetanus Toxoids Itching     Itching around site    Tizanidine Hcl Itching    Tetanus Toxoid      Reaction: [address]

## 2023-06-20 ENCOUNTER — ANTI-COAG VISIT (OUTPATIENT)
Dept: PRIMARY CARE CLINIC | Age: 78
End: 2023-06-20
Payer: MEDICARE

## 2023-06-20 DIAGNOSIS — Z86.718 HISTORY OF DVT (DEEP VEIN THROMBOSIS): Primary | ICD-10-CM

## 2023-06-20 LAB — INR BLD: 2.4

## 2023-06-20 PROCEDURE — 93793 ANTICOAG MGMT PT WARFARIN: CPT | Performed by: NURSE PRACTITIONER

## 2023-06-20 NOTE — PROGRESS NOTES
HOME MONITORING REPORT    INR today:   Results for orders placed or performed in visit on 06/20/23   Protime-INR   Result Value Ref Range    INR 2.40        INR Goal: 2.0-3.0    Dosing Plan  As of 6/20/2023      TTR:  50.0 % (4.9 y)   Full warfarin instructions:  7.5 mg every Sun, Tue, Thu; 5 mg all other days                PLAN: Patient aware to continue current dose and recheck in one week or as ordered.    I have reviewed nursing plan for Coumadin management and agree with plan

## 2023-06-21 DIAGNOSIS — E78.2 MIXED HYPERLIPIDEMIA: ICD-10-CM

## 2023-06-21 RX ORDER — ATORVASTATIN CALCIUM 40 MG/1
40 TABLET, FILM COATED ORAL DAILY
Qty: 30 TABLET | Refills: 5 | Status: SHIPPED | OUTPATIENT
Start: 2023-06-21

## 2023-06-21 NOTE — TELEPHONE ENCOUNTER
Last OV 4/20/2023  Next OV 7/17/2023      Requested Prescriptions     Pending Prescriptions Disp Refills    atorvastatin (LIPITOR) 40 MG tablet [Pharmacy Med Name: ATORVASTATIN 40 MG TABLET 40 Tablet] 30 tablet 5     Sig: TAKE 1 TABLET BY MOUTH DAILY

## 2023-06-26 DIAGNOSIS — G89.4 CHRONIC PAIN SYNDROME: ICD-10-CM

## 2023-06-26 RX ORDER — HYDROCODONE BITARTRATE AND ACETAMINOPHEN 5; 325 MG/1; MG/1
TABLET ORAL
Qty: 120 TABLET | Refills: 0 | Status: SHIPPED | OUTPATIENT
Start: 2023-06-26 | End: 2023-07-26

## 2023-07-06 ENCOUNTER — ANTI-COAG VISIT (OUTPATIENT)
Dept: INTERNAL MEDICINE | Age: 78
End: 2023-07-06
Payer: MEDICARE

## 2023-07-06 DIAGNOSIS — Z86.718 HISTORY OF DVT (DEEP VEIN THROMBOSIS): Primary | ICD-10-CM

## 2023-07-06 LAB — INR BLD: 2.6

## 2023-07-06 PROCEDURE — 93793 ANTICOAG MGMT PT WARFARIN: CPT | Performed by: NURSE PRACTITIONER

## 2023-07-06 PROCEDURE — 99999 PR OFFICE/OUTPT VISIT,PROCEDURE ONLY: CPT | Performed by: NURSE PRACTITIONER

## 2023-07-06 NOTE — PROGRESS NOTES
HOME MONITORING REPORT    INR today:   Results for orders placed or performed in visit on 07/06/23   Protime-INR   Result Value Ref Range    INR 2.60        INR Goal: 2.0-3.0    Dosing Plan  As of 7/6/2023      TTR:  50.4 % (5 y)   Full warfarin instructions:  7.5 mg every Sun, Tue, Thu; 5 mg all other days                PLAN: Advised patient/caregiver to continue current dose and recheck in one week. Patient/Caregiver voiced understanding   I have reviewed nursing plan for Coumadin management and agree with plan.

## 2023-07-14 DIAGNOSIS — E05.90 HYPERTHYROIDISM: ICD-10-CM

## 2023-07-14 RX ORDER — METHIMAZOLE 10 MG/1
TABLET ORAL
Qty: 30 TABLET | Refills: 2 | Status: SHIPPED | OUTPATIENT
Start: 2023-07-14

## 2023-07-18 ENCOUNTER — ANTI-COAG VISIT (OUTPATIENT)
Dept: PRIMARY CARE CLINIC | Age: 78
End: 2023-07-18
Payer: MEDICARE

## 2023-07-18 DIAGNOSIS — Z86.718 HISTORY OF DVT (DEEP VEIN THROMBOSIS): Primary | ICD-10-CM

## 2023-07-18 LAB — INR BLD: 2.8

## 2023-07-18 PROCEDURE — 93793 ANTICOAG MGMT PT WARFARIN: CPT | Performed by: NURSE PRACTITIONER

## 2023-07-18 NOTE — PROGRESS NOTES
HOME MONITORING REPORT    INR today:   Results for orders placed or performed in visit on 07/18/23   Protime-INR   Result Value Ref Range    INR 2.80        INR Goal: 2.0-3.0    Dosing Plan  As of 7/18/2023      TTR:  50.8 % (5 y)   Full warfarin instructions:  7.5 mg every Sun, Tue, Thu; 5 mg all other days                PLAN: Patient aware to continue current dose and recheck in one week or as ordered. I have reviewed nursing plan for Coumadin management and agree with plan.

## 2023-07-25 DIAGNOSIS — G89.4 CHRONIC PAIN SYNDROME: ICD-10-CM

## 2023-07-25 RX ORDER — HYDROCODONE BITARTRATE AND ACETAMINOPHEN 5; 325 MG/1; MG/1
TABLET ORAL
Qty: 120 TABLET | Refills: 0 | Status: SHIPPED | OUTPATIENT
Start: 2023-07-25 | End: 2023-08-24

## 2023-07-25 NOTE — TELEPHONE ENCOUNTER
Nini Uriostegui called to request a refill on her medication. Last office visit : 4/20/2023   Next office visit : 7/31/2023     Last UDS: No results found for: [place]etta Outhouse, LABBENZ, BUPRENUR, COCAIMETSCRU, GABAPENTIN, MDMA, METAMPU, OPIATESCREENURINE, OXTCOSU, PHENCYCLIDINESCREENURINE, PROPOXYPHENE, THCSCREENUR, TRICYUR    Last Franny Gal:     Medication Contract:     Last Fill:  06/26/23    Requested Prescriptions     Pending Prescriptions Disp Refills    HYDROcodone-acetaminophen ([address]) 5-325 MG per tablet [Pharmacy Med Name: HYDROCODON-APAP 5-325 5-325 Tablet] 120 tablet 0     Sig: TAKE 1 TABLET BY MOUTH EVERY 6 HOURS AS NEEDED FOR PAIN FOR UP TO 30 DAYS. Please approve or refuse this medication.    Isela Gomez MA

## 2023-07-31 ENCOUNTER — ANTI-COAG VISIT (OUTPATIENT)
Dept: PRIMARY CARE CLINIC | Age: 78
End: 2023-07-31
Payer: MEDICARE

## 2023-07-31 ENCOUNTER — OFFICE VISIT (OUTPATIENT)
Dept: INTERNAL MEDICINE | Age: 78
End: 2023-07-31
Payer: MEDICARE

## 2023-07-31 VITALS — SYSTOLIC BLOOD PRESSURE: 124 MMHG | HEART RATE: 60 BPM | DIASTOLIC BLOOD PRESSURE: 60 MMHG | OXYGEN SATURATION: 94 %

## 2023-07-31 DIAGNOSIS — I50.33 ACUTE ON CHRONIC DIASTOLIC HEART FAILURE (HCC): Primary | ICD-10-CM

## 2023-07-31 DIAGNOSIS — E55.9 VITAMIN D DEFICIENCY: ICD-10-CM

## 2023-07-31 DIAGNOSIS — N18.30 STAGE 3 CHRONIC KIDNEY DISEASE, UNSPECIFIED WHETHER STAGE 3A OR 3B CKD (HCC): ICD-10-CM

## 2023-07-31 DIAGNOSIS — E05.90 HYPERTHYROIDISM: ICD-10-CM

## 2023-07-31 DIAGNOSIS — N18.31 STAGE 3A CHRONIC KIDNEY DISEASE (HCC): ICD-10-CM

## 2023-07-31 DIAGNOSIS — I48.91 ATRIAL FIBRILLATION, UNSPECIFIED TYPE (HCC): ICD-10-CM

## 2023-07-31 DIAGNOSIS — Z86.718 HISTORY OF DVT (DEEP VEIN THROMBOSIS): Primary | ICD-10-CM

## 2023-07-31 DIAGNOSIS — D68.69 SECONDARY HYPERCOAGULABLE STATE (HCC): ICD-10-CM

## 2023-07-31 DIAGNOSIS — E78.2 MIXED HYPERLIPIDEMIA: ICD-10-CM

## 2023-07-31 PROBLEM — F11.20 OPIOID DEPENDENCE WITH CURRENT USE (HCC): Status: ACTIVE | Noted: 2023-07-31

## 2023-07-31 LAB
25(OH)D3 SERPL-MCNC: 79.9 NG/ML
ALBUMIN SERPL-MCNC: 3.7 G/DL (ref 3.5–5.2)
ALP SERPL-CCNC: 85 U/L (ref 35–104)
ALT SERPL-CCNC: 12 U/L (ref 5–33)
ANION GAP SERPL CALCULATED.3IONS-SCNC: 10 MMOL/L (ref 7–19)
AST SERPL-CCNC: 19 U/L (ref 5–32)
BILIRUB SERPL-MCNC: 0.7 MG/DL (ref 0.2–1.2)
BUN SERPL-MCNC: 35 MG/DL (ref 8–23)
CALCIUM SERPL-MCNC: 10.9 MG/DL (ref 8.8–10.2)
CHLORIDE SERPL-SCNC: 104 MMOL/L (ref 98–111)
CO2 SERPL-SCNC: 29 MMOL/L (ref 22–29)
CREAT SERPL-MCNC: 1.7 MG/DL (ref 0.5–0.9)
GLUCOSE SERPL-MCNC: 93 MG/DL (ref 74–109)
INR BLD: 2.8
POTASSIUM SERPL-SCNC: 4.2 MMOL/L (ref 3.5–5)
PROT SERPL-MCNC: 7.7 G/DL (ref 6.6–8.7)
SODIUM SERPL-SCNC: 143 MMOL/L (ref 136–145)

## 2023-07-31 PROCEDURE — 3078F DIAST BP <80 MM HG: CPT | Performed by: NURSE PRACTITIONER

## 2023-07-31 PROCEDURE — 99214 OFFICE O/P EST MOD 30 MIN: CPT | Performed by: NURSE PRACTITIONER

## 2023-07-31 PROCEDURE — G8400 PT W/DXA NO RESULTS DOC: HCPCS | Performed by: NURSE PRACTITIONER

## 2023-07-31 PROCEDURE — 3074F SYST BP LT 130 MM HG: CPT | Performed by: NURSE PRACTITIONER

## 2023-07-31 PROCEDURE — 1036F TOBACCO NON-USER: CPT | Performed by: NURSE PRACTITIONER

## 2023-07-31 PROCEDURE — 1123F ACP DISCUSS/DSCN MKR DOCD: CPT | Performed by: NURSE PRACTITIONER

## 2023-07-31 PROCEDURE — 93793 ANTICOAG MGMT PT WARFARIN: CPT | Performed by: NURSE PRACTITIONER

## 2023-07-31 PROCEDURE — 1090F PRES/ABSN URINE INCON ASSESS: CPT | Performed by: NURSE PRACTITIONER

## 2023-07-31 PROCEDURE — G8427 DOCREV CUR MEDS BY ELIG CLIN: HCPCS | Performed by: NURSE PRACTITIONER

## 2023-07-31 PROCEDURE — G8417 CALC BMI ABV UP PARAM F/U: HCPCS | Performed by: NURSE PRACTITIONER

## 2023-07-31 RX ORDER — ZOSTER VACCINE RECOMBINANT, ADJUVANTED 50 MCG/0.5
0.5 KIT INTRAMUSCULAR SEE ADMIN INSTRUCTIONS
Qty: 0.5 ML | Refills: 0 | Status: SHIPPED | OUTPATIENT
Start: 2023-07-31 | End: 2024-01-27

## 2023-07-31 ASSESSMENT — ENCOUNTER SYMPTOMS
EYE DISCHARGE: 0
CHOKING: 0
COLOR CHANGE: 0
TROUBLE SWALLOWING: 0
ABDOMINAL PAIN: 0
EYE ITCHING: 0
STRIDOR: 0
NAUSEA: 0
SORE THROAT: 0
SHORTNESS OF BREATH: 0
CONSTIPATION: 0
WHEEZING: 0
COUGH: 0
DIARRHEA: 0
BLOOD IN STOOL: 0
VOMITING: 0
ABDOMINAL DISTENTION: 0

## 2023-07-31 NOTE — PROGRESS NOTES
HOME MONITORING REPORT    INR today:   Results for orders placed or performed in visit on 07/31/23   Protime-INR   Result Value Ref Range    INR 2.80        INR Goal: 2.0-3.0    Dosing Plan  As of 7/31/2023      TTR:  51.1 % (5 y)   Full warfarin instructions:  7.5 mg every Sun, Tue, Thu; 5 mg all other days                PLAN: Patient aware to continue current dose and recheck in one week or as ordered. I have reviewed nursing plan for Coumadin management and agree with plan.

## 2023-07-31 NOTE — PATIENT INSTRUCTIONS
Chronic pain syndrome;  stable with hydrocodone   Alzheimers; stable   Chronic CHF;  stable   Depression stable with lexapro  History of DVTstable with home monitored coumadin  Afib stable with current meds;

## 2023-07-31 NOTE — PROGRESS NOTES
Get active - living an active life is one of the most rewarding gifts you can give yourself and those you love. Simply put, daily physical activity increases your length and quality of life. Strive to exercise 15 minutes most days of the week. 5)  Eat better - A healthy diet is one of your best weapons for fighting cardiovascular disease. When you eat a heart healthy diet, you improve your chances for feeling good and staying healthy for life. 6)  Lose weight - when you shed extra fat an unnecessary pounds, you reduce the burden on your hear, lungs, blood vessels and skeleton. You give yourself the gift of active living, you lower your blood pressure and help yourself feel better. 7) Stop smoking - cigarette smokers have a higher risk of developing cardiovascular disease. If  You smoke, quitting is the best thing you can do for your health. Electronically signed by MARIELOS Anguiano on 7/31/2023 at 6:18 PM    @  @More than 50% of the time was spent counseling and coordinating care   EMRDragon/transcription disclaimer:  Much of this encounter note is electronic transcription/translation of spoken language to printed texts. The electronic translation of spoken language may be erroneous, or at times,nonsensical words or phrases may be inadvertently transcribed.   Although I have reviewed the note for such errors, some may still exist.

## 2023-08-07 ENCOUNTER — ANTI-COAG VISIT (OUTPATIENT)
Dept: PRIMARY CARE CLINIC | Age: 78
End: 2023-08-07
Payer: MEDICARE

## 2023-08-07 DIAGNOSIS — Z86.718 HISTORY OF DVT (DEEP VEIN THROMBOSIS): Primary | ICD-10-CM

## 2023-08-07 LAB — INR BLD: 2.1

## 2023-08-07 PROCEDURE — 93793 ANTICOAG MGMT PT WARFARIN: CPT | Performed by: NURSE PRACTITIONER

## 2023-08-07 NOTE — PROGRESS NOTES
HOME MONITORING REPORT    INR today:   Results for orders placed or performed in visit on 08/07/23   Protime-INR   Result Value Ref Range    INR 2.10        INR Goal: 2.0-3.0    Dosing Plan  As of 8/7/2023      TTR:  51.3 % (5.1 y)   Full warfarin instructions:  7.5 mg every Sun, Tue, Thu; 5 mg all other days                PLAN: Patient aware to continue current dose and recheck in one week or as ordered. I have reviewed nursing plan for Coumadin management and agree with plan.

## 2023-08-18 ENCOUNTER — ANTI-COAG VISIT (OUTPATIENT)
Dept: INTERNAL MEDICINE | Age: 78
End: 2023-08-18
Payer: MEDICARE

## 2023-08-18 DIAGNOSIS — Z86.718 HISTORY OF DVT (DEEP VEIN THROMBOSIS): Primary | ICD-10-CM

## 2023-08-18 LAB — INR BLD: 2.9

## 2023-08-18 PROCEDURE — 93793 ANTICOAG MGMT PT WARFARIN: CPT | Performed by: NURSE PRACTITIONER

## 2023-08-18 PROCEDURE — 99999 PR OFFICE/OUTPT VISIT,PROCEDURE ONLY: CPT | Performed by: NURSE PRACTITIONER

## 2023-08-18 NOTE — PROGRESS NOTES
HOME MONITORING REPORT    INR today:   Results for orders placed or performed in visit on 08/18/23   Protime-INR   Result Value Ref Range    INR 2.90        INR Goal: 2.0-3.0    Dosing Plan  As of 8/18/2023      TTR:  51.6 % (5.1 y)   Full warfarin instructions:  7.5 mg every Sun, Tue, Thu; 5 mg all other days                PLAN: Attempted to reach the pt to advise patient/caregiver to continue current dose and recheck in one week. There is no answer on the daughters phone and no VM set up. The patients number has been disconnected. I have reviewed nursing plan for Coumadin management and agree with plan.

## 2023-08-21 NOTE — PROGRESS NOTES
Patient and her daughter know to continue her current dose if she is in normal range.  [place] Scrape CCMA

## 2023-08-25 DIAGNOSIS — G89.4 CHRONIC PAIN SYNDROME: ICD-10-CM

## 2023-08-28 RX ORDER — HYDROCODONE BITARTRATE AND ACETAMINOPHEN 5; 325 MG/1; MG/1
TABLET ORAL
Qty: 120 TABLET | Refills: 0 | Status: SHIPPED | OUTPATIENT
Start: 2023-08-28 | End: 2023-09-27

## 2023-08-31 ENCOUNTER — ANTI-COAG VISIT (OUTPATIENT)
Dept: INTERNAL MEDICINE | Age: 78
End: 2023-08-31
Payer: MEDICARE

## 2023-08-31 DIAGNOSIS — Z86.718 HISTORY OF DVT (DEEP VEIN THROMBOSIS): Primary | ICD-10-CM

## 2023-08-31 LAB — INR BLD: 2.6

## 2023-08-31 PROCEDURE — 93793 ANTICOAG MGMT PT WARFARIN: CPT | Performed by: NURSE PRACTITIONER

## 2023-08-31 PROCEDURE — 99999 PR OFFICE/OUTPT VISIT,PROCEDURE ONLY: CPT | Performed by: NURSE PRACTITIONER

## 2023-08-31 NOTE — PROGRESS NOTES
HOME MONITORING REPORT    INR today:   Results for orders placed or performed in visit on 08/31/23   Protime-INR   Result Value Ref Range    INR 2.60        INR Goal: 2.0-3.0    Dosing Plan  As of 8/31/2023      TTR:  51.9 % (5.1 y)   Full warfarin instructions:  7.5 mg every Sun, Tue, Thu; 5 mg all other days            I have reviewed nursing plan for Coumadin management and agree with plan.        PLAN: Attempted to reach the patient to let her know to stay on the same dose no  set up

## 2023-09-12 ENCOUNTER — ANTI-COAG VISIT (OUTPATIENT)
Dept: PRIMARY CARE CLINIC | Age: 78
End: 2023-09-12
Payer: MEDICARE

## 2023-09-12 DIAGNOSIS — Z86.718 HISTORY OF DVT (DEEP VEIN THROMBOSIS): Primary | ICD-10-CM

## 2023-09-12 LAB — INR BLD: 2.8

## 2023-09-12 PROCEDURE — 93793 ANTICOAG MGMT PT WARFARIN: CPT | Performed by: NURSE PRACTITIONER

## 2023-09-12 NOTE — PROGRESS NOTES
HOME MONITORING REPORT    INR today:   Results for orders placed or performed in visit on 09/12/23   Protime-INR   Result Value Ref Range    INR 2.80        INR Goal: 2.0-3.0    Dosing Plan  As of 9/12/2023      TTR:  52.1 % (5.2 y)   Full warfarin instructions:  7.5 mg every Sun, Tue, Thu; 5 mg all other days                PLAN: Patient aware to continue current dose and recheck in one week or as ordered. I have reviewed nursing plan for Coumadin management and agree with plan.

## 2023-09-18 ENCOUNTER — ANTI-COAG VISIT (OUTPATIENT)
Dept: PRIMARY CARE CLINIC | Age: 78
End: 2023-09-18
Payer: MEDICARE

## 2023-09-18 DIAGNOSIS — Z86.718 HISTORY OF DVT (DEEP VEIN THROMBOSIS): Primary | ICD-10-CM

## 2023-09-18 LAB — INR BLD: 2.4

## 2023-09-18 PROCEDURE — 93793 ANTICOAG MGMT PT WARFARIN: CPT | Performed by: NURSE PRACTITIONER

## 2023-09-18 NOTE — PROGRESS NOTES
HOME MONITORING REPORT    INR today:   Results for orders placed or performed in visit on 09/18/23   Protime-INR   Result Value Ref Range    INR 2.40        INR Goal: 2.0-3.0    Dosing Plan  As of 9/18/2023      TTR:  52.2 % (5.2 y)   Full warfarin instructions:  7.5 mg every Sun, Tue, Thu; 5 mg all other days                PLAN: Patient aware to continue current dose and recheck in one week or as ordered. I have reviewed nursing plan for Coumadin management and agree with plan.

## 2023-09-19 ENCOUNTER — HOSPITAL ENCOUNTER (EMERGENCY)
Facility: HOSPITAL | Age: 78
Discharge: HOME | End: 2023-09-19
Attending: EMERGENCY MEDICINE | Admitting: EMERGENCY MEDICINE

## 2023-09-19 ENCOUNTER — APPOINTMENT (OUTPATIENT)
Dept: CT IMAGING | Facility: HOSPITAL | Age: 78
End: 2023-09-19

## 2023-09-19 VITALS
BODY MASS INDEX: 30.8 KG/M2 | SYSTOLIC BLOOD PRESSURE: 132 MMHG | HEIGHT: 71 IN | TEMPERATURE: 99 F | DIASTOLIC BLOOD PRESSURE: 78 MMHG | OXYGEN SATURATION: 92 % | RESPIRATION RATE: 16 BRPM | WEIGHT: 220 LBS | HEART RATE: 84 BPM

## 2023-09-19 DIAGNOSIS — N39.0 ACUTE UTI (URINARY TRACT INFECTION): Primary | ICD-10-CM

## 2023-09-19 DIAGNOSIS — N18.9 CHRONIC KIDNEY DISEASE, UNSPECIFIED CKD STAGE: ICD-10-CM

## 2023-09-19 DIAGNOSIS — Z79.01 CHRONIC ANTICOAGULATION: ICD-10-CM

## 2023-09-19 DIAGNOSIS — N28.9 MILD RENAL INSUFFICIENCY: ICD-10-CM

## 2023-09-19 LAB
ALBUMIN SERPL-MCNC: 3.4 G/DL (ref 3.5–5.2)
ALBUMIN/GLOB SERPL: 0.9 G/DL
ALP SERPL-CCNC: 80 U/L (ref 39–117)
ALT SERPL W P-5'-P-CCNC: 12 U/L (ref 1–33)
AMORPH URATE CRY URNS QL MICRO: ABNORMAL /HPF
ANION GAP SERPL CALCULATED.3IONS-SCNC: 8 MMOL/L (ref 5–15)
AST SERPL-CCNC: 14 U/L (ref 1–32)
BACTERIA UR QL AUTO: ABNORMAL /HPF
BASOPHILS # BLD AUTO: 0.02 10*3/MM3 (ref 0–0.2)
BASOPHILS NFR BLD AUTO: 0.2 % (ref 0–1.5)
BILIRUB SERPL-MCNC: 1.2 MG/DL (ref 0–1.2)
BILIRUB UR QL STRIP: ABNORMAL
BUN SERPL-MCNC: 44 MG/DL (ref 8–23)
BUN/CREAT SERPL: 23.9 (ref 7–25)
CALCIUM SPEC-SCNC: 10.7 MG/DL (ref 8.6–10.5)
CHLORIDE SERPL-SCNC: 105 MMOL/L (ref 98–107)
CLARITY UR: ABNORMAL
CO2 SERPL-SCNC: 28 MMOL/L (ref 22–29)
COLOR UR: ABNORMAL
CREAT SERPL-MCNC: 1.84 MG/DL (ref 0.57–1)
D-LACTATE SERPL-SCNC: 1.1 MMOL/L (ref 0.5–2)
DEPRECATED RDW RBC AUTO: 49.1 FL (ref 37–54)
EGFRCR SERPLBLD CKD-EPI 2021: 27.6 ML/MIN/1.73
EOSINOPHIL # BLD AUTO: 0.03 10*3/MM3 (ref 0–0.4)
EOSINOPHIL NFR BLD AUTO: 0.3 % (ref 0.3–6.2)
ERYTHROCYTE [DISTWIDTH] IN BLOOD BY AUTOMATED COUNT: 13.1 % (ref 12.3–15.4)
GLOBULIN UR ELPH-MCNC: 4 GM/DL
GLUCOSE SERPL-MCNC: 117 MG/DL (ref 65–99)
GLUCOSE UR STRIP-MCNC: NEGATIVE MG/DL
HCT VFR BLD AUTO: 43.3 % (ref 34–46.6)
HGB BLD-MCNC: 13.2 G/DL (ref 12–15.9)
HGB UR QL STRIP.AUTO: ABNORMAL
HYALINE CASTS UR QL AUTO: ABNORMAL /LPF
IMM GRANULOCYTES # BLD AUTO: 0.03 10*3/MM3 (ref 0–0.05)
IMM GRANULOCYTES NFR BLD AUTO: 0.3 % (ref 0–0.5)
INR PPP: 2.32 (ref 0.91–1.09)
KETONES UR QL STRIP: NEGATIVE
LEUKOCYTE ESTERASE UR QL STRIP.AUTO: ABNORMAL
LYMPHOCYTES # BLD AUTO: 0.77 10*3/MM3 (ref 0.7–3.1)
LYMPHOCYTES NFR BLD AUTO: 8.4 % (ref 19.6–45.3)
MCH RBC QN AUTO: 30.6 PG (ref 26.6–33)
MCHC RBC AUTO-ENTMCNC: 30.5 G/DL (ref 31.5–35.7)
MCV RBC AUTO: 100.5 FL (ref 79–97)
MONOCYTES # BLD AUTO: 1.39 10*3/MM3 (ref 0.1–0.9)
MONOCYTES NFR BLD AUTO: 15.1 % (ref 5–12)
NEUTROPHILS NFR BLD AUTO: 6.97 10*3/MM3 (ref 1.7–7)
NEUTROPHILS NFR BLD AUTO: 75.7 % (ref 42.7–76)
NITRITE UR QL STRIP: POSITIVE
NRBC BLD AUTO-RTO: 0 /100 WBC (ref 0–0.2)
PH UR STRIP.AUTO: >=9 [PH] (ref 5–8)
PLATELET # BLD AUTO: 165 10*3/MM3 (ref 140–450)
PMV BLD AUTO: 9.4 FL (ref 6–12)
POTASSIUM SERPL-SCNC: 4.5 MMOL/L (ref 3.5–5.2)
PROCALCITONIN SERPL-MCNC: 0.08 NG/ML (ref 0–0.25)
PROT SERPL-MCNC: 7.4 G/DL (ref 6–8.5)
PROT UR QL STRIP: ABNORMAL
PROTHROMBIN TIME: 25.8 SECONDS (ref 11.8–14.8)
RBC # BLD AUTO: 4.31 10*6/MM3 (ref 3.77–5.28)
RBC # UR STRIP: ABNORMAL /HPF
REF LAB TEST METHOD: ABNORMAL
SODIUM SERPL-SCNC: 141 MMOL/L (ref 136–145)
SP GR UR STRIP: 1.01 (ref 1–1.03)
SQUAMOUS #/AREA URNS HPF: ABNORMAL /HPF
TRI-PHOS CRY URNS QL MICRO: ABNORMAL /HPF
UROBILINOGEN UR QL STRIP: ABNORMAL
WBC # UR STRIP: ABNORMAL /HPF
WBC NRBC COR # BLD: 9.21 10*3/MM3 (ref 3.4–10.8)

## 2023-09-19 PROCEDURE — 83605 ASSAY OF LACTIC ACID: CPT | Performed by: EMERGENCY MEDICINE

## 2023-09-19 PROCEDURE — 87150 DNA/RNA AMPLIFIED PROBE: CPT | Performed by: EMERGENCY MEDICINE

## 2023-09-19 PROCEDURE — 74176 CT ABD & PELVIS W/O CONTRAST: CPT

## 2023-09-19 PROCEDURE — 84145 PROCALCITONIN (PCT): CPT | Performed by: EMERGENCY MEDICINE

## 2023-09-19 PROCEDURE — 25010000002 CEFTRIAXONE PER 250 MG: Performed by: EMERGENCY MEDICINE

## 2023-09-19 PROCEDURE — 85610 PROTHROMBIN TIME: CPT | Performed by: EMERGENCY MEDICINE

## 2023-09-19 PROCEDURE — 85025 COMPLETE CBC W/AUTO DIFF WBC: CPT | Performed by: EMERGENCY MEDICINE

## 2023-09-19 PROCEDURE — 36415 COLL VENOUS BLD VENIPUNCTURE: CPT

## 2023-09-19 PROCEDURE — 80053 COMPREHEN METABOLIC PANEL: CPT | Performed by: EMERGENCY MEDICINE

## 2023-09-19 PROCEDURE — 99284 EMERGENCY DEPT VISIT MOD MDM: CPT

## 2023-09-19 PROCEDURE — 87186 SC STD MICRODIL/AGAR DIL: CPT | Performed by: EMERGENCY MEDICINE

## 2023-09-19 PROCEDURE — 96365 THER/PROPH/DIAG IV INF INIT: CPT

## 2023-09-19 PROCEDURE — 87040 BLOOD CULTURE FOR BACTERIA: CPT | Performed by: EMERGENCY MEDICINE

## 2023-09-19 PROCEDURE — 81001 URINALYSIS AUTO W/SCOPE: CPT | Performed by: EMERGENCY MEDICINE

## 2023-09-19 PROCEDURE — 87077 CULTURE AEROBIC IDENTIFY: CPT | Performed by: EMERGENCY MEDICINE

## 2023-09-19 RX ORDER — CEFDINIR 300 MG/1
300 CAPSULE ORAL 2 TIMES DAILY
Qty: 20 CAPSULE | Refills: 0 | Status: SHIPPED | OUTPATIENT
Start: 2023-09-19 | End: 2023-09-29

## 2023-09-19 RX ADMIN — SODIUM CHLORIDE 1000 ML: 9 INJECTION, SOLUTION INTRAVENOUS at 11:24

## 2023-09-19 RX ADMIN — SODIUM CHLORIDE 1000 MG: 900 INJECTION INTRAVENOUS at 11:25

## 2023-09-19 NOTE — ED PROVIDER NOTES
Subjective   History of Present Illness  Patient has got ileostomy with a single kidney and came to the ER with hematuria some flank pain    Blood in Urine  This is a new problem. The current episode started in the past 7 days. The problem is unchanged. She describes the hematuria as gross hematuria. The hematuria occurs throughout her entire urinary stream. Irritative symptoms do not include frequency or nocturia. Associated symptoms include abdominal pain, flank pain and nausea. Pertinent negatives include no bladder pain, bone pain, urinary retention or vomiting. Her past medical history is significant for hypertension. Risk factors include anticoagulant.     Review of Systems   Constitutional: Negative.    HENT: Negative.     Eyes: Negative.    Respiratory: Negative.     Cardiovascular: Negative.    Gastrointestinal:  Positive for abdominal pain and nausea. Negative for vomiting.   Endocrine: Negative.    Genitourinary:  Positive for flank pain and hematuria. Negative for frequency and nocturia.   Skin: Negative.    Neurological: Negative.    Hematological: Negative.    All other systems reviewed and are negative.    Past Medical History:   Diagnosis Date    A-fib     Aortic stenosis     Arthritis     Bradycardia     Cancer     bladder and skin    Cardiomegaly     CHF (congestive heart failure)     GERD (gastroesophageal reflux disease)     Hard of hearing     History of short term memory loss     Hypercalcemia     Hyperlipidemia     Hypertension     Hyperthyroidism 11/20/2017    Hypothyroidism     Kidney stone     Long term current use of anticoagulant therapy     Open wound     left lower extremity,    Palpitation     PONV (postoperative nausea and vomiting)     Shortness of breath     Sick sinus syndrome     Sleep apnea     CPAP       Allergies   Allergen Reactions    Ciprofloxacin Itching    Codeine Itching and GI Intolerance    Definity [Perflutren Lipid Microsphere] Other (See Comments)     Back pain     Tetanus Toxoids Itching     Itching around site    Tizanidine Hcl Itching    Other Itching     Cloth bandaids , causes redness of skin       Past Surgical History:   Procedure Laterality Date    AORTIC VALVE REPAIR/REPLACEMENT      BLADDER SURGERY      removed    CARDIAC CATHETERIZATION      CARDIAC CATHETERIZATION N/A 12/9/2016    Procedure: Left Heart Cath;  Surgeon: Elia Flores MD;  Location:  [facility];  Service:     DISTAL FEMORAL NAILING Right 9/24/2020    Procedure: RIGHT SHORT TFN;  Surgeon: Betito Shetty MD;  Location:  [facility] OR;  Service: Orthopedics;  Laterality: Right;    HIP BIPOLAR REPLACEMENT Left     HYSTERECTOMY      ILEOSTOMY      JOINT REPLACEMENT      KIDNEY SURGERY      right kidney removed    NEPHRECTOMY RADICAL Right     from cancer    VARICOSE VEIN SURGERY Left 4/10/2017    Procedure: LEFT LOWER EXTREMITY VENOGRAM. LEFT SAPHENOUS VEIN RADIO FREQUENCY ABLATION;  Surgeon: Blake Martinez DO;  Location:  [facility] OR;  Service:        Family History   Problem Relation Age of Onset    Heart failure Mother     Heart disease Father     Stroke Father     Hypertension Sister     Heart failure Sister     No Known Problems Brother     No Known Problems Maternal Grandmother     No Known Problems Maternal Grandfather     No Known Problems Paternal Grandmother     No Known Problems Paternal Grandfather     Hypertension Sister     Heart failure Sister     Hypertension Sister     Heart failure Sister     Hypertension Sister     Heart failure Sister     Hypertension Sister     Heart failure Sister     Hypertension Sister     Heart failure Sister     Gallbladder disease Brother     COPD Daughter     Asthma Daughter     Diabetes Son     No Known Problems Son     Autism Son        Social History     Socioeconomic History    Marital status:    Tobacco Use    Smoking status: Never    Smokeless tobacco: Never   Vaping Use    Vaping Use: Never used   Substance and Sexual Activity     Alcohol use: No    Drug use: No    Sexual activity: Defer           Objective   Physical Exam  Vitals and nursing note reviewed. Exam conducted with a chaperone present.   Constitutional:       General: She is awake. She is not in acute distress.     Appearance: Normal appearance. She is well-developed. She is not toxic-appearing.   HENT:      Head: Normocephalic and atraumatic.      Nose:      Right Nostril: No epistaxis.      Left Nostril: No epistaxis.   Eyes:      General: Lids are normal. No scleral icterus.     Conjunctiva/sclera: Conjunctivae normal.      Pupils: Pupils are equal, round, and reactive to light.   Neck:      Vascular: No hepatojugular reflux or JVD.   Cardiovascular:      Rate and Rhythm: Normal rate and regular rhythm.      Chest Wall: PMI is not displaced.      Pulses: Normal pulses. No decreased pulses.      Heart sounds: Normal heart sounds. No murmur heard.  Pulmonary:      Effort: Pulmonary effort is normal. No accessory muscle usage or respiratory distress.      Breath sounds: Normal breath sounds. No decreased breath sounds or wheezing.   Abdominal:      General: Abdomen is flat. Bowel sounds are normal. There is no distension or abdominal bruit.      Palpations: Abdomen is soft. There is no shifting dullness, fluid wave, mass or pulsatile mass.      Tenderness: There is no abdominal tenderness. There is left CVA tenderness. There is no right CVA tenderness, guarding or rebound.      Hernia: No hernia is present.      Comments: Ileal conduit   Musculoskeletal:         General: Normal range of motion.      Cervical back: Normal range of motion and neck supple. No rigidity.      Right lower leg: No edema.      Left lower leg: No edema.   Skin:     General: Skin is warm and dry.      Capillary Refill: Capillary refill takes less than 2 seconds.      Coloration: Skin is not cyanotic, jaundiced, mottled or pale.   Neurological:      General: No focal deficit present.      Mental Status:  She is alert and oriented to person, place, and time. Mental status is at baseline.      GCS: GCS eye subscore is 4. GCS verbal subscore is 5. GCS motor subscore is 6.      Cranial Nerves: No cranial nerve deficit.      Sensory: Sensation is intact.      Motor: Motor function is intact.      Deep Tendon Reflexes: Reflexes are normal and symmetric.   Psychiatric:         Behavior: Behavior normal. Behavior is cooperative.       Procedures           ED Course  ED Course as of 09/19/23 1345   Tue Sep 19, 2023   1341 Patient came in with hematuria CT scan finding were discussed patient is got a UTI and chronic kidney disease anticoagulation which is causing more hematuria we will place antibiotics already received Rocephin in the ED. [TS]   1342 Left nephrolithiasis in the lower pole calyx measuring up to 5 mm.     Mild left hydronephrosis and hydroureter with postsurgical changes of  cystectomy and right lower quadrant urostomy.     Small right pleural effusion.  Case were discussed the patient [TS]      ED Course User Index  [TS] London Castellanos MD                                           Medical Decision Making  Patient with hematuria possible ureteric calculi versus UTI.    Problems Addressed:  Acute UTI (urinary tract infection): acute illness or injury     Details: Acute UTI with hematuria  Chronic anticoagulation: chronic illness or injury  Chronic kidney disease, unspecified CKD stage: chronic illness or injury  Mild renal insufficiency: acute illness or injury     Details: Have advised encouraging p.o. fluids    Amount and/or Complexity of Data Reviewed  Labs: ordered.     Details: Labs reviewed  Radiology: ordered.     Details: ED findings reviewed    Risk  Prescription drug management.  Risk Details: Discussed with the patient and advised to follow-up with primary MD repeat lab work-up.  Encourage p.o. fluids and p.o. antibiotics.  If the hematuria persist she will have to get a scope performed.        Final  diagnoses:   Acute UTI (urinary tract infection)   Chronic kidney disease, unspecified CKD stage   Chronic anticoagulation   Mild renal insufficiency       ED Disposition  ED Disposition       ED Disposition   Discharge    Condition   Stable    Comment   --               Mary Beth Izquierdo, APRN  [address] [place] [address] [address]  [facility] [address]  [phone]               Medication List        New Prescriptions      cefdinir 300 MG capsule  Commonly known as: OMNICEF  Take 1 capsule by mouth 2 (Two) Times a Day for 10 days.            Changed      * warfarin 7.5 MG tablet  Commonly known as: COUMADIN  Take 1 tablet by mouth 3 (Three) Times a Week. Take 10 mg Monday and Tuesday, then resume normal schedule with 5 mg on Wednesday  What changed: additional instructions     * warfarin 5 MG tablet  Commonly known as: COUMADIN  Take 1 tablet by mouth 4 (Four) Times a Week. Take 10 mg Monday and Tuesday, then resume normal schedule taking 5 mg Wednesday.  What changed: additional instructions           * This list has 2 medication(s) that are the same as other medications prescribed for you. Read the directions carefully, and ask your doctor or other care provider to review them with you.                   Where to Get Your Medications        These medications were sent to [facility] - [place], [place] - [address] [place] [address] - [phone] [facility] [phone] [address] [address]      Phone: [phone]   cefdinir 300 MG capsule            London Castellanos MD  09/19/23 3446       London Castellanos MD  09/19/23 8470

## 2023-09-20 LAB
BACTERIA BLD CULT: ABNORMAL
BOTTLE TYPE: ABNORMAL

## 2023-09-22 LAB
BACTERIA SPEC AEROBE CULT: ABNORMAL
GRAM STN SPEC: ABNORMAL
GRAM STN SPEC: ABNORMAL
ISOLATED FROM: ABNORMAL

## 2023-09-25 ENCOUNTER — ANTI-COAG VISIT (OUTPATIENT)
Dept: PRIMARY CARE CLINIC | Age: 78
End: 2023-09-25
Payer: MEDICARE

## 2023-09-25 DIAGNOSIS — Z86.718 HISTORY OF DVT (DEEP VEIN THROMBOSIS): Primary | ICD-10-CM

## 2023-09-25 LAB — INR BLD: 2.2

## 2023-09-25 PROCEDURE — 93793 ANTICOAG MGMT PT WARFARIN: CPT | Performed by: NURSE PRACTITIONER

## 2023-09-25 NOTE — PROGRESS NOTES
HOME MONITORING REPORT    INR today:   Results for orders placed or performed in visit on 09/25/23   Protime-INR   Result Value Ref Range    INR 2.20        INR Goal: 2.0-3.0    Dosing Plan  As of 9/25/2023      TTR:  52.4 % (5.2 y)   Full warfarin instructions:  7.5 mg every Sun, Tue, Thu; 5 mg all other days                PLAN: Patient aware to continue current dose and recheck in one week or as ordered. I have reviewed nursing plan for Coumadin management and agree with plan.

## 2023-10-04 DIAGNOSIS — G89.4 CHRONIC PAIN SYNDROME: ICD-10-CM

## 2023-10-04 DIAGNOSIS — E55.9 VITAMIN D DEFICIENCY: ICD-10-CM

## 2023-10-04 RX ORDER — ERGOCALCIFEROL 1.25 MG/1
CAPSULE ORAL
Qty: 4 CAPSULE | Refills: 3 | Status: SHIPPED | OUTPATIENT
Start: 2023-10-04

## 2023-10-04 RX ORDER — HYDROCODONE BITARTRATE AND ACETAMINOPHEN 5; 325 MG/1; MG/1
TABLET ORAL
Qty: 120 TABLET | Refills: 0 | Status: SHIPPED | OUTPATIENT
Start: 2023-10-04 | End: 2023-11-03

## 2023-10-04 NOTE — TELEPHONE ENCOUNTER
Lyubov Gonzales called to request a refill on her medication. Last office visit : 7/31/2023   Next office visit : 10/31/2023     Last UDS: No results found for: \"LABAMPH\", \"LABBARB\", \"LABBENZ\", \"BUPRENUR\", \"COCAIMETSCRU\", \"GABAPENTIN\", \"MDMA\", \"METAMPU\", \"OPIATESCREENURINE\", \"OXTCOSU\", \"PHENCYCLIDINESCREENURINE\", \"PROPOXYPHENE\", \"THCSCREENUR\", \"TRICYUR\"    Last Yenantolin Russo: Sent to [employer] to pull  Medication Contract: needs contract  Last Fill: 08/28/2023    Requested Prescriptions     Pending Prescriptions Disp Refills    vitamin D (ERGOCALCIFEROL) 1.25 MG (96213 UT) CAPS capsule [Pharmacy Med Name: VIT D2 1.25 MG (50,000 UNIT 1.25 MG Capsule] 4 capsule 3     Sig: TAKE ONE CAPSULE BY MOUTH ONCE WEEKLY    HYDROcodone-acetaminophen ([address]) 5-325 MG per tablet [Pharmacy Med Name: HYDROCODON-APAP 5-325 5-325 Tablet] 120 tablet 0     Sig: TAKE 1 TABLET BY MOUTH EVERY 6 HOURS AS NEEDED FOR PAIN FOR UP TO 30 DAYS. Please approve or refuse this medication.    Ricky Gold MA

## 2023-10-16 ENCOUNTER — ANTI-COAG VISIT (OUTPATIENT)
Dept: PRIMARY CARE CLINIC | Age: 78
End: 2023-10-16
Payer: MEDICARE

## 2023-10-16 DIAGNOSIS — Z86.718 HISTORY OF DVT (DEEP VEIN THROMBOSIS): Primary | ICD-10-CM

## 2023-10-16 LAB — INR BLD: 2.6

## 2023-10-16 PROCEDURE — 93793 ANTICOAG MGMT PT WARFARIN: CPT | Performed by: NURSE PRACTITIONER

## 2023-10-16 NOTE — PROGRESS NOTES
HOME MONITORING REPORT    INR today:   Results for orders placed or performed in visit on 10/16/23   Protime-INR   Result Value Ref Range    INR 2.60        INR Goal: 2.0-3.0    Dosing Plan  As of 10/16/2023      TTR:  52.9 % (5.3 y)   Full warfarin instructions:  7.5 mg every Sun, Tue, Thu; 5 mg all other days                PLAN: Patient aware to continue current dose and recheck in one week or as ordered. I have reviewed nursing plan for Coumadin management and agree with plan.

## 2023-10-18 DIAGNOSIS — E05.90 HYPERTHYROIDISM: ICD-10-CM

## 2023-10-18 DIAGNOSIS — I10 ESSENTIAL HYPERTENSION: ICD-10-CM

## 2023-10-18 DIAGNOSIS — I48.91 ATRIAL FIBRILLATION, UNSPECIFIED TYPE (HCC): ICD-10-CM

## 2023-10-18 RX ORDER — DILTIAZEM HYDROCHLORIDE 180 MG/1
CAPSULE, COATED, EXTENDED RELEASE ORAL
Qty: 30 CAPSULE | Refills: 5 | Status: SHIPPED | OUTPATIENT
Start: 2023-10-18

## 2023-10-18 RX ORDER — METHIMAZOLE 10 MG/1
TABLET ORAL
Qty: 30 TABLET | Refills: 2 | Status: SHIPPED | OUTPATIENT
Start: 2023-10-18

## 2023-10-18 NOTE — TELEPHONE ENCOUNTER
Fannie Vasquez called requesting a refill of the below medication which has been pended for you:     Requested Prescriptions     Pending Prescriptions Disp Refills    methIMAzole (TAPAZOLE) 10 MG tablet [Pharmacy Med Name: METHIMAZOLE 10 MG TABS 10 Tablet] 30 tablet 2     Sig: TAKE 1 TABLET BY MOUTH DAILY    dilTIAZem (CARDIZEM CD) 180 MG extended release capsule [Pharmacy Med Name: DILTIAZEM ER 180MG COATED B 180 Capsule] 30 capsule 5     Sig: TAKE 1 CAPSULE BY MOUTH EVERY DAY       Last Appointment Date: 7/31/2023  Next Appointment Date: 10/31/2023    Allergies   Allergen Reactions    Other Anaphylaxis and Itching     Cloth bandaids , causes redness of skin    Ciprofloxacin Itching    Codeine Itching    Perflutren Lipid Microsphere Other (See Comments)     Back pain    Tetanus Toxoids Itching     Itching around site    Tizanidine Hcl Itching    Tetanus Toxoid      Reaction: [address]

## 2023-10-20 LAB — INR BLD: 2.9

## 2023-10-23 ENCOUNTER — ANTI-COAG VISIT (OUTPATIENT)
Dept: PRIMARY CARE CLINIC | Age: 78
End: 2023-10-23
Payer: MEDICARE

## 2023-10-23 DIAGNOSIS — Z86.718 HISTORY OF DVT (DEEP VEIN THROMBOSIS): Primary | ICD-10-CM

## 2023-10-23 PROCEDURE — 93793 ANTICOAG MGMT PT WARFARIN: CPT | Performed by: NURSE PRACTITIONER

## 2023-10-23 NOTE — PROGRESS NOTES
HOME MONITORING REPORT    INR today:   Results for orders placed or performed in visit on 10/23/23   Protime-INR   Result Value Ref Range    INR 2.90        INR Goal: 2.0-3.0    Dosing Plan  As of 10/23/2023      TTR:  53.0 % (5.3 y)   Full warfarin instructions:  7.5 mg every Sun, Tue, Thu; 5 mg all other days                PLAN: Patient aware to continue current dose and recheck in one week or as ordered. I have reviewed nursing plan for Coumadin management and agree with plan.

## 2023-10-29 LAB — INR BLD: 2

## 2023-10-30 ENCOUNTER — ANTI-COAG VISIT (OUTPATIENT)
Dept: PRIMARY CARE CLINIC | Age: 78
End: 2023-10-30
Payer: MEDICARE

## 2023-10-30 DIAGNOSIS — Z86.718 HISTORY OF DVT (DEEP VEIN THROMBOSIS): Primary | ICD-10-CM

## 2023-10-30 PROCEDURE — 93793 ANTICOAG MGMT PT WARFARIN: CPT | Performed by: NURSE PRACTITIONER

## 2023-10-30 NOTE — PROGRESS NOTES
HOME MONITORING REPORT    INR today:   Results for orders placed or performed in visit on 10/30/23   Protime-INR   Result Value Ref Range    INR 2.00        INR Goal: 2.0-3.0    Dosing Plan  As of 10/30/2023      TTR:  53.3 % (5.3 y)   Full warfarin instructions:  7.5 mg every Sun, Tue, Thu; 5 mg all other days                PLAN: Patient aware to continue current dose and recheck in one week or as ordered. I have reviewed nursing plan for Coumadin management and agree with plan.

## 2023-10-31 RX ORDER — WARFARIN SODIUM 5 MG/1
5 TABLET ORAL DAILY
Qty: 90 TABLET | Refills: 1 | Status: SHIPPED | OUTPATIENT
Start: 2023-10-31

## 2023-10-31 NOTE — TELEPHONE ENCOUNTER
Apolonio Cheadle called requesting a refill of the below medication which has been pended for you:     Requested Prescriptions     Pending Prescriptions Disp Refills    warfarin (COUMADIN) 5 MG tablet [Pharmacy Med Name: WARFARIN SODIUM 5 MG TABS 5 Tablet] 90 tablet 1     Sig: TAKE 1 TABLET BY MOUTH DAILY       Last Appointment Date: 7/31/2023  Next Appointment Date: 11/6/2023    Allergies   Allergen Reactions    Other Anaphylaxis and Itching     Cloth bandaids , causes redness of skin    Ciprofloxacin Itching    Codeine Itching    Perflutren Lipid Microsphere Other (See Comments)     Back pain    Tetanus Toxoids Itching     Itching around site    Tizanidine Hcl Itching    Tetanus Toxoid      Reaction: [address]

## 2023-11-06 ENCOUNTER — OFFICE VISIT (OUTPATIENT)
Dept: INTERNAL MEDICINE | Age: 78
End: 2023-11-06
Payer: MEDICARE

## 2023-11-06 VITALS — SYSTOLIC BLOOD PRESSURE: 108 MMHG | HEART RATE: 58 BPM | DIASTOLIC BLOOD PRESSURE: 64 MMHG | OXYGEN SATURATION: 93 %

## 2023-11-06 DIAGNOSIS — F05 SUNDOWN SYNDROME: ICD-10-CM

## 2023-11-06 DIAGNOSIS — R21 RASH: ICD-10-CM

## 2023-11-06 DIAGNOSIS — E78.2 MIXED HYPERLIPIDEMIA: ICD-10-CM

## 2023-11-06 DIAGNOSIS — G89.4 CHRONIC PAIN SYNDROME: ICD-10-CM

## 2023-11-06 DIAGNOSIS — N18.30 STAGE 3 CHRONIC KIDNEY DISEASE, UNSPECIFIED WHETHER STAGE 3A OR 3B CKD (HCC): ICD-10-CM

## 2023-11-06 DIAGNOSIS — E55.9 VITAMIN D DEFICIENCY: ICD-10-CM

## 2023-11-06 DIAGNOSIS — G30.9 ALZHEIMER'S DEMENTIA WITH BEHAVIORAL DISTURBANCE (HCC): Primary | ICD-10-CM

## 2023-11-06 DIAGNOSIS — I50.32 CHRONIC DIASTOLIC HEART FAILURE (HCC): ICD-10-CM

## 2023-11-06 DIAGNOSIS — F11.20 OPIOID DEPENDENCE WITH CURRENT USE (HCC): ICD-10-CM

## 2023-11-06 DIAGNOSIS — E05.90 HYPERTHYROIDISM: ICD-10-CM

## 2023-11-06 DIAGNOSIS — N18.31 STAGE 3A CHRONIC KIDNEY DISEASE (HCC): ICD-10-CM

## 2023-11-06 DIAGNOSIS — F33.41 RECURRENT MAJOR DEPRESSIVE DISORDER, IN PARTIAL REMISSION (HCC): ICD-10-CM

## 2023-11-06 DIAGNOSIS — N25.81 SECONDARY HYPERPARATHYROIDISM OF RENAL ORIGIN (HCC): ICD-10-CM

## 2023-11-06 DIAGNOSIS — Z86.718 HISTORY OF DVT (DEEP VEIN THROMBOSIS): ICD-10-CM

## 2023-11-06 DIAGNOSIS — F02.818 ALZHEIMER'S DEMENTIA WITH BEHAVIORAL DISTURBANCE (HCC): Primary | ICD-10-CM

## 2023-11-06 DIAGNOSIS — I48.91 ATRIAL FIBRILLATION, UNSPECIFIED TYPE (HCC): ICD-10-CM

## 2023-11-06 LAB
25(OH)D3 SERPL-MCNC: 74.7 NG/ML
ALBUMIN SERPL-MCNC: 3.4 G/DL (ref 3.5–5.2)
ALP SERPL-CCNC: 76 U/L (ref 35–104)
ALT SERPL-CCNC: 12 U/L (ref 5–33)
ANION GAP SERPL CALCULATED.3IONS-SCNC: 9 MMOL/L (ref 7–19)
AST SERPL-CCNC: 16 U/L (ref 5–32)
BASOPHILS # BLD: 0 K/UL (ref 0–0.2)
BASOPHILS NFR BLD: 0.3 % (ref 0–1)
BILIRUB SERPL-MCNC: 0.7 MG/DL (ref 0.2–1.2)
BUN SERPL-MCNC: 36 MG/DL (ref 8–23)
CALCIUM SERPL-MCNC: 10.7 MG/DL (ref 8.8–10.2)
CHLORIDE SERPL-SCNC: 107 MMOL/L (ref 98–111)
CHOLEST SERPL-MCNC: 93 MG/DL (ref 160–199)
CO2 SERPL-SCNC: 28 MMOL/L (ref 22–29)
CREAT SERPL-MCNC: 1.7 MG/DL (ref 0.5–0.9)
EOSINOPHIL # BLD: 0.1 K/UL (ref 0–0.6)
EOSINOPHIL NFR BLD: 2.3 % (ref 0–5)
ERYTHROCYTE [DISTWIDTH] IN BLOOD BY AUTOMATED COUNT: 13.2 % (ref 11.5–14.5)
GLUCOSE SERPL-MCNC: 92 MG/DL (ref 74–109)
HCT VFR BLD AUTO: 43.5 % (ref 37–47)
HDLC SERPL-MCNC: 37 MG/DL (ref 65–121)
HGB BLD-MCNC: 13.7 G/DL (ref 12–16)
IMM GRANULOCYTES # BLD: 0 K/UL
LDLC SERPL CALC-MCNC: 34 MG/DL
LYMPHOCYTES # BLD: 0.8 K/UL (ref 1.1–4.5)
LYMPHOCYTES NFR BLD: 13.8 % (ref 20–40)
MCH RBC QN AUTO: 31.9 PG (ref 27–31)
MCHC RBC AUTO-ENTMCNC: 31.5 G/DL (ref 33–37)
MCV RBC AUTO: 101.4 FL (ref 81–99)
MONOCYTES # BLD: 0.7 K/UL (ref 0–0.9)
MONOCYTES NFR BLD: 12.2 % (ref 0–10)
NEUTROPHILS # BLD: 4.3 K/UL (ref 1.5–7.5)
NEUTS SEG NFR BLD: 71.2 % (ref 50–65)
PLATELET # BLD AUTO: 146 K/UL (ref 130–400)
PMV BLD AUTO: 9.9 FL (ref 9.4–12.3)
POTASSIUM SERPL-SCNC: 4.5 MMOL/L (ref 3.5–5)
PROT SERPL-MCNC: 7.3 G/DL (ref 6.6–8.7)
RBC # BLD AUTO: 4.29 M/UL (ref 4.2–5.4)
SODIUM SERPL-SCNC: 144 MMOL/L (ref 136–145)
TRIGL SERPL-MCNC: 109 MG/DL (ref 0–149)
TSH SERPL DL<=0.005 MIU/L-ACNC: 3 UIU/ML (ref 0.27–4.2)
WBC # BLD AUTO: 6 K/UL (ref 4.8–10.8)

## 2023-11-06 PROCEDURE — 96372 THER/PROPH/DIAG INJ SC/IM: CPT | Performed by: NURSE PRACTITIONER

## 2023-11-06 PROCEDURE — G8400 PT W/DXA NO RESULTS DOC: HCPCS | Performed by: NURSE PRACTITIONER

## 2023-11-06 PROCEDURE — G8417 CALC BMI ABV UP PARAM F/U: HCPCS | Performed by: NURSE PRACTITIONER

## 2023-11-06 PROCEDURE — 3074F SYST BP LT 130 MM HG: CPT | Performed by: NURSE PRACTITIONER

## 2023-11-06 PROCEDURE — G8427 DOCREV CUR MEDS BY ELIG CLIN: HCPCS | Performed by: NURSE PRACTITIONER

## 2023-11-06 PROCEDURE — 1123F ACP DISCUSS/DSCN MKR DOCD: CPT | Performed by: NURSE PRACTITIONER

## 2023-11-06 PROCEDURE — G8484 FLU IMMUNIZE NO ADMIN: HCPCS | Performed by: NURSE PRACTITIONER

## 2023-11-06 PROCEDURE — 3078F DIAST BP <80 MM HG: CPT | Performed by: NURSE PRACTITIONER

## 2023-11-06 PROCEDURE — 1090F PRES/ABSN URINE INCON ASSESS: CPT | Performed by: NURSE PRACTITIONER

## 2023-11-06 PROCEDURE — 99214 OFFICE O/P EST MOD 30 MIN: CPT | Performed by: NURSE PRACTITIONER

## 2023-11-06 PROCEDURE — 1036F TOBACCO NON-USER: CPT | Performed by: NURSE PRACTITIONER

## 2023-11-06 RX ORDER — HYDROCODONE BITARTRATE AND ACETAMINOPHEN 5; 325 MG/1; MG/1
1 TABLET ORAL EVERY 8 HOURS PRN
Qty: 120 TABLET | Refills: 0 | Status: SHIPPED | OUTPATIENT
Start: 2023-11-06 | End: 2024-02-04

## 2023-11-06 RX ORDER — METHYLPREDNISOLONE ACETATE 80 MG/ML
80 INJECTION, SUSPENSION INTRA-ARTICULAR; INTRALESIONAL; INTRAMUSCULAR; SOFT TISSUE ONCE
Status: COMPLETED | OUTPATIENT
Start: 2023-11-06 | End: 2023-11-06

## 2023-11-06 RX ADMIN — METHYLPREDNISOLONE ACETATE 80 MG: 80 INJECTION, SUSPENSION INTRA-ARTICULAR; INTRALESIONAL; INTRAMUSCULAR; SOFT TISSUE at 12:11

## 2023-11-06 ASSESSMENT — ENCOUNTER SYMPTOMS
ABDOMINAL PAIN: 0
BACK PAIN: 1
BLOOD IN STOOL: 0
EYE ITCHING: 0
COLOR CHANGE: 0
CONSTIPATION: 0
STRIDOR: 0
SORE THROAT: 0
NAUSEA: 0
COUGH: 0
ABDOMINAL DISTENTION: 0
CHOKING: 0
WHEEZING: 0
TROUBLE SWALLOWING: 0
DIARRHEA: 0
SHORTNESS OF BREATH: 0
EYE DISCHARGE: 0
VOMITING: 0

## 2023-11-06 NOTE — PROGRESS NOTES
1 tablet by mouth daily Take 4 days a week 90 tablet 1    silver sulfADIAZINE (SILVADENE) 1 % cream Apply topically daily. 50 g 2    lisinopril (PRINIVIL;ZESTRIL) 10 MG tablet Take 1 tablet by mouth      escitalopram (LEXAPRO) 10 MG tablet Take 1 tablet by mouth daily 30 tablet 3    acetaminophen (TYLENOL) 325 MG tablet Take 2 tablets by mouth every 4 hours as needed for Pain      docusate sodium (COLACE) 100 MG capsule Take 1 capsule by mouth 2 times daily as needed for Constipation      Ostomy Supplies (CHAO-FIT NATURA STOMAHESIVE) WAFR Use as directed 20 Wafer 11     No current facility-administered medications for this visit.      Allergies   Allergen Reactions    Other Anaphylaxis and Itching     Cloth bandaids , causes redness of skin    Ciprofloxacin Itching    Codeine Itching    Perflutren Lipid Microsphere Other (See Comments)     Back pain    Tetanus Toxoids Itching     Itching around site    Tizanidine Hcl Itching    Tetanus Toxoid      Reaction: [address] Maintenance   Topic Date Due    Shingles vaccine (1 of 2) Never done    DEXA (modify frequency per FRAX score)  Never done    Flu vaccine (1) 08/01/2023    COVID-19 Vaccine (1) 05/01/2025 (Originally 1945)    Depression Monitoring  04/20/2024    Annual Wellness Visit (AWV)  04/20/2024    Lipids  11/06/2024    Pneumococcal 65+ years Vaccine  Completed    Hepatitis C screen  Completed    Hepatitis A vaccine  Aged Out    Hepatitis B vaccine  Aged Out    Hib vaccine  Aged Out    Meningococcal (ACWY) vaccine  Aged Out       Lab Results   Component Value Date    LABA1C 5.6 03/22/2021     No results found for: \"PSA\", \"PSADIA\"  TSH   Date Value Ref Range Status   11/06/2023 3.000 0.270 - 4.200 uIU/mL Final   ]  Lab Results   Component Value Date     11/06/2023    K 4.5 11/06/2023     11/06/2023    CO2 28 11/06/2023    BUN 36 (H) 11/06/2023    CREATININE 1.7 (H) 11/06/2023    GLUCOSE 92 11/06/2023    CALCIUM 10.7 (H)

## 2023-11-06 NOTE — PATIENT INSTRUCTIONS
1.  Chronic pain syndrome stable on current current dose of hydrocodone  2. Alzheimer she is stable  3. Chronic congestive heart failure stable with current meds no changes  4. Depression stable Lexapro  5. History of DVT she is on chronic Coumadin therapy they do home checks  6. Atrial fibs stable on current meds no changes  7. Hyperlipidemia diet controlled no changes  8. Chronic kidney disease has improved and stable  9. Vitamin D deficiency stable with current dose  10. Rash;  Mix hydrocortisone and Benadryl together and use 2 times a day. You can use the Benadryl cream as often as you need in between times.

## 2023-11-09 RX ORDER — CALCITRIOL 0.25 UG/1
CAPSULE, LIQUID FILLED ORAL DAILY
Qty: 30 CAPSULE | Refills: 4 | Status: SHIPPED | OUTPATIENT
Start: 2023-11-09

## 2023-11-09 NOTE — TELEPHONE ENCOUNTER
Kaykay Dunn called requesting a refill of the below medication which has been pended for you:     Requested Prescriptions     Pending Prescriptions Disp Refills    calcitRIOL (ROCALTROL) 0.25 MCG capsule [Pharmacy Med Name: CALCITRIOL 0.25 MCG CAPS 0.25 Capsule] 30 capsule 4     Sig: TAKE 1 CAPSULE BY MOUTH EVERY DAY       Last Appointment Date: 11/6/2023  Next Appointment Date: 2/6/2024    Allergies   Allergen Reactions    Other Anaphylaxis and Itching     Cloth bandaids , causes redness of skin    Ciprofloxacin Itching    Codeine Itching    Perflutren Lipid Microsphere Other (See Comments)     Back pain    Tetanus Toxoids Itching     Itching around site    Tizanidine Hcl Itching    Tetanus Toxoid      Reaction: [address]

## 2023-11-21 RX ORDER — WARFARIN SODIUM 7.5 MG/1
7.5 TABLET ORAL DAILY
Qty: 30 TABLET | Refills: 1 | Status: SHIPPED | OUTPATIENT
Start: 2023-11-21

## 2023-11-21 NOTE — TELEPHONE ENCOUNTER
Sintia Taveras called requesting a refill of the below medication which has been pended for you:     Requested Prescriptions     Pending Prescriptions Disp Refills    warfarin (COUMADIN) 7.5 MG tablet [Pharmacy Med Name: WARFARIN SODIUM 7.5 MG TABS 7.5 Tablet] 30 tablet 1     Sig: TAKE 1 TABLET BY MOUTH DAILY TAKE 4 DAYS A WEEK       Last Appointment Date: 11/6/2023  Next Appointment Date: 2/6/2024    Allergies   Allergen Reactions    Other Anaphylaxis and Itching     Cloth bandaids , causes redness of skin    Ciprofloxacin Itching    Codeine Itching    Perflutren Lipid Microsphere Other (See Comments)     Back pain    Tetanus Toxoids Itching     Itching around site    Tizanidine Hcl Itching    Tetanus Toxoid      Reaction: [address]

## 2023-11-29 ENCOUNTER — ANTI-COAG VISIT (OUTPATIENT)
Dept: PRIMARY CARE CLINIC | Age: 78
End: 2023-11-29
Payer: MEDICARE

## 2023-11-29 DIAGNOSIS — Z86.718 HISTORY OF DVT (DEEP VEIN THROMBOSIS): Primary | ICD-10-CM

## 2023-11-29 LAB — INR BLD: 2

## 2023-11-29 PROCEDURE — 93793 ANTICOAG MGMT PT WARFARIN: CPT | Performed by: NURSE PRACTITIONER

## 2023-11-29 NOTE — PROGRESS NOTES
HOME MONITORING REPORT    INR today:   Results for orders placed or performed in visit on 11/29/23   Protime-INR   Result Value Ref Range    INR 2.00        INR Goal: 2.0-3.0    Dosing Plan  As of 11/29/2023      TTR:  54.0 % (5.4 y)   Full warfarin instructions:  7.5 mg every Sun, Tue, Thu; 5 mg all other days                PLAN: Patient aware to continue current dose and recheck in one week or as ordered. I have reviewed nursing plan for Coumadin management and agree with plan.

## 2023-12-11 ENCOUNTER — TELEPHONE (OUTPATIENT)
Dept: INTERNAL MEDICINE | Age: 78
End: 2023-12-11

## 2023-12-11 RX ORDER — FUROSEMIDE 40 MG/1
40 TABLET ORAL DAILY
Qty: 90 TABLET | Refills: 1 | Status: SHIPPED | OUTPATIENT
Start: 2023-12-11

## 2023-12-11 NOTE — TELEPHONE ENCOUNTER
[employer] called requesting a refill of the below medication which has been pended for you:     Requested Prescriptions     Pending Prescriptions Disp Refills    furosemide (LASIX) 40 MG tablet [Pharmacy Med Name: FUROSEMIDE 40 MG TABS 40 Tablet] 90 tablet 1     Sig: TAKE 1 TABLET BY MOUTH EVERY DAY       Last Appointment Date: 11/6/2023  Next Appointment Date: 2/6/2024    Allergies   Allergen Reactions    Other Anaphylaxis and Itching     Cloth bandaids , causes redness of skin    Ciprofloxacin Itching    Codeine Itching    Perflutren Lipid Microsphere Other (See Comments)     Back pain    Tetanus Toxoids Itching     Itching around site    Tizanidine Hcl Itching    Tetanus Toxoid      Reaction: [address]

## 2023-12-12 ENCOUNTER — ANTI-COAG VISIT (OUTPATIENT)
Dept: PRIMARY CARE CLINIC | Age: 78
End: 2023-12-12
Payer: MEDICARE

## 2023-12-12 DIAGNOSIS — Z86.718 HISTORY OF DVT (DEEP VEIN THROMBOSIS): Primary | ICD-10-CM

## 2023-12-12 LAB — INR BLD: 2.4

## 2023-12-12 PROCEDURE — 93793 ANTICOAG MGMT PT WARFARIN: CPT | Performed by: NURSE PRACTITIONER

## 2023-12-12 NOTE — PROGRESS NOTES
HOME MONITORING REPORT    INR today:   Results for orders placed or performed in visit on 12/12/23   Protime-INR   Result Value Ref Range    INR 2.40        INR Goal: 2.0-3.0    Dosing Plan  As of 12/12/2023      TTR:  54.3 % (5.4 y)   Full warfarin instructions:  7.5 mg every Sun, Tue, Thu; 5 mg all other days                PLAN: Patient aware to continue current dose and recheck in one week or as ordered. I have reviewed nursing plan for Coumadin management and agree with plan.

## 2023-12-22 LAB — INR BLD: 2.4

## 2023-12-26 ENCOUNTER — ANTI-COAG VISIT (OUTPATIENT)
Dept: INTERNAL MEDICINE | Age: 78
End: 2023-12-26

## 2023-12-26 DIAGNOSIS — Z86.718 HISTORY OF DVT (DEEP VEIN THROMBOSIS): Primary | ICD-10-CM

## 2023-12-26 NOTE — PROGRESS NOTES
HOME MONITORING REPORT    INR today:   Results for orders placed or performed in visit on 12/26/23   Protime-INR   Result Value Ref Range    INR 2.40        INR Goal: 2.0-3.0    Dosing Plan  As of 12/26/2023      TTR:  54.5 % (5.4 y)   Full warfarin instructions:  7.5 mg every Sun, Tue, Thu; 5 mg all other days                PLAN: Patient aware to continue current dose and recheck in one week or as ordered. I have reviewed nursing plan for Coumadin management and agree with plan.

## 2023-12-28 DIAGNOSIS — E78.2 MIXED HYPERLIPIDEMIA: ICD-10-CM

## 2023-12-28 RX ORDER — ATORVASTATIN CALCIUM 40 MG/1
40 TABLET, FILM COATED ORAL DAILY
Qty: 30 TABLET | Refills: 5 | Status: SHIPPED | OUTPATIENT
Start: 2023-12-28

## 2023-12-30 LAB — INR BLD: 2.4

## 2024-01-01 ENCOUNTER — HOSPITAL ENCOUNTER (INPATIENT)
Facility: HOSPITAL | Age: 79
LOS: 8 days | End: 2024-05-10
Attending: EMERGENCY MEDICINE | Admitting: INTERNAL MEDICINE
Payer: MEDICARE

## 2024-01-01 ENCOUNTER — APPOINTMENT (OUTPATIENT)
Dept: GENERAL RADIOLOGY | Facility: HOSPITAL | Age: 79
End: 2024-01-01
Payer: MEDICARE

## 2024-01-01 ENCOUNTER — APPOINTMENT (OUTPATIENT)
Dept: CARDIOLOGY | Facility: HOSPITAL | Age: 79
End: 2024-01-01
Payer: MEDICARE

## 2024-01-01 VITALS
HEIGHT: 69 IN | RESPIRATION RATE: 16 BRPM | WEIGHT: 222 LBS | OXYGEN SATURATION: 85 % | HEART RATE: 98 BPM | BODY MASS INDEX: 32.88 KG/M2 | SYSTOLIC BLOOD PRESSURE: 48 MMHG | DIASTOLIC BLOOD PRESSURE: 27 MMHG | TEMPERATURE: 97.9 F

## 2024-01-01 DIAGNOSIS — Z51.5 COMFORT MEASURES ONLY STATUS: ICD-10-CM

## 2024-01-01 DIAGNOSIS — N18.9 CHRONIC RENAL IMPAIRMENT, UNSPECIFIED CKD STAGE: ICD-10-CM

## 2024-01-01 DIAGNOSIS — J96.01 ACUTE RESPIRATORY FAILURE WITH HYPOXIA AND HYPERCAPNIA: Primary | ICD-10-CM

## 2024-01-01 DIAGNOSIS — J96.02 ACUTE RESPIRATORY FAILURE WITH HYPOXIA AND HYPERCAPNIA: Primary | ICD-10-CM

## 2024-01-01 DIAGNOSIS — Z74.09 IMPAIRED MOBILITY: ICD-10-CM

## 2024-01-01 DIAGNOSIS — Z51.5 ENCOUNTER FOR PALLIATIVE CARE: ICD-10-CM

## 2024-01-01 DIAGNOSIS — I50.9 ACUTE ON CHRONIC CONGESTIVE HEART FAILURE, UNSPECIFIED HEART FAILURE TYPE: ICD-10-CM

## 2024-01-01 DIAGNOSIS — Z78.9 DECREASED ACTIVITIES OF DAILY LIVING (ADL): ICD-10-CM

## 2024-01-01 DIAGNOSIS — I48.11 LONGSTANDING PERSISTENT ATRIAL FIBRILLATION: ICD-10-CM

## 2024-01-01 DIAGNOSIS — J90 PLEURAL EFFUSION ON RIGHT: ICD-10-CM

## 2024-01-01 LAB
ALBUMIN SERPL-MCNC: 3.3 G/DL (ref 3.5–5.2)
ALBUMIN SERPL-MCNC: 3.8 G/DL (ref 3.5–5.2)
ALBUMIN/GLOB SERPL: 0.8 G/DL
ALBUMIN/GLOB SERPL: 1 G/DL
ALP SERPL-CCNC: 77 U/L (ref 39–117)
ALP SERPL-CCNC: 79 U/L (ref 39–117)
ALT SERPL W P-5'-P-CCNC: 10 U/L (ref 1–33)
ALT SERPL W P-5'-P-CCNC: 12 U/L (ref 1–33)
ANION GAP SERPL CALCULATED.3IONS-SCNC: 4 MMOL/L (ref 5–15)
ANION GAP SERPL CALCULATED.3IONS-SCNC: 6 MMOL/L (ref 5–15)
ANION GAP SERPL CALCULATED.3IONS-SCNC: 7 MMOL/L (ref 5–15)
ANION GAP SERPL CALCULATED.3IONS-SCNC: 7 MMOL/L (ref 5–15)
ANION GAP SERPL CALCULATED.3IONS-SCNC: 8 MMOL/L (ref 5–15)
ARTERIAL PATENCY WRIST A: ABNORMAL
ARTERIAL PATENCY WRIST A: ABNORMAL
ARTERIAL PATENCY WRIST A: POSITIVE
AST SERPL-CCNC: 13 U/L (ref 1–32)
AST SERPL-CCNC: 16 U/L (ref 1–32)
ATMOSPHERIC PRESS: 750 MMHG
ATMOSPHERIC PRESS: 750 MMHG
ATMOSPHERIC PRESS: 751 MMHG
BACTERIA SPEC AEROBE CULT: ABNORMAL
BACTERIA SPEC AEROBE CULT: NORMAL
BACTERIA SPEC AEROBE CULT: NORMAL
BACTERIA UR QL AUTO: ABNORMAL /HPF
BASE EXCESS BLDA CALC-SCNC: 2.9 MMOL/L (ref 0–2)
BASE EXCESS BLDA CALC-SCNC: 3.6 MMOL/L (ref 0–2)
BASE EXCESS BLDA CALC-SCNC: 5.8 MMOL/L (ref 0–2)
BASOPHILS # BLD AUTO: 0.01 10*3/MM3 (ref 0–0.2)
BASOPHILS # BLD AUTO: 0.02 10*3/MM3 (ref 0–0.2)
BASOPHILS # BLD AUTO: 0.02 10*3/MM3 (ref 0–0.2)
BASOPHILS # BLD AUTO: 0.03 10*3/MM3 (ref 0–0.2)
BASOPHILS NFR BLD AUTO: 0.1 % (ref 0–1.5)
BASOPHILS NFR BLD AUTO: 0.2 % (ref 0–1.5)
BASOPHILS NFR BLD AUTO: 0.2 % (ref 0–1.5)
BASOPHILS NFR BLD AUTO: 0.5 % (ref 0–1.5)
BDY SITE: ABNORMAL
BH CV ECHO MEAS - AO MAX PG: 40.4 MMHG
BH CV ECHO MEAS - AO MEAN PG: 18.6 MMHG
BH CV ECHO MEAS - AO ROOT DIAM: 3.1 CM
BH CV ECHO MEAS - AO V2 MAX: 318 CM/SEC
BH CV ECHO MEAS - AO V2 VTI: 55.8 CM
BH CV ECHO MEAS - AVA(I,D): 1.4 CM2
BH CV ECHO MEAS - EDV(CUBED): 101.8 ML
BH CV ECHO MEAS - EDV(MOD-SP2): 75.7 ML
BH CV ECHO MEAS - EDV(MOD-SP4): 58.1 ML
BH CV ECHO MEAS - EF(MOD-BP): 63.6 %
BH CV ECHO MEAS - EF(MOD-SP2): 66.7 %
BH CV ECHO MEAS - EF(MOD-SP4): 59.2 %
BH CV ECHO MEAS - ESV(CUBED): 29.8 ML
BH CV ECHO MEAS - ESV(MOD-SP2): 25.2 ML
BH CV ECHO MEAS - ESV(MOD-SP4): 23.7 ML
BH CV ECHO MEAS - FS: 33.6 %
BH CV ECHO MEAS - IVS/LVPW: 0.93 CM
BH CV ECHO MEAS - IVSD: 1.13 CM
BH CV ECHO MEAS - LA DIMENSION: 4.7 CM
BH CV ECHO MEAS - LAT PEAK E' VEL: 10.9 CM/SEC
BH CV ECHO MEAS - LV DIASTOLIC VOL/BSA (35-75): 27.1 CM2
BH CV ECHO MEAS - LV MASS(C)D: 202.5 GRAMS
BH CV ECHO MEAS - LV MAX PG: 5.3 MMHG
BH CV ECHO MEAS - LV MEAN PG: 3 MMHG
BH CV ECHO MEAS - LV SYSTOLIC VOL/BSA (12-30): 11 CM2
BH CV ECHO MEAS - LV V1 MAX: 115.6 CM/SEC
BH CV ECHO MEAS - LV V1 VTI: 22.5 CM
BH CV ECHO MEAS - LVIDD: 4.7 CM
BH CV ECHO MEAS - LVIDS: 3.1 CM
BH CV ECHO MEAS - LVOT AREA: 3.5 CM2
BH CV ECHO MEAS - LVOT DIAM: 2.1 CM
BH CV ECHO MEAS - LVPWD: 1.21 CM
BH CV ECHO MEAS - MED PEAK E' VEL: 4.9 CM/SEC
BH CV ECHO MEAS - MV DEC TIME: 0.2 SEC
BH CV ECHO MEAS - MV E MAX VEL: 125 CM/SEC
BH CV ECHO MEAS - PI END-D VEL: 194 CM/SEC
BH CV ECHO MEAS - RAP SYSTOLE: 10 MMHG
BH CV ECHO MEAS - RVSP: 51.7 MMHG
BH CV ECHO MEAS - SV(LVOT): 77.9 ML
BH CV ECHO MEAS - SV(MOD-SP2): 50.5 ML
BH CV ECHO MEAS - SV(MOD-SP4): 34.4 ML
BH CV ECHO MEAS - SVI(LVOT): 36.3 ML/M2
BH CV ECHO MEAS - SVI(MOD-SP2): 23.5 ML/M2
BH CV ECHO MEAS - SVI(MOD-SP4): 16 ML/M2
BH CV ECHO MEAS - TR MAX PG: 41.7 MMHG
BH CV ECHO MEAS - TR MAX VEL: 323 CM/SEC
BH CV ECHO MEASUREMENTS AVERAGE E/E' RATIO: 15.82
BH CV XLRA - RV BASE: 4.9 CM
BH CV XLRA - RV LENGTH: 5.9 CM
BH CV XLRA - RV MID: 3.5 CM
BILIRUB SERPL-MCNC: 0.5 MG/DL (ref 0–1.2)
BILIRUB SERPL-MCNC: 0.6 MG/DL (ref 0–1.2)
BILIRUB UR QL STRIP: NEGATIVE
BODY TEMPERATURE: 37
BUN SERPL-MCNC: 40 MG/DL (ref 8–23)
BUN SERPL-MCNC: 41 MG/DL (ref 8–23)
BUN SERPL-MCNC: 48 MG/DL (ref 8–23)
BUN SERPL-MCNC: 53 MG/DL (ref 8–23)
BUN SERPL-MCNC: 56 MG/DL (ref 8–23)
BUN/CREAT SERPL: 21.2 (ref 7–25)
BUN/CREAT SERPL: 23.2 (ref 7–25)
BUN/CREAT SERPL: 25.1 (ref 7–25)
BUN/CREAT SERPL: 29.8 (ref 7–25)
BUN/CREAT SERPL: 35.2 (ref 7–25)
CA-I BLD-MCNC: 5.9 MG/DL (ref 4.6–5.4)
CA-I BLD-MCNC: 5.91 MG/DL (ref 4.6–5.4)
CALCIUM SPEC-SCNC: 10.4 MG/DL (ref 8.6–10.5)
CALCIUM SPEC-SCNC: 10.5 MG/DL (ref 8.6–10.5)
CALCIUM SPEC-SCNC: 10.5 MG/DL (ref 8.6–10.5)
CALCIUM SPEC-SCNC: 10.8 MG/DL (ref 8.6–10.5)
CALCIUM SPEC-SCNC: 11.1 MG/DL (ref 8.6–10.5)
CHLORIDE SERPL-SCNC: 102 MMOL/L (ref 98–107)
CHLORIDE SERPL-SCNC: 103 MMOL/L (ref 98–107)
CHLORIDE SERPL-SCNC: 103 MMOL/L (ref 98–107)
CHLORIDE SERPL-SCNC: 104 MMOL/L (ref 98–107)
CHLORIDE SERPL-SCNC: 105 MMOL/L (ref 98–107)
CLARITY UR: CLEAR
CO2 SERPL-SCNC: 31 MMOL/L (ref 22–29)
CO2 SERPL-SCNC: 32 MMOL/L (ref 22–29)
CO2 SERPL-SCNC: 32 MMOL/L (ref 22–29)
CO2 SERPL-SCNC: 33 MMOL/L (ref 22–29)
CO2 SERPL-SCNC: 35 MMOL/L (ref 22–29)
COHGB MFR BLD: 1.4 % (ref 0–5)
COHGB MFR BLD: 1.6 % (ref 0–5)
COLOR UR: YELLOW
CREAT SERPL-MCNC: 1.59 MG/DL (ref 0.57–1)
CREAT SERPL-MCNC: 1.77 MG/DL (ref 0.57–1)
CREAT SERPL-MCNC: 1.78 MG/DL (ref 0.57–1)
CREAT SERPL-MCNC: 1.89 MG/DL (ref 0.57–1)
CREAT SERPL-MCNC: 1.91 MG/DL (ref 0.57–1)
D-LACTATE SERPL-SCNC: 1.3 MMOL/L (ref 0.5–2)
DEPRECATED RDW RBC AUTO: 50.4 FL (ref 37–54)
DEPRECATED RDW RBC AUTO: 51.7 FL (ref 37–54)
DEPRECATED RDW RBC AUTO: 51.9 FL (ref 37–54)
DEPRECATED RDW RBC AUTO: 52.1 FL (ref 37–54)
DEPRECATED RDW RBC AUTO: 52.6 FL (ref 37–54)
EGFRCR SERPLBLD CKD-EPI 2021: 26.3 ML/MIN/1.73
EGFRCR SERPLBLD CKD-EPI 2021: 26.6 ML/MIN/1.73
EGFRCR SERPLBLD CKD-EPI 2021: 28.6 ML/MIN/1.73
EGFRCR SERPLBLD CKD-EPI 2021: 28.8 ML/MIN/1.73
EGFRCR SERPLBLD CKD-EPI 2021: 32.7 ML/MIN/1.73
EOSINOPHIL # BLD AUTO: 0.02 10*3/MM3 (ref 0–0.4)
EOSINOPHIL # BLD AUTO: 0.03 10*3/MM3 (ref 0–0.4)
EOSINOPHIL # BLD AUTO: 0.06 10*3/MM3 (ref 0–0.4)
EOSINOPHIL # BLD AUTO: 0.06 10*3/MM3 (ref 0–0.4)
EOSINOPHIL NFR BLD AUTO: 0.2 % (ref 0.3–6.2)
EOSINOPHIL NFR BLD AUTO: 0.4 % (ref 0.3–6.2)
EOSINOPHIL NFR BLD AUTO: 0.7 % (ref 0.3–6.2)
EOSINOPHIL NFR BLD AUTO: 0.9 % (ref 0.3–6.2)
EPAP: 6
EPAP: 6
ERYTHROCYTE [DISTWIDTH] IN BLOOD BY AUTOMATED COUNT: 13.5 % (ref 12.3–15.4)
ERYTHROCYTE [DISTWIDTH] IN BLOOD BY AUTOMATED COUNT: 13.7 % (ref 12.3–15.4)
ERYTHROCYTE [DISTWIDTH] IN BLOOD BY AUTOMATED COUNT: 13.9 % (ref 12.3–15.4)
FLUAV RNA RESP QL NAA+PROBE: NOT DETECTED
FLUBV RNA RESP QL NAA+PROBE: NOT DETECTED
GAS FLOW AIRWAY: 2 LPM
GEN 5 2HR TROPONIN T REFLEX: 38 NG/L
GLOBULIN UR ELPH-MCNC: 3.9 GM/DL
GLOBULIN UR ELPH-MCNC: 3.9 GM/DL
GLUCOSE SERPL-MCNC: 111 MG/DL (ref 65–99)
GLUCOSE SERPL-MCNC: 119 MG/DL (ref 65–99)
GLUCOSE SERPL-MCNC: 139 MG/DL (ref 65–99)
GLUCOSE SERPL-MCNC: 88 MG/DL (ref 65–99)
GLUCOSE SERPL-MCNC: 95 MG/DL (ref 65–99)
GLUCOSE UR STRIP-MCNC: NEGATIVE MG/DL
HCO3 BLDA-SCNC: 31.8 MMOL/L (ref 20–26)
HCO3 BLDA-SCNC: 32.1 MMOL/L (ref 20–26)
HCO3 BLDA-SCNC: 33.1 MMOL/L (ref 20–26)
HCT VFR BLD AUTO: 40.4 % (ref 34–46.6)
HCT VFR BLD AUTO: 44 % (ref 34–46.6)
HCT VFR BLD AUTO: 45.7 % (ref 34–46.6)
HCT VFR BLD AUTO: 45.9 % (ref 34–46.6)
HCT VFR BLD AUTO: 46.4 % (ref 34–46.6)
HCT VFR BLD CALC: 41 % (ref 38–51)
HCT VFR BLD CALC: 42.6 % (ref 38–51)
HGB BLD-MCNC: 12.6 G/DL (ref 12–15.9)
HGB BLD-MCNC: 13.4 G/DL (ref 12–15.9)
HGB BLD-MCNC: 13.9 G/DL (ref 12–15.9)
HGB BLD-MCNC: 14.1 G/DL (ref 12–15.9)
HGB BLD-MCNC: 14.1 G/DL (ref 12–15.9)
HGB BLDA-MCNC: 13.4 G/DL (ref 12–16)
HGB BLDA-MCNC: 13.9 G/DL (ref 12–16)
HGB UR QL STRIP.AUTO: ABNORMAL
HOLD SPECIMEN: NORMAL
HYALINE CASTS UR QL AUTO: ABNORMAL /LPF
IMM GRANULOCYTES # BLD AUTO: 0.02 10*3/MM3 (ref 0–0.05)
IMM GRANULOCYTES # BLD AUTO: 0.02 10*3/MM3 (ref 0–0.05)
IMM GRANULOCYTES # BLD AUTO: 0.03 10*3/MM3 (ref 0–0.05)
IMM GRANULOCYTES # BLD AUTO: 0.03 10*3/MM3 (ref 0–0.05)
IMM GRANULOCYTES NFR BLD AUTO: 0.2 % (ref 0–0.5)
IMM GRANULOCYTES NFR BLD AUTO: 0.3 % (ref 0–0.5)
IMM GRANULOCYTES NFR BLD AUTO: 0.4 % (ref 0–0.5)
IMM GRANULOCYTES NFR BLD AUTO: 0.5 % (ref 0–0.5)
INHALED O2 CONCENTRATION: 35 %
INHALED O2 CONCENTRATION: 40 %
INR PPP: 2.48 (ref 0.91–1.09)
INR PPP: 2.72 (ref 0.91–1.09)
INR PPP: 2.8 (ref 0.91–1.09)
INR PPP: 2.81 (ref 0.91–1.09)
INR PPP: 3.1 (ref 0.91–1.09)
INR PPP: 3.34 (ref 0.91–1.09)
INR PPP: 3.6 (ref 0.91–1.09)
IPAP: 12
IPAP: 16
KETONES UR QL STRIP: NEGATIVE
LEFT ATRIUM VOLUME INDEX: 48.8 ML/M2
LEFT ATRIUM VOLUME: 105 ML
LEUKOCYTE ESTERASE UR QL STRIP.AUTO: ABNORMAL
LYMPHOCYTES # BLD AUTO: 0.5 10*3/MM3 (ref 0.7–3.1)
LYMPHOCYTES # BLD AUTO: 0.53 10*3/MM3 (ref 0.7–3.1)
LYMPHOCYTES # BLD AUTO: 0.66 10*3/MM3 (ref 0.7–3.1)
LYMPHOCYTES # BLD AUTO: 0.83 10*3/MM3 (ref 0.7–3.1)
LYMPHOCYTES NFR BLD AUTO: 10.2 % (ref 19.6–45.3)
LYMPHOCYTES NFR BLD AUTO: 6.8 % (ref 19.6–45.3)
LYMPHOCYTES NFR BLD AUTO: 8 % (ref 19.6–45.3)
LYMPHOCYTES NFR BLD AUTO: 8.2 % (ref 19.6–45.3)
Lab: ABNORMAL
MCH RBC QN AUTO: 31.4 PG (ref 26.6–33)
MCH RBC QN AUTO: 31.5 PG (ref 26.6–33)
MCH RBC QN AUTO: 31.6 PG (ref 26.6–33)
MCH RBC QN AUTO: 31.7 PG (ref 26.6–33)
MCH RBC QN AUTO: 31.9 PG (ref 26.6–33)
MCHC RBC AUTO-ENTMCNC: 30.3 G/DL (ref 31.5–35.7)
MCHC RBC AUTO-ENTMCNC: 30.4 G/DL (ref 31.5–35.7)
MCHC RBC AUTO-ENTMCNC: 30.5 G/DL (ref 31.5–35.7)
MCHC RBC AUTO-ENTMCNC: 30.9 G/DL (ref 31.5–35.7)
MCHC RBC AUTO-ENTMCNC: 31.2 G/DL (ref 31.5–35.7)
MCV RBC AUTO: 102.3 FL (ref 79–97)
MCV RBC AUTO: 102.5 FL (ref 79–97)
MCV RBC AUTO: 103.8 FL (ref 79–97)
MCV RBC AUTO: 103.8 FL (ref 79–97)
MCV RBC AUTO: 104 FL (ref 79–97)
METHGB BLD QL: 0.2 % (ref 0–3)
METHGB BLD QL: 0.5 % (ref 0–3)
MODALITY: ABNORMAL
MONOCYTES # BLD AUTO: 0.73 10*3/MM3 (ref 0.1–0.9)
MONOCYTES # BLD AUTO: 0.77 10*3/MM3 (ref 0.1–0.9)
MONOCYTES # BLD AUTO: 0.94 10*3/MM3 (ref 0.1–0.9)
MONOCYTES # BLD AUTO: 1.12 10*3/MM3 (ref 0.1–0.9)
MONOCYTES NFR BLD AUTO: 11.9 % (ref 5–12)
MONOCYTES NFR BLD AUTO: 12.7 % (ref 5–12)
MONOCYTES NFR BLD AUTO: 13.8 % (ref 5–12)
MONOCYTES NFR BLD AUTO: 8.9 % (ref 5–12)
NEUTROPHILS NFR BLD AUTO: 5.04 10*3/MM3 (ref 1.7–7)
NEUTROPHILS NFR BLD AUTO: 5.89 10*3/MM3 (ref 1.7–7)
NEUTROPHILS NFR BLD AUTO: 6.05 10*3/MM3 (ref 1.7–7)
NEUTROPHILS NFR BLD AUTO: 6.77 10*3/MM3 (ref 1.7–7)
NEUTROPHILS NFR BLD AUTO: 74.9 % (ref 42.7–76)
NEUTROPHILS NFR BLD AUTO: 78 % (ref 42.7–76)
NEUTROPHILS NFR BLD AUTO: 79.7 % (ref 42.7–76)
NEUTROPHILS NFR BLD AUTO: 82.3 % (ref 42.7–76)
NITRITE UR QL STRIP: NEGATIVE
NOTIFIED BY: ABNORMAL
NOTIFIED WHO: ABNORMAL
NRBC BLD AUTO-RTO: 0 /100 WBC (ref 0–0.2)
NT-PROBNP SERPL-MCNC: 1132 PG/ML (ref 0–1800)
OXYHGB MFR BLDV: 91.1 % (ref 94–99)
OXYHGB MFR BLDV: 94.9 % (ref 94–99)
PCO2 BLDA: 59.2 MM HG (ref 35–45)
PCO2 BLDA: 65.9 MM HG (ref 35–45)
PCO2 BLDA: 68.6 MM HG (ref 35–45)
PCO2 TEMP ADJ BLD: 59.2 MM HG (ref 35–45)
PCO2 TEMP ADJ BLD: 65.9 MM HG (ref 35–45)
PCO2 TEMP ADJ BLD: 68.6 MM HG (ref 35–45)
PH BLDA: 7.28 PH UNITS (ref 7.35–7.45)
PH BLDA: 7.3 PH UNITS (ref 7.35–7.45)
PH BLDA: 7.36 PH UNITS (ref 7.35–7.45)
PH UR STRIP.AUTO: 7.5 [PH] (ref 5–8)
PH, TEMP CORRECTED: 7.28 PH UNITS (ref 7.35–7.45)
PH, TEMP CORRECTED: 7.3 PH UNITS (ref 7.35–7.45)
PH, TEMP CORRECTED: 7.36 PH UNITS (ref 7.35–7.45)
PLATELET # BLD AUTO: 154 10*3/MM3 (ref 140–450)
PLATELET # BLD AUTO: 157 10*3/MM3 (ref 140–450)
PLATELET # BLD AUTO: 163 10*3/MM3 (ref 140–450)
PLATELET # BLD AUTO: 169 10*3/MM3 (ref 140–450)
PLATELET # BLD AUTO: 171 10*3/MM3 (ref 140–450)
PMV BLD AUTO: 10.9 FL (ref 6–12)
PMV BLD AUTO: 9.1 FL (ref 6–12)
PMV BLD AUTO: 9.4 FL (ref 6–12)
PMV BLD AUTO: 9.4 FL (ref 6–12)
PMV BLD AUTO: 9.8 FL (ref 6–12)
PO2 BLDA: 67.1 MM HG (ref 83–108)
PO2 BLDA: 78.9 MM HG (ref 83–108)
PO2 BLDA: 83.7 MM HG (ref 83–108)
PO2 TEMP ADJ BLD: 67.1 MM HG (ref 83–108)
PO2 TEMP ADJ BLD: 78.9 MM HG (ref 83–108)
PO2 TEMP ADJ BLD: 83.7 MM HG (ref 83–108)
POTASSIUM BLDA-SCNC: 4.4 MMOL/L (ref 3.5–5.2)
POTASSIUM BLDA-SCNC: 4.6 MMOL/L (ref 3.5–5.2)
POTASSIUM SERPL-SCNC: 4.4 MMOL/L (ref 3.5–5.2)
POTASSIUM SERPL-SCNC: 4.7 MMOL/L (ref 3.5–5.2)
POTASSIUM SERPL-SCNC: 4.8 MMOL/L (ref 3.5–5.2)
POTASSIUM SERPL-SCNC: 4.9 MMOL/L (ref 3.5–5.2)
POTASSIUM SERPL-SCNC: 5 MMOL/L (ref 3.5–5.2)
PROCALCITONIN SERPL-MCNC: 0.05 NG/ML (ref 0–0.25)
PROT SERPL-MCNC: 7.2 G/DL (ref 6–8.5)
PROT SERPL-MCNC: 7.7 G/DL (ref 6–8.5)
PROT UR QL STRIP: ABNORMAL
PROTHROMBIN TIME: 27.8 SECONDS (ref 11.8–14.8)
PROTHROMBIN TIME: 29.9 SECONDS (ref 11.8–14.8)
PROTHROMBIN TIME: 30.6 SECONDS (ref 11.8–14.8)
PROTHROMBIN TIME: 30.7 SECONDS (ref 11.8–14.8)
PROTHROMBIN TIME: 33.2 SECONDS (ref 11.8–14.8)
PROTHROMBIN TIME: 35.2 SECONDS (ref 11.8–14.8)
PROTHROMBIN TIME: 37.3 SECONDS (ref 11.8–14.8)
QT INTERVAL: 388 MS
QTC INTERVAL: 447 MS
RBC # BLD AUTO: 3.95 10*6/MM3 (ref 3.77–5.28)
RBC # BLD AUTO: 4.23 10*6/MM3 (ref 3.77–5.28)
RBC # BLD AUTO: 4.42 10*6/MM3 (ref 3.77–5.28)
RBC # BLD AUTO: 4.46 10*6/MM3 (ref 3.77–5.28)
RBC # BLD AUTO: 4.47 10*6/MM3 (ref 3.77–5.28)
RBC # UR STRIP: ABNORMAL /HPF
REF LAB TEST METHOD: ABNORMAL
SAO2 % BLDCOA: 92.7 % (ref 94–99)
SAO2 % BLDCOA: 96.3 % (ref 94–99)
SAO2 % BLDCOA: 96.7 % (ref 94–99)
SARS-COV-2 RNA RESP QL NAA+PROBE: NOT DETECTED
SET MECH RESP RATE: 14
SET MECH RESP RATE: 16
SODIUM BLDA-SCNC: 145 MMOL/L (ref 136–145)
SODIUM BLDA-SCNC: 145 MMOL/L (ref 136–145)
SODIUM SERPL-SCNC: 141 MMOL/L (ref 136–145)
SODIUM SERPL-SCNC: 142 MMOL/L (ref 136–145)
SODIUM SERPL-SCNC: 142 MMOL/L (ref 136–145)
SODIUM SERPL-SCNC: 143 MMOL/L (ref 136–145)
SODIUM SERPL-SCNC: 144 MMOL/L (ref 136–145)
SP GR UR STRIP: 1.01 (ref 1–1.03)
SQUAMOUS #/AREA URNS HPF: ABNORMAL /HPF
T4 FREE SERPL-MCNC: 0.82 NG/DL (ref 0.93–1.7)
TROPONIN T DELTA: 0 NG/L
TROPONIN T SERPL HS-MCNC: 38 NG/L
TSH SERPL DL<=0.05 MIU/L-ACNC: 2.99 UIU/ML (ref 0.27–4.2)
UROBILINOGEN UR QL STRIP: ABNORMAL
VENTILATOR MODE: ABNORMAL
WBC # UR STRIP: ABNORMAL /HPF
WBC NRBC COR # BLD AUTO: 6.46 10*3/MM3 (ref 3.4–10.8)
WBC NRBC COR # BLD AUTO: 7.39 10*3/MM3 (ref 3.4–10.8)
WBC NRBC COR # BLD AUTO: 8.1 10*3/MM3 (ref 3.4–10.8)
WBC NRBC COR # BLD AUTO: 8.23 10*3/MM3 (ref 3.4–10.8)
WBC NRBC COR # BLD AUTO: 8.34 10*3/MM3 (ref 3.4–10.8)
WHOLE BLOOD HOLD COAG: NORMAL
WHOLE BLOOD HOLD SPECIMEN: NORMAL

## 2024-01-01 PROCEDURE — 99497 ADVNCD CARE PLAN 30 MIN: CPT | Performed by: CLINICAL NURSE SPECIALIST

## 2024-01-01 PROCEDURE — 84484 ASSAY OF TROPONIN QUANT: CPT | Performed by: INTERNAL MEDICINE

## 2024-01-01 PROCEDURE — 94799 UNLISTED PULMONARY SVC/PX: CPT

## 2024-01-01 PROCEDURE — 85610 PROTHROMBIN TIME: CPT | Performed by: INTERNAL MEDICINE

## 2024-01-01 PROCEDURE — 94664 DEMO&/EVAL PT USE INHALER: CPT

## 2024-01-01 PROCEDURE — 25010000002 FUROSEMIDE PER 20 MG: Performed by: INTERNAL MEDICINE

## 2024-01-01 PROCEDURE — 84439 ASSAY OF FREE THYROXINE: CPT | Performed by: INTERNAL MEDICINE

## 2024-01-01 PROCEDURE — 80053 COMPREHEN METABOLIC PANEL: CPT | Performed by: NURSE PRACTITIONER

## 2024-01-01 PROCEDURE — 36415 COLL VENOUS BLD VENIPUNCTURE: CPT | Performed by: INTERNAL MEDICINE

## 2024-01-01 PROCEDURE — 94660 CPAP INITIATION&MGMT: CPT

## 2024-01-01 PROCEDURE — 71045 X-RAY EXAM CHEST 1 VIEW: CPT

## 2024-01-01 PROCEDURE — 99221 1ST HOSP IP/OBS SF/LOW 40: CPT | Performed by: NURSE PRACTITIONER

## 2024-01-01 PROCEDURE — 97166 OT EVAL MOD COMPLEX 45 MIN: CPT

## 2024-01-01 PROCEDURE — 94761 N-INVAS EAR/PLS OXIMETRY MLT: CPT

## 2024-01-01 PROCEDURE — 97162 PT EVAL MOD COMPLEX 30 MIN: CPT

## 2024-01-01 PROCEDURE — 99222 1ST HOSP IP/OBS MODERATE 55: CPT | Performed by: HOSPITALIST

## 2024-01-01 PROCEDURE — 82805 BLOOD GASES W/O2 SATURATION: CPT

## 2024-01-01 PROCEDURE — 80053 COMPREHEN METABOLIC PANEL: CPT | Performed by: EMERGENCY MEDICINE

## 2024-01-01 PROCEDURE — 82375 ASSAY CARBOXYHB QUANT: CPT

## 2024-01-01 PROCEDURE — 97530 THERAPEUTIC ACTIVITIES: CPT

## 2024-01-01 PROCEDURE — 83880 ASSAY OF NATRIURETIC PEPTIDE: CPT | Performed by: EMERGENCY MEDICINE

## 2024-01-01 PROCEDURE — 85025 COMPLETE CBC W/AUTO DIFF WBC: CPT | Performed by: EMERGENCY MEDICINE

## 2024-01-01 PROCEDURE — 36600 WITHDRAWAL OF ARTERIAL BLOOD: CPT

## 2024-01-01 PROCEDURE — 25010000002 FUROSEMIDE PER 20 MG: Performed by: EMERGENCY MEDICINE

## 2024-01-01 PROCEDURE — 25010000002 CEFTRIAXONE PER 250 MG: Performed by: INTERNAL MEDICINE

## 2024-01-01 PROCEDURE — 87086 URINE CULTURE/COLONY COUNT: CPT | Performed by: INTERNAL MEDICINE

## 2024-01-01 PROCEDURE — 80048 BASIC METABOLIC PNL TOTAL CA: CPT | Performed by: INTERNAL MEDICINE

## 2024-01-01 PROCEDURE — 83605 ASSAY OF LACTIC ACID: CPT | Performed by: EMERGENCY MEDICINE

## 2024-01-01 PROCEDURE — 81001 URINALYSIS AUTO W/SCOPE: CPT | Performed by: INTERNAL MEDICINE

## 2024-01-01 PROCEDURE — 99232 SBSQ HOSP IP/OBS MODERATE 35: CPT | Performed by: HOSPITALIST

## 2024-01-01 PROCEDURE — 84484 ASSAY OF TROPONIN QUANT: CPT | Performed by: EMERGENCY MEDICINE

## 2024-01-01 PROCEDURE — 99223 1ST HOSP IP/OBS HIGH 75: CPT | Performed by: INTERNAL MEDICINE

## 2024-01-01 PROCEDURE — 85025 COMPLETE CBC W/AUTO DIFF WBC: CPT | Performed by: INTERNAL MEDICINE

## 2024-01-01 PROCEDURE — 94640 AIRWAY INHALATION TREATMENT: CPT

## 2024-01-01 PROCEDURE — 25010000002 LORAZEPAM PER 2 MG: Performed by: INTERNAL MEDICINE

## 2024-01-01 PROCEDURE — 5A09357 ASSISTANCE WITH RESPIRATORY VENTILATION, LESS THAN 24 CONSECUTIVE HOURS, CONTINUOUS POSITIVE AIRWAY PRESSURE: ICD-10-PCS | Performed by: INTERNAL MEDICINE

## 2024-01-01 PROCEDURE — 94760 N-INVAS EAR/PLS OXIMETRY 1: CPT

## 2024-01-01 PROCEDURE — 84145 PROCALCITONIN (PCT): CPT | Performed by: EMERGENCY MEDICINE

## 2024-01-01 PROCEDURE — 36415 COLL VENOUS BLD VENIPUNCTURE: CPT

## 2024-01-01 PROCEDURE — 93005 ELECTROCARDIOGRAM TRACING: CPT | Performed by: EMERGENCY MEDICINE

## 2024-01-01 PROCEDURE — 87186 SC STD MICRODIL/AGAR DIL: CPT | Performed by: INTERNAL MEDICINE

## 2024-01-01 PROCEDURE — 85027 COMPLETE CBC AUTOMATED: CPT | Performed by: NURSE PRACTITIONER

## 2024-01-01 PROCEDURE — 83050 HGB METHEMOGLOBIN QUAN: CPT

## 2024-01-01 PROCEDURE — 93306 TTE W/DOPPLER COMPLETE: CPT

## 2024-01-01 PROCEDURE — 99233 SBSQ HOSP IP/OBS HIGH 50: CPT | Performed by: CLINICAL NURSE SPECIALIST

## 2024-01-01 PROCEDURE — 87636 SARSCOV2 & INF A&B AMP PRB: CPT | Performed by: EMERGENCY MEDICINE

## 2024-01-01 PROCEDURE — 85610 PROTHROMBIN TIME: CPT | Performed by: EMERGENCY MEDICINE

## 2024-01-01 PROCEDURE — 87040 BLOOD CULTURE FOR BACTERIA: CPT | Performed by: EMERGENCY MEDICINE

## 2024-01-01 PROCEDURE — 99285 EMERGENCY DEPT VISIT HI MDM: CPT

## 2024-01-01 PROCEDURE — 82803 BLOOD GASES ANY COMBINATION: CPT

## 2024-01-01 PROCEDURE — 99232 SBSQ HOSP IP/OBS MODERATE 35: CPT | Performed by: CLINICAL NURSE SPECIALIST

## 2024-01-01 PROCEDURE — 93306 TTE W/DOPPLER COMPLETE: CPT | Performed by: EMERGENCY MEDICINE

## 2024-01-01 PROCEDURE — 87077 CULTURE AEROBIC IDENTIFY: CPT | Performed by: INTERNAL MEDICINE

## 2024-01-01 PROCEDURE — 84443 ASSAY THYROID STIM HORMONE: CPT | Performed by: INTERNAL MEDICINE

## 2024-01-01 RX ORDER — LORAZEPAM 2 MG/ML
2 INJECTION INTRAMUSCULAR
Status: DISCONTINUED | OUTPATIENT
Start: 2024-01-01 | End: 2024-01-01 | Stop reason: HOSPADM

## 2024-01-01 RX ORDER — PROCHLORPERAZINE MALEATE 10 MG
5 TABLET ORAL EVERY 6 HOURS PRN
Status: DISCONTINUED | OUTPATIENT
Start: 2024-01-01 | End: 2024-01-01 | Stop reason: HOSPADM

## 2024-01-01 RX ORDER — FUROSEMIDE 40 MG/1
40 TABLET ORAL 2 TIMES DAILY
Start: 2024-01-01

## 2024-01-01 RX ORDER — LORAZEPAM 2 MG/ML
1 CONCENTRATE ORAL ONCE
Status: COMPLETED | OUTPATIENT
Start: 2024-01-01 | End: 2024-01-01

## 2024-01-01 RX ORDER — DIPHENHYDRAMINE HCL 25 MG
25 CAPSULE ORAL EVERY 6 HOURS PRN
Status: DISCONTINUED | OUTPATIENT
Start: 2024-01-01 | End: 2024-01-01 | Stop reason: HOSPADM

## 2024-01-01 RX ORDER — MORPHINE SULFATE 20 MG/ML
10 SOLUTION ORAL EVERY 6 HOURS SCHEDULED
Status: DISCONTINUED | OUTPATIENT
Start: 2024-01-01 | End: 2024-01-01 | Stop reason: HOSPADM

## 2024-01-01 RX ORDER — FUROSEMIDE 20 MG/1
40 TABLET ORAL
Status: DISCONTINUED | OUTPATIENT
Start: 2024-01-01 | End: 2024-01-01

## 2024-01-01 RX ORDER — DIPHENHYDRAMINE HCL 25 MG
25 CAPSULE ORAL EVERY 6 HOURS PRN
Start: 2024-01-01

## 2024-01-01 RX ORDER — HALOPERIDOL 2 MG/ML
2 SOLUTION ORAL EVERY 4 HOURS PRN
Status: DISCONTINUED | OUTPATIENT
Start: 2024-01-01 | End: 2024-01-01 | Stop reason: HOSPADM

## 2024-01-01 RX ORDER — ACETAMINOPHEN 325 MG/1
650 TABLET ORAL EVERY 6 HOURS PRN
Status: DISCONTINUED | OUTPATIENT
Start: 2024-01-01 | End: 2024-01-01

## 2024-01-01 RX ORDER — CALCITRIOL 0.25 UG/1
0.25 CAPSULE, LIQUID FILLED ORAL DAILY
Status: DISCONTINUED | OUTPATIENT
Start: 2024-01-01 | End: 2024-01-01

## 2024-01-01 RX ORDER — PROCHLORPERAZINE 25 MG
25 SUPPOSITORY, RECTAL RECTAL EVERY 12 HOURS PRN
Status: DISCONTINUED | OUTPATIENT
Start: 2024-01-01 | End: 2024-01-01 | Stop reason: HOSPADM

## 2024-01-01 RX ORDER — WARFARIN SODIUM 5 MG/1
5 TABLET ORAL
Status: DISCONTINUED | OUTPATIENT
Start: 2024-01-01 | End: 2024-01-01

## 2024-01-01 RX ORDER — SCOLOPAMINE TRANSDERMAL SYSTEM 1 MG/1
1 PATCH, EXTENDED RELEASE TRANSDERMAL
Status: DISCONTINUED | OUTPATIENT
Start: 2024-01-01 | End: 2024-01-01 | Stop reason: HOSPADM

## 2024-01-01 RX ORDER — LORAZEPAM 2 MG/ML
1 INJECTION INTRAMUSCULAR
Status: DISCONTINUED | OUTPATIENT
Start: 2024-01-01 | End: 2024-01-01 | Stop reason: HOSPADM

## 2024-01-01 RX ORDER — BISACODYL 10 MG
10 SUPPOSITORY, RECTAL RECTAL DAILY PRN
Status: DISCONTINUED | OUTPATIENT
Start: 2024-01-01 | End: 2024-01-01 | Stop reason: SDUPTHER

## 2024-01-01 RX ORDER — ATORVASTATIN CALCIUM 40 MG/1
40 TABLET, FILM COATED ORAL NIGHTLY
Status: DISCONTINUED | OUTPATIENT
Start: 2024-01-01 | End: 2024-01-01

## 2024-01-01 RX ORDER — LORAZEPAM 2 MG/ML
2 INJECTION INTRAMUSCULAR EVERY 4 HOURS PRN
Status: DISCONTINUED | OUTPATIENT
Start: 2024-01-01 | End: 2024-01-01 | Stop reason: HOSPADM

## 2024-01-01 RX ORDER — BISACODYL 10 MG
10 SUPPOSITORY, RECTAL RECTAL DAILY PRN
Status: DISCONTINUED | OUTPATIENT
Start: 2024-01-01 | End: 2024-01-01 | Stop reason: HOSPADM

## 2024-01-01 RX ORDER — DIPHENOXYLATE HYDROCHLORIDE AND ATROPINE SULFATE 2.5; .025 MG/1; MG/1
1 TABLET ORAL
Status: DISCONTINUED | OUTPATIENT
Start: 2024-01-01 | End: 2024-01-01 | Stop reason: HOSPADM

## 2024-01-01 RX ORDER — HALOPERIDOL 2 MG/ML
1 SOLUTION ORAL EVERY 4 HOURS PRN
Status: DISCONTINUED | OUTPATIENT
Start: 2024-01-01 | End: 2024-01-01 | Stop reason: HOSPADM

## 2024-01-01 RX ORDER — LORAZEPAM 2 MG/ML
0.5 CONCENTRATE ORAL
Status: DISCONTINUED | OUTPATIENT
Start: 2024-01-01 | End: 2024-01-01 | Stop reason: HOSPADM

## 2024-01-01 RX ORDER — LORAZEPAM 2 MG/ML
0.5 INJECTION INTRAMUSCULAR
Status: DISCONTINUED | OUTPATIENT
Start: 2024-01-01 | End: 2024-01-01 | Stop reason: HOSPADM

## 2024-01-01 RX ORDER — DONEPEZIL HYDROCHLORIDE 5 MG/1
5 TABLET, FILM COATED ORAL NIGHTLY
Status: DISCONTINUED | OUTPATIENT
Start: 2024-01-01 | End: 2024-01-01

## 2024-01-01 RX ORDER — ESCITALOPRAM OXALATE 10 MG/1
10 TABLET ORAL DAILY
Status: ON HOLD | COMMUNITY
End: 2024-01-01

## 2024-01-01 RX ORDER — BISACODYL 5 MG/1
5 TABLET, DELAYED RELEASE ORAL DAILY PRN
Status: DISCONTINUED | OUTPATIENT
Start: 2024-01-01 | End: 2024-01-01 | Stop reason: HOSPADM

## 2024-01-01 RX ORDER — IPRATROPIUM BROMIDE AND ALBUTEROL SULFATE 2.5; .5 MG/3ML; MG/3ML
3 SOLUTION RESPIRATORY (INHALATION) EVERY 4 HOURS PRN
Status: DISCONTINUED | OUTPATIENT
Start: 2024-01-01 | End: 2024-01-01 | Stop reason: HOSPADM

## 2024-01-01 RX ORDER — MORPHINE SULFATE 20 MG/ML
10 SOLUTION ORAL ONCE
Status: DISCONTINUED | OUTPATIENT
Start: 2024-01-01 | End: 2024-01-01

## 2024-01-01 RX ORDER — OXYCODONE HYDROCHLORIDE AND ACETAMINOPHEN 5; 325 MG/1; MG/1
1 TABLET ORAL EVERY 4 HOURS PRN
Status: DISCONTINUED | OUTPATIENT
Start: 2024-01-01 | End: 2024-01-01

## 2024-01-01 RX ORDER — QUETIAPINE FUMARATE 25 MG/1
25 TABLET, FILM COATED ORAL NIGHTLY
Start: 2024-01-01

## 2024-01-01 RX ORDER — OXYCODONE HYDROCHLORIDE AND ACETAMINOPHEN 5; 325 MG/1; MG/1
1 TABLET ORAL EVERY 6 HOURS PRN
Status: DISCONTINUED | OUTPATIENT
Start: 2024-01-01 | End: 2024-01-01

## 2024-01-01 RX ORDER — MORPHINE SULFATE 20 MG/ML
5 SOLUTION ORAL
Status: DISCONTINUED | OUTPATIENT
Start: 2024-01-01 | End: 2024-01-01 | Stop reason: HOSPADM

## 2024-01-01 RX ORDER — SODIUM CHLORIDE 0.9 % (FLUSH) 0.9 %
10 SYRINGE (ML) INJECTION AS NEEDED
Status: DISCONTINUED | OUTPATIENT
Start: 2024-01-01 | End: 2024-01-01 | Stop reason: SDUPTHER

## 2024-01-01 RX ORDER — LORAZEPAM 0.5 MG/1
0.5 TABLET ORAL
Status: DISCONTINUED | OUTPATIENT
Start: 2024-01-01 | End: 2024-01-01

## 2024-01-01 RX ORDER — AMOXICILLIN 250 MG
2 CAPSULE ORAL 2 TIMES DAILY PRN
Status: DISCONTINUED | OUTPATIENT
Start: 2024-01-01 | End: 2024-01-01 | Stop reason: HOSPADM

## 2024-01-01 RX ORDER — NITROGLYCERIN 0.4 MG/1
0.4 TABLET SUBLINGUAL
Status: DISCONTINUED | OUTPATIENT
Start: 2024-01-01 | End: 2024-01-01

## 2024-01-01 RX ORDER — SODIUM CHLORIDE 0.9 % (FLUSH) 0.9 %
10 SYRINGE (ML) INJECTION AS NEEDED
Status: DISCONTINUED | OUTPATIENT
Start: 2024-01-01 | End: 2024-01-01 | Stop reason: HOSPADM

## 2024-01-01 RX ORDER — LORAZEPAM 0.5 MG/1
0.5 TABLET ORAL
Qty: 10 TABLET | Refills: 0 | Status: SHIPPED | OUTPATIENT
Start: 2024-01-01 | End: 2024-05-15

## 2024-01-01 RX ORDER — DILTIAZEM HYDROCHLORIDE 180 MG/1
180 CAPSULE, COATED, EXTENDED RELEASE ORAL
Status: DISCONTINUED | OUTPATIENT
Start: 2024-01-01 | End: 2024-01-01

## 2024-01-01 RX ORDER — HYDROCODONE BITARTRATE AND ACETAMINOPHEN 5; 325 MG/1; MG/1
1 TABLET ORAL EVERY 8 HOURS PRN
COMMUNITY
End: 2024-01-01 | Stop reason: HOSPADM

## 2024-01-01 RX ORDER — HALOPERIDOL 1 MG/1
1 TABLET ORAL EVERY 4 HOURS PRN
Status: DISCONTINUED | OUTPATIENT
Start: 2024-01-01 | End: 2024-01-01 | Stop reason: HOSPADM

## 2024-01-01 RX ORDER — LORAZEPAM 2 MG/ML
1 CONCENTRATE ORAL
Status: DISCONTINUED | OUTPATIENT
Start: 2024-01-01 | End: 2024-01-01 | Stop reason: HOSPADM

## 2024-01-01 RX ORDER — LORAZEPAM 0.5 MG/1
0.5 TABLET ORAL
Status: DISCONTINUED | OUTPATIENT
Start: 2024-01-01 | End: 2024-01-01 | Stop reason: HOSPADM

## 2024-01-01 RX ORDER — ACETAMINOPHEN 325 MG/1
650 TABLET ORAL EVERY 4 HOURS PRN
Status: DISCONTINUED | OUTPATIENT
Start: 2024-01-01 | End: 2024-01-01 | Stop reason: HOSPADM

## 2024-01-01 RX ORDER — HALOPERIDOL 2 MG/1
2 TABLET ORAL EVERY 4 HOURS PRN
Status: DISCONTINUED | OUTPATIENT
Start: 2024-01-01 | End: 2024-01-01 | Stop reason: HOSPADM

## 2024-01-01 RX ORDER — ENOXAPARIN SODIUM 100 MG/ML
1 INJECTION SUBCUTANEOUS EVERY 12 HOURS
Status: DISCONTINUED | OUTPATIENT
Start: 2024-01-01 | End: 2024-01-01

## 2024-01-01 RX ORDER — LORAZEPAM 1 MG/1
2 TABLET ORAL
Status: DISCONTINUED | OUTPATIENT
Start: 2024-01-01 | End: 2024-01-01 | Stop reason: HOSPADM

## 2024-01-01 RX ORDER — LORAZEPAM 2 MG/ML
1 CONCENTRATE ORAL EVERY 6 HOURS
Status: DISCONTINUED | OUTPATIENT
Start: 2024-01-01 | End: 2024-01-01 | Stop reason: HOSPADM

## 2024-01-01 RX ORDER — METHIMAZOLE 10 MG/1
10 TABLET ORAL DAILY
Status: DISCONTINUED | OUTPATIENT
Start: 2024-01-01 | End: 2024-01-01

## 2024-01-01 RX ORDER — MORPHINE SULFATE 20 MG/ML
20 SOLUTION ORAL
Status: DISCONTINUED | OUTPATIENT
Start: 2024-01-01 | End: 2024-01-01 | Stop reason: HOSPADM

## 2024-01-01 RX ORDER — SODIUM CHLORIDE 0.9 % (FLUSH) 0.9 %
10 SYRINGE (ML) INJECTION EVERY 12 HOURS SCHEDULED
Status: DISCONTINUED | OUTPATIENT
Start: 2024-01-01 | End: 2024-01-01 | Stop reason: HOSPADM

## 2024-01-01 RX ORDER — QUETIAPINE FUMARATE 25 MG/1
25 TABLET, FILM COATED ORAL NIGHTLY
Status: DISCONTINUED | OUTPATIENT
Start: 2024-01-01 | End: 2024-01-01

## 2024-01-01 RX ORDER — POLYETHYLENE GLYCOL 3350 17 G/17G
17 POWDER, FOR SOLUTION ORAL DAILY PRN
Status: DISCONTINUED | OUTPATIENT
Start: 2024-01-01 | End: 2024-01-01 | Stop reason: HOSPADM

## 2024-01-01 RX ORDER — ATROPINE SULFATE 10 MG/ML
2 SOLUTION/ DROPS OPHTHALMIC 2 TIMES DAILY PRN
Status: DISCONTINUED | OUTPATIENT
Start: 2024-01-01 | End: 2024-01-01 | Stop reason: HOSPADM

## 2024-01-01 RX ORDER — NYSTATIN 100000 [USP'U]/G
POWDER TOPICAL EVERY 12 HOURS SCHEDULED
Status: DISCONTINUED | OUTPATIENT
Start: 2024-01-01 | End: 2024-01-01 | Stop reason: HOSPADM

## 2024-01-01 RX ORDER — LORAZEPAM 1 MG/1
1 TABLET ORAL
Status: DISCONTINUED | OUTPATIENT
Start: 2024-01-01 | End: 2024-01-01 | Stop reason: HOSPADM

## 2024-01-01 RX ORDER — PROCHLORPERAZINE EDISYLATE 5 MG/ML
5 INJECTION INTRAMUSCULAR; INTRAVENOUS EVERY 6 HOURS PRN
Status: DISCONTINUED | OUTPATIENT
Start: 2024-01-01 | End: 2024-01-01 | Stop reason: HOSPADM

## 2024-01-01 RX ORDER — ESCITALOPRAM OXALATE 10 MG/1
10 TABLET ORAL DAILY
Status: DISCONTINUED | OUTPATIENT
Start: 2024-01-01 | End: 2024-01-01

## 2024-01-01 RX ORDER — IPRATROPIUM BROMIDE AND ALBUTEROL SULFATE 2.5; .5 MG/3ML; MG/3ML
3 SOLUTION RESPIRATORY (INHALATION)
Status: DISCONTINUED | OUTPATIENT
Start: 2024-01-01 | End: 2024-01-01

## 2024-01-01 RX ORDER — ACETAMINOPHEN 650 MG/1
650 SUPPOSITORY RECTAL EVERY 4 HOURS PRN
Status: DISCONTINUED | OUTPATIENT
Start: 2024-01-01 | End: 2024-01-01 | Stop reason: HOSPADM

## 2024-01-01 RX ORDER — FUROSEMIDE 10 MG/ML
80 INJECTION INTRAMUSCULAR; INTRAVENOUS ONCE
Status: COMPLETED | OUTPATIENT
Start: 2024-01-01 | End: 2024-01-01

## 2024-01-01 RX ORDER — IPRATROPIUM BROMIDE AND ALBUTEROL SULFATE 2.5; .5 MG/3ML; MG/3ML
3 SOLUTION RESPIRATORY (INHALATION)
Status: DISCONTINUED | OUTPATIENT
Start: 2024-01-01 | End: 2024-01-01 | Stop reason: SDUPTHER

## 2024-01-01 RX ORDER — HALOPERIDOL 5 MG/ML
1 INJECTION INTRAMUSCULAR EVERY 4 HOURS PRN
Status: DISCONTINUED | OUTPATIENT
Start: 2024-01-01 | End: 2024-01-01 | Stop reason: HOSPADM

## 2024-01-01 RX ORDER — SODIUM CHLORIDE 9 MG/ML
40 INJECTION, SOLUTION INTRAVENOUS AS NEEDED
Status: DISCONTINUED | OUTPATIENT
Start: 2024-01-01 | End: 2024-01-01 | Stop reason: HOSPADM

## 2024-01-01 RX ORDER — FUROSEMIDE 10 MG/ML
40 INJECTION INTRAMUSCULAR; INTRAVENOUS EVERY 12 HOURS
Status: DISCONTINUED | OUTPATIENT
Start: 2024-01-01 | End: 2024-01-01

## 2024-01-01 RX ORDER — FUROSEMIDE 40 MG/1
40 TABLET ORAL DAILY
Status: DISCONTINUED | OUTPATIENT
Start: 2024-01-01 | End: 2024-01-01

## 2024-01-01 RX ORDER — HALOPERIDOL 5 MG/ML
2 INJECTION INTRAMUSCULAR EVERY 4 HOURS PRN
Status: DISCONTINUED | OUTPATIENT
Start: 2024-01-01 | End: 2024-01-01 | Stop reason: HOSPADM

## 2024-01-01 RX ORDER — DIPHENHYDRAMINE HYDROCHLORIDE 50 MG/ML
25 INJECTION INTRAMUSCULAR; INTRAVENOUS EVERY 6 HOURS PRN
Status: DISCONTINUED | OUTPATIENT
Start: 2024-01-01 | End: 2024-01-01 | Stop reason: HOSPADM

## 2024-01-01 RX ORDER — OXYCODONE HYDROCHLORIDE AND ACETAMINOPHEN 5; 325 MG/1; MG/1
1 TABLET ORAL EVERY 4 HOURS PRN
Qty: 10 TABLET | Refills: 0 | Status: SHIPPED | OUTPATIENT
Start: 2024-01-01

## 2024-01-01 RX ORDER — WARFARIN SODIUM 7.5 MG/1
7.5 TABLET ORAL 3 TIMES WEEKLY
Status: DISCONTINUED | OUTPATIENT
Start: 2024-01-01 | End: 2024-01-01

## 2024-01-01 RX ORDER — LORAZEPAM 2 MG/ML
2 CONCENTRATE ORAL
Status: DISCONTINUED | OUTPATIENT
Start: 2024-01-01 | End: 2024-01-01 | Stop reason: HOSPADM

## 2024-01-01 RX ORDER — MORPHINE SULFATE 20 MG/ML
10 SOLUTION ORAL ONCE
Status: COMPLETED | OUTPATIENT
Start: 2024-01-01 | End: 2024-01-01

## 2024-01-01 RX ORDER — MORPHINE SULFATE 20 MG/ML
10 SOLUTION ORAL
Status: DISCONTINUED | OUTPATIENT
Start: 2024-01-01 | End: 2024-01-01 | Stop reason: HOSPADM

## 2024-01-01 RX ORDER — METOPROLOL TARTRATE 50 MG/1
25 TABLET, FILM COATED ORAL EVERY 12 HOURS SCHEDULED
Status: DISCONTINUED | OUTPATIENT
Start: 2024-01-01 | End: 2024-01-01

## 2024-01-01 RX ORDER — PROCHLORPERAZINE MALEATE 5 MG/1
5 TABLET ORAL EVERY 6 HOURS PRN
Start: 2024-01-01

## 2024-01-01 RX ADMIN — DILTIAZEM HYDROCHLORIDE 180 MG: 180 CAPSULE, COATED, EXTENDED RELEASE ORAL at 09:45

## 2024-01-01 RX ADMIN — DONEPEZIL HYDROCHLORIDE 5 MG: 5 TABLET, FILM COATED ORAL at 21:11

## 2024-01-01 RX ADMIN — DILTIAZEM HYDROCHLORIDE 180 MG: 180 CAPSULE, COATED, EXTENDED RELEASE ORAL at 16:20

## 2024-01-01 RX ADMIN — DILTIAZEM HYDROCHLORIDE 180 MG: 180 CAPSULE, COATED, EXTENDED RELEASE ORAL at 09:12

## 2024-01-01 RX ADMIN — NYSTATIN: 100000 POWDER TOPICAL at 09:46

## 2024-01-01 RX ADMIN — LORAZEPAM 1 MG: 2 SOLUTION, CONCENTRATE ORAL at 23:51

## 2024-01-01 RX ADMIN — Medication 10 ML: at 09:13

## 2024-01-01 RX ADMIN — Medication 10 ML: at 09:28

## 2024-01-01 RX ADMIN — IPRATROPIUM BROMIDE AND ALBUTEROL SULFATE 3 ML: .5; 3 SOLUTION RESPIRATORY (INHALATION) at 10:27

## 2024-01-01 RX ADMIN — DONEPEZIL HYDROCHLORIDE 5 MG: 5 TABLET, FILM COATED ORAL at 20:33

## 2024-01-01 RX ADMIN — FUROSEMIDE 40 MG: 20 TABLET ORAL at 09:45

## 2024-01-01 RX ADMIN — FUROSEMIDE 40 MG: 20 TABLET ORAL at 17:19

## 2024-01-01 RX ADMIN — IPRATROPIUM BROMIDE AND ALBUTEROL SULFATE 3 ML: .5; 3 SOLUTION RESPIRATORY (INHALATION) at 05:43

## 2024-01-01 RX ADMIN — CEFTRIAXONE SODIUM 2000 MG: 2 INJECTION, POWDER, FOR SOLUTION INTRAMUSCULAR; INTRAVENOUS at 16:36

## 2024-01-01 RX ADMIN — FUROSEMIDE 40 MG: 20 TABLET ORAL at 09:27

## 2024-01-01 RX ADMIN — Medication 10 ML: at 09:27

## 2024-01-01 RX ADMIN — FUROSEMIDE 80 MG: 10 INJECTION, SOLUTION INTRAVENOUS at 12:58

## 2024-01-01 RX ADMIN — NYSTATIN: 100000 POWDER TOPICAL at 09:28

## 2024-01-01 RX ADMIN — Medication 10 ML: at 21:39

## 2024-01-01 RX ADMIN — METOPROLOL TARTRATE 25 MG: 50 TABLET, FILM COATED ORAL at 20:34

## 2024-01-01 RX ADMIN — NYSTATIN: 100000 POWDER TOPICAL at 20:55

## 2024-01-01 RX ADMIN — IPRATROPIUM BROMIDE AND ALBUTEROL SULFATE 3 ML: .5; 3 SOLUTION RESPIRATORY (INHALATION) at 13:54

## 2024-01-01 RX ADMIN — NYSTATIN: 100000 POWDER TOPICAL at 20:37

## 2024-01-01 RX ADMIN — IPRATROPIUM BROMIDE AND ALBUTEROL SULFATE 3 ML: .5; 3 SOLUTION RESPIRATORY (INHALATION) at 14:43

## 2024-01-01 RX ADMIN — CALCITRIOL 0.25 MCG: 0.25 CAPSULE ORAL at 09:45

## 2024-01-01 RX ADMIN — QUETIAPINE FUMARATE 25 MG: 25 TABLET, FILM COATED ORAL at 22:15

## 2024-01-01 RX ADMIN — ESCITALOPRAM OXALATE 10 MG: 10 TABLET ORAL at 09:26

## 2024-01-01 RX ADMIN — METHIMAZOLE 10 MG: 10 TABLET ORAL at 19:34

## 2024-01-01 RX ADMIN — Medication 10 ML: at 09:33

## 2024-01-01 RX ADMIN — IPRATROPIUM BROMIDE AND ALBUTEROL SULFATE 3 ML: .5; 3 SOLUTION RESPIRATORY (INHALATION) at 15:50

## 2024-01-01 RX ADMIN — Medication 10 ML: at 22:17

## 2024-01-01 RX ADMIN — QUETIAPINE FUMARATE 25 MG: 25 TABLET, FILM COATED ORAL at 20:37

## 2024-01-01 RX ADMIN — Medication 10 ML: at 20:35

## 2024-01-01 RX ADMIN — CALCITRIOL 0.25 MCG: 0.25 CAPSULE ORAL at 09:26

## 2024-01-01 RX ADMIN — IPRATROPIUM BROMIDE AND ALBUTEROL SULFATE 3 ML: .5; 3 SOLUTION RESPIRATORY (INHALATION) at 19:49

## 2024-01-01 RX ADMIN — IPRATROPIUM BROMIDE AND ALBUTEROL SULFATE 3 ML: .5; 3 SOLUTION RESPIRATORY (INHALATION) at 06:30

## 2024-01-01 RX ADMIN — IPRATROPIUM BROMIDE AND ALBUTEROL SULFATE 3 ML: .5; 3 SOLUTION RESPIRATORY (INHALATION) at 14:31

## 2024-01-01 RX ADMIN — DONEPEZIL HYDROCHLORIDE 5 MG: 5 TABLET, FILM COATED ORAL at 21:38

## 2024-01-01 RX ADMIN — NYSTATIN: 100000 POWDER TOPICAL at 08:30

## 2024-01-01 RX ADMIN — Medication 10 ML: at 21:12

## 2024-01-01 RX ADMIN — QUETIAPINE FUMARATE 25 MG: 25 TABLET, FILM COATED ORAL at 20:34

## 2024-01-01 RX ADMIN — QUETIAPINE FUMARATE 25 MG: 25 TABLET, FILM COATED ORAL at 21:39

## 2024-01-01 RX ADMIN — CALCITRIOL 0.25 MCG: 0.25 CAPSULE ORAL at 09:32

## 2024-01-01 RX ADMIN — Medication 10 ML: at 20:37

## 2024-01-01 RX ADMIN — LORAZEPAM 1 MG: 2 SOLUTION, CONCENTRATE ORAL at 16:23

## 2024-01-01 RX ADMIN — DONEPEZIL HYDROCHLORIDE 5 MG: 5 TABLET, FILM COATED ORAL at 20:37

## 2024-01-01 RX ADMIN — IPRATROPIUM BROMIDE AND ALBUTEROL SULFATE 3 ML: .5; 3 SOLUTION RESPIRATORY (INHALATION) at 06:37

## 2024-01-01 RX ADMIN — METOPROLOL TARTRATE 25 MG: 50 TABLET, FILM COATED ORAL at 22:16

## 2024-01-01 RX ADMIN — OXYCODONE AND ACETAMINOPHEN 1 TABLET: 5; 325 TABLET ORAL at 00:17

## 2024-01-01 RX ADMIN — METHIMAZOLE 10 MG: 10 TABLET ORAL at 09:12

## 2024-01-01 RX ADMIN — ESCITALOPRAM OXALATE 10 MG: 10 TABLET ORAL at 09:12

## 2024-01-01 RX ADMIN — LORAZEPAM 2 MG: 2 INJECTION, SOLUTION INTRAMUSCULAR; INTRAVENOUS at 05:47

## 2024-01-01 RX ADMIN — IPRATROPIUM BROMIDE AND ALBUTEROL SULFATE 3 ML: .5; 3 SOLUTION RESPIRATORY (INHALATION) at 15:02

## 2024-01-01 RX ADMIN — QUETIAPINE FUMARATE 25 MG: 25 TABLET, FILM COATED ORAL at 21:11

## 2024-01-01 RX ADMIN — CEFTRIAXONE SODIUM 2000 MG: 2 INJECTION, POWDER, FOR SOLUTION INTRAMUSCULAR; INTRAVENOUS at 16:20

## 2024-01-01 RX ADMIN — METOPROLOL TARTRATE 25 MG: 50 TABLET, FILM COATED ORAL at 21:11

## 2024-01-01 RX ADMIN — NYSTATIN: 100000 POWDER TOPICAL at 21:11

## 2024-01-01 RX ADMIN — SCOPOLAMINE 1 PATCH: 1.5 PATCH, EXTENDED RELEASE TRANSDERMAL at 16:23

## 2024-01-01 RX ADMIN — IPRATROPIUM BROMIDE AND ALBUTEROL SULFATE 3 ML: .5; 3 SOLUTION RESPIRATORY (INHALATION) at 13:40

## 2024-01-01 RX ADMIN — LORAZEPAM 1 MG: 2 SOLUTION, CONCENTRATE ORAL at 18:49

## 2024-01-01 RX ADMIN — NYSTATIN: 100000 POWDER TOPICAL at 22:16

## 2024-01-01 RX ADMIN — ATORVASTATIN CALCIUM 40 MG: 40 TABLET ORAL at 22:09

## 2024-01-01 RX ADMIN — IPRATROPIUM BROMIDE AND ALBUTEROL SULFATE 3 ML: .5; 3 SOLUTION RESPIRATORY (INHALATION) at 20:35

## 2024-01-01 RX ADMIN — MORPHINE SULFATE 10 MG: 20 SOLUTION ORAL at 12:12

## 2024-01-01 RX ADMIN — ATORVASTATIN CALCIUM 40 MG: 40 TABLET ORAL at 21:12

## 2024-01-01 RX ADMIN — OXYCODONE AND ACETAMINOPHEN 1 TABLET: 5; 325 TABLET ORAL at 21:18

## 2024-01-01 RX ADMIN — DONEPEZIL HYDROCHLORIDE 5 MG: 5 TABLET, FILM COATED ORAL at 22:09

## 2024-01-01 RX ADMIN — MORPHINE SULFATE 10 MG: 20 SOLUTION ORAL at 23:51

## 2024-01-01 RX ADMIN — MORPHINE SULFATE 10 MG: 20 SOLUTION ORAL at 18:49

## 2024-01-01 RX ADMIN — FUROSEMIDE 40 MG: 10 INJECTION, SOLUTION INTRAVENOUS at 00:13

## 2024-01-01 RX ADMIN — MORPHINE SULFATE 10 MG: 20 SOLUTION ORAL at 16:23

## 2024-01-01 RX ADMIN — DONEPEZIL HYDROCHLORIDE 5 MG: 5 TABLET, FILM COATED ORAL at 20:34

## 2024-01-01 RX ADMIN — IPRATROPIUM BROMIDE AND ALBUTEROL SULFATE 3 ML: .5; 3 SOLUTION RESPIRATORY (INHALATION) at 18:41

## 2024-01-01 RX ADMIN — METOPROLOL TARTRATE 25 MG: 50 TABLET, FILM COATED ORAL at 09:28

## 2024-01-01 RX ADMIN — ATORVASTATIN CALCIUM 40 MG: 40 TABLET ORAL at 20:32

## 2024-01-01 RX ADMIN — DILTIAZEM HYDROCHLORIDE 180 MG: 180 CAPSULE, COATED, EXTENDED RELEASE ORAL at 09:26

## 2024-01-01 RX ADMIN — FUROSEMIDE 40 MG: 40 TABLET ORAL at 09:26

## 2024-01-01 RX ADMIN — IPRATROPIUM BROMIDE AND ALBUTEROL SULFATE 3 ML: .5; 3 SOLUTION RESPIRATORY (INHALATION) at 19:57

## 2024-01-01 RX ADMIN — ESCITALOPRAM OXALATE 10 MG: 10 TABLET ORAL at 09:32

## 2024-01-01 RX ADMIN — IPRATROPIUM BROMIDE AND ALBUTEROL SULFATE 3 ML: .5; 3 SOLUTION RESPIRATORY (INHALATION) at 06:46

## 2024-01-01 RX ADMIN — IPRATROPIUM BROMIDE AND ALBUTEROL SULFATE 3 ML: .5; 3 SOLUTION RESPIRATORY (INHALATION) at 19:23

## 2024-01-01 RX ADMIN — DONEPEZIL HYDROCHLORIDE 5 MG: 5 TABLET, FILM COATED ORAL at 22:16

## 2024-01-01 RX ADMIN — IPRATROPIUM BROMIDE AND ALBUTEROL SULFATE 3 ML: .5; 3 SOLUTION RESPIRATORY (INHALATION) at 10:21

## 2024-01-01 RX ADMIN — METHIMAZOLE 10 MG: 10 TABLET ORAL at 09:27

## 2024-01-01 RX ADMIN — QUETIAPINE FUMARATE 25 MG: 25 TABLET, FILM COATED ORAL at 20:32

## 2024-01-01 RX ADMIN — IPRATROPIUM BROMIDE AND ALBUTEROL SULFATE 3 ML: .5; 3 SOLUTION RESPIRATORY (INHALATION) at 10:34

## 2024-01-01 RX ADMIN — METOPROLOL TARTRATE 25 MG: 50 TABLET, FILM COATED ORAL at 09:45

## 2024-01-01 RX ADMIN — METOPROLOL TARTRATE 25 MG: 50 TABLET, FILM COATED ORAL at 20:32

## 2024-01-01 RX ADMIN — METHIMAZOLE 10 MG: 10 TABLET ORAL at 09:26

## 2024-01-01 RX ADMIN — IPRATROPIUM BROMIDE AND ALBUTEROL SULFATE 3 ML: .5; 3 SOLUTION RESPIRATORY (INHALATION) at 06:29

## 2024-01-01 RX ADMIN — DILTIAZEM HYDROCHLORIDE 180 MG: 180 CAPSULE, COATED, EXTENDED RELEASE ORAL at 09:27

## 2024-01-01 RX ADMIN — MORPHINE SULFATE 10 MG: 20 SOLUTION ORAL at 08:30

## 2024-01-01 RX ADMIN — Medication 10 ML: at 09:46

## 2024-01-01 RX ADMIN — ATORVASTATIN CALCIUM 40 MG: 40 TABLET ORAL at 20:34

## 2024-01-01 RX ADMIN — FUROSEMIDE 40 MG: 20 TABLET ORAL at 17:59

## 2024-01-01 RX ADMIN — CALCITRIOL 0.25 MCG: 0.25 CAPSULE ORAL at 09:12

## 2024-01-01 RX ADMIN — NYSTATIN: 100000 POWDER TOPICAL at 09:30

## 2024-01-01 RX ADMIN — NYSTATIN: 100000 POWDER TOPICAL at 09:12

## 2024-01-01 RX ADMIN — IPRATROPIUM BROMIDE AND ALBUTEROL SULFATE 3 ML: .5; 3 SOLUTION RESPIRATORY (INHALATION) at 07:01

## 2024-01-01 RX ADMIN — METHIMAZOLE 10 MG: 10 TABLET ORAL at 09:32

## 2024-01-01 RX ADMIN — METOPROLOL TARTRATE 25 MG: 50 TABLET, FILM COATED ORAL at 21:38

## 2024-01-01 RX ADMIN — NYSTATIN: 100000 POWDER TOPICAL at 20:34

## 2024-01-01 RX ADMIN — CALCITRIOL 0.25 MCG: 0.25 CAPSULE ORAL at 16:19

## 2024-01-01 RX ADMIN — FUROSEMIDE 40 MG: 10 INJECTION, SOLUTION INTRAVENOUS at 00:54

## 2024-01-01 RX ADMIN — FUROSEMIDE 40 MG: 40 TABLET ORAL at 09:15

## 2024-01-01 RX ADMIN — METOPROLOL TARTRATE 25 MG: 50 TABLET, FILM COATED ORAL at 20:37

## 2024-01-01 RX ADMIN — CEFTRIAXONE SODIUM 2000 MG: 2 INJECTION, POWDER, FOR SOLUTION INTRAMUSCULAR; INTRAVENOUS at 17:07

## 2024-01-01 RX ADMIN — METOPROLOL TARTRATE 25 MG: 50 TABLET, FILM COATED ORAL at 22:09

## 2024-01-01 RX ADMIN — Medication 10 ML: at 20:33

## 2024-01-01 RX ADMIN — IPRATROPIUM BROMIDE AND ALBUTEROL SULFATE 3 ML: .5; 3 SOLUTION RESPIRATORY (INHALATION) at 18:55

## 2024-01-01 RX ADMIN — ATORVASTATIN CALCIUM 40 MG: 40 TABLET ORAL at 20:37

## 2024-01-01 RX ADMIN — METOPROLOL TARTRATE 25 MG: 50 TABLET, FILM COATED ORAL at 09:26

## 2024-01-01 RX ADMIN — ATORVASTATIN CALCIUM 40 MG: 40 TABLET ORAL at 22:15

## 2024-01-01 RX ADMIN — ATORVASTATIN CALCIUM 40 MG: 40 TABLET ORAL at 21:39

## 2024-01-01 RX ADMIN — WARFARIN SODIUM 5 MG: 5 TABLET ORAL at 18:17

## 2024-01-01 RX ADMIN — ESCITALOPRAM OXALATE 10 MG: 10 TABLET ORAL at 09:45

## 2024-01-01 RX ADMIN — QUETIAPINE FUMARATE 25 MG: 25 TABLET, FILM COATED ORAL at 22:09

## 2024-01-01 RX ADMIN — IPRATROPIUM BROMIDE AND ALBUTEROL SULFATE 3 ML: .5; 3 SOLUTION RESPIRATORY (INHALATION) at 11:06

## 2024-01-01 RX ADMIN — NYSTATIN: 100000 POWDER TOPICAL at 20:33

## 2024-01-01 RX ADMIN — METOPROLOL TARTRATE 25 MG: 50 TABLET, FILM COATED ORAL at 09:16

## 2024-01-01 RX ADMIN — NYSTATIN: 100000 POWDER TOPICAL at 09:26

## 2024-01-01 RX ADMIN — WARFARIN SODIUM 5 MG: 5 TABLET ORAL at 18:58

## 2024-01-01 RX ADMIN — IPRATROPIUM BROMIDE AND ALBUTEROL SULFATE 3 ML: .5; 3 SOLUTION RESPIRATORY (INHALATION) at 09:45

## 2024-01-01 RX ADMIN — FUROSEMIDE 40 MG: 10 INJECTION, SOLUTION INTRAVENOUS at 14:56

## 2024-01-01 RX ADMIN — IPRATROPIUM BROMIDE AND ALBUTEROL SULFATE 3 ML: .5; 3 SOLUTION RESPIRATORY (INHALATION) at 07:30

## 2024-01-01 RX ADMIN — METHIMAZOLE 10 MG: 10 TABLET ORAL at 09:46

## 2024-01-01 RX ADMIN — WARFARIN SODIUM 7.5 MG: 7.5 TABLET ORAL at 17:46

## 2024-01-01 RX ADMIN — NYSTATIN: 100000 POWDER TOPICAL at 22:09

## 2024-01-02 ENCOUNTER — ANTI-COAG VISIT (OUTPATIENT)
Dept: INTERNAL MEDICINE | Age: 79
End: 2024-01-02
Payer: MEDICARE

## 2024-01-02 DIAGNOSIS — Z86.718 HISTORY OF DVT (DEEP VEIN THROMBOSIS): Primary | ICD-10-CM

## 2024-01-02 PROCEDURE — 93793 ANTICOAG MGMT PT WARFARIN: CPT | Performed by: NURSE PRACTITIONER

## 2024-01-02 NOTE — PROGRESS NOTES
HOME MONITORING REPORT    INR today:   Results for orders placed or performed in visit on 01/02/24   Protime-INR   Result Value Ref Range    INR 2.40        INR Goal: 2.0-3.0    Dosing Plan  As of 1/2/2024      TTR:  54.7 % (5.5 y)   Full warfarin instructions:  7.5 mg every Sun, Tue, Thu; 5 mg all other days                PLAN: Patient aware to continue current dose and recheck in one week or as ordered.      I have reviewed nursing plan for Coumadin management and agree with plan.

## 2024-01-08 ENCOUNTER — ANTI-COAG VISIT (OUTPATIENT)
Dept: PRIMARY CARE CLINIC | Age: 79
End: 2024-01-08
Payer: MEDICARE

## 2024-01-08 DIAGNOSIS — Z86.718 HISTORY OF DVT (DEEP VEIN THROMBOSIS): Primary | ICD-10-CM

## 2024-01-08 LAB — INR BLD: 2.8

## 2024-01-08 PROCEDURE — 93793 ANTICOAG MGMT PT WARFARIN: CPT | Performed by: NURSE PRACTITIONER

## 2024-01-08 NOTE — PROGRESS NOTES
HOME MONITORING REPORT    INR today:   Results for orders placed or performed in visit on 01/08/24   Protime-INR   Result Value Ref Range    INR 2.80        INR Goal: 2.0-3.0    Dosing Plan  As of 1/8/2024      TTR:  54.9 % (5.5 y)   Full warfarin instructions:  7.5 mg every Sun, Tue, Thu; 5 mg all other days                PLAN: Patient aware to continue current dose and recheck in one week or as ordered.     I have reviewed nursing plan for Coumadin management and agree with plan.

## 2024-01-18 DIAGNOSIS — G89.4 CHRONIC PAIN SYNDROME: ICD-10-CM

## 2024-01-18 LAB — INR BLD: 1.5

## 2024-01-18 RX ORDER — HYDROCODONE BITARTRATE AND ACETAMINOPHEN 5; 325 MG/1; MG/1
1 TABLET ORAL EVERY 8 HOURS PRN
Qty: 90 TABLET | Refills: 0 | Status: SHIPPED | OUTPATIENT
Start: 2024-01-18 | End: 2024-02-17

## 2024-01-18 NOTE — TELEPHONE ENCOUNTER
Glendy Nicole called to request a refill on her medication.      Last office visit : 11/6/2023   Next office visit : 2/6/2024     Last UDS: No results found for: \"LABAMPH\", \"LABBARB\", \"LABBENZ\", \"BUPRENUR\", \"COCAIMETSCRU\", \"GABAPENTIN\", \"MDMA\", \"METAMPU\", \"OPIATESCREENURINE\", \"OXTCOSU\", \"PHENCYCLIDINESCREENURINE\", \"PROPOXYPHENE\", \"THCSCREENUR\", \"TRICYUR\"    Last Srikanth: 11/7/23  Medication Contract: 11/7/23   Last Fill: 12/8/23    Requested Prescriptions     Pending Prescriptions Disp Refills    HYDROcodone-acetaminophen (NORCO) 5-325 MG per tablet [Pharmacy Med Name: HYDROCODON-APAP 5-325 5-325 Tablet] 90 tablet 0     Sig: Take 1 tablet by mouth every 8 hours as needed for Pain for up to 30 days. Max Daily Amount: 3 tablets         Please approve or refuse this medication.   Jerry Maciel MA

## 2024-01-19 ENCOUNTER — ANTI-COAG VISIT (OUTPATIENT)
Dept: INTERNAL MEDICINE | Age: 79
End: 2024-01-19
Payer: MEDICARE

## 2024-01-19 DIAGNOSIS — Z86.718 HISTORY OF DVT (DEEP VEIN THROMBOSIS): Primary | ICD-10-CM

## 2024-01-19 PROCEDURE — 93793 ANTICOAG MGMT PT WARFARIN: CPT | Performed by: NURSE PRACTITIONER

## 2024-01-19 NOTE — PROGRESS NOTES
HOME MONITORING REPORT    INR today:   Results for orders placed or performed in visit on 01/19/24   Protime-INR   Result Value Ref Range    INR 1.50        INR Goal: 2.0-3.0    Dosing Plan  As of 1/19/2024      TTR:  54.9 % (5.5 y)   Full warfarin instructions:  7.5 mg every Sun, Tue, Thu; 5 mg all other days              I called the patient's daughter, Brittany Nicole, and confirmed her Coumadin dose as 7.5 MG on Sunday and Tuesday. 5 MG on Monday and Wednesday. Thursday night the patient was given 10 MG of Coumadin due to the critical INR reading. Please advise on dose and the new testing date.     Plan: Take 7.5 MG everyday and retest her INR on 1/25/24.Patient's daughter has been notified and voiced understanding.

## 2024-01-29 ENCOUNTER — ANTI-COAG VISIT (OUTPATIENT)
Dept: PRIMARY CARE CLINIC | Age: 79
End: 2024-01-29
Payer: MEDICARE

## 2024-01-29 DIAGNOSIS — Z86.718 HISTORY OF DVT (DEEP VEIN THROMBOSIS): Primary | ICD-10-CM

## 2024-01-29 LAB — INR BLD: 2.6

## 2024-01-29 PROCEDURE — 93793 ANTICOAG MGMT PT WARFARIN: CPT | Performed by: NURSE PRACTITIONER

## 2024-01-29 NOTE — PROGRESS NOTES
HOME MONITORING REPORT    INR today:   Results for orders placed or performed in visit on 01/29/24   Protime-INR   Result Value Ref Range    INR 2.60        INR Goal: 2.0-3.0    Dosing Plan  As of 1/29/2024      TTR:  54.9 % (5.5 y)   Full warfarin instructions:  7.5 mg every Sun, Tue, Thu; 5 mg all other days                PLAN: Advised patient/caregiver to continue current dose and recheck in one week.  Patient/Caregiver voiced understanding  I have reviewed nursing plan for Coumadin management and agree with plan.

## 2024-02-05 DIAGNOSIS — E05.90 HYPERTHYROIDISM: ICD-10-CM

## 2024-02-05 RX ORDER — METHIMAZOLE 10 MG/1
TABLET ORAL
Qty: 30 TABLET | Refills: 2 | Status: SHIPPED | OUTPATIENT
Start: 2024-02-05

## 2024-02-05 NOTE — TELEPHONE ENCOUNTER
Glendy called requesting a refill of the below medication which has been pended for you:     Requested Prescriptions     Pending Prescriptions Disp Refills    methIMAzole (TAPAZOLE) 10 MG tablet [Pharmacy Med Name: METHIMAZOLE 10 MG TABS 10 Tablet] 30 tablet 2     Sig: TAKE 1 TABLET BY MOUTH DAILY       Last Appointment Date: 11/6/2023  Next Appointment Date: 2/6/2024    Allergies   Allergen Reactions    Other Anaphylaxis and Itching     Cloth bandaids , causes redness of skin    Ciprofloxacin Itching    Codeine Itching    Perflutren Lipid Microsphere Other (See Comments)     Back pain    Tetanus Toxoids Itching     Itching around site    Tizanidine Hcl Itching    Tetanus Toxoid      Reaction: ITCHING AROUND SITE

## 2024-02-14 ENCOUNTER — ANTI-COAG VISIT (OUTPATIENT)
Dept: PRIMARY CARE CLINIC | Age: 79
End: 2024-02-14
Payer: MEDICARE

## 2024-02-14 DIAGNOSIS — Z86.718 HISTORY OF DVT (DEEP VEIN THROMBOSIS): Primary | ICD-10-CM

## 2024-02-14 LAB — INR BLD: 2.8

## 2024-02-14 PROCEDURE — 93793 ANTICOAG MGMT PT WARFARIN: CPT | Performed by: NURSE PRACTITIONER

## 2024-02-14 NOTE — PROGRESS NOTES
HOME MONITORING REPORT    INR today:   Results for orders placed or performed in visit on 02/14/24   Protime-INR   Result Value Ref Range    INR 2.80        INR Goal: 2.0-3.0    Dosing Plan  As of 2/14/2024      TTR:  55.3 % (5.6 y)   Full warfarin instructions:  7.5 mg every Sun, Tue, Thu; 5 mg all other days                PLAN: Patient aware to continue current dose and recheck in one week or as ordered.     I have reviewed nursing plan for Coumadin management and agree with plan.

## 2024-02-19 DIAGNOSIS — G89.4 CHRONIC PAIN SYNDROME: ICD-10-CM

## 2024-02-19 RX ORDER — HYDROCODONE BITARTRATE AND ACETAMINOPHEN 5; 325 MG/1; MG/1
1 TABLET ORAL EVERY 8 HOURS PRN
Qty: 90 TABLET | Refills: 0 | Status: SHIPPED | OUTPATIENT
Start: 2024-02-19 | End: 2024-03-20

## 2024-02-19 NOTE — TELEPHONE ENCOUNTER
Glendy Nicole called to request a refill on her medication.      Last office visit : 11/6/2023   Next office visit : Visit date not found     Last UDS: No results found for: \"LABAMPH\", \"LABBARB\", \"LABBENZ\", \"BUPRENUR\", \"COCAIMETSCRU\", \"GABAPENTIN\", \"MDMA\", \"METAMPU\", \"OPIATESCREENURINE\", \"OXTCOSU\", \"PHENCYCLIDINESCREENURINE\", \"PROPOXYPHENE\", \"THCSCREENUR\", \"TRICYUR\"    Last Srikanth: 07/31/23  Medication Contract: 11/07/23   Last Fill: 01/18/2024    Requested Prescriptions     Pending Prescriptions Disp Refills    HYDROcodone-acetaminophen (NORCO) 5-325 MG per tablet [Pharmacy Med Name: HYDROCODON-APAP 5-325 5-325 Tablet] 90 tablet 0     Sig: Take 1 tablet by mouth every 8 hours as needed for Pain for up to 30 days. Max Daily Amount: 3 tablets         Please approve or refuse this medication.   Andria Rust MA

## 2024-02-26 ENCOUNTER — ANTI-COAG VISIT (OUTPATIENT)
Dept: PRIMARY CARE CLINIC | Age: 79
End: 2024-02-26
Payer: MEDICARE

## 2024-02-26 DIAGNOSIS — Z86.718 HISTORY OF DVT (DEEP VEIN THROMBOSIS): Primary | ICD-10-CM

## 2024-02-26 LAB — INR BLD: 2.4

## 2024-02-26 PROCEDURE — 93793 ANTICOAG MGMT PT WARFARIN: CPT | Performed by: NURSE PRACTITIONER

## 2024-02-26 NOTE — PROGRESS NOTES
HOME MONITORING REPORT    INR today:   Results for orders placed or performed in visit on 02/26/24   Protime-INR   Result Value Ref Range    INR 2.40        INR Goal: 2.0-3.0    Dosing Plan  As of 2/26/2024      TTR:  55.6 % (5.6 y)   Full warfarin instructions:  7.5 mg every Sun, Tue, Thu; 5 mg all other days                PLAN: Patient aware to continue current dose and recheck in one week or as ordered.     I have reviewed nursing plan for Coumadin management and agree with plan.

## 2024-03-04 LAB — INR BLD: 2.7

## 2024-03-05 ENCOUNTER — ANTI-COAG VISIT (OUTPATIENT)
Dept: PRIMARY CARE CLINIC | Age: 79
End: 2024-03-05
Payer: MEDICARE

## 2024-03-05 DIAGNOSIS — Z86.718 HISTORY OF DVT (DEEP VEIN THROMBOSIS): Primary | ICD-10-CM

## 2024-03-05 PROCEDURE — 93793 ANTICOAG MGMT PT WARFARIN: CPT | Performed by: NURSE PRACTITIONER

## 2024-03-05 NOTE — PROGRESS NOTES
HOME MONITORING REPORT    INR today:   Results for orders placed or performed in visit on 03/05/24   Protime-INR   Result Value Ref Range    INR 2.70        INR Goal: 2.0-3.0    Dosing Plan  As of 3/5/2024      TTR:  55.7 % (5.6 y)   Full warfarin instructions:  7.5 mg every Sun, Tue, Thu; 5 mg all other days                PLAN: Patient aware to continue current dose and recheck in one week or as ordered.    I have reviewed nursing plan for Coumadin management and agree with plan.

## 2024-03-14 ENCOUNTER — OFFICE VISIT (OUTPATIENT)
Dept: INTERNAL MEDICINE | Age: 79
End: 2024-03-14
Payer: MEDICARE

## 2024-03-14 VITALS
BODY MASS INDEX: 32.78 KG/M2 | SYSTOLIC BLOOD PRESSURE: 140 MMHG | HEART RATE: 61 BPM | HEIGHT: 69 IN | DIASTOLIC BLOOD PRESSURE: 76 MMHG | OXYGEN SATURATION: 90 %

## 2024-03-14 DIAGNOSIS — M15.9 PRIMARY OSTEOARTHRITIS INVOLVING MULTIPLE JOINTS: ICD-10-CM

## 2024-03-14 DIAGNOSIS — E55.9 VITAMIN D DEFICIENCY: ICD-10-CM

## 2024-03-14 DIAGNOSIS — G30.9 ALZHEIMER'S DEMENTIA WITH BEHAVIORAL DISTURBANCE (HCC): ICD-10-CM

## 2024-03-14 DIAGNOSIS — N25.81 SECONDARY HYPERPARATHYROIDISM OF RENAL ORIGIN (HCC): ICD-10-CM

## 2024-03-14 DIAGNOSIS — N20.0 NEPHROLITHIASIS: Primary | ICD-10-CM

## 2024-03-14 DIAGNOSIS — I82.502 CHRONIC DEEP VEIN THROMBOSIS (DVT) OF LEFT LOWER EXTREMITY, UNSPECIFIED VEIN (HCC): ICD-10-CM

## 2024-03-14 DIAGNOSIS — I48.91 ATRIAL FIBRILLATION, UNSPECIFIED TYPE (HCC): ICD-10-CM

## 2024-03-14 DIAGNOSIS — N18.31 STAGE 3A CHRONIC KIDNEY DISEASE (HCC): ICD-10-CM

## 2024-03-14 DIAGNOSIS — F11.20 OPIOID DEPENDENCE WITH CURRENT USE (HCC): ICD-10-CM

## 2024-03-14 DIAGNOSIS — I50.32 CHRONIC DIASTOLIC HEART FAILURE (HCC): ICD-10-CM

## 2024-03-14 DIAGNOSIS — F05 SUNDOWN SYNDROME: ICD-10-CM

## 2024-03-14 DIAGNOSIS — Z43.6 ATTENTION TO UROSTOMY (HCC): ICD-10-CM

## 2024-03-14 DIAGNOSIS — D68.69 SECONDARY HYPERCOAGULABLE STATE (HCC): ICD-10-CM

## 2024-03-14 DIAGNOSIS — F02.818 ALZHEIMER'S DEMENTIA WITH BEHAVIORAL DISTURBANCE (HCC): ICD-10-CM

## 2024-03-14 DIAGNOSIS — F33.41 RECURRENT MAJOR DEPRESSIVE DISORDER, IN PARTIAL REMISSION (HCC): ICD-10-CM

## 2024-03-14 PROBLEM — I50.33 ACUTE ON CHRONIC DIASTOLIC HEART FAILURE (HCC): Status: RESOLVED | Noted: 2017-11-20 | Resolved: 2024-03-14

## 2024-03-14 LAB
25(OH)D3 SERPL-MCNC: 62.9 NG/ML
ALBUMIN SERPL-MCNC: 3.6 G/DL (ref 3.5–5.2)
ALP SERPL-CCNC: 78 U/L (ref 35–104)
ALT SERPL-CCNC: 13 U/L (ref 5–33)
ANION GAP SERPL CALCULATED.3IONS-SCNC: 11 MMOL/L (ref 7–19)
AST SERPL-CCNC: 15 U/L (ref 5–32)
BASOPHILS # BLD: 0 K/UL (ref 0–0.2)
BASOPHILS NFR BLD: 0.3 % (ref 0–1)
BILIRUB SERPL-MCNC: 0.5 MG/DL (ref 0.2–1.2)
BUN SERPL-MCNC: 35 MG/DL (ref 8–23)
CALCIUM SERPL-MCNC: 11.2 MG/DL (ref 8.8–10.2)
CHLORIDE SERPL-SCNC: 106 MMOL/L (ref 98–111)
CO2 SERPL-SCNC: 28 MMOL/L (ref 22–29)
CREAT SERPL-MCNC: 1.6 MG/DL (ref 0.5–0.9)
EOSINOPHIL # BLD: 0.2 K/UL (ref 0–0.6)
EOSINOPHIL NFR BLD: 3.5 % (ref 0–5)
ERYTHROCYTE [DISTWIDTH] IN BLOOD BY AUTOMATED COUNT: 12.8 % (ref 11.5–14.5)
GLUCOSE SERPL-MCNC: 109 MG/DL (ref 74–109)
HCT VFR BLD AUTO: 41.5 % (ref 37–47)
HGB BLD-MCNC: 13 G/DL (ref 12–16)
IMM GRANULOCYTES # BLD: 0 K/UL
LYMPHOCYTES # BLD: 0.8 K/UL (ref 1.1–4.5)
LYMPHOCYTES NFR BLD: 12.5 % (ref 20–40)
MCH RBC QN AUTO: 32.3 PG (ref 27–31)
MCHC RBC AUTO-ENTMCNC: 31.3 G/DL (ref 33–37)
MCV RBC AUTO: 103 FL (ref 81–99)
MONOCYTES # BLD: 0.8 K/UL (ref 0–0.9)
MONOCYTES NFR BLD: 12.3 % (ref 0–10)
NEUTROPHILS # BLD: 4.4 K/UL (ref 1.5–7.5)
NEUTS SEG NFR BLD: 71.2 % (ref 50–65)
PLATELET # BLD AUTO: 152 K/UL (ref 130–400)
PMV BLD AUTO: 9.7 FL (ref 9.4–12.3)
POTASSIUM SERPL-SCNC: 5 MMOL/L (ref 3.5–5)
PROT SERPL-MCNC: 7.1 G/DL (ref 6.6–8.7)
RBC # BLD AUTO: 4.03 M/UL (ref 4.2–5.4)
SODIUM SERPL-SCNC: 145 MMOL/L (ref 136–145)
TSH SERPL DL<=0.005 MIU/L-ACNC: 4.06 UIU/ML (ref 0.27–4.2)
WBC # BLD AUTO: 6.2 K/UL (ref 4.8–10.8)

## 2024-03-14 PROCEDURE — 1036F TOBACCO NON-USER: CPT | Performed by: NURSE PRACTITIONER

## 2024-03-14 PROCEDURE — G8427 DOCREV CUR MEDS BY ELIG CLIN: HCPCS | Performed by: NURSE PRACTITIONER

## 2024-03-14 PROCEDURE — 3077F SYST BP >= 140 MM HG: CPT | Performed by: NURSE PRACTITIONER

## 2024-03-14 PROCEDURE — 99214 OFFICE O/P EST MOD 30 MIN: CPT | Performed by: NURSE PRACTITIONER

## 2024-03-14 PROCEDURE — G8400 PT W/DXA NO RESULTS DOC: HCPCS | Performed by: NURSE PRACTITIONER

## 2024-03-14 PROCEDURE — 3078F DIAST BP <80 MM HG: CPT | Performed by: NURSE PRACTITIONER

## 2024-03-14 PROCEDURE — 1090F PRES/ABSN URINE INCON ASSESS: CPT | Performed by: NURSE PRACTITIONER

## 2024-03-14 PROCEDURE — G8417 CALC BMI ABV UP PARAM F/U: HCPCS | Performed by: NURSE PRACTITIONER

## 2024-03-14 PROCEDURE — G8484 FLU IMMUNIZE NO ADMIN: HCPCS | Performed by: NURSE PRACTITIONER

## 2024-03-14 PROCEDURE — 1123F ACP DISCUSS/DSCN MKR DOCD: CPT | Performed by: NURSE PRACTITIONER

## 2024-03-14 ASSESSMENT — ENCOUNTER SYMPTOMS
ABDOMINAL DISTENTION: 0
WHEEZING: 0
CHOKING: 0
VOMITING: 0
SORE THROAT: 0
EYE ITCHING: 0
TROUBLE SWALLOWING: 0
DIARRHEA: 0
ABDOMINAL PAIN: 0
SHORTNESS OF BREATH: 0
CONSTIPATION: 0
COLOR CHANGE: 0
STRIDOR: 0
NAUSEA: 0
COUGH: 0
EYE DISCHARGE: 0
BLOOD IN STOOL: 0

## 2024-03-14 ASSESSMENT — PATIENT HEALTH QUESTIONNAIRE - PHQ9
9. THOUGHTS THAT YOU WOULD BE BETTER OFF DEAD, OR OF HURTING YOURSELF: 0
SUM OF ALL RESPONSES TO PHQ9 QUESTIONS 1 & 2: 0
3. TROUBLE FALLING OR STAYING ASLEEP: 0
2. FEELING DOWN, DEPRESSED OR HOPELESS: 0
8. MOVING OR SPEAKING SO SLOWLY THAT OTHER PEOPLE COULD HAVE NOTICED. OR THE OPPOSITE, BEING SO FIGETY OR RESTLESS THAT YOU HAVE BEEN MOVING AROUND A LOT MORE THAN USUAL: 0
SUM OF ALL RESPONSES TO PHQ QUESTIONS 1-9: 0
10. IF YOU CHECKED OFF ANY PROBLEMS, HOW DIFFICULT HAVE THESE PROBLEMS MADE IT FOR YOU TO DO YOUR WORK, TAKE CARE OF THINGS AT HOME, OR GET ALONG WITH OTHER PEOPLE: 0
7. TROUBLE CONCENTRATING ON THINGS, SUCH AS READING THE NEWSPAPER OR WATCHING TELEVISION: 0
5. POOR APPETITE OR OVEREATING: 0
6. FEELING BAD ABOUT YOURSELF - OR THAT YOU ARE A FAILURE OR HAVE LET YOURSELF OR YOUR FAMILY DOWN: 0
4. FEELING TIRED OR HAVING LITTLE ENERGY: 0
1. LITTLE INTEREST OR PLEASURE IN DOING THINGS: 0
SUM OF ALL RESPONSES TO PHQ QUESTIONS 1-9: 0

## 2024-03-14 NOTE — ASSESSMENT & PLAN NOTE
She is actually fairly stable today she looks really good they have chosen not to get put her on meds

## 2024-03-14 NOTE — ASSESSMENT & PLAN NOTE
She needs new urostomy supplies.  This is a right middle abdomen she had bladder cancer years ago over 40 years ago she does well

## 2024-03-14 NOTE — PATIENT INSTRUCTIONS
pressure - high blood pressure is a major risk factor for heart disease and stroke. Keeping blood pressure in health range reduces strain on your heart, arteries and kidneys.  Blood pressure goal is less than 130/80.   2) Control cholesterol - contributes to plaque, which can clog arteries and lead to heart disease and stroke. When you control your cholesterol you are giving your arteries their best chance to remain clear.  It is recommended that you get cholesterol lab work done once a year.  3) Reduce blood sugar - most of the food we eat is turning into glucose or blood sugar that our body uses for energy.  Over time, high levels of blood sugar can damage your heart, kidneys, eyes and nerves.  4) Get active - living an active life is one of the most rewarding gifts you can give yourself and those you love.  Simply put, daily physical activity increases your length and quality of life. Strive to exercise 15 minutes most days of the week.  5)  Eat better - A healthy diet is one of your best weapons for fighting cardiovascular disease.  When you eat a heart healthy diet, you improve your chances for feeling good and staying healthy for life.  6)  Lose weight - when you shed extra fat an unnecessary pounds, you reduce the burden on your hear, lungs, blood vessels and skeleton.  You give yourself the gift of active living, you lower your blood pressure and help yourself feel better.  7) Stop smoking - cigarette smokers have a higher risk of developing cardiovascular disease.  If  You smoke, quitting is the best thing you can do for your health.

## 2024-03-14 NOTE — ASSESSMENT & PLAN NOTE
She is wheelchair-bound from this she needs a new wheelchair today she is in stable without the wheelchair unsafe to walk she has not really walked in years she can just stand to transfer

## 2024-03-14 NOTE — PROGRESS NOTES
EMRDragon/transcription disclaimer:  Much of this encounter note is electronic transcription/translation of spoken language to printed texts.  The electronic translation of spoken language may be erroneous, or at times,nonsensical words or phrases may be inadvertently transcribed.  Although I have reviewed the note for such errors, some may still exist.

## 2024-03-14 NOTE — ASSESSMENT & PLAN NOTE
She has been passing a lot of stones most recently she passed a rather large 1 and had some blood with it.  That has resolved most likely these are calcium stones as her calcium has been high in the past she also has been drinking a lot of Powerade and Gatorade and there high potassiums were cutting those back some to

## 2024-03-14 NOTE — ASSESSMENT & PLAN NOTE
She is stable with chronic Coumadin therapy they check at home daughter since to our Coumadin clinic she is fairly stable she is also on metoprolol and Cardizem

## 2024-03-18 DIAGNOSIS — G89.4 CHRONIC PAIN SYNDROME: ICD-10-CM

## 2024-03-18 NOTE — TELEPHONE ENCOUNTER
Glendy Nicole called to request a refill on her medication.      Last office visit : 3/14/2024   Next office visit : 6/17/2024     Last UDS: No results found for: \"LABAMPH\", \"LABBARB\", \"LABBENZ\", \"BUPRENUR\", \"COCAIMETSCRU\", \"GABAPENTIN\", \"MDMA\", \"METAMPU\", \"OPIATESCREENURINE\", \"OXTCOSU\", \"PHENCYCLIDINESCREENURINE\", \"PROPOXYPHENE\", \"THCSCREENUR\", \"TRICYUR\"    Last Srikanth: 03/14/24  Medication Contract: 11/07/23   Last Fill: 02/19/24    Requested Prescriptions     Pending Prescriptions Disp Refills    HYDROcodone-acetaminophen (NORCO) 5-325 MG per tablet 90 tablet 0     Sig: Take 1 tablet by mouth every 8 hours as needed for Pain for up to 30 days. Max Daily Amount: 3 tablets         Please approve or refuse this medication.   Andria Rust MA

## 2024-03-19 RX ORDER — HYDROCODONE BITARTRATE AND ACETAMINOPHEN 5; 325 MG/1; MG/1
1 TABLET ORAL EVERY 8 HOURS PRN
Qty: 90 TABLET | Refills: 0 | Status: SHIPPED | OUTPATIENT
Start: 2024-03-19 | End: 2024-04-18

## 2024-03-25 ENCOUNTER — ANTI-COAG VISIT (OUTPATIENT)
Dept: INTERNAL MEDICINE | Age: 79
End: 2024-03-25
Payer: MEDICARE

## 2024-03-25 DIAGNOSIS — Z86.718 HISTORY OF DVT (DEEP VEIN THROMBOSIS): Primary | ICD-10-CM

## 2024-03-25 LAB — INR BLD: 2.4

## 2024-03-25 PROCEDURE — 93793 ANTICOAG MGMT PT WARFARIN: CPT | Performed by: NURSE PRACTITIONER

## 2024-03-25 NOTE — PROGRESS NOTES
HOME MONITORING REPORT    INR today:   Results for orders placed or performed in visit on 03/25/24   Protime-INR   Result Value Ref Range    INR 2.40        INR Goal: 2.0-3.0    Dosing Plan  As of 3/25/2024      TTR:  56.2 % (5.7 y)   Full warfarin instructions:  7.5 mg every Sun, Tue, Thu; 5 mg all other days                PLAN: Patient aware to continue current dose and recheck in one week or as ordered.     I have reviewed nursing plan for Coumadin management and agree with plan.

## 2024-04-09 NOTE — TELEPHONE ENCOUNTER
Glendy called requesting a refill of the below medication which has been pended for you:     Requested Prescriptions     Pending Prescriptions Disp Refills    metoprolol tartrate (LOPRESSOR) 25 MG tablet [Pharmacy Med Name: METOPROLOL TARTRATE 25 MG T 25 Tablet] 180 tablet 1     Sig: TAKE 1 TABLET BY MOUTH TWO TIMES A DAY       Last Appointment Date: 3/14/2024  Next Appointment Date: 6/17/2024    Allergies   Allergen Reactions    Other Anaphylaxis and Itching     Cloth bandaids , causes redness of skin    Ciprofloxacin Itching    Codeine Itching    Perflutren Lipid Microsphere Other (See Comments)     Back pain    Tetanus Toxoids Itching     Itching around site    Tizanidine Hcl Itching    Tetanus Toxoid      Reaction: ITCHING AROUND SITE

## 2024-04-10 ENCOUNTER — ANTI-COAG VISIT (OUTPATIENT)
Dept: INTERNAL MEDICINE | Age: 79
End: 2024-04-10
Payer: MEDICARE

## 2024-04-10 DIAGNOSIS — Z86.718 HISTORY OF DVT (DEEP VEIN THROMBOSIS): Primary | ICD-10-CM

## 2024-04-10 LAB — INR BLD: 2.6

## 2024-04-10 PROCEDURE — 93793 ANTICOAG MGMT PT WARFARIN: CPT | Performed by: NURSE PRACTITIONER

## 2024-04-10 NOTE — PROGRESS NOTES
HOME MONITORING REPORT    INR today:   Results for orders placed or performed in visit on 04/10/24   Protime-INR   Result Value Ref Range    INR 2.60        INR Goal: 2.0-3.0    Dosing Plan  As of 4/10/2024      TTR:  56.5 % (5.7 y)   Full warfarin instructions:  7.5 mg every Sun, Tue, Thu; 5 mg all other days                PLAN: Patient aware to continue current dose and recheck in one week or as ordered.     I have reviewed nursing plan for Coumadin management and agree with plan.

## 2024-04-12 NOTE — TELEPHONE ENCOUNTER
Glendy Nicole called to request a refill on her medication.      Last office visit : 3/14/2024   Next office visit : 6/17/2024     Requested Prescriptions     Pending Prescriptions Disp Refills    calcitRIOL (ROCALTROL) 0.25 MCG capsule [Pharmacy Med Name: CALCITRIOL 0.25 MCG CAPS 0.25 Capsule] 30 capsule 4     Sig: TAKE 1 CAPSULE BY MOUTH EVERY DAY            Jerry Maciel MA

## 2024-04-15 RX ORDER — CALCITRIOL 0.25 UG/1
CAPSULE, LIQUID FILLED ORAL DAILY
Qty: 30 CAPSULE | Refills: 4 | Status: SHIPPED | OUTPATIENT
Start: 2024-04-15

## 2024-04-18 DIAGNOSIS — I48.91 ATRIAL FIBRILLATION, UNSPECIFIED TYPE (HCC): ICD-10-CM

## 2024-04-18 DIAGNOSIS — I10 ESSENTIAL HYPERTENSION: ICD-10-CM

## 2024-04-18 DIAGNOSIS — G89.4 CHRONIC PAIN SYNDROME: ICD-10-CM

## 2024-04-18 RX ORDER — HYDROCODONE BITARTRATE AND ACETAMINOPHEN 5; 325 MG/1; MG/1
1 TABLET ORAL EVERY 8 HOURS PRN
Qty: 90 TABLET | Refills: 0 | Status: SHIPPED | OUTPATIENT
Start: 2024-04-18 | End: 2024-05-18

## 2024-04-18 RX ORDER — DILTIAZEM HYDROCHLORIDE 180 MG/1
CAPSULE, COATED, EXTENDED RELEASE ORAL
Qty: 30 CAPSULE | Refills: 5 | Status: SHIPPED | OUTPATIENT
Start: 2024-04-18

## 2024-04-18 NOTE — TELEPHONE ENCOUNTER
Glendy called requesting a refill of the below medication which has been pended for you:     Requested Prescriptions     Pending Prescriptions Disp Refills    dilTIAZem (CARDIZEM CD) 180 MG extended release capsule [Pharmacy Med Name: DILTIAZEM ER 180MG COATED B 180 Capsule] 30 capsule 5     Sig: TAKE 1 CAPSULE BY MOUTH EVERY DAY    HYDROcodone-acetaminophen (NORCO) 5-325 MG per tablet [Pharmacy Med Name: HYDROCODON-APAP 5-325 5-325 Tablet] 90 tablet 0     Sig: Take 1 tablet by mouth every 8 hours as needed for Pain for up to 30 days. Max Daily Amount: 3 tablets       Last Appointment Date: 3/14/2024  Next Appointment Date: 6/17/2024    Allergies   Allergen Reactions    Other Anaphylaxis and Itching     Cloth bandaids , causes redness of skin    Ciprofloxacin Itching    Codeine Itching    Perflutren Lipid Microsphere Other (See Comments)     Back pain    Tetanus Toxoids Itching     Itching around site    Tizanidine Hcl Itching    Tetanus Toxoid      Reaction: ITCHING AROUND SITE

## 2024-04-22 ENCOUNTER — ANTI-COAG VISIT (OUTPATIENT)
Dept: INTERNAL MEDICINE | Age: 79
End: 2024-04-22
Payer: MEDICARE

## 2024-04-22 DIAGNOSIS — Z86.718 HISTORY OF DVT (DEEP VEIN THROMBOSIS): Primary | ICD-10-CM

## 2024-04-22 LAB — INR BLD: 2.6

## 2024-04-22 PROCEDURE — 93793 ANTICOAG MGMT PT WARFARIN: CPT | Performed by: NURSE PRACTITIONER

## 2024-04-22 NOTE — PROGRESS NOTES
HOME MONITORING REPORT    INR today:   Results for orders placed or performed in visit on 04/22/24   Protime-INR   Result Value Ref Range    INR 2.60        INR Goal: 2.0-3.0    Dosing Plan  As of 4/22/2024      TTR:  56.7 % (5.8 y)   Full warfarin instructions:  7.5 mg every Sun, Tue, Thu; 5 mg all other days                PLAN: Patient aware to continue current dose and recheck in one week or as ordered.     I have reviewed nursing plan for Coumadin management and agree with plan.

## 2024-05-02 PROBLEM — I50.9 CHF (CONGESTIVE HEART FAILURE): Status: ACTIVE | Noted: 2024-01-01

## 2024-05-02 NOTE — ED NOTES
Nursing report ED to floor  Jarvis Morgan  79 y.o.  female    HPI:   Chief Complaint   Patient presents with    Altered Mental Status       Admitting doctor:   Los Vázquez MD    Consulting provider(s):  Consults       Date and Time Order Name Status Description    5/2/2024  2:56 PM Inpatient Cardiology Consult      5/2/2024  2:26 PM Inpatient Pulmonology Consult               Admitting diagnosis:   The primary encounter diagnosis was Acute respiratory failure with hypoxia and hypercapnia. Diagnoses of Acute on chronic congestive heart failure, unspecified heart failure type, Pleural effusion on right, Chronic renal impairment, unspecified CKD stage, and Longstanding persistent atrial fibrillation were also pertinent to this visit.    Code status:   Current Code Status       Date Active Code Status Order ID Comments User Context       5/2/2024 1250 CPR (Attempt to Resuscitate) [id number]  Los Vázquez MD ED        Question Answer    Code Status (Patient has no pulse and is not breathing) CPR (Attempt to Resuscitate)    Medical Interventions (Patient has pulse or is breathing) Full Support    Level Of Support Discussed With Patient                    Allergies:   Ciprofloxacin, Codeine, Definity [perflutren lipid microsphere], Tetanus toxoids, Tizanidine hcl, and Other    Intake and Output  No intake or output data in the 24 hours ending 05/02/24 1634    Weight:       05/02/24  1138   Weight: 103 kg (228 lb)       Most recent vitals:   Vitals:    05/02/24 1616 05/02/24 1619 05/02/24 1620 05/02/24 1632   BP: 138/88  138/88    BP Location:       Patient Position:       Pulse: 66  83    Resp:    18   Temp:  97.9 °F (36.6 °C)     TempSrc:  Oral     SpO2:    98%   Weight:       Height:         Oxygen Therapy: .    Active LDAs/IV Access:   Lines, Drains & Airways       Active LDAs       Name Placement date Placement time Site Days    Peripheral IV 05/02/24 Anterior;Left Forearm 05/02/24  --  Forearm  less than 1     Peripheral IV 05/02/24 1149 Right Antecubital 05/02/24  1149  Antecubital  less than 1    Urostomy Ureterostomy right RLQ 05/12/18  1200  present on arrival to unit  [facility]  2182                    Labs (abnormal labs have a star):   Labs Reviewed   COMPREHENSIVE METABOLIC PANEL - Abnormal; Notable for the following components:       Result Value    Glucose 119 (*)     BUN 40 (*)     Creatinine 1.89 (*)     CO2 32.0 (*)     Calcium 10.8 (*)     eGFR 26.6 (*)     All other components within normal limits    Narrative:     GFR Normal >60  Chronic Kidney Disease <60  Kidney Failure <15    The GFR formula is only valid for adults with stable renal function between ages 18 and 70.   CBC WITH AUTO DIFFERENTIAL - Abnormal; Notable for the following components:    .8 (*)     MCHC 30.4 (*)     Neutrophil % 82.3 (*)     Lymphocyte % 8.0 (*)     Eosinophil % 0.2 (*)     Lymphocytes, Absolute 0.66 (*)     All other components within normal limits   PROTIME-INR - Abnormal; Notable for the following components:    Protime 33.2 (*)     INR 3.10 (*)     All other components within normal limits   TROPONIN - Abnormal; Notable for the following components:    HS Troponin T 38 (*)     All other components within normal limits    Narrative:     High Sensitive Troponin T Reference Range:  <14.0 ng/L- Negative Female for AMI  <22.0 ng/L- Negative Male for AMI  >=14 - Abnormal Female indicating possible myocardial injury.  >=22 - Abnormal Male indicating possible myocardial injury.   Clinicians would have to utilize clinical acumen, EKG, Troponin, and serial changes to determine if it is an Acute Myocardial Infarction or myocardial injury due to an underlying chronic condition.        HIGH SENSITIVITIY TROPONIN T 2HR - Abnormal; Notable for the following components:    HS Troponin T 38 (*)     All other components within normal limits    Narrative:     High Sensitive Troponin T Reference Range:  <14.0 ng/L- Negative Female for  AMI  <22.0 ng/L- Negative Male for AMI  >=14 - Abnormal Female indicating possible myocardial injury.  >=22 - Abnormal Male indicating possible myocardial injury.   Clinicians would have to utilize clinical acumen, EKG, Troponin, and serial changes to determine if it is an Acute Myocardial Infarction or myocardial injury due to an underlying chronic condition.        BLOOD GAS, ARTERIAL W/CO-OXIMETRY - Abnormal; Notable for the following components:    pH, Arterial 7.275 (*)     pCO2, Arterial 68.6 (*)     pO2, Arterial 67.1 (*)     HCO3, Arterial 31.8 (*)     Base Excess, Arterial 2.9 (*)     O2 Saturation, Arterial 92.7 (*)     Oxyhemoglobin 91.1 (*)     Ionized Calcium 5.91 (*)     pH, Temp Corrected 7.275 (*)     pCO2, Temperature Corrected 68.6 (*)     pO2, Temperature Corrected 67.1 (*)     All other components within normal limits   BLOOD GAS, ARTERIAL W/CO-OXIMETRY - Abnormal; Notable for the following components:    pH, Arterial 7.296 (*)     pCO2, Arterial 65.9 (*)     HCO3, Arterial 32.1 (*)     Base Excess, Arterial 3.6 (*)     Ionized Calcium 5.90 (*)     pH, Temp Corrected 7.296 (*)     pCO2, Temperature Corrected 65.9 (*)     All other components within normal limits   COVID-19 AND FLU A/B, NP SWAB IN TRANSPORT MEDIA 1 HR TAT - Normal    Narrative:     Fact sheet for providers: https://www.fda.gov/media/240102/download    Fact sheet for patients: https://www.fda.gov/media/654365/download    Test performed by PCR.   BNP (IN-HOUSE) - Normal    Narrative:     This assay is used as an aid in the diagnosis of individuals suspected of having heart failure. It can be used as an aid in the diagnosis of acute decompensated heart failure (ADHF) in patients presenting with signs and symptoms of ADHF to the emergency department (ED). In addition, NT-proBNP of <300 pg/mL indicates ADHF is not likely.    Age Range Result Interpretation  NT-proBNP Concentration (pg/mL:      <50             Positive            >450    "                Turner                 300-450                    Negative             <300    50-75           Positive            >900                  Gray                300-900                  Negative            <300      >75             Positive            >1800                  Gray                300-1800                  Negative            <300   LACTIC ACID, PLASMA - Normal   PROCALCITONIN - Normal    Narrative:     As a Marker for Sepsis (Non-Neonates):    1. <0.5 ng/mL represents a low risk of severe sepsis and/or septic shock.  2. >2 ng/mL represents a high risk of severe sepsis and/or septic shock.    As a Marker for Lower Respiratory Tract Infections that require antibiotic therapy:    PCT on Admission    Antibiotic Therapy       6-12 Hrs later    >0.5                Strongly Recommended  >0.25 - <0.5        Recommended   0.1 - 0.25          Discouraged              Remeasure/reassess PCT  <0.1                Strongly Discouraged     Remeasure/reassess PCT    As 28 day mortality risk marker: \"Change in Procalcitonin Result\" (>80% or <=80%) if Day 0 (or Day 1) and Day 4 values are available. Refer to http://www.PanOpticapct-calculator.com    Change in PCT <=80%  A decrease of PCT levels below or equal to 80% defines a positive change in PCT test result representing a higher risk for 28-day all-cause mortality of patients diagnosed with severe sepsis for septic shock.    Change in PCT >80%  A decrease of PCT levels of more than 80% defines a negative change in PCT result representing a lower risk for 28-day all-cause mortality of patients diagnosed with severe sepsis or septic shock.      BLOOD CULTURE   BLOOD CULTURE   RAINBOW DRAW    Narrative:     The following orders were created for panel order [place] Draw.  Procedure                               Abnormality         Status                     ---------                               -----------         ------                     Green Top " (Gel)[[id number]]                                  Final result               Lavender Top[[id number]]                                     Final result               Red Top[[id number]]                                          Final result               Gray Top[[id number]]                                         Final result               Light Blue Top[[id number]]                                   Final result                 Please view results for these tests on the individual orders.   BLOOD GAS, ARTERIAL W/CO-OXIMETRY   BLOOD GAS, ARTERIAL   PROTIME-INR   CBC AND DIFFERENTIAL    Narrative:     The following orders were created for panel order CBC & Differential.  Procedure                               Abnormality         Status                     ---------                               -----------         ------                     CBC Auto Differential[[id number]]        Abnormal            Final result                 Please view results for these tests on the individual orders.   GREEN TOP   LAVENDER TOP   RED TOP   GRAY TOP   LIGHT BLUE TOP       Meds given in ED:   Medications   sodium chloride 0.9 % flush 10 mL (has no administration in time range)   donepezil (ARICEPT) tablet 5 mg (has no administration in time range)   oxyCODONE-acetaminophen (PERCOCET) 5-325 MG per tablet 1 tablet (has no administration in time range)   QUEtiapine (SEROquel) tablet 25 mg (has no administration in time range)   acetaminophen (TYLENOL) tablet 650 mg (has no administration in time range)   atorvastatin (LIPITOR) tablet 40 mg (has no administration in time range)   calcitriol (ROCALTROL) capsule 0.25 mcg (0.25 mcg Oral Given 5/2/24 1619)   dilTIAZem CD (CARDIZEM CD) 24 hr capsule 180 mg (180 mg Oral Given 5/2/24 1620)   escitalopram (LEXAPRO) tablet 10 mg (10 mg Oral Not Given 5/2/24 1619)   methIMAzole (TAPAZOLE) tablet 10 mg (has no administration in time range)   metoprolol tartrate (LOPRESSOR) tablet 25 mg (has no  administration in time range)   warfarin (COUMADIN) tablet 5 mg (has no administration in time range)   warfarin (COUMADIN) tablet 7.5 mg (has no administration in time range)   sodium chloride 0.9 % flush 10 mL (has no administration in time range)   sodium chloride 0.9 % flush 10 mL (has no administration in time range)   sodium chloride 0.9 % infusion 40 mL (has no administration in time range)   nitroglycerin (NITROSTAT) SL tablet 0.4 mg (has no administration in time range)   Potassium Replacement - Follow Nurse / BPA Driven Protocol (has no administration in time range)   Magnesium Standard Dose Replacement - Follow Nurse / BPA Driven Protocol (has no administration in time range)   Phosphorus Replacement - Follow Nurse / BPA Driven Protocol (has no administration in time range)   Calcium Replacement - Follow Nurse / BPA Driven Protocol (has no administration in time range)   sennosides-docusate (PERICOLACE) 8.6-50 MG per tablet 2 tablet (has no administration in time range)     And   polyethylene glycol (MIRALAX) packet 17 g (has no administration in time range)     And   bisacodyl (DULCOLAX) EC tablet 5 mg (has no administration in time range)     And   bisacodyl (DULCOLAX) suppository 10 mg (has no administration in time range)   furosemide (LASIX) injection 40 mg (has no administration in time range)   ipratropium-albuterol (DUO-NEB) nebulizer solution 3 mL (3 mL Nebulization Given 5/2/24 1550)   furosemide (LASIX) injection 80 mg (80 mg Intravenous Given 5/2/24 1258)           NIH Stroke Scale:  Interval: baseline    Isolation/Infection(s):  No active isolations   MRSA     COVID Testing  Collected .  Resulted .    Nursing report ED to floor:  Mental status: .  Ambulatory status: .  Precautions: .    ED nurse phone extentsion- ..

## 2024-05-02 NOTE — ED PROVIDER NOTES
Subjective   History of Present Illness  Patient is an 79-year-old female with a history of atrial fibrillation, hypertension and congestive heart failure who presents to the ER with shortness of air.  Family called EMS today and states the patient has had shortness of breath and worsening lower extremity edema since last night.  Patient was satting 87% on room air upon their arrival.  She did improve with oxygen.  Family states the patient has been more lethargic today and altered.  No further HPI could be obtained due to the patient's confusion.      Review of Systems   Unable to perform ROS: Mental status change   Constitutional:  Positive for fatigue.   Respiratory:  Positive for shortness of breath.    Cardiovascular:  Positive for leg swelling.   Psychiatric/Behavioral:  Positive for confusion.        Past Medical History:   Diagnosis Date    A-fib     Aortic stenosis     Arthritis     Bradycardia     Cancer     bladder and skin    Cardiomegaly     CHF (congestive heart failure)     GERD (gastroesophageal reflux disease)     Hard of hearing     History of short term memory loss     Hypercalcemia     Hyperlipidemia     Hypertension     Hyperthyroidism 11/20/2017    Hypothyroidism     Kidney stone     Long term current use of anticoagulant therapy     Open wound     left lower extremity,    Palpitation     PONV (postoperative nausea and vomiting)     Shortness of breath     Sick sinus syndrome     Sleep apnea     CPAP    Stage 3 chronic kidney disease        Allergies   Allergen Reactions    Ciprofloxacin Itching    Codeine Itching and GI Intolerance    Definity [Perflutren Lipid Microsphere] Other (See Comments)     Back pain    Tetanus Toxoids Itching     Itching around site    Tizanidine Hcl Itching    Other Itching     Cloth bandaids , causes redness of skin       Past Surgical History:   Procedure Laterality Date    AORTIC VALVE REPAIR/REPLACEMENT      BLADDER SURGERY      removed    CARDIAC CATHETERIZATION       CARDIAC CATHETERIZATION N/A 12/9/2016    Procedure: Left Heart Cath;  Surgeon: Elia Flores MD;  Location:  [facility];  Service:     DISTAL FEMORAL NAILING Right 9/24/2020    Procedure: RIGHT SHORT TFN;  Surgeon: Betito Shetty MD;  Location:  [facility] OR;  Service: Orthopedics;  Laterality: Right;    HIP BIPOLAR REPLACEMENT Left     HYSTERECTOMY      ILEOSTOMY      JOINT REPLACEMENT      KIDNEY SURGERY      right kidney removed    NEPHRECTOMY RADICAL Right     from cancer    VARICOSE VEIN SURGERY Left 4/10/2017    Procedure: LEFT LOWER EXTREMITY VENOGRAM. LEFT SAPHENOUS VEIN RADIO FREQUENCY ABLATION;  Surgeon: Blake Martinez DO;  Location:  [facility] OR;  Service:        Family History   Problem Relation Age of Onset    Heart failure Mother     Heart disease Father     Stroke Father     Hypertension Sister     Heart failure Sister     No Known Problems Brother     No Known Problems Maternal Grandmother     No Known Problems Maternal Grandfather     No Known Problems Paternal Grandmother     No Known Problems Paternal Grandfather     Hypertension Sister     Heart failure Sister     Hypertension Sister     Heart failure Sister     Hypertension Sister     Heart failure Sister     Hypertension Sister     Heart failure Sister     Hypertension Sister     Heart failure Sister     Gallbladder disease Brother     COPD Daughter     Asthma Daughter     Diabetes Son     No Known Problems Son     Autism Son        Social History     Socioeconomic History    Marital status:    Tobacco Use    Smoking status: Never    Smokeless tobacco: Never   Vaping Use    Vaping status: Never Used   Substance and Sexual Activity    Alcohol use: No    Drug use: No    Sexual activity: Defer           Objective   Physical Exam  Vitals and nursing note reviewed.   Constitutional:       Appearance: She is well-developed.   HENT:      Head: Normocephalic and atraumatic.   Eyes:      Extraocular Movements:  Extraocular movements intact.      Conjunctiva/sclera: Conjunctivae normal.      Pupils: Pupils are equal, round, and reactive to light.   Cardiovascular:      Rate and Rhythm: Normal rate. Rhythm irregularly irregular.      Heart sounds: Murmur heard.   Pulmonary:      Effort: Pulmonary effort is normal. No tachypnea or respiratory distress.      Breath sounds: Rales present.   Abdominal:      Palpations: Abdomen is soft.      Tenderness: There is no abdominal tenderness.   Musculoskeletal:         General: No deformity. Normal range of motion.      Cervical back: Normal range of motion.   Skin:     General: Skin is warm.      Comments: 2+ pitting edema bilateral lower extremities   Neurological:      Mental Status: She is alert and oriented to person, place, and time.   Psychiatric:         Behavior: Behavior normal.         Procedures           ED Course      EKG as interpreted by me: Atrial fibrillation with a rate of 80, left ventricular hypertrophy, right bundle branch block, Q waves in the anterior leads and inferior leads, T wave inversion in the lateral leads                      Total (NIH Stroke Scale): 5          Lab Results (last 24 hours)       Procedure Component Value Units Date/Time    CBC & Differential [[id number]]  (Abnormal) Collected: 05/02/24 1140    Specimen: Blood Updated: 05/02/24 1151    Narrative:      The following orders were created for panel order CBC & Differential.  Procedure                               Abnormality         Status                     ---------                               -----------         ------                     CBC Auto Differential[[id number]]        Abnormal            Final result                 Please view results for these tests on the individual orders.    Comprehensive Metabolic Panel [[id number]]  (Abnormal) Collected: 05/02/24 1140    Specimen: Blood Updated: 05/02/24 1213     Glucose 119 mg/dL      BUN 40 mg/dL      Creatinine 1.89 mg/dL      Sodium  143 mmol/L      Potassium 4.8 mmol/L      Chloride 104 mmol/L      CO2 32.0 mmol/L      Calcium 10.8 mg/dL      Total Protein 7.7 g/dL      Albumin 3.8 g/dL      ALT (SGPT) 12 U/L      AST (SGOT) 16 U/L      Alkaline Phosphatase 79 U/L      Total Bilirubin 0.5 mg/dL      Globulin 3.9 gm/dL      A/G Ratio 1.0 g/dL      BUN/Creatinine Ratio 21.2     Anion Gap 7.0 mmol/L      eGFR 26.6 mL/min/1.73     Narrative:      GFR Normal >60  Chronic Kidney Disease <60  Kidney Failure <15    The GFR formula is only valid for adults with stable renal function between ages 18 and 70.    CBC Auto Differential [[id number]]  (Abnormal) Collected: 05/02/24 1140    Specimen: Blood Updated: 05/02/24 1151     WBC 8.23 10*3/mm3      RBC 4.47 10*6/mm3      Hemoglobin 14.1 g/dL      Hematocrit 46.4 %      .8 fL      MCH 31.5 pg      MCHC 30.4 g/dL      RDW 13.9 %      RDW-SD 52.6 fl      MPV 9.1 fL      Platelets 171 10*3/mm3      Neutrophil % 82.3 %      Lymphocyte % 8.0 %      Monocyte % 8.9 %      Eosinophil % 0.2 %      Basophil % 0.2 %      Immature Grans % 0.4 %      Neutrophils, Absolute 6.77 10*3/mm3      Lymphocytes, Absolute 0.66 10*3/mm3      Monocytes, Absolute 0.73 10*3/mm3      Eosinophils, Absolute 0.02 10*3/mm3      Basophils, Absolute 0.02 10*3/mm3      Immature Grans, Absolute 0.03 10*3/mm3      nRBC 0.0 /100 WBC     Protime-INR [[id number]]  (Abnormal) Collected: 05/02/24 1140    Specimen: Blood Updated: 05/02/24 1200     Protime 33.2 Seconds      INR 3.10    BNP [[id number]]  (Normal) Collected: 05/02/24 1140    Specimen: Blood Updated: 05/02/24 1209     proBNP 1,132.0 pg/mL     Narrative:      This assay is used as an aid in the diagnosis of individuals suspected of having heart failure. It can be used as an aid in the diagnosis of acute decompensated heart failure (ADHF) in patients presenting with signs and symptoms of ADHF to the emergency department (ED). In addition, NT-proBNP of <300 pg/mL indicates ADHF is  not likely.    Age Range Result Interpretation  NT-proBNP Concentration (pg/mL:      <50             Positive            >450                   Gray                 300-450                    Negative             <300    50-75           Positive            >900                  Gray                300-900                  Negative            <300      >75             Positive            >1800                  Gray                300-1800                  Negative            <300    High Sensitivity Troponin T [[id number]]  (Abnormal) Collected: 05/02/24 1140    Specimen: Blood Updated: 05/02/24 1208     HS Troponin T 38 ng/L     Narrative:      High Sensitive Troponin T Reference Range:  <14.0 ng/L- Negative Female for AMI  <22.0 ng/L- Negative Male for AMI  >=14 - Abnormal Female indicating possible myocardial injury.  >=22 - Abnormal Male indicating possible myocardial injury.   Clinicians would have to utilize clinical acumen, EKG, Troponin, and serial changes to determine if it is an Acute Myocardial Infarction or myocardial injury due to an underlying chronic condition.         Lactic Acid, Plasma [[id number]]  (Normal) Collected: 05/02/24 1140    Specimen: Blood Updated: 05/02/24 1204     Lactate 1.3 mmol/L     Procalcitonin [[id number]]  (Normal) Collected: 05/02/24 1140    Specimen: Blood Updated: 05/02/24 1218     Procalcitonin 0.05 ng/mL     Narrative:      As a Marker for Sepsis (Non-Neonates):    1. <0.5 ng/mL represents a low risk of severe sepsis and/or septic shock.  2. >2 ng/mL represents a high risk of severe sepsis and/or septic shock.    As a Marker for Lower Respiratory Tract Infections that require antibiotic therapy:    PCT on Admission    Antibiotic Therapy       6-12 Hrs later    >0.5                Strongly Recommended  >0.25 - <0.5        Recommended   0.1 - 0.25          Discouraged              Remeasure/reassess PCT  <0.1                Strongly Discouraged     Remeasure/reassess PCT    As  "28 day mortality risk marker: \"Change in Procalcitonin Result\" (>80% or <=80%) if Day 0 (or Day 1) and Day 4 values are available. Refer to http://www.[facility]-pct-calculator.com    Change in PCT <=80%  A decrease of PCT levels below or equal to 80% defines a positive change in PCT test result representing a higher risk for 28-day all-cause mortality of patients diagnosed with severe sepsis for septic shock.    Change in PCT >80%  A decrease of PCT levels of more than 80% defines a negative change in PCT result representing a lower risk for 28-day all-cause mortality of patients diagnosed with severe sepsis or septic shock.       Blood Culture - Blood, Arm, Right [[id number]] Collected: 05/02/24 1149    Specimen: Blood from Arm, Right Updated: 05/02/24 1313    COVID-19 and FLU A/B PCR, 1 HR TAT - Swab, Nasopharynx [[id number]]  (Normal) Collected: 05/02/24 1157    Specimen: Swab from Nasopharynx Updated: 05/02/24 1225     COVID19 Not Detected     Influenza A PCR Not Detected     Influenza B PCR Not Detected    Narrative:      Fact sheet for providers: https://www.fda.gov/media/362070/download    Fact sheet for patients: https://www.fda.gov/media/850929/download    Test performed by PCR.    Blood Culture - Blood, Arm, Left [[id number]] Collected: 05/02/24 1210    Specimen: Blood from Arm, Left Updated: 05/02/24 1313    Blood Gas, Arterial With Co-Ox [[id number]]  (Abnormal) Collected: 05/02/24 1241    Specimen: Arterial Blood Updated: 05/02/24 1242     Site Left Brachial     Rubén's Test N/A     pH, Arterial 7.275 pH units      Comment: 84 Value below reference range        pCO2, Arterial 68.6 mm Hg      Comment: 86 Value above critical limit        pO2, Arterial 67.1 mm Hg      Comment: 84 Value below reference range        HCO3, Arterial 31.8 mmol/L      Comment: 83 Value above reference range        Base Excess, Arterial 2.9 mmol/L      Comment: 83 Value above reference range        O2 Saturation, Arterial 92.7 %      " Comment: 84 Value below reference range        Hemoglobin, Blood Gas 13.9 g/dL      Hematocrit, Blood Gas 42.6 %      Oxyhemoglobin 91.1 %      Comment: 84 Value below reference range        Methemoglobin 0.20 %      Carboxyhemoglobin 1.6 %      Temperature 37.0     Sodium, Arterial 145 mmol/L      Potassium, Arterial 4.6 mmol/L      Ionized Calcium 5.91 mg/dL      Comment: 83 Value above reference range        Barometric Pressure for Blood Gas 751 mmHg      Modality Nasal Cannula     Flow Rate 2.0 lpm      Ventilator Mode NA     Notified Who DR STEPHENS     Notified By Clemencia Osei CRT     Notified Time 05/02/2024 12:41     Collected by [id number]     Comment: Meter: [id number]     :  Clemencia Osei CRT        pH, Temp Corrected 7.275 pH Units      pCO2, Temperature Corrected 68.6 mm Hg      pO2, Temperature Corrected 67.1 mm Hg            XR Chest 1 View   Final Result   1.  Volume overload with bilateral pulmonary edema (mostly interstitial)   as well as bilateral layering pleural effusions. Right-sided pleural   effusion is large in size.       This report was signed and finalized on 5/2/2024 12:04 PM by Dr Lino Ramos.                     Medical Decision Making  Patient is an 79-year-old female with a history of atrial fibrillation, hypertension and congestive heart failure who presents to the ER with shortness of air.  Family called EMS today and states the patient has had shortness of breath and worsening lower extremity edema since last night.  Patient was satting 87% on room air upon their arrival.  She did improve with oxygen.  Family states the patient has been more lethargic today and altered.  No further HPI could be obtained due to the patient's confusion.    Differential diagnosis: COPD exacerbation, CHF exacerbation, pneumonia, viral syndrome, pulmonary embolus    Patient arrived hypoxic.  She was placed on nasal cannula and sats improving.  Labs showed an elevated BUN and  creatinine consistent with the patient's chronic renal insufficiency.  Labs also showed a slightly elevated troponin.  ABG showed a chronic primary respiratory acidosis with secondary metabolic acidosis.  Chest x-ray showed volume overload with bilateral pulmonary edema mostly interstitial and bilateral layering pleural effusions with the right being very large in size.  Patient was given Lasix.  She was placed on BiPAP.  I discussed the case with Dr. Vázquez with the hospitalist service and patient was admitted to his team for further workup and treatment.    Problems Addressed:  Acute on chronic congestive heart failure, unspecified heart failure type: acute illness or injury  Acute respiratory failure with hypoxia and hypercapnia: acute illness or injury  Chronic renal impairment, unspecified CKD stage: chronic illness or injury  Longstanding persistent atrial fibrillation: chronic illness or injury  Pleural effusion on right: acute illness or injury    Amount and/or Complexity of Data Reviewed  Labs: ordered. Decision-making details documented in ED Course.  Radiology: ordered. Decision-making details documented in ED Course.  ECG/medicine tests: ordered. Decision-making details documented in ED Course.  Discussion of management or test interpretation with external provider(s): Dr Vázquez with the hospitalist group    Risk  Prescription drug management.  Decision regarding hospitalization.        Final diagnoses:   Acute respiratory failure with hypoxia and hypercapnia   Acute on chronic congestive heart failure, unspecified heart failure type   Pleural effusion on right   Chronic renal impairment, unspecified CKD stage   Longstanding persistent atrial fibrillation       ED Disposition  ED Disposition       ED Disposition   Decision to Admit    Condition   --    Comment   Level of Care: Telemetry [5]   Diagnosis: CHF (congestive heart failure) [664289]   Admitting Physician: GRACIELA VÁZQUEZ [[id number]]    Attending Physician: GRACIELA KLEIN [[id number]]   Certification: I Certify That Inpatient Hospital Services Are Medically Necessary For Greater Than 2 Midnights                 No follow-up provider specified.       Medication List      No changes were made to your prescriptions during this visit.            Cris Phillip MD  05/02/24 [id number]

## 2024-05-02 NOTE — ED NOTES
Respiratory contacted. Patient going to floor. Verbal orders to place patient on 2 liters per NC to transport to floor and they will be up shortly to re place bipap.

## 2024-05-02 NOTE — H&P
[facility] Medicine Services  HISTORY AND PHYSICAL    Date of Admission: 5/2/2024  Primary Care Physician: Mary Beth Izquierdo APRN    Subjective   Primary Historian: family     Chief Complaint: shortness of air    History of Present Illness  This is a 89-year-old lady with past medical history significant for atrial fibrillation on chronic warfarin use, history of hypertension, history of diastolic heart failure who presented initially to the emergency department earlier today with increased shortness of air, apparently family called EMS earlier today and stated that she is becoming more dyspneic and she has worsening lower extremity edema, also she is becoming more more lethargic, patient had saturation cocci in the upper 80s on room air upon arrival, patient had an ABG that showed significant hypercarbia, patient received IV Lasix, placed on BiPAP and decision was made to admit her for further evaluation and management, she denied any chest pain, she denied any lightheadedness or any headaches any nausea or vomiting.  She will be admitted for further evaluation and management and cardiology and pulmonary consultation will be placed.        Review of Systems   Otherwise complete ROS reviewed and negative except as mentioned in the HPI.    Past Medical History:   Past Medical History:   Diagnosis Date    A-fib     Aortic stenosis     Arthritis     Bradycardia     Cancer     bladder and skin    Cardiomegaly     CHF (congestive heart failure)     GERD (gastroesophageal reflux disease)     Hard of hearing     History of short term memory loss     Hypercalcemia     Hyperlipidemia     Hypertension     Hyperthyroidism 11/20/2017    Hypothyroidism     Kidney stone     Long term current use of anticoagulant therapy     Open wound     left lower extremity,    Palpitation     PONV (postoperative nausea and vomiting)     Shortness of breath     Sick sinus syndrome     Sleep apnea     CPAP     Stage 3 chronic kidney disease      Past Surgical History:  Past Surgical History:   Procedure Laterality Date    AORTIC VALVE REPAIR/REPLACEMENT      BLADDER SURGERY      removed    CARDIAC CATHETERIZATION      CARDIAC CATHETERIZATION N/A 12/9/2016    Procedure: Left Heart Cath;  Surgeon: Elia Flores MD;  Location:  [facility] CATH INVASIVE [facility];  Service:     DISTAL FEMORAL NAILING Right 9/24/2020    Procedure: RIGHT SHORT TFN;  Surgeon: Betito Shetty MD;  Location:  [facility] OR;  Service: Orthopedics;  Laterality: Right;    HIP BIPOLAR REPLACEMENT Left     HYSTERECTOMY      ILEOSTOMY      JOINT REPLACEMENT      KIDNEY SURGERY      right kidney removed    NEPHRECTOMY RADICAL Right     from cancer    VARICOSE VEIN SURGERY Left 4/10/2017    Procedure: LEFT LOWER EXTREMITY VENOGRAM. LEFT SAPHENOUS VEIN RADIO FREQUENCY ABLATION;  Surgeon: Blake Martinez DO;  Location:  [facility] OR;  Service:      Social History:  reports that she has never smoked. She has never used smokeless tobacco. She reports that she does not drink alcohol and does not use drugs.    Family History: family history includes Asthma in her daughter; Autism in her son; COPD in her daughter; Diabetes in her son; Gallbladder disease in her brother; Heart disease in her father; Heart failure in her mother, sister, sister, sister, sister, sister, and sister; Hypertension in her sister, sister, sister, sister, sister, and sister; No Known Problems in her brother, maternal grandfather, maternal grandmother, paternal grandfather, paternal grandmother, and son; Stroke in her father.       Allergies:  Allergies   Allergen Reactions    Ciprofloxacin Itching    Codeine Itching and GI Intolerance    Definity [Perflutren Lipid Microsphere] Other (See Comments)     Back pain    Tetanus Toxoids Itching     Itching around site    Tizanidine Hcl Itching    Other Itching     Cloth bandaids , causes redness of skin       Medications:  Prior to Admission  medications    Medication Sig Start Date End Date Taking? Authorizing Provider   acetaminophen (TYLENOL) 650 MG 8 hr tablet Take 1 tablet by mouth Every 8 (Eight) Hours As Needed for Mild Pain (arthritis).   Yes Bony Evans MD   atorvastatin (LIPITOR) 40 MG tablet Take 1 tablet by mouth Every Night.   Yes Bony Evans MD   bisacodyl (DULCOLAX) 10 MG suppository Insert 1 suppository into the rectum Daily As Needed for Constipation. 9/28/20  Yes Sarai Olivares APRN   calcitriol (ROCALTROL) 0.25 MCG capsule Take 1 capsule by mouth Daily. 1/4/18  Yes Enzo Ayala MD   diltiaZEM CD (CARDIZEM CD) 180 MG 24 hr capsule Take 1 capsule by mouth Daily. 1/4/18  Yes Enzo Ayala MD   escitalopram (LEXAPRO) 10 MG tablet Take 1 tablet by mouth Daily.   Yes Bony Evans MD   furosemide (LASIX) 40 MG tablet Take 1 tablet by mouth Daily. 4/8/20  Yes Melba Murillo APRN   HYDROcodone-acetaminophen (NORCO) 5-325 MG per tablet Take 1 tablet by mouth Every 8 (Eight) Hours As Needed.   Yes Bony Evans MD   methIMAzole (TAPAZOLE) 10 MG tablet Take 1 tablet by mouth Take As Directed. Take every day except Fridays   Yes Bony Evans MD   metoprolol tartrate (LOPRESSOR) 50 MG tablet Take 0.5 tablets by mouth 2 (Two) Times a Day. Patient reports taking 25 mg bid   Yes Bony Evans MD   polyethylene glycol (polyethylene glycol) 17 g packet Take 17 g by mouth Daily. 11/16/20  Yes Maddie Ruiz APRN   sennosides-docusate (PERICOLACE) 8.6-50 MG per tablet Take 2 tablets by mouth Every Night. 11/16/20  Yes Maddie Ruiz APRN   donepezil (ARICEPT) 5 MG tablet Take 1 tablet by mouth Every Night. 1/4/18   Enzo Ayala MD   ipratropium-albuterol (DUO-NEB) 0.5-2.5 mg/3 ml nebulizer Take 3 mL by nebulization 4 (Four) Times a Day. 9/28/20   Sarai Olivares APRN   oxyCODONE-acetaminophen (PERCOCET) 5-325 MG per tablet Take 1 tablet by mouth Every 6  "(Six) Hours As Needed for Moderate Pain  or Severe Pain . 11/16/20   Maddie Ruiz APRN   QUEtiapine (SEROquel) 25 MG tablet Take 1 tablet by mouth Every Night.    ProviderBony MD   vitamin D (ERGOCALCIFEROL) 1.25 MG (78385 UT) capsule capsule Take 1 capsule by mouth 1 (One) Time Per Week. Take every Wednesday    ProviderBony MD   warfarin (COUMADIN) 5 MG tablet Take 1 tablet by mouth 4 (Four) Times a Week. Take 10 mg Monday and Tuesday, then resume normal schedule taking 5 mg Wednesday.  Patient taking differently: Take 1 tablet by mouth 4 (Four) Times a Week. Patient takes 5 mg Monday, Wednesday, Friday, Saturday. 11/17/20   Maddie Ruiz APRN   warfarin (COUMADIN) 7.5 MG tablet Take 1 tablet by mouth 3 (Three) Times a Week. Take 10 mg Monday and Tuesday, then resume normal schedule with 5 mg on Wednesday  Patient taking differently: Take 1 tablet by mouth 3 (Three) Times a Week. Take 7.5 mg Sunday , Tuesday and Thursday 11/16/20   Maddie Ruiz APRN     I have utilized all available immediate resources to obtain, update, or review the patient's current medications (including all prescriptions, over-the-counter products, herbals, cannabis/cannabidiol products, and vitamin/mineral/dietary (nutritional) supplements).    Objective     Vital Signs: /90   Pulse 75   Temp 98.8 °F (37.1 °C) (Oral)   Resp 22   Ht 180.3 cm (71\")   Wt 103 kg (228 lb)   LMP  (LMP Unknown)   SpO2 100%   BMI 31.80 kg/m²   Physical Exam   General: Patient is alert, awake but lethargic on BiPAP  HEENT: Normocephalic, atraumatic, pupils are equal reactive to light, and accommodate  Neck: Supple, no JVD, no carotid bruits  CVS: Irregularly irregular rhythm  Lungs: Increased inspiratory effort, diminished breath sounds at the bases  Abdomen: Soft, nontender, nondistended bowel sounds are present  Extremities: Edema present bilaterally  Neurologic: No focal deficits  Psychiatric: Normal " affect        Results Reviewed:  Lab Results (last 24 hours)       Procedure Component Value Units Date/Time    Blood Gas, Arterial With Co-Ox [[id number]]  (Abnormal) Collected: 05/02/24 1408    Specimen: Arterial Blood Updated: 05/02/24 1409     Site Left Brachial     Rubén's Test N/A     pH, Arterial 7.296 pH units      Comment: 84 Value below reference range        pCO2, Arterial 65.9 mm Hg      Comment: 83 Value above reference range        pO2, Arterial 83.7 mm Hg      HCO3, Arterial 32.1 mmol/L      Comment: 83 Value above reference range        Base Excess, Arterial 3.6 mmol/L      Comment: 83 Value above reference range        O2 Saturation, Arterial 96.7 %      Hemoglobin, Blood Gas 13.4 g/dL      Hematocrit, Blood Gas 41.0 %      Oxyhemoglobin 94.9 %      Methemoglobin 0.50 %      Carboxyhemoglobin 1.4 %      Temperature 37.0     Sodium, Arterial 145 mmol/L      Potassium, Arterial 4.4 mmol/L      Ionized Calcium 5.90 mg/dL      Comment: 83 Value above reference range        Barometric Pressure for Blood Gas 750 mmHg      Modality BiPap     FIO2 40 %      Ventilator Mode NA     Set [facility] Resp Rate 14.0     IPAP 12     Comment: Meter: [id number]     :  Clemencia Osei, CRT        EPAP 6     Collected by [id number]     pH, Temp Corrected 7.296 pH Units      pCO2, Temperature Corrected 65.9 mm Hg      pO2, Temperature Corrected 83.7 mm Hg     Blood Culture - Blood, Arm, Left [[id number]] Collected: 05/02/24 1210    Specimen: Blood from Arm, Left Updated: 05/02/24 1313    Blood Culture - Blood, Arm, Right [[id number]] Collected: 05/02/24 1149    Specimen: Blood from Arm, Right Updated: 05/02/24 1313    Blood Gas, Arterial With Co-Ox [[id number]]  (Abnormal) Collected: 05/02/24 1241    Specimen: Arterial Blood Updated: 05/02/24 1242     Site Left Brachial     Rubén's Test N/A     pH, Arterial 7.275 pH units      Comment: 84 Value below reference range        pCO2, Arterial 68.6 mm Hg      Comment: 86  Value above critical limit        pO2, Arterial 67.1 mm Hg      Comment: 84 Value below reference range        HCO3, Arterial 31.8 mmol/L      Comment: 83 Value above reference range        Base Excess, Arterial 2.9 mmol/L      Comment: 83 Value above reference range        O2 Saturation, Arterial 92.7 %      Comment: 84 Value below reference range        Hemoglobin, Blood Gas 13.9 g/dL      Hematocrit, Blood Gas 42.6 %      Oxyhemoglobin 91.1 %      Comment: 84 Value below reference range        Methemoglobin 0.20 %      Carboxyhemoglobin 1.6 %      Temperature 37.0     Sodium, Arterial 145 mmol/L      Potassium, Arterial 4.6 mmol/L      Ionized Calcium 5.91 mg/dL      Comment: 83 Value above reference range        Barometric Pressure for Blood Gas 751 mmHg      Modality Nasal Cannula     Flow Rate 2.0 lpm      Ventilator Mode NA     Notified Who DR STEPHENS     Notified By Clemencia Osei CRT     Notified Time 05/02/2024 12:41     Collected by [id number]     Comment: Meter: [id number]     :  Clemencia Osei CRT        pH, Temp Corrected 7.275 pH Units      pCO2, Temperature Corrected 68.6 mm Hg      pO2, Temperature Corrected 67.1 mm Hg     COVID-19 and FLU A/B PCR, 1 HR TAT - Swab, Nasopharynx [[id number]]  (Normal) Collected: 05/02/24 1157    Specimen: Swab from Nasopharynx Updated: 05/02/24 1225     COVID19 Not Detected     Influenza A PCR Not Detected     Influenza B PCR Not Detected    Narrative:      Fact sheet for providers: https://www.fda.gov/media/589933/download    Fact sheet for patients: https://www.fda.gov/media/823342/download    Test performed by PCR.    Procalcitonin [[id number]]  (Normal) Collected: 05/02/24 1140    Specimen: Blood Updated: 05/02/24 1218     Procalcitonin 0.05 ng/mL     Narrative:      As a Marker for Sepsis (Non-Neonates):    1. <0.5 ng/mL represents a low risk of severe sepsis and/or septic shock.  2. >2 ng/mL represents a high risk of severe sepsis and/or septic  "shock.    As a Marker for Lower Respiratory Tract Infections that require antibiotic therapy:    PCT on Admission    Antibiotic Therapy       6-12 Hrs later    >0.5                Strongly Recommended  >0.25 - <0.5        Recommended   0.1 - 0.25          Discouraged              Remeasure/reassess PCT  <0.1                Strongly Discouraged     Remeasure/reassess PCT    As 28 day mortality risk marker: \"Change in Procalcitonin Result\" (>80% or <=80%) if Day 0 (or Day 1) and Day 4 values are available. Refer to http://[email or web address]-pct-calculator.com    Change in PCT <=80%  A decrease of PCT levels below or equal to 80% defines a positive change in PCT test result representing a higher risk for 28-day all-cause mortality of patients diagnosed with severe sepsis for septic shock.    Change in PCT >80%  A decrease of PCT levels of more than 80% defines a negative change in PCT result representing a lower risk for 28-day all-cause mortality of patients diagnosed with severe sepsis or septic shock.       Comprehensive Metabolic Panel [[id number]]  (Abnormal) Collected: 05/02/24 1140    Specimen: Blood Updated: 05/02/24 1213     Glucose 119 mg/dL      BUN 40 mg/dL      Creatinine 1.89 mg/dL      Sodium 143 mmol/L      Potassium 4.8 mmol/L      Chloride 104 mmol/L      CO2 32.0 mmol/L      Calcium 10.8 mg/dL      Total Protein 7.7 g/dL      Albumin 3.8 g/dL      ALT (SGPT) 12 U/L      AST (SGOT) 16 U/L      Alkaline Phosphatase 79 U/L      Total Bilirubin 0.5 mg/dL      Globulin 3.9 gm/dL      A/G Ratio 1.0 g/dL      BUN/Creatinine Ratio 21.2     Anion Gap 7.0 mmol/L      eGFR 26.6 mL/min/1.73     Narrative:      GFR Normal >60  Chronic Kidney Disease <60  Kidney Failure <15    The GFR formula is only valid for adults with stable renal function between ages 18 and 70.    BNP [[id number]]  (Normal) Collected: 05/02/24 1140    Specimen: Blood Updated: 05/02/24 1209     proBNP 1,132.0 pg/mL     Narrative:      This assay is " used as an aid in the diagnosis of individuals suspected of having heart failure. It can be used as an aid in the diagnosis of acute decompensated heart failure (ADHF) in patients presenting with signs and symptoms of ADHF to the emergency department (ED). In addition, NT-proBNP of <300 pg/mL indicates ADHF is not likely.    Age Range Result Interpretation  NT-proBNP Concentration (pg/mL:      <50             Positive            >450                   Gray                 300-450                    Negative             <300    50-75           Positive            >900                  Gray                300-900                  Negative            <300      >75             Positive            >1800                  Gray                300-1800                  Negative            <300    High Sensitivity Troponin T [[id number]]  (Abnormal) Collected: 05/02/24 1140    Specimen: Blood Updated: 05/02/24 1208     HS Troponin T 38 ng/L     Narrative:      High Sensitive Troponin T Reference Range:  <14.0 ng/L- Negative Female for AMI  <22.0 ng/L- Negative Male for AMI  >=14 - Abnormal Female indicating possible myocardial injury.  >=22 - Abnormal Male indicating possible myocardial injury.   Clinicians would have to utilize clinical acumen, EKG, Troponin, and serial changes to determine if it is an Acute Myocardial Infarction or myocardial injury due to an underlying chronic condition.         Lactic Acid, Plasma [[id number]]  (Normal) Collected: 05/02/24 1140    Specimen: Blood Updated: 05/02/24 1204     Lactate 1.3 mmol/L     Lillie Draw [[id number]] Collected: 05/02/24 1140    Specimen: Blood Updated: 05/02/24 1200    Narrative:      The following orders were created for panel order Lillie Draw.  Procedure                               Abnormality         Status                     ---------                               -----------         ------                     Green Top (Gel)[[id number]]                                   Final result               Lavender Top[[id number]]                                     Final result               Red Top[[id number]]                                          Final result               Gray Top[[id number]]                                         Final result               Light Blue Top[[id number]]                                   Final result                 Please view results for these tests on the individual orders.    Green Top (Gel) [[id number]] Collected: 05/02/24 1140    Specimen: Blood Updated: 05/02/24 1200     Extra Tube Hold for add-ons.     Comment: Auto resulted.       Red Top [[id number]] Collected: 05/02/24 1140    Specimen: Blood Updated: 05/02/24 1200     Extra Tube Hold for add-ons.     Comment: Auto resulted.       Gray Top [[id number]] Collected: 05/02/24 1140    Specimen: Blood Updated: 05/02/24 1200     Extra Tube Hold for add-ons.     Comment: Auto resulted.       Light Blue Top [[id number]] Collected: 05/02/24 1140    Specimen: Blood Updated: 05/02/24 1200     Extra Tube Hold for add-ons.     Comment: Auto resulted       Lavender Top [[id number]] Collected: 05/02/24 1140    Specimen: Blood Updated: 05/02/24 1200     Extra Tube hold for add-on     Comment: Auto resulted       Protime-INR [[id number]]  (Abnormal) Collected: 05/02/24 1140    Specimen: Blood Updated: 05/02/24 1200     Protime 33.2 Seconds      INR 3.10    CBC & Differential [[id number]]  (Abnormal) Collected: 05/02/24 1140    Specimen: Blood Updated: 05/02/24 1151    Narrative:      The following orders were created for panel order CBC & Differential.  Procedure                               Abnormality         Status                     ---------                               -----------         ------                     CBC Auto Differential[[id number]]        Abnormal            Final result                 Please view results for these tests on the individual orders.    CBC Auto Differential [[id number]]   (Abnormal) Collected: 05/02/24 1140    Specimen: Blood Updated: 05/02/24 1151     WBC 8.23 10*3/mm3      RBC 4.47 10*6/mm3      Hemoglobin 14.1 g/dL      Hematocrit 46.4 %      .8 fL      MCH 31.5 pg      MCHC 30.4 g/dL      RDW 13.9 %      RDW-SD 52.6 fl      MPV 9.1 fL      Platelets 171 10*3/mm3      Neutrophil % 82.3 %      Lymphocyte % 8.0 %      Monocyte % 8.9 %      Eosinophil % 0.2 %      Basophil % 0.2 %      Immature Grans % 0.4 %      Neutrophils, Absolute 6.77 10*3/mm3      Lymphocytes, Absolute 0.66 10*3/mm3      Monocytes, Absolute 0.73 10*3/mm3      Eosinophils, Absolute 0.02 10*3/mm3      Basophils, Absolute 0.02 10*3/mm3      Immature Grans, Absolute 0.03 10*3/mm3      nRBC 0.0 /100 WBC           Imaging Results (Last 24 Hours)       Procedure Component Value Units Date/Time    XR Chest 1 View [[id number]] Collected: 05/02/24 1202     Updated: 05/02/24 1207    Narrative:      XR CHEST 1 VW- 5/2/2024 10:59 AM     HISTORY: soa       COMPARISON: Chest x-ray dated 8/1/2022     FINDINGS:  Upright frontal radiograph of the chest was obtained     Bilateral patchy airspace opacities and there are bilateral layering  pleural effusions. Right-sided layering pleural effusion is large. No  pneumothorax identified. Cardiac silhouette is partially obscured by the  effusions. Central pulmonary vascular congestion. Previous TAVR. No  acute bony abnormality seen. Spinal scoliotic curvature.       Impression:      1.  Volume overload with bilateral pulmonary edema (mostly interstitial)  as well as bilateral layering pleural effusions. Right-sided pleural  effusion is large in size.     This report was signed and finalized on 5/2/2024 12:04 PM by Dr Lino Ramos.             I have personally reviewed and interpreted the radiology studies and ECG obtained at time of admission.     Assessment / Plan   Assessment:   Active Hospital Problems    Diagnosis     **CHF (congestive heart failure)        Treatment  Plan  The patient will be admitted to my service here at [facility].  Under my service to telemetry  1.  Acute respiratory failure with hypercarbia on BiPAP: Will start patient on breathing treatment with DuoNeb, try to wean off BiPAP, will consult pulmonary  2.  Acute decompensated diastolic heart failure, last 2D echo Doppler showed LVEF to be normal and that was back in 2020 at this time we will go ahead and order another 2D echo Doppler, to assess cardiac function and structure, patient to be started on diuretics with Lasix, fluid restriction, sodium restriction, will resume home medications  3.  Atrial fibrillation on chronic anticoagulation with warfarin, her INR was at 3.1, will check INR daily.  4.  Chronic kidney disease stage III: Will avoid nephrotoxic agents  5.  History of obstructive sleep apnea: On CPAP at home    Medical Decision Making  Number and Complexity of problems: 3  Differential Diagnosis:  Acute respiratory failure with hypercarbia on BiPAP  Acute decompensated diastolic heart failure  Atrial fibrillation with rapid ventricular response  Chronic anticoagulation on warfarin    Conditions and Status        guarded     MDM Data  External documents reviewed: none  Cardiac tracing (EKG, telemetry) interpretation: atrial fibrillation  Radiology interpretation: yes  Labs reviewed: yes  Any tests that were considered but not ordered: none     Decision rules/scores evaluated (example [id number], Wells, etc): 4     Discussed with: nursing staff     Care Planning  Shared decision making: patient,consultant  Code status and discussions: full    Disposition  Social Determinants of Health that impact treatment or disposition: none  Estimated length of stay is 3 days.     I confirmed that the patient's advanced care plan is present, code status is documented, and a surrogate decision maker is listed in the patient's medical record.     The patient's surrogate decision maker is patient.      The patient was seen and examined by me on 5/2/2024 at 1345.    Electronically signed by Los Vázquez MD, 05/02/24, 14:12 CDT.

## 2024-05-02 NOTE — CONSULTS
[facility] [place]ULMONARY [place] - [facility]    24, 16:47 CDT  Patient Care Team:  Mary Beth Izquierdo APRN as PCP - General  Mary Beth Izquierdo APRN as PCP - Family Medicine  Elia Flores MD (Inactive) as Cardiologist (Cardiology)  Name: Jarvis Morgan  : 1945  MRN: [id number]  Contact Serial Number [id number]    Chief complaint: acute respiratory failure with hypoxemia/hypercapnia     HPI:  We have been consulted by Los Vázquez MD to see this 79 y.o. female patient. Patient presented to [facility] ER on 24 via EMS with c/c of lethargy, shortness of breath, and worsening lower extremity edema. Family called EMS because they noticed the patient being more lethargic. Patient with right nephrectomy and CKD. She has had aortic valve replacement. She also has history of a-fib, CHF, cardiomegaly, and SSS. She has followed with Dr. Flores in the past, but has not seen cardiology in some time. She has been managed per her PCP.   Respiratory history: ASA-noncompliant with home CPAP. Baseline O2 2.5L. No history of lung disease.   Upon assessment the patient is resting in bed on BiPAP 16/6, 0.45 FiO2, O2 sat 99%. Decreased FiO2 to 0.35. CXR 24 with volume overload, bilateral pulmonary edema, and bilateral laying pleural effusions. Labs reviewed- ABG ph 7.29, pco2 65.9, po2 83.7, pco2 32.1, wbc 8.23, hgb 14.1, creatinine 1.89, BNP 1,132. She has received lasix 80mg x1. Obtain f/u ABG and CXR. 2d echo and cardiology consult pending.     Past Medical History:   has a past medical history of A-fib, Aortic stenosis, Arthritis, Bradycardia, Cancer, Cardiomegaly, CHF (congestive heart failure), GERD (gastroesophageal reflux disease), Hard of hearing, History of short term memory loss, Hypercalcemia, Hyperlipidemia, Hypertension, Hyperthyroidism (2017), Hypothyroidism, Kidney stone, Long term current use of anticoagulant therapy, Open wound, Palpitation, PONV (postoperative nausea and vomiting),  Shortness of breath, Sick sinus syndrome, Sleep apnea, and Stage 3 chronic kidney disease.   has a past surgical history that includes Hip Bipolar (Left); Bladder surgery; Nephrectomy radical (Right); Hysterectomy; Cardiac catheterization; Kidney surgery; Cardiac catheterization (N/A, 12/9/2016); Ileostomy; Joint replacement; Varicose vein surgery (Left, 4/10/2017); Aortic valve replacement; and distal femoral nailing (Right, 9/24/2020).  Allergies   Allergen Reactions    Ciprofloxacin Itching    Codeine Itching and GI Intolerance    Definity [Perflutren Lipid Microsphere] Other (See Comments)     Back pain    Tetanus Toxoids Itching     Itching around site    Tizanidine Hcl Itching    Other Itching     Cloth bandaids , causes redness of skin     Medications:  atorvastatin, 40 mg, Oral, Nightly  calcitriol, 0.25 mcg, Oral, Daily  dilTIAZem CD, 180 mg, Oral, Q24H  donepezil, 5 mg, Oral, Nightly  escitalopram, 10 mg, Oral, Daily  [START ON 5/3/2024] furosemide, 40 mg, Intravenous, Q12H  ipratropium-albuterol, 3 mL, Nebulization, 4x Daily - RT  methIMAzole, 10 mg, Oral, Take As Directed  metoprolol tartrate, 25 mg, Oral, Q12H  QUEtiapine, 25 mg, Oral, Nightly  sodium chloride, 10 mL, Intravenous, Q12H  warfarin, 5 mg, Oral, Once per day on Sunday Tuesday Thursday Saturday  [START ON 5/3/2024] warfarin, 7.5 mg, Oral, Once per day on Monday Wednesday Friday         Family History:  Family History   Problem Relation Age of Onset    Heart failure Mother     Heart disease Father     Stroke Father     Hypertension Sister     Heart failure Sister     No Known Problems Brother     No Known Problems Maternal Grandmother     No Known Problems Maternal Grandfather     No Known Problems Paternal Grandmother     No Known Problems Paternal Grandfather     Hypertension Sister     Heart failure Sister     Hypertension Sister     Heart failure Sister     Hypertension Sister     Heart failure Sister     Hypertension Sister     Heart  failure Sister     Hypertension Sister     Heart failure Sister     Gallbladder disease Brother     COPD Daughter     Asthma Daughter     Diabetes Son     No Known Problems Son     Autism Son      Social History:   reports that she has never smoked. She has never used smokeless tobacco. She reports that she does not drink alcohol and does not use drugs.  Review of Systems:  Shortness of breath, lethargy   Physical Exam:  Temp:  [97.9 °F (36.6 °C)-98.8 °F (37.1 °C)] 97.9 °F (36.6 °C)  Heart Rate:  [63-91] 83  Resp:  [18-23] 18  BP: (118-150)/(61-95) 138/88No intake or output data in the 24 hours ending 05/02/24 1647      05/02/24  1138   Weight: 103 kg (228 lb)     SpO2 Percentage    05/02/24 1551 05/02/24 1556 05/02/24 1632   SpO2: 100% 100% 98%     Body mass index is 31.8 kg/m².   Physical Exam  Vitals reviewed.   Constitutional:       General: She is awake.      Appearance: She is well-developed. She is obese.      Comments: bipap   HENT:      Head: Normocephalic and atraumatic.   Eyes:      General: No scleral icterus.     Conjunctiva/sclera: Conjunctivae normal.      Pupils: Pupils are equal, round, and reactive to light.   Cardiovascular:      Rate and Rhythm: Normal rate.   Pulmonary:      Breath sounds: Decreased breath sounds and rhonchi (few) present.   Abdominal:      Palpations: Abdomen is soft.      Comments: Ileostomy    Musculoskeletal:         General: Normal range of motion.      Cervical back: Normal range of motion and neck supple.      Right lower leg: Edema present.      Left lower leg: Edema present.      Comments: Chronic venous stasis changes   Skin:     General: Skin is warm and dry.   Neurological:      Mental Status: She is easily aroused.     Electronically signed by IRINEO Falk, 5/2/2024, 16:47 CDT      Physician substantive portion/medical decision making to follow:        Result Review    Laboratory Data:  Results from last 7 days   Lab Units 05/02/24  1140   WBC 10*3/mm3  8.23   HEMOGLOBIN g/dL 14.1   PLATELETS 10*3/mm3 171     Results from last 7 days   Lab Units 05/02/24  1408 05/02/24  1241 05/02/24  1140   SODIUM mmol/L  --   --  143   SODIUM, ARTERIAL mmol/L 145 145  --    POTASSIUM mmol/L  --   --  4.8   CHLORIDE mmol/L  --   --  104   CO2 mmol/L  --   --  32.0*   BUN mg/dL  --   --  40*   CREATININE mg/dL  --   --  1.89*   CALCIUM mg/dL  --   --  10.8*   ALK PHOS U/L  --   --  79   ALT (SGPT) U/L  --   --  12   AST (SGOT) U/L  --   --  16   LACTATE mmol/L  --   --  1.3         Lab 05/02/24  1140   GLUCOSE 119*     Results from last 7 days   Lab Units 05/02/24  1408 05/02/24  1241   PH, ARTERIAL pH units 7.296* 7.275*   PCO2, ARTERIAL mm Hg 65.9* 68.6*   PO2 ART mm Hg 83.7 67.1*   FIO2 % 40  --          Lab 05/02/24  1629 05/02/24  1423 05/02/24  1140   PROBNP  --   --  1,132.0   HSTROP T  --  38* 38*   INR 3.34*  --  3.10*               Component  Ref Range & Units 1 mo ago  (3/14/24)  [place] 5 mo ago  (11/6/23)  [place] 1 yr ago  (4/20/23)  [place] 1 yr ago  (10/19/22)  [place] 2 yr ago  (4/19/22)  [place] 2 yr ago  (10/18/21)  [place] 2 yr ago  (6/28/21)  [place]   TSH  0.270 - 4.200 uIU/mL 4.060 3.000 3.970 1.530 2.770 0.385 1.390        Microbiology Results (last 10 days)       Procedure Component Value - Date/Time    COVID-19 and FLU A/B PCR, 1 HR TAT - Swab, Nasopharynx [[id number]]  (Normal) Collected: 05/02/24 1157    Lab Status: Final result Specimen: Swab from Nasopharynx Updated: 05/02/24 1225     COVID19 Not Detected     Influenza A PCR Not Detected     Influenza B PCR Not Detected    Narrative:      Fact sheet for providers: https://www.fda.gov/media/995736/download    Fact sheet for patients: https://www.fda.gov/media/341759/download    Test performed by PCR.           Recent films:  XR Chest 1 View    Result Date: 5/2/2024  XR CHEST 1 VW- 5/2/2024 10:59 AM  HISTORY: soa   COMPARISON: Chest x-ray  dated 8/1/2022  FINDINGS: Upright frontal radiograph of the chest was obtained  Bilateral patchy airspace opacities and there are bilateral layering pleural effusions. Right-sided layering pleural effusion is large. No pneumothorax identified. Cardiac silhouette is partially obscured by the effusions. Central pulmonary vascular congestion. Previous TAVR. No acute bony abnormality seen. Spinal scoliotic curvature.      Impression: 1.  Volume overload with bilateral pulmonary edema (mostly interstitial) as well as bilateral layering pleural effusions. Right-sided pleural effusion is large in size.  This report was signed and finalized on 5/2/2024 12:04 PM by Dr Lino Ramos.        Personal review of imaging : CXR shows bilateral increased markings suggesting pulm edema    Pulmonary Assessment:  Acute resp failure with hypoxia and hypercapnia requiring bipap, threat to life and bodily function with high risk of morbidity from additional diagnostic testing or treatment   Farooq essentially untreated.  No other pulmonary disease identified  Pulmonary edema and pleural effusions  Mildly elevated bnp of uncertain significance  Ckd 3  Hx tavr  Chronic anticoagulation status  Hyperthyroidism  scoliosis    Recommend/plan:   Continue bipap for now until a little more stabilized and then continue hs.  Needs to resume home cpap  Avoid resp depressant medications  Check tsh, t4  Echocardiogram  Oxygen for sat 90  Diuresis, started already  Follow up abg    This visit was performed by both a physician and an [facility] RN.  I performed all aspects of the medical decision making as documented.  Electronically signed by Joshua Franklin MD, 5/2/2024, 18:21 CDT

## 2024-05-02 NOTE — ED NOTES
Pharmacy contacted for patient medications. Spoke with Kelsey in pharmacy. Aware that patient is ER hold and to send medications to ER when ready.

## 2024-05-03 PROBLEM — I48.21 PERMANENT ATRIAL FIBRILLATION: Status: ACTIVE | Noted: 2024-01-01

## 2024-05-03 PROBLEM — J96.02 ACUTE RESPIRATORY FAILURE WITH HYPOXIA AND HYPERCAPNIA: Status: ACTIVE | Noted: 2017-11-20

## 2024-05-03 PROBLEM — I50.33 ACUTE ON CHRONIC DIASTOLIC CONGESTIVE HEART FAILURE: Status: ACTIVE | Noted: 2024-01-01

## 2024-05-03 NOTE — PLAN OF CARE
Problem: Adult Inpatient Plan of Care  Goal: Plan of Care Review  Outcome: Ongoing, Progressing  Goal: Patient-Specific Goal (Individualized)  Outcome: Ongoing, Progressing  Goal: Absence of Hospital-Acquired Illness or Injury  Outcome: Ongoing, Progressing  Intervention: Identify and Manage Fall Risk  Recent Flowsheet Documentation  Taken 5/3/2024 0000 by Ariana Bae RN  Safety Promotion/Fall Prevention: safety round/check completed  Taken 5/2/2024 2200 by Ariana Bae RN  Safety Promotion/Fall Prevention: safety round/check completed  Taken 5/2/2024 2100 by Ariana Bae RN  Safety Promotion/Fall Prevention: safety round/check completed  Taken 5/2/2024 2000 by Ariana Bae RN  Safety Promotion/Fall Prevention: safety round/check completed  Taken 5/2/2024 1900 by Ariana Bae RN  Safety Promotion/Fall Prevention: safety round/check completed  Intervention: Prevent Skin Injury  Recent Flowsheet Documentation  Taken 5/3/2024 0000 by Ariana Bae RN  Body Position:   turned   side-lying   right  Taken 5/2/2024 2100 by Ariana Bae RN  Body Position: supine  Taken 5/2/2024 1900 by Ariana Bae RN  Body Position: supine, legs elevated  Intervention: Prevent and Manage VTE (Venous Thromboembolism) Risk  Recent Flowsheet Documentation  Taken 5/2/2024 2100 by Ariana Bae RN  Activity Management: bedrest  Range of Motion: active ROM (range of motion) encouraged  Goal: Optimal Comfort and Wellbeing  Outcome: Ongoing, Progressing  Intervention: Provide Person-Centered Care  Recent Flowsheet Documentation  Taken 5/2/2024 2100 by Ariana Bae RN  Trust Relationship/Rapport: care explained  Goal: Readiness for Transition of Care  Outcome: Ongoing, Progressing     Problem: Skin Injury Risk Increased  Goal: Skin Health and Integrity  Outcome: Ongoing, Progressing  Intervention: Optimize Skin Protection  Recent Flowsheet Documentation  Taken 5/3/2024 0000 by Ariana Bae RN  Head of Bed (HOB) Positioning: HOB at 30-45  degrees  Taken 5/2/2024 1900 by Ariana Bae, RN  Head of Bed (HOB) Positioning: HOB at 30 degrees   Goal Outcome Evaluation:

## 2024-05-03 NOTE — CONSULTS
[facility] CONSULT NOTE    Referring Provider: No ref. provider found    Reason for Consultation: Congestive Heart Failure     Chief complaint:   Chief Complaint   Patient presents with    Altered Mental Status       Subjective .     History of present illness:  Jarvis Morgan is a 79 y.o. female who presented to the ER for worsening shortness of breath and leg swelling. She is family members at bedside that provide most of history. Patient is able to answer yes and no questions and seems appropriate. She is lethargic and this had started prior to ER. Reportedly patient is very inactive and uses a wheelchair at home. Denies chest pain. She was admitted through the ER for respiratory failure and volume overload. She has been treated with BiPAP and diuresis. Patient does have a urostomy bag (roughly 40 years old). No I&Os have been collected. She reports her shortness of breath is better. Leg swelling noted to be better. She did have an elevated troponin with flat trend. EKG with RBBB and A-fib and nonspecific changes. Her BNP was normal. Chronic kidney disease appears at baseline. Patient was previously followed by Dr. Flores. Last OV was in 2019- she notes she has not been followed with anyone since that time. She does follow with Catrachita CABELLO. She is noted to have permanent atrial fibrillation, chronic diastolic congestive heart failure, aortic valve disease with TAVR in 2018, sleep apnea, remote DVT and chronic anticoagulation with coumadin. She did have an echo in 2020 which showed severely reduced RV function and moderate to severe dilated RV - which was a significant change compared to echo in 2018.     History  Past Medical History:   Diagnosis Date    A-fib     Aortic stenosis     Arthritis     Bradycardia     Cancer     bladder and skin    Cardiomegaly     CHF (congestive heart failure)     GERD (gastroesophageal reflux disease)     Hard of hearing     History of short term memory  loss     Hypercalcemia     Hyperlipidemia     Hypertension     Hyperthyroidism 11/20/2017    Hypothyroidism     Kidney stone     Long term current use of anticoagulant therapy     Open wound     left lower extremity,    Palpitation     PONV (postoperative nausea and vomiting)     Shortness of breath     Sick sinus syndrome     Sleep apnea     CPAP    Stage 3 chronic kidney disease    ,   Past Surgical History:   Procedure Laterality Date    AORTIC VALVE REPAIR/REPLACEMENT      BLADDER SURGERY      removed    CARDIAC CATHETERIZATION      CARDIAC CATHETERIZATION N/A 12/9/2016    Procedure: Left Heart Cath;  Surgeon: Elia Flores MD;  Location:  [facility] CATH [facility]VASIVE [facility];  Service:     DISTAL FEMORAL NAILING Right 9/24/2020    Procedure: RIGHT SHORT TFN;  Surgeon: Betito Shetty MD;  Location:  [facility] OR;  Service: Orthopedics;  Laterality: Right;    HIP BIPOLAR REPLACEMENT Left     HYSTERECTOMY      ILEOSTOMY      JOINT REPLACEMENT      KIDNEY SURGERY      right kidney removed    NEPHRECTOMY RADICAL Right     from cancer    VARICOSE VEIN SURGERY Left 4/10/2017    Procedure: LEFT LOWER EXTREMITY VENOGRAM. LEFT SAPHENOUS VEIN RADIO FREQUENCY ABLATION;  Surgeon: Blake Martinez DO;  Location:  [facility] OR;  Service:    ,   Family History   Problem Relation Age of Onset    Heart failure Mother     Heart disease Father     Stroke Father     Hypertension Sister     Heart failure Sister     No Known Problems Brother     No Known Problems Maternal Grandmother     No Known Problems Maternal Grandfather     No Known Problems Paternal Grandmother     No Known Problems Paternal Grandfather     Hypertension Sister     Heart failure Sister     Hypertension Sister     Heart failure Sister     Hypertension Sister     Heart failure Sister     Hypertension Sister     Heart failure Sister     Hypertension Sister     Heart failure Sister     Gallbladder disease Brother     COPD Daughter     Asthma Daughter     Diabetes Son     No  Known Problems Son     Autism Son    ,   Social History     Tobacco Use    Smoking status: Never    Smokeless tobacco: Never   Vaping Use    Vaping status: Never Used   Substance Use Topics    Alcohol use: No    Drug use: No   ,     Medications    Prior to Admission medications    Medication Sig Start Date End Date Taking? Authorizing Provider   acetaminophen (TYLENOL) 650 MG 8 hr tablet Take 1 tablet by mouth Every 8 (Eight) Hours As Needed for Mild Pain (arthritis).   Yes Bony Evans MD   atorvastatin (LIPITOR) 40 MG tablet Take 1 tablet by mouth Every Night.   Yes Bony Evans MD   calcitriol (ROCALTROL) 0.25 MCG capsule Take 1 capsule by mouth Daily. 1/4/18  Yes Enzo Ayala MD   diltiaZEM CD (CARDIZEM CD) 180 MG 24 hr capsule Take 1 capsule by mouth Daily. 1/4/18  Yes Enzo Ayala MD   furosemide (LASIX) 40 MG tablet Take 1 tablet by mouth Daily. 4/8/20  Yes Melba Murillo APRN   HYDROcodone-acetaminophen (NORCO) 5-325 MG per tablet Take 1 tablet by mouth Every 8 (Eight) Hours As Needed.   Yes Bony Evans MD   methIMAzole (TAPAZOLE) 10 MG tablet Take 1 tablet by mouth Take As Directed. Take every day except Fridays   Yes Bony Evans MD   metoprolol tartrate (LOPRESSOR) 50 MG tablet Take 0.5 tablets by mouth 2 (Two) Times a Day. Patient reports taking 25 mg bid   Yes Bony Evans MD   silver sulfadiazine (SILVADENE, SSD) 1 % cream Apply 1 Application topically to the appropriate area as directed Daily As Needed for Wound Care (on bottom).   Yes Bony Evans MD   ipratropium-albuterol (DUO-NEB) 0.5-2.5 mg/3 ml nebulizer Take 3 mL by nebulization 4 (Four) Times a Day. 9/28/20   Sarai Olivares APRN   warfarin (COUMADIN) 5 MG tablet Take 1 tablet by mouth 4 (Four) Times a Week. Take 10 mg Monday and Tuesday, then resume normal schedule taking 5 mg Wednesday.  Patient taking differently: Take 1 tablet by mouth 4 (Four) Times a Week.  Patient takes 5 mg Monday, Wednesday, Friday, Saturday. 11/17/20   Maddie Ruiz APRN   warfarin (COUMADIN) 7.5 MG tablet Take 1 tablet by mouth 3 (Three) Times a Week. Take 10 mg Monday and Tuesday, then resume normal schedule with 5 mg on Wednesday  Patient taking differently: Take 1 tablet by mouth 3 (Three) Times a Week. Take 7.5 mg Sunday , Tuesday and Thursday 11/16/20   Maddie Ruiz APRN       Current Facility-Administered Medications   Medication Dose Route Frequency Provider Last Rate Last Admin    acetaminophen (TYLENOL) tablet 650 mg  650 mg Oral Q6H PRN Los Vázquez MD        atorvastatin (LIPITOR) tablet 40 mg  40 mg Oral Nightly Los Vázquez MD   40 mg at 05/02/24 2139    sennosides-docusate (PERICOLACE) 8.6-50 MG per tablet 2 tablet  2 tablet Oral BID PRN Los Vázquez MD        And    polyethylene glycol (MIRALAX) packet 17 g  17 g Oral Daily PRN Los Vázquez MD        And    bisacodyl (DULCOLAX) EC tablet 5 mg  5 mg Oral Daily PRN Lso Vázquez MD        And    bisacodyl (DULCOLAX) suppository 10 mg  10 mg Rectal Daily PRN Los Vázquez MD        calcitriol (ROCALTROL) capsule 0.25 mcg  0.25 mcg Oral Daily Los Vázquez MD   0.25 mcg at 05/02/24 1619    Calcium Replacement - Follow Nurse / BPA Driven Protocol   Does not apply PRN Los Vázquez MD        dilTIAZem CD (CARDIZEM CD) 24 hr capsule 180 mg  180 mg Oral Q24H Los Vázquez MD   180 mg at 05/02/24 1620    donepezil (ARICEPT) tablet 5 mg  5 mg Oral Nightly Los Vázquez MD   5 mg at 05/02/24 2138    escitalopram (LEXAPRO) tablet 10 mg  10 mg Oral Daily Los Vázquez MD        furosemide (LASIX) injection 40 mg  40 mg Intravenous Q12H Los Vázquez MD   40 mg at 05/03/24 0054    ipratropium-albuterol (DUO-NEB) nebulizer solution 3 mL  3 mL Nebulization 4x Daily - RT Los Vázquez MD   3 mL at 05/03/24 0701    Magnesium Standard Dose Replacement - Follow Nurse / BPA Driven  Protocol   Does not apply PRN Los Vázquez MD        methIMAzole (TAPAZOLE) tablet 10 mg  10 mg Oral Daily Los Vázquez MD   10 mg at 05/02/24 1934    metoprolol tartrate (LOPRESSOR) tablet 25 mg  25 mg Oral Q12H Los Vázquez MD   25 mg at 05/02/24 2138    nitroglycerin (NITROSTAT) SL tablet 0.4 mg  0.4 mg Sublingual Q5 Min PRN Los Vázquez MD        nystatin (MYCOSTATIN) powder   Topical Q12H [facility], Sundeep, DO        oxyCODONE-acetaminophen (PERCOCET) 5-325 MG per tablet 1 tablet  1 tablet Oral Q6H PRN Los Vázquez MD        Phosphorus Replacement - Follow Nurse / BPA Driven Protocol   Does not apply PRN Los Vázquez MD        Potassium Replacement - Follow Nurse / BPA Driven Protocol   Does not apply PRLos Palm MD        QUEtiapine (SEROquel) tablet 25 mg  25 mg Oral Nightly Los Vázquez MD   25 mg at 05/02/24 2139    sodium chloride 0.9 % flush 10 mL  10 mL Intravenous PRN Los Vázquez MD        sodium chloride 0.9 % flush 10 mL  10 mL Intravenous Q12H Los Vázquez MD   10 mL at 05/02/24 2139    sodium chloride 0.9 % flush 10 mL  10 mL Intravenous PRN Los Vázquez MD        sodium chloride 0.9 % infusion 40 mL  40 mL Intravenous PRN Los Vázquez MD        [Held by provider] warfarin (COUMADIN) tablet 5 mg  5 mg Oral Once per day on Monday Wednesday Friday Saturday Los Vázquez MD        [Held by provider] warfarin (COUMADIN) tablet 7.5 mg  7.5 mg Oral Once per day on Sunday Tuesday Thursday Los Vázquez MD           Allergies:  Ciprofloxacin, Codeine, Definity [perflutren lipid microsphere], Tetanus toxoids, Tizanidine hcl, and Other    Review of Systems  Review of Systems   Unable to perform ROS: Dementia   Constitutional: Positive for malaise/fatigue.   Cardiovascular:  Positive for leg swelling. Negative for chest pain.   Respiratory:  Positive for shortness of breath.        Objective     Physical Exam:  Patient Vitals for the  "past 24 hrs:   BP Temp Temp src Pulse Resp SpO2 Height Weight   05/03/24 0715 113/50 97.8 °F (36.6 °C) Oral 57 16 99 % -- --   05/03/24 0705 -- -- -- 59 17 97 % -- --   05/03/24 0701 -- -- -- 58 18 96 % -- --   05/03/24 0415 111/63 97.3 °F (36.3 °C) Oral 79 18 97 % -- --   05/02/24 2346 122/63 97.4 °F (36.3 °C) Oral 68 18 93 % -- --   05/02/24 2104 116/64 97.6 °F (36.4 °C) Oral 61 23 93 % -- --   05/02/24 1955 -- -- -- 71 16 96 % -- --   05/02/24 1949 -- -- -- 74 16 96 % -- --   05/02/24 1755 144/86 97.7 °F (36.5 °C) Oral 95 16 96 % 175.3 cm (69\") 101 kg (221 lb 11.2 oz)   05/02/24 1645 -- -- -- -- -- 99 % -- --   05/02/24 1632 -- -- -- -- 18 98 % -- --   05/02/24 1620 138/88 -- -- 83 -- -- -- --   05/02/24 1619 -- 97.9 °F (36.6 °C) Oral -- -- -- -- --   05/02/24 1616 138/88 -- -- 66 -- -- -- --   05/02/24 1556 -- -- -- 78 18 100 % -- --   05/02/24 1551 -- -- -- 77 23 100 % -- --   05/02/24 1546 118/61 -- -- 65 -- -- -- --   05/02/24 1531 124/67 -- -- 70 -- -- -- --   05/02/24 1516 127/87 -- -- 82 -- -- -- --   05/02/24 1501 136/88 -- -- 79 -- -- -- --   05/02/24 1446 143/95 -- -- 79 -- -- -- --   05/02/24 1431 148/95 -- -- 79 -- -- -- --   05/02/24 1426 148/94 -- -- 80 -- -- -- --   05/02/24 1312 -- -- -- 78 -- 94 % -- --   05/02/24 1258 135/90 -- -- 75 -- -- -- --   05/02/24 1256 -- -- -- 75 -- 100 % -- --   05/02/24 1246 135/90 -- -- -- -- -- -- --   05/02/24 1243 -- -- -- 63 -- -- -- --   05/02/24 1240 -- -- -- 80 -- 94 % -- --   05/02/24 1232 -- -- -- 88 -- 94 % -- --   05/02/24 1231 134/82 -- -- 89 -- -- -- --   05/02/24 1216 134/83 -- -- -- -- -- -- --   05/02/24 1215 -- -- -- 77 -- -- -- --   05/02/24 1214 -- -- -- 81 -- 97 % -- --   05/02/24 1201 138/75 -- -- 73 -- -- -- --   05/02/24 1159 -- -- -- 84 -- 100 % -- --   05/02/24 1146 139/84 98.8 °F (37.1 °C) Oral 82 -- -- -- --   05/02/24 1145 -- -- -- 78 -- -- -- --   05/02/24 1138 -- -- -- -- -- -- 180.3 cm (71\") 103 kg (228 lb)   05/02/24 1136 150/83 -- -- " 91 22 (!) 85 % -- --   05/02/24 1133 150/83 -- -- -- -- -- -- --     Constitutional:       Appearance: Not in distress. Frail. Chronically ill-appearing.      Interventions: Face mask in place.   Eyes:      Pupils: Pupils are equal, round, and reactive to light.   HENT:      Head: Normocephalic and atraumatic.      Nose: Nose normal.    Mouth/Throat:      Dentition: Normal.   Pulmonary:      Effort: Pulmonary effort is normal. Increased respiratory effort.      Breath sounds: Decreased breath sounds present.   Chest:      Chest wall: Not tender to palpatation.   Cardiovascular:      PMI at left midclavicular line. Normal rate. Irregular rhythm.      Murmurs: There is a systolic murmur.   Pulses:     Intact distal pulses.   Edema:     Peripheral edema present.     Ankle: 2+ pitting edema of the left ankle and 1+ pitting edema of the right ankle.  Abdominal:      General: Bowel sounds are normal.      Palpations: Abdomen is soft.   Musculoskeletal: Normal range of motion.      Cervical back: Normal range of motion. Skin:     General: Skin is warm and dry.   Neurological:      Mental Status: Alert and oriented to person, place, and time.      Comments: Lethargic, drowsy   Psychiatric:         Attention and Perception: Attention normal.         Mood and Affect: Mood normal.         Results Review:   I reviewed the patient's new clinical results.    Lab Results (last 72 hours)       Procedure Component Value Units Date/Time    Basic Metabolic Panel [[id number]]  (Abnormal) Collected: 05/03/24 0526    Specimen: Blood Updated: 05/03/24 0629     Glucose 95 mg/dL      BUN 41 mg/dL      Creatinine 1.77 mg/dL      Sodium 144 mmol/L      Potassium 4.4 mmol/L      Chloride 105 mmol/L      CO2 35.0 mmol/L      Calcium 10.4 mg/dL      BUN/Creatinine Ratio 23.2     Anion Gap 4.0 mmol/L      eGFR 28.8 mL/min/1.73     Narrative:      GFR Normal >60  Chronic Kidney Disease <60  Kidney Failure <15    The GFR formula is only valid for  adults with stable renal function between ages 18 and 70.    TSH [[id number]]  (Normal) Collected: 05/03/24 0526    Specimen: Blood Updated: 05/03/24 0629     TSH 2.990 uIU/mL     T4, Free [[id number]]  (Abnormal) Collected: 05/03/24 0526    Specimen: Blood Updated: 05/03/24 0629     Free T4 0.82 ng/dL     Protime-INR [[id number]]  (Abnormal) Collected: 05/03/24 0526    Specimen: Blood Updated: 05/03/24 0608     Protime 37.3 Seconds      INR 3.60    CBC Auto Differential [[id number]]  (Abnormal) Collected: 05/03/24 0526    Specimen: Blood Updated: 05/03/24 0603     WBC 6.46 10*3/mm3      RBC 4.23 10*6/mm3      Hemoglobin 13.4 g/dL      Hematocrit 44.0 %      .0 fL      MCH 31.7 pg      MCHC 30.5 g/dL      RDW 13.5 %      RDW-SD 51.9 fl      MPV 9.4 fL      Platelets 154 10*3/mm3      Neutrophil % 78.0 %      Lymphocyte % 8.2 %      Monocyte % 11.9 %      Eosinophil % 0.9 %      Basophil % 0.5 %      Immature Grans % 0.5 %      Neutrophils, Absolute 5.04 10*3/mm3      Lymphocytes, Absolute 0.53 10*3/mm3      Monocytes, Absolute 0.77 10*3/mm3      Eosinophils, Absolute 0.06 10*3/mm3      Basophils, Absolute 0.03 10*3/mm3      Immature Grans, Absolute 0.03 10*3/mm3      nRBC 0.0 /100 WBC     Blood Gas, Arterial - [[id number]]  (Abnormal) Collected: 05/02/24 2041    Specimen: Arterial Blood Updated: 05/02/24 2041     Site Right Radial     Rubén's Test Positive     pH, Arterial 7.355 pH units      pCO2, Arterial 59.2 mm Hg      Comment: 83 Value above reference range        pO2, Arterial 78.9 mm Hg      Comment: 84 Value below reference range        HCO3, Arterial 33.1 mmol/L      Comment: 83 Value above reference range        Base Excess, Arterial 5.8 mmol/L      Comment: 83 Value above reference range        O2 Saturation, Arterial 96.3 %      Temperature 37.0     Barometric Pressure for Blood Gas 750 mmHg      Modality BiPap     FIO2 35 %      Ventilator Mode NA     Set Mech Resp Rate 16.0     IPAP 16     Comment:  Meter: [id number]     :  Krissy Mccarthy, RRT        EPAP 6     Collected by [id number]     pCO2, Temperature Corrected 59.2 mm Hg      pH, Temp Corrected 7.355 pH Units      pO2, Temperature Corrected 78.9 mm Hg     Protime-INR [[id number]]  (Abnormal) Collected: 05/02/24 1629    Specimen: Blood Updated: 05/02/24 1645     Protime 35.2 Seconds      INR 3.34    High Sensitivity Troponin T 2Hr [[id number]]  (Abnormal) Collected: 05/02/24 1423    Specimen: Blood Updated: 05/02/24 1456     HS Troponin T 38 ng/L      Troponin T Delta 0 ng/L     Narrative:      High Sensitive Troponin T Reference Range:  <14.0 ng/L- Negative Female for AMI  <22.0 ng/L- Negative Male for AMI  >=14 - Abnormal Female indicating possible myocardial injury.  >=22 - Abnormal Male indicating possible myocardial injury.   Clinicians would have to utilize clinical acumen, EKG, Troponin, and serial changes to determine if it is an Acute Myocardial Infarction or myocardial injury due to an underlying chronic condition.         Blood Gas, Arterial With Co-Ox [[id number]]  (Abnormal) Collected: 05/02/24 1408    Specimen: Arterial Blood Updated: 05/02/24 1409     Site Left Brachial     Rubén's Test N/A     pH, Arterial 7.296 pH units      Comment: 84 Value below reference range        pCO2, Arterial 65.9 mm Hg      Comment: 83 Value above reference range        pO2, Arterial 83.7 mm Hg      HCO3, Arterial 32.1 mmol/L      Comment: 83 Value above reference range        Base Excess, Arterial 3.6 mmol/L      Comment: 83 Value above reference range        O2 Saturation, Arterial 96.7 %      Hemoglobin, Blood Gas 13.4 g/dL      Hematocrit, Blood Gas 41.0 %      Oxyhemoglobin 94.9 %      Methemoglobin 0.50 %      Carboxyhemoglobin 1.4 %      Temperature 37.0     Sodium, Arterial 145 mmol/L      Potassium, Arterial 4.4 mmol/L      Ionized Calcium 5.90 mg/dL      Comment: 83 Value above reference range        Barometric Pressure for Blood Gas 750 mmHg       Modality BiPap     FIO2 40 %      Ventilator Mode NA     Set [facility] Resp Rate 14.0     IPAP 12     Comment: Meter: [id number]     :  Clemencia Osei CRT        EPAP 6     Collected by [id number]     pH, Temp Corrected 7.296 pH Units      pCO2, Temperature Corrected 65.9 mm Hg      pO2, Temperature Corrected 83.7 mm Hg     Blood Culture - Blood, Arm, Left [[id number]] Collected: 05/02/24 1210    Specimen: Blood from Arm, Left Updated: 05/02/24 1313    Blood Culture - Blood, Arm, Right [[id number]] Collected: 05/02/24 1149    Specimen: Blood from Arm, Right Updated: 05/02/24 1313    Blood Gas, Arterial With Co-Ox [[id number]]  (Abnormal) Collected: 05/02/24 1241    Specimen: Arterial Blood Updated: 05/02/24 1242     Site Left Brachial     Rubén's Test N/A     pH, Arterial 7.275 pH units      Comment: 84 Value below reference range        pCO2, Arterial 68.6 mm Hg      Comment: 86 Value above critical limit        pO2, Arterial 67.1 mm Hg      Comment: 84 Value below reference range        HCO3, Arterial 31.8 mmol/L      Comment: 83 Value above reference range        Base Excess, Arterial 2.9 mmol/L      Comment: 83 Value above reference range        O2 Saturation, Arterial 92.7 %      Comment: 84 Value below reference range        Hemoglobin, Blood Gas 13.9 g/dL      Hematocrit, Blood Gas 42.6 %      Oxyhemoglobin 91.1 %      Comment: 84 Value below reference range        Methemoglobin 0.20 %      Carboxyhemoglobin 1.6 %      Temperature 37.0     Sodium, Arterial 145 mmol/L      Potassium, Arterial 4.6 mmol/L      Ionized Calcium 5.91 mg/dL      Comment: 83 Value above reference range        Barometric Pressure for Blood Gas 751 mmHg      Modality Nasal Cannula     Flow Rate 2.0 lpm      Ventilator Mode NA     Notified Who DR STEPHENS     Notified By Clemencia Osei CRT     Notified Time 05/02/2024 12:41     Collected by [id number]     Comment: Meter: [id number]     :  Clemencia Osei CRT         "pH, Temp Corrected 7.275 pH Units      pCO2, Temperature Corrected 68.6 mm Hg      pO2, Temperature Corrected 67.1 mm Hg     COVID-19 and FLU A/B PCR, 1 HR TAT - Swab, Nasopharynx [[id number]]  (Normal) Collected: 05/02/24 1157    Specimen: Swab from Nasopharynx Updated: 05/02/24 1225     COVID19 Not Detected     Influenza A PCR Not Detected     Influenza B PCR Not Detected    Narrative:      Fact sheet for providers: https://www.fda.gov/media/037450/download    Fact sheet for patients: https://www.fda.gov/media/248738/download    Test performed by PCR.    Procalcitonin [[id number]]  (Normal) Collected: 05/02/24 1140    Specimen: Blood Updated: 05/02/24 1218     Procalcitonin 0.05 ng/mL     Narrative:      As a Marker for Sepsis (Non-Neonates):    1. <0.5 ng/mL represents a low risk of severe sepsis and/or septic shock.  2. >2 ng/mL represents a high risk of severe sepsis and/or septic shock.    As a Marker for Lower Respiratory Tract Infections that require antibiotic therapy:    PCT on Admission    Antibiotic Therapy       6-12 Hrs later    >0.5                Strongly Recommended  >0.25 - <0.5        Recommended   0.1 - 0.25          Discouraged              Remeasure/reassess PCT  <0.1                Strongly Discouraged     Remeasure/reassess PCT    As 28 day mortality risk marker: \"Change in Procalcitonin Result\" (>80% or <=80%) if Day 0 (or Day 1) and Day 4 values are available. Refer to http://www.[employer]-pct-calculator.com    Change in PCT <=80%  A decrease of PCT levels below or equal to 80% defines a positive change in PCT test result representing a higher risk for 28-day all-cause mortality of patients diagnosed with severe sepsis for septic shock.    Change in PCT >80%  A decrease of PCT levels of more than 80% defines a negative change in PCT result representing a lower risk for 28-day all-cause mortality of patients diagnosed with severe sepsis or septic shock.       Comprehensive Metabolic Panel " [[id number]]  (Abnormal) Collected: 05/02/24 1140    Specimen: Blood Updated: 05/02/24 1213     Glucose 119 mg/dL      BUN 40 mg/dL      Creatinine 1.89 mg/dL      Sodium 143 mmol/L      Potassium 4.8 mmol/L      Chloride 104 mmol/L      CO2 32.0 mmol/L      Calcium 10.8 mg/dL      Total Protein 7.7 g/dL      Albumin 3.8 g/dL      ALT (SGPT) 12 U/L      AST (SGOT) 16 U/L      Alkaline Phosphatase 79 U/L      Total Bilirubin 0.5 mg/dL      Globulin 3.9 gm/dL      A/G Ratio 1.0 g/dL      BUN/Creatinine Ratio 21.2     Anion Gap 7.0 mmol/L      eGFR 26.6 mL/min/1.73     Narrative:      GFR Normal >60  Chronic Kidney Disease <60  Kidney Failure <15    The GFR formula is only valid for adults with stable renal function between ages 18 and 70.    BNP [[id number]]  (Normal) Collected: 05/02/24 1140    Specimen: Blood Updated: 05/02/24 1209     proBNP 1,132.0 pg/mL     Narrative:      This assay is used as an aid in the diagnosis of individuals suspected of having heart failure. It can be used as an aid in the diagnosis of acute decompensated heart failure (ADHF) in patients presenting with signs and symptoms of ADHF to the emergency department (ED). In addition, NT-proBNP of <300 pg/mL indicates ADHF is not likely.    Age Range Result Interpretation  NT-proBNP Concentration (pg/mL:      <50             Positive            >450                   Gray                 300-450                    Negative             <300    50-75           Positive            >900                  Gray                300-900                  Negative            <300      >75             Positive            >1800                  Gray                300-1800                  Negative            <300    High Sensitivity Troponin T [[id number]]  (Abnormal) Collected: 05/02/24 1140    Specimen: Blood Updated: 05/02/24 1208     HS Troponin T 38 ng/L     Narrative:      High Sensitive Troponin T Reference Range:  <14.0 ng/L- Negative Female for  AMI  <22.0 ng/L- Negative Male for AMI  >=14 - Abnormal Female indicating possible myocardial injury.  >=22 - Abnormal Male indicating possible myocardial injury.   Clinicians would have to utilize clinical acumen, EKG, Troponin, and serial changes to determine if it is an Acute Myocardial Infarction or myocardial injury due to an underlying chronic condition.         Lactic Acid, Plasma [[id number]]  (Normal) Collected: 05/02/24 1140    Specimen: Blood Updated: 05/02/24 1204     Lactate 1.3 mmol/L     [facility] [[id number]] Collected: 05/02/24 1140    Specimen: Blood Updated: 05/02/24 1200    Narrative:      The following orders were created for panel order [facility].  Procedure                               Abnormality         Status                     ---------                               -----------         ------                     Green Top (Gel)[[id number]]                                  Final result               Lavender Top[[id number]]                                     Final result               Red Top[[id number]]                                          Final result               Gray Top[[id number]]                                         Final result               Light Blue Top[[id number]]                                   Final result                 Please view results for these tests on the individual orders.    Green Top (Gel) [[id number]] Collected: 05/02/24 1140    Specimen: Blood Updated: 05/02/24 1200     Extra Tube Hold for add-ons.     Comment: Auto resulted.       Red Top [[id number]] Collected: 05/02/24 1140    Specimen: Blood Updated: 05/02/24 1200     Extra Tube Hold for add-ons.     Comment: Auto resulted.       Gray Top [[id number]] Collected: 05/02/24 1140    Specimen: Blood Updated: 05/02/24 1200     Extra Tube Hold for add-ons.     Comment: Auto resulted.       Light Blue Top [[id number]] Collected: 05/02/24 1140    Specimen: Blood Updated: 05/02/24 1200     Extra Tube Hold for  add-ons.     Comment: Auto resulted       Lavender Top [[id number]] Collected: 05/02/24 1140    Specimen: Blood Updated: 05/02/24 1200     Extra Tube hold for add-on     Comment: Auto resulted       Protime-INR [[id number]]  (Abnormal) Collected: 05/02/24 1140    Specimen: Blood Updated: 05/02/24 1200     Protime 33.2 Seconds      INR 3.10    CBC & Differential [[id number]]  (Abnormal) Collected: 05/02/24 1140    Specimen: Blood Updated: 05/02/24 1151    Narrative:      The following orders were created for panel order CBC & Differential.  Procedure                               Abnormality         Status                     ---------                               -----------         ------                     CBC Auto Differential[[id number]]        Abnormal            Final result                 Please view results for these tests on the individual orders.    CBC Auto Differential [[id number]]  (Abnormal) Collected: 05/02/24 1140    Specimen: Blood Updated: 05/02/24 1151     WBC 8.23 10*3/mm3      RBC 4.47 10*6/mm3      Hemoglobin 14.1 g/dL      Hematocrit 46.4 %      .8 fL      MCH 31.5 pg      MCHC 30.4 g/dL      RDW 13.9 %      RDW-SD 52.6 fl      MPV 9.1 fL      Platelets 171 10*3/mm3      Neutrophil % 82.3 %      Lymphocyte % 8.0 %      Monocyte % 8.9 %      Eosinophil % 0.2 %      Basophil % 0.2 %      Immature Grans % 0.4 %      Neutrophils, Absolute 6.77 10*3/mm3      Lymphocytes, Absolute 0.66 10*3/mm3      Monocytes, Absolute 0.73 10*3/mm3      Eosinophils, Absolute 0.02 10*3/mm3      Basophils, Absolute 0.02 10*3/mm3      Immature Grans, Absolute 0.03 10*3/mm3      nRBC 0.0 /100 WBC             Results for orders placed during the hospital encounter of 09/21/20    Adult Transthoracic Echo Complete W/ Cont if Necessary Per Protocol    Interpretation Summary  · Left ventricular wall thickness is consistent with concentric hypertrophy. Left ventricular diastolic dysfunction is noted.  · Estimated EF =  60%  · Right ventricular cavity is moderate to severely dilated. Severely reduced right ventricular systolic function noted.  · Left atrial cavity size is moderately dilated.  · Right atrial cavity size is moderately dilated.  · Compared to the previous study in 2018, marked RV dysfunction is present       Imaging Results (Last 72 Hours)       Procedure Component Value Units Date/Time    XR Chest 1 View [[id number]] Collected: 05/03/24 0727     Updated: 05/03/24 0731    Narrative:      EXAMINATION: XR CHEST 1 VW-     5/3/2024 2:51 AM     HISTORY: resp failure; J96.01-Acute respiratory failure with hypoxia;  J96.02-Acute respiratory failure with hypercapnia; I50.9-Heart failure,  unspecified; V88-Bizvsep effusion, not elsewhere classified;  N18.9-Chronic kidney disease, unspecified; I48.11-Longstanding  persistent atrial fibrillation     1 view chest x-ray.     COMPARISON:  Yesterday at 11:56 a.m.     Bibasilar infiltrate and pleural fluid.     No pneumothorax.     Stable heart size.  TAVR heart valve noted.       Impression:      1. Similar appearance of bilateral infiltrate and pleural fluid. No  pneumothorax is seen.           This report was signed and finalized on 5/3/2024 7:28 AM by Dr. Gregorio Rick MD.       XR Chest 1 View [[id number]] Collected: 05/02/24 1202     Updated: 05/02/24 1207    Narrative:      XR CHEST 1 VW- 5/2/2024 10:59 AM     HISTORY: soa       COMPARISON: Chest x-ray dated 8/1/2022     FINDINGS:  Upright frontal radiograph of the chest was obtained     Bilateral patchy airspace opacities and there are bilateral layering  pleural effusions. Right-sided layering pleural effusion is large. No  pneumothorax identified. Cardiac silhouette is partially obscured by the  effusions. Central pulmonary vascular congestion. Previous TAVR. No  acute bony abnormality seen. Spinal scoliotic curvature.       Impression:      1.  Volume overload with bilateral pulmonary edema (mostly interstitial)  as well as  bilateral layering pleural effusions. Right-sided pleural  effusion is large in size.     This report was signed and finalized on 5/2/2024 12:04 PM by Dr Lino Ramos.             Assessment & Plan       Acute on chronic diastolic congestive heart failure    Essential hypertension    ASA (obstructive sleep apnea)    Hyperthyroidism    Acute respiratory failure with hypoxia and hypercapnia    S/P TAVR (transcatheter aortic valve replacement)    Right pleural effusion    Stage 3 chronic kidney disease    Permanent atrial fibrillation    Plan:  Acute on Chronic Diastolic Congestive Heart Failure- No I&O's or weights thus far. Leg swelling reportedly improved. Breathing some improved. On BiPAP machine this morning. Continue IV diuresis. BP soft. Titrate heart failure medicines as renal function and BP will allow. Low Sat diet. Daily weights. And Strict I&O. Pleural Effusions. Of note her last echo was done in 2020 which showed severe RV dysfunction and moderate to severely dilated. Last visit with cardiology was in 2019.     Acute Respiratory Failure with hypoxia and hypercapnia- pulmonary following. On BiPAP. She has oxygen to wear at home as needed but she does not. She also is prescribed a CPAP at night but is noncompliant.     Permanent Atrial Fibrillation- rates are controlled on Lopressor 25 mg BID. Noted to permanent for some time. Anticoagulated with coumadin.     TAVR- patient had TAVR in May of 2018 at [place]. Echo ordered.     Chronic Anticoagulation- anticoagulated with coumadin for A-fib and remote DVT. INR high. Coumadin on hold. Daily INRs ordered.     Chronic Kidney Disease - with right nephrectomy. Renal function stable and tolerating diureses. Also with urostomy.     Hypertension- blood pressure stable. Borderline low.     Right Pleural Effusion- diuresing.       Further orders per Dr. Hobbs     Thank you for asking us to follow this patient with you.       Electronically signed by Librado  IRINEO Kemp, 05/03/24, 9:05 AM CDT.

## 2024-05-03 NOTE — PROGRESS NOTES
RT EQUIPMENT DEVICE RELATED - SKIN ASSESSMENT    Deven Score:  Deven Score: 13     RT Medical Equipment/Device:     NIV Mask:  Under-the-nose   size:       Skin Assessment:      Cheek:  Intact  Ears:  Intact  Neck:  Intact    Device Skin Pressure Protection:  Pressure points protected    Nurse Notification:  Darcie Recinos, RRT

## 2024-05-03 NOTE — PLAN OF CARE
Problem: Noninvasive Ventilation Acute  Goal: Effective Unassisted Ventilation and Oxygenation  Outcome: Ongoing, Progressing   Goal Outcome Evaluation:         Wean off bipap

## 2024-05-03 NOTE — CASE MANAGEMENT/SOCIAL WORK
Discharge Planning Assessment  [facility]     Patient Name: Jarvis Morgan  MRN: [id number]  Today's Date: 5/3/2024    Admit Date: 5/2/2024        Discharge Needs Assessment       Row Name 05/03/24 1519       Living Environment    People in Home child(jamin), adult    Current Living Arrangements home    Potentially Unsafe Housing Conditions none    Primary Care Provided by self    Family Caregiver if Needed child(jamin), adult       Resource/Environmental Concerns    Resource/Environmental Concerns none    Transportation Concerns none       Transition Planning    Patient/Family Anticipates Transition to home with family    Patient/Family Anticipated Services at Transition none    Transportation Anticipated family or friend will provide       Discharge Needs Assessment    Readmission Within the Last 30 Days no previous admission in last 30 days    Equipment Currently Used at Home shower chair;cpap;colostomy/ostomy supplies;walker, rolling    Concerns to be Addressed no discharge needs identified;denies needs/concerns at this time    Anticipated Changes Related to Illness none    Equipment Needed After Discharge none    Discharge Coordination/Progress Pt plans to return home with her daughter upon DC, denies the need for HH. Has a PCP and Rx coverage.  Will follow for any DC needs.                   Discharge Plan    No documentation.                 Continued Care and Services - Admitted Since 5/2/2024    No active coordination exists for this encounter.          Demographic Summary    No documentation.                  Functional Status    No documentation.                  Psychosocial    No documentation.                  Abuse/Neglect    No documentation.                  Legal    No documentation.                  Substance Abuse    No documentation.                  Patient Forms    No documentation.                     Omaira Sy RN

## 2024-05-03 NOTE — PROGRESS NOTES
Inpatient Nutrition Services  Patient Name:  Jarvis Morgan  YOB: 1945  MRN: [id number]  Admit Date:  5/2/2024  Assessment Date:  5/3/2024   Reason for Assessment       Row Name 05/03/24 1606          Reason for Assessment    Reason For Assessment identified at risk by screening criteria;per organizational policy;other (see comments)  wound care provider consult, active pressure injury report     Diagnosis cardiac disease     Identified At Risk by Screening Criteria large or nonhealing wound, burn or pressure injury;MST SCORE 2+                    Nutrition/Diet History       Row Name 05/03/24 1606          Nutrition/Diet History    Typical Intake (Food/Fluid/EN/PN) Pt receiving breathing treatment during attempted visit. GCS 14 noted. No PO meals recorded at this time. BM today. Multiple areas of skin breakdown noted. Will order Mighty Shake with all meals to help with increased nutrient needs for wound healing.     Food Intolerance(s) None per EMR     Factors Affecting Nutritional Intake appetite;other (see comments);cognitive status/motor function  advanced age                    Labs/Tests/Procedures/Meds       Row Name 05/03/24 1608          Labs/Procedures/Meds    Lab Results Reviewed reviewed     Lab Results Comments BUN, Cr        Diagnostic Tests/Procedures    Diagnostic Test/Procedure Reviewed reviewed        Medications    Pertinent Medications Reviewed reviewed     Pertinent Medications Comments See MAR                    Physical Findings       Row Name 05/03/24 1608          Physical Findings    Overall Physical Appearance GCS 14, NC, Bm 5/3, Deven Score 13, skin breakdown to: left calf, bilateral lower sacral spine, bilateral breasts, bilateral medial gluteal.                    Estimated/Assessed Needs - Anthropometrics       Row Name 05/03/24 1608          Anthropometrics    Weight for Calculation 101 kg (221 lb 11.2 oz)        Estimated/Assessed Needs    Additional Documentation  Fluid Requirements (Group);[place] Jeor Equation (Group);Protein Requirements (Group)        [place]-St[place] Jeor Equation    RMR ([place]-St. Jeor Equation) 1539.995     [place] Jeor Activity Factors 1.2     Activity Factors ([place]-St. Jeor) 1847.994        Protein Requirements    Weight Used For Protein Calculations 65.8 kg (145 lb)  IBW     Est Protein Requirement Amount (gms/kg) 1.3 gm protein     Estimated Protein Requirements (gms/day) 85.5        Fluid Requirements    Fluid Requirements (mL/day) 1848     RDA Method (mL) 1848                    Nutrition Prescription Ordered       Row Name 05/03/24 1609          Nutrition Prescription PO    Current PO Diet Regular     Fluid Consistency Thin     Common Modifiers Cardiac                    Evaluation of Received Nutrient/Fluid Intake       Row Name 05/03/24 1609          Nutrient/Fluid Evaluation    Number of Days Evaluated Other (comment)  admission < 24 hr        Fluid Intake Evaluation    Oral Fluid (mL) 210  admit to present total        PO Evaluation    Number of Days PO Intake Evaluated Insufficient Data                       Problem/Interventions:   Problem 1       Row Name 05/03/24 1609          Nutrition Diagnoses Problem 1    Problem 1 Increased Nutrient Needs     Macronutrient Kcal     Micronutrient Vitamin;Mineral     Etiology (related to) MNT for Treatment/Condition;Medical Diagnosis     Skin Pressure injury;Skin breakdown     Signs/Symptoms (evidenced by) Report of Mnimal PO Intake;Other (comment)     Other Comment MST = 3 on admission                          Intervention Goal       Row Name 05/03/24 1610          Intervention Goal    General Meet nutritional needs for age/condition;Disease management/therapy;Reduce/improve symptoms     PO Tolerate PO;Increase intake     Weight No significant weight loss                    Nutrition Intervention       Row Name 05/03/24 1610          Nutrition Intervention    RD/Tech Action Follow Tx  progress;Care plan reviewd;Recommend/ordered     Recommended/Ordered Supplement                    Nutrition Prescription       Row Name 05/03/24 1610          Nutrition Prescription PO    PO Prescription Begin/change supplement     Supplement Mighty Shake     Supplement Frequency 3 times a day     New PO Prescription Ordered? Yes                    Education/Evaluation       Row Name 05/03/24 1610          Education    Education No discharge needs identified at this time        Monitor/Evaluation    Monitor Per protocol                     Electronically signed by:  Pily Hatch RDN, LD  05/03/24 16:10 CDT

## 2024-05-03 NOTE — PROGRESS NOTES
[facility]  INPATIENT PROGRESS NOTE    Patient Name: Jarvis Morgan  Date of Admission: 5/2/2024  Today's Date: 05/03/24  Length of Stay: 1  Primary Care Physician: Mary Beth Izquierdo APRN    Subjective   Chief Complaint:  shortness of air   HPI     This is a 89-year-old lady with past medical history significant for atrial fibrillation on chronic warfarin use, history of hypertension, history of diastolic heart failure who presented initially to the emergency department earlier today with increased shortness of air, apparently family called EMS earlier today and stated that she is becoming more dyspneic and she has worsening lower extremity edema, also she is becoming more more lethargic, patient had saturation cocci in the upper 80s on room air upon arrival, patient had an ABG that showed significant hypercarbia, patient received IV Lasix, placed on BiPAP and decision was made to admit her for further evaluation and management, she denied any chest pain, she denied any lightheadedness or any headaches any nausea or vomiting. She will be admitted for further evaluation and management and cardiology and pulmonary consultation will be placed       Review of Systems   All pertinent negatives and positives are as above. All other systems have been reviewed and are negative unless otherwise stated.     Objective    Temp:  [97.3 °F (36.3 °C)-97.9 °F (36.6 °C)] 97.3 °F (36.3 °C)  Heart Rate:  [57-95] 80  Resp:  [16-23] 18  BP: (111-144)/(50-88) 117/55  Physical Exam  General: Patient is alert, awake in mild respiratory distress   HEENT: Normocephalic, atraumatic, pupils are equal reactive to light, and accommodate  Neck: Supple, no JVD, no carotid bruits  CVS: Irregularly irregular rhythm  Lungs:  diminished breath sounds at the bases  Abdomen: Soft, nontender, nondistended bowel sounds are present  Extremities: Edema present bilaterally  Neurologic: No focal  deficits  Psychiatric: Normal affect      Results Review:  I have reviewed the labs, radiology results, and diagnostic studies.    Laboratory Data:   Results from last 7 days   Lab Units 05/03/24  0526 05/02/24  1140   WBC 10*3/mm3 6.46 8.23   HEMOGLOBIN g/dL 13.4 14.1   HEMATOCRIT % 44.0 46.4   PLATELETS 10*3/mm3 154 171        Results from last 7 days   Lab Units 05/03/24  0526 05/02/24  1408 05/02/24  1241 05/02/24  1140   SODIUM mmol/L 144  --   --  143   SODIUM, ARTERIAL mmol/L  --  145 145  --    POTASSIUM mmol/L 4.4  --   --  4.8   CHLORIDE mmol/L 105  --   --  104   CO2 mmol/L 35.0*  --   --  32.0*   BUN mg/dL 41*  --   --  40*   CREATININE mg/dL 1.77*  --   --  1.89*   CALCIUM mg/dL 10.4  --   --  10.8*   BILIRUBIN mg/dL  --   --   --  0.5   ALK PHOS U/L  --   --   --  79   ALT (SGPT) U/L  --   --   --  12   AST (SGOT) U/L  --   --   --  16   GLUCOSE mg/dL 95  --   --  119*       Culture Data:   Blood Culture   Date Value Ref Range Status   05/02/2024 No growth at 24 hours  Preliminary   05/02/2024 No growth at 24 hours  Preliminary       Radiology Data:   Imaging Results (Last 24 Hours)       Procedure Component Value Units Date/Time    XR Chest 1 View [[id number]] Collected: 05/03/24 0727     Updated: 05/03/24 0731    Narrative:      EXAMINATION: XR CHEST 1 VW-     5/3/2024 2:51 AM     HISTORY: resp failure; J96.01-Acute respiratory failure with hypoxia;  J96.02-Acute respiratory failure with hypercapnia; I50.9-Heart failure,  unspecified; I14-Vkoddkt effusion, not elsewhere classified;  N18.9-Chronic kidney disease, unspecified; I48.11-Longstanding  persistent atrial fibrillation     1 view chest x-ray.     COMPARISON:  Yesterday at 11:56 a.m.     Bibasilar infiltrate and pleural fluid.     No pneumothorax.     Stable heart size.  TAVR heart valve noted.       Impression:      1. Similar appearance of bilateral infiltrate and pleural fluid. No  pneumothorax is seen.           This report was signed and  finalized on 5/3/2024 7:28 AM by Dr. Gregorio Rick MD.               I have reviewed the patient's current medications.     Assessment/Plan   Assessment  Active Hospital Problems    Diagnosis     **Acute on chronic diastolic congestive heart failure     Permanent atrial fibrillation     Right pleural effusion     Stage 3 chronic kidney disease     S/P TAVR (transcatheter aortic valve replacement)     Hyperthyroidism     ASA (obstructive sleep apnea)     Acute respiratory failure with hypoxia and hypercapnia     Essential hypertension        Treatment Plan    1.  Acute respiratory failure with hypercarbia on BiPAP: Will start patient on breathing treatment with DuoNeb, try to wean off BiPAP, pulmonary on board   2.  Acute decompensated diastolic heart failure, last 2D echo Doppler showed LVEF to be normal and that was back in 2020   2D echo Doppler was ordered and still pending, to assess cardiac function and structure, patient to be started on diuretics with Lasix, fluid restriction, sodium restriction, will resume home medications  3.  Atrial fibrillation on chronic anticoagulation with warfarin, her INR was at 3.6, will check INR daily, warfarin is on hold   4.  Chronic kidney disease stage III: Will avoid nephrotoxic agents  5.  History of obstructive sleep apnea: On CPAP at home  6.PT/OT evaluation      Medical Decision Making  Number and Complexity of problems: 3  Differential Diagnosis:  Acute respiratory failure with hypercarbia on BiPAP  Acute decompensated diastolic heart failure  Atrial fibrillation with rapid ventricular response  Chronic anticoagulation on warfarin    Conditions and Status        improving     MDM Data  External documents reviewed: none  Cardiac tracing (EKG, telemetry) interpretation: atrial fibrillation  Radiology interpretation: yes  Labs reviewed: yes  Any tests that were considered but not ordered: none     Decision rules/scores evaluated (example [id number], Wells, etc): 4      Discussed with: nursing  staff     Care Planning  Shared decision making: patient,consultant  Code status and discussions: full    Disposition  Social Determinants of Health that impact treatment or disposition:   I expect the patient to be discharged to home in 2 to 3  days.     Electronically signed by Los Vázquez MD, 05/03/24, 14:50 CDT.

## 2024-05-03 NOTE — PROGRESS NOTES
[facility] [place] - [facility]    Patient: Jarvis Morgan  1945   MR# [id number]   Acct# [id number]  05/03/24   11:15 CDT  Referring Provider: Los Vázquez MD    Chief Complaint: acute respiratory failure with hypoxemia/hypercapnia     Interval history: Patient seen and examined.  More awake and alert.  Remains on BiPAP.  Diuresing well.  Renal function stable.    Meds:  atorvastatin, 40 mg, Oral, Nightly  calcitriol, 0.25 mcg, Oral, Daily  dilTIAZem CD, 180 mg, Oral, Q24H  donepezil, 5 mg, Oral, Nightly  escitalopram, 10 mg, Oral, Daily  furosemide, 40 mg, Intravenous, Q12H  ipratropium-albuterol, 3 mL, Nebulization, 4x Daily - RT  methIMAzole, 10 mg, Oral, Daily  metoprolol tartrate, 25 mg, Oral, Q12H  nystatin, , Topical, Q12H  QUEtiapine, 25 mg, Oral, Nightly  sodium chloride, 10 mL, Intravenous, Q12H  [Held by provider] warfarin, 5 mg, Oral, Once per day on Monday Wednesday Friday Saturday  [Held by provider] warfarin, 7.5 mg, Oral, Once per day on Sunday Tuesday Thursday         Physical Exam:  SpO2 Percentage    05/03/24 1034 05/03/24 1045 05/03/24 1057   SpO2: 97% 94% 99%     Body mass index is 32.74 kg/m².   Temp:  [97.3 °F (36.3 °C)-98.8 °F (37.1 °C)] 97.3 °F (36.3 °C)  Heart Rate:  [57-95] 62  Resp:  [16-23] 16  BP: (111-150)/(50-95) 117/55  Intake/Output Summary (Last 24 hours) at 5/3/2024 1115  Last data filed at 5/3/2024 1057  Gross per 24 hour   Intake 210 ml   Output 825 ml   Net -615 ml     Physical Exam  Laboratory Data:  Results from last 7 days   Lab Units 05/03/24  0526 05/02/24  1140   WBC 10*3/mm3 6.46 8.23   HEMOGLOBIN g/dL 13.4 14.1   PLATELETS 10*3/mm3 154 171     Results from last 7 days   Lab Units 05/03/24  0526 05/02/24  1629 05/02/24  1408 05/02/24  1241 05/02/24  1140   SODIUM mmol/L 144  --   --   --  143   SODIUM, ARTERIAL mmol/L  --   --  145 145  --    POTASSIUM mmol/L 4.4  --   --   --  4.8   BUN mg/dL 41*  --   --   --  40*    CREATININE mg/dL 1.77*  --   --   --  1.89*   INR  3.60* 3.34*  --   --  3.10*     Results from last 7 days   Lab Units 05/02/24  2041 05/02/24  1408 05/02/24  1241   PH, ARTERIAL pH units 7.355 7.296* 7.275*   PCO2, ARTERIAL mm Hg 59.2* 65.9* 68.6*   PO2 ART mm Hg 78.9* 83.7 67.1*   FIO2 % 35 40  --      Microbiology Results (last 10 days)       Procedure Component Value - Date/Time    COVID-19 and FLU A/B PCR, 1 HR TAT - Swab, Nasopharynx [[id number]]  (Normal) Collected: 05/02/24 1157    Lab Status: Final result Specimen: Swab from Nasopharynx Updated: 05/02/24 1225     COVID19 Not Detected     Influenza A PCR Not Detected     Influenza B PCR Not Detected    Narrative:      Fact sheet for providers: https://www.fda.gov/media/712762/download    Fact sheet for patients: https://www.fda.gov/media/402332/download    Test performed by PCR.          Recent films:  XR Chest 1 View    Result Date: 5/3/2024  1. Similar appearance of bilateral infiltrate and pleural fluid. No pneumothorax is seen.    This report was signed and finalized on 5/3/2024 7:28 AM by Dr. Gregorio Rick MD.      XR Chest 1 View    Result Date: 5/2/2024  1.  Volume overload with bilateral pulmonary edema (mostly interstitial) as well as bilateral layering pleural effusions. Right-sided pleural effusion is large in size.  This report was signed and finalized on 5/2/2024 12:04 PM by Dr Lino Ramos.     Personal review of imaging : CXR shows bilateral increased markings suggesting pulm edema    Pulmonary Assessment:  Acute resp failure with hypoxia and hypercapnia requiring bipap, threat to life and bodily function with high risk of morbidity from additional diagnostic testing or treatment   Farooq essentially untreated.  No other pulmonary disease identified  Pulmonary edema and pleural effusions  Mildly elevated bnp of uncertain significance  Ckd 3  Hx tavr  Chronic anticoagulation status  Hyperthyroidism  scoliosis    Recommend/plan:   Continue BiPAP  for work of breathing.  Hypercapnia is unlikely to be contributing to her narcosis given normal pH and that this is likely chronic hypercapnia.  Avoid resp depressant medications  Echocardiogram  Titrate supplemental oxygen maintain SpO2 greater than 88%  Diuresis as tolerated  Pulmonary will follow    Electronically signed by Anthony Horner MD, 05/03/24, 11:15 CDT

## 2024-05-04 NOTE — PROGRESS NOTES
[facility] – [place]UL[place]ARY AND CRITICAL CARE PROGRESS NOTE - [facility]    Patient: Jarvis Morgan  1945   MR# [id number]   Acct# [id number]  05/04/24   10:21 CDT  Referring Provider: Los Vázquez MD    Chief Complaint: acute respiratory failure with hypoxemia/hypercapnia     Interval history: Patient seen and examined. Oxygen requirements back to baseline of 1.5LPM to 2.5LPM. BIPAP overnight. Diuresis held today due to worsening renal function. Afebrile. No overnight events.    Meds:  atorvastatin, 40 mg, Oral, Nightly  calcitriol, 0.25 mcg, Oral, Daily  dilTIAZem CD, 180 mg, Oral, Q24H  donepezil, 5 mg, Oral, Nightly  escitalopram, 10 mg, Oral, Daily  [Held by provider] furosemide, 40 mg, Intravenous, Q12H  ipratropium-albuterol, 3 mL, Nebulization, 4x Daily - RT  methIMAzole, 10 mg, Oral, Daily  metoprolol tartrate, 25 mg, Oral, Q12H  nystatin, , Topical, Q12H  QUEtiapine, 25 mg, Oral, Nightly  sodium chloride, 10 mL, Intravenous, Q12H  [Held by provider] warfarin, 5 mg, Oral, Once per day on Monday Wednesday Friday Saturday  [Held by provider] warfarin, 7.5 mg, Oral, Once per day on Sunday Tuesday Thursday         Physical Exam:  SpO2 Percentage    05/04/24 0718 05/04/24 0730 05/04/24 0736   SpO2: 90% 90% 96%     Body mass index is 32.47 kg/m².   Temp:  [96.7 °F (35.9 °C)-98.2 °F (36.8 °C)] 96.7 °F (35.9 °C)  Heart Rate:  [62-90] 76  Resp:  [12-18] 16  BP: (109-133)/(54-89) 122/61  Intake/Output Summary (Last 24 hours) at 5/4/2024 1021  Last data filed at 5/4/2024 0022  Gross per 24 hour   Intake 150 ml   Output 1325 ml   Net -1175 ml     Physical Exam  Physical Exam  Vitals reviewed.   Constitutional:       General: She is awake.      Appearance: She is well-developed. She is obese.      Comments: NC   HENT:      Head: Normocephalic and atraumatic.   Eyes:      General: No scleral icterus.     Conjunctiva/sclera: Conjunctivae normal.      Pupils: Pupils are equal, round, and reactive to light.    Cardiovascular:      Rate and Rhythm: Normal rate.   Pulmonary:      Breath sounds: Decreased breath sounds bilaterally   Abdominal:      Palpations: Abdomen is soft.      Comments: Ileostomy    Musculoskeletal:         General: Normal range of motion.      Cervical back: Normal range of motion and neck supple.      Right lower leg: Edema present.      Left lower leg: Edema present.      Comments: Chronic venous stasis changes   Skin:     General: Skin is warm and dry.   Neurological:      Mental Status: She is easily aroused      Laboratory Data:  Results from last 7 days   Lab Units 05/04/24  0835 05/03/24  0526 05/02/24  1140   WBC 10*3/mm3 8.34 6.46 8.23   HEMOGLOBIN g/dL 13.9 13.4 14.1   PLATELETS 10*3/mm3 163 154 171     Results from last 7 days   Lab Units 05/04/24  0835 05/04/24  0352 05/03/24  0526 05/02/24  1629 05/02/24  1408 05/02/24  1241 05/02/24  1140   SODIUM mmol/L 142  --  144  --   --   --  143   SODIUM, ARTERIAL mmol/L  --   --   --   --  145   < >  --    POTASSIUM mmol/L 5.0  --  4.4  --   --   --  4.8   BUN mg/dL 48*  --  41*  --   --   --  40*   CREATININE mg/dL 1.91*  --  1.77*  --   --   --  1.89*   INR   --  2.81* 3.60* 3.34*  --   --  3.10*    < > = values in this interval not displayed.     Results from last 7 days   Lab Units 05/02/24  2041 05/02/24  1408 05/02/24  1241   PH, ARTERIAL pH units 7.355 7.296* 7.275*   PCO2, ARTERIAL mm Hg 59.2* 65.9* 68.6*   PO2 ART mm Hg 78.9* 83.7 67.1*   FIO2 % 35 40  --      Microbiology Results (last 10 days)       Procedure Component Value - Date/Time    Blood Culture - Blood, Arm, Left [[id number]]  (Normal) Collected: 05/02/24 1210    Lab Status: Preliminary result Specimen: Blood from Arm, Left Updated: 05/03/24 1315     Blood Culture No growth at 24 hours    COVID-19 and FLU A/B PCR, 1 HR TAT - Swab, Nasopharynx [[id number]]  (Normal) Collected: 05/02/24 1157    Lab Status: Final result Specimen: Swab from Nasopharynx Updated: 05/02/24 122      COVID19 Not Detected     Influenza A PCR Not Detected     Influenza B PCR Not Detected    Narrative:      Fact sheet for providers: https://www.fda.gov/media/621591/download    Fact sheet for patients: https://www.fda.gov/media/303227/download    Test performed by PCR.    Blood Culture - Blood, Arm, Right [[id number]]  (Normal) Collected: 05/02/24 1149    Lab Status: Preliminary result Specimen: Blood from Arm, Right Updated: 05/03/24 1315     Blood Culture No growth at 24 hours          Recent films:  XR Chest 1 View    Result Date: 5/3/2024  1. Similar appearance of bilateral infiltrate and pleural fluid. No pneumothorax is seen.    This report was signed and finalized on 5/3/2024 7:28 AM by Dr. Gregorio Rick MD.      XR Chest 1 View    Result Date: 5/2/2024  1.  Volume overload with bilateral pulmonary edema (mostly interstitial) as well as bilateral layering pleural effusions. Right-sided pleural effusion is large in size.  This report was signed and finalized on 5/2/2024 12:04 PM by Dr Lino Ramos.     Personal review of imaging : CXR shows bilateral increased markings suggesting pulm edema    Pulmonary Assessment:  Acute resp failure with hypoxia and hypercapnia requiring bipap, threat to life and bodily function with high risk of morbidity from additional diagnostic testing or treatment   Farooq essentially untreated.  No other pulmonary disease identified  Pulmonary edema and pleural effusions  Mildly elevated bnp of uncertain significance  Ckd 3  Hx tavr  Chronic anticoagulation status  Hyperthyroidism  scoliosis    Recommend/plan:   Continue BiPAP QHS and PRN for work of breathing.  Hypercapnia is unlikely to be contributing to her narcosis given normal pH and that this is likely chronic hypercapnia.  Avoid resp depressant medications  Diuresis as tolerated   Titrate supplemental oxygen maintain SpO2 greater than 88%  Pulmonary will sign off    Electronically signed by Anthony Horner MD, 05/04/24,  10:21 CDT

## 2024-05-04 NOTE — PROGRESS NOTES
RT EQUIPMENT DEVICE RELATED - SKIN ASSESSMENT    Deven Score:  Deven Score: 13     RT Medical Equipment/Device:     NIV Mask:  Under-the-nose   size:  b    Skin Assessment:      Cheek:  Intact  Lips:  Intact  Mouth:  Intact    Device Skin Pressure Protection:  Skin-to-device areas padded:  None Required    Nurse Notification:  Darcie Caceres, CRT

## 2024-05-04 NOTE — THERAPY EVALUATION
Patient Name: Jarvis Morgan  : 1945    MRN: [id number]                              Today's Date: 2024       Admit Date: 2024    Visit Dx:     ICD-10-CM ICD-9-CM   1. Acute respiratory failure with hypoxia and hypercapnia  J96.01 518.81    J96.02    2. Acute on chronic congestive heart failure, unspecified heart failure type  I50.9 428.0   3. Pleural effusion on right  J90 511.9   4. Chronic renal impairment, unspecified CKD stage  N18.9 585.9   5. Longstanding persistent atrial fibrillation  I48.11 427.31     Patient Active Problem List   Diagnosis    History of aortic valvular stenosis    Chronic atrial fibrillation    Mixed hyperlipidemia    Essential hypertension    Deep vein thrombosis (DVT) of left lower extremity    Shortness of breath    Physical deconditioning    Venous insufficiency    GERD (gastroesophageal reflux disease)    History of bladder cancer    Sick sinus syndrome    ASA (obstructive sleep apnea)    Hyperthyroidism    Acute respiratory failure with hypoxia and hypercapnia    Chronic diastolic heart failure    History of DVT (deep vein thrombosis)    Dementia    Vitamin D deficiency    Class 2 obesity due to excess calories with serious comorbidity and body mass index (BMI) of 38.0 to 38.9 in adult    Chronic anticoagulation    Scabies    MRSA bacteremia    Weakness    Acute on chronic diastolic heart failure    S/P TAVR (transcatheter aortic valve replacement)    Sepsis    Acute UTI    Pneumonia of both lower lobes due to infectious organism    Acute and chronic respiratory failure with hypercapnia    Abnormal CXR - pneumonia cannot be ruled out at this time.    Closed comminuted intertrochanteric fracture of proximal end of right femur    Obesity (BMI 30-39.9)    Closed fracture of right hip    Right pleural effusion    Closed fracture of left hip    Aortic valve stenosis    H/O ureterostomy    Idiopathic peripheral neuropathy    Memory impairment    Neoplasm of bladder     Primary osteoarthritis of both knees    Recurrent major depressive disorder, in partial remission    Stage 3 chronic kidney disease    Acute cystitis with hematuria    Subtherapeutic international normalized ratio (INR)    Acute on chronic diastolic congestive heart failure    Permanent atrial fibrillation     Past Medical History:   Diagnosis Date    A-fib     Aortic stenosis     Arthritis     Bradycardia     Cancer     bladder and skin    Cardiomegaly     CHF (congestive heart failure)     GERD (gastroesophageal reflux disease)     Hard of hearing     History of short term memory loss     Hypercalcemia     Hyperlipidemia     Hypertension     Hyperthyroidism 11/20/2017    Hypothyroidism     Kidney stone     Long term current use of anticoagulant therapy     Open wound     left lower extremity,    Palpitation     PONV (postoperative nausea and vomiting)     Shortness of breath     Sick sinus syndrome     Sleep apnea     CPAP    Stage 3 chronic kidney disease      Past Surgical History:   Procedure Laterality Date    AORTIC VALVE REPAIR/REPLACEMENT      BLADDER SURGERY      removed    CARDIAC CATHETERIZATION      CARDIAC CATHETERIZATION N/A 12/9/2016    Procedure: Left Heart Cath;  Surgeon: Elia Flores MD;  Location:  [facility];  Service:     DISTAL FEMORAL NAILING Right 9/24/2020    Procedure: RIGHT SHORT TFN;  Surgeon: Betito Shetty MD;  Location:  [facility] OR;  Service: Orthopedics;  Laterality: Right;    HIP BIPOLAR REPLACEMENT Left     HYSTERECTOMY      ILEOSTOMY      JOINT REPLACEMENT      KIDNEY SURGERY      right kidney removed    NEPHRECTOMY RADICAL Right     from cancer    VARICOSE VEIN SURGERY Left 4/10/2017    Procedure: LEFT LOWER EXTREMITY VENOGRAM. LEFT SAPHENOUS VEIN RADIO FREQUENCY ABLATION;  Surgeon: Blake Martinez DO;  Location:  [facility] OR;  Service:       General Information       Row Name 05/04/24 1541 05/04/24 1416       OT Time and Intention    Document Type  evaluation  Pt presents with SOA, worsening B LE edema, lethargy. Dx: acute respiratory failure with hypoxia and hypercapnia, acute on chronic CHF. Hx includes: a-fib, CA, CHF, HTN, CKD.  -LR --  -LR    Mode of Treatment occupational therapy;individual therapy  -LR --      Row Name 05/04/24 1541          General Information    Patient Profile Reviewed yes  -LR     Prior Level of Function --  unable to obtain accurate PLOF d/t pt cognition  -LR     Existing Precautions/Restrictions fall;oxygen therapy device and L/min  confusion, urostomy  -LR     Barriers to Rehab medically complex;cognitive status;physical barrier  -LR       Row Name 05/04/24 1541          Living Environment    People in Home child(jamin), adult  per chart review, unable to obtain from pt d/t cognition  -LR       Row Name 05/04/24 1541          Cognition    Orientation Status (Cognition) oriented to;person;disoriented to;place;situation;time;verbal cues/prompts needed for orientation  name, but not birthday  -LR       Row Name 05/04/24 1541          Safety Issues, Functional Mobility    Safety Issues Affecting Function (Mobility) ability to follow commands;at risk behavior observed;awareness of need for assistance;friction/shear risk;impulsivity;insight into deficits/self-awareness;judgment;problem-solving;safety precaution awareness;safety precautions follow-through/compliance;sequencing abilities  -LR     Impairments Affecting Function (Mobility) balance;cognition;coordination;endurance/activity tolerance;grasp;motor control;pain;postural/trunk control;range of motion (ROM);shortness of breath;strength  -LR     Cognitive Impairments, Mobility Safety/Performance attention;awareness, need for assistance;impulsivity;insight into deficits/self-awareness;judgment;problem-solving/reasoning;safety precaution awareness;safety precaution follow-through;sequencing abilities  -LR               User Key  (r) = Recorded By, (t) = Taken By, (c) = Cosigned By       Initials Name Provider Type    LR Kay Kirby OTR/L Occupational Therapist                     Mobility/ADL's       Row Name 05/04/24 1541          Bed Mobility    Bed Mobility scooting/bridging  -LR     Scooting/Bridging [place] (Bed Mobility) dependent (less than 25% patient effort);verbal cues  -LR     Bed Mobility, Safety Issues cognitive deficits limit understanding  -LR     Assistive Device (Bed Mobility) draw sheet  -LR     Comment, (Bed Mobility) pt did not follow commands for attempt  -LR       Row Name 05/04/24 1541          Transfers    Comment, (Transfers) not safe to attempt at this time  -LR       Row Name 05/04/24 1541          Activities of Daily Living    BADL Assessment/Intervention grooming  -LR       Row Name 05/04/24 1541          Grooming Assessment/Training    [place] Level (Grooming) oral care regimen;wash face, hands;maximum assist (25% patient effort);verbal cues  -LR     Position (Grooming) sitting up in bed  -LR     Comment, (Grooming) oral swab, attempted [place] but pt resistive at times  -LR       Row Name 05/04/24 1541          Self-Feeding Assessment/Training    Comment, (Feeding) pt refused self-feeding, nsg staff reports pt Dep A for attempted self feeding during lunch meal  -LR               User Key  (r) = Recorded By, (t) = Taken By, (c) = Cosigned By      Initials Name Provider Type    LR Kay Kirby OTR/L Occupational Therapist                   Obj/Interventions       Row Name 05/04/24 1541          Sensory Assessment (Somatosensory)    Sensory Assessment (Somatosensory) UE sensation intact  per pt report  -LR       Row Name 05/04/24 1541          Vision Assessment/Intervention    Visual Impairment/Limitations unable/difficult to assess  -LR       Row Name 05/04/24 1541          Range of Motion Comprehensive    General Range of Motion upper extremity range of motion deficits identified  -LR     Comment, General Range of Motion R UE AAROM WFL; L UE  proximal AAROM limited by pain, distal AAROM WFL  -LR       Row Name 05/04/24 1541          Strength Comprehensive (MMT)    General Manual Muscle Testing (MMT) Assessment upper extremity strength deficits identified  -LR     Comment, General Manual Muscle Testing (MMT) Assessment B UE 2-/5  -LR       Row Name 05/04/24 1541          Motor Skills    Motor Skills coordination  -LR     Coordination gross motor deficit;fine motor deficit;bilateral;upper extremity  total body jerking at times  -LR               User Key  (r) = Recorded By, (t) = Taken By, (c) = Cosigned By      Initials Name Provider Type    LR Kay Kirby, OTR/L Occupational Therapist                   Goals/Plan       Row Name 05/04/24 1541          Dressing Goal 1 (OT)    Activity/Device (Dressing Goal 1, OT) upper body dressing  -LR     [place]/Cues Needed (Dressing Goal 1, OT) minimum assist (75% or more patient effort)  -LR     Time Frame (Dressing Goal 1, OT) long term goal (LTG);by discharge  -LR     Progress/Outcome (Dressing Goal 1, OT) new goal  -LR       Row Name 05/04/24 1541          Grooming Goal 1 (OT)    Activity/Device (Grooming Goal 1, OT) grooming skills, all  -LR     [place] (Grooming Goal 1, OT) standby assist  -LR     Time Frame (Grooming Goal 1, OT) long term goal (LTG);by discharge  -LR     Strategies/Barriers (Grooming Goal 1, OT) sitting EOB  -LR     Progress/Outcome (Grooming Goal 1, OT) new goal  -LR       Row Name 05/04/24 1541          Self-Feeding Goal 1 (OT)    Activity/Device (Self-Feeding Goal 1, OT) self-feeding skills, all  -LR     [place] Level/Cues Needed (Self-Feeding Goal 1, OT) supervision required  -LR     Time Frame (Self-Feeding Goal 1, OT) long term goal (LTG);by discharge  -LR     Progress/Outcomes (Self-Feeding Goal 1, OT) new goal  -LR       Row Name 05/04/24 1541          Therapy Assessment/Plan (OT)    Planned Therapy Interventions (OT) activity tolerance training;adaptive equipment  training;BADL retraining;cognitive/visual perception retraining;edema control/reduction;functional balance retraining;neuromuscular control/coordination retraining;occupation/activity based interventions;patient/caregiver education/training;ROM/therapeutic exercise;strengthening exercise;transfer/mobility retraining  -LR               User Key  (r) = Recorded By, (t) = Taken By, (c) = Cosigned By      Initials Name Provider Type    LR Kay Kirby, OTR/L Occupational Therapist                   Clinical Impression       Row Name 05/04/24 1541          Pain Assessment    Pre/Posttreatment Pain Comment B LE edema noted (L > R)  -LR     Additional Documentation Pain Scale: FACES Pre/Post-Treatment (Group)  -LR       Row Name 05/04/24 1541          Pain Scale: FACES Pre/Post-Treatment    Pain: FACES Scale, Pretreatment 4-->hurts little more  -LR     Posttreatment Pain Rating 4-->hurts little more  -LR     Pain Location - Side/Orientation Left  -LR     Pain Location lower  -LR     Pain Location - extremity  -LR     Pre/Posttreatment Pain Comment pt yelled out in pain with touch and movement of L LE and L UE  -LR       Row Name 05/04/24 154          Plan of Care Review    Plan of Care Reviewed With patient  -LR     Progress no change  -LR     Outcome Evaluation OT eval completed. Pt presents A&O to person only, in partial R side-lying with bipap donned. Nsg cleared pt for removal of bipap and placement of 2.5 L O2 per NC. Pt conversationally confused and difficult to understand at times. Pt lethargic, but agreeable to participate initially. Unable to gather accurate PLOF from pt d/t cognition. Pt required Dep A for scooting toward HOB, did not follow commands for attempt. Pt required Max A for grooming while sitting up in bed, attempted [place] but pt resistive at times. Pt yelling out in pain with touch and movement of L LE and L UE for attempted mobility, nsg notified. Further mobility deferred at this time d/t  safety concerns and pain. Pt O2 sats 95% during activities on 2.5 L O2 per NC. Skilled OT warranted to provide education and address cognition, pain, balance, activity tolerance, coordination, ROM, and strength in order to increase pt safety and I during ADLs, fxl mobility, and fxl t/f's. Recommend SNF at D/C pending progress.  -LR       Row Name 05/04/24 1541          Therapy Assessment/Plan (OT)    Rehab Potential (OT) good, to achieve stated therapy goals  -LR     Criteria for Skilled Therapeutic Interventions Met (OT) yes;skilled treatment is necessary  -LR     Therapy Frequency (OT) 3 times/wk  -LR     Predicted Duration of Therapy Intervention (OT) until hospital D/C  -LR       Row Name 05/04/24 1541          Therapy Plan Review/Discharge Plan (OT)    Anticipated Discharge Disposition (OT) skilled nursing facility  -LR       Row Name 05/04/24 1541          Vital Signs    Pre SpO2 (%) 99  -LR     O2 Delivery Pre Treatment supplemental O2  nsg cleared therapist to remove bipap  -LR     Intra SpO2 (%) 95  -LR     O2 Delivery Intra Treatment supplemental O2  2.5 L  -LR     Post SpO2 (%) 96  -LR     O2 Delivery Post Treatment supplemental O2  -LR     Pre Patient Position Side Lying  -LR     Intra Patient Position Side Lying  -LR     Post Patient Position Side Lying  -LR       Row Name 05/04/24 1541          Positioning and Restraints    Pre-Treatment Position in bed  -LR     Post Treatment Position bed  -LR     In Bed notified nsg;side lying right;call light within reach;encouraged to call for assist;exit alarm on;RUE elevated;LUE elevated;legs elevated  -LR               User Key  (r) = Recorded By, (t) = Taken By, (c) = Cosigned By      Initials Name Provider Type    LR Kay Kirby, OTR/L Occupational Therapist                   Outcome Measures       Row Name 05/04/24 1541          How much help from another is currently needed...    Putting on and taking off regular lower body clothing? 1  -LR      Bathing (including washing, rinsing, and drying) 2  -LR     Toileting (which includes using toilet bed pan or urinal) 1  -LR     Putting on and taking off regular upper body clothing 2  -LR     Taking care of personal grooming (such as brushing teeth) 2  -LR     Eating meals 2  -LR     AM-PAC 6 Clicks Score (OT) 10  -LR       Row Name 05/04/24 1541          Functional Assessment    Outcome Measure Options AM-PAC 6 Clicks Daily Activity (OT)  -LR               User Key  (r) = Recorded By, (t) = Taken By, (c) = Cosigned By      Initials Name Provider Type    LR Kay Kirby, OTR/L Occupational Therapist                    Occupational Therapy Education       Title: PT OT SLP Therapies (In Progress)       Topic: Occupational Therapy (In Progress)       Point: ADL training (In Progress)       Description:   Instruct learner(s) on proper safety adaptation and remediation techniques during self care or transfers.   Instruct in proper use of assistive devices.                  Learning Progress Summary             Patient Acceptance, E,D, NR by LR at 5/4/2024 1625                         Point: Home exercise program (In Progress)       Description:   Instruct learner(s) on appropriate technique for monitoring, assisting and/or progressing therapeutic exercises/activities.                  Learning Progress Summary             Patient Acceptance, E,D, NR by LR at 5/4/2024 1625                         Point: Precautions (In Progress)       Description:   Instruct learner(s) on prescribed precautions during self-care and functional transfers.                  Learning Progress Summary             Patient Acceptance, E,D, NR by LR at 5/4/2024 1625                         Point: Body mechanics (In Progress)       Description:   Instruct learner(s) on proper positioning and spine alignment during self-care, functional mobility activities and/or exercises.                  Learning Progress Summary             Patient  Acceptance, E,D, NR by LR at 5/4/2024 4918                                         User Key       Initials Effective Dates Name Provider Type Discipline    LR 04/25/23 -  Kay Kirby, OTR/L Occupational Therapist OT                  OT Recommendation and Plan  Planned Therapy Interventions (OT): activity tolerance training, adaptive equipment training, BADL retraining, cognitive/visual perception retraining, edema control/reduction, functional balance retraining, neuromuscular control/coordination retraining, occupation/activity based interventions, patient/caregiver education/training, ROM/therapeutic exercise, strengthening exercise, transfer/mobility retraining  Therapy Frequency (OT): 3 times/wk  Plan of Care Review  Plan of Care Reviewed With: patient  Progress: no change  Outcome Evaluation: OT eval completed. Pt presents A&O to person only, in partial R side-lying with bipap donned. Nsg cleared pt for removal of bipap and placement of 2.5 L O2 per NC. Pt conversationally confused and difficult to understand at times. Pt lethargic, but agreeable to participate initially. Unable to gather accurate PLOF from pt d/t cognition. Pt required Dep A for scooting toward HOB, did not follow commands for attempt. Pt required Max A for grooming while sitting up in bed, attempted [place] but pt resistive at times. Pt yelling out in pain with touch and movement of L LE and L UE for attempted mobility, nsg notified. Further mobility deferred at this time d/t safety concerns and pain. Pt O2 sats 95% during activities on 2.5 L O2 per NC. Skilled OT warranted to provide education and address cognition, pain, balance, activity tolerance, coordination, ROM, and strength in order to increase pt safety and I during ADLs, fxl mobility, and fxl t/f's. Recommend SNF at D/C pending progress.     Time Calculation:         Time Calculation- OT       Row Name 05/04/24 1549             Time Calculation- OT    OT Start Time 1541  +14  min chart review and initial attempt  -LR      OT Stop Time 1620  -LR      OT Time Calculation (min) 39 min  -LR      OT Received On 05/04/24  -LR      OT Goal Re-Cert Due Date 05/14/24  -LR         Untimed Charges    OT Eval/Re-eval Minutes 53  -LR         Total Minutes    Untimed Charges Total Minutes 53  -LR       Total Minutes 53  -LR                User Key  (r) = Recorded By, (t) = Taken By, (c) = Cosigned By      Initials Name Provider Type    LR Kay Kirby, OTR/L Occupational Therapist                  Therapy Charges for Today       Code Description Service Date Service Provider Modifiers Qty    55896159723 HC OT EVAL MOD COMPLEXITY 4 5/4/2024 Kay Kirby OTR/L GO 1                 SHARMILA Barragan/ELLEN  5/4/2024

## 2024-05-04 NOTE — PROGRESS NOTES
[facility] Medicine Services  INPATIENT PROGRESS NOTE    Patient Name: Jarvis Morgan  Date of Admission: 5/2/2024  Today's Date: 05/04/24  Length of Stay: 2  Primary Care Physician: Mary Beth Izquierdo APRN    Subjective   Chief Complaint:   HPI shortness of air     This is a 89-year-old lady with past medical history significant for atrial fibrillation on chronic warfarin use, history of hypertension, history of diastolic heart failure who presented initially to the emergency department earlier today with increased shortness of air, apparently family called EMS earlier today and stated that she is becoming more dyspneic and she has worsening lower extremity edema, also she is becoming more more lethargic, patient had saturation cocci in the upper 80s on room air upon arrival, patient had an ABG that showed significant hypercarbia, patient received IV Lasix, placed on BiPAP and decision was made to admit her for further evaluation and management, she denied any chest pain, she denied any lightheadedness or any headaches any nausea or vomiting. She will be admitted for further evaluation and management and cardiology and pulmonary consultation will be placed       Review of Systems   All pertinent negatives and positives are as above. All other systems have been reviewed and are negative unless otherwise stated.     Objective    Temp:  [96.7 °F (35.9 °C)-98.2 °F (36.8 °C)] 98.2 °F (36.8 °C)  Heart Rate:  [75-93] 78  Resp:  [12-18] 12  BP: (109-133)/(61-89) 117/68  Physical Exam  General: Patient is lethargic this morning  HEENT: Normocephalic, atraumatic, pupils are equal reactive to light, and accommodate  Neck: Supple, no JVD, no carotid bruits  CVS: Irregularly irregular rhythm  Lungs:  diminished breath sounds at the bases  Abdomen: Soft, nontender, nondistended bowel sounds are present  Extremities: Edema present bilaterally  Neurologic: No focal deficits  Psychiatric: Normal  affect      Results Review:  I have reviewed the labs, radiology results, and diagnostic studies.    Laboratory Data:   Results from last 7 days   Lab Units 05/04/24  0835 05/03/24  0526 05/02/24  1140   WBC 10*3/mm3 8.34 6.46 8.23   HEMOGLOBIN g/dL 13.9 13.4 14.1   HEMATOCRIT % 45.9 44.0 46.4   PLATELETS 10*3/mm3 163 154 171        Results from last 7 days   Lab Units 05/04/24  0835 05/03/24  0526 05/02/24  1408 05/02/24  1241 05/02/24  1140   SODIUM mmol/L 142 144  --   --  143   SODIUM, ARTERIAL mmol/L  --   --  145   < >  --    POTASSIUM mmol/L 5.0 4.4  --   --  4.8   CHLORIDE mmol/L 103 105  --   --  104   CO2 mmol/L 32.0* 35.0*  --   --  32.0*   BUN mg/dL 48* 41*  --   --  40*   CREATININE mg/dL 1.91* 1.77*  --   --  1.89*   CALCIUM mg/dL 10.5 10.4  --   --  10.8*   BILIRUBIN mg/dL 0.6  --   --   --  0.5   ALK PHOS U/L 77  --   --   --  79   ALT (SGPT) U/L 10  --   --   --  12   AST (SGOT) U/L 13  --   --   --  16   GLUCOSE mg/dL 139* 95  --   --  119*    < > = values in this interval not displayed.       Culture Data:   Blood Culture   Date Value Ref Range Status   05/02/2024 No growth at 2 days  Preliminary   05/02/2024 No growth at 2 days  Preliminary       Radiology Data:   Imaging Results (Last 24 Hours)       ** No results found for the last 24 hours. **            I have reviewed the patient's current medications.     Assessment/Plan   Assessment  Active Hospital Problems    Diagnosis     **Acute on chronic diastolic congestive heart failure     Permanent atrial fibrillation     Right pleural effusion     Stage 3 chronic kidney disease     S/P TAVR (transcatheter aortic valve replacement)     Hyperthyroidism     ASA (obstructive sleep apnea)     Acute respiratory failure with hypoxia and hypercapnia     Essential hypertension        Treatment Plan  1.  Acute respiratory failure with hypercarbia on BiPAP: Will start patient on breathing treatment with DuoNeb, try to wean off BiPAP, pulmonary on board    2.  Acute decompensated diastolic heart failure, last 2D echo Doppler showed LVEF to be normal and that was back in 2020   2D echo Doppler was ordered and still pending, to assess cardiac function and structure, patient to be started on diuretics with Lasix, fluid restriction, sodium restriction, will resume home medications  3.  Atrial fibrillation on chronic anticoagulation with warfarin, her INR is at 2.81, will check INR daily, will resume warfarin  4.  Chronic kidney disease stage III: Will avoid nephrotoxic agents  5.  History of obstructive sleep apnea: On CPAP at home  6.PT/OT evaluation    Medical Decision Making  Number and Complexity of problems: 3  Differential Diagnosis:  Acute respiratory failure with hypercarbia on BiPAP  Acute decompensated diastolic heart failure  Atrial fibrillation with rapid ventricular response  Chronic anticoagulation on warfarin  Conditions and Status        fair     [facility] Data  External documents reviewed: none  Cardiac tracing (EKG, telemetry) interpretation: atrial fibrillation  Radiology interpretation: yes  Labs reviewed: yes  Any tests that were considered but not ordered: none     Decision rules/scores evaluated (example [id number], [place], etc): 4     Discussed with: nursing staff,      Care Planning  Shared decision making: patient,consultant   Code status and discussions: full     Disposition  Social Determinants of Health that impact treatment or disposition: none  I expect the patient to be discharged to home in 3  days.     Electronically signed by Los Vázquez MD, 05/04/24, 16:23 CDT.

## 2024-05-04 NOTE — PROGRESS NOTES
RT EQUIPMENT DEVICE RELATED - SKIN ASSESSMENT    Deven Score:  Deven Score: 13     RT Medical Equipment/Device:     NIV Mask:  Under-the-nose   size:  C    Skin Assessment:      Cheek:  Intact    Device Skin Pressure Protection:  Skin-to skin areas padded    Nurse Notification:  Darcie Julio, RRT

## 2024-05-04 NOTE — PLAN OF CARE
Goal Outcome Evaluation:  Plan of Care Reviewed With: patient        Progress: no change  Outcome Evaluation: OT eval completed. Pt presents A&O to person only, in partial R side-lying with bipap donned. Nsg cleared pt for removal of bipap and placement of 2.5 L O2 per NC. Pt conversationally confused and difficult to understand at times. Pt lethargic, but agreeable to participate initially. Unable to gather accurate PLOF from pt d/t cognition. Pt required Dep A for scooting toward HOB, did not follow commands for attempt. Pt required Max A for grooming while sitting up in bed, attempted [place] but pt resistive at times. Pt yelling out in pain with touch and movement of L LE and L UE for attempted mobility, nsg notified. Further mobility deferred at this time d/t safety concerns and pain. Pt O2 sats 95% during activities on 2.5 L O2 per NC. Skilled OT warranted to provide education and address cognition, pain, balance, activity tolerance, coordination, ROM, and strength in order to increase pt safety and I during ADLs, fxl mobility, and fxl t/f's. Recommend SNF at D/C pending progress.      Anticipated Discharge Disposition (OT): skilled nursing facility

## 2024-05-04 NOTE — PROGRESS NOTES
"[facility] -  Progress Note     LOS: 2 days   Patient Care Team:  Mary Beth Izquierdo APRN as PCP - General  Mary Beth Izquierdo APRN as PCP - Family Medicine  Elia Flores MD (Inactive) as Cardiologist (Cardiology)    Chief Complaint: F/U CHF    Subjective   Patient is very drowsy/lethargic. Stated her \"butt\" was hurting.       REVIEW OF SYSTEMS:  Unable to obtain 2' to lethargy.      Objective     Vital Sign Min/Max for last 24 hours  Temp  Min: 96.7 °F (35.9 °C)  Max: 98.2 °F (36.8 °C)   BP  Min: 109/62  Max: 133/89   Pulse  Min: 62  Max: 90   Resp  Min: 12  Max: 18   SpO2  Min: 90 %  Max: 99 %   No data recorded   Weight  Min: 99.7 kg (219 lb 14.4 oz)  Max: 99.7 kg (219 lb 14.4 oz)         05/04/24  0611   Weight: 99.7 kg (219 lb 14.4 oz)         Intake/Output Summary (Last 24 hours) at 5/4/2024 0944  Last data filed at 5/4/2024 0022  Gross per 24 hour   Intake 150 ml   Output 1325 ml   Net -1175 ml         Physical Exam:    General Appearance:  Frail, chronically ill-appearing female who is very lethargic.   HEENT:  Normocephalic. Atraumatic. CRISTIANA.   Lungs:  Clear, but diminished throughout,  to auscultation, respirations regular, even and unlabored    Heart:  Irregular rhythm with normal rate, normal S1 and S2, soft systolic murmur, no gallop, no rub, no click   Abdomen:  Normal bowel sounds, soft, non-tender, non-distended   Extremities:  Moves all extremities, + BLE edema (L>R), no cyanosis, no redness   Pulses:  Pulses palpable and equal bilaterally   Skin:  No bleeding, bruising or rash. Discoloration of LLE 2' to venous insufficiency/stasis.    Neurologic:  Lethargic. Will answer appropriately to name and place. Stated year was 1984.        Results Review:   Lab Results (last 72 hours)       Procedure Component Value Units Date/Time    CBC (No Diff) [[id number]]  (Abnormal) Collected: 05/04/24 0835    Specimen: Blood Updated: 05/04/24 0924     WBC 8.34 10*3/mm3      RBC 4.42 " 10*6/mm3      Hemoglobin 13.9 g/dL      Hematocrit 45.9 %      .8 fL      MCH 31.4 pg      MCHC 30.3 g/dL      RDW 13.7 %      RDW-SD 52.1 fl      MPV 9.4 fL      Platelets 163 10*3/mm3     Comprehensive Metabolic Panel [[id number]]  (Abnormal) Collected: 05/04/24 0835    Specimen: Blood Updated: 05/04/24 0910     Glucose 139 mg/dL      BUN 48 mg/dL      Creatinine 1.91 mg/dL      Sodium 142 mmol/L      Potassium 5.0 mmol/L      Chloride 103 mmol/L      CO2 32.0 mmol/L      Calcium 10.5 mg/dL      Total Protein 7.2 g/dL      Albumin 3.3 g/dL      ALT (SGPT) 10 U/L      AST (SGOT) 13 U/L      Alkaline Phosphatase 77 U/L      Total Bilirubin 0.6 mg/dL      Globulin 3.9 gm/dL      A/G Ratio 0.8 g/dL      BUN/Creatinine Ratio 25.1     Anion Gap 7.0 mmol/L      eGFR 26.3 mL/min/1.73     Narrative:      GFR Normal >60  Chronic Kidney Disease <60  Kidney Failure <15    The GFR formula is only valid for adults with stable renal function between ages 18 and 70.    Protime-INR [[id number]]  (Abnormal) Collected: 05/04/24 0352    Specimen: Blood Updated: 05/04/24 0446     Protime 30.7 Seconds      INR 2.81    Blood Culture - Blood, Arm, Left [[id number]]  (Normal) Collected: 05/02/24 1210    Specimen: Blood from Arm, Left Updated: 05/03/24 1315     Blood Culture No growth at 24 hours    Blood Culture - Blood, Arm, Right [[id number]]  (Normal) Collected: 05/02/24 1149    Specimen: Blood from Arm, Right Updated: 05/03/24 1315     Blood Culture No growth at 24 hours    Basic Metabolic Panel [[id number]]  (Abnormal) Collected: 05/03/24 0526    Specimen: Blood Updated: 05/03/24 0629     Glucose 95 mg/dL      BUN 41 mg/dL      Creatinine 1.77 mg/dL      Sodium 144 mmol/L      Potassium 4.4 mmol/L      Chloride 105 mmol/L      CO2 35.0 mmol/L      Calcium 10.4 mg/dL      BUN/Creatinine Ratio 23.2     Anion Gap 4.0 mmol/L      eGFR 28.8 mL/min/1.73     Narrative:      GFR Normal >60  Chronic Kidney Disease <60  Kidney Failure  <15    The GFR formula is only valid for adults with stable renal function between ages 18 and 70.    TSH [[id number]]  (Normal) Collected: 05/03/24 0526    Specimen: Blood Updated: 05/03/24 0629     TSH 2.990 uIU/mL     T4, Free [[id number]]  (Abnormal) Collected: 05/03/24 0526    Specimen: Blood Updated: 05/03/24 0629     Free T4 0.82 ng/dL     Protime-INR [[id number]]  (Abnormal) Collected: 05/03/24 0526    Specimen: Blood Updated: 05/03/24 0608     Protime 37.3 Seconds      INR 3.60    CBC Auto Differential [[id number]]  (Abnormal) Collected: 05/03/24 0526    Specimen: Blood Updated: 05/03/24 0603     WBC 6.46 10*3/mm3      RBC 4.23 10*6/mm3      Hemoglobin 13.4 g/dL      Hematocrit 44.0 %      .0 fL      MCH 31.7 pg      MCHC 30.5 g/dL      RDW 13.5 %      RDW-SD 51.9 fl      MPV 9.4 fL      Platelets 154 10*3/mm3      Neutrophil % 78.0 %      Lymphocyte % 8.2 %      Monocyte % 11.9 %      Eosinophil % 0.9 %      Basophil % 0.5 %      Immature Grans % 0.5 %      Neutrophils, Absolute 5.04 10*3/mm3      Lymphocytes, Absolute 0.53 10*3/mm3      Monocytes, Absolute 0.77 10*3/mm3      Eosinophils, Absolute 0.06 10*3/mm3      Basophils, Absolute 0.03 10*3/mm3      Immature Grans, Absolute 0.03 10*3/mm3      nRBC 0.0 /100 WBC     Blood Gas, Arterial - [[id number]]  (Abnormal) Collected: 05/02/24 2041    Specimen: Arterial Blood Updated: 05/02/24 2041     Site Right Radial     Rubén's Test Positive     pH, Arterial 7.355 pH units      pCO2, Arterial 59.2 mm Hg      Comment: 83 Value above reference range        pO2, Arterial 78.9 mm Hg      Comment: 84 Value below reference range        HCO3, Arterial 33.1 mmol/L      Comment: 83 Value above reference range        Base Excess, Arterial 5.8 mmol/L      Comment: 83 Value above reference range        O2 Saturation, Arterial 96.3 %      Temperature 37.0     Barometric Pressure for Blood Gas 750 mmHg      Modality BiPap     FIO2 35 %      Ventilator Mode NA     Set Mech  Resp Rate 16.0     IPAP 16     Comment: Meter: [id number]     :  Krissy Mccarthy, RRT        EPAP 6     Collected by [id number]     pCO2, Temperature Corrected 59.2 mm Hg      pH, Temp Corrected 7.355 pH Units      pO2, Temperature Corrected 78.9 mm Hg     Protime-INR [[id number]]  (Abnormal) Collected: 05/02/24 1629    Specimen: Blood Updated: 05/02/24 1645     Protime 35.2 Seconds      INR 3.34    High Sensitivity Troponin T 2Hr [[id number]]  (Abnormal) Collected: 05/02/24 1423    Specimen: Blood Updated: 05/02/24 1456     HS Troponin T 38 ng/L      Troponin T Delta 0 ng/L     Narrative:      High Sensitive Troponin T Reference Range:  <14.0 ng/L- Negative Female for AMI  <22.0 ng/L- Negative Male for AMI  >=14 - Abnormal Female indicating possible myocardial injury.  >=22 - Abnormal Male indicating possible myocardial injury.   Clinicians would have to utilize clinical acumen, EKG, Troponin, and serial changes to determine if it is an Acute Myocardial Infarction or myocardial injury due to an underlying chronic condition.         Blood Gas, Arterial With Co-Ox [[id number]]  (Abnormal) Collected: 05/02/24 1408    Specimen: Arterial Blood Updated: 05/02/24 1409     Site Left Brachial     Rubén's Test N/A     pH, Arterial 7.296 pH units      Comment: 84 Value below reference range        pCO2, Arterial 65.9 mm Hg      Comment: 83 Value above reference range        pO2, Arterial 83.7 mm Hg      HCO3, Arterial 32.1 mmol/L      Comment: 83 Value above reference range        Base Excess, Arterial 3.6 mmol/L      Comment: 83 Value above reference range        O2 Saturation, Arterial 96.7 %      Hemoglobin, Blood Gas 13.4 g/dL      Hematocrit, Blood Gas 41.0 %      Oxyhemoglobin 94.9 %      Methemoglobin 0.50 %      Carboxyhemoglobin 1.4 %      Temperature 37.0     Sodium, Arterial 145 mmol/L      Potassium, Arterial 4.4 mmol/L      Ionized Calcium 5.90 mg/dL      Comment: 83 Value above reference range         Barometric Pressure for Blood Gas 750 mmHg      Modality BiPap     FIO2 40 %      Ventilator Mode NA     [facility] 14.0     IPAP 12     Comment: Meter: [id number]     :  Clemencia Osei CRT        EPAP 6     Collected by [id number]     pH, Temp Corrected 7.296 pH Units      pCO2, Temperature Corrected 65.9 mm Hg      pO2, Temperature Corrected 83.7 mm Hg     Blood Gas, Arterial With Co-Ox [[id number]]  (Abnormal) Collected: 05/02/24 1241    Specimen: Arterial Blood Updated: 05/02/24 1242     Site Left Brachial     Rubén's Test N/A     pH, Arterial 7.275 pH units      Comment: 84 Value below reference range        pCO2, Arterial 68.6 mm Hg      Comment: 86 Value above critical limit        pO2, Arterial 67.1 mm Hg      Comment: 84 Value below reference range        HCO3, Arterial 31.8 mmol/L      Comment: 83 Value above reference range        Base Excess, Arterial 2.9 mmol/L      Comment: 83 Value above reference range        O2 Saturation, Arterial 92.7 %      Comment: 84 Value below reference range        Hemoglobin, Blood Gas 13.9 g/dL      Hematocrit, Blood Gas 42.6 %      Oxyhemoglobin 91.1 %      Comment: 84 Value below reference range        Methemoglobin 0.20 %      Carboxyhemoglobin 1.6 %      Temperature 37.0     Sodium, Arterial 145 mmol/L      Potassium, Arterial 4.6 mmol/L      Ionized Calcium 5.91 mg/dL      Comment: 83 Value above reference range        Barometric Pressure for Blood Gas 751 mmHg      Modality Nasal Cannula     Flow Rate 2.0 lpm      Ventilator Mode NA     Notified Who DR STEPHENS     Notified By Clemencia Osei CRT     Notified Time 05/02/2024 12:41     Collected by [id number]     Comment: Meter: [id number]     :  Clemencia Osei CRT        pH, Temp Corrected 7.275 pH Units      pCO2, Temperature Corrected 68.6 mm Hg      pO2, Temperature Corrected 67.1 mm Hg     COVID-19 and FLU A/B PCR, 1 HR TAT - Swab, Nasopharynx [[id number]]  (Normal) Collected:  "05/02/24 1157    Specimen: Swab from Nasopharynx Updated: 05/02/24 1225     COVID19 Not Detected     Influenza A PCR Not Detected     Influenza B PCR Not Detected    Narrative:      Fact sheet for providers: https://www.fda.gov/media/733661/download    Fact sheet for patients: https://www.fda.gov/media/781598/download    Test performed by PCR.    Procalcitonin [[id number]]  (Normal) Collected: 05/02/24 1140    Specimen: Blood Updated: 05/02/24 1218     Procalcitonin 0.05 ng/mL     Narrative:      As a Marker for Sepsis (Non-Neonates):    1. <0.5 ng/mL represents a low risk of severe sepsis and/or septic shock.  2. >2 ng/mL represents a high risk of severe sepsis and/or septic shock.    As a Marker for Lower Respiratory Tract Infections that require antibiotic therapy:    PCT on Admission    Antibiotic Therapy       6-12 Hrs later    >0.5                Strongly Recommended  >0.25 - <0.5        Recommended   0.1 - 0.25          Discouraged              Remeasure/reassess PCT  <0.1                Strongly Discouraged     Remeasure/reassess PCT    As 28 day mortality risk marker: \"Change in Procalcitonin Result\" (>80% or <=80%) if Day 0 (or Day 1) and Day 4 values are available. Refer to http://www.[facility]-pct-calculator.com    Change in PCT <=80%  A decrease of PCT levels below or equal to 80% defines a positive change in PCT test result representing a higher risk for 28-day all-cause mortality of patients diagnosed with severe sepsis for septic shock.    Change in PCT >80%  A decrease of PCT levels of more than 80% defines a negative change in PCT result representing a lower risk for 28-day all-cause mortality of patients diagnosed with severe sepsis or septic shock.       Comprehensive Metabolic Panel [[id number]]  (Abnormal) Collected: 05/02/24 1140    Specimen: Blood Updated: 05/02/24 1213     Glucose 119 mg/dL      BUN 40 mg/dL      Creatinine 1.89 mg/dL      Sodium 143 mmol/L      Potassium 4.8 mmol/L      Chloride " 104 mmol/L      CO2 32.0 mmol/L      Calcium 10.8 mg/dL      Total Protein 7.7 g/dL      Albumin 3.8 g/dL      ALT (SGPT) 12 U/L      AST (SGOT) 16 U/L      Alkaline Phosphatase 79 U/L      Total Bilirubin 0.5 mg/dL      Globulin 3.9 gm/dL      A/G Ratio 1.0 g/dL      BUN/Creatinine Ratio 21.2     Anion Gap 7.0 mmol/L      eGFR 26.6 mL/min/1.73     Narrative:      GFR Normal >60  Chronic Kidney Disease <60  Kidney Failure <15    The GFR formula is only valid for adults with stable renal function between ages 18 and 70.    BNP [[id number]]  (Normal) Collected: 05/02/24 1140    Specimen: Blood Updated: 05/02/24 1209     proBNP 1,132.0 pg/mL     Narrative:      This assay is used as an aid in the diagnosis of individuals suspected of having heart failure. It can be used as an aid in the diagnosis of acute decompensated heart failure (ADHF) in patients presenting with signs and symptoms of ADHF to the emergency department (ED). In addition, NT-proBNP of <300 pg/mL indicates ADHF is not likely.    Age Range Result Interpretation  NT-proBNP Concentration (pg/mL:      <50             Positive            >450                   Gray                 300-450                    Negative             <300    50-75           Positive            >900                  Gray                300-900                  Negative            <300      >75             Positive            >1800                  Gray                300-1800                  Negative            <300    High Sensitivity Troponin T [[id number]]  (Abnormal) Collected: 05/02/24 1140    Specimen: Blood Updated: 05/02/24 1208     HS Troponin T 38 ng/L     Narrative:      High Sensitive Troponin T Reference Range:  <14.0 ng/L- Negative Female for AMI  <22.0 ng/L- Negative Male for AMI  >=14 - Abnormal Female indicating possible myocardial injury.  >=22 - Abnormal Male indicating possible myocardial injury.   Clinicians would have to utilize clinical acumen, EKG, Troponin,  and serial changes to determine if it is an Acute Myocardial Infarction or myocardial injury due to an underlying chronic condition.         Lactic Acid, Plasma [[id number]]  (Normal) Collected: 05/02/24 1140    Specimen: Blood Updated: 05/02/24 1204     Lactate 1.3 mmol/L     [place] Draw [[id number]] Collected: 05/02/24 1140    Specimen: Blood Updated: 05/02/24 1200    Narrative:      The following orders were created for panel order [place] Draw.  Procedure                               Abnormality         Status                     ---------                               -----------         ------                     Green Top (Gel)[[id number]]                                  Final result               Lavender Top[[id number]]                                     Final result               Red Top[[id number]]                                          Final result               Gray Top[[id number]]                                         Final result               Light Blue Top[[id number]]                                   Final result                 Please view results for these tests on the individual orders.    Green Top (Gel) [[id number]] Collected: 05/02/24 1140    Specimen: Blood Updated: 05/02/24 1200     Extra Tube Hold for add-ons.     Comment: Auto resulted.       Red Top [[id number]] Collected: 05/02/24 1140    Specimen: Blood Updated: 05/02/24 1200     Extra Tube Hold for add-ons.     Comment: Auto resulted.       Gray Top [[id number]] Collected: 05/02/24 1140    Specimen: Blood Updated: 05/02/24 1200     Extra Tube Hold for add-ons.     Comment: Auto resulted.       Light Blue Top [[id number]] Collected: 05/02/24 1140    Specimen: Blood Updated: 05/02/24 1200     Extra Tube Hold for add-ons.     Comment: Auto resulted       Lavender Top [[id number]] Collected: 05/02/24 1140    Specimen: Blood Updated: 05/02/24 1200     Extra Tube hold for add-on     Comment: Auto resulted       Protime-INR [[id number]]   (Abnormal) Collected: 05/02/24 1140    Specimen: Blood Updated: 05/02/24 1200     Protime 33.2 Seconds      INR 3.10    CBC & Differential [[id number]]  (Abnormal) Collected: 05/02/24 1140    Specimen: Blood Updated: 05/02/24 1151    Narrative:      The following orders were created for panel order CBC & Differential.  Procedure                               Abnormality         Status                     ---------                               -----------         ------                     CBC Auto Differential[[id number]]        Abnormal            Final result                 Please view results for these tests on the individual orders.    CBC Auto Differential [[id number]]  (Abnormal) Collected: 05/02/24 1140    Specimen: Blood Updated: 05/02/24 1151     WBC 8.23 10*3/mm3      RBC 4.47 10*6/mm3      Hemoglobin 14.1 g/dL      Hematocrit 46.4 %      .8 fL      MCH 31.5 pg      MCHC 30.4 g/dL      RDW 13.9 %      RDW-SD 52.6 fl      MPV 9.1 fL      Platelets 171 10*3/mm3      Neutrophil % 82.3 %      Lymphocyte % 8.0 %      Monocyte % 8.9 %      Eosinophil % 0.2 %      Basophil % 0.2 %      Immature Grans % 0.4 %      Neutrophils, Absolute 6.77 10*3/mm3      Lymphocytes, Absolute 0.66 10*3/mm3      Monocytes, Absolute 0.73 10*3/mm3      Eosinophils, Absolute 0.02 10*3/mm3      Basophils, Absolute 0.02 10*3/mm3      Immature Grans, Absolute 0.03 10*3/mm3      nRBC 0.0 /100 WBC                 2D Echocardiogram:  Report pending        Medication Review: yes  Current Facility-Administered Medications   Medication Dose Route Frequency Provider Last Rate Last Admin    acetaminophen (TYLENOL) tablet 650 mg  650 mg Oral Q6H PRN Los Vázquez MD        atorvastatin (LIPITOR) tablet 40 mg  40 mg Oral Nightly Los Vázquez MD   40 mg at 05/03/24 2034    sennosides-docusate (PERICOLACE) 8.6-50 MG per tablet 2 tablet  2 tablet Oral BID PRN Los Vázquez MD        And    polyethylene glycol (MIRALAX) packet 17 g   17 g Oral Daily PRN Los Vázquez MD        And    bisacodyl (DULCOLAX) EC tablet 5 mg  5 mg Oral Daily PRN Los Vázquez MD        And    bisacodyl (DULCOLAX) suppository 10 mg  10 mg Rectal Daily PRN Los Vázquez MD        calcitriol (ROCALTROL) capsule 0.25 mcg  0.25 mcg Oral Daily Los Vázquez MD   0.25 mcg at 05/03/24 0932    Calcium Replacement - Follow Nurse / BPA Driven Protocol   Does not apply PRN Los Vázquez MD        dilTIAZem CD (CARDIZEM CD) 24 hr capsule 180 mg  180 mg Oral Q24H Los Vázquez MD   180 mg at 05/02/24 1620    donepezil (ARICEPT) tablet 5 mg  5 mg Oral Nightly Los Vázquez MD   5 mg at 05/03/24 2034    escitalopram (LEXAPRO) tablet 10 mg  10 mg Oral Daily Los Vázquez MD   10 mg at 05/03/24 0932    [Held by provider] furosemide (LASIX) injection 40 mg  40 mg Intravenous Q12H Los Vázquez MD   40 mg at 05/04/24 0013    ipratropium-albuterol (DUO-NEB) nebulizer solution 3 mL  3 mL Nebulization 4x Daily - RT Los Vázquez MD   3 mL at 05/04/24 0730    Magnesium Standard Dose Replacement - Follow Nurse / BPA Driven Protocol   Does not apply PRN Los Vázquez MD        methIMAzole (TAPAZOLE) tablet 10 mg  10 mg Oral Daily Los Vázquez MD   10 mg at 05/03/24 0932    metoprolol tartrate (LOPRESSOR) tablet 25 mg  25 mg Oral Q12H Los Vázquez MD   25 mg at 05/03/24 2034    nitroglycerin (NITROSTAT) SL tablet 0.4 mg  0.4 mg Sublingual Q5 Min PRN Los Vázquez MD        nystatin (MYCOSTATIN) powder   Topical Q12H [facility] Femi, DO   Given at 05/03/24 2034    oxyCODONE-acetaminophen (PERCOCET) 5-325 MG per tablet 1 tablet  1 tablet Oral Q6H PRN Los Vázquez MD   1 tablet at 05/04/24 0017    Phosphorus Replacement - Follow Nurse / BPA Driven Protocol   Does not apply PRN Los Vázquez MD        Potassium Replacement - Follow Nurse / BPA Driven Protocol   Does not apply PRN Los Vázquez MD        QUEtiapine (SEROquel)  tablet 25 mg  25 mg Oral Nightly Los Vázquez MD   25 mg at 05/03/24 2034    sodium chloride 0.9 % flush 10 mL  10 mL Intravenous PRN Los Vázquez MD        sodium chloride 0.9 % flush 10 mL  10 mL Intravenous Q12H Los Vázquez MD   10 mL at 05/03/24 2035    sodium chloride 0.9 % infusion 40 mL  40 mL Intravenous PRN Los Vázquez MD        [Held by provider] warfarin (COUMADIN) tablet 5 mg  5 mg Oral Once per day on Monday Wednesday Friday Saturday Los Vázquez MD        [Held by provider] warfarin (COUMADIN) tablet 7.5 mg  7.5 mg Oral Once per day on Sunday Tuesday Thursday Los Vázquez MD             Assessment & Plan       Acute on chronic diastolic congestive heart failure - improved with IV diuresing with net - .965 L. Weight is down 2 pounds. Her CR increased, therefore will hold IV lasix. Continue metoprolol 25 mg BID. Monitor strict I/O, daily weight and renal function. Add 1500 ml fluid restriction.     Acute on chronic respiratory failure with hypoxia and hypercapnia - currently on 2.5 L NC. She is very lethargic.     Essential hypertension - controlled. On metoprolol 25 mg BID    Permanent atrial fibrillation - rate controlled with BB and cardizem  mg daily. Anticoagulated with warfarin. INR 2.81 this morning. [id number] Score: 5 (age, female, CHF, HTN)    Valvular heart disease - S/P TAVR (transcatheter aortic valve replacement) in May of 2018 at [place]. 2D echocardiogram ordered.    Stage 3 chronic kidney disease - mild elevation with IV diuresing. Hold IV lasix and recheck BMP in AM.     ASA (obstructive sleep apnea) - CPAP noncompliance    Mixed hyperlipidemia - on atorvastatin 40 mg nightly    Chronic anticoagulation for permanent atrial fibrillation and DVT          Further orders per Dr. Betito Hobbs.        Amaya John, APRN  05/04/24  09:44 CDT

## 2024-05-04 NOTE — PLAN OF CARE
Goal Outcome Evaluation:     Problem: Noninvasive Ventilation Acute  Goal: Effective Unassisted Ventilation and Oxygenation  Outcome: Ongoing, Progressing    RT EQUIPMENT DEVICE RELATED - SKIN ASSESSMENT    Deven Score:  Deven Score: 13     RT Medical Equipment/Device:     NIV Mask:  Under-the-nose   size:  c    Skin Assessment:      Nose:  Intact    Device Skin Pressure Protection:  Skin-to-device areas padded:  None Required    Nurse Notification:  Darcie Hernandez, RRT

## 2024-05-04 NOTE — PLAN OF CARE
Goal Outcome Evaluation:              Outcome Evaluation: Pt c/o back and bottom pain, but refused to turn the majority of the night, could only negotiate a reposition with wound care being provided. VSS. Af . Safety maintained.

## 2024-05-05 NOTE — PROGRESS NOTES
[facility] Medicine Services  INPATIENT PROGRESS NOTE    Patient Name: Jarvis Morgan  Date of Admission: 5/2/2024  Today's Date: 05/05/24  Length of Stay: 3  Primary Care Physician: Mary Beth Izquierdo APRN    Subjective   Chief Complaint:   HPI shortness of air       This is a 89-year-old lady with past medical history significant for atrial fibrillation on chronic warfarin use, history of hypertension, history of diastolic heart failure who presented initially to the emergency department earlier today with increased shortness of air, apparently family called EMS earlier today and stated that she is becoming more dyspneic and she has worsening lower extremity edema, also she is becoming more more lethargic, patient had saturation cocci in the upper 80s on room air upon arrival, patient had an ABG that showed significant hypercarbia, patient received IV Lasix, placed on BiPAP and decision was made to admit her for further evaluation and management, she denied any chest pain, she denied any lightheadedness or any headaches any nausea or vomiting. She will be admitted for further evaluation and management and cardiology and pulmonary consultation will be place         Review of Systems   All pertinent negatives and positives are as above. All other systems have been reviewed and are negative unless otherwise stated.     Objective    Temp:  [98.2 °F (36.8 °C)-99.4 °F (37.4 °C)] 98.4 °F (36.9 °C)  Heart Rate:  [78-97] 87  Resp:  [12-19] 16  BP: (112-139)/(55-68) 118/61  Physical Exam    General: Patient is more awake this morning following commands   HEENT: Normocephalic, atraumatic, pupils are equal reactive to light, and accommodate  Neck: Supple, no JVD, no carotid bruits  CVS: Irregularly irregular rhythm  Lungs:  diminished breath sounds at the bases  Abdomen: Soft, nontender, nondistended bowel sounds are present  Extremities: Edema present bilaterally  Neurologic: No focal  deficits  Psychiatric: Normal affect    Results Review:  I have reviewed the labs, radiology results, and diagnostic studies.    Laboratory Data:   Results from last 7 days   Lab Units 05/05/24  0421 05/04/24  0835 05/03/24  0526   WBC 10*3/mm3 7.39 8.34 6.46   HEMOGLOBIN g/dL 12.6 13.9 13.4   HEMATOCRIT % 40.4 45.9 44.0   PLATELETS 10*3/mm3 157 163 154        Results from last 7 days   Lab Units 05/05/24  0421 05/04/24  0835 05/03/24  0526 05/02/24  1241 05/02/24  1140   SODIUM mmol/L 141 142 144  --  143   SODIUM, ARTERIAL   --   --   --    < >  --    POTASSIUM mmol/L 4.7 5.0 4.4  --  4.8   CHLORIDE mmol/L 102 103 105  --  104   CO2 mmol/L 31.0* 32.0* 35.0*  --  32.0*   BUN mg/dL 53* 48* 41*  --  40*   CREATININE mg/dL 1.78* 1.91* 1.77*  --  1.89*   CALCIUM mg/dL 10.5 10.5 10.4  --  10.8*   BILIRUBIN mg/dL  --  0.6  --   --  0.5   ALK PHOS U/L  --  77  --   --  79   ALT (SGPT) U/L  --  10  --   --  12   AST (SGOT) U/L  --  13  --   --  16   GLUCOSE mg/dL 88 139* 95  --  119*    < > = values in this interval not displayed.       Culture Data:   Blood Culture   Date Value Ref Range Status   05/02/2024 No growth at 3 days  Preliminary   05/02/2024 No growth at 3 days  Preliminary     Urine Culture   Date Value Ref Range Status   05/04/2024 >100,000 CFU/mL Gram Negative Bacilli (A)  Preliminary       Radiology Data:   Imaging Results (Last 24 Hours)       ** No results found for the last 24 hours. **            I have reviewed the patient's current medications.     Assessment/Plan   Assessment  Active Hospital Problems    Diagnosis     **Acute on chronic diastolic congestive heart failure     Permanent atrial fibrillation     Right pleural effusion     Stage 3 chronic kidney disease     S/P TAVR (transcatheter aortic valve replacement)     Hyperthyroidism     ASA (obstructive sleep apnea)     Acute respiratory failure with hypoxia and hypercapnia     Essential hypertension        Treatment Plan  1.  Acute respiratory  failure with hypercarbia on BiPAP: Will start patient on breathing treatment with DuoNeb, try to wean off BiPAP, pulmonary on board   2.  Acute decompensated diastolic heart failure, LVEF is 61 to 65% on 2D echo Doppler was done on May 4, 2024, patient was switched to p.o. Lasix 40 mg daily.    3.  Atrial fibrillation on chronic anticoagulation with warfarin, her INR is at 2.81, will check INR daily, warfarin was resumed  4.  Chronic kidney disease stage III: Will avoid nephrotoxic agents  5.  History of obstructive sleep apnea: On CPAP at home  6.PT/OT evaluation  7.  Increase activity  8.Acute Cystitis : will start IV antibiotics Ceftriaxone, urine culture is showing gram negative bacilli     Medical Decision Making  Number and Complexity of problems: 3  Differential Diagnosis:  Acute respiratory failure with hypercarbia on BiPAP  Acute decompensated diastolic heart failure  Atrial fibrillation with rapid ventricular response  Chronic anticoagulation on warfarin    Conditions and Status        fair     [facility] Data  External documents reviewed: none  Cardiac tracing (EKG, telemetry) interpretation: atrial fibrillation  Radiology interpretation: yes  Labs reviewed: yes  Any tests that were considered but not ordered: none     Decision rules/scores evaluated (example [id number], Wells, etc): 4     Discussed with: nursing staff,      Care Planning  Shared decision making: patient,consultant   Code status and discussions: full    Disposition  Social Determinants of Health that impact treatment or disposition: none  I expect the patient to be discharged to home in 2 days.     Electronically signed by Los Vázquez MD, 05/05/24, 14:35 CDT.

## 2024-05-05 NOTE — PROGRESS NOTES
[facility] -  Progress Note     LOS: 3 days   Patient Care Team:  Mary Beth Izquierdo APRN as PCP - General  Mary Beth Izquierdo APRN as PCP - Family Medicine  Elia Flores MD (Inactive) as Cardiologist (Cardiology)    Chief Complaint: F/U CHF    Subjective   Patient awake, BiPAP on. Denies complaint of chest pain or pressure. Denies any shortness of breath.       REVIEW OF SYSTEMS:  Constitutional: Denies fever or chills. Denies change in energy level or malaise.  HEENT: Denies headaches or visual disturbances. Denies difficulty swallowing or sore throat.  Respiratory: Denies cough or hoarseness. Denies shortness of breath.   Cardiovascular: Denies chest pain or pressure. Denies palpitations. Denies presyncope/syncope. Denies orthopnea/PND. Denies lower extremity edema.   Gastrointestinal: Denies abdominal pain. Denies nausea/vomiting. Denies change in bowel habits or history of recent GI tract blood loss.   Genitourinary: Denies urinary urgency or frequency. Denies dysuria, hematuria.  Musculoskeletal: Denies pain or swelling in joints.  Neurological: Denies paresthesias. Denies headache. Denies seizure or stroke symptoms.  Behavioral/Psych: Denies problems with anxiety or depression.    All other systems are negative except where stated above.        Objective     Vital Sign Min/Max for last 24 hours  Temp  Min: 97.6 °F (36.4 °C)  Max: 99.4 °F (37.4 °C)   BP  Min: 111/73  Max: 139/67   Pulse  Min: 78  Max: 97   Resp  Min: 12  Max: 19   SpO2  Min: 92 %  Max: 98 %   No data recorded   Weight  Min: 98.7 kg (217 lb 11.2 oz)  Max: 98.7 kg (217 lb 11.2 oz)         05/05/24  0337   Weight: 98.7 kg (217 lb 11.2 oz)         Intake/Output Summary (Last 24 hours) at 5/5/2024 0828  Last data filed at 5/5/2024 0500  Gross per 24 hour   Intake 120 ml   Output 1100 ml   Net -980 ml         Physical Exam:    General Appearance:  Frail, chronically ill-appearing female who is awake this morning.  "  HEENT:  Normocephalic. Atraumatic. CRISTIANA.   Lungs:  Clear, but diminished throughout,  to auscultation, respirations regular, even and unlabored    Heart:  Irregular rhythm with normal rate, normal S1 and S2, soft systolic murmur, no gallop, no rub, no click   Abdomen:  Normal bowel sounds, soft, non-tender, non-distended   Extremities:  Moves all extremities, + BLE edema (L>R), no cyanosis, no redness   Pulses:  Pulses palpable and equal bilaterally   Skin:  No bleeding, bruising or rash. Discoloration of LLE 2' to venous insufficiency/stasis.    Neurologic:  Grossly intact. Alert this morning, but still confused regarding year.        Results Review:   Basic Metabolic Panel    Sodium Sodium   Date Value Ref Range Status   05/05/2024 141 136 - 145 mmol/L Final   05/04/2024 142 136 - 145 mmol/L Final   05/03/2024 144 136 - 145 mmol/L Final   05/02/2024 143 136 - 145 mmol/L Final     Sodium, Arterial   Date Value Ref Range Status   05/02/2024 145 136 - 145 mmol/L Final   05/02/2024 145 136 - 145 mmol/L Final      Potassium Potassium   Date Value Ref Range Status   05/05/2024 4.7 3.5 - 5.2 mmol/L Final   05/04/2024 5.0 3.5 - 5.2 mmol/L Final   05/03/2024 4.4 3.5 - 5.2 mmol/L Final   05/02/2024 4.8 3.5 - 5.2 mmol/L Final      Chloride Chloride   Date Value Ref Range Status   05/05/2024 102 98 - 107 mmol/L Final   05/04/2024 103 98 - 107 mmol/L Final   05/03/2024 105 98 - 107 mmol/L Final   05/02/2024 104 98 - 107 mmol/L Final      Bicarbonate No results found for: \"PLASMABICARB\"   BUN BUN   Date Value Ref Range Status   05/05/2024 53 (H) 8 - 23 mg/dL Final   05/04/2024 48 (H) 8 - 23 mg/dL Final   05/03/2024 41 (H) 8 - 23 mg/dL Final   05/02/2024 40 (H) 8 - 23 mg/dL Final      Creatinine Creatinine   Date Value Ref Range Status   05/05/2024 1.78 (H) 0.57 - 1.00 mg/dL Final   05/04/2024 1.91 (H) 0.57 - 1.00 mg/dL Final   05/03/2024 1.77 (H) 0.57 - 1.00 mg/dL Final   05/02/2024 1.89 (H) 0.57 - 1.00 mg/dL Final    " "  Calcium Calcium   Date Value Ref Range Status   05/05/2024 10.5 8.6 - 10.5 mg/dL Final   05/04/2024 10.5 8.6 - 10.5 mg/dL Final   05/03/2024 10.4 8.6 - 10.5 mg/dL Final   05/02/2024 10.8 (H) 8.6 - 10.5 mg/dL Final      Glucose      No components found for: \"GLUCOSE.*\"     WBC   Date Value Ref Range Status   05/05/2024 7.39 3.40 - 10.80 10*3/mm3 Final     RBC   Date Value Ref Range Status   05/05/2024 3.95 3.77 - 5.28 10*6/mm3 Final     Hemoglobin   Date Value Ref Range Status   05/05/2024 12.6 12.0 - 15.9 g/dL Final     Hematocrit   Date Value Ref Range Status   05/05/2024 40.4 34.0 - 46.6 % Final     MCV   Date Value Ref Range Status   05/05/2024 102.3 (H) 79.0 - 97.0 fL Final     MCH   Date Value Ref Range Status   05/05/2024 31.9 26.6 - 33.0 pg Final     MCHC   Date Value Ref Range Status   05/05/2024 31.2 (L) 31.5 - 35.7 g/dL Final     RDW   Date Value Ref Range Status   05/05/2024 13.5 12.3 - 15.4 % Final     RDW-SD   Date Value Ref Range Status   05/05/2024 50.4 37.0 - 54.0 fl Final     MPV   Date Value Ref Range Status   05/05/2024 9.8 6.0 - 12.0 fL Final     Platelets   Date Value Ref Range Status   05/05/2024 157 140 - 450 10*3/mm3 Final     Neutrophil %   Date Value Ref Range Status   05/05/2024 79.7 (H) 42.7 - 76.0 % Final     Lymphocyte %   Date Value Ref Range Status   05/05/2024 6.8 (L) 19.6 - 45.3 % Final     Monocyte %   Date Value Ref Range Status   05/05/2024 12.7 (H) 5.0 - 12.0 % Final     Eosinophil %   Date Value Ref Range Status   05/05/2024 0.4 0.3 - 6.2 % Final     Basophil %   Date Value Ref Range Status   05/05/2024 0.1 0.0 - 1.5 % Final     Immature Grans %   Date Value Ref Range Status   05/05/2024 0.3 0.0 - 0.5 % Final     Neutrophils, Absolute   Date Value Ref Range Status   05/05/2024 5.89 1.70 - 7.00 10*3/mm3 Final     Lymphocytes, Absolute   Date Value Ref Range Status   05/05/2024 0.50 (L) 0.70 - 3.10 10*3/mm3 Final     Monocytes, Absolute   Date Value Ref Range Status "   05/05/2024 0.94 (H) 0.10 - 0.90 10*3/mm3 Final     Eosinophils, Absolute   Date Value Ref Range Status   05/05/2024 0.03 0.00 - 0.40 10*3/mm3 Final     Basophils, Absolute   Date Value Ref Range Status   05/05/2024 0.01 0.00 - 0.20 10*3/mm3 Final     Immature Grans, Absolute   Date Value Ref Range Status   05/05/2024 0.02 0.00 - 0.05 10*3/mm3 Final     nRBC   Date Value Ref Range Status   05/05/2024 0.0 0.0 - 0.2 /100 WBC Final     PT/INR:  30.6 / 2.80    2D Echocardiogram:    Atrial fibrillation was the predominant rhythm observed during the procedure.    Left ventricular systolic function is normal. Left ventricular ejection fraction appears to be 61 - 65%.    The right ventricular cavity is moderate to severely dilated. Moderately reduced right ventricular systolic function noted.    The left atrial cavity is moderately dilated    The aortic valve exhibits sclerosis. There is moderate calcification of the aortic valve. Trace aortic valve regurgitation is present. Moderate aortic valve stenosis is present    Moderate mitral annular calcification is present. Mild mitral valve regurgitation is present.    Tricuspid annular calcification is present. Moderate tricuspid valve regurgitation is present    Moderate pulmonary hypertension is present.    The inferior vena cava is dilated. IVC inspiratory collapse is absent.    There is a small (<1cm) pericardial effusion. There is no evidence of cardiac tamponade.      Medication Review: yes  Current Facility-Administered Medications   Medication Dose Route Frequency Provider Last Rate Last Admin    acetaminophen (TYLENOL) tablet 650 mg  650 mg Oral Q6H PRN Los Vázquez MD        atorvastatin (LIPITOR) tablet 40 mg  40 mg Oral Nightly Los Vázquez MD   40 mg at 05/04/24 2037    sennosides-docusate (PERICOLACE) 8.6-50 MG per tablet 2 tablet  2 tablet Oral BID PRN Los Vázquez MD        And    polyethylene glycol (MIRALAX) packet 17 g  17 g Oral Daily PRN  Los Vázquez MD        And    bisacodyl (DULCOLAX) EC tablet 5 mg  5 mg Oral Daily PRN Los Vázquez MD        And    bisacodyl (DULCOLAX) suppository 10 mg  10 mg Rectal Daily PRN Los Vázquez MD        calcitriol (ROCALTROL) capsule 0.25 mcg  0.25 mcg Oral Daily Los Vázquez MD   0.25 mcg at 05/03/24 0932    Calcium Replacement - Follow Nurse / BPA Driven Protocol   Does not apply PRN Los Vázquez MD        dilTIAZem CD (CARDIZEM CD) 24 hr capsule 180 mg  180 mg Oral Q24H Los Vázquez MD   180 mg at 05/02/24 1620    donepezil (ARICEPT) tablet 5 mg  5 mg Oral Nightly Los Vázquez MD   5 mg at 05/04/24 2037    escitalopram (LEXAPRO) tablet 10 mg  10 mg Oral Daily Los Vázquez MD   10 mg at 05/03/24 0932    furosemide (LASIX) tablet 40 mg  40 mg Oral Daily Amaya John APRN        ipratropium-albuterol (DUO-NEB) nebulizer solution 3 mL  3 mL Nebulization 4x Daily - RT Los Vázquez MD   3 mL at 05/05/24 0646    Magnesium Standard Dose Replacement - Follow Nurse / BPA Driven Protocol   Does not apply PRN Los Vázquez MD        methIMAzole (TAPAZOLE) tablet 10 mg  10 mg Oral Daily Los Vázquez MD   10 mg at 05/03/24 0932    metoprolol tartrate (LOPRESSOR) tablet 25 mg  25 mg Oral Q12H Los Vázquez MD   25 mg at 05/04/24 2037    nitroglycerin (NITROSTAT) SL tablet 0.4 mg  0.4 mg Sublingual Q5 Min PRN Los Vázquez MD        nystatin (MYCOSTATIN) powder   Topical Q12H [facility], DO   Given at 05/04/24 2037    oxyCODONE-acetaminophen (PERCOCET) 5-325 MG per tablet 1 tablet  1 tablet Oral Q6H PRN Los Vázquez MD   1 tablet at 05/04/24 0017    Phosphorus Replacement - Follow Nurse / BPA Driven Protocol   Does not apply PRN Los Vázuqez MD        Potassium Replacement - Follow Nurse / BPA Driven Protocol   Does not apply PRN Los Vázquez MD        QUEtiapine (SEROquel) tablet 25 mg  25 mg Oral Nightly Los Vázquez MD   25 mg at  05/04/24 2037    sodium chloride 0.9 % flush 10 mL  10 mL Intravenous PRN Los Vázquez MD        sodium chloride 0.9 % flush 10 mL  10 mL Intravenous Q12H Los Vázquez MD   10 mL at 05/04/24 2037    sodium chloride 0.9 % infusion 40 mL  40 mL Intravenous PRN Los Vázquez MD        warfarin (COUMADIN) tablet 5 mg  5 mg Oral Once per day on Monday Wednesday Friday Saturday Los Vázquez MD   5 mg at 05/04/24 1858    warfarin (COUMADIN) tablet 7.5 mg  7.5 mg Oral Once per day on Sunday Tuesday Thursday Los Vázquez MD             Assessment & Plan       Acute on chronic diastolic congestive heart failure - improved with IV diuresing with net - 2 L. Weight is down 4 pounds. Continue metoprolol 25 mg BID. Monitor strict I/O, daily weight and renal function. Continue 1500 ml fluid restriction. CR improved, therefore, will restart oral diuretic with Lasix 40 mg daily.     Acute on chronic respiratory failure with hypoxia and hypercapnia - currently on 2.5 L NC. She is very lethargic.     Essential hypertension - controlled. On metoprolol 25 mg BID    Permanent atrial fibrillation - rate controlled with BB and cardizem  mg daily. Anticoagulated with warfarin. INR 2.80 this morning. [id number] Score: 5 (age, female, CHF, HTN)    Valvular heart disease - S/P TAVR (transcatheter aortic valve replacement) in May of 2018 at [place].     Stage 3 chronic kidney disease - mild elevation with IV diuresing. Hold IV lasix and recheck BMP in AM.     ASA (obstructive sleep apnea) - CPAP noncompliance    Mixed hyperlipidemia - on atorvastatin 40 mg nightly    Chronic anticoagulation for permanent atrial fibrillation and DVT          Further orders per Dr. Betito Hobbs.        Amaya John, IRINEO  05/05/24  08:28 CDT

## 2024-05-05 NOTE — PROGRESS NOTES
RT EQUIPMENT DEVICE RELATED - SKIN ASSESSMENT    Deven Score:  Deven Score: 13     RT Medical Equipment/Device:     NIV Mask:  Under-the-nose   size:  B    Skin Assessment:      Cheek:  Intact  Neck:  Intact  Nose:  Intact    Device Skin Pressure Protection:  Skin-to-device areas padded:  None Required    Nurse Notification:  Darcie Avila, CRT

## 2024-05-05 NOTE — PLAN OF CARE
Goal Outcome Evaluation:              Outcome Evaluation: Pt wearing bipap and has not tried to remove tonight. Appears to be resting comfortably, pt more compliant this morning in comparison to last night--ie has been allowing me to turn her. VSS. AF . Safety maintained.

## 2024-05-05 NOTE — PLAN OF CARE
Goal Outcome Evaluation:  Plan of Care Reviewed With: patient        Progress: no change  Outcome Evaluation: Patient did eat a good portion of breakfast this morning but was not agreeable to eating much of her lunch or dinner. She was able to answer all orientation questions correctly except for the current year. Af [id number] with BBB per tele.

## 2024-05-05 NOTE — PLAN OF CARE
Goal Outcome Evaluation:     Problem: Noninvasive Ventilation Acute  Goal: Effective Unassisted Ventilation and Oxygenation  Outcome: Ongoing, Progressing

## 2024-05-06 NOTE — PROGRESS NOTES
[facility] -  Progress Note     LOS: 4 days   Patient Care Team:  Mary Beth Izquierdo APRN as PCP - General  Mary Beth Izquierdo APRN as PCP - Family Medicine  Elia Flores MD (Inactive) as Cardiologist (Cardiology)    Chief Complaint: Follow-up shortness of breath    Subjective     Interval History:   Upon my examination patient is up eating breakfast and son is at bedside.  She denies any significant complaints.  Breathing is improved but not back to baseline.  Still on oxygen cannula.  She is a poor historian in regards to her day-to-day health and she is unable to tell me whether she actually uses a CPAP at home or not.  She has no significant complaints today however.      Review of Systems:     Review of Systems   Respiratory:  Positive for shortness of breath.      Objective     Vital Sign Min/Max for last 24 hours  Temp  Min: 97.2 °F (36.2 °C)  Max: 98.4 °F (36.9 °C)   BP  Min: 101/50  Max: 134/67   Pulse  Min: 65  Max: 100   Resp  Min: 12  Max: 23   SpO2  Min: 91 %  Max: 96 %   No data recorded   Weight  Min: 98.8 kg (217 lb 12.8 oz)  Max: 98.8 kg (217 lb 12.8 oz)         05/06/24  0441   Weight: 98.8 kg (217 lb 12.8 oz)       Telemetry: Atrial fibrillation with rates in the 60s to low 100s      Physical Exam:    Constitutional:       Appearance: Healthy appearance. Not in distress.   Pulmonary:      Effort: Pulmonary effort is normal.      Breath sounds: Normal breath sounds. Decreased air movement present.   Cardiovascular:      PMI at left midclavicular line. Normal rate. Irregularly irregular rhythm.      Murmurs: There is no murmur.      No gallop.  No click. No rub.   Edema:     Peripheral edema present.     Ankle: bilateral trace edema of the ankle.     Feet: bilateral trace edema of the feet.  Abdominal:      General: Bowel sounds are normal.   Musculoskeletal: Normal range of motion.      Cervical back: Normal range of motion and neck supple. Skin:     General: Skin is  warm.      Comments: Hyperpigmented lower extremities.   Neurological:      Mental Status: Alert and oriented to person, place and time.       Results Review:   Lab Results (last 72 hours)       Procedure Component Value Units Date/Time    Basic Metabolic Panel [[id number]]  (Abnormal) Collected: 05/06/24 0425    Specimen: Blood Updated: 05/06/24 0547     Glucose 111 mg/dL      BUN 56 mg/dL      Creatinine 1.59 mg/dL      Sodium 142 mmol/L      Potassium 4.9 mmol/L      Chloride 103 mmol/L      CO2 33.0 mmol/L      Calcium 11.1 mg/dL      BUN/Creatinine Ratio 35.2     Anion Gap 6.0 mmol/L      eGFR 32.7 mL/min/1.73     Narrative:      GFR Normal >60  Chronic Kidney Disease <60  Kidney Failure <15    The GFR formula is only valid for adults with stable renal function between ages 18 and 70.    Protime-INR [[id number]]  (Abnormal) Collected: 05/06/24 0425    Specimen: Blood Updated: 05/06/24 0544     Protime 27.8 Seconds      INR 2.48    CBC & Differential [[id number]]  (Abnormal) Collected: 05/06/24 0425    Specimen: Blood Updated: 05/06/24 0529    Narrative:      The following orders were created for panel order CBC & Differential.  Procedure                               Abnormality         Status                     ---------                               -----------         ------                     CBC Auto Differential[[id number]]        Abnormal            Final result                 Please view results for these tests on the individual orders.    CBC Auto Differential [[id number]]  (Abnormal) Collected: 05/06/24 0425    Specimen: Blood Updated: 05/06/24 0529     WBC 8.10 10*3/mm3      RBC 4.46 10*6/mm3      Hemoglobin 14.1 g/dL      Hematocrit 45.7 %      .5 fL      MCH 31.6 pg      MCHC 30.9 g/dL      RDW 13.5 %      RDW-SD 51.7 fl      MPV 10.9 fL      Platelets 169 10*3/mm3      Neutrophil % 74.9 %      Lymphocyte % 10.2 %      Monocyte % 13.8 %      Eosinophil % 0.7 %      Basophil % 0.2 %       Immature Grans % 0.2 %      Neutrophils, Absolute 6.05 10*3/mm3      Lymphocytes, Absolute 0.83 10*3/mm3      Monocytes, Absolute 1.12 10*3/mm3      Eosinophils, Absolute 0.06 10*3/mm3      Basophils, Absolute 0.02 10*3/mm3      Immature Grans, Absolute 0.02 10*3/mm3      nRBC 0.0 /100 WBC     Urine Culture - Urine, Indwelling Urethral Catheter [[id number]]  (Abnormal) Collected: 05/04/24 2050    Specimen: Urine from Indwelling Urethral Catheter Updated: 05/05/24 1343     Urine Culture >100,000 CFU/mL Gram Negative Bacilli    Narrative:      Colonization of the urinary tract without infection is common. Treatment is discouraged unless the patient is symptomatic, pregnant, or undergoing an invasive urologic procedure.    Blood Culture - Blood, Arm, Left [[id number]]  (Normal) Collected: 05/02/24 1210    Specimen: Blood from Arm, Left Updated: 05/05/24 1315     Blood Culture No growth at 3 days    Blood Culture - Blood, Arm, Right [[id number]]  (Normal) Collected: 05/02/24 1149    Specimen: Blood from Arm, Right Updated: 05/05/24 1315     Blood Culture No growth at 3 days    Basic Metabolic Panel [[id number]]  (Abnormal) Collected: 05/05/24 0421    Specimen: Blood Updated: 05/05/24 0512     Glucose 88 mg/dL      BUN 53 mg/dL      Creatinine 1.78 mg/dL      Sodium 141 mmol/L      Potassium 4.7 mmol/L      Chloride 102 mmol/L      CO2 31.0 mmol/L      Calcium 10.5 mg/dL      BUN/Creatinine Ratio 29.8     Anion Gap 8.0 mmol/L      eGFR 28.6 mL/min/1.73     Narrative:      GFR Normal >60  Chronic Kidney Disease <60  Kidney Failure <15    The GFR formula is only valid for adults with stable renal function between ages 18 and 70.    Protime-INR [[id number]]  (Abnormal) Collected: 05/05/24 0421    Specimen: Blood Updated: 05/05/24 0454     Protime 30.6 Seconds      INR 2.80    CBC & Differential [[id number]]  (Abnormal) Collected: 05/05/24 0421    Specimen: Blood Updated: 05/05/24 0447    Narrative:      The following orders were  created for panel order CBC & Differential.  Procedure                               Abnormality         Status                     ---------                               -----------         ------                     CBC Auto Differential[[id number]]        Abnormal            Final result                 Please view results for these tests on the individual orders.    CBC Auto Differential [[id number]]  (Abnormal) Collected: 05/05/24 0421    Specimen: Blood Updated: 05/05/24 0447     WBC 7.39 10*3/mm3      RBC 3.95 10*6/mm3      Hemoglobin 12.6 g/dL      Hematocrit 40.4 %      .3 fL      MCH 31.9 pg      MCHC 31.2 g/dL      RDW 13.5 %      RDW-SD 50.4 fl      MPV 9.8 fL      Platelets 157 10*3/mm3      Neutrophil % 79.7 %      Lymphocyte % 6.8 %      Monocyte % 12.7 %      Eosinophil % 0.4 %      Basophil % 0.1 %      Immature Grans % 0.3 %      Neutrophils, Absolute 5.89 10*3/mm3      Lymphocytes, Absolute 0.50 10*3/mm3      Monocytes, Absolute 0.94 10*3/mm3      Eosinophils, Absolute 0.03 10*3/mm3      Basophils, Absolute 0.01 10*3/mm3      Immature Grans, Absolute 0.02 10*3/mm3      nRBC 0.0 /100 WBC     Urinalysis With Culture If Indicated - Indwelling Urethral Catheter [[id number]]  (Abnormal) Collected: 05/04/24 2050    Specimen: Urine from Indwelling Urethral Catheter Updated: 05/04/24 2218     Color, UA Yellow     Appearance, UA Clear     pH, UA 7.5     Specific Gravity, UA 1.012     Glucose, UA Negative     Ketones, UA Negative     Bilirubin, UA Negative     Blood, UA Large (3+)     Protein,  mg/dL (2+)     Leuk Esterase, UA Large (3+)     Nitrite, UA Negative     Urobilinogen, UA 1.0 E.U./dL    Narrative:      In absence of clinical symptoms, the presence of pyuria, bacteria, and/or nitrites on the urinalysis result does not correlate with infection.    Urinalysis, Microscopic Only - Indwelling Urethral Catheter [[id number]]  (Abnormal) Collected: 05/04/24 2050    Specimen: Urine from  "Indwelling Urethral Catheter Updated: 05/04/24 2218     RBC, UA Too Numerous to Count /HPF      WBC, UA 21-50 /HPF      Bacteria, UA 3+ /HPF      Squamous Epithelial Cells, UA None Seen /HPF      Hyaline Casts, UA 0-2 /LPF      Methodology Automated Microscopy    CBC (No Diff) [[id number]]  (Abnormal) Collected: 05/04/24 0835    Specimen: Blood Updated: 05/04/24 0924     WBC 8.34 10*3/mm3      RBC 4.42 10*6/mm3      Hemoglobin 13.9 g/dL      Hematocrit 45.9 %      .8 fL      MCH 31.4 pg      MCHC 30.3 g/dL      RDW 13.7 %      RDW-SD 52.1 fl      MPV 9.4 fL      Platelets 163 10*3/mm3     Comprehensive Metabolic Panel [[id number]]  (Abnormal) Collected: 05/04/24 0835    Specimen: Blood Updated: 05/04/24 0910     Glucose 139 mg/dL      BUN 48 mg/dL      Creatinine 1.91 mg/dL      Sodium 142 mmol/L      Potassium 5.0 mmol/L      Chloride 103 mmol/L      CO2 32.0 mmol/L      Calcium 10.5 mg/dL      Total Protein 7.2 g/dL      Albumin 3.3 g/dL      ALT (SGPT) 10 U/L      AST (SGOT) 13 U/L      Alkaline Phosphatase 77 U/L      Total Bilirubin 0.6 mg/dL      Globulin 3.9 gm/dL      A/G Ratio 0.8 g/dL      BUN/Creatinine Ratio 25.1     Anion Gap 7.0 mmol/L      eGFR 26.3 mL/min/1.73     Narrative:      GFR Normal >60  Chronic Kidney Disease <60  Kidney Failure <15    The GFR formula is only valid for adults with stable renal function between ages 18 and 70.    Protime-INR [[id number]]  (Abnormal) Collected: 05/04/24 0352    Specimen: Blood Updated: 05/04/24 0446     Protime 30.7 Seconds      INR 2.81                Echo EF Estimated  Lab Results   Component Value Date    ECHOEFEST 60 09/22/2020         Cath Ejection Fraction Quantitative  No results found for: \"CATHEF\"        Medication Review: yes  Current Facility-Administered Medications   Medication Dose Route Frequency Provider Last Rate Last Admin    acetaminophen (TYLENOL) tablet 650 mg  650 mg Oral Q6H PRN Los Vázquez MD        atorvastatin (LIPITOR) " tablet 40 mg  40 mg Oral Nightly Los Vázquez MD   40 mg at 05/05/24 2032    sennosides-docusate (PERICOLACE) 8.6-50 MG per tablet 2 tablet  2 tablet Oral BID PRN Los Vázquez MD        And    polyethylene glycol (MIRALAX) packet 17 g  17 g Oral Daily PRN Los Vázquez MD        And    bisacodyl (DULCOLAX) EC tablet 5 mg  5 mg Oral Daily PRN Los Vázquez MD        And    bisacodyl (DULCOLAX) suppository 10 mg  10 mg Rectal Daily PRN Los Vázquez MD        calcitriol (ROCALTROL) capsule 0.25 mcg  0.25 mcg Oral Daily Los Vázquez MD   0.25 mcg at 05/06/24 0926    Calcium Replacement - Follow Nurse / BPA Driven Protocol   Does not apply PRN Los Vázquez MD        cefTRIAXone (ROCEPHIN) 2,000 mg in sodium chloride 0.9 % 100 mL MBP  2,000 mg Intravenous Q24H Los Vázquez  mL/hr at 05/05/24 1707 2,000 mg at 05/05/24 1707    dilTIAZem CD (CARDIZEM CD) 24 hr capsule 180 mg  180 mg Oral Q24H Los Vázquez MD   180 mg at 05/06/24 0926    donepezil (ARICEPT) tablet 5 mg  5 mg Oral Nightly Los Vázquez MD   5 mg at 05/05/24 2033    escitalopram (LEXAPRO) tablet 10 mg  10 mg Oral Daily Los Vázquez MD   10 mg at 05/06/24 0926    furosemide (LASIX) tablet 40 mg  40 mg Oral Daily Amaya John APRN   40 mg at 05/06/24 0926    ipratropium-albuterol (DUO-NEB) nebulizer solution 3 mL  3 mL Nebulization 4x Daily - RT Los Vázquez MD   3 mL at 05/06/24 0629    Magnesium Standard Dose Replacement - Follow Nurse / BPA Driven Protocol   Does not apply PRN Los Vázquez MD        methIMAzole (TAPAZOLE) tablet 10 mg  10 mg Oral Daily Los Vázquez MD   10 mg at 05/06/24 0926    metoprolol tartrate (LOPRESSOR) tablet 25 mg  25 mg Oral Q12H Los Vázquez MD   25 mg at 05/06/24 0926    nitroglycerin (NITROSTAT) SL tablet 0.4 mg  0.4 mg Sublingual Q5 Min PRN Los Vázquez MD        nystatin (MYCOSTATIN) powder   Topical Q12H [facility],    Given at  05/06/24 0928    oxyCODONE-acetaminophen (PERCOCET) 5-325 MG per tablet 1 tablet  1 tablet Oral Q6H PRN Los Vázquez MD   1 tablet at 05/04/24 0017    Phosphorus Replacement - Follow Nurse / BPA Driven Protocol   Does not apply PRN Los Vázquez MD        Potassium Replacement - Follow Nurse / BPA Driven Protocol   Does not apply PRN Los Vázquez MD        QUEtiapine (SEROquel) tablet 25 mg  25 mg Oral Nightly Los Vázquez MD   25 mg at 05/05/24 2032    sodium chloride 0.9 % flush 10 mL  10 mL Intravenous PRN Los Vázquez MD        sodium chloride 0.9 % flush 10 mL  10 mL Intravenous Q12H Los Vázquez MD   10 mL at 05/06/24 0928    sodium chloride 0.9 % infusion 40 mL  40 mL Intravenous PRN Los Vázquez MD        warfarin (COUMADIN) tablet 5 mg  5 mg Oral Once per day on Monday Wednesday Friday Saturday Los Vázquez MD   5 mg at 05/04/24 1858    warfarin (COUMADIN) tablet 7.5 mg  7.5 mg Oral Once per day on Sunday Tuesday Thursday Los Vázquez MD   7.5 mg at 05/05/24 1746         Assessment & Plan       Acute on chronic diastolic congestive heart failure: Patient appears to be at least -2 L with bed weights showing around a 4 pound weight loss over the last few days.  Difficult to get respiratory assessment from patient but she does state that she is improved  - Continue Lopressor 25 mg twice daily as well as Lasix 40 mg daily  - Would not initiate SGL 2 inhibitor for now given her current UTI, but could consider this later or as an outpatient.  - Would not initiate spironolactone given high normal potassium  - Daily weight as well as strict intake and output  - Patient will need 1 week follow-up with IRINEO Pichardo for heart failure follow-up.    Permanent atrial fibrillation: Rate controlled at this time  - Continue beta-blocker as noted above  - Continue anticoagulation with Coumadin      At this time cardiology will sign off.  Patient does have findings  suggestive of pulmonary hypertension on echocardiogram with her severely dilated right ventricular cavity.  Patient is unable to tell me whether she has been diagnosed with sleep apnea, however the son appears to think that she has machine that she supposed be using, that she does not.  Highly suggest this being addressed in the outpatient setting with her getting a updated sleep study versus a working CPAP as a believe this would help her general cardiac health as well.        Electronically signed by IRINEO Dhillon, 05/06/24, 10:02 AM CDT.

## 2024-05-06 NOTE — PROGRESS NOTES
RT EQUIPMENT DEVICE RELATED - SKIN ASSESSMENT    Deven Score:  Deven Score: 15     RT Medical Equipment/Device:     ETT Martinez/[employer]    Skin Assessment:      Cheek:  Intact  Lips:  Intact  Mouth:  Intact    Device Skin Pressure Protection:  Skin-to-device areas padded:  None Required    Nurse Notification:  Darcie Caceres, CRT

## 2024-05-06 NOTE — THERAPY EVALUATION
Patient Name: Jarvis Morgan  : 1945    MRN: [id number]                              Today's Date: 2024       Admit Date: 2024    Visit Dx:     ICD-10-CM ICD-9-CM   1. Acute respiratory failure with hypoxia and hypercapnia  J96.01 518.81    J96.02    2. Acute on chronic congestive heart failure, unspecified heart failure type  I50.9 428.0   3. Pleural effusion on right  J90 511.9   4. Chronic renal impairment, unspecified CKD stage  N18.9 585.9   5. Longstanding persistent atrial fibrillation  I48.11 427.31   6. Impaired mobility [Z74.09]  Z74.09 799.89     Patient Active Problem List   Diagnosis    History of aortic valvular stenosis    Chronic atrial fibrillation    Mixed hyperlipidemia    Essential hypertension    Deep vein thrombosis (DVT) of left lower extremity    Shortness of breath    Physical deconditioning    Venous insufficiency    GERD (gastroesophageal reflux disease)    History of bladder cancer    Sick sinus syndrome    ASA (obstructive sleep apnea)    Hyperthyroidism    Acute respiratory failure with hypoxia and hypercapnia    Chronic diastolic heart failure    History of DVT (deep vein thrombosis)    Dementia    Vitamin D deficiency    Class 2 obesity due to excess calories with serious comorbidity and body mass index (BMI) of 38.0 to 38.9 in adult    Chronic anticoagulation    Scabies    MRSA bacteremia    Weakness    Acute on chronic diastolic heart failure    S/P TAVR (transcatheter aortic valve replacement)    Sepsis    Acute UTI    Pneumonia of both lower lobes due to infectious organism    Acute and chronic respiratory failure with hypercapnia    Abnormal CXR - pneumonia cannot be ruled out at this time.    Closed comminuted intertrochanteric fracture of proximal end of right femur    Obesity (BMI 30-39.9)    Closed fracture of right hip    Right pleural effusion    Closed fracture of left hip    Aortic valve stenosis    H/O ureterostomy    Idiopathic peripheral neuropathy     Memory impairment    Neoplasm of bladder    Primary osteoarthritis of both knees    Recurrent major depressive disorder, in partial remission    Stage 3 chronic kidney disease    Acute cystitis with hematuria    Subtherapeutic international normalized ratio (INR)    Acute on chronic diastolic congestive heart failure    Permanent atrial fibrillation     Past Medical History:   Diagnosis Date    A-fib     Aortic stenosis     Arthritis     Bradycardia     Cancer     bladder and skin    Cardiomegaly     CHF (congestive heart failure)     GERD (gastroesophageal reflux disease)     Hard of hearing     History of short term memory loss     Hypercalcemia     Hyperlipidemia     Hypertension     Hyperthyroidism 11/20/2017    Hypothyroidism     Kidney stone     Long term current use of anticoagulant therapy     Open wound     left lower extremity,    Palpitation     PONV (postoperative nausea and vomiting)     Shortness of breath     Sick sinus syndrome     Sleep apnea     CPAP    Stage 3 chronic kidney disease      Past Surgical History:   Procedure Laterality Date    AORTIC VALVE REPAIR/REPLACEMENT      BLADDER SURGERY      removed    CARDIAC CATHETERIZATION      CARDIAC CATHETERIZATION N/A 12/9/2016    Procedure: Left Heart Cath;  Surgeon: Elia Flores MD;  Location:  [facility];  Service:     DISTAL FEMORAL NAILING Right 9/24/2020    Procedure: RIGHT SHORT TFN;  Surgeon: Betito Shetty MD;  Location:  [facility] OR;  Service: Orthopedics;  Laterality: Right;    HIP BIPOLAR REPLACEMENT Left     HYSTERECTOMY      ILEOSTOMY      JOINT REPLACEMENT      KIDNEY SURGERY      right kidney removed    NEPHRECTOMY RADICAL Right     from cancer    VARICOSE VEIN SURGERY Left 4/10/2017    Procedure: LEFT LOWER EXTREMITY VENOGRAM. LEFT SAPHENOUS VEIN RADIO FREQUENCY ABLATION;  Surgeon: Blake Martinez DO;  Location:  [facility] OR;  Service:       General Information       Row Name 05/06/24 1034          Physical  Therapy Time and Intention    Document Type evaluation  Pt presents with SOA, worsening B LE edema, lethargy. Dx: acute respiratory failure with hypoxia and hypercapnia, acute on chronic CHF. Hx includes: a-fib, CA, CHF, HTN, CKD.  -FIDELIA     Mode of Treatment physical therapy  -FIDELIA       Row Name 05/06/24 1034          General Information    Patient Profile Reviewed yes  -FIDELIA     Prior Level of Function --   Daughter present and reports patient does not ambulate but completes stand pviot transfers to the [facility] and w/c. She does not stand fully erect but is able to transfer. Grayer provides assistance as needed as well.  -FIDELIA     Existing Precautions/Restrictions fall;oxygen therapy device and L/min  -FIDELIA     Barriers to Rehab medically complex;previous functional deficit;cognitive status;hearing deficit  -FIDELIA       Row Name 05/06/24 1034          Living Environment    People in Home child(jamin), adult  Daughter stays with patient 24/7  -FIDELIA       Row Name 05/06/24 1034          Home Main Entrance    Number of Stairs, Main Entrance none  -FIDELIA       Row Name 05/06/24 1034          Cognition    Orientation Status (Cognition) oriented to;person  responsive to name, follows commands, hard of hearing  -FIDELIA       Row Name 05/06/24 1034          Safety Issues, Functional Mobility    Safety Issues Affecting Function (Mobility) insight into deficits/self-awareness;judgment;problem-solving;safety precaution awareness;safety precautions follow-through/compliance  -FIDELIA     Impairments Affecting Function (Mobility) balance;cognition;endurance/activity tolerance;postural/trunk control;range of motion (ROM);shortness of breath;strength  -FIDELIA               User Key  (r) = Recorded By, (t) = Taken By, (c) = Cosigned By      Initials Name Provider Type    Pancho Brooke, PT DPT Physical Therapist                   Mobility       Row Name 05/06/24 1034          Bed Mobility    Bed Mobility scooting/bridging;supine-sit;sit-supine  -FIDELIA      Scooting/Bridging [place] (Bed Mobility) maximum assist (25% patient effort)  -FIDELIA     Supine-Sit [place] (Bed Mobility) minimum assist (75% patient effort)  -FIDELIA     Sit-Supine [place] (Bed Mobility) moderate assist (50% patient effort)  -FIDELIA     Assistive Device (Bed Mobility) head of bed elevated;draw sheet;bed rails  -FIDELIA       Row Name 05/06/24 1034          Sit-Stand Transfer    Sit-Stand [place] (Transfers) moderate assist (50% patient effort)  -FIDELIA     Comment, (Sit-Stand Transfer) stood twice, does not stand fully erect at baseline  -FIDELIA               User Key  (r) = Recorded By, (t) = Taken By, (c) = Cosigned By      Initials Name Provider Type    Pancho Brooke, PT DPT Physical Therapist                   Obj/Interventions       Row Name 05/06/24 1034          Range of Motion Comprehensive    Comment, General Range of Motion B LE AROM impaired ~ 25% (Weakness)  -FIDELIA       Row Name 05/06/24 1034          Strength Comprehensive (MMT)    Comment, General Manual Muscle Testing (MMT) Assessment B LE grossly 3-/5  -FIDELIA       Row Name 05/06/24 1034          Balance    Balance Assessment sitting dynamic balance;standing static balance  -FIDELIA     Dynamic Sitting Balance standby assist;contact guard  -FIDELIA     Position, Sitting Balance unsupported;supported;sitting edge of bed  -FIDELIA     Static Standing Balance moderate assist  -FIDELIA     Position/Device Used, Standing Balance supported  -FIDELIA               User Key  (r) = Recorded By, (t) = Taken By, (c) = Cosigned By      Initials Name Provider Type    Pancho Brooke PT DPT Physical Therapist                   Goals/Plan       Row Name 05/06/24 1034          Bed Mobility Goal 1 (PT)    Activity/Assistive Device (Bed Mobility Goal 1, PT) sit to supine/supine to sit  -FIDELIA     [place] Level/Cues Needed (Bed Mobility Goal 1, PT) contact guard required  -FIDELIA     Time Frame (Bed Mobility Goal 1, PT) long term goal (LTG);10 days  -FIDELIA      Progress/Outcomes (Bed Mobility Goal 1, PT) new goal  -FIDELIA       Row Name 05/06/24 1034          Transfer Goal 1 (PT)    Activity/Assistive Device (Transfer Goal 1, PT) sit-to-stand/stand-to-sit;bed-to-chair/chair-to-bed;wheelchair transfer;toilet  -FIDELIA     [place] Level/Cues Needed (Transfer Goal 1, PT) minimum assist (75% or more patient effort)  -FIDELIA     Time Frame (Transfer Goal 1, PT) long term goal (LTG);10 days  -FIDELIA     Progress/Outcome (Transfer Goal 1, PT) new goal  -FIDELIA       Row Name 05/06/24 1034          Therapy Assessment/Plan (PT)    Planned Therapy Interventions (PT) bed mobility training;transfer training;balance training;home exercise program;patient/family education;postural re-education;ROM (range of motion);strengthening;wheelchair management/propulsion training  -FIDELIA               User Key  (r) = Recorded By, (t) = Taken By, (c) = Cosigned By      Initials Name Provider Type    FIDELIA Pancho Huertas, PT DPT Physical Therapist                   Clinical Impression       [facility] Name 05/06/24 1034          Pain    Pain Intervention(s) Repositioned;Ambulation/increased activity  -FIDELIA     Additional Documentation Pain Scale: FACES Pre/Post-Treatment (Group)  -FIDELIA       [facility] Name 05/06/24 1034          Pain Scale: FACES Pre/Post-Treatment    Pain: FACES Scale, Pretreatment 2-->hurts little bit  -FIDELIA     Pain Location lower  -FIDELIA     Pain Location - back  -FIDELIA       Row Name 05/06/24 1034          Plan of Care Review    Plan of Care Reviewed With patient;daughter  -FIDELIA     Outcome Evaluation PT eval complete. She is alert and oriented to self, responsive to name and follows commands with cues. She is hard of hearing. Daughter present and reports patient does not ambulate but completes stand pviot transfers to the C and w/c. She does not stand fully erect but is able to transfer. Grayer provides assistance as needed as well. Today she needs min assist to get to the EOB. Was able to briefly stand twice with mod  assist. Not yet strong enough to safely attempt to transfer. She is weak and remains a fall risk. PT will cont with mobility, balance, activity tolerance and strengthening. Her daughter is hopeful she will maintain her ability to transfer with assistance so that she can return home with 24/7 care and  therapy. If unable to make needed progress she will need placement for continured rehab to regain baseline mobillity and strength.  -FIDELIA       Row Name 05/06/24 1034          Therapy Assessment/Plan (PT)    Patient/Family Therapy Goals Statement (PT) go home, per daughter  -FIDELIA     Rehab Potential (PT) good, to achieve stated therapy goals  -FIDELIA     Criteria for Skilled Interventions Met (PT) yes;meets criteria;skilled treatment is necessary  -FIDELIA     Therapy Frequency (PT) 2 times/day  -FIDELIA     Predicted Duration of Therapy Intervention (PT) until d.c  -FIDELIA       Row Name 05/06/24 1034          Positioning and Restraints    Pre-Treatment Position in bed  -FIDELIA     Post Treatment Position bed  -FIDELIA     In Bed fowlers;call light within reach;encouraged to call for assist;exit alarm on;with family/caregiver  -FIDELIA               User Key  (r) = Recorded By, (t) = Taken By, (c) = Cosigned By      Initials Name Provider Type    Pancho Brooke, PT DPT Physical Therapist                   Outcome Measures       Row Name 05/06/24 1034          How much help from another person do you currently need...    Turning from your back to your side while in flat bed without using bedrails? 3  -FIDELIA     Moving from lying on back to sitting on the side of a flat bed without bedrails? 2  -FIDELIA     Moving to and from a bed to a chair (including a wheelchair)? 1  -FIDELIA     Standing up from a chair using your arms (e.g., wheelchair, bedside chair)? 2  -FIDELIA     Climbing 3-5 steps with a railing? 1  -FIDELIA     To walk in hospital room? 1  -FIDELIA     AM-PAC 6 Clicks Score (PT) 10  -FIDELIA     Highest Level of Mobility Goal 4 --> Transfer to chair/commode  -FIDELIA        Row Name 05/06/24 1034          Functional Assessment    Outcome Measure Options AM-PAC 6 Clicks Basic Mobility (PT)  -FIDELIA               User Key  (r) = Recorded By, (t) = Taken By, (c) = Cosigned By      Initials Name Provider Type    Pancho Brooke, PT DPT Physical Therapist                                 Physical Therapy Education       Title: PT OT SLP Therapies (In Progress)       Topic: Physical Therapy (In Progress)       Point: Mobility training (Done)       Learning Progress Summary             Family Acceptance, E, VU by FIDELIA at 5/6/2024 1034    Comment: benefits of activity, progression of PT                         Point: Home exercise program (Not Started)       Learner Progress:  Not documented in this visit.              Point: Body mechanics (Not Started)       Learner Progress:  Not documented in this visit.              Point: Precautions (Not Started)       Learner Progress:  Not documented in this visit.                              User Key       Initials Effective Dates Name Provider Type Rashad MISTRY 02/03/23 -  Pancho Huertas, PT DPT Physical Therapist PT                  PT Recommendation and Plan  Planned Therapy Interventions (PT): bed mobility training, transfer training, balance training, home exercise program, patient/family education, postural re-education, ROM (range of motion), strengthening, wheelchair management/propulsion training  Plan of Care Reviewed With: patient, daughter  Outcome Evaluation: PT eval complete. She is alert and oriented to self, responsive to name and follows commands with cues. She is hard of hearing. Daughter present and reports patient does not ambulate but completes stand pviot transfers to the [facility] and w/c. She does not stand fully erect but is able to transfer. [place]her provides assistance as needed as well. Today she needs min assist to get to the EOB. Was able to briefly stand twice with mod assist. Not yet strong enough to safely attempt  to transfer. She is weak and remains a fall risk. PT will cont with mobility, balance, activity tolerance and strengthening. Her daughter is hopeful she will maintain her ability to transfer with assistance so that she can return home with 24/7 care and HH therapy. If unable to make needed progress she will need placement for continured rehab to regain baseline mobillity and strength.     Time Calculation:         PT Charges       Row Name 05/06/24 1158             Time Calculation    Start Time 1034  -FIDELIA      Stop Time 1137  -FIDELIA      Time Calculation (min) 63 min  -FIDELIA      PT Received On 05/06/24  -FIDELIA      PT Goal Re-Cert Due Date 05/16/24  -FIDELIA                User Key  (r) = Recorded By, (t) = Taken By, (c) = Cosigned By      Initials Name Provider Type    Pancho Brooke, PT DPT Physical Therapist                  Therapy Charges for Today       Code Description Service Date Service Provider Modifiers Qty    [id number] HC PT EVAL MOD COMPLEXITY 4 5/6/2024 Pancho Huertas PT DPT GP 1            PT G-Codes  Outcome Measure Options: AM-PAC 6 Clicks Basic Mobility (PT)  AM-PAC 6 Clicks Score (PT): 10  AM-PAC 6 Clicks Score (OT): 10  PT Discharge Summary  Anticipated Discharge Disposition (PT): home with 24/7 care, home with home health, sub acute care setting, skilled nursing facility    Pancho Huertas, PT DPT  5/6/2024

## 2024-05-06 NOTE — PROGRESS NOTES
1         [facility] Medicine Services  INPATIENT PROGRESS NOTE    Patient Name: Jarvis Morgan  Date of Admission: 5/2/2024  Today's Date: 05/06/24  Length of Stay: 4  Primary Care Physician: Mary Beth Izquierdo APRN    Subjective   Chief Complaint: Follow-up  HPI   Patient on day 4 of hospitalization  This is my first day to see the patient    Since admission patient been seen by cardiology and pulmonary medicine.    Patient presented with shortness of breath.  Patient was admitted with provisional diagnosis of:    acute hypercarbic respiratory failure  Acute decompensated heart failure  Atrial fibrillation with supratherapeutic INR  Chronic kidney disease stage III  History of obstructive sleep apnea on CPAP at home      89-year-old lady with past medical history significant for atrial fibrillation on chronic warfarin use, history of hypertension, history of diastolic heart failure who presented initially to the emergency department (earlier today) with increased shortness of air, apparently family called EMS (earlier today) and stated that she is becoming more dyspneic and she has worsening lower extremity edema, also she is becoming more more lethargic  Patient reportedly had 80% of oxygenation via air.  Arterial blood gas reportedly has hypercarbia.  Patient was placed on BiPAP and given Lasix.    Review of admitting H&P indicates creatinine at 1.89  Calcium of 10.8  CO2 32  INR of 3.1  Chest x-ray indicates volume overload with bilateral pulmonary edema with bilateral layering effusions.  Right pleural fluid effusion is large in size.    Patient is hemodynamically stable  Patient on NIPPV at 28 to 35% FiO2  O2 saturation in the mid 90s    Cardiologist's impression includes:  Acute on chronic diastolic heart failure  Acute on chronic respiratory failure with hypoxia and hypercapnia  Hypertension  Permanent atrial fibrillation with CHADS2 Vascor of 5 (age, female, CHF,  hypertension)  Valvular heart disease status post TAVR in 2018 at [place]  CKD stage III  FAROOQ  Mixed hyperlipidemia  Chronic anticoagulation for permanent atrial fibrillation and reported history of DVT.      Pulmonary medicine's assessment includes:  Acute resp failure with hypoxia and hypercapnia requiring bipap, threat to life and bodily function with high risk of morbidity from additional diagnostic testing or treatment   Farooq essentially untreated.  No other pulmonary disease identified  Pulmonary edema and pleural effusions  Mildly elevated bnp of uncertain significance  Ckd 3  Hx tavr  Chronic anticoagulation status  Hyperthyroidism  scoliosis  The service signed off on day 4.      Patient is very difficult to understand with her BiPAP mask on.  It is not clear to me whether she uses CPAP/BiPAP or oxygen at home  I offered her to replace nasal cannula however she declined.  I asked her whether this causes her difficulty to breathe if this is removed.  I am hoping just to removed it temporarily so we can understand each other clearly      It is not clear to me whether the patient is feeling better compared to admission.  Review of Systems   All pertinent negatives and positives are as above. All other systems have been reviewed and are negative unless otherwise stated.     Objective    Temp:  [97.2 °F (36.2 °C)-98.4 °F (36.9 °C)] 97.2 °F (36.2 °C)  Heart Rate:  [] 65  Resp:  [12-23] 16  BP: (101-134)/(50-70) 134/67  Physical Exam  Patient is appropriately responsive although difficult to understand with the BiPAP mask on  She is arousable and able to participate in her care.      HEENT: Normocephalic, atraumatic, pupils are equal reactive to light, and accommodate  Neck: Supple, no JVD, no carotid bruits  CVS: Regular rhythm   lungs:  diminished breath sounds at the bases  Abdomen: Soft, nontender, nondistended bowel sounds are present; ostomy present pink in color.  Connected to to a bag with  dark-colored urine; no gross debris noted at the bottom of bag  Extremities: Edema present bilaterally; hyperpigmented left shin.  Colder distal extremities left greater than right, diminished pulses dorsalis pedis  Neurologic: No focal deficits  Psychiatric: Normal affect         Results Review:  I have reviewed the labs, radiology results, and diagnostic studies.    Laboratory Data:   Results from last 7 days   Lab Units 05/06/24 0425 05/05/24  0421 05/04/24  0835   WBC 10*3/mm3 8.10 7.39 8.34   HEMOGLOBIN g/dL 14.1 12.6 13.9   HEMATOCRIT % 45.7 40.4 45.9   PLATELETS 10*3/mm3 169 157 163        Results from last 7 days   Lab Units 05/06/24  0425 05/05/24  0421 05/04/24  0835 05/02/24  1241 05/02/24  1140   SODIUM mmol/L 142 141 142   < > 143   SODIUM, ARTERIAL   --   --   --    < >  --    POTASSIUM mmol/L 4.9 4.7 5.0   < > 4.8   CHLORIDE mmol/L 103 102 103   < > 104   CO2 mmol/L 33.0* 31.0* 32.0*   < > 32.0*   BUN mg/dL 56* 53* 48*   < > 40*   CREATININE mg/dL 1.59* 1.78* 1.91*   < > 1.89*   CALCIUM mg/dL 11.1* 10.5 10.5   < > 10.8*   BILIRUBIN mg/dL  --   --  0.6  --  0.5   ALK PHOS U/L  --   --  77  --  79   ALT (SGPT) U/L  --   --  10  --  12   AST (SGOT) U/L  --   --  13  --  16   GLUCOSE mg/dL 111* 88 139*   < > 119*    < > = values in this interval not displayed.     INR 2.48  Patient's urine culture from May 4 showed gram-negative bacilli greater than 100,000 colony-forming units  Normal TSH with decreased free T4  Cultures to date no growth    Culture Data:   Blood Culture   Date Value Ref Range Status   05/02/2024 No growth at 3 days  Preliminary   05/02/2024 No growth at 3 days  Preliminary     Urine Culture   Date Value Ref Range Status   05/04/2024 >100,000 CFU/mL Gram Negative Bacilli (A)  Preliminary       Radiology Data:   Imaging Results (Last 24 Hours)       ** No results found for the last 24 hours. **            I have reviewed the patient's current medications.     Assessment/Plan    Assessment        Active Hospital Problems    Diagnosis     **Acute on chronic diastolic congestive heart failure     Permanent atrial fibrillation     Right pleural effusion     Stage 3 chronic kidney disease     S/P TAVR (transcatheter aortic valve replacement)     Hyperthyroidism     ASA (obstructive sleep apnea)     Acute respiratory failure with hypoxia and hypercapnia     Essential hypertension          Medical Decision Making  Number and Complexity of problems:     Acute on chronic(?) hypoxic and hypercarbic respiratory failure  Acute on chronic diastolic heart failure  Moderate pulmonary hypertension  Stage III chronic kidney disease-improving creatinine  History of TAVR in 2000  Hypothyroidism on methimazole  Obstructive sleep apnea - ?  Relation to pulmonary hypertension  Fluid retention of lower extremity probably related to right-sided heart failure with pulmonary hypertension  Supratherapeutic anticoagulation on admission now improved  Permanent atrial fibrillation  Gram-negative bacilli present in urine culture.  ?  Probably underlying dementia-on donepezil.  Possible underlying peripheral arterial disease as evidenced by cold extremities of lower extremity  Treatment Plan    Monitor PT/INR.  On Coumadin.  Maintain INR between 2-3; monitor for active bleeding  Empiric antibiotic.  Patient has gram-negative bacilli in urine culture.  Follow-up susceptibility.  Noted that patient at night uses NIPPV anywhere from 28 to 35% with saturation in the mid 90s  Continue antihypertensive medication (diltiazem, metoprolol); heart rate adequately controlled. Wean down to lowest FiO2 to maintain saturation greater than 90%.  Continue thyroid medication.  I requested for chest x-ray to follow through the abnormality and chest x-ray while on diuretic.  Monitor input output        Medications reviewed  atorvastatin, 40 mg, Oral, Nightly  calcitriol, 0.25 mcg, Oral, Daily  cefTRIAXone, 2,000 mg, Intravenous,  Q24H  dilTIAZem CD, 180 mg, Oral, Q24H  donepezil, 5 mg, Oral, Nightly  escitalopram, 10 mg, Oral, Daily  furosemide, 40 mg, Oral, Daily  ipratropium-albuterol, 3 mL, Nebulization, 4x Daily - RT  methIMAzole, 10 mg, Oral, Daily  metoprolol tartrate, 25 mg, Oral, Q12H  nystatin, , Topical, Q12H  QUEtiapine, 25 mg, Oral, Nightly  sodium chloride, 10 mL, Intravenous, Q12H  warfarin, 5 mg, Oral, Once per day on Monday Wednesday Friday Saturday  warfarin, 7.5 mg, Oral, Once per day on Sunday Tuesday Thursday            Conditions and Status       Fair  MDM Data  External documents reviewed: Reviewed epic record    Results for orders placed during the hospital encounter of 05/02/24    Adult Transthoracic Echo Complete w/ Color, Spectral and Contrast if Necessary Per Protocol    Interpretation Summary    Atrial fibrillation was the predominant rhythm observed during the procedure.    Left ventricular systolic function is normal. Left ventricular ejection fraction appears to be 61 - 65%.    The right ventricular cavity is moderate to severely dilated. Moderately reduced right ventricular systolic function noted.    The left atrial cavity is moderately dilated    The aortic valve exhibits sclerosis. There is moderate calcification of the aortic valve. Trace aortic valve regurgitation is present. Moderate aortic valve stenosis is present    Moderate mitral annular calcification is present. Mild mitral valve regurgitation is present.    Tricuspid annular calcification is present. Moderate tricuspid valve regurgitation is present    Moderate pulmonary hypertension is present.    The inferior vena cava is dilated. IVC inspiratory collapse is absent.    There is a small (<1cm) pericardial effusion. There is no evidence of cardiac tamponade.      Cardiac tracing (EKG, telemetry) interpretation: Atrial fibrillation, right bundle branch block.  Radiology interpretation:      On the x-ray on the right side shows absence of the  cardiophrenic and costophrenic angle is pronounced compared to the left lung field but when compared to the one on the left  Film, there is diffuse haziness mostly of the right lung field.  Presence of pleural effusion noted.  This is also evidence in the abdominal pelvic CT scan here.    Labs reviewed: Yes  Any tests that were considered but not ordered: None     Decision rules/scores evaluated (example [id number], [place], etc): None     Discussed with: Gray Gan with patient     Care Planning  Shared decision making: Patient and son  Code status and discussions: Full code  I confirmed that the patient's advanced care plan is present, code status is documented, and a surrogate decision maker is listed in the patient's medical record.  Disposition  Social Determinants of Health that impact treatment or disposition: None identified at this time  I expect the patient to be discharged to D.     Electronically signed by Dylon Baig MD, 05/06/24, 07:35 CDT.

## 2024-05-06 NOTE — PLAN OF CARE
Goal Outcome Evaluation:  Plan of Care Reviewed With: patient        Progress: no change  Outcome Evaluation: OT tx completed on this date. Pt A&O to self and place on 3L/NC. Pt had lunch tray in front of her that had hardly been touched, pt reported that she was not hungry, required verbal cuing to stay awake and did hydrate herself. Pt completed bed mobiltiy from fowlers <>EOB with slow movements and kept her eyes closed  requiring min A and mod verbal cuing for progression as well as increased time. Pt tolerated EOB sitting for approx 8 minutes and declined to wash face or participate in BUE AROM activities. Pt reported pain in BUEs with little movement and would wince at touch of her arms. Pt's O2 femained in the 90s during tx. OT POC to continue.      Anticipated Discharge Disposition (OT): skilled nursing facility

## 2024-05-06 NOTE — PLAN OF CARE
Goal Outcome Evaluation:      The patient is using bipap at night.

## 2024-05-06 NOTE — PLAN OF CARE
Goal Outcome Evaluation:              Outcome Evaluation: Pt more alert this morning in comparison to past days, allowed us to bath her this morning. Patient placed on bipap to rest as patient saturation during bath declined to 80s. VSS. AF 63-99. Safety maintained.

## 2024-05-06 NOTE — THERAPY TREATMENT NOTE
Acute Care - Occupational Therapy Treatment Note  [facility]     Patient Name: Jarvis Morgan  : 1945  MRN: [id number]  Today's Date: 2024             Admit Date: 2024       ICD-10-CM ICD-9-CM   1. Acute respiratory failure with hypoxia and hypercapnia  J96.01 518.81    J96.02    2. Acute on chronic congestive heart failure, unspecified heart failure type  I50.9 428.0   3. Pleural effusion on right  J90 511.9   4. Chronic renal impairment, unspecified CKD stage  N18.9 585.9   5. Longstanding persistent atrial fibrillation  I48.11 427.31   6. Impaired mobility [Z74.09]  Z74.09 799.89   7. Decreased activities of daily living (ADL)  Z78.9 V49.89     Patient Active Problem List   Diagnosis    History of aortic valvular stenosis    Chronic atrial fibrillation    Mixed hyperlipidemia    Essential hypertension    Deep vein thrombosis (DVT) of left lower extremity    Shortness of breath    Physical deconditioning    Venous insufficiency    GERD (gastroesophageal reflux disease)    History of bladder cancer    Sick sinus syndrome    ASA (obstructive sleep apnea)    Hyperthyroidism    Acute respiratory failure with hypoxia and hypercapnia    Chronic diastolic heart failure    History of DVT (deep vein thrombosis)    Dementia    Vitamin D deficiency    Class 2 obesity due to excess calories with serious comorbidity and body mass index (BMI) of 38.0 to 38.9 in adult    Chronic anticoagulation    Scabies    MRSA bacteremia    Weakness    Acute on chronic diastolic heart failure    S/P TAVR (transcatheter aortic valve replacement)    Sepsis    Acute UTI    Pneumonia of both lower lobes due to infectious organism    Acute and chronic respiratory failure with hypercapnia    Abnormal CXR - pneumonia cannot be ruled out at this time.    Closed comminuted intertrochanteric fracture of proximal end of right femur    Obesity (BMI 30-39.9)    Closed fracture of right hip    Right pleural effusion    Closed fracture of left  hip    Aortic valve stenosis    H/O ureterostomy    Idiopathic peripheral neuropathy    Memory impairment    Neoplasm of bladder    Primary osteoarthritis of both knees    Recurrent major depressive disorder, in partial remission    Stage 3 chronic kidney disease    Acute cystitis with hematuria    Subtherapeutic international normalized ratio (INR)    Acute on chronic diastolic congestive heart failure    Permanent atrial fibrillation     Past Medical History:   Diagnosis Date    A-fib     Aortic stenosis     Arthritis     Bradycardia     Cancer     bladder and skin    Cardiomegaly     CHF (congestive heart failure)     GERD (gastroesophageal reflux disease)     Hard of hearing     History of short term memory loss     Hypercalcemia     Hyperlipidemia     Hypertension     Hyperthyroidism 11/20/2017    Hypothyroidism     Kidney stone     Long term current use of anticoagulant therapy     Open wound     left lower extremity,    Palpitation     PONV (postoperative nausea and vomiting)     Shortness of breath     Sick sinus syndrome     Sleep apnea     CPAP    Stage 3 chronic kidney disease      Past Surgical History:   Procedure Laterality Date    AORTIC VALVE REPAIR/REPLACEMENT      BLADDER SURGERY      removed    CARDIAC CATHETERIZATION      CARDIAC CATHETERIZATION N/A 12/9/2016    Procedure: Left Heart Cath;  Surgeon: Elia Flores MD;  Location:  [facility];  Service:     DISTAL FEMORAL NAILING Right 9/24/2020    Procedure: RIGHT SHORT TFN;  Surgeon: Betito Shetty MD;  Location:  [facility] OR;  Service: Orthopedics;  Laterality: Right;    HIP BIPOLAR REPLACEMENT Left     HYSTERECTOMY      ILEOSTOMY      JOINT REPLACEMENT      KIDNEY SURGERY      right kidney removed    NEPHRECTOMY RADICAL Right     from cancer    VARICOSE VEIN SURGERY Left 4/10/2017    Procedure: LEFT LOWER EXTREMITY VENOGRAM. LEFT SAPHENOUS VEIN RADIO FREQUENCY ABLATION;  Surgeon: Blake Martinez DO;  Location:  [facility] OR;   Service:          OT ASSESSMENT FLOWSHEET (Last 12 Hours)       OT Evaluation and Treatment       Row Name 05/06/24 1414                   OT Time and Intention    Subjective Information no complaints  -LS        Document Type therapy note (daily note)  -LS        Mode of Treatment occupational therapy  -LS        Patient Effort poor  -LS        Symptoms Noted During/After Treatment --  extremely [place]  -LS        Comment extremely [place]  -LS           General Information    Patient Profile Reviewed yes  -LS        Barriers to Rehab medically complex  -LS           Cognition    Orientation Status (Cognition) person;place;oriented to  -LS           Wound 05/02/24 2143 Left lower calf    Wound - Properties Group Placement Date: 05/02/24  -AK Placement Time: 2143  -AK Present on Original Admission: Y  -AK Side: Left  -AK Orientation: lower  -AK Location: calf  -AK    Retired Wound - Properties Group Placement Date: 05/02/24  -AK Placement Time: 2143 -AK Present on Original Admission: Y  -AK Side: Left  -AK Orientation: lower  -AK Location: calf  -AK    Retired Wound - Properties Group Date first assessed: 05/02/24  -AK Time first assessed: 2143  -AK Present on Original Admission: Y  -AK Side: Left  -AK Location: calf  -AK       Wound 05/03/24 0025 Bilateral lower sacral spine Pressure Injury    Wound - Properties Group Placement Date: 05/03/24  -AK Placement Time: 0025  -AK Present on Original Admission: Y  -AK Side: Bilateral  -AK Orientation: lower  -AK Location: sacral spine  -AK Primary Wound Type: Pressure inj  -AK    Retired Wound - Properties Group Placement Date: 05/03/24  -AK Placement Time: 0025  -AK Present on Original Admission: Y  -AK Side: Bilateral  -AK Orientation: lower  -AK Location: sacral spine  -AK Primary Wound Type: Pressure inj  -AK    Retired Wound - Properties Group Date first assessed: 05/03/24  -AK Time first assessed: 0025  -AK Present on Original Admission: Y  -AK Side: Bilateral  -AK  Location: sacral spine  -AK Primary Wound Type: Pressure inj  -AK       Wound 05/03/24 0029 Bilateral lower breast    Wound - Properties Group Placement Date: 05/03/24  -AK Placement Time: 0029 -AK Side: Bilateral  -AK Orientation: lower  -AK Location: breast  -AK    Retired Wound - Properties Group Placement Date: 05/03/24  -AK Placement Time: 0029 -AK Side: Bilateral  -AK Orientation: lower  -AK Location: breast  -AK    Retired Wound - Properties Group Date first assessed: 05/03/24  -AK Time first assessed: 0029 -AK Side: Bilateral  -AK Location: breast  -AK       Plan of Care Review    Plan of Care Reviewed With patient  -LS        Progress no change  -LS        Outcome Evaluation OT tx completed on this date. Pt A&O to self and place on 3L/NC. Pt had lunch tray in front of her that had hardly been touched, pt reported that she was not hungry, required verbal cuing to stay awake and did hydrate herself. Pt completed bed mobiltiy from fowlers <>EOB with slow movements and kept her eyes closed  requiring min A and mod verbal cuing for progression as well as increased time. Pt tolerated EOB sitting for approx 8 minutes and declined to wash face or participate in BUE AROM activities. Pt reported pain in BUEs with little movement and would wince at touch of her arms. Pt's O2 femained in the 90s during tx. OT POC to continue.  -LS           Positioning and Restraints    Pre-Treatment Position in bed  -LS        Post Treatment Position bed  -LS        In Bed fowlers;call light within reach;encouraged to call for assist;exit alarm on  -LS           Therapy Plan Review/Discharge Plan (OT)    Anticipated Discharge Disposition (OT) skilled nursing facility  -                  User Key  (r) = Recorded By, (t) = Taken By, (c) = Cosigned By      Initials Name Effective Dates    LS Melanie Silver COTA 09/22/22 -     Ariana Castillo RN 01/24/24 -                      Occupational Therapy Education       Title: PT OT SLP  Therapies (In Progress)       Topic: Occupational Therapy (In Progress)       Point: ADL training (Done)       Description:   Instruct learner(s) on proper safety adaptation and remediation techniques during self care or transfers.   Instruct in proper use of assistive devices.                  Learning Progress Summary             Patient Acceptance, E, VU by LS at 5/6/2024 1440    Comment: Pt educated on safety with bed mobility    Acceptance, E,D, NR by LR at 5/4/2024 1625                         Point: Home exercise program (In Progress)       Description:   Instruct learner(s) on appropriate technique for monitoring, assisting and/or progressing therapeutic exercises/activities.                  Learning Progress Summary             Patient Acceptance, E,D, NR by LR at 5/4/2024 1625                         Point: Precautions (In Progress)       Description:   Instruct learner(s) on prescribed precautions during self-care and functional transfers.                  Learning Progress Summary             Patient Acceptance, E,D, NR by LR at 5/4/2024 1625                         Point: Body mechanics (In Progress)       Description:   Instruct learner(s) on proper positioning and spine alignment during self-care, functional mobility activities and/or exercises.                  Learning Progress Summary             Patient Acceptance, E,D, NR by LR at 5/4/2024 1625                                         User Key       Initials Effective Dates Name Provider Type Discipline     09/22/22 -  Melanie Silver COTA Occupational Therapist Assistant THERAPIES     04/25/23 -  Kay Kirby OTR/L Occupational Therapist OT                      OT Recommendation and Plan     Plan of Care Review  Plan of Care Reviewed With: patient  Progress: no change  Outcome Evaluation: OT tx completed on this date. Pt A&O to self and place on 3L/NC. Pt had lunch tray in front of her that had hardly been touched, pt reported that she  was not hungry, required verbal cuing to stay awake and did hydrate herself. Pt completed bed mobiltiy from fowlers <>EOB with slow movements and kept her eyes closed  requiring min A and mod verbal cuing for progression as well as increased time. Pt tolerated EOB sitting for approx 8 minutes and declined to wash face or participate in BUE AROM activities. Pt reported pain in BUEs with little movement and would wince at touch of her arms. Pt's O2 femained in the 90s during tx. OT POC to continue.  Plan of Care Reviewed With: patient  Outcome Evaluation: OT tx completed on this date. Pt A&O to self and place on 3L/NC. Pt had lunch tray in front of her that had hardly been touched, pt reported that she was not hungry, required verbal cuing to stay awake and did hydrate herself. Pt completed bed mobiltiy from fowlers <>EOB with slow movements and kept her eyes closed  requiring min A and mod verbal cuing for progression as well as increased time. Pt tolerated EOB sitting for approx 8 minutes and declined to wash face or participate in BUE AROM activities. Pt reported pain in BUEs with little movement and would wince at touch of her arms. Pt's O2 femained in the 90s during tx. OT POC to continue.     Outcome Measures       Row Name 05/06/24 1400             How much help from another is currently needed...    Putting on and taking off regular lower body clothing? 1  -LS      Bathing (including washing, rinsing, and drying) 2  -LS      Toileting (which includes using toilet bed pan or urinal) 1  -LS      Putting on and taking off regular upper body clothing 2  -LS      Taking care of personal grooming (such as brushing teeth) 2  -LS      Eating meals 2  -LS      AM-PAC 6 Clicks Score (OT) 10  -LS         Functional Assessment    Outcome Measure Options AM-PAC 6 Clicks Daily Activity (OT)  -LS                User Key  (r) = Recorded By, (t) = Taken By, (c) = Cosigned By      Initials Name Provider Type    Melanie Arciniega  JANET Occupational Therapist Assistant                    Time Calculation:    Time Calculation- OT       Row Name 05/06/24 1510             Time Calculation- OT    OT Start Time 1414  -LS      OT Stop Time 1507  -LS      OT Time Calculation (min) 53 min  -      Total Timed Code Minutes- OT 53 minute(s)  -      OT Received On 05/06/24  -                User Key  (r) = Recorded By, (t) = Taken By, (c) = Cosigned By      Initials Name Provider Type     Melanie Silver COTA Occupational Therapist Assistant                  Therapy Charges for Today       Code Description Service Date Service Provider Modifiers Qty    [id number]  OT THERAPEUTIC ACT EA 15 MIN 5/6/2024 Melanie Silver COTA GO 4                 JANET Mello  5/6/2024

## 2024-05-06 NOTE — PLAN OF CARE
Goal Outcome Evaluation:  Plan of Care Reviewed With: patient, daughter           Outcome Evaluation: PT eval complete. She is alert and oriented to self, responsive to name and follows commands with cues. She is hard of hearing. Daughter present and reports patient does not ambulate but completes stand pviot transfers to the C and w/c. She does not stand fully erect but is able to transfer. Grayer provides assistance as needed as well. Today she needs min assist to get to the EOB. Was able to briefly stand twice with mod assist. Not yet strong enough to safely attempt to transfer. She is weak and remains a fall risk. PT will cont with mobility, balance, activity tolerance and strengthening. Her daughter is hopeful she will maintain her ability to transfer with assistance so that she can return home with 24/7 care and HH therapy. If unable to make needed progress she will need placement for continured rehab to regain baseline mobillity and strength.      Anticipated Discharge Disposition (PT): home with 24/7 care, home with home health, sub acute care setting, skilled nursing facility

## 2024-05-07 NOTE — CONSULTS
"            [facility] Palliative Care Services    Palliative Care Initial Consult   Attending Physician: Dylon Baig*  Referring Provider: Dylon Baig MD      Reason for Referral: assistance with clarification of goals of care  Family/Support: DaughterSwetha    Code Status and Medical Interventions:   Ordered at: 05/02/24 1250     Level Of Support Discussed With:    Patient     Code Status (Patient has no pulse and is not breathing):    CPR (Attempt to Resuscitate)     Medical Interventions (Patient has pulse or is breathing):    Full Support     Goals of Care: TBD.    HPI:   79 y.o. female has a past medical history of Alzheimer's dementia, permanent A-fib-on anticoagulation, Aortic stenosis s/p TAVR (5/2018 at [place])-does not regularly follow with cardiology, CHF, chronic pain syndrome, depression, history of DVT, GERD, Hard of hearing, Hyperlipidemia, Hypertension, Hypothyroidism, impaired mobility-uses wheelchair, oxygen ltddkxoptq-3-5/2 L, sick sinus syndrome, Sleep apnea-noncompliant with CPAP, solitary kidney s/p right nephrectomy (roughly 20 years ago \"spot they could not reach, worrisome for cancer\") and cystectomy (cancer per family) s/p urostomy bag (roughly 40 years ago), and Stage 3 chronic kidney disease.  Patient presented to [facility] on 5/2/2024 related to shortness of breath and worsening lower extremity edema.  Noted to have hypoxia on room air.  ABG showed pH 7.28/pCO2 68.6/pO2 67/O2 saturation 93%.  Additional ED workup shows creatinine 1.89, hypercalcemia, elevated troponin T.  Chest imaging shows volume overload with bilateral pulmonary edema as well as bilateral layering pleural effusions.  Right-sided pleural effusion is large in size.  Placed on BiPAP.  Received IV Lasix in the ED, in addition to started on DuoNeb.  Pulmonology and cardiology consulted.  Placed on fluid restrictions.  Echocardiogram shows atrial fibrillation, LVEF 61 to " "65%, RV moderate to severely dilated, aortic valve exhibits sclerosis, mild mitral valve regurgitation, moderate tricuspid valve regurgitation, moderate pulmonary hypertension, small less than 1 cm pericardial effusion.  Noted to have decline in renal function and diuresis held on 5/4.  Urine culture shows Proteus mirabilis.  Pulmonology notes hypercapnia is unlikely to be contributing to patient's narcosis given normal pH and likely chronic hypercapnia and have signed off.  Therapy recommends SNF at discharge.  Oral diuretics restarted on 5/5 per cardiology after improvement of creatinine and recommend evaluation of sleep apnea compliance as feel noncompliance is affecting general cardiac health.  Continues BiPAP use at nighttime.  Laying in bed without apparent distress.  Somnolent, nods off during conversation.  No family at bedside. Palliative Care Spoke With: family report increased sleeping \"but appetite remains good\".  Patient has been wheelchair-bound over the last 3 years after her hip fracture \"out of fear of falling\", but still able to transfer self to chair up until this hospitalization.  Her daughter, Swetha is primary caregiver for her mother and 2 autistic brothers over the last 10 years. She reports difficulty with CPAP compliance at home \"she just fights me and is argumentive\". Due to the Palliative Care Topics Discussed: palliative care, goals of care, care options, and [facility] we will establish an advance care plan.   Advance Care Planning   Advance Care Planning Discussion: Spoke with daughterSwetha telephone with regard to events leading to current hospitalization, and poor to guarded long-term prognosis secondary to acute CHF, acute respiratory failure, right pleural effusion, Alzheimer's dementia, multiple comorbidities, and advanced age.  Discussed challenges with medical noncompliance in the home setting and progressive nature of multiple comorbid factors, high risk for rehospitalizations " and progressive decline as a result.  Explored options of continued aggressive care interventions and rehospitalizations versus transitions of care with best supportive care directed by hospice.  Daughter interested in face-to-face conversation and we will plan to meet tomorrow around 9 AM to further determine goals of care.     Review of Systems   Unable to perform ROS: Acuity of condition     1- Pain Assessment  Nonverbal Indicators of Pain: nonverbal indicators absent  PAINAD Breathin-->normal  PAINAD Negative Vocalization: 0-->none  PAINAD Facial Expression: 0-->smiling or inexpressive  PAINAD Body Language: 0-->relaxed  PAINAD Consolability: 0-->no need to console  PAINAD Score: 0  Pain Location: back, buttock  Pain Description: constant, aching, deep    Past Medical History:   Diagnosis Date    A-fib     Aortic stenosis     Arthritis     Bradycardia     Cancer     bladder and skin    Cardiomegaly     CHF (congestive heart failure)     GERD (gastroesophageal reflux disease)     Hard of hearing     History of short term memory loss     Hypercalcemia     Hyperlipidemia     Hypertension     Hyperthyroidism 2017    Hypothyroidism     Kidney stone     Long term current use of anticoagulant therapy     Open wound     left lower extremity,    Palpitation     PONV (postoperative nausea and vomiting)     Shortness of breath     Sick sinus syndrome     Sleep apnea     CPAP    Stage 3 chronic kidney disease      Past Surgical History:   Procedure Laterality Date    AORTIC VALVE REPAIR/REPLACEMENT      BLADDER SURGERY      removed    CARDIAC CATHETERIZATION      CARDIAC CATHETERIZATION N/A 2016    Procedure: Left Heart Cath;  Surgeon: Elia Flores MD;  Location:  [facility];  Service:     DISTAL FEMORAL NAILING Right 2020    Procedure: RIGHT SHORT TFN;  Surgeon: Betito Shetty MD;  Location: [facility] OR;  Service: Orthopedics;  Laterality: Right;    HIP BIPOLAR REPLACEMENT Left      HYSTERECTOMY      ILEOSTOMY      JOINT REPLACEMENT      KIDNEY SURGERY      right kidney removed    NEPHRECTOMY RADICAL Right     from cancer    VARICOSE VEIN SURGERY Left 4/10/2017    Procedure: LEFT LOWER EXTREMITY VENOGRAM. LEFT SAPHENOUS VEIN RADIO FREQUENCY ABLATION;  Surgeon: Blake Martinez DO;  Location: [facility] OR;  Service:      Social History     Socioeconomic History    Marital status:    Tobacco Use    Smoking status: Never    Smokeless tobacco: Never   Vaping Use    Vaping status: Never Used   Substance and Sexual Activity    Alcohol use: No    Drug use: No    Sexual activity: Defer         Current Facility-Administered Medications:     acetaminophen (TYLENOL) tablet 650 mg, 650 mg, Oral, Q6H PRN, Los Vázquez MD    atorvastatin (LIPITOR) tablet 40 mg, 40 mg, Oral, Nightly, Los Vázquez MD, 40 mg at 05/06/24 2215    sennosides-docusate (PERICOLACE) 8.6-50 MG per tablet 2 tablet, 2 tablet, Oral, BID PRN **AND** polyethylene glycol (MIRALAX) packet 17 g, 17 g, Oral, Daily PRN **AND** bisacodyl (DULCOLAX) EC tablet 5 mg, 5 mg, Oral, Daily PRN **AND** bisacodyl (DULCOLAX) suppository 10 mg, 10 mg, Rectal, Daily PRN, Los Vázquez MD    calcitriol (ROCALTROL) capsule 0.25 mcg, 0.25 mcg, Oral, Daily, Los Vázquez MD, 0.25 mcg at 05/07/24 0945    Calcium Replacement - Follow Nurse / BPA Driven Protocol, , Does not apply, PRN, Los Vázquez MD    cefTRIAXone (ROCEPHIN) 2,000 mg in sodium chloride 0.9 % 100 mL MBP, 2,000 mg, Intravenous, Q24H, Los Vázquez MD, Last Rate: 200 mL/hr at 05/06/24 1620, 2,000 mg at 05/06/24 1620    dilTIAZem CD (CARDIZEM CD) 24 hr capsule 180 mg, 180 mg, Oral, Q24H, Los Vázquez MD, 180 mg at 05/07/24 0945    donepezil (ARICEPT) tablet 5 mg, 5 mg, Oral, Nightly, Los Vázquez MD, 5 mg at 05/06/24 8156    [START ON 5/8/2024] Enoxaparin Sodium (LOVENOX) syringe 100 mg, 1 mg/kg, Subcutaneous, Q12H, Dylon Baig MD     escitalopram (LEXAPRO) tablet 10 mg, 10 mg, Oral, Daily, Los Vázquez MD, 10 mg at 05/07/24 0945    furosemide (LASIX) tablet 40 mg, 40 mg, Oral, BID, Dylon Baig MD, 40 mg at 05/07/24 0945    ipratropium-albuterol (DUO-NEB) nebulizer solution 3 mL, 3 mL, Nebulization, 4x Daily - RT, Los Vázquez MD, 3 mL at 05/07/24 1027    Magnesium Standard Dose Replacement - Follow Nurse / BPA Driven Protocol, , Does not apply, PRN, Los Vázquez MD    methIMAzole (TAPAZOLE) tablet 10 mg, 10 mg, Oral, Daily, Los Vázquez MD, 10 mg at 05/07/24 0946    metoprolol tartrate (LOPRESSOR) tablet 25 mg, 25 mg, Oral, Q12H, Los Vázquez MD, 25 mg at 05/07/24 0945    nitroglycerin (NITROSTAT) SL tablet 0.4 mg, 0.4 mg, Sublingual, Q5 Min PRN, Los Vázquez MD    nystatin (MYCOSTATIN) powder, , Topical, Q12H, [facility], Femi, DO, Given at 05/07/24 0946    oxyCODONE-acetaminophen (PERCOCET) 5-325 MG per tablet 1 tablet, 1 tablet, Oral, Q6H PRN, Los Vázquez MD, 1 tablet at 05/04/24 0017    Pharmacy to Dose enoxaparin (LOVENOX), , Does not apply, Continuous PRN, Dylon Baig MD    Phosphorus Replacement - Follow Nurse / BPA Driven Protocol, , Does not apply, PRCHAPARRO, Los Vázquez MD    Potassium Replacement - Follow Nurse / BPA Driven Protocol, , Does not apply, PRN, Los Vázquez MD    QUEtiapine (SEROquel) tablet 25 mg, 25 mg, Oral, Nightly, Los Vázquez MD, 25 mg at 05/06/24 2215    [COMPLETED] Insert Peripheral IV, , , Once **AND** sodium chloride 0.9 % flush 10 mL, 10 mL, Intravenous, PRN, Los Vázquez MD    sodium chloride 0.9 % flush 10 mL, 10 mL, Intravenous, Q12H, Los Vázquez MD, 10 mL at 05/07/24 0946    sodium chloride 0.9 % infusion 40 mL, 40 mL, Intravenous, PRN, Los Vázquez MD    [Held by provider] warfarin (COUMADIN) tablet 5 mg, 5 mg, Oral, Once per day on Monday Wednesday Friday Saturday, Los Vázquez MD, 5 mg at 05/06/24 6659     "[Held by provider] warfarin (COUMADIN) tablet 7.5 mg, 7.5 mg, Oral, Once per day on Sunday Tuesday Thursday, Los Vázquez MD, 7.5 mg at 05/05/24 3010    Allergies   Allergen Reactions    Ciprofloxacin Itching    Codeine Itching and GI Intolerance    Definity [Perflutren Lipid Microsphere] Other (See Comments)     Back pain    Tetanus Toxoids Itching     Itching around site    Tizanidine Hcl Itching    Other Itching     Cloth bandaids , causes redness of skin     I have utilized all available immediate resources to obtain, update, or review the patient's current medications (including all prescriptions, over-the-counter products, herbals, cannabis/cannabidiol products, and vitamin/mineral/dietary (nutritional) supplements) for name, route of administration, type, dose and frequency.      Intake/Output Summary (Last 24 hours) at 5/7/2024 1030  Last data filed at 5/7/2024 0340  Gross per 24 hour   Intake 220 ml   Output 950 ml   Net -730 ml       Physical Exam:    Diagnostics: Reviewed  /65 (BP Location: Right arm, Patient Position: Lying)   Pulse 73   Temp 98.1 °F (36.7 °C) (Axillary)   Resp 20   Ht 175.3 cm (69\")   Wt 96.3 kg (212 lb 4.8 oz)   LMP  (LMP Unknown)   SpO2 91%   BMI 31.35 kg/m²     Vitals and nursing note reviewed.   Constitutional:       Appearance: Not in distress. Obese. Chronically ill-appearing.      Interventions: Nasal cannula in place.      Comments: Extremely somnolent, nods off during conversation.   Eyes:      General: Lids are normal.      Pupils: Pupils are equal, round, and reactive to light.   HENT:      Head: Normocephalic.   Pulmonary:      Effort: Pulmonary effort is normal.   Cardiovascular:      Normal rate.   Edema:     Peripheral edema present.     Pretibial: edema of the left pretibial area.  Abdominal:      Palpations: Abdomen is soft.      Comments: Right lower quadrant urostomy   Musculoskeletal: Normal range of motion.      Cervical back: Neck supple. Skin:   " "  General: Skin is warm.      Comments: Pressure associated skin injury and moisture associated dermatitis bilateral buttocks.  Discoloration left lower extremity with edema.   Neurological:      Cranial Nerves: Dysarthria present.      Comments: Somnolent   Psychiatric:         Behavior: Behavior is slowed.         Cognition and Memory: Memory is impaired.     Patient status: Disease state: Deteriorating despite treatments.  Current Functional status: Palliative Performance Scale Score: Performance 40% based on the following measures: Ambulation: Mainly in bed, Activity and Evidence of Disease: Unable to do any work, extensive evidence of disease, Self-Care: Mainly assistance required,  Intake: Normal or reduced, LOC: Full, drowsy or confusion   Baseline Functional status: Palliative Performance Scale Score: Performance 60% based on the following measures: Ambulation: Reduced, Activity and Evidence of Disease: Unable to do hobby or some work, significant evidence of disease, Self-Care: Occasional assist necessary,  Intake: Normal or reduced, LOC: Full or confusion   Baseline ECOG Status(4) Completely disabled, unable to carry out self-care.  Totally confined to bed or chair.  Nutritional status: Albumin 3.3 Body mass index is 31.35 kg/m².         Hospital Problem List      Acute on chronic diastolic congestive heart failure    Essential hypertension    ASA (obstructive sleep apnea)    Hyperthyroidism    Acute respiratory failure with hypoxia and hypercapnia    S/P TAVR (transcatheter aortic valve replacement)    Right pleural effusion    Stage 3 chronic kidney disease    Permanent atrial fibrillation    Impression/Problem List:    Acute on chronic diastolic congestive heart failure  Acute respiratory failure with hypoxia and hypercapnia  Right pleural effusion  Alzheimer's dementia  Cancer? solitary kidney s/p right nephrectomy (roughly 20 years ago \"spot they could not reach, worrisome for cancer\") and cystectomy " (cancer per family) s/p urostomy bag (roughly 40 years ago),  Pulmonary hypertension  Pericardial effusion, small less than 1 cm  Chronic kidney disease stage III  Permanent A-fib-on anticoagulation  Aortic stenosis s/p TAVR (5/2018 at [place])-does not regularly follow with cardiology  Chronic pain syndrome  Depression  History of DVT  Hard of hearing  Hyperlipidemia  Hypertension  Hypothyroidism  Impaired mobility-uses wheelchair  Oxygen pfazvpsvls-6-5/2 L  Sick sinus syndrome  Sleep apnea-noncompliant with CPAP  Pressure associated skin injury and moisture associated dermatitis, bilateral buttocks  Advanced age      Recommendations/Plan:  1. plan: Goals of care include CODE STATUS CPR/full support interventions.    Family support: The patient receives support from her daughter..  Advance Directives: Advance Directive Status: Patient does not have advance directive   POA/Healthcare surrogate-daughterSwetha.    2.  Palliative care encounter  - Prognosis is poor to guarded long-term secondary to acute CHF, acute respiratory failure, right pleural effusion, Alzheimer's dementia, multiple comorbidities, and advanced age.  -Family appears to have fair prognostic awareness.     -Placed on BiPAP.  Received IV Lasix in the ED, in addition to started on DuoNeb.  -Pulmonology consulted and notes hypercapnia is unlikely to be contributing to patient's narcosis given normal pH and likely chronic hypercapnia and have signed off.    -Cardiology consulted, medication management of CHF    -Placed on fluid restrictions.   5/4-Noted to have decline in renal function and diuresis held    -Urine culture shows Proteus mirabilis, antibiotics per attending.    -Therapy recommends SNF at discharge.    5/5-Oral diuretics restarted per cardiology after improvement of creatinine and  and recommend evaluation of sleep apnea compliance as feel noncompliance is affecting general cardiac health.   -Continues BiPAP use at nighttime.       5/7-Discussed challenges with medical noncompliance in the home setting and progressive nature of multiple comorbid factors, high risk for rehospitalizations and progressive decline as a result.  Explored options of continued aggressive care interventions and rehospitalizations versus transitions of care with best supportive care directed by hospice.    -ongoing conversation with daughter Wednesday, 5/8 around 9 AM to further determine goals of care.       Thank you for this consult and allowing us to participate in patient's plan of care. Palliative Care Team will continue to follow patient.     Carol Ramos, IRINEO  5/7/2024  10:30 CDT

## 2024-05-07 NOTE — PROGRESS NOTES
1         [facility] Medicine Services  INPATIENT PROGRESS NOTE    Patient Name: Jarvis Morgan  Date of Admission: 5/2/2024  Today's Date: 05/07/24  Length of Stay: 5  Primary Care Physician: Mary Beth Izquierdo APRN    Subjective   Chief Complaint: Follow-up  HPI   Patient on day 4 of hospitalization  This is my first day to see the patient    Since admission patient been seen by cardiology and pulmonary medicine.    Patient presented with shortness of breath.  Patient was admitted with provisional diagnosis of:    acute hypercarbic respiratory failure  Acute decompensated heart failure  Atrial fibrillation with supratherapeutic INR  Chronic kidney disease stage III  History of obstructive sleep apnea on CPAP at home      89-year-old lady with past medical history significant for atrial fibrillation on chronic warfarin use, history of hypertension, history of diastolic heart failure who presented initially to the emergency department (earlier today) with increased shortness of air, apparently family called EMS (earlier today) and stated that she is becoming more dyspneic and she has worsening lower extremity edema, also she is becoming more more lethargic  Patient reportedly had 80% of oxygenation via air.  Arterial blood gas reportedly has hypercarbia.  Patient was placed on BiPAP and given Lasix.    Review of admitting H&P indicates creatinine at 1.89  Calcium of 10.8  CO2 32  INR of 3.1  Chest x-ray indicates volume overload with bilateral pulmonary edema with bilateral layering effusions.  Right pleural fluid effusion is large in size.    Patient is hemodynamically stable  Patient on NIPPV at 28 to 35% FiO2  O2 saturation in the mid 90s    Cardiologist's impression includes:  Acute on chronic diastolic heart failure  Acute on chronic respiratory failure with hypoxia and hypercapnia  Hypertension  Permanent atrial fibrillation with CHADS2 Vascor of 5 (age, female, CHF,  "hypertension)  Valvular heart disease status post TAVR in 2018 at [place]  CKD stage III  FAROOQ  Mixed hyperlipidemia  Chronic anticoagulation for permanent atrial fibrillation and reported history of DVT.      Pulmonary medicine's assessment includes:  Acute resp failure with hypoxia and hypercapnia requiring bipap, threat to life and bodily function with high risk of morbidity from additional diagnostic testing or treatment   Farooq essentially untreated.  No other pulmonary disease identified  Pulmonary edema and pleural effusions  Mildly elevated bnp of uncertain significance  Ckd 3  Hx tavr  Chronic anticoagulation status  Hyperthyroidism  scoliosis  The service signed off on day 4.      Patient is very difficult to understand with her BiPAP mask on.  It is not clear to me whether she uses CPAP/BiPAP or oxygen at home  I offered her to replace nasal cannula however she declined.  I asked her whether this causes her difficulty to breathe if this is removed.  I am hoping just to removed it temporarily so we can understand each other clearly      It is not clear to me whether the patient is feeling better compared to admission.    Today, May 7:  Patient has no significant change from yesterday  I took off the BiPAP and place her on nasal cannula which was set at 3 L  She maintains her oxygen saturation at mid 90s.  I did not get much information from her  \"I do not know\"  I called her daughter Swetha.  I was able to verify that she has CPAP and oxygen at home although she uses the oxygen only as needed and is noncompliant with CPAP    I expressed to her the presence of pleural effusion bilateral right greater than left  I gave her options including diuresis and its cons and p.o.  I also mentioned to her about thoracentesis and the fact that patient is on anticoagulation.  She told me she worked with therapist yesterday.  She directly observed stand with therapist.    Review of Systems   Limited-healing issue  Not " sure whether she has insight of her condition  Otherwise has not complained of any shortness of breath or difficulty breathing  All pertinent negatives and positives are as above. All other systems have been reviewed and are negative unless otherwise stated.     Objective    Temp:  [97.2 °F (36.2 °C)-98.2 °F (36.8 °C)] 98.1 °F (36.7 °C)  Heart Rate:  [59-92] 67  Resp:  [12-22] 22  BP: (106-158)/(52-91) 136/65  Physical Exam  Very limited insight  Hard of hearing  HEENT: Normocephalic, atraumatic, pupils are equal reactive to light, and accommodate  Neck: Supple, no JVD, no carotid bruits  CVS: Regular rhythm   lungs:  diminished breath sounds at the bases  Abdomen: Soft, nontender, nondistended bowel sounds are present; ostomy present pink in color.  Ostomy present.  Had -1489 mL fluid balance.  Extremities: Edema present bilaterally; hyperpigmented left shin.  Colder distal extremities left greater than right, diminished pulses dorsalis pedis  Neurologic: No focal deficits  Psychiatric: Normal affect         Results Review:  I have reviewed the labs, radiology results, and diagnostic studies.    Laboratory Data:   Results from last 7 days   Lab Units 05/06/24  0425 05/05/24  0421 05/04/24  0835   WBC 10*3/mm3 8.10 7.39 8.34   HEMOGLOBIN g/dL 14.1 12.6 13.9   HEMATOCRIT % 45.7 40.4 45.9   PLATELETS 10*3/mm3 169 157 163        Results from last 7 days   Lab Units 05/06/24  0425 05/05/24  0421 05/04/24  0835 05/02/24  1241 05/02/24  1140   SODIUM mmol/L 142 141 142   < > 143   SODIUM, ARTERIAL   --   --   --    < >  --    POTASSIUM mmol/L 4.9 4.7 5.0   < > 4.8   CHLORIDE mmol/L 103 102 103   < > 104   CO2 mmol/L 33.0* 31.0* 32.0*   < > 32.0*   BUN mg/dL 56* 53* 48*   < > 40*   CREATININE mg/dL 1.59* 1.78* 1.91*   < > 1.89*   CALCIUM mg/dL 11.1* 10.5 10.5   < > 10.8*   BILIRUBIN mg/dL  --   --  0.6  --  0.5   ALK PHOS U/L  --   --  77  --  79   ALT (SGPT) U/L  --   --  10  --  12   AST (SGOT) U/L  --   --  13  --  16    GLUCOSE mg/dL 111* 88 139*   < > 119*    < > = values in this interval not displayed.     INR 2.48  Patient's urine culture from May 4 showed gram-negative bacilli greater than 100,000 colony-forming units  Normal TSH with decreased free T4  Cultures to date no growth    Culture Data:   Blood Culture   Date Value Ref Range Status   05/02/2024 No growth at 3 days  Preliminary   05/02/2024 No growth at 3 days  Preliminary     Urine Culture   Date Value Ref Range Status   05/04/2024 >100,000 CFU/mL Gram Negative Bacilli (A)  Preliminary       Radiology Data:   Imaging Results (Last 24 Hours)       Procedure Component Value Units Date/Time    XR Chest 1 View [[id number]] Collected: 05/06/24 1427     Updated: 05/06/24 1433    Narrative:      EXAMINATION:  XR CHEST 1 VW-  5/6/2024 1:24 PM     HISTORY: Hypoxia. J96.01-Acute respiratory failure with hypoxia;  J96.02-Acute respiratory failure with hypercapnia; I50.9-Heart failure,  unspecified; V53-Zjcnnwu effusion, not elsewhere classified;  N18.9-Chronic kidney disease, unspecified; I48.11-Longstanding  persistent atrial fibrillation; Z74.09-Other reduced mobility.     COMPARISON: 5/3/2024.     TECHNIQUE: Single view AP image.     FINDINGS: There is a large pleural effusion on the right. There is small  to moderate pleural effusion on the left. There is at least passive  atelectasis in both lungs. There is some shifting of the heart to the  left. The trachea deviates to the left. The cardiac silhouette is  predominantly obscured. There has been prior TAVR aortic valve  replacement. There are degenerative changes of the spine.          Impression:      1. Large right pleural effusion with at least passive atelectasis of the  right lung.  2. Small to moderate left pleural effusion with at least passive  atelectasis of the left lung.  3. Shifting of the heart and mediastinum towards the left could be  related to some degree of rotation.  4. Prior TAVR aortic valve  replacement. Heart size cannot be evaluated  due to opacities obscuring the heart borders.           This report was signed and finalized on 5/6/2024 2:30 PM by Dr. Josh Robledo MD.               I have reviewed the patient's current medications.     Assessment/Plan   Assessment        Active Hospital Problems    Diagnosis     **Acute on chronic diastolic congestive heart failure     Permanent atrial fibrillation     Right pleural effusion     Stage 3 chronic kidney disease     S/P TAVR (transcatheter aortic valve replacement)     Hyperthyroidism     ASA (obstructive sleep apnea)     Acute respiratory failure with hypoxia and hypercapnia     Essential hypertension          Medical Decision Making  Number and Complexity of problems:     Acute on chronic(?) hypoxic and hypercarbic respiratory failure  Acute on chronic diastolic heart failure  Moderate pulmonary hypertension  Stage III chronic kidney disease-improving creatinine  History of TAVR in 2000  Hypothyroidism on methimazole  Obstructive sleep apnea - ?  Relation to pulmonary hypertension  Fluid retention of lower extremity probably related to right-sided heart failure with pulmonary hypertension  Supratherapeutic anticoagulation on admission now improved  Permanent atrial fibrillation  Gram-negative bacilli (Proteus mirabilis) present in urine culture.  Susceptible to ceftriaxone.  ?  Probably underlying dementia-on donepezil.  Possible underlying peripheral arterial disease as evidenced by cold extremities of lower extremity  Treatment Plan    Monitor PT/INR.  On Coumadin.  Maintain INR between 2-3; monitor for active bleeding.  (2.72 INR)  Empiric antibiotic.  Patient has gram-negative bacilli in urine culture.  Follow-up susceptibility.  Noted that patient at night uses NIPPV anywhere from 28 to 35% with saturation in the mid 90s  Continue antihypertensive medication (diltiazem, metoprolol); heart rate adequately controlled. Wean down to lowest FiO2 to  maintain saturation greater than 90%.  Continue thyroid medication.  I requested for chest x-ray to follow through the abnormality and chest x-ray while on diuretic.  Monitor input output    Today, May 7.  I may have to hold off on Coumadin and let it drift down while continuing on Lovenox even in the event that they decide to pursue a thoracentesis.  I expect that ceftriaxone will be completed later today  Continue to work with therapist  Continue on diuretic and be more aggressive on it by increasing it twice daily following renal function and electrolytes and correcting this as needed.  Patient has A-fib and continuing rate limiting medications such as diltiazem and metoprolol heart rate as tolerated  Continuing thyroid medication  Continuing physical rehabilitation and discussion with social service on discharge planning  Spoke with daughter as outlined above.    Medications reviewed  atorvastatin, 40 mg, Oral, Nightly  calcitriol, 0.25 mcg, Oral, Daily  cefTRIAXone, 2,000 mg, Intravenous, Q24H  dilTIAZem CD, 180 mg, Oral, Q24H  donepezil, 5 mg, Oral, Nightly  escitalopram, 10 mg, Oral, Daily  furosemide, 40 mg, Oral, Daily  ipratropium-albuterol, 3 mL, Nebulization, 4x Daily - RT  methIMAzole, 10 mg, Oral, Daily  metoprolol tartrate, 25 mg, Oral, Q12H  nystatin, , Topical, Q12H  QUEtiapine, 25 mg, Oral, Nightly  sodium chloride, 10 mL, Intravenous, Q12H  [Held by provider] warfarin, 5 mg, Oral, Once per day on Monday Wednesday Friday Saturday  [Held by provider] warfarin, 7.5 mg, Oral, Once per day on Sunday Tuesday Thursday                Conditions and Status       Fair  [facility] Data  External documents reviewed: Reviewed epic record    Results for orders placed during the hospital encounter of 05/02/24    Adult Transthoracic Echo Complete w/ Color, Spectral and Contrast if Necessary Per Protocol    Interpretation Summary    Atrial fibrillation was the predominant rhythm observed during the procedure.    Left  ventricular systolic function is normal. Left ventricular ejection fraction appears to be 61 - 65%.    The right ventricular cavity is moderate to severely dilated. Moderately reduced right ventricular systolic function noted.    The left atrial cavity is moderately dilated    The aortic valve exhibits sclerosis. There is moderate calcification of the aortic valve. Trace aortic valve regurgitation is present. Moderate aortic valve stenosis is present    Moderate mitral annular calcification is present. Mild mitral valve regurgitation is present.    Tricuspid annular calcification is present. Moderate tricuspid valve regurgitation is present    Moderate pulmonary hypertension is present.    The inferior vena cava is dilated. IVC inspiratory collapse is absent.    There is a small (<1cm) pericardial effusion. There is no evidence of cardiac tamponade.      Cardiac tracing (EKG, telemetry) interpretation: Atrial fibrillation, right bundle branch block.  Radiology interpretation:      On the x-ray on the right side shows absence of the cardiophrenic and costophrenic angle is pronounced compared to the left lung field but when compared to the one on the left  Film, there is diffuse haziness mostly of the right lung field.  Presence of pleural effusion noted.  This is also evidence in the abdominal pelvic CT scan here.        Labs reviewed: Yes  Any tests that were considered but not ordered: None     Decision rules/scores evaluated (example [id number], [place], etc): None     Discussed with: daughter patient and nurse [place]      Care Planning  Shared decision making: Patient and son  Code status and discussions: Full code  I confirmed that the patient's advanced care plan is present, code status is documented, and a surrogate decision maker is listed in the patient's medical record.  Disposition  Social Determinants of Health that impact treatment or disposition: None identified at this time  I expect the patient to be  discharged to TBD.     Electronically signed by Dylon Baig MD, 05/07/24, 07:38 CDT.

## 2024-05-07 NOTE — PROGRESS NOTES
RT EQUIPMENT DEVICE RELATED - SKIN ASSESSMENT    Deven Score:  Deven Score: 14     RT Medical Equipment/Device:     NIV Mask:  Under-the-nose   size:  B    Skin Assessment:      Nose:  Intact    Device Skin Pressure Protection:  Pressure points protected    Nurse Notification:  No    Lb Fu, RRT

## 2024-05-07 NOTE — PLAN OF CARE
Goal Outcome Evaluation:  Plan of Care Reviewed With: patient, daughter        Progress: no change  Outcome Evaluation: OT tx completed on this date. Pt's daughter present. Pt lethargic and would only open eyes for a brief second when attempted to wake up multiple times. Pt's daughter extensively educated on pt's OT POC /goals and minimal progress that has been made with therapy. SNF recommended upon discharge. OT POC to continue.      Anticipated Discharge Disposition (OT): skilled nursing facility

## 2024-05-07 NOTE — THERAPY TREATMENT NOTE
Acute Care - Occupational Therapy Treatment Note  [facility]     Patient Name: Jarvis Morgan  : 1945  MRN: [id number]  Today's Date: 2024             Admit Date: 2024       ICD-10-CM ICD-9-CM   1. Acute respiratory failure with hypoxia and hypercapnia  J96.01 518.81    J96.02    2. Acute on chronic congestive heart failure, unspecified heart failure type  I50.9 428.0   3. Pleural effusion on right  J90 511.9   4. Chronic renal impairment, unspecified CKD stage  N18.9 585.9   5. Longstanding persistent atrial fibrillation  I48.11 427.31   6. Impaired mobility [Z74.09]  Z74.09 799.89   7. Decreased activities of daily living (ADL)  Z78.9 V49.89     Patient Active Problem List   Diagnosis    History of aortic valvular stenosis    Chronic atrial fibrillation    Mixed hyperlipidemia    Essential hypertension    Deep vein thrombosis (DVT) of left lower extremity    Shortness of breath    Physical deconditioning    Venous insufficiency    GERD (gastroesophageal reflux disease)    History of bladder cancer    Sick sinus syndrome    ASA (obstructive sleep apnea)    Hyperthyroidism    Acute respiratory failure with hypoxia and hypercapnia    Chronic diastolic heart failure    History of DVT (deep vein thrombosis)    Dementia    Vitamin D deficiency    Class 2 obesity due to excess calories with serious comorbidity and body mass index (BMI) of 38.0 to 38.9 in adult    Chronic anticoagulation    Scabies    MRSA bacteremia    Weakness    Acute on chronic diastolic heart failure    S/P TAVR (transcatheter aortic valve replacement)    Sepsis    Acute UTI    Pneumonia of both lower lobes due to infectious organism    Acute and chronic respiratory failure with hypercapnia    Abnormal CXR - pneumonia cannot be ruled out at this time.    Closed comminuted intertrochanteric fracture of proximal end of right femur    Obesity (BMI 30-39.9)    Closed fracture of right hip    Right pleural effusion    Closed fracture of left  hip    Aortic valve stenosis    H/O ureterostomy    Idiopathic peripheral neuropathy    Memory impairment    Neoplasm of bladder    Primary osteoarthritis of both knees    Recurrent major depressive disorder, in partial remission    Stage 3 chronic kidney disease    Acute cystitis with hematuria    Subtherapeutic international normalized ratio (INR)    Acute on chronic diastolic congestive heart failure    Permanent atrial fibrillation     Past Medical History:   Diagnosis Date    A-fib     Aortic stenosis     Arthritis     Bradycardia     Cancer     bladder and skin    Cardiomegaly     CHF (congestive heart failure)     GERD (gastroesophageal reflux disease)     Hard of hearing     History of short term memory loss     Hypercalcemia     Hyperlipidemia     Hypertension     Hyperthyroidism 11/20/2017    Hypothyroidism     Kidney stone     Long term current use of anticoagulant therapy     Open wound     left lower extremity,    Palpitation     PONV (postoperative nausea and vomiting)     Shortness of breath     Sick sinus syndrome     Sleep apnea     CPAP    Stage 3 chronic kidney disease      Past Surgical History:   Procedure Laterality Date    AORTIC VALVE REPAIR/REPLACEMENT      BLADDER SURGERY      removed    CARDIAC CATHETERIZATION      CARDIAC CATHETERIZATION N/A 12/9/2016    Procedure: Left Heart Cath;  Surgeon: Elia Flores MD;  Location:  [facility]ATH [facility];  Service:     DISTAL FEMORAL NAILING Right 9/24/2020    Procedure: RIGHT SHORT TFN;  Surgeon: Betito Shetty MD;  Location:  [facility] OR;  Service: Orthopedics;  Laterality: Right;    HIP BIPOLAR REPLACEMENT Left     HYSTERECTOMY      ILEOSTOMY      JOINT REPLACEMENT      KIDNEY SURGERY      right kidney removed    NEPHRECTOMY RADICAL Right     from cancer    VARICOSE VEIN SURGERY Left 4/10/2017    Procedure: LEFT LOWER EXTREMITY VENOGRAM. LEFT SAPHENOUS VEIN RADIO FREQUENCY ABLATION;  Surgeon: Blake Martinez DO;  Location:  [facility] OR;   Service:          OT ASSESSMENT FLOWSHEET (Last 12 Hours)       OT Evaluation and Treatment       Row Name 05/07/24 1440                   OT Time and Intention    Subjective Information no complaints  -LS        Document Type therapy note (daily note)  -LS        Mode of Treatment occupational therapy  -LS        Patient Effort poor  -LS           General Information    Patient Profile Reviewed yes  -LS        Barriers to Rehab medically complex  -LS           Wound 05/02/24 2143 Left lower calf    Wound - Properties Group Placement Date: 05/02/24  -AK Placement Time: 2143 -AK Present on Original Admission: Y  -AK Side: Left  -AK Orientation: lower  -AK Location: calf  -AK    Retired Wound - Properties Group Placement Date: 05/02/24  -AK Placement Time: 2143 -AK Present on Original Admission: Y  -AK Side: Left  -AK Orientation: lower  -AK Location: calf  -AK    Retired Wound - Properties Group Date first assessed: 05/02/24  -AK Time first assessed: 2143  -AK Present on Original Admission: Y  -AK Side: Left  -AK Location: calf  -AK       Wound 05/03/24 0025 Bilateral lower sacral spine Pressure Injury    Wound - Properties Group Placement Date: 05/03/24  -AK Placement Time: 0025  -AK Present on Original Admission: Y  -AK Side: Bilateral  -AK Orientation: lower  -AK Location: sacral spine  -AK Primary Wound Type: Pressure inj  -AK    Retired Wound - Properties Group Placement Date: 05/03/24  -AK Placement Time: 0025  -AK Present on Original Admission: Y  -AK Side: Bilateral  -AK Orientation: lower  -AK Location: sacral spine  -AK Primary Wound Type: Pressure inj  -AK    Retired Wound - Properties Group Date first assessed: 05/03/24  -AK Time first assessed: 0025  -AK Present on Original Admission: Y  -AK Side: Bilateral  -AK Location: sacral spine  -AK Primary Wound Type: Pressure inj  -AK       Wound 05/03/24 0029 Bilateral lower breast    Wound - Properties Group Placement Date: 05/03/24  -AK Placement Time: 0029   -AK Side: Bilateral  -AK Orientation: lower  -AK Location: breast  -AK    Retired Wound - Properties Group Placement Date: 05/03/24  -AK Placement Time: 0029 -AK Side: Bilateral  -AK Orientation: lower  -AK Location: breast  -AK    Retired Wound - Properties Group Date first assessed: 05/03/24  -AK Time first assessed: 0029  -AK Side: Bilateral  -AK Location: breast  -AK       Plan of Care Review    Plan of Care Reviewed With patient;daughter  -        Progress no change  -        Outcome Evaluation OT tx completed on this date. Pt's daughter present. Pt lethargic and would only open eyes for a brief second when attempted to wake up multiple times. Pt's daughter extensively educated on pt's OT POC /goals and minimal progress that has been made with therapy. SNF recommended upon discharge. OT POC to continue.  -LS           Positioning and Restraints    Pre-Treatment Position in bed  -LS        Post Treatment Position bed  -LS        In Bed fowlers;call light within reach;encouraged to call for assist;with family/caregiver  -           Therapy Plan Review/Discharge Plan (OT)    Anticipated Discharge Disposition (OT) skilled nursing facility  -                  User Key  (r) = Recorded By, (t) = Taken By, (c) = Cosigned By      Initials Name Effective Dates     Melanie Silver COTA 09/22/22 -     Ariana Castillo RN 01/24/24 -                      Occupational Therapy Education       Title: PT OT SLP Therapies (In Progress)       Topic: Occupational Therapy (In Progress)       Point: ADL training (Done)       Description:   Instruct learner(s) on proper safety adaptation and remediation techniques during self care or transfers.   Instruct in proper use of assistive devices.                  Learning Progress Summary             Patient Acceptance, E, VU by BIPIN at 5/6/2024 1440    Comment: Pt educated on safety with bed mobility    Acceptance, E,D, NR by REAL at 5/4/2024 1625   Family Acceptance, E, VU by BIPIN at  5/7/2024 1523    Comment: Education on OT recommendations.                         Point: Home exercise program (Done)       Description:   Instruct learner(s) on appropriate technique for monitoring, assisting and/or progressing therapeutic exercises/activities.                  Learning Progress Summary             Patient Acceptance, E,TB, VU by LW at 5/7/2024 0301    Acceptance, E,D, NR by LR at 5/4/2024 1625                         Point: Precautions (In Progress)       Description:   Instruct learner(s) on prescribed precautions during self-care and functional transfers.                  Learning Progress Summary             Patient Acceptance, E,D, NR by LR at 5/4/2024 1625                         Point: Body mechanics (Done)       Description:   Instruct learner(s) on proper positioning and spine alignment during self-care, functional mobility activities and/or exercises.                  Learning Progress Summary             Patient Acceptance, E,TB, VU by LW at 5/7/2024 0301    Acceptance, E,D, NR by LR at 5/4/2024 1625                                         User Key       Initials Effective Dates Name Provider Type Discipline     09/22/22 -  Melanie Silver COTA Occupational Therapist Assistant THERAPIES    LR 04/25/23 -  Kay Kirby OTR/L Occupational Therapist OT    LW 02/02/24 -  Patricia Thomas, GERARDO Registered Nurse Nurse                      OT Recommendation and Plan     Plan of Care Review  Plan of Care Reviewed With: patient, daughter  Progress: no change  Outcome Evaluation: OT tx completed on this date. Pt's daughter present. Pt lethargic and would only open eyes for a brief second when attempted to wake up multiple times. Pt's daughter extensively educated on pt's OT POC /goals and minimal progress that has been made with therapy. SNF recommended upon discharge. OT POC to continue.  Plan of Care Reviewed With: patient, daughter  Outcome Evaluation: OT tx completed on this date. Pt's  daughter present. Pt lethargic and would only open eyes for a brief second when attempted to wake up multiple times. Pt's daughter extensively educated on pt's OT POC /goals and minimal progress that has been made with therapy. SNF recommended upon discharge. OT POC to continue.     Outcome Measures       Row Name 05/07/24 1500 05/07/24 0859 05/06/24 1400       How much help from another person do you currently need...    Turning from your back to your side while in flat bed without using bedrails? -- 3  -MF --    Moving from lying on back to sitting on the side of a flat bed without bedrails? -- 2  -MF --    Moving to and from a bed to a chair (including a wheelchair)? -- 1  -MF --    Standing up from a chair using your arms (e.g., wheelchair, bedside chair)? -- 2  -MF --    Climbing 3-5 steps with a railing? -- 1  -MF --    To walk in hospital room? -- 1  -MF --    AM-PAC 6 Clicks Score (PT) -- 10  -MF --    Highest Level of Mobility Goal -- 4 --> Transfer to chair/commode  -MF --       How much help from another is currently needed...    Putting on and taking off regular lower body clothing? 1  -LS -- 1  -LS    Bathing (including washing, rinsing, and drying) 2  -LS -- 2  -LS    Toileting (which includes using toilet bed pan or urinal) 1  -LS -- 1  -LS    Putting on and taking off regular upper body clothing 2  -LS -- 2  -LS    Taking care of personal grooming (such as brushing teeth) 2  -LS -- 2  -LS    Eating meals 2  -LS -- 2  -LS    AM-PAC 6 Clicks Score (OT) 10  -LS -- 10  -LS       Functional Assessment    Outcome Measure Options AM-PAC 6 Clicks Daily Activity (OT)  -LS AM-PAC 6 Clicks Basic Mobility (PT)  -MF AM-PAC 6 Clicks Daily Activity (OT)  -LS              User Key  (r) = Recorded By, (t) = Taken By, (c) = Cosigned By      Initials Name Provider Type    Pebbles Islas PTA Physical Therapist Assistant    Melanie Arciniega COTA Occupational Therapist Assistant                    Time  Calculation:    Time Calculation- OT       Row Name 05/07/24 1524             Time Calculation- OT    OT Start Time 1440  -LS      OT Stop Time 1508  -LS      OT Time Calculation (min) 28 min  -LS      Total Timed Code Minutes- OT 28 minute(s)  -      OT Received On 05/07/24  -                User Key  (r) = Recorded By, (t) = Taken By, (c) = Cosigned By      Initials Name Provider Type     Melanie Silver COTA Occupational Therapist Assistant                  Therapy Charges for Today       Code Description Service Date Service Provider Modifiers Qty    54556366764 HC OT THERAPEUTIC ACT EA 15 MIN 5/6/2024 Melanie Silver COTA GO 4    14895759486 HC OT THERAPEUTIC ACT EA 15 MIN 5/7/2024 Melanie Silver COTA GO 2                 JANET Mello  5/7/2024

## 2024-05-07 NOTE — THERAPY TREATMENT NOTE
Acute Care - Physical Therapy Treatment Note  [facility]     Patient Name: Jarvis Morgan  : 1945  MRN: [id number]  Today's Date: 2024      Visit Dx:     ICD-10-CM ICD-9-CM   1. Acute respiratory failure with hypoxia and hypercapnia  J96.01 518.81    J96.02    2. Acute on chronic congestive heart failure, unspecified heart failure type  I50.9 428.0   3. Pleural effusion on right  J90 511.9   4. Chronic renal impairment, unspecified CKD stage  N18.9 585.9   5. Longstanding persistent atrial fibrillation  I48.11 427.31   6. Impaired mobility [Z74.09]  Z74.09 799.89   7. Decreased activities of daily living (ADL)  Z78.9 V49.89     Patient Active Problem List   Diagnosis    History of aortic valvular stenosis    Chronic atrial fibrillation    Mixed hyperlipidemia    Essential hypertension    Deep vein thrombosis (DVT) of left lower extremity    Shortness of breath    Physical deconditioning    Venous insufficiency    GERD (gastroesophageal reflux disease)    History of bladder cancer    Sick sinus syndrome    ASA (obstructive sleep apnea)    Hyperthyroidism    Acute respiratory failure with hypoxia and hypercapnia    Chronic diastolic heart failure    History of DVT (deep vein thrombosis)    Dementia    Vitamin D deficiency    Class 2 obesity due to excess calories with serious comorbidity and body mass index (BMI) of 38.0 to 38.9 in adult    Chronic anticoagulation    Scabies    MRSA bacteremia    Weakness    Acute on chronic diastolic heart failure    S/P TAVR (transcatheter aortic valve replacement)    Sepsis    Acute UTI    Pneumonia of both lower lobes due to infectious organism    Acute and chronic respiratory failure with hypercapnia    Abnormal CXR - pneumonia cannot be ruled out at this time.    Closed comminuted intertrochanteric fracture of proximal end of right femur    Obesity (BMI 30-39.9)    Closed fracture of right hip    Right pleural effusion    Closed fracture of left hip    Aortic valve  stenosis    H/O ureterostomy    Idiopathic peripheral neuropathy    Memory impairment    Neoplasm of bladder    Primary osteoarthritis of both knees    Recurrent major depressive disorder, in partial remission    Stage 3 chronic kidney disease    Acute cystitis with hematuria    Subtherapeutic international normalized ratio (INR)    Acute on chronic diastolic congestive heart failure    Permanent atrial fibrillation     Past Medical History:   Diagnosis Date    A-fib     Aortic stenosis     Arthritis     Bradycardia     Cancer     bladder and skin    Cardiomegaly     CHF (congestive heart failure)     GERD (gastroesophageal reflux disease)     Hard of hearing     History of short term memory loss     Hypercalcemia     Hyperlipidemia     Hypertension     Hyperthyroidism 11/20/2017    Hypothyroidism     Kidney stone     Long term current use of anticoagulant therapy     Open wound     left lower extremity,    Palpitation     PONV (postoperative nausea and vomiting)     Shortness of breath     Sick sinus syndrome     Sleep apnea     CPAP    Stage 3 chronic kidney disease      Past Surgical History:   Procedure Laterality Date    AORTIC VALVE REPAIR/REPLACEMENT      BLADDER SURGERY      removed    CARDIAC CATHETERIZATION      CARDIAC CATHETERIZATION N/A 12/9/2016    Procedure: Left Heart Cath;  Surgeon: Elia Flores MD;  Location:  [facility]ATH [facility];  Service:     DISTAL FEMORAL NAILING Right 9/24/2020    Procedure: RIGHT SHORT TFN;  Surgeon: Betito Shetty MD;  Location:  [facility] OR;  Service: Orthopedics;  Laterality: Right;    HIP BIPOLAR REPLACEMENT Left     HYSTERECTOMY      ILEOSTOMY      JOINT REPLACEMENT      KIDNEY SURGERY      right kidney removed    NEPHRECTOMY RADICAL Right     from cancer    VARICOSE VEIN SURGERY Left 4/10/2017    Procedure: LEFT LOWER EXTREMITY VENOGRAM. LEFT SAPHENOUS VEIN RADIO FREQUENCY ABLATION;  Surgeon: Blake Martinez DO;  Location:  [facility] OR;  Service:      PT  Assessment (Last 12 Hours)       PT Evaluation and Treatment       [facility] Name 05/07/24 0859          Physical Therapy Time and Intention    Subjective Information complains of;pain  -     Document Type therapy note (daily note)  -     Mode of Treatment physical therapy  -[facility] Name 05/07/24 0859          General Information    Existing Precautions/Restrictions fall;oxygen therapy device and L/min  -     Limitations/Impairments hearing   -       Row Name 05/07/24 0859          Pain Scale: FACES Pre/Post-Treatment    Pain: FACES Scale, Pretreatment 2-->hurts little bit  -     Posttreatment Pain Rating 6-->hurts even more  -     Pain Location generalized  -     Pain Location - back  -     Pre/Posttreatment Pain Comment yelling out in pain with back pain, also touch and movement of L UE and LE.  -[facility] Name 05/07/24 0859          Bed Mobility    Scooting/Bridging [place] (Bed Mobility) dependent (less than 25% patient effort)  -     Supine-Sit [place] (Bed Mobility) verbal cues;moderate assist (50% patient effort)  -     Sit-Supine [place] (Bed Mobility) verbal cues;moderate assist (50% patient effort)  -     Bed Mobility, Safety Issues decreased use of legs for bridging/pushing;decreased use of arms for pushing/pulling  -     Assistive Device (Bed Mobility) head of bed elevated;draw sheet;bed rails  -[facility] Name 05/07/24 0859          Transfers    Comment, (Transfers) pt declined to attempt to stand despite encouragement.  -       Row Name 05/07/24 0859          Balance    Comment, Balance Pt. progressed from Mod assist for sitting balance at first to SBA. Pt. worked on sitting balance with UE movement while eating breakfast and bringing food/utensils to mouth. Pt. using R UE only at first, but then able to hold cup in left hand. Kyphotic posture in sitting.  -       Row Name 05/07/24 0859          Hip (Therapeutic Exercise)    Hip (Therapeutic Exercise) AROM  (active range of motion)  -     Hip AROM (Therapeutic Exercise) bilateral;flexion;sitting;10 repetitions  -       Row Name 05/07/24 0859          Knee (Therapeutic Exercise)    Knee (Therapeutic Exercise) AROM (active range of motion)  -     Knee AROM (Therapeutic Exercise) bilateral;LAQ (long arc quad);sitting;15 repititions  -       Row Name 05/07/24 0859          Ankle (Therapeutic Exercise)    Ankle (Therapeutic Exercise) AROM (active range of motion)  -     Ankle AROM (Therapeutic Exercise) bilateral;dorsiflexion;sitting;15 repititions  -       Row Name             Wound 05/02/24 2143 Left lower calf    Wound - Properties Group Placement Date: 05/02/24  -AK Placement Time: 2143  -AK Present on Original Admission: Y  -AK Side: Left  -AK Orientation: lower  -AK Location: calf  -AK    Retired Wound - Properties Group Placement Date: 05/02/24  -AK Placement Time: 2143  -AK Present on Original Admission: Y  -AK Side: Left  -AK Orientation: lower  -AK Location: calf  -AK    Retired Wound - Properties Group Date first assessed: 05/02/24  -AK Time first assessed: 2143  -AK Present on Original Admission: Y  -AK Side: Left  -AK Location: calf  -AK      Row Name             Wound 05/03/24 0025 Bilateral lower sacral spine Pressure Injury    Wound - Properties Group Placement Date: 05/03/24  -AK Placement Time: 0025  -AK Present on Original Admission: Y  -AK Side: Bilateral  -AK Orientation: lower  -AK Location: sacral spine  -AK Primary Wound Type: Pressure inj  -AK    Retired Wound - Properties Group Placement Date: 05/03/24  -AK Placement Time: 0025  -AK Present on Original Admission: Y  -AK Side: Bilateral  -AK Orientation: lower  -AK Location: sacral spine  -AK Primary Wound Type: Pressure inj  -AK    Retired Wound - Properties Group Date first assessed: 05/03/24  -AK Time first assessed: 0025  -AK Present on Original Admission: Y  -AK Side: Bilateral  -AK Location: sacral spine  -AK Primary Wound Type:  Pressure inj  -AK      Row Name             Wound 05/03/24 0029 Bilateral lower breast    Wound - Properties Group Placement Date: 05/03/24  -AK Placement Time: 0029 -AK Side: Bilateral  -AK Orientation: lower  -AK Location: breast  -AK    Retired Wound - Properties Group Placement Date: 05/03/24  -AK Placement Time: 0029 -AK Side: Bilateral  -AK Orientation: lower  -AK Location: breast  -AK    Retired Wound - Properties Group Date first assessed: 05/03/24  -AK Time first assessed: 0029 -AK Side: Bilateral  -AK Location: breast  -AK      Row Name 05/07/24 0859          Plan of Care Review    Plan of Care Reviewed With patient  -MF     Progress no change  -     Outcome Evaluation Pt. agreeable to therapy with encouragement. She needed MOD assist for bed mobility. She began with needing MOD assist for sitting balance, but progressed to CGA. Pt. worked on sitting balance while eating breakfast EOB and performed LE exercises. She declined to attempt to stand despite encouragement this morning. Pt. is very [place], which makes following directions difficult at times. She continues to c/o back pain as well as Left UE and LE pain with movement. SHe would benefit from further therapy prior to returning home.  -       Row Name 05/07/24 0859          Positioning and Restraints    Pre-Treatment Position in bed  -     Post Treatment Position bed  -MF     In Bed fowlers;call light within reach;encouraged to call for assist;exit alarm on;with nsg;side rails up x2;RUE elevated;LUE elevated;heels elevated  -               User Key  (r) = Recorded By, (t) = Taken By, (c) = Cosigned By      Initials Name Provider Type    Pebbles Islas PTA Physical Therapist Assistant    Ariana Castillo, RN Registered Nurse                    Physical Therapy Education       Title: PT OT SLP Therapies (In Progress)       Topic: Physical Therapy (Done)       Point: Mobility training (Done)       Learning Progress Summary              Family Acceptance, E, VU by FIDELIA at 5/6/2024 1034    Comment: benefits of activity, progression of PT                         Point: Home exercise program (Done)       Learning Progress Summary             Patient Acceptance, E,TB, VU by SANDRA at 5/7/2024 0301                         Point: Body mechanics (Done)       Learning Progress Summary             Patient Acceptance, E,TB, VU by SANDRA at 5/7/2024 0301                         Point: Precautions (Done)       Learning Progress Summary             Patient Acceptance, E,TB, VU by SANDRA at 5/7/2024 0301                                         User Key       Initials Effective Dates Name Provider Type Discipline    FIDELIA 02/03/23 -  Pancho Huertas, PT DPT Physical Therapist PT    SANDRA 02/02/24 -  Patricia Thomas, RN Registered Nurse Nurse                  PT Recommendation and Plan     Plan of Care Reviewed With: patient  Progress: no change  Outcome Evaluation: Pt. agreeable to therapy with encouragement. She needed MOD assist for bed mobility. She began with needing MOD assist for sitting balance, but progressed to CGA. Pt. worked on sitting balance while eating breakfast EOB and performed LE exercises. She declined to attempt to stand despite encouragement this morning. Pt. is very [place], which makes following directions difficult at times. She continues to c/o back pain as well as Left UE and LE pain with movement. SHe would benefit from further therapy prior to returning home.   Outcome Measures       Row Name 05/07/24 0859 05/06/24 1400          How much help from another person do you currently need...    Turning from your back to your side while in flat bed without using bedrails? 3  -MF --     Moving from lying on back to sitting on the side of a flat bed without bedrails? 2  -MF --     Moving to and from a bed to a chair (including a wheelchair)? 1  -MF --     Standing up from a chair using your arms (e.g., wheelchair, bedside chair)? 2  -MF --     Climbing 3-5 steps  with a railing? 1  -MF --     To walk in hospital room? 1  -MF --     AM-PAC 6 Clicks Score (PT) 10  -MF --     Highest Level of Mobility Goal 4 --> Transfer to chair/commode  -MF --        How much help from another is currently needed...    Putting on and taking off regular lower body clothing? -- 1  -LS     Bathing (including washing, rinsing, and drying) -- 2  -LS     Toileting (which includes using toilet bed pan or urinal) -- 1  -LS     Putting on and taking off regular upper body clothing -- 2  -LS     Taking care of personal grooming (such as brushing teeth) -- 2  -LS     Eating meals -- 2  -LS     AM-PAC 6 Clicks Score (OT) -- 10  -LS        Functional Assessment    Outcome Measure Options AM-PAC 6 Clicks Basic Mobility (PT)  -MF AM-PAC 6 Clicks Daily Activity (OT)  -LS               User Key  (r) = Recorded By, (t) = Taken By, (c) = Cosigned By      Initials Name Provider Type    Pebbles Islas PTA Physical Therapist Assistant    Melanie Arciniega COTA Occupational Therapist Assistant                     Time Calculation:    PT Charges       Row Name 05/07/24 1001             Time Calculation    Start Time 0859  -      Stop Time 0954  -      Time Calculation (min) 55 min  -      PT Received On 05/07/24  -         Time Calculation- PT    Total Timed Code Minutes- PT 55 minute(s)  -         Timed Charges    80981 - PT Therapeutic Activity Minutes 55  -MF         Total Minutes    Timed Charges Total Minutes 55  -MF       Total Minutes 55  -MF                User Key  (r) = Recorded By, (t) = Taken By, (c) = Cosigned By      Initials Name Provider Type    Pebbles Islas PTA Physical Therapist Assistant                  Therapy Charges for Today       Code Description Service Date Service Provider Modifiers Qty    [id number]  PT THERAPEUTIC ACT EA 15 MIN 5/7/2024 Pebbles Garcia PTA GP 4            PT G-Codes  Outcome Measure Options: AM-PAC 6 Clicks Basic Mobility  (PT)  AM-PAC 6 Clicks Score (PT): 10  AM-PAC 6 Clicks Score (OT): 10    Pebbles Garcia, PTA  5/7/2024

## 2024-05-07 NOTE — PROGRESS NOTES
"Pharmacy Dosing Service  Anticoagulant  Enoxaparin    Assessment/Action/Plan:  Pharmacy to dose Enoxaparin for atrial fibrillation. Previously on warfarin that was placed on HOLD this AM (last dose of 5 mg received on 5/6/24 at 18:17. Labs/dose reviewed. INR is still therapeutic at 2.72. Will initiated Enoxaparin 100 mg (1mg/kg) SQ every 12 hours once INR falls below 2. Pharmacy will continue to monitor renal function and adjust dose accordingly.     Subjective:  Jarvis Morgan is a 79 y.o. female  Objective:  [Ht: 175.3 cm (69\"); Wt: 96.3 kg (212 lb 4.8 oz); BMI: Body mass index is 31.35 kg/m².]  Estimated Creatinine Clearance: 34.8 mL/min (A) (by C-G formula based on SCr of 1.59 mg/dL (H)).   Lab Results   Component Value Date    INR 2.72 (H) 05/07/2024    INR 2.48 (H) 05/06/2024    INR 2.80 (H) 05/05/2024    PROTIME 29.9 (H) 05/07/2024    PROTIME 27.8 (H) 05/06/2024    PROTIME 30.6 (H) 05/05/2024      Lab Results   Component Value Date    HGB 14.1 05/06/2024    HGB 12.6 05/05/2024    HGB 13.9 05/04/2024      Lab Results   Component Value Date     05/06/2024     05/05/2024     05/04/2024         Ronak Meeks, PharmJAVAD  05/07/24 07:50 CDT     "

## 2024-05-07 NOTE — PLAN OF CARE
Goal Outcome Evaluation:  Plan of Care Reviewed With: patient        Progress: no change  Outcome Evaluation: Pt. agreeable to therapy with encouragement. She needed MOD assist for bed mobility. She began with needing MOD assist for sitting balance, but progressed to CGA. Pt. worked on sitting balance while eating breakfast EOB and performed LE exercises. She declined to attempt to stand despite encouragement this morning. Pt. is very [place], which makes following directions difficult at times. She continues to c/o back pain as well as Left UE and LE pain with movement. SHe would benefit from further therapy prior to returning home.

## 2024-05-07 NOTE — CONSULTS
[facility]  INPATIENT WOUND & OSTOMY CONSULTATION    Today's Date: 05/07/24    Patient Name: Jarvis Morgan  MRN: [id number]  CSN: [id number]  PCP: Mary Beth Izquierdo APRN  Referring Provider:   Consulting Provider (From admission, onward)      Start Ordered     Status Ordering Provider    05/03/24 0310 05/03/24 0311  Inpatient Wound Care MD Consult  Once        Specialty:  Wound Care  Provider:  Macarena Mar APRN    Acknowledged GRACIELA KLEIN           Attending Provider: Dylon Baig*  Length of Stay: 5    SUBJECTIVE   Chief Complaint: Flares of skin breakdown    HPI: Jarvis Morgan, a 79 y.o.female, presents with a past medical history of A-fib, hyperlipidemia, hypertension, cardiomegaly, hypothyroidism, bladder and skin cancer, congestive heart failure, chronic kidney disease.  A full past medical history as listed below.  Inpatient wound care consulted due to multiple areas of skin breakdown. Currently skin under breasts and left calf are improved from admission. Buttocks has skin breakdown across sacral region and bilateral buttocks with one area down to subcutaneous tissue on the right lateral sacrum. This is a stage 3 pressure injury. Wound was present on admission.       Visit Dx:    ICD-10-CM ICD-9-CM   1. Acute respiratory failure with hypoxia and hypercapnia  J96.01 518.81    J96.02    2. Acute on chronic congestive heart failure, unspecified heart failure type  I50.9 428.0   3. Pleural effusion on right  J90 511.9   4. Chronic renal impairment, unspecified CKD stage  N18.9 585.9   5. Longstanding persistent atrial fibrillation  I48.11 427.31   6. Impaired mobility [Z74.09]  Z74.09 799.89   7. Decreased activities of daily living (ADL)  Z78.9 V49.89       Hospital Problem List:     Acute on chronic diastolic congestive heart failure    Essential hypertension    ASA (obstructive sleep apnea)    Hyperthyroidism    Acute respiratory failure with hypoxia and hypercapnia     S/P TAVR (transcatheter aortic valve replacement)    Right pleural effusion    Stage 3 chronic kidney disease    Permanent atrial fibrillation      History:   Past Medical History:   Diagnosis Date    A-fib     Aortic stenosis     Arthritis     Bradycardia     Cancer     bladder and skin    Cardiomegaly     CHF (congestive heart failure)     GERD (gastroesophageal reflux disease)     Hard of hearing     History of short term memory loss     Hypercalcemia     Hyperlipidemia     Hypertension     Hyperthyroidism 11/20/2017    Hypothyroidism     Kidney stone     Long term current use of anticoagulant therapy     Open wound     left lower extremity,    Palpitation     PONV (postoperative nausea and vomiting)     Shortness of breath     Sick sinus syndrome     Sleep apnea     CPAP    Stage 3 chronic kidney disease      Past Surgical History:   Procedure Laterality Date    AORTIC VALVE REPAIR/REPLACEMENT      BLADDER SURGERY      removed    CARDIAC CATHETERIZATION      CARDIAC CATHETERIZATION N/A 12/9/2016    Procedure: Left Heart Cath;  Surgeon: Elia Flores MD;  Location:  [facility] CATH INVASIVE LOCATION;  Service:     DISTAL FEMORAL NAILING Right 9/24/2020    Procedure: RIGHT SHORT TFN;  Surgeon: Betito hSetty MD;  Location:  [facility] OR;  Service: Orthopedics;  Laterality: Right;    HIP BIPOLAR REPLACEMENT Left     HYSTERECTOMY      ILEOSTOMY      JOINT REPLACEMENT      KIDNEY SURGERY      right kidney removed    NEPHRECTOMY RADICAL Right     from cancer    VARICOSE VEIN SURGERY Left 4/10/2017    Procedure: LEFT LOWER EXTREMITY VENOGRAM. LEFT SAPHENOUS VEIN RADIO FREQUENCY ABLATION;  Surgeon: Blake Martinez DO;  Location:  [facility] OR;  Service:      Social History     Socioeconomic History    Marital status:    Tobacco Use    Smoking status: Never    Smokeless tobacco: Never   Vaping Use    Vaping status: Never Used   Substance and Sexual Activity    Alcohol use: No    Drug use: No    Sexual activity: Defer      Family History   Problem Relation Age of Onset    Heart failure Mother     Heart disease Father     Stroke Father     Hypertension Sister     Heart failure Sister     No Known Problems Brother     No Known Problems Maternal Grandmother     No Known Problems Maternal Grandfather     No Known Problems Paternal Grandmother     No Known Problems Paternal Grandfather     Hypertension Sister     Heart failure Sister     Hypertension Sister     Heart failure Sister     Hypertension Sister     Heart failure Sister     Hypertension Sister     Heart failure Sister     Hypertension Sister     Heart failure Sister     Gallbladder disease Brother     COPD Daughter     Asthma Daughter     Diabetes Son     No Known Problems Son     Autism Son        Allergies:  Allergies   Allergen Reactions    Ciprofloxacin Itching    Codeine Itching and GI Intolerance    Definity [Perflutren Lipid Microsphere] Other (See Comments)     Back pain    Tetanus Toxoids Itching     Itching around site    Tizanidine Hcl Itching    Other Itching     Cloth bandaids , causes redness of skin       Medications:    Current Facility-Administered Medications:     acetaminophen (TYLENOL) tablet 650 mg, 650 mg, Oral, Q6H PRN, Los Vázquez MD    atorvastatin (LIPITOR) tablet 40 mg, 40 mg, Oral, Nightly, Los Vázquez MD, 40 mg at 05/06/24 2212    sennosides-docusate (PERICOLACE) 8.6-50 MG per tablet 2 tablet, 2 tablet, Oral, BID PRN **AND** polyethylene glycol (MIRALAX) packet 17 g, 17 g, Oral, Daily PRN **AND** bisacodyl (DULCOLAX) EC tablet 5 mg, 5 mg, Oral, Daily PRN **AND** bisacodyl (DULCOLAX) suppository 10 mg, 10 mg, Rectal, Daily PRN, Los Vázquez MD    calcitriol (ROCALTROL) capsule 0.25 mcg, 0.25 mcg, Oral, Daily, Los Vázquez MD, 0.25 mcg at 05/07/24 0961    Calcium Replacement - Follow Nurse / BPA Driven Protocol, , Does not apply, PRN, Los Vázquez MD    cefTRIAXone (ROCEPHIN) 2,000 mg in sodium chloride 0.9 % 100 mL MBP,  2,000 mg, Intravenous, Q24H, Los Vázquez MD, Last Rate: 200 mL/hr at 05/06/24 1620, 2,000 mg at 05/06/24 1620    dilTIAZem CD (CARDIZEM CD) 24 hr capsule 180 mg, 180 mg, Oral, Q24H, Los Vázquez MD, 180 mg at 05/07/24 0945    donepezil (ARICEPT) tablet 5 mg, 5 mg, Oral, Nightly, Los Vázquez MD, 5 mg at 05/06/24 2216    [START ON 5/8/2024] Enoxaparin Sodium (LOVENOX) syringe 100 mg, 1 mg/kg, Subcutaneous, Q12H, Dylon Baig MD    escitalopram (LEXAPRO) tablet 10 mg, 10 mg, Oral, Daily, Los Vázquez MD, 10 mg at 05/07/24 0945    furosemide (LASIX) tablet 40 mg, 40 mg, Oral, BID, Dylon Baig MD, 40 mg at 05/07/24 0945    ipratropium-albuterol (DUO-NEB) nebulizer solution 3 mL, 3 mL, Nebulization, 4x Daily - RT, Los Vázquez MD, 3 mL at 05/07/24 1354    Magnesium Standard Dose Replacement - Follow Nurse / BPA Driven Protocol, , Does not apply, PRN, Los Vázquez MD    methIMAzole (TAPAZOLE) tablet 10 mg, 10 mg, Oral, Daily, Los Vázquez MD, 10 mg at 05/07/24 0946    metoprolol tartrate (LOPRESSOR) tablet 25 mg, 25 mg, Oral, Q12H, Los Vázquez MD, 25 mg at 05/07/24 0945    nitroglycerin (NITROSTAT) SL tablet 0.4 mg, 0.4 mg, Sublingual, Q5 Min PRN, Los Vázquez MD    nystatin (MYCOSTATIN) powder, , Topical, Q12H, [facility], Femi, DO, Given at 05/07/24 0946    oxyCODONE-acetaminophen (PERCOCET) 5-325 MG per tablet 1 tablet, 1 tablet, Oral, Q6H PRN, Los Vázquez MD, 1 tablet at 05/04/24 0017    Pharmacy to Dose enoxaparin (LOVENOX), , Does not apply, Continuous PRN, Dylon Baig MD    Phosphorus Replacement - Follow Nurse / BPA Driven Protocol, , Does not apply, PRN, Los Vázquez MD    Potassium Replacement - Follow Nurse / BPA Driven Protocol, , Does not apply, PRCHAPARRO, Los Vázquez MD    QUEtiapine (SEROquel) tablet 25 mg, 25 mg, Oral, Nightly, Los Vázquez MD, 25 mg at 05/06/24 8824    [COMPLETED] Insert Peripheral  IV, , , Once **AND** sodium chloride 0.9 % flush 10 mL, 10 mL, Intravenous, PRN, Los Vázquez MD    sodium chloride 0.9 % flush 10 mL, 10 mL, Intravenous, Q12H, Los Vázquez MD, 10 mL at 05/07/24 0946    sodium chloride 0.9 % infusion 40 mL, 40 mL, Intravenous, PRN, Los Vázquez MD    [Held by provider] warfarin (COUMADIN) tablet 5 mg, 5 mg, Oral, Once per day on Monday Wednesday Friday Saturday, Los Vázquez MD, 5 mg at 05/06/24 1817    [Held by provider] warfarin (COUMADIN) tablet 7.5 mg, 7.5 mg, Oral, Once per day on Sunday Tuesday Thursday, Los Vázquez MD, 7.5 mg at 05/05/24 1746    OBJECTIVE     Vitals:    05/07/24 1354   BP:    Pulse: 72   Resp: 24   Temp:    SpO2: 90%       PHYSICAL EXAM:   Physical Exam  Vitals and nursing note reviewed.   Constitutional:       Appearance: She is obese. She is ill-appearing.      Comments: Body mass index is 32.78 kg/m².    HENT:      Head: Normocephalic and atraumatic.   Eyes:      General: Lids are normal. Gaze aligned appropriately.   Cardiovascular:      Rate and Rhythm: Normal rate and regular rhythm.   Pulmonary:      Effort: Pulmonary effort is normal. No respiratory distress.   Abdominal:      General: The ostomy site is clean. There is no distension.      Palpations: Abdomen is soft.      Comments: Urostomy present of right lower quadrant.  Clean dry intact appliance in place.  Appliance is connected to bedside drainage.   Musculoskeletal:      Cervical back: Normal range of motion and neck supple.   Skin:     General: Skin is warm and dry.      Findings: Erythema and wound present.      Comments: Stage 3 pressure injury of sacral region.  Slough and subcutaneous tissue in wound bed.  Irregular edges attached to wound bed.  Small amount of serous drainage.  Periwound area is erythemic but blanchable.  Small amount of erythema of skin folds which is greatly improved from pictures taken on admission.  No open broken skin.  No significant  denudation present.   Neurological:      Mental Status: She is lethargic and disoriented.      Motor: Weakness present.   Psychiatric:         Attention and Perception: She is inattentive.         Speech: Speech is slurred.         Behavior: Behavior is cooperative.        Results Review:  Lab Results (last 48 hours)       Procedure Component Value Units Date/Time    Blood Culture - Blood, Arm, Left [[id number]]  (Normal) Collected: 05/02/24 1210    Specimen: Blood from Arm, Left Updated: 05/07/24 1315     Blood Culture No growth at 5 days    Blood Culture - Blood, Arm, Right [[id number]]  (Normal) Collected: 05/02/24 1149    Specimen: Blood from Arm, Right Updated: 05/07/24 1315     Blood Culture No growth at 5 days    Protime-INR [[id number]]  (Abnormal) Collected: 05/07/24 0420    Specimen: Blood Updated: 05/07/24 0515     Protime 29.9 Seconds      INR 2.72    Urine Culture - Urine, Indwelling Urethral Catheter [[id number]]  (Abnormal)  (Susceptibility) Collected: 05/04/24 2050    Specimen: Urine from Indwelling Urethral Catheter Updated: 05/06/24 1121     Urine Culture >100,000 CFU/mL Proteus mirabilis    Narrative:      Colonization of the urinary tract without infection is common. Treatment is discouraged unless the patient is symptomatic, pregnant, or undergoing an invasive urologic procedure.    Susceptibility        Proteus mirabilis      INDIO      Amoxicillin + Clavulanate Susceptible      Ampicillin Susceptible      Ampicillin + Sulbactam Susceptible      Cefazolin Susceptible      Cefepime Susceptible      Ceftazidime Susceptible      Ceftriaxone Susceptible      Gentamicin Susceptible      Levofloxacin Susceptible      Nitrofurantoin Resistant      Piperacillin + Tazobactam Susceptible      Trimethoprim + Sulfamethoxazole Susceptible                           Basic Metabolic Panel [[id number]]  (Abnormal) Collected: 05/06/24 0425    Specimen: Blood Updated: 05/06/24 0547     Glucose 111 mg/dL      BUN 56  mg/dL      Creatinine 1.59 mg/dL      Sodium 142 mmol/L      Potassium 4.9 mmol/L      Chloride 103 mmol/L      CO2 33.0 mmol/L      Calcium 11.1 mg/dL      BUN/Creatinine Ratio 35.2     Anion Gap 6.0 mmol/L      eGFR 32.7 mL/min/1.73     Narrative:      GFR Normal >60  Chronic Kidney Disease <60  Kidney Failure <15    The GFR formula is only valid for adults with stable renal function between ages 18 and 70.    Protime-INR [[id number]]  (Abnormal) Collected: 05/06/24 0425    Specimen: Blood Updated: 05/06/24 0544     Protime 27.8 Seconds      INR 2.48    CBC & Differential [[id number]]  (Abnormal) Collected: 05/06/24 0425    Specimen: Blood Updated: 05/06/24 0529    Narrative:      The following orders were created for panel order CBC & Differential.  Procedure                               Abnormality         Status                     ---------                               -----------         ------                     CBC Auto Differential[[id number]]        Abnormal            Final result                 Please view results for these tests on the individual orders.    CBC Auto Differential [[id number]]  (Abnormal) Collected: 05/06/24 0425    Specimen: Blood Updated: 05/06/24 0529     WBC 8.10 10*3/mm3      RBC 4.46 10*6/mm3      Hemoglobin 14.1 g/dL      Hematocrit 45.7 %      .5 fL      MCH 31.6 pg      MCHC 30.9 g/dL      RDW 13.5 %      RDW-SD 51.7 fl      MPV 10.9 fL      Platelets 169 10*3/mm3      Neutrophil % 74.9 %      Lymphocyte % 10.2 %      Monocyte % 13.8 %      Eosinophil % 0.7 %      Basophil % 0.2 %      Immature Grans % 0.2 %      Neutrophils, Absolute 6.05 10*3/mm3      Lymphocytes, Absolute 0.83 10*3/mm3      Monocytes, Absolute 1.12 10*3/mm3      Eosinophils, Absolute 0.06 10*3/mm3      Basophils, Absolute 0.02 10*3/mm3      Immature Grans, Absolute 0.02 10*3/mm3      nRBC 0.0 /100 WBC           Imaging Results (Last 72 Hours)       Procedure Component Value Units Date/Time    XR Chest  1 View [[id number]] Collected: 05/06/24 1427     Updated: 05/06/24 1433    Narrative:      EXAMINATION:  XR CHEST 1 VW-  5/6/2024 1:24 PM     HISTORY: Hypoxia. J96.01-Acute respiratory failure with hypoxia;  J96.02-Acute respiratory failure with hypercapnia; I50.9-Heart failure,  unspecified; P28-[place] effusion, not elsewhere classified;  N18.9-Chronic kidney disease, unspecified; I48.11-Longstanding  persistent atrial fibrillation; Z74.09-Other reduced mobility.     COMPARISON: 5/3/2024.     TECHNIQUE: Single view AP image.     FINDINGS: There is a large pleural effusion on the right. There is small  to moderate pleural effusion on the left. There is at least passive  atelectasis in both lungs. There is some shifting of the heart to the  left. The trachea deviates to the left. The cardiac silhouette is  predominantly obscured. There has been prior TAVR aortic valve  replacement. There are degenerative changes of the spine.          Impression:      1. Large right pleural effusion with at least passive atelectasis of the  right lung.  2. Small to moderate left pleural effusion with at least passive  atelectasis of the left lung.  3. Shifting of the heart and mediastinum towards the left could be  related to some degree of rotation.  4. Prior TAVR aortic valve replacement. Heart size cannot be evaluated  due to opacities obscuring the heart borders.           This report was signed and finalized on 5/6/2024 2:30 PM by Dr. Josh Robledo MD.                  ASSESSMENT/PLAN       Examination and evaluation of wound(s) was performed.    DIAGNOSIS:   Pressure injury of sacral region, stage 3  Moisture associated skin damage  Impaired mobility and ADLs  Bowel incontinence  Urostomy present    Acute on chronic diastolic congestive heart failure    Essential hypertension    ASA (obstructive sleep apnea)    Hyperthyroidism    Acute respiratory failure with hypoxia and hypercapnia    S/P TAVR (transcatheter aortic valve  replacement)    Right pleural effusion    Stage 3 chronic kidney disease    Permanent atrial fibrillation       PLAN:   Orders placed for wound care and pressure/moisture management as listed below.       Start     Ordered    05/07/24 1405  Assess Stoma  Every Shift       05/07/24 1405    05/07/24 1405  Stoma Care - Change Appliance  Per Order Details        Comments: Change every 3 to 5 days.    05/07/24 1405    05/07/24 1404  Wound Care Pressure Injury  Daily      Question Answer Comment   Wound Locations Sacrum    Wound Care Instructions Clean with NS.  Apply OPticell Ag to open wounds. Cover with silicone border foam dressing. Change daily.        05/07/24 1403    05/07/24 1402  Turn Patient  Now Then Every 2 Hours         05/07/24 1403    05/07/24 1402  Elevate Heels Off of Bed  Until Discontinued         05/07/24 1403    05/07/24 1402  Use Seat Cushion When Up In Chair  Continuous         05/07/24 1403    05/07/24 1402  Silicone Border Dressing to Bony Prominences  Every Shift       05/07/24 1403    05/07/24 1402  Do NOT Rub or Massage Any Bony Prominence  Continuous         05/07/24 1403    05/07/24 1402  Specialty Bed Dolphin FIS Mattress  Once        Comments: For Dolphin FIS Mattress, call [employer] to order a [employer] bed frame.  If bariatric/bariatric dolphin, [employer] provides frame, mattress, pump, and trapeze (if needed).  Nurse or HUC is to call [employer], the rental company, to order the equipment, provide patient's name, room number, and if in isolation. [phone].   Question:  Specialty Bed needed  Answer:  Dolphin FIS Mattress    05/07/24 1403    Unscheduled  Wound Care  As Needed      Comments: Avoid open wounds of sacrum   Question:  Wound Care Instructions  Answer:  Apply Moisture Barrier After Any Incontinence    05/07/24 1403    Unscheduled  Change Pouch Immediately if Leaking  As Needed       05/07/24 1405    Unscheduled  Empty Pouch As Needed  As Needed       05/07/24 1405                      This document has been electronically signed by IRINEO Gomez on 5/7/2024 14:00 CDT

## 2024-05-07 NOTE — PLAN OF CARE
Goal Outcome Evaluation:                Outcome Evaluation: Pt on bipap at night with sats within normal limits Pt AF 58-88 BBB on tele. Pt slept well throughout the night. Pt complains of no pain. Pt turned Q2H.

## 2024-05-08 NOTE — PROGRESS NOTES
RT EQUIPMENT DEVICE RELATED - SKIN ASSESSMENT    Deven Score:  Deven Score: 12     RT Medical Equipment/Device:     NIV Mask:  Under-the-nose   size:  B    Skin Assessment:       :  Intact    Device Skin Pressure Protection:  Pressure points protected    Nurse Notification:  Darcie Michael, RRT

## 2024-05-08 NOTE — DISCHARGE SUMMARY
[facility] Medicine Services  DISCHARGE SUMMARY       Date of Admission: 5/2/2024  Date of Discharge:  5/8/2024  Primary Care Physician: Mary Beth Izquierdo APRN    Presenting Problem/History of Present Illness:   shortness of air     Final Discharge Diagnoses:  Acute hypoxic and hypercarbic respiratory failure  Acute on chronic diastolic heart failure  Moderate pulmonary hypertension  Bilateral pleural effusion right greater than left  Stage III chronic kidney disease  History of TAVR  Hyperthyroidism  Obstructive sleep apnea noncompliant with CPAP  Permanent atrial fibrillation  Fluid retention lower extremity probably related to right-sided heart failure with pulmonary hypertension  Proteus mirabilis urinary tract infection  Probable underlying dementia  Possible underlying peripheral arterial disease.  Hypertension  Palliative care/comfort care patient    Consults:   Cardiology   Pulmonary     Procedures Performed: none    Pertinent Test Results:   Results for orders placed during the hospital encounter of 05/02/24    Adult Transthoracic Echo Complete w/ Color, Spectral and Contrast if Necessary Per Protocol    Interpretation Summary    Atrial fibrillation was the predominant rhythm observed during the procedure.    Left ventricular systolic function is normal. Left ventricular ejection fraction appears to be 61 - 65%.    The right ventricular cavity is moderate to severely dilated. Moderately reduced right ventricular systolic function noted.    The left atrial cavity is moderately dilated    The aortic valve exhibits sclerosis. There is moderate calcification of the aortic valve. Trace aortic valve regurgitation is present. Moderate aortic valve stenosis is present    Moderate mitral annular calcification is present. Mild mitral valve regurgitation is present.    Tricuspid annular calcification is present. Moderate tricuspid valve regurgitation is present    Moderate  pulmonary hypertension is present.    The inferior vena cava is dilated. IVC inspiratory collapse is absent.    There is a small (<1cm) pericardial effusion. There is no evidence of cardiac tamponade.      Imaging Results (All)       Procedure Component Value Units Date/Time    XR Chest 1 View [[id number]] Collected: 05/06/24 1427     Updated: 05/06/24 1433    Narrative:      EXAMINATION:  XR CHEST 1 VW-  5/6/2024 1:24 PM     HISTORY: Hypoxia. J96.01-Acute respiratory failure with hypoxia;  J96.02-Acute respiratory failure with hypercapnia; I50.9-Heart failure,  unspecified; D54-Lxuztxf effusion, not elsewhere classified;  N18.9-Chronic kidney disease, unspecified; I48.11-Longstanding  persistent atrial fibrillation; Z74.09-Other reduced mobility.     COMPARISON: 5/3/2024.     TECHNIQUE: Single view AP image.     FINDINGS: There is a large pleural effusion on the right. There is small  to moderate pleural effusion on the left. There is at least passive  atelectasis in both lungs. There is some shifting of the heart to the  left. The trachea deviates to the left. The cardiac silhouette is  predominantly obscured. There has been prior TAVR aortic valve  replacement. There are degenerative changes of the spine.          Impression:      1. Large right pleural effusion with at least passive atelectasis of the  right lung.  2. Small to moderate left pleural effusion with at least passive  atelectasis of the left lung.  3. Shifting of the heart and mediastinum towards the left could be  related to some degree of rotation.  4. Prior TAVR aortic valve replacement. Heart size cannot be evaluated  due to opacities obscuring the heart borders.           This report was signed and finalized on 5/6/2024 2:30 PM by Dr. Josh Robledo MD.       XR Chest 1 View [[id number]] Collected: 05/03/24 0727     Updated: 05/03/24 0731    Narrative:      EXAMINATION: XR CHEST 1 VW-     5/3/2024 2:51 AM     HISTORY: resp failure; J96.01-Acute  respiratory failure with hypoxia;  J96.02-Acute respiratory failure with hypercapnia; I50.9-Heart failure,  unspecified; B72-Iczalbf effusion, not elsewhere classified;  N18.9-Chronic kidney disease, unspecified; I48.11-Longstanding  persistent atrial fibrillation     1 view chest x-ray.     COMPARISON:  Yesterday at 11:56 a.m.     Bibasilar infiltrate and pleural fluid.     No pneumothorax.     Stable heart size.  TAVR heart valve noted.       Impression:      1. Similar appearance of bilateral infiltrate and pleural fluid. No  pneumothorax is seen.           This report was signed and finalized on 5/3/2024 7:28 AM by Dr. Gregorio Rick MD.       XR Chest 1 View [[id number]] Collected: 05/02/24 1202     Updated: 05/02/24 1207    Narrative:      XR CHEST 1 VW- 5/2/2024 10:59 AM     HISTORY: soa       COMPARISON: Chest x-ray dated 8/1/2022     FINDINGS:  Upright frontal radiograph of the chest was obtained     Bilateral patchy airspace opacities and there are bilateral layering  pleural effusions. Right-sided layering pleural effusion is large. No  pneumothorax identified. Cardiac silhouette is partially obscured by the  effusions. Central pulmonary vascular congestion. Previous TAVR. No  acute bony abnormality seen. Spinal scoliotic curvature.       Impression:      1.  Volume overload with bilateral pulmonary edema (mostly interstitial)  as well as bilateral layering pleural effusions. Right-sided pleural  effusion is large in size.     This report was signed and finalized on 5/2/2024 12:04 PM by Dr Lino Ramos.             LAB RESULTS:      Lab 05/07/24  0420 05/06/24  0425 05/05/24  0421 05/04/24  0835 05/04/24  0352 05/03/24  0526 05/02/24  1629 05/02/24  1140   WBC  --  8.10 7.39 8.34  --  6.46  --  8.23   HEMOGLOBIN  --  14.1 12.6 13.9  --  13.4  --  14.1   HEMATOCRIT  --  45.7 40.4 45.9  --  44.0  --  46.4   PLATELETS  --  169 157 163  --  154  --  171   NEUTROS ABS  --  6.05 5.89  --   --  5.04  --  6.77    IMMATURE GRANS (ABS)  --  0.02 0.02  --   --  0.03  --  0.03   LYMPHS ABS  --  0.83 0.50*  --   --  0.53*  --  0.66*   MONOS ABS  --  1.12* 0.94*  --   --  0.77  --  0.73   EOS ABS  --  0.06 0.03  --   --  0.06  --  0.02   MCV  --  102.5* 102.3* 103.8*  --  104.0*  --  103.8*   PROCALCITONIN  --   --   --   --   --   --   --  0.05   LACTATE  --   --   --   --   --   --   --  1.3   PROTIME 29.9* 27.8* 30.6*  --  30.7* 37.3*   < > 33.2*    < > = values in this interval not displayed.         Lab 05/06/24  0425 05/05/24  0421 05/04/24  0835 05/03/24  0526 05/02/24  1408 05/02/24  1241 05/02/24  1140   SODIUM 142 141 142 144  --   --  143   SODIUM, ARTERIAL  --   --   --   --  145   < >  --    POTASSIUM 4.9 4.7 5.0 4.4  --   --  4.8   CHLORIDE 103 102 103 105  --   --  104   CO2 33.0* 31.0* 32.0* 35.0*  --   --  32.0*   ANION GAP 6.0 8.0 7.0 4.0*  --   --  7.0   BUN 56* 53* 48* 41*  --   --  40*   CREATININE 1.59* 1.78* 1.91* 1.77*  --   --  1.89*   EGFR 32.7* 28.6* 26.3* 28.8*  --   --  26.6*   GLUCOSE 111* 88 139* 95  --   --  119*   CALCIUM 11.1* 10.5 10.5 10.4  --   --  10.8*   IONIZED CALCIUM  --   --   --   --  5.90*   < >  --    TSH  --   --   --  2.990  --   --   --     < > = values in this interval not displayed.         Lab 05/04/24  0835 05/02/24  1140   TOTAL PROTEIN 7.2 7.7   ALBUMIN 3.3* 3.8   GLOBULIN 3.9 3.9   ALT (SGPT) 10 12   AST (SGOT) 13 16   BILIRUBIN 0.6 0.5   ALK PHOS 77 79         Lab 05/07/24  0420 05/06/24  0425 05/05/24  0421 05/04/24  0352 05/03/24  0526 05/02/24  1629 05/02/24  1423 05/02/24  1140   PROBNP  --   --   --   --   --   --   --  1,132.0   HSTROP T  --   --   --   --   --   --  38* 38*   PROTIME 29.9* 27.8* 30.6* 30.7* 37.3*   < >  --  33.2*   INR 2.72* 2.48* 2.80* 2.81* 3.60*   < >  --  3.10*    < > = values in this interval not displayed.                 Lab 05/02/24  2041 05/02/24  1408 05/02/24  1241   PH, ARTERIAL 7.355 7.296* 7.275*   PCO2, ARTERIAL 59.2* 65.9* 68.6*    PO2 ART 78.9* 83.7 67.1*   O2 SATURATION ART 96.3 96.7 92.7*   FIO2 35 40  --    HCO3 ART 33.1* 32.1* 31.8*   BASE EXCESS ART 5.8* 3.6* 2.9*   CARBOXYHEMOGLOBIN  --  1.4 1.6     Brief Urine Lab Results  (Last result in the past 365 days)        Color   Clarity   Blood   Leuk Est   Nitrite   Protein   CREAT   Urine HCG        05/04/24 2050 Yellow   Clear   Large (3+)   Large (3+)   Negative   100 mg/dL (2+)                 Microbiology Results (last 10 days)       Procedure Component Value - Date/Time    Urine Culture - Urine, Indwelling Urethral Catheter [[id number]]  (Abnormal)  (Susceptibility) Collected: 05/04/24 2050    Lab Status: Final result Specimen: Urine from Indwelling Urethral Catheter Updated: 05/06/24 1121     Urine Culture >100,000 CFU/mL Proteus mirabilis    Narrative:      Colonization of the urinary tract without infection is common. Treatment is discouraged unless the patient is symptomatic, pregnant, or undergoing an invasive urologic procedure.    Susceptibility        Proteus mirabilis      INDIO      Amoxicillin + Clavulanate Susceptible      Ampicillin Susceptible      Ampicillin + Sulbactam Susceptible      Cefazolin Susceptible      Cefepime Susceptible      Ceftazidime Susceptible      Ceftriaxone Susceptible      Gentamicin Susceptible      Levofloxacin Susceptible      Nitrofurantoin Resistant      Piperacillin + Tazobactam Susceptible      Trimethoprim + Sulfamethoxazole Susceptible                           Blood Culture - Blood, Arm, Left [[id number]]  (Normal) Collected: 05/02/24 1210    Lab Status: Final result Specimen: Blood from Arm, Left Updated: 05/07/24 1315     Blood Culture No growth at 5 days    COVID-19 and FLU A/B PCR, 1 HR TAT - Swab, Nasopharynx [[id number]]  (Normal) Collected: 05/02/24 1157    Lab Status: Final result Specimen: Swab from Nasopharynx Updated: 05/02/24 1225     COVID19 Not Detected     Influenza A PCR Not Detected     Influenza B PCR Not Detected     Narrative:      Fact sheet for providers: https://www.fda.gov/media/715019/download    Fact sheet for patients: https://www.fda.gov/media/562784/download    Test performed by PCR.    Blood Culture - Blood, Arm, Right [[id number]]  (Normal) Collected: 05/02/24 1149    Lab Status: Final result Specimen: Blood from Arm, Right Updated: 05/07/24 1315     Blood Culture No growth at 5 days            Hospital Course:   Yesterday, I spoke to the daughter and informed her of overall situation including prognosis.  I also consulted palliative care.  She has been switched to comfort measures only.  Social service work with family and she has been accepted at [facility] under skilled/palliative.  I spoke to her daughter at bedside.  I spoke to palliative care nurse and social service.  Patient is cleared to go today.    I started seeing the patient on day 4 of hospitalization.  She is already being followed by cardiology and pulmonary medicine.  She presented with shortness of breath, worsening lower extremity edema and lethargy.  She was admitted with provisional diagnosis of  Acute hypercarbic respiratory failure  Acute decompensated heart failure  Atrial fibrillation with supratherapeutic INR  Chronic kidney disease stage III  History of obstructive sleep apnea noncompliant with CPAP at home    Pulmonary medicine's assessment includes:  Acute resp failure with hypoxia and hypercapnia requiring bipap, threat to life and bodily function with high risk of morbidity from additional diagnostic testing or treatment   Farooq essentially untreated.  No other pulmonary disease identified  Pulmonary edema and pleural effusions  Mildly elevated bnp of uncertain significance  Ckd 3  Hx tavr  Chronic anticoagulation status  Hyperthyroidism  scoliosis  The service signed off on day 4      Cardiologist's impression includes:  Acute on chronic diastolic heart failure  Acute on chronic respiratory failure with hypoxia and  "hypercapnia  Hypertension  Permanent atrial fibrillation with CHADS2 Vascor of 5 (age, female, CHF, hypertension)  Valvular heart disease status post TAVR in 2018 at [place]  CKD stage III  ASA  Mixed hyperlipidemia  Chronic anticoagulation for permanent atrial fibrillation and reported history of DVT.  Cardiology service signed off on May 6.  In their note, the service said that the patient has findings suggestive of pulmonary hypertension on echocardiogram with severely dilated right ventricular cavity..    I repeated the chest x-ray and noted large right pleural effusion with decreased passive atelectasis of the right lung and small to moderate left pleural effusion with at least passive atelectasis of the left lung.  I increased her diuretic.  I even considered thoracentesis but they decided toward comfort measures only.    Patient has CPAP at home however been noncompliant according to daughter.  She has not been wanting to use this.    Patient been on oxygen via nasal cannula 3 L with saturation in the low to mid 90s which in general would be reasonable for someone with pulmonary hypertension.  I mentioned to daughter could be life prolonging.  Medications meant for comfort been placed.  Discussed with BIBI Medina with palliative care regarding medications.  She recommends continuing Coumadin as per home medication.  I will defer to skilled nursing facility to revisit this with family and to monitor INR if continued.    I believe patient has met maximum inpatient hospital benefit and medically appropriate to be transferred to nursing facility under skilled/palliative care    Physical Exam on Discharge:  /58 (BP Location: Left arm, Patient Position: Lying)   Pulse 72   Temp 97.5 °F (36.4 °C) (Axillary)   Resp 18   Ht 175.3 cm (69\")   Wt 101 kg (222 lb)   LMP  (LMP Unknown)   SpO2 94%   BMI 32.78 kg/m²   Physical Exam  Daughter at bedside  Very limited insight  Hard of hearing  Now on nasal " cannula  HEENT: Normocephalic, atraumatic, pupils are equal reactive to light, and accommodate  Neck: Supple, no JVD, no carotid bruits  CVS: Regular rhythm   lungs:  diminished breath sounds at the bases  Abdomen: Soft, nontender, nondistended bowel sounds are present; ostomy present pink in color.  Ostomy present.  Extremities: Edema present bilaterally; hyperpigmented left shin.  Colder distal extremities left greater than right, diminished pulses dorsalis pedis  Neurologic: No focal deficits  Psychiatric: Normal affect  Condition on Discharge: fair    Discharge Disposition:  Skilled Nursing Facility (DC - External)    Discharge Medications:     Discharge Medications        New Medications        Instructions Start Date   diphenhydrAMINE 25 mg capsule  Commonly known as: BENADRYL   25 mg, Oral, Every 6 Hours PRN      LORazepam 0.5 MG tablet  Commonly known as: ATIVAN   0.5 mg, Oral, Every 1 Hour PRN      prochlorperazine 5 MG tablet  Commonly known as: COMPAZINE   5 mg, Oral, Every 6 Hours PRN             Changes to Medications        Instructions Start Date   furosemide 40 MG tablet  Commonly known as: LASIX  What changed: when to take this   40 mg, Oral, 2 Times Daily      oxyCODONE-acetaminophen 5-325 MG per tablet  Commonly known as: PERCOCET  What changed:   when to take this  reasons to take this   1 tablet, Oral, Every 4 Hours PRN      warfarin 7.5 MG tablet  Commonly known as: COUMADIN  What changed: additional instructions   7.5 mg, Oral, 3 Times Weekly, Take 10 mg Monday and Tuesday, then resume normal schedule with 5 mg on Wednesday      warfarin 5 MG tablet  Commonly known as: COUMADIN  What changed: Another medication with the same name was changed. Make sure you understand how and when to take each.   5 mg, Oral, 4 Times Weekly, Take 10 mg Monday and Tuesday, then resume normal schedule taking 5 mg Wednesday.             Continue These Medications        Instructions Start Date   acetaminophen 650  MG 8 hr tablet  Commonly known as: TYLENOL   650 mg, Oral, Every 8 Hours PRN      atorvastatin 40 MG tablet  Commonly known as: LIPITOR   40 mg, Oral, Nightly      calcitriol 0.25 MCG capsule  Commonly known as: ROCALTROL   0.25 mcg, Oral, Daily      dilTIAZem  MG 24 hr capsule  Commonly known as: CARDIZEM CD   180 mg, Oral, Every 24 Hours Scheduled      ipratropium-albuterol 0.5-2.5 mg/3 ml nebulizer  Commonly known as: DUO-NEB   3 mL, Nebulization, 4 Times Daily - RT      methIMAzole 10 MG tablet  Commonly known as: TAPAZOLE   10 mg, Oral, Take As Directed, Take every day except Fridays      metoprolol tartrate 50 MG tablet  Commonly known as: LOPRESSOR   25 mg, Oral, 2 Times Daily, Patient reports taking 25 mg bid       QUEtiapine 25 MG tablet  Commonly known as: SEROquel   25 mg, Oral, Nightly             Stop These Medications      HYDROcodone-acetaminophen 5-325 MG per tablet  Commonly known as: NORCO     silver sulfadiazine 1 % cream  Commonly known as: SILVADENE, SSD              This patient has current or prior documentation of an left ventricular ejection fraction (LVEF) of less than or equal to 40%. - not applicable    Results for orders placed during the hospital encounter of 05/02/24    Adult Transthoracic Echo Complete w/ Color, Spectral and Contrast if Necessary Per Protocol    Interpretation Summary    Atrial fibrillation was the predominant rhythm observed during the procedure.    Left ventricular systolic function is normal. Left ventricular ejection fraction appears to be 61 - 65%.    The right ventricular cavity is moderate to severely dilated. Moderately reduced right ventricular systolic function noted.    The left atrial cavity is moderately dilated    The aortic valve exhibits sclerosis. There is moderate calcification of the aortic valve. Trace aortic valve regurgitation is present. Moderate aortic valve stenosis is present    Moderate mitral annular calcification is present. Mild  mitral valve regurgitation is present.    Tricuspid annular calcification is present. Moderate tricuspid valve regurgitation is present    Moderate pulmonary hypertension is present.    The inferior vena cava is dilated. IVC inspiratory collapse is absent.    There is a small (<1cm) pericardial effusion. There is no evidence of cardiac tamponade.      - patient on comfort measure      Discharge Diet:   Diet Instructions       Diet: Cardiac Diets, Fluid Restriction (240 mL/tray) Diets; Healthy Heart (2-3 Na+); Thin (IDDSI 0); 1500 mL/day      Discharge Diet:  Cardiac Diets  Fluid Restriction (240 mL/tray) Diets       Cardiac Diet: Healthy Heart (2-3 Na+)    Fluid Consistency: Thin (IDDSI 0)    Fluid Restriction Diet (240 mL/tray): 1500 mL/day    As tolerated - comfort measure            Activity at Discharge:   Activity Instructions       Activity as Tolerated              Follow-up Appointments:   PCP through primary care provider at the skilled nursing facility    Test Results Pending at Discharge: None    Electronically signed by Dylon Baig MD, 05/08/24, 11:45 CDT.    Time: Greater than 30 minutes.

## 2024-05-08 NOTE — PROGRESS NOTES
[facility] Palliative Care Services    Palliative Care Daily Progress Note   Chief complaint-follow up goals of care/advanced care planning, support for patient/family, hospice referral/discussion, and comfort care    Code Status:   Code Status and Medical Interventions:   Ordered at: 05/02/24 1250     Level Of Support Discussed With:    Patient     Code Status (Patient has no pulse and is not breathing):    CPR (Attempt to Resuscitate)     Medical Interventions (Patient has pulse or is breathing):    Full Support      Advanced Directives: Advance Directive Status: Patient does not have advance directive   Goals of Care: Ongoing.     S: Medical record reviewed. Events noted.  Therapy discussed minimal progress has been made with therapy attempts this hospitalization, recommends SNF at discharge.  In bed without apparent distress.  Remains on BiPAP.  Patient's daughter, Lashon, niece-a hospice staff member, and family member at bedside. Due to the Palliative Care Topics Discussed: palliative care, goals of care, care options, resuscitation status, [facility], and discharge options we will establish an advance care plan.   Advance Care Planning   Advance Care Planning Discussion: Care conference with patient's daughter and extended family.  Explored events leading to current hospitalization, intolerance to BiPAP, resistance to therapy efforts, and poor to guarded long-term prognosis secondary to acute CHF, acute respiratory failure, right pleural effusion, Alzheimer's dementia, multiple comorbidities, and advanced age.  Daughter verbalizes progressive decline and unlikely to progress or participate with any therapy initiatives at baseline.  Discussed conversations with physicians with regard to pleural effusion management, risk of procedure, and high likelihood of recurrence given patient's heart failure per conversations with physicians.  Reviewed medical priorities including CODE STATUS and  medical interventions, including options of continued aggressive care interventions versus transitions of care with comfort.  Daughters elected to de-escalate care measures to no CPR/comfort measures and avoid any further hospitalizations given progressive decline and multiple comorbid factors.  We reviewed and initiated a MOST document, attending to complete.  In addition based upon goals we discussed discharge options and daughter feels at this juncture patient will need long-term care facility placement due to caregiver needs.  She has elected [facility] care center SNF with hospice and we are potential private pay scenario and daughter unsure if patient will qualify for Medicaid benefits.  Will have unit SW follow-up with family to further discuss.  Questions encouraged and support given.     Review of Systems   Unable to perform ROS: Acuity of condition     Pain Assessment  Nonverbal Indicators of Pain: moaning  CPOT and PAINAD Scales: PAINAD (Pain Assessment in Advance Dementia Scale)  PAINAD Breathin-->normal  PAINAD Negative Vocalization: 0-->none  PAINAD Facial Expression: 0-->smiling or inexpressive  PAINAD Body Language: 0-->relaxed  PAINAD Consolability: 0-->no need to console  PAINAD Score: 0  Pain Location: back  Pain Description: constant, aching    O:     Intake/Output Summary (Last 24 hours) at 2024 0838  Last data filed at 2024 2137  Gross per 24 hour   Intake 360 ml   Output 950 ml   Net -590 ml       Diagnostics and current medications: Reviewed.      Current Facility-Administered Medications:     acetaminophen (TYLENOL) tablet 650 mg, 650 mg, Oral, Q6H PRN, Los Vázquez MD    atorvastatin (LIPITOR) tablet 40 mg, 40 mg, Oral, Nightly, Los Vázquez MD, 40 mg at 24 2112    sennosides-docusate (PERICOLACE) 8.6-50 MG per tablet 2 tablet, 2 tablet, Oral, BID PRN **AND** polyethylene glycol (MIRALAX) packet 17 g, 17 g, Oral, Daily PRN **AND** bisacodyl (DULCOLAX) EC tablet 5 mg, 5  mg, Oral, Daily PRN **AND** bisacodyl (DULCOLAX) suppository 10 mg, 10 mg, Rectal, Daily PRN, Los Vázquez MD    calcitriol (ROCALTROL) capsule 0.25 mcg, 0.25 mcg, Oral, Daily, Los Vázquez MD, 0.25 mcg at 05/07/24 0945    Calcium Replacement - Follow Nurse / BPA Driven Protocol, , Does not apply, PRN, Los Vázquez MD    dilTIAZem CD (CARDIZEM CD) 24 hr capsule 180 mg, 180 mg, Oral, Q24H, Los Vázquez MD, 180 mg at 05/07/24 0945    donepezil (ARICEPT) tablet 5 mg, 5 mg, Oral, Nightly, Los Vázquez MD, 5 mg at 05/07/24 2111    Enoxaparin Sodium (LOVENOX) syringe 100 mg, 1 mg/kg, Subcutaneous, Q12H, Dylon Baig MD    escitalopram (LEXAPRO) tablet 10 mg, 10 mg, Oral, Daily, Los Vázquez MD, 10 mg at 05/07/24 0945    furosemide (LASIX) tablet 40 mg, 40 mg, Oral, BID, Dylon Baig MD, 40 mg at 05/07/24 1759    ipratropium-albuterol (DUO-NEB) nebulizer solution 3 mL, 3 mL, Nebulization, 4x Daily - RT, Los Vázquez MD, 3 mL at 05/08/24 0543    Magnesium Standard Dose Replacement - Follow Nurse / BPA Driven Protocol, , Does not apply, PRN, Los Vázquez MD    methIMAzole (TAPAZOLE) tablet 10 mg, 10 mg, Oral, Daily, Los Vázquez MD, 10 mg at 05/07/24 0946    metoprolol tartrate (LOPRESSOR) tablet 25 mg, 25 mg, Oral, Q12H, Los Vázquez MD, 25 mg at 05/07/24 2111    nitroglycerin (NITROSTAT) SL tablet 0.4 mg, 0.4 mg, Sublingual, Q5 Min PRN, Los Vázquez MD    nystatin (MYCOSTATIN) powder, , Topical, Q12H, Femi Cat DO, Given at 05/07/24 2111    oxyCODONE-acetaminophen (PERCOCET) 5-325 MG per tablet 1 tablet, 1 tablet, Oral, Q6H PRN, Los Vázquez MD, 1 tablet at 05/07/24 2118    Pharmacy to Dose enoxaparin (LOVENOX), , Does not apply, Continuous PRN, Dylon Baig MD    Phosphorus Replacement - Follow Nurse / BPA Driven Protocol, , Does not apply, PRN, Los Vázquez MD    Potassium Replacement - Follow Nurse / BPA  "Driven Protocol, , Does not apply, PRCHAPARRO, Los Vázquez MD    QUEtiapine (SEROquel) tablet 25 mg, 25 mg, Oral, Nightly, Los Vázquez MD, 25 mg at 05/07/24 2111    [COMPLETED] Insert Peripheral IV, , , Once **AND** sodium chloride 0.9 % flush 10 mL, 10 mL, Intravenous, PRN, Los Vázquez MD    sodium chloride 0.9 % flush 10 mL, 10 mL, Intravenous, Q12H, Los Vázquez MD, 10 mL at 05/07/24 2112    sodium chloride 0.9 % infusion 40 mL, 40 mL, Intravenous, PRN, Los Vázquez MD    [Held by provider] warfarin (COUMADIN) tablet 5 mg, 5 mg, Oral, Once per day on Monday Wednesday Friday Saturday, Los Vázquez MD, 5 mg at 05/06/24 1817    [Held by provider] warfarin (COUMADIN) tablet 7.5 mg, 7.5 mg, Oral, Once per day on Sunday Tuesday Thursday, Los Vázquez MD, 7.5 mg at 05/05/24 1746    Allergies   Allergen Reactions    Ciprofloxacin Itching    Codeine Itching and GI Intolerance    Definity [Perflutren Lipid Microsphere] Other (See Comments)     Back pain    Tetanus Toxoids Itching     Itching around site    Tizanidine Hcl Itching    Other Itching     Cloth bandaids , causes redness of skin     I have utilized all available immediate resources to obtain, update, or review the patient's current medications (including all prescriptions, over-the-counter products, herbals, cannabis/cannabidiol products, and vitamin/mineral/dietary (nutritional) supplements) for name, route of administration, type, dose and frequency.    A:    /58 (BP Location: Left arm, Patient Position: Lying)   Pulse 63   Temp 97.5 °F (36.4 °C) (Axillary)   Resp 18   Ht 175.3 cm (69\")   Wt 101 kg (222 lb)   LMP  (LMP Unknown)   SpO2 96%   BMI 32.78 kg/m²     Vitals and nursing note reviewed.   Constitutional:       Appearance: Not in distress. Obese. Chronically ill-appearing.      Interventions: BiPap in place.   Eyes:      General: Lids are normal.      Pupils: Pupils are equal, round, and reactive to light. "   HENT:      Head: Normocephalic.   Pulmonary:      Effort: Pulmonary effort is normal.   Cardiovascular:      Normal rate.   Edema:     Peripheral edema present.     Pretibial: edema of the left pretibial area.  Abdominal:      Palpations: Abdomen is soft.      Comments: Right lower quadrant urostomy   Musculoskeletal: Normal range of motion.      Cervical back: Neck supple.   Skin:     General: Skin is warm.      Comments: Pressure associated skin injury and moisture associated dermatitis bilateral buttocks.  Discoloration left lower extremity with edema.   Neurological:      Comments: Somnolent   Psychiatric:          Cognition and Memory: Memory is impaired. Dementia.     Patient status: Disease state: Deteriorating despite treatments.  Current Functional status: Palliative Performance Scale Score: Performance 40% based on the following measures: Ambulation: Mainly in bed, Activity and Evidence of Disease: Unable to do any work, extensive evidence of disease, Self-Care: Mainly assistance required,  Intake: Normal or reduced, LOC: Full, drowsy or confusion   Baseline Functional status: Palliative Performance Scale Score: Performance 60% based on the following measures: Ambulation: Reduced, Activity and Evidence of Disease: Unable to do hobby or some work, significant evidence of disease, Self-Care: Occasional assist necessary,  Intake: Normal or reduced, LOC: Full or confusion   Baseline ECOG Status(4) Completely disabled, unable to carry out self-care.  Totally confined to bed or chair.  Nutritional status: Albumin 3.3 Body mass index is 31.35 kg/m².      Hospital Problem List      Acute on chronic diastolic congestive heart failure    Essential hypertension    ASA (obstructive sleep apnea)    Hyperthyroidism    Acute respiratory failure with hypoxia and hypercapnia    S/P TAVR (transcatheter aortic valve replacement)    Right pleural effusion    Stage 3 chronic kidney disease    Permanent atrial fibrillation    "  Impression/Problem List:     Acute on chronic diastolic congestive heart failure  Acute respiratory failure with hypoxia and hypercapnia  Right pleural effusion  Alzheimer's dementia-FAST 7C  Cancer? solitary kidney s/p right nephrectomy (roughly 20 years ago \"spot they could not reach, worrisome for cancer\") and cystectomy (cancer per family) s/p urostomy bag (roughly 40 years ago),  Pulmonary hypertension  Pericardial effusion, small less than 1 cm  Chronic kidney disease stage III  Permanent A-fib-on anticoagulation  Aortic stenosis s/p TAVR (5/2018 at [place])-does not regularly follow with cardiology  Chronic pain syndrome  Depression  History of DVT  Hard of hearing  Hyperlipidemia  Hypertension  Hypothyroidism  Impaired mobility-uses wheelchair, nonambulatory x 3 years  Oxygen ptocmvzert-4-3/2 L  Sick sinus syndrome  Sleep apnea-noncompliant with CPAP  Pressure associated skin injury and moisture associated dermatitis, bilateral buttocks  Advanced age       Recommendations/Plan:  1. plan: Goals of care include CODE STATUS No CPR/comfort measures.     Family support: The patient receives support from her daughter..  Advance Directives: Advance Directive Status: Patient does not have advance directive   POA/Healthcare surrogate-daughterSwetha.     2.  Palliative care encounter  - Prognosis is poor secondary to acute CHF, acute respiratory failure, right pleural effusion, Alzheimer's dementia, multiple comorbidities, and advanced age.  -Family appears to have fair prognostic awareness.      -Placed on BiPAP.  Received IV Lasix in the ED, in addition to started on DuoNeb.  -Pulmonology consulted and notes hypercapnia is unlikely to be contributing to patient's narcosis given normal pH and likely chronic hypercapnia and have signed off.    -Cardiology consulted, medication management of CHF    -Placed on fluid restrictions.   5/4-Noted to have decline in renal function and diuresis held    -Urine culture " shows Proteus mirabilis, antibiotics per attending.    -Therapy recommends SNF at discharge.    5/5-Oral diuretics restarted per cardiology after improvement of creatinine and  and recommend evaluation of sleep apnea compliance as feel noncompliance is affecting general cardiac health.   -Continues BiPAP use at nighttime.       5/7-Discussed challenges with medical noncompliance in the home setting and progressive nature of multiple comorbid factors, high risk for rehospitalizations and progressive decline as a result.  Explored options of continued aggressive care interventions and rehospitalizations versus transitions of care with best supportive care directed by hospice.    -ongoing conversation with daughter Wednesday, 5/8 around 9 AM to further determine goals of care.     5/8-CODE STATUS changes no CPR/comfort measures  -Reviewed and initiated a MOST document, attending to complete  -Anticipate SNF at discharge with hospice.  A consult has been placed and discussed with Kay ALFONSO    3.  Comfort care  -We will discontinue any treatments and adjuvants not in line with comfort including BiPAP given patient intolerance.  Family agreeable plan.  -May continue home medications as long as able to safely swallow.  -Palliative adjuvants as needed      Thank you for allowing us to participate in patient's plan of care. Palliative Care Team will continue to follow patient.     35 minutes spent on advance care planning-discussing with patient and/or family, answering questions, providing some guidance about a plan and documentation of care, and coordinating care face to face.    Carol Ramos, APRN  5/8/2024  08:38 CDT

## 2024-05-08 NOTE — PLAN OF CARE
Problem: Adult Inpatient Plan of Care  Goal: Plan of Care Review  Outcome: Ongoing, Progressing  Flowsheets (Taken 5/8/2024 0419)  Outcome Evaluation: VSS. Pt had c/o pain, prn pain meds given as ordered with some relief. Wound care completed. She rested well on bipap overnight. Safety maintained. Care plan ongoing.

## 2024-05-08 NOTE — PLAN OF CARE
Problem: Noninvasive Ventilation Acute  Goal: Effective Unassisted Ventilation and Oxygenation  Outcome: Ongoing, Progressing   Goal Outcome Evaluation:      Pt wears Bipap at hs.

## 2024-05-08 NOTE — CASE MANAGEMENT/SOCIAL WORK
Continued Stay Note  KRYSTAL Mccracken     Patient Name: Jarvis Morgan  MRN: [id number]  Today's Date: 5/8/2024    Admit Date: 5/2/2024    [facility]   Discharge Plan       Row Name 05/08/24 1016       Plan    Plan [facility]    Patient/Family in Agreement with Plan yes    Plan Comments Referral to [facility] per pt's request.  SW has informed Le[facility] in admissions.  Awaiting response.                   Discharge Codes    No documentation.                       DEMETRIUS Oliveira

## 2024-05-08 NOTE — CASE MANAGEMENT/SOCIAL WORK
Continued Stay Note   [place]     Patient Name: Jarvis Morgan  MRN: [id number]  Today's Date: 5/8/2024    Admit Date: 5/2/2024    Plan[facility]   Discharge Plan       Row Name 05/08/24 1112       Plan    Plan [facility]    Patient/Family in Agreement with Plan yes    Final Discharge Disposition Code 03 - skilled nursing facility (SNF)    Final Note Pt to dc to [facility] under skilled/palliative.  Phone:  [phone] Fax: [phone].      Row Name 05/08/24 1016       Plan    Plan [facility]    Patient/Family in Agreement with Plan yes    Plan Comments Referral to [facility] per pt's request.  SW has informed Leola in admissions.  Awaiting response.                   Discharge Codes    No documentation.                       DEMETRIUS Oliveira

## 2024-05-08 NOTE — PROGRESS NOTES
RT EQUIPMENT DEVICE RELATED - SKIN ASSESSMENT    Deven Score:  Deven Score: 14     RT Medical Equipment/Device:     NIV Mask:  Under-the-nose   size:  B    Skin Assessment:      Cheek:  Intact  Nares:  Intact  Lips:  Intact  Mouth:  Intact    Device Skin Pressure Protection:  Skin-to-device areas padded:  None Required    Nurse Notification:  Darcie Mccarthy, RRT

## 2024-05-09 NOTE — PLAN OF CARE
Goal Outcome Evaluation:  Plan of Care Reviewed With: patient        Progress: no change  Outcome Evaluation: Patient hasn't been responsive this shift. She's now receiving morphine and ativan q6h. Patient family requests that vitals be gotten every 4 hours and that an update be given about any changes made to the medication she recieves.

## 2024-05-09 NOTE — PROGRESS NOTES
0637 arrived to pt's room sat 83% on 3 lpm/nc increased to 6 lpm/nc sat came up to 85%. Gave breathing treatment with oxygen at 10 lpm  sat recovered to 90%. Changed pt to a [place] NC at 15 lpm. 0710 sat 91% on 15 lpm/ HFNC. Will continue to monitor and titrate oxygen as tolerated. RN notified and is in agreement with oxygen change.

## 2024-05-09 NOTE — CASE MANAGEMENT/SOCIAL WORK
Continued Stay Note  KRYSTAL Mccracken     Patient Name: Jarvis Morgan  MRN: [id number]  Today's Date: 5/9/2024    Admit Date: 5/2/2024    Plan: SNF   Discharge Plan       Row Name 05/09/24 1056       Plan    Plan SNF    Patient/Family in Agreement with Plan yes    Plan Comments [facility] cannot [facility].  SW spoke with daughter about dc options.  She states home with hospice is not an option; as she cannot meet pt's needs.  She wanted the hospice care center, however, pt does not meet criteria.  Referrals have been made to [facility] and [facility].                   Discharge Codes    No documentation.                 Expected Discharge Date and Time       Expected Discharge Date Expected Discharge Time    May 8, 2024               DEMETRIUS Oliveira

## 2024-05-09 NOTE — NURSING NOTE
Pt got agitated during the night and was too agitated and out of it to take her PRN ativan by mouth. Called Dr Miner and got ativan IM and gave that to her to calm her down.

## 2024-05-09 NOTE — PROGRESS NOTES
Nutrition Services    Patient Name:  Jarvis Morgan  YOB: 1945  MRN: [id number]  Admit Date:  5/2/2024    Inpatient nutrition services reviewed POC. Interventions align with the goal(s) to maintain comfort at this time. Inpatient nutrition services/RD available upon request.     Electronically signed by:  Pily Hatch RDN, LD  05/09/24 15:30 CDT

## 2024-05-09 NOTE — PROGRESS NOTES
"1         [facility] Medicine Services  INPATIENT PROGRESS NOTE    Patient Name: Jarvis Morgan  Date of Admission: 5/2/2024  Today's Date: 05/09/24  Length of Stay: 7  Primary Care Physician: Mary Beth Izquierdo APRN    Subjective   Chief Complaint: Follow-up  HPI   Patient is planned for discharge was hampered by the weather yesterday.  I am also told that the skilled nursing facility who is supposed to take her \"backed out\"  I spoke to social service who made further referrals to other places  Patient also had some medication changes to address goals of comfort.  Sisters are at bedside        Review of Systems   Patient unable to participate in her care  Patient sedated and comfortable    Objective    Temp:  [97.8 °F (36.6 °C)-98.2 °F (36.8 °C)] 97.9 °F (36.6 °C)  Heart Rate:  [61-98] 89  Resp:  [18] 18  BP: (107-148)/(60-69) 107/69  Physical Exam  Patient is resting comfortably and not in any distress  Sedated  Family is at bedside      Results Review:  I have reviewed the labs, radiology results, and diagnostic studies.    Laboratory Data:   Results from last 7 days   Lab Units 05/06/24  0425 05/05/24  0421 05/04/24  0835   WBC 10*3/mm3 8.10 7.39 8.34   HEMOGLOBIN g/dL 14.1 12.6 13.9   HEMATOCRIT % 45.7 40.4 45.9   PLATELETS 10*3/mm3 169 157 163        Results from last 7 days   Lab Units 05/06/24  0425 05/05/24  0421 05/04/24  0835   SODIUM mmol/L 142 141 142   POTASSIUM mmol/L 4.9 4.7 5.0   CHLORIDE mmol/L 103 102 103   CO2 mmol/L 33.0* 31.0* 32.0*   BUN mg/dL 56* 53* 48*   CREATININE mg/dL 1.59* 1.78* 1.91*   CALCIUM mg/dL 11.1* 10.5 10.5   BILIRUBIN mg/dL  --   --  0.6   ALK PHOS U/L  --   --  77   ALT (SGPT) U/L  --   --  10   AST (SGOT) U/L  --   --  13   GLUCOSE mg/dL 111* 88 139*       Culture Data:   Urine Culture   Date Value Ref Range Status   05/04/2024 >100,000 CFU/mL Proteus mirabilis (A)  Final       Radiology Data:   Imaging Results (Last 24 Hours)       ** No " results found for the last 24 hours. **            I have reviewed the patient's current medications.     Assessment/Plan   Assessment  Active Hospital Problems    Diagnosis     **Acute on chronic diastolic congestive heart failure     Permanent atrial fibrillation     Right pleural effusion     Stage 3 chronic kidney disease     S/P TAVR (transcatheter aortic valve replacement)     Hyperthyroidism     ASA (obstructive sleep apnea)     Acute respiratory failure with hypoxia and hypercapnia     Essential hypertension            Medical Decision Making  Number and Complexity of problems:   Acute hypoxic and hypercarbic respiratory failure  Acute on chronic diastolic heart failure  Moderate pulmonary hypertension  Bilateral pleural effusion right greater than left  Stage III chronic kidney disease  History of TAVR  Hyperthyroidism  Obstructive sleep apnea noncompliant with CPAP  Permanent atrial fibrillation  Fluid retention lower extremity probably related to right-sided heart failure with pulmonary hypertension  Proteus mirabilis urinary tract infection  Probable underlying dementia  Possible underlying peripheral arterial disease.  Hypertension  Palliative care/comfort care patient    Continue comfort measures  Discussed with palliative care Poppy and social service  Apprised family of current plan of care  They verbalized adequate understanding    Expand All Collapse All    1                                                               [facility] Medicine Services  INPATIENT PROGRESS NOTE     Patient Name: Jarvis Morgan  Date of Admission: 5/2/2024  Today's Date: 05/07/24  Length of Stay: 5  Primary Care Physician: Mary Beth Izquierdo APRN     Subjective   Chief Complaint: Follow-up  HPI   Patient on day 4 of hospitalization  This is my first day to see the patient     Since admission patient been seen by cardiology and pulmonary medicine.     Patient presented with shortness of  breath.  Patient was admitted with provisional diagnosis of:     acute hypercarbic respiratory failure  Acute decompensated heart failure  Atrial fibrillation with supratherapeutic INR  Chronic kidney disease stage III  History of obstructive sleep apnea on CPAP at home        89-year-old lady with past medical history significant for atrial fibrillation on chronic warfarin use, history of hypertension, history of diastolic heart failure who presented initially to the emergency department (earlier today) with increased shortness of air, apparently family called EMS (earlier today) and stated that she is becoming more dyspneic and she has worsening lower extremity edema, also she is becoming more more lethargic  Patient reportedly had 80% of oxygenation via air.  Arterial blood gas reportedly has hypercarbia.  Patient was placed on BiPAP and given Lasix.     Review of admitting H&P indicates creatinine at 1.89  Calcium of 10.8  CO2 32  INR of 3.1  Chest x-ray indicates volume overload with bilateral pulmonary edema with bilateral layering effusions.  Right pleural fluid effusion is large in size.     Patient is hemodynamically stable  Patient on NIPPV at 28 to 35% FiO2  O2 saturation in the mid 90s     Cardiologist's impression includes:  Acute on chronic diastolic heart failure  Acute on chronic respiratory failure with hypoxia and hypercapnia  Hypertension  Permanent atrial fibrillation with CHADS2 Vascor of 5 (age, female, CHF, hypertension)  Valvular heart disease status post TAVR in 2018 at [place]  CKD stage III  FAROOQ  Mixed hyperlipidemia  Chronic anticoagulation for permanent atrial fibrillation and reported history of DVT.        Pulmonary medicine's assessment includes:  Acute resp failure with hypoxia and hypercapnia requiring bipap, threat to life and bodily function with high risk of morbidity from additional diagnostic testing or treatment   Farooq essentially untreated.  No other pulmonary disease  "identified  Pulmonary edema and pleural effusions  Mildly elevated bnp of uncertain significance  Ckd 3  Hx tavr  Chronic anticoagulation status  Hyperthyroidism  scoliosis  The service signed off on day 4.        Patient is very difficult to understand with her BiPAP mask on.  It is not clear to me whether she uses CPAP/BiPAP or oxygen at home  I offered her to replace nasal cannula however she declined.  I asked her whether this causes her difficulty to breathe if this is removed.  I am hoping just to removed it temporarily so we can understand each other clearly        It is not clear to me whether the patient is feeling better compared to admission.     Today, May 7:  Patient has no significant change from yesterday  I took off the BiPAP and place her on nasal cannula which was set at 3 L  She maintains her oxygen saturation at mid 90s.  I did not get much information from her  \"I do not know\"  I called her daughter Swetha.  I was able to verify that she has CPAP and oxygen at home although she uses the oxygen only as needed and is noncompliant with CPAP     I expressed to her the presence of pleural effusion bilateral right greater than left  I gave her options including diuresis and its cons and p.o.  I also mentioned to her about thoracentesis and the fact that patient is on anticoagulation.  She told me she worked with therapist yesterday.  She directly observed stand with therapist.     Review of Systems   Limited-healing issue  Not sure whether she has insight of her condition  Otherwise has not complained of any shortness of breath or difficulty breathing  All pertinent negatives and positives are as above. All other systems have been reviewed and are negative unless otherwise stated.      Objective    Temp:  [97.2 °F (36.2 °C)-98.2 °F (36.8 °C)] 98.1 °F (36.7 °C)  Heart Rate:  [59-92] 67  Resp:  [12-22] 22  BP: (106-158)/(52-91) 136/65  Physical Exam  Very limited insight  Hard of hearing  HEENT: " Normocephalic, atraumatic, pupils are equal reactive to light, and accommodate  Neck: Supple, no JVD, no carotid bruits  CVS: Regular rhythm   lungs:  diminished breath sounds at the bases  Abdomen: Soft, nontender, nondistended bowel sounds are present; ostomy present pink in color.  Ostomy present.  Had -1489 mL fluid balance.  Extremities: Edema present bilaterally; hyperpigmented left shin.  Colder distal extremities left greater than right, diminished pulses dorsalis pedis  Neurologic: No focal deficits  Psychiatric: Normal affect           Results Review:  I have reviewed the labs, radiology results, and diagnostic studies.     Laboratory Data:          Results from last 7 days   Lab Units 05/06/24  0425 05/05/24  0421 05/04/24  0835   WBC 10*3/mm3 8.10 7.39 8.34   HEMOGLOBIN g/dL 14.1 12.6 13.9   HEMATOCRIT % 45.7 40.4 45.9   PLATELETS 10*3/mm3 169 157 163                  Results from last 7 days   Lab Units 05/06/24  0425 05/05/24  0421 05/04/24  0835 05/02/24  1241 05/02/24  1140   SODIUM mmol/L 142 141 142   < > 143   SODIUM, ARTERIAL    --   --   --    < >  --    POTASSIUM mmol/L 4.9 4.7 5.0   < > 4.8   CHLORIDE mmol/L 103 102 103   < > 104   CO2 mmol/L 33.0* 31.0* 32.0*   < > 32.0*   BUN mg/dL 56* 53* 48*   < > 40*   CREATININE mg/dL 1.59* 1.78* 1.91*   < > 1.89*   CALCIUM mg/dL 11.1* 10.5 10.5   < > 10.8*   BILIRUBIN mg/dL  --   --  0.6  --  0.5   ALK PHOS U/L  --   --  77  --  79   ALT (SGPT) U/L  --   --  10  --  12   AST (SGOT) U/L  --   --  13  --  16   GLUCOSE mg/dL 111* 88 139*   < > 119*    < > = values in this interval not displayed.      INR 2.48  Patient's urine culture from May 4 showed gram-negative bacilli greater than 100,000 colony-forming units  Normal TSH with decreased free T4  Cultures to date no growth     Culture Data:         Blood Culture   Date Value Ref Range Status   05/02/2024 No growth at 3 days   Preliminary   05/02/2024 No growth at 3 days   Preliminary            Urine  Culture   Date Value Ref Range Status   05/04/2024 >100,000 CFU/mL Gram Negative Bacilli (A)   Preliminary         Radiology Data:   Imaging Results (Last 24 Hours)         Procedure Component Value Units Date/Time     XR Chest 1 View [[id number]] Collected: 05/06/24 1427       Updated: 05/06/24 1433     Narrative:       EXAMINATION:  XR CHEST 1 VW-  5/6/2024 1:24 PM     HISTORY: Hypoxia. J96.01-Acute respiratory failure with hypoxia;  J96.02-Acute respiratory failure with hypercapnia; I50.9-Heart failure,  unspecified; O30-[place] effusion, not elsewhere classified;  N18.9-Chronic kidney disease, unspecified; I48.11-Longstanding  persistent atrial fibrillation; Z74.09-Other reduced mobility.     COMPARISON: 5/3/2024.     TECHNIQUE: Single view AP image.     FINDINGS: There is a large pleural effusion on the right. There is small  to moderate pleural effusion on the left. There is at least passive  atelectasis in both lungs. There is some shifting of the heart to the  left. The trachea deviates to the left. The cardiac silhouette is  predominantly obscured. There has been prior TAVR aortic valve  replacement. There are degenerative changes of the spine.           Impression:       1. Large right pleural effusion with at least passive atelectasis of the  right lung.  2. Small to moderate left pleural effusion with at least passive  atelectasis of the left lung.  3. Shifting of the heart and mediastinum towards the left could be  related to some degree of rotation.  4. Prior TAVR aortic valve replacement. Heart size cannot be evaluated  due to opacities obscuring the heart borders.           This report was signed and finalized on 5/6/2024 2:30 PM by Dr. Josh Robledo MD.                   I have reviewed the patient's current medications.      Assessment/Plan   Assessment                Active Hospital Problems     Diagnosis      **Acute on chronic diastolic congestive heart failure      Permanent atrial  fibrillation      Right pleural effusion      Stage 3 chronic kidney disease      S/P TAVR (transcatheter aortic valve replacement)      Hyperthyroidism      ASA (obstructive sleep apnea)      Acute respiratory failure with hypoxia and hypercapnia      Essential hypertension              Medical Decision Making  Number and Complexity of problems:      Acute on chronic(?) hypoxic and hypercarbic respiratory failure  Acute on chronic diastolic heart failure  Moderate pulmonary hypertension  Stage III chronic kidney disease-improving creatinine  History of TAVR in 2000  Hypothyroidism on methimazole  Obstructive sleep apnea - ?  Relation to pulmonary hypertension  Fluid retention of lower extremity probably related to right-sided heart failure with pulmonary hypertension  Supratherapeutic anticoagulation on admission now improved  Permanent atrial fibrillation  Gram-negative bacilli (Proteus mirabilis) present in urine culture.  Susceptible to ceftriaxone.  ?  Probably underlying dementia-on donepezil.  Possible underlying peripheral arterial disease as evidenced by cold extremities of lower extremity  Treatment Plan     Monitor PT/INR.  On Coumadin.  Maintain INR between 2-3; monitor for active bleeding.  (2.72 INR)  Empiric antibiotic.  Patient has gram-negative bacilli in urine culture.  Follow-up susceptibility.  Noted that patient at night uses NIPPV anywhere from 28 to 35% with saturation in the mid 90s  Continue antihypertensive medication (diltiazem, metoprolol); heart rate adequately controlled. Wean down to lowest FiO2 to maintain saturation greater than 90%.  Continue thyroid medication.  I requested for chest x-ray to follow through the abnormality and chest x-ray while on diuretic.  Monitor input output     Today, May 7.  I may have to hold off on Coumadin and let it drift down while continuing on Lovenox even in the event that they decide to pursue a thoracentesis.  I expect that ceftriaxone will be  completed later today  Continue to work with therapist  Continue on diuretic and be more aggressive on it by increasing it twice daily following renal function and electrolytes and correcting this as needed.  Patient has A-fib and continuing rate limiting medications such as diltiazem and metoprolol heart rate as tolerated  Continuing thyroid medication  Continuing physical rehabilitation and discussion with social service on discharge planning  Spoke with daughter as outlined above.     Medications reviewed    LORazepam, 1 mg, Sublingual, Q6H  morphine, 10 mg, Sublingual, Q6H  nystatin, , Topical, Q12H  Scopolamine, 1 patch, Transdermal, Q72H  sodium chloride, 10 mL, Intravenous, Q12H                          Conditions and Status  Comfortable  MDM Data  External documents reviewed: Reviewed epic record     Results for orders placed during the hospital encounter of 05/02/24     Adult Transthoracic Echo Complete w/ Color, Spectral and Contrast if Necessary Per Protocol     Interpretation Summary    Atrial fibrillation was the predominant rhythm observed during the procedure.    Left ventricular systolic function is normal. Left ventricular ejection fraction appears to be 61 - 65%.    The right ventricular cavity is moderate to severely dilated. Moderately reduced right ventricular systolic function noted.    The left atrial cavity is moderately dilated    The aortic valve exhibits sclerosis. There is moderate calcification of the aortic valve. Trace aortic valve regurgitation is present. Moderate aortic valve stenosis is present    Moderate mitral annular calcification is present. Mild mitral valve regurgitation is present.    Tricuspid annular calcification is present. Moderate tricuspid valve regurgitation is present    Moderate pulmonary hypertension is present.    The inferior vena cava is dilated. IVC inspiratory collapse is absent.    There is a small (<1cm) pericardial effusion. There is no evidence of  cardiac tamponade.        Cardiac tracing (EKG, telemetry) interpretation: Atrial fibrillation, right bundle branch block.  Radiology interpretation:      On the x-ray on the right side shows absence of the cardiophrenic and costophrenic angle is pronounced compared to the left lung field but when compared to the one on the left  Film, there is diffuse haziness mostly of the right lung field.  Presence of pleural effusion noted.  This is also evidence in the abdominal pelvic CT scan here.          Labs reviewed: Yes  Any tests that were considered but not ordered: None     Decision rules/scores evaluated (example [id number], [place], etc): None     Discussed with: daughter patient and nurse [place]      Care Planning  Shared decision making: Family  Code status and discussions: Comfort measure  I confirmed that the patient's advanced care plan is present, code status is documented, and a surrogate decision maker is listed in the patient's medical record.  Disposition     Electronically signed by Dylon Baig MD, 05/09/24, 17:06 CDT.

## 2024-05-10 NOTE — NURSING NOTE
Patient , charge nurse, house supervisor, hospitalist and family notified. [facility] called, patient is not a candidate for organ or tissue donation. Family not at the bedside and will not be coming in. Patient taken to the [facility] for  home .

## 2024-05-10 NOTE — THERAPY DISCHARGE NOTE
Acute Care - Occupational Therapy Discharge Summary  [facility]     Patient Name: Jarvis Morgan  : 1945  MRN: [id number]    Today's Date: 5/10/2024                 Admit Date: 2024        OT Recommendation and Plan    Visit Dx:    ICD-10-CM ICD-9-CM   1. Acute respiratory failure with hypoxia and hypercapnia  J96.01 518.81    J96.02    2. Acute on chronic congestive heart failure, unspecified heart failure type  I50.9 428.0   3. Pleural effusion on right  J90 511.9   4. Chronic renal impairment, unspecified CKD stage  N18.9 585.9   5. Longstanding persistent atrial fibrillation  I48.11 427.31   6. Impaired mobility [Z74.09]  Z74.09 799.89   7. Decreased activities of daily living (ADL)  Z78.9 V49.89   8. Encounter for palliative care  Z51.5 V66.7   9. Comfort measures only status  Z51.5 V66.7                OT Rehab Goals       Row Name 05/10/24 0700             Dressing Goal 1 (OT)    Activity/Device (Dressing Goal 1, OT) upper body dressing  -CS      [place]/Cues Needed (Dressing Goal 1, OT) minimum assist (75% or more patient effort)  -CS      Time Frame (Dressing Goal 1, OT) long term goal (LTG);by discharge  -CS      Progress/Outcome (Dressing Goal 1, OT) goal not met  -CS         Grooming Goal 1 (OT)    Activity/Device (Grooming Goal 1, OT) grooming skills, all  -CS      [place] (Grooming Goal 1, OT) standby assist  -CS      Time Frame (Grooming Goal 1, OT) long term goal (LTG);by discharge  -CS      Strategies/Barriers (Grooming Goal 1, OT) sitting EOB  -CS      Progress/Outcome (Grooming Goal 1, OT) goal not met  -CS         Self-Feeding Goal 1 (OT)    Activity/Device (Self-Feeding Goal 1, OT) self-feeding skills, all  -CS      [place] Level/Cues Needed (Self-Feeding Goal 1, OT) supervision required  -CS      Time Frame (Self-Feeding Goal 1, OT) long term goal (LTG);by discharge  -CS      Progress/Outcomes (Self-Feeding Goal 1, OT) goal not met  -CS                User Key  (r) =  Recorded By, (t) = Taken By, (c) = Cosigned By      Initials Name Provider Type Discipline    CS Natalia Redding, OTR/L, CNT Occupational Therapist OT                     Outcome Measures       Row Name 24 1500 24 0859          How much help from another person do you currently need...    Turning from your back to your side while in flat bed without using bedrails? -- 3  -MF     Moving from lying on back to sitting on the side of a flat bed without bedrails? -- 2  -MF     Moving to and from a bed to a chair (including a wheelchair)? -- 1  -MF     Standing up from a chair using your arms (e.g., wheelchair, bedside chair)? -- 2  -MF     Climbing 3-5 steps with a railing? -- 1  -MF     To walk in hospital room? -- 1  -MF     AM-PAC 6 Clicks Score (PT) -- 10  -MF     Highest Level of Mobility Goal -- 4 --> Transfer to chair/commode  -        How much help from another is currently needed...    Putting on and taking off regular lower body clothing? 1  -LS --     Bathing (including washing, rinsing, and drying) 2  -LS --     Toileting (which includes using toilet bed pan or urinal) 1  -LS --     Putting on and taking off regular upper body clothing 2  -LS --     Taking care of personal grooming (such as brushing teeth) 2  -LS --     Eating meals 2  -LS --     AM-PAC 6 Clicks Score (OT) 10  -LS --        Functional Assessment    Outcome Measure Options AM-PAC 6 Clicks Daily Activity (OT)  -LS AM-PAC 6 Clicks Basic Mobility (PT)  -               User Key  (r) = Recorded By, (t) = Taken By, (c) = Cosigned By      Initials Name Provider Type    Pebbles Islas PTA Physical Therapist Assistant    Melanie Arciniega COTA Occupational Therapist Assistant                            OT Discharge Summary  Anticipated Discharge Disposition (OT): skilled nursing facility  Reason for Discharge: Patient   Outcomes Achieved: Refer to plan of care for updates on goals achieved  Discharge Destination: other  (comment) (Pt )      Natalia Redding, OTR/L, CNT  5/10/2024

## 2024-05-10 NOTE — THERAPY DISCHARGE NOTE
Acute Care - Physical Therapy Discharge Summary  [facility]       Patient Name: Jarvis Morgan  : 1945  MRN: [id number]    Today's Date: 5/10/2024                 Admit Date: 2024      PT Recommendation and Plan    Visit Dx:    ICD-10-CM ICD-9-CM   1. Acute respiratory failure with hypoxia and hypercapnia  J96.01 518.81    J96.02    2. Acute on chronic congestive heart failure, unspecified heart failure type  I50.9 428.0   3. Pleural effusion on right  J90 511.9   4. Chronic renal impairment, unspecified CKD stage  N18.9 585.9   5. Longstanding persistent atrial fibrillation  I48.11 427.31   6. Impaired mobility [Z74.09]  Z74.09 799.89   7. Decreased activities of daily living (ADL)  Z78.9 V49.89   8. Encounter for palliative care  Z51.5 V66.7   9. Comfort measures only status  Z51.5 V66.7        Outcome Measures       Row Name 24 1500             How much help from another is currently needed...    Putting on and taking off regular lower body clothing? 1  -LS      Bathing (including washing, rinsing, and drying) 2  -LS      Toileting (which includes using toilet bed pan or urinal) 1  -LS      Putting on and taking off regular upper body clothing 2  -LS      Taking care of personal grooming (such as brushing teeth) 2  -LS      Eating meals 2  -LS      AM-PAC 6 Clicks Score (OT) 10  -LS         Functional Assessment    Outcome Measure Options AM-PAC 6 Clicks Daily Activity (OT)  -LS                User Key  (r) = Recorded By, (t) = Taken By, (c) = Cosigned By      Initials Name Provider Type    LS Melanie Silver COTA Occupational Therapist Assistant                         PT Rehab Goals       Row Name 05/10/24 1000             Bed Mobility Goal 1 (PT)    Activity/Assistive Device (Bed Mobility Goal 1, PT) sit to supine/supine to sit  -AB      [place] Level/Cues Needed (Bed Mobility Goal 1, PT) contact guard required  -AB      Time Frame (Bed Mobility Goal 1, PT) long term goal (LTG);10 days  -AB       Progress/Outcomes (Bed Mobility Goal 1, PT) goal not met  -AB         Transfer Goal 1 (PT)    Activity/Assistive Device (Transfer Goal 1, PT) sit-to-stand/stand-to-sit;bed-to-chair/chair-to-bed;wheelchair transfer;toilet  -AB      [place] Level/Cues Needed (Transfer Goal 1, PT) minimum assist (75% or more patient effort)  -AB      Time Frame (Transfer Goal 1, PT) long term goal (LTG);10 days  -AB      Progress/Outcome (Transfer Goal 1, PT) goal not met  -AB                User Key  (r) = Recorded By, (t) = Taken By, (c) = Cosigned By      Initials Name Provider Type Discipline    Alyssa Mcdermott, PTA Physical Therapist Assistant PT                        PT Discharge Summary  Reason for Discharge: Patient   Outcomes Achieved: Refer to plan of care for updates on goals achieved  Discharge Destination: other (comment) ()      Alyssa Alvarez PTA   5/10/2024

## (undated) DEVICE — BNDG ELAS W/CLIP 6IN 10YD LF STRL

## (undated) DEVICE — SYR SLP TP 10ML DISP

## (undated) DEVICE — PK HIP ORIF FX TABL 30

## (undated) DEVICE — INTENDED FOR TISSUE SEPARATION, AND OTHER PROCEDURES THAT REQUIRE A SHARP SURGICAL BLADE TO PUNCTURE OR CUT.: Brand: BARD-PARKER ® STAINLESS STEEL BLADES

## (undated) DEVICE — 4-PORT MANIFOLD: Brand: NEPTUNE 2

## (undated) DEVICE — BNDG ADHS CURAD FLX/FABRC 1X3IN STRL LF

## (undated) DEVICE — PAD,PREPPING,CUFFED,24X48,7",NONSTERILE: Brand: MEDLINE

## (undated) DEVICE — PAD MINOR UNIVERSAL: Brand: MEDLINE INDUSTRIES, INC.

## (undated) DEVICE — BANDAGE,GAUZE,BULKEE II,4.5"X4.1YD,STRL: Brand: MEDLINE

## (undated) DEVICE — GLV SURG DERMASSURE GRN LF PF 8.0

## (undated) DEVICE — GLV SURG TRIUMPH PF LTX 7.5 STRL

## (undated) DEVICE — PROXIMATE RH ROTATING HEAD SKIN STAPLERS (35 REGULAR) CONTAINS 35 STAINLESS STEEL STAPLES: Brand: PROXIMATE

## (undated) DEVICE — SPNG GZ WOVN 4X4IN 12PLY 10/BX STRL

## (undated) DEVICE — Device

## (undated) DEVICE — CATH VASC RF CLOSUREFAST 7CM 7F100CM

## (undated) DEVICE — SHEATH INTRO MICRO 7F 11CM

## (undated) DEVICE — GLV SURG NEOLON 2G PF LF 7.5 STRL

## (undated) DEVICE — GEL ULTRASND HI VISC 20G PACKET STRL

## (undated) DEVICE — BNDG ELAS ECON W/CLIP 4IN 5YD LF STRL

## (undated) DEVICE — SUT GUT CHRM 2/0 CT1 36IN 923H

## (undated) DEVICE — APPL CHLORAPREP W/TINT 26ML ORNG

## (undated) DEVICE — PK TURNOVER RM ADV

## (undated) DEVICE — ST TB EXT STANDARDBORE 30IN

## (undated) DEVICE — CVR UNIV C/ARM

## (undated) DEVICE — 3M™ STERI-STRIP™ REINFORCED ADHESIVE SKIN CLOSURES, R1547, 1/2 IN X 4 IN (12 MM X 100 MM), 6 STRIPS/ENVELOPE: Brand: 3M™ STERI-STRIP™

## (undated) DEVICE — HI-TORQUE COMMAND ES GUIDE WIRE .014" 300 CM: Brand: HI-TORQUE COMMAND

## (undated) DEVICE — PROXIMATE RH ROTATING HEAD SKIN STAPLERS (35 WIDE) CONTAINS 35 STAINLESS STEEL STAPLES: Brand: PROXIMATE

## (undated) DEVICE — NDL SPINE 20G 3 1/2 YEL STRL 1P/U

## (undated) DEVICE — BIT DRL 3FLUT QC CALIB 4.2X330X100MM

## (undated) DEVICE — ST INF 2NDARY 20DRP VNT/NOVNT 30IN

## (undated) DEVICE — GW FOR TROCH NAIL 3.2X400MM

## (undated) DEVICE — CVR PROB GEN PURP W ISOSILK 6X48

## (undated) DEVICE — DRSNG WND GZ CURAD OIL EMULSION 3X8IN STRL PK/3

## (undated) DEVICE — Device: Brand: MEDEX